# Patient Record
Sex: MALE | Race: WHITE | Employment: UNEMPLOYED | URBAN - METROPOLITAN AREA
[De-identification: names, ages, dates, MRNs, and addresses within clinical notes are randomized per-mention and may not be internally consistent; named-entity substitution may affect disease eponyms.]

---

## 2018-09-05 ENCOUNTER — APPOINTMENT (OUTPATIENT)
Dept: CT IMAGING | Age: 28
End: 2018-09-05
Payer: COMMERCIAL

## 2018-09-05 ENCOUNTER — HOSPITAL ENCOUNTER (EMERGENCY)
Age: 28
Discharge: HOME | End: 2018-09-05
Attending: EMERGENCY MEDICINE
Payer: COMMERCIAL

## 2018-09-05 VITALS
HEIGHT: 66 IN | WEIGHT: 130 LBS | OXYGEN SATURATION: 95 % | RESPIRATION RATE: 16 BRPM | BODY MASS INDEX: 20.89 KG/M2 | TEMPERATURE: 97.8 F | DIASTOLIC BLOOD PRESSURE: 70 MMHG | HEART RATE: 102 BPM | SYSTOLIC BLOOD PRESSURE: 117 MMHG

## 2018-09-05 DIAGNOSIS — F10.10 ALCOHOL ABUSE: ICD-10-CM

## 2018-09-05 DIAGNOSIS — F10.920 ACUTE ALCOHOLIC INTOXICATION WITHOUT COMPLICATION (HCC): ICD-10-CM

## 2018-09-05 DIAGNOSIS — S09.90XA CLOSED HEAD INJURY, INITIAL ENCOUNTER: ICD-10-CM
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
## 2018-09-05 PROCEDURE — 72125 CT NECK SPINE W/O DYE: CPT

## 2018-09-05 PROCEDURE — 99284 EMERGENCY DEPT VISIT MOD MDM: CPT

## 2018-09-05 PROCEDURE — 70450 CT HEAD/BRAIN W/O DYE: CPT

## 2018-09-05 ASSESSMENT — ENCOUNTER SYMPTOMS
SORE THROAT: 0
DIARRHEA: 0
RHINORRHEA: 0
SHORTNESS OF BREATH: 0
VOMITING: 0
ABDOMINAL PAIN: 0
COUGH: 0
NAUSEA: 0
BLOOD IN STOOL: 0
BACK PAIN: 0
CONSTIPATION: 0

## 2018-09-05 NOTE — ED NOTES
Bed: 10  Expected date:   Expected time:   Means of arrival:   Comments:  Thelma Vázquez [facility]  09/05/18 1027 [facility]

## 2018-09-07 ENCOUNTER — HOSPITAL ENCOUNTER (INPATIENT)
Age: 28
LOS: 6 days | Discharge: HOME | DRG: 753 | End: 2018-09-13
Attending: PSYCHIATRY & NEUROLOGY | Admitting: PSYCHIATRY & NEUROLOGY
Payer: COMMERCIAL

## 2018-09-07 PROBLEM — F32.2 MAJOR DEPRESSIVE DISORDER, SEVERE (HCC): Status: ACTIVE | Noted: 2018-09-07

## 2018-09-07 PROCEDURE — 6370000000 HC RX 637 (ALT 250 FOR IP): Performed by: NURSE PRACTITIONER

## 2018-09-07 PROCEDURE — 1240000000 HC EMOTIONAL WELLNESS R&B

## 2018-09-07 RX ORDER — OLANZAPINE 10 MG/1
10 TABLET ORAL
Status: COMPLETED | OUTPATIENT
Start: 2018-09-07 | End: 2018-09-07

## 2018-09-07 RX ORDER — BENZTROPINE MESYLATE 1 MG/ML
2 INJECTION INTRAMUSCULAR; INTRAVENOUS 2 TIMES DAILY PRN
Status: DISCONTINUED | OUTPATIENT
Start: 2018-09-07 | End: 2018-09-13 | Stop reason: HOSPADM

## 2018-09-07 RX ORDER — HALOPERIDOL 5 MG/ML
10 INJECTION INTRAMUSCULAR EVERY 6 HOURS PRN
Status: DISCONTINUED | OUTPATIENT
Start: 2018-09-07 | End: 2018-09-10 | Stop reason: SDUPTHER

## 2018-09-07 RX ORDER — NICOTINE 21 MG/24HR
1 PATCH, TRANSDERMAL 24 HOURS TRANSDERMAL DAILY
Status: DISCONTINUED | OUTPATIENT
Start: 2018-09-07 | End: 2018-09-13 | Stop reason: HOSPADM

## 2018-09-07 RX ORDER — CHLORDIAZEPOXIDE HYDROCHLORIDE 5 MG/1
5 CAPSULE, GELATIN COATED ORAL EVERY 6 HOURS PRN
Status: DISCONTINUED | OUTPATIENT
Start: 2018-09-07 | End: 2018-09-13 | Stop reason: HOSPADM

## 2018-09-07 RX ORDER — TRAZODONE HYDROCHLORIDE 50 MG/1
50 TABLET ORAL NIGHTLY PRN
Status: DISCONTINUED | OUTPATIENT
Start: 2018-09-07 | End: 2018-09-12

## 2018-09-07 RX ORDER — ACETAMINOPHEN 325 MG/1
650 TABLET ORAL EVERY 4 HOURS PRN
Status: DISCONTINUED | OUTPATIENT
Start: 2018-09-07 | End: 2018-09-13 | Stop reason: HOSPADM

## 2018-09-07 RX ORDER — MAGNESIUM HYDROXIDE/ALUMINUM HYDROXICE/SIMETHICONE 120; 1200; 1200 MG/30ML; MG/30ML; MG/30ML
30 SUSPENSION ORAL PRN
Status: DISCONTINUED | OUTPATIENT
Start: 2018-09-07 | End: 2018-09-13 | Stop reason: HOSPADM

## 2018-09-07 RX ORDER — SODIUM CHLORIDE 0.9 % (FLUSH) 0.9 %
10 SYRINGE (ML) INJECTION PRN
Status: DISCONTINUED | OUTPATIENT
Start: 2018-09-07 | End: 2018-09-08 | Stop reason: SDUPTHER

## 2018-09-07 RX ORDER — ONDANSETRON 2 MG/ML
4 INJECTION INTRAMUSCULAR; INTRAVENOUS EVERY 6 HOURS PRN
Status: DISCONTINUED | OUTPATIENT
Start: 2018-09-07 | End: 2018-09-13 | Stop reason: HOSPADM

## 2018-09-07 RX ORDER — HYDROXYZINE PAMOATE 50 MG/1
50 CAPSULE ORAL EVERY 6 HOURS PRN
Status: DISCONTINUED | OUTPATIENT
Start: 2018-09-07 | End: 2018-09-12

## 2018-09-07 RX ORDER — SODIUM CHLORIDE 0.9 % (FLUSH) 0.9 %
10 SYRINGE (ML) INJECTION EVERY 12 HOURS SCHEDULED
Status: DISCONTINUED | OUTPATIENT
Start: 2018-09-07 | End: 2018-09-08 | Stop reason: SDUPTHER

## 2018-09-07 RX ORDER — GABAPENTIN 100 MG/1
100 CAPSULE ORAL 2 TIMES DAILY
Status: DISCONTINUED | OUTPATIENT
Start: 2018-09-07 | End: 2018-09-08

## 2018-09-07 RX ADMIN — HYDROXYZINE PAMOATE 50 MG: 50 CAPSULE ORAL at 17:44

## 2018-09-07 RX ADMIN — OLANZAPINE 10 MG: 10 TABLET, FILM COATED ORAL at 20:16

## 2018-09-07 RX ADMIN — ACETAMINOPHEN 650 MG: 325 TABLET, FILM COATED ORAL at 17:45

## 2018-09-07 RX ADMIN — CHLORDIAZEPOXIDE HYDROCHLORIDE 5 MG: 5 CAPSULE ORAL at 20:16

## 2018-09-07 RX ADMIN — GABAPENTIN 100 MG: 100 CAPSULE ORAL at 20:16

## 2018-09-07 ASSESSMENT — SLEEP AND FATIGUE QUESTIONNAIRES
DIFFICULTY FALLING ASLEEP: YES
AVERAGE NUMBER OF SLEEP HOURS: 2
DIFFICULTY STAYING ASLEEP: YES
DO YOU USE A SLEEP AID: YES
DO YOU HAVE DIFFICULTY SLEEPING: YES
DIFFICULTY ARISING: NO
SLEEP PATTERN: DIFFICULTY FALLING ASLEEP;DISTURBED/INTERRUPTED SLEEP;INSOMNIA;NIGHTMARES/TERRORS
RESTFUL SLEEP: NO

## 2018-09-07 ASSESSMENT — PAIN DESCRIPTION - ONSET
ONSET: ON-GOING
ONSET: OTHER (COMMENT)

## 2018-09-07 ASSESSMENT — LIFESTYLE VARIABLES: HISTORY_ALCOHOL_USE: YES

## 2018-09-07 ASSESSMENT — PAIN SCALES - GENERAL
PAINLEVEL_OUTOF10: 10
PAINLEVEL_OUTOF10: 0
PAINLEVEL_OUTOF10: 6

## 2018-09-07 ASSESSMENT — PAIN DESCRIPTION - DESCRIPTORS: DESCRIPTORS: ACHING;CRAMPING;JABBING

## 2018-09-07 ASSESSMENT — PAIN DESCRIPTION - FREQUENCY
FREQUENCY: INTERMITTENT
FREQUENCY: CONTINUOUS

## 2018-09-07 ASSESSMENT — PAIN DESCRIPTION - PAIN TYPE
TYPE: ACUTE PAIN
TYPE: ACUTE PAIN

## 2018-09-07 ASSESSMENT — PAIN DESCRIPTION - LOCATION
LOCATION: BACK;ARM
LOCATION: BACK

## 2018-09-07 ASSESSMENT — PAIN DESCRIPTION - PROGRESSION
CLINICAL_PROGRESSION: GRADUALLY WORSENING
CLINICAL_PROGRESSION: NOT CHANGED

## 2018-09-07 ASSESSMENT — PAIN DESCRIPTION - ORIENTATION: ORIENTATION: LOWER;MID;UPPER

## 2018-09-07 ASSESSMENT — PATIENT HEALTH QUESTIONNAIRE - PHQ9: SUM OF ALL RESPONSES TO PHQ QUESTIONS 1-9: 22

## 2018-09-08 PROBLEM — F31.4 SEVERE DEPRESSED BIPOLAR I DISORDER WITHOUT PSYCHOTIC FEATURES (HCC): Status: ACTIVE | Noted: 2018-09-08

## 2018-09-08 LAB
CHOLESTEROL, TOTAL: 189 MG/DL (ref 0–199)
CREAT SERPL-MCNC: 0.8 MG/DL (ref 0.7–1.2)
GFR AFRICAN AMERICAN: >60
GFR NON-AFRICAN AMERICAN: >60 ML/MIN/1.73
HBA1C MFR BLD: 5.1 % (ref 4–5.6)
HDLC SERPL-MCNC: 48 MG/DL
LDL CHOLESTEROL CALCULATED: 106 MG/DL (ref 0–99)
TRIGL SERPL-MCNC: 176 MG/DL (ref 0–149)
VLDLC SERPL CALC-MCNC: 35 MG/DL

## 2018-09-08 PROCEDURE — 36415 COLL VENOUS BLD VENIPUNCTURE: CPT

## 2018-09-08 PROCEDURE — 83036 HEMOGLOBIN GLYCOSYLATED A1C: CPT

## 2018-09-08 PROCEDURE — 6370000000 HC RX 637 (ALT 250 FOR IP): Performed by: PSYCHIATRY & NEUROLOGY

## 2018-09-08 PROCEDURE — 99222 1ST HOSP IP/OBS MODERATE 55: CPT | Performed by: PSYCHIATRY & NEUROLOGY

## 2018-09-08 PROCEDURE — 1240000000 HC EMOTIONAL WELLNESS R&B

## 2018-09-08 PROCEDURE — 6370000000 HC RX 637 (ALT 250 FOR IP): Performed by: NURSE PRACTITIONER

## 2018-09-08 PROCEDURE — 82565 ASSAY OF CREATININE: CPT

## 2018-09-08 PROCEDURE — 80061 LIPID PANEL: CPT

## 2018-09-08 RX ORDER — BENZTROPINE MESYLATE 1 MG/ML
2 INJECTION INTRAMUSCULAR; INTRAVENOUS 2 TIMES DAILY PRN
Status: DISCONTINUED | OUTPATIENT
Start: 2018-09-08 | End: 2018-09-13 | Stop reason: HOSPADM

## 2018-09-08 RX ORDER — ACETAMINOPHEN 325 MG/1
650 TABLET ORAL EVERY 4 HOURS PRN
Status: DISCONTINUED | OUTPATIENT
Start: 2018-09-08 | End: 2018-09-13 | Stop reason: HOSPADM

## 2018-09-08 RX ORDER — OLANZAPINE 10 MG/1
10 TABLET ORAL
Status: ACTIVE | OUTPATIENT
Start: 2018-09-08 | End: 2018-09-08

## 2018-09-08 RX ORDER — DIVALPROEX SODIUM 500 MG/1
500 TABLET, EXTENDED RELEASE ORAL EVERY EVENING
Status: DISCONTINUED | OUTPATIENT
Start: 2018-09-08 | End: 2018-09-10

## 2018-09-08 RX ORDER — TRAZODONE HYDROCHLORIDE 50 MG/1
50 TABLET ORAL NIGHTLY PRN
Status: DISCONTINUED | OUTPATIENT
Start: 2018-09-08 | End: 2018-09-08

## 2018-09-08 RX ORDER — HYDROXYZINE PAMOATE 50 MG/1
50 CAPSULE ORAL EVERY 6 HOURS PRN
Status: DISCONTINUED | OUTPATIENT
Start: 2018-09-08 | End: 2018-09-08

## 2018-09-08 RX ORDER — SODIUM CHLORIDE 0.9 % (FLUSH) 0.9 %
10 SYRINGE (ML) INJECTION PRN
Status: DISCONTINUED | OUTPATIENT
Start: 2018-09-08 | End: 2018-09-13 | Stop reason: HOSPADM

## 2018-09-08 RX ORDER — HALOPERIDOL 5 MG/ML
10 INJECTION INTRAMUSCULAR EVERY 6 HOURS PRN
Status: DISCONTINUED | OUTPATIENT
Start: 2018-09-08 | End: 2018-09-13 | Stop reason: HOSPADM

## 2018-09-08 RX ORDER — SODIUM CHLORIDE 0.9 % (FLUSH) 0.9 %
10 SYRINGE (ML) INJECTION EVERY 12 HOURS SCHEDULED
Status: DISCONTINUED | OUTPATIENT
Start: 2018-09-08 | End: 2018-09-08

## 2018-09-08 RX ORDER — PALIPERIDONE 3 MG/1
3 TABLET, EXTENDED RELEASE ORAL DAILY
Status: DISCONTINUED | OUTPATIENT
Start: 2018-09-08 | End: 2018-09-10

## 2018-09-08 RX ORDER — MAGNESIUM HYDROXIDE/ALUMINUM HYDROXICE/SIMETHICONE 120; 1200; 1200 MG/30ML; MG/30ML; MG/30ML
30 SUSPENSION ORAL PRN
Status: DISCONTINUED | OUTPATIENT
Start: 2018-09-08 | End: 2018-09-13 | Stop reason: HOSPADM

## 2018-09-08 RX ADMIN — PALIPERIDONE 3 MG: 3 TABLET, EXTENDED RELEASE ORAL at 11:49

## 2018-09-08 RX ADMIN — CHLORDIAZEPOXIDE HYDROCHLORIDE 5 MG: 5 CAPSULE ORAL at 15:06

## 2018-09-08 RX ADMIN — HYDROXYZINE PAMOATE 50 MG: 50 CAPSULE ORAL at 13:22

## 2018-09-08 RX ADMIN — DIVALPROEX SODIUM 500 MG: 500 TABLET, EXTENDED RELEASE ORAL at 17:22

## 2018-09-08 RX ADMIN — HYDROXYZINE PAMOATE 50 MG: 50 CAPSULE ORAL at 21:12

## 2018-09-08 RX ADMIN — CHLORDIAZEPOXIDE HYDROCHLORIDE 5 MG: 5 CAPSULE ORAL at 21:12

## 2018-09-08 RX ADMIN — GABAPENTIN 100 MG: 100 CAPSULE ORAL at 08:38

## 2018-09-08 RX ADMIN — CHLORDIAZEPOXIDE HYDROCHLORIDE 5 MG: 5 CAPSULE ORAL at 08:42

## 2018-09-08 NOTE — PROGRESS NOTES
greg 7 pt. Irritable because he did not get what he wanted for lunch dietary to send new lunch for pt.

## 2018-09-08 NOTE — PROGRESS NOTES
`[facility]  Admission Note     Admission Type:   Admission Type: Involuntary    Reason for admission:  Reason for Admission: \"I'M SCARED AND SAID SOME SUICIDAL THINGS, MY MOM SAID GO JUMP IN A RIVER AND SHE CALLED THE \"    PATIENT STRENGTHS:  Strengths: No significant Physical Illness    Patient Strengths and Limitations:  Limitations: Tendency to isolate self, Inappropriate/potentially harmful leisure interests, Hopeless about future    Addictive Behavior:   Addictive Behavior  In the past 3 months, have you felt or has someone told you that you have a problem with:  : None  Do you have a history of Chemical Use?: No  Do you have a history of Alcohol Use?: Yes  Do you have a history of Street Drug Abuse?: Yes  Histroy of Prescripton Drug Abuse?: No    Medical Problems:   Past Medical History:   Diagnosis Date    Anxiety     Arm pain     Convulsions (HCC)     Depression     Flashing lights     Head injury     Headache     Leg pain     Numbness and tingling     arms and hands    PTSD (post-traumatic stress disorder)     Seizures (HCC)        Status EXAM:  Status and Exam  Normal: No  Facial Expression: Avoids Gaze, Exaggerated, Sad, Worried  Affect: Unstable  Level of Consciousness: Confused  Mood:Normal: No  Mood: Depressed, Anxious, Ashamed/humiliated, Despair, Sad, Helpless  Motor Activity:Normal: No  Motor Activity: Agitated, Tremors  Interview Behavior: Evasive, Impulsive  Preception: Russell to Person, Russell to Place, Russell to Situation  Attention:Normal: No  Attention: Distractible, Unable to Concentrate  Thought Processes: Tangential, Flt.of Ideas, Loose Assoc. Thought Content:Normal: No  Thought Content: Preoccupations  Hallucinations: Visual (Comment), Auditory (Comment), Tactile (Comment)  Delusions: Yes  Delusions:  Other(See Comment) (PARANOID)  Memory:Normal: No  Memory: Confabulation, Poor Recent, Poor Remote  Insight and Judgment: No  Insight and Judgment: Poor

## 2018-09-08 NOTE — H&P
PSYCHIATRIC EVALUATION  (HISTORY & PHYSICAL)     CHIEF COMPLAINT:   [x] Mood Problems [] Anxiety Problems [x] Psychosis                    [x] Suicidal/Homicidal   [] Aggression  [] Other    HISTORY OF PRESENT ILLNESS: Rusty Christianson  is a 24 y.o. male who has a previous psychiatric history of depression, alcoholism medication noncompliance. Pt  presents for admission with depression, sucidal ideation, and alcohol withdrawal. He stopped psych meds 4 months ago. Symptoms onset was a 4 days ago and worsened when he cried over his girlfriend, his mother told him to kill himself and he said \"I'm going to kill myself\" and his mother called police. Police pink slipped him. This presentation associates with depression, anxiety and worsened by medication noncompliance, GF suicide attempt and relapse on ETOH.      Precipitating Factors:     [x] Family Stress   [] Recent loss/grief Stress   [] Health Stress   [x] Relationship Stress    [] Legal Stress   [] Environmental Stress    [] Occupational Stress   [] Financial Stress   [] Substance Abuse [x]   Other: ETOH abuse     PAST PSYCHIATRIC HISTORY:   History of psychiatric Hospitalization:    [] Denies    [x] yes  [] Days ago     []  Weeks Ago    [] Months ago  [] Years ago              [] [facility]  [] [facility]  [x] Other: Leelee Finley [address]          [x] Once  [] More than once    Outpatient treatment:  [] [facility]  [x] [facility]  [] [facility]              [] [facility]  [] [facility]      [] [address] [place] [address] [] [facility]      [] [facility] [] [facility]  [] [facility] [] [facility]             [x] currently  [] in the past  [] Non-Compliant    [] Denies    Previous suicide attempt: [x]Denies            [] yes  [] OD  [] Cutting  [] Hanging  [] Gun  [] Other    Previous psych medications:  [x] Was prescribed depakote, effexor, abilify, gabapentin, prozac               [] Currently Taking       [] Never taken medications      PAST MEDICAL HISTORY:       Diagnosis Date    Anxiety     Arm pain     Convulsions (HCC)     Depression     Flashing lights     Head injury     Headache     Leg pain     Numbness and tingling     arms and hands    PTSD (post-traumatic stress disorder)     Seizures (HCC)        FAMILY PSYCHIATRIC HISTORY:  Family History   Problem Relation Age of Onset    Mental Illness Mother     Substance Abuse Mother     Cancer Father     Substance Abuse Father     Mental Illness Sister     Substance Abuse Sister     Mental Illness Brother     Substance Abuse Maternal Aunt     Substance Abuse Maternal Uncle     Substance Abuse Paternal Aunt     Substance Abuse Paternal Uncle            [] Denies       [x] Endorses               [] Father                [] Depression  [] Anxiety  [] Bipolar  [] Psychosis  []  Other                  [] Mother               [] Depression  [] Anxiety  [] Bipolar  [] Psychosis  []  Other                  [x] Sibling               [] Depression  [] Anxiety  [x] Bipolar, sister, mother [] Psychosis  [x]  Other: Autism, OCD  [x] alcoholic dependence             [] Grandparent               [] Depression  [] Anxiety  [] Bipolar  [] Psychosis  []  Other       SOCIAL HISTORY:     1. Living Situation:[x] Private Residence, lives with mother [] Homeless [] [address]             [] Assisted Living [] [address]  [] Shelter [] Other   2. Employment:  [] Unemployed  [x] Employed  [] Disabled  [] Retired   1. Legal History: [x] No Arrest [] Arrest  [] Theft  []  Assault  [] Substances   4. History of Trama/ Abuse: [x] Denies  [] Emotional [] Physical [] Sexual   5. Spirituality: [] Spiritual [x] Not Spiritual   6. Substance Abuse: [] Denies  [x] Drug of choice - ETOH      [] Amphetamines [x] Marijuana [] Cocaine      [] Opioids  [x] Alcohol  [] Benzodiazepines     For further SH review SW note. Risk Assessment:  1.  Risk Factors:   [x] Depression  [x] Anxiety  [] Psychosis   [x] Suicidal/Homicidal Thoughts [] Suicide Attempt [x] Substance Abuse 2. Protective Factors: [] Controlled Environment         [] Supportive Family []         [] Christian Support     3. Level of Risk: [] Mild [] Moderate [x] Severe      Strengths & Weaknesses:    1. Strengths: [] Ability to communicate feelings     [x] Independent ADL's     [] Supportive Family    [x] Current Health Status     2.  Weaknesses: [x] Emotional          [x] Motivational     MEDICATIONS: Current Facility-Administered Medications: acetaminophen (TYLENOL) tablet 650 mg, 650 mg, Oral, Q4H PRN  OLANZapine (ZYPREXA) tablet 10 mg, 10 mg, Oral, Once PRN  haloperidol lactate (HALDOL) injection 10 mg, 10 mg, Intramuscular, Q6H PRN  benztropine mesylate (COGENTIN) injection 2 mg, 2 mg, Intramuscular, BID PRN  aluminum & magnesium hydroxide-simethicone (MAALOX) 200-200-20 MG/5ML suspension 30 mL, 30 mL, Oral, PRN  paliperidone (INVEGA) extended release tablet 3 mg, 3 mg, Oral, Daily  divalproex (DEPAKOTE ER) extended release tablet 500 mg, 500 mg, Oral, QPM  sodium chloride flush 0.9 % injection 10 mL, 10 mL, Intravenous, 2 times per day  sodium chloride flush 0.9 % injection 10 mL, 10 mL, Intravenous, PRN  enoxaparin (LOVENOX) injection 40 mg, 40 mg, Subcutaneous, Daily  acetaminophen (TYLENOL) tablet 650 mg, 650 mg, Oral, Q4H PRN  hydrOXYzine (VISTARIL) capsule 50 mg, 50 mg, Oral, Q6H PRN  haloperidol lactate (HALDOL) injection 10 mg, 10 mg, Intramuscular, Q6H PRN  traZODone (DESYREL) tablet 50 mg, 50 mg, Oral, Nightly PRN  benztropine mesylate (COGENTIN) injection 2 mg, 2 mg, Intramuscular, BID PRN  magnesium hydroxide (MILK OF MAGNESIA) 400 MG/5ML suspension 30 mL, 30 mL, Oral, Daily PRN  aluminum & magnesium hydroxide-simethicone (MAALOX) 200-200-20 MG/5ML suspension 30 mL, 30 mL, Oral, PRN  nicotine (NICODERM CQ) 21 MG/24HR 1 patch, 1 patch, Transdermal, Daily  sodium chloride flush 0.9 % injection 10 mL, 10 mL, Intravenous, 2 times per day  sodium chloride flush 0.9 % injection 10 mL, 10 mL, [x] Limited    Judgement:  [] Intact  [] Fair  [x] Limited        ASSESSMENT  Patient Active Problem List   Diagnosis    [facility] withdrawal syndrome [facility] complication [facility]    Alcoholism ([facility]    Acute alcoholic intoxication ([facility]    Severe single current episode of major depressive disorder, without psychotic features ([facility]    Marijuana smoker    Major depressive disorder, severe ([facility]    Severe depressed bipolar I disorder without psychotic features ([facility]     Recommendations and plan of treatment:  1- admit to inpatient unit  2- Unit milleiu   3- Medication Management I discussed risk benefits and side effects of medications. Patient is aware and willing to comply with treatment. 4- Group therapy and one on one. 5- Routine precautions    Start depakote, paliperidone, nicoderm. CDP 5 mg q6h prn    CIWA protocol    Signed:  Denny Davenport  9/8/2018  2:22 PM     I saw and examined the patient and I agree with the above documentation.

## 2018-09-08 NOTE — PROGRESS NOTES
Group Therapy Note    Date: 9/8/2018  Start Time:  11:00  End Time:  11:35  Number of Participants: 14    Type of Group: Psychoeducation    Wellness Binder Information  Module Name:  Mindful Response to Thoughts  Session Number:  APPLE    Patient's Goal:  Patient will identify ways to utilize the APPLE acronym in recovery. Notes:  Patient was interactive during group sharing ways to utilize the APPLE acronym in recovery. Patient gave support and feedback to others. Status After Intervention:  Improved    Participation Level:  Active Listener and Interactive    Participation Quality: Appropriate, Attentive, Sharing and Supportive      Speech:  normal      Thought Process/Content: Logical      Affective Functioning: Congruent      Mood: depressed      Level of consciousness:  Alert, Oriented x4 and Attentive      Response to Learning: Able to verbalize current knowledge/experience, Able to verbalize/acknowledge new learning, Able to retain information, Capable of insight, Able to change behavior and Progressing to goal      Endings: None Reported    Modes of Intervention: Education, Support, Socialization, Exploration, Clarifying, Problem-solving and Activity      Discipline Responsible: Psychoeducational Specialist      Signature:  Melissa Snell

## 2018-09-08 NOTE — PROGRESS NOTES
Group Therapy Note    Date: 9/8/2018  Start Time: 10:00 am  End Time:  10:45 am  Number of Participants: 15     Type of Group: Recovery     Wellness Binder Information  Module Name:  francoise  Session Number:  na     Patient's Goal:  To practice mindfulness techniques    Notes:  Pt was open to group topic and engaged in discussion. Status After Intervention:  Improved    Participation Level:  Active Listener and Interactive    Participation Quality: Appropriate, Attentive and Sharing      Speech:  normal      Thought Process/Content: Logical  Linear      Affective Functioning: Congruent      Mood: euthymic      Level of consciousness:  Alert, Oriented x4 and Attentive      Response to Learning: Able to verbalize current knowledge/experience      Endings: None Reported    Modes of Intervention: Education, Support, Socialization, Exploration, Clarifying, Problem-solving and Activity      Discipline Responsible: /Counselor      Signature:  Lennox Hinojosa MSW, LSJEANCARLOS

## 2018-09-09 PROCEDURE — 99231 SBSQ HOSP IP/OBS SF/LOW 25: CPT | Performed by: PSYCHIATRY & NEUROLOGY

## 2018-09-09 PROCEDURE — 6370000000 HC RX 637 (ALT 250 FOR IP): Performed by: NURSE PRACTITIONER

## 2018-09-09 PROCEDURE — 1240000000 HC EMOTIONAL WELLNESS R&B

## 2018-09-09 PROCEDURE — 6370000000 HC RX 637 (ALT 250 FOR IP): Performed by: PSYCHIATRY & NEUROLOGY

## 2018-09-09 RX ADMIN — HYDROXYZINE PAMOATE 50 MG: 50 CAPSULE ORAL at 13:17

## 2018-09-09 RX ADMIN — CHLORDIAZEPOXIDE HYDROCHLORIDE 5 MG: 5 CAPSULE ORAL at 20:47

## 2018-09-09 RX ADMIN — CHLORDIAZEPOXIDE HYDROCHLORIDE 5 MG: 5 CAPSULE ORAL at 09:56

## 2018-09-09 RX ADMIN — PALIPERIDONE 3 MG: 3 TABLET, EXTENDED RELEASE ORAL at 09:12

## 2018-09-09 RX ADMIN — HYDROXYZINE PAMOATE 50 MG: 50 CAPSULE ORAL at 20:47

## 2018-09-09 RX ADMIN — DIVALPROEX SODIUM 500 MG: 500 TABLET, EXTENDED RELEASE ORAL at 17:47

## 2018-09-09 NOTE — PROGRESS NOTES
DATE OF SERVICE:     9/9/2018    Eusebio Lock seen today for the purpose of continuation of care. Nursing, social work reports, laboratory studies and vital signs are reviewed. Patient chief complaint today is:             [x] Depression      [] Anxiety        [] Psychosis         [] Suicidal/Homicidal                         [] Delusions           [] Aggression          Subjective: Today patient states that he's feeling pretty good. States that his depression and anxiety is moderate and sporadic. Denies SI, HI, and AVH. Sleep:  [] Good [x] Fair  [] Poor  Appetite:  [] Good [x] Fair  [] Poor    Depression:  [] Mild [x] Moderate [] Severe                [] Constant [x] Sporadic     Anxiety: [] Mild [] Moderate [] Severe    [] Constant [] Sporadic     Delusions: [] Mild [] Moderate [] Severe     [] Constant [] Sporadic     [] Paranoid [] Somatic [] Grandiose     Hallucinations: [] Mild [] Moderate [] Severe     [] Constant [] Sporadic    [] Auditory  [] Visual [] Tactile       Suicidal: [] Constant [] Sporadic  Homicidal: [] Constant [] Sporadic    Unscheduled Medications     [] Patient Receiving Emergency Medications \" Chemical Restraint\"   [x] Requesting PRN medications for anxiety    Medical Review of Systems:     All other than marked systmes have been reviewed and are all negative.     Constitutional Symptoms: []  fever []  Chills  Skin Symptoms: [] rash []  Pruritus   Eye Symptoms: [] Vision unchanged []  recent vision problems[] blurred vision   Respiratory Symptoms:[] shortness of breath [] cough  Cardiovascular Symptoms:  [] chest pain   [] palpitations   Gastrointestinal Symptoms: []  abdominal pain []  nausea []  vomiting []  diarrhea  Genitourinary Symptoms: []  dysuria  []  hematuria   Musculoskeletal Symptoms: []  back pain []  muscle pain []  joint pain  Neurologic Symptoms: []  headache []  dizziness  Hematolymphoid Symptoms: [] Adenopathy [] Bruises   [] Schimosis       Psychiatric Review of systems  Delusions:  [] Denies [] Endorses   Withdrawals:  [] Denies [] Endorses    Hallucinations: [] Denies [] Endorses    Extra Pyramidal Symptoms: [] Denies [] Endorses      /63   Pulse 50   Temp 98 °F (36.7 °C) (Oral)   Resp 16   Ht 5' 6\" (1.676 m)   Wt 144 lb 2 oz (65.4 kg)   SpO2 98%   BMI 23.26 kg/m²     Mental Status Examination:    Cognition:      [x] Alert  [x] Awake  [x] Oriented  [x] Person  [x] Place [x] Time      [] drowsy  [] tired  [] lethargic  [] distractable  [] Other    Attention/Concentration:   [x] Attentive  [] Distracted        Memory Recent and Remote: [] Intact   [] Impaired [] Partially Impaired     Language: [] Able to recognize and name objects          [] Unable to recognize and name Objects    Fund of Knowledge:  [] Poor [x]  Fair  [] Good    Speech: [] Normal  [x] Soft  [] Slow  [] Fast [] Pressured            [] Loud [] Dysarthria  [] Incoherent    Appearance: [] Well Groomed  [] Casual Dressed  [] Unkept  [x] Disheveled          [x] Normal weight[] Thin  [] Overweight  [] Obese           Attitude: [] Positive  [] Hostile  [] Demanding  [x] Guarded  [] Defensive         [x] Cooperative  []  Uncooperative      Behavior:  [x] Normal Gait  [] Walks with Assistance  [] Kayleigh Chair     [] Walks with Tiara   [] In Hospital Bed  [] Sitting in Chair    Muscle-Skeletal:  [x] Normal Muscle Tone [] Muscle Atrophy       [] Abnormal Muscle Movement     Eye Contact:  [] Good eye contact  [x] Intermittent Eye Contact  [] Poor Eye Contact        [] Excessive Eye Contact   [] Intrusive    Mood: [x] Depressed  [] Anxious  [] Irritated  [] Euthymic   [] Angry [] Restless                    [] Apathetic    Affect:  [x] Congruent  [] Incongruent  [] Labile  [x] Constricted  [x] Flat  [] Bizarre                     [] Heightened  [] Exaggerated      Thought Process and Association:  [x] Logical [] Illogical       [] Linear and Goal Directed  [] Tangential  [x] Circumstantial Thought Content:  [x] Denies [] Endorses [] Suicidal [] Homicidal  [] Delusional      [] Paranoid  [] Somatic  [] Grandiose    Perception: [x]  None  [] Auditory   [] Visual  [] tactile   [] olfactory  [] Illusions         Insight: [] Intact  [] Fair  [x] Limited    Judgement:  [] Intact  [] Fair  [x] Limited      Assessment/Plan:        Patient Active Problem List   Diagnosis Code    Alcohol withdrawal syndrome without complication ([facility] B59.407    Alcoholism ([facility] F10.20    Acute alcoholic intoxication ([facility] F10.929    Severe single current episode of major depressive disorder, without psychotic features ([facility] F32.2    Marijuana smoker F12.90    Major depressive disorder, severe ([facility] F32.2    Severe depressed bipolar I disorder without psychotic features ([facility] F31.4         Plan:    []  Patient is refusing medications  [x] Improving as expected   [] Not improving as expected   [] Worsening    []  At Baseline     Continue current treatment  [facility]ing for bed at [facility].       Reason for more than one antipsychotic:  [x] N/A  [] 3 failed monotherapy(drugs tried):  [] Cross over to a new antipsychotic  [] Taper to monotherapy from polypharmacy  [] Augmentation of Clozapine therapy due to treatment resistance to single therapy      Signed:  Georgi Mcdonald  9/9/2018  4:35 PM

## 2018-09-09 NOTE — PROGRESS NOTES
Patient attended community meeting/morning stretch. Patient identified goal for the day as:  \"Be able to sleep tonight\"

## 2018-09-10 PROCEDURE — 99232 SBSQ HOSP IP/OBS MODERATE 35: CPT | Performed by: PSYCHIATRY & NEUROLOGY

## 2018-09-10 PROCEDURE — 6370000000 HC RX 637 (ALT 250 FOR IP): Performed by: NURSE PRACTITIONER

## 2018-09-10 PROCEDURE — 1240000000 HC EMOTIONAL WELLNESS R&B

## 2018-09-10 PROCEDURE — 6360000002 HC RX W HCPCS: Performed by: NURSE PRACTITIONER

## 2018-09-10 PROCEDURE — 6370000000 HC RX 637 (ALT 250 FOR IP): Performed by: PSYCHIATRY & NEUROLOGY

## 2018-09-10 RX ORDER — DIVALPROEX SODIUM 500 MG/1
1000 TABLET, EXTENDED RELEASE ORAL EVERY EVENING
Status: DISCONTINUED | OUTPATIENT
Start: 2018-09-10 | End: 2018-09-12

## 2018-09-10 RX ORDER — PALIPERIDONE 6 MG/1
6 TABLET, EXTENDED RELEASE ORAL DAILY
Status: DISCONTINUED | OUTPATIENT
Start: 2018-09-11 | End: 2018-09-13 | Stop reason: HOSPADM

## 2018-09-10 RX ADMIN — CHLORDIAZEPOXIDE HYDROCHLORIDE 5 MG: 5 CAPSULE ORAL at 09:26

## 2018-09-10 RX ADMIN — CHLORDIAZEPOXIDE HYDROCHLORIDE 5 MG: 5 CAPSULE ORAL at 20:55

## 2018-09-10 RX ADMIN — HYDROXYZINE PAMOATE 50 MG: 50 CAPSULE ORAL at 09:26

## 2018-09-10 RX ADMIN — BENZTROPINE MESYLATE 2 MG: 1 INJECTION INTRAMUSCULAR; INTRAVENOUS at 12:34

## 2018-09-10 RX ADMIN — HALOPERIDOL LACTATE 10 MG: 5 INJECTION, SOLUTION INTRAMUSCULAR at 12:33

## 2018-09-10 RX ADMIN — PALIPERIDONE 3 MG: 3 TABLET, EXTENDED RELEASE ORAL at 08:43

## 2018-09-10 RX ADMIN — DIVALPROEX SODIUM 1000 MG: 500 TABLET, EXTENDED RELEASE ORAL at 17:06

## 2018-09-10 ASSESSMENT — PAIN SCALES - GENERAL: PAINLEVEL_OUTOF10: 0

## 2018-09-10 NOTE — PROGRESS NOTES
Directed  [] Tangential  [x] Circumstantial     Thought Content:  [] Denies [] Endorses [] Suicidal [] Homicidal  [x] Delusional      [x] Paranoid  [] Somatic  [] Grandiose    Perception: [x]  None  [] Auditory   [] Visual  [] tactile   [] olfactory  [] Illusions         Insight: [] Intact  [] Fair  [x] Limited    Judgement:  [] Intact  [] Fair  [x] Limited      Assessment/Plan:        Patient Active Problem List   Diagnosis Code    [facility] E82.351    Alcoholism ([facility] F10.20    Acute alcoholic intoxication ([facility] F10.929    Severe single current episode of major depressive disorder, without psychotic features ([facility] F32.2    Marijuana smoker F12.90    Major depressive disorder, severe ([facility] F32.2    Severe depressed bipolar I disorder without psychotic features ([facility] F31.4         Plan:    []  Patient is refusing medications  [] Improving as expected   [x] Not improving as expected Will increase Depakote to 1,000 mg daily and increase Invega to 6 mg daily. Will petition court for [facility]. Spoke with patient's mother who states that the patient is manipulative and drinks alcohol frequently. She also states patient does not make it to his appointments or routinely take his medications.     [] Worsening    []  At Baseline       Reason for more than one antipsychotic:  [x] N/A  [] 3 failed monotherapy(drugs tried):  [] Cross over to a new antipsychotic  [] Taper to monotherapy from polypharmacy  [] Augmentation of Clozapine therapy due to treatment resistance to single therapy      Signed:  Prabhu Carlos  9/10/2018  2:54 PM    I saw and examined the patient and I agree with the above documentation.

## 2018-09-11 PROCEDURE — 6370000000 HC RX 637 (ALT 250 FOR IP): Performed by: PSYCHIATRY & NEUROLOGY

## 2018-09-11 PROCEDURE — 6370000000 HC RX 637 (ALT 250 FOR IP): Performed by: NURSE PRACTITIONER

## 2018-09-11 PROCEDURE — 1240000000 HC EMOTIONAL WELLNESS R&B

## 2018-09-11 PROCEDURE — 99231 SBSQ HOSP IP/OBS SF/LOW 25: CPT | Performed by: PSYCHIATRY & NEUROLOGY

## 2018-09-11 RX ADMIN — DIVALPROEX SODIUM 1000 MG: 500 TABLET, EXTENDED RELEASE ORAL at 17:11

## 2018-09-11 RX ADMIN — HYDROXYZINE PAMOATE 50 MG: 50 CAPSULE ORAL at 09:48

## 2018-09-11 RX ADMIN — CHLORDIAZEPOXIDE HYDROCHLORIDE 5 MG: 5 CAPSULE ORAL at 09:48

## 2018-09-11 RX ADMIN — PALIPERIDONE 6 MG: 6 TABLET, EXTENDED RELEASE ORAL at 08:36

## 2018-09-11 ASSESSMENT — PAIN SCALES - GENERAL: PAINLEVEL_OUTOF10: 0

## 2018-09-11 NOTE — CARE COORDINATION
Pt met in treatment team meeting regarding case, pt had various questions regarding hearing. Pt affect flat and depressed. SW will await court hearing and will await hearing regarding discharge plan (possible [place] prior to discharge home).

## 2018-09-11 NOTE — PROGRESS NOTES
Date: 9/11/2018  Start Time: 100  End Time:  200  Number of Participants: 8     Type of Group: Recovery     Wellness Binder Information  Module Name:  na  Session Number:  na     Patient's Goal: Patient Safety Plan     Notes:  Pt was engaged in group session and was able to identify warning signs, coping strategies, supports and reasons to stay mentally healthy. Coping mechanisms were discussed. Status After Intervention:  Improved     Participation Level:  Active Listener and Interactive     Participation Quality: Appropriate, Attentive, Sharing and Supportive     Speech:  normal     Thought Process/Content: Logical  Linear     Affective Functioning: Congruent     Mood: euthymic     Level of consciousness:  Alert, Oriented x4 and Attentive     Response to Learning: Able to verbalize current knowledge/experience     Endings: None Reported     Modes of Intervention: Education, Support, Socialization, Exploration, Clarifying, Problem-solving and Activity     Discipline Responsible: /Counselor

## 2018-09-11 NOTE — PROGRESS NOTES
Group Therapy Note    Date: 9/11/2018  Start Time: 10:00 am  End Time:  11:00 am  Number of Participants: 10    Type of Group: Psychoeducation    Wellness Binder Information  Module Name:  Coping and Affirmations  Session Number:  N/A    Patient's Goal:  Patient will recognize impact stress on health, and identify one affirmation helpful to add to recovery plan. Notes:  Positively participated in discussion. Able to share stressor and useful affirmation with peers. Status After Intervention:  Improved    Participation Level:  Active Listener and Interactive    Participation Quality: Appropriate and Attentive      Speech:  normal      Thought Process/Content: Logical      Affective Functioning: Congruent      Mood: depressed      Level of consciousness:  Alert      Response to Learning: Progressing to goal      Endings: None Reported    Modes of Intervention: Education, Support, Socialization and Exploration      Discipline Responsible: Psychoeducational Specialist      Signature:  Mandy Da Silva, [address]

## 2018-09-11 NOTE — PROGRESS NOTES
In bed after lunch, resting. Pt. is quiet with low profile. Did attend treatment team. Has been in control  and has been medication complaint, but does not attend most groups. Pt. is concerned about court placement and is requesting transfer to [facility]. Pt. denies suicidal ideation, homicidal ideation, and hallucinations. Pt. Has no insight in regards to need to be in hospital, and stated belief that being in  This hospital is \"detrimental' to this pt. Rene Jordan

## 2018-09-11 NOTE — PROGRESS NOTES
DATE OF SERVICE:     9/11/2018    Ronn Howell seen today for the purpose of continuation of care. Nursing, social work reports, laboratory studies and vital signs are reviewed. Patient chief complaint today is:             [x] Depression      [x] Anxiety        [] Psychosis         [] Suicidal/Homicidal                         [x] Delusions           [] Aggression          Subjective: Today patient states that \"I want transferred to [facility]. I don't want to be here anymore and I just feel anxious. \"  Patient denies SI, HI, or AVH. Sleep:  [] Good [x] Fair  [] Poor  Appetite:  [] Good [x] Fair  [] Poor    Depression:  [] Mild [] Moderate [x] Severe                [x] Constant [] Sporadic     Anxiety: [] Mild [] Moderate [x] Severe    [x] Constant [] Sporadic     Delusions: [] Mild [] Moderate [x] Severe     [x] Constant [] Sporadic     [x] Paranoid [] Somatic [] Grandiose     Hallucinations: [] Mild [] Moderate [] Severe     [] Constant [] Sporadic    [] Auditory  [] Visual [] Tactile       Suicidal: [] Constant [] Sporadic  Homicidal: [] Constant [] Sporadic    Unscheduled Medications     [] Patient Receiving Emergency Medications \" Chemical Restraint\"   [] Requesting PRN medications for anxiety    Medical Review of Systems:     All other than marked systmes have been reviewed and are all negative.     Constitutional Symptoms: []  fever []  Chills  Skin Symptoms: [] rash []  Pruritus   Eye Symptoms: [] Vision unchanged []  recent vision problems[] blurred vision   Respiratory Symptoms:[] shortness of breath [] cough  Cardiovascular Symptoms:  [] chest pain   [] palpitations   Gastrointestinal Symptoms: []  abdominal pain []  nausea []  vomiting []  diarrhea  Genitourinary Symptoms: []  dysuria  []  hematuria   Musculoskeletal Symptoms: []  back pain []  muscle pain []  joint pain  Neurologic Symptoms: []  headache []  dizziness  Hematolymphoid Symptoms: [] Adenopathy [] Bruises   [] Schimosis       Psychiatric Review of systems  Delusions:  [] Denies [] Endorses   Withdrawals:  [] Denies [] Endorses    Hallucinations: [] Denies [] Endorses    Extra Pyramidal Symptoms: [] Denies [] Endorses      BP 96/65   Pulse 85   Temp 97.9 °F (36.6 °C) (Oral)   Resp 16   Ht 5' 6\" (1.676 m)   Wt 144 lb 2 oz (65.4 kg)   SpO2 96%   BMI 23.26 kg/m²     Mental Status Examination:    Cognition:      [x] Alert  [x] Awake  [x] Oriented  [x] Person  [x] Place [x] Time      [] drowsy  [] tired  [] lethargic  [] distractable  [] Other     Attention/Concentration:   [x] Attentive  [] Distracted        Memory Recent and Remote: [x] Intact   [] Impaired [] Partially Impaired     Language: [] Able to recognize and name objects          [] Unable to recognize and name Objects    Fund of Knowledge:  [] Poor []  Fair  [] Good    Speech: [] Normal  [x] Soft  [] Slow  [] Fast [] Pressured            [] Loud [] Dysarthria  [] Incoherent    Appearance: [] Well Groomed  [x] Casual Dressed  [] Unkept  [] Disheveled          [] Normal weight[] Thin  [] Overweight  [] Obese           Attitude: [] Positive  [] Hostile  [] Demanding  [] Guarded  [] Defensive         [x] Cooperative  []  Uncooperative      Behavior:  [x] Normal Gait  [] Walks with Assistance  [] Kayleigh Chair    [] Walks with Tono Erwin  [] In Hospital Bed  [] Sitting in Chair    Muscle-Skeletal:  [x] Normal Muscle Tone [] Muscle Atrophy       [] Abnormal Muscle Movement     Eye Contact:  [] Good eye contact  [x] Intermittent Eye Contact  [] Poor Eye Contact     Mood: [x] Depressed  [x] Anxious  [x] Irritated  [] Euthymic   [] Angry [] Restless    Affect:  [x] Congruent  [] Incongruent  [] Labile  [] Constricted  [] Flat  [] Bizarre     Thought Process and Association:  [] Logical [] Illogical       [] Linear and Goal Directed  [x] Tangential  [x] Circumstantial     Thought Content:  [] Denies [] Endorses [] Suicidal [] Homicidal  [x] Delusional      [x] Paranoid  [] Somatic  [] Grandiose    Perception: [x]  None  [] Auditory   [] Visual  [] tactile   [] olfactory  [] Illusions         Insight: [] Intact  [] Fair  [x] Limited    Judgement:  [] Intact  [] Fair  [x] Limited      Assessment/Plan:        Patient Active Problem List   Diagnosis Code    Alcohol withdrawal syndrome without complication ([facility] A12.100    Alcoholism ([facility] F10.20    Acute alcoholic intoxication ([facility] F10.929    Severe single current episode of major depressive disorder, without psychotic features ([facility] F32.2    Marijuana smoker F12.90    Major depressive disorder, severe ([facility] F32.2    Severe depressed bipolar I disorder without psychotic features ([facility] F31.4         Plan:    []  Patient is refusing medications  [] Improving as expected   [x] Not improving as expected-Awaiting probate date for [facility]   [] Worsening    []  At Baseline       Reason for more than one antipsychotic:  [x] N/A  [] 3 failed monotherapy(drugs tried):  [] Cross over to a new antipsychotic  [] Taper to monotherapy from polypharmacy  [] Augmentation of Clozapine therapy due to treatment resistance to single therapy      Signed:  Damion Jeffery  9/11/2018  2:21 PM    I saw and examined the patient and I agree with the above documentation.

## 2018-09-12 LAB — VALPROIC ACID LEVEL: 83 MCG/ML (ref 50–100)

## 2018-09-12 PROCEDURE — 80164 ASSAY DIPROPYLACETIC ACD TOT: CPT

## 2018-09-12 PROCEDURE — 1240000000 HC EMOTIONAL WELLNESS R&B

## 2018-09-12 PROCEDURE — 6370000000 HC RX 637 (ALT 250 FOR IP): Performed by: PSYCHIATRY & NEUROLOGY

## 2018-09-12 PROCEDURE — 6370000000 HC RX 637 (ALT 250 FOR IP): Performed by: NURSE PRACTITIONER

## 2018-09-12 PROCEDURE — 99231 SBSQ HOSP IP/OBS SF/LOW 25: CPT | Performed by: PSYCHIATRY & NEUROLOGY

## 2018-09-12 PROCEDURE — 36415 COLL VENOUS BLD VENIPUNCTURE: CPT

## 2018-09-12 RX ORDER — DIVALPROEX SODIUM 500 MG/1
1000 TABLET, EXTENDED RELEASE ORAL EVERY MORNING
Status: DISCONTINUED | OUTPATIENT
Start: 2018-09-13 | End: 2018-09-13 | Stop reason: HOSPADM

## 2018-09-12 RX ORDER — DIPHENHYDRAMINE HCL 25 MG
50 TABLET ORAL NIGHTLY PRN
Status: DISCONTINUED | OUTPATIENT
Start: 2018-09-12 | End: 2018-09-13 | Stop reason: HOSPADM

## 2018-09-12 RX ADMIN — DIPHENHYDRAMINE HCL 50 MG: 25 TABLET ORAL at 20:42

## 2018-09-12 RX ADMIN — ACETAMINOPHEN 650 MG: 325 TABLET, FILM COATED ORAL at 08:25

## 2018-09-12 RX ADMIN — HYDROXYZINE PAMOATE 50 MG: 50 CAPSULE ORAL at 15:51

## 2018-09-12 RX ADMIN — PALIPERIDONE 6 MG: 6 TABLET, EXTENDED RELEASE ORAL at 08:24

## 2018-09-12 RX ADMIN — HYDROXYZINE PAMOATE 50 MG: 50 CAPSULE ORAL at 08:24

## 2018-09-12 RX ADMIN — CHLORDIAZEPOXIDE HYDROCHLORIDE 5 MG: 5 CAPSULE ORAL at 13:50

## 2018-09-12 ASSESSMENT — PAIN SCALES - GENERAL
PAINLEVEL_OUTOF10: 5
PAINLEVEL_OUTOF10: 0
PAINLEVEL_OUTOF10: 3

## 2018-09-12 NOTE — PROGRESS NOTES
DATE OF SERVICE:     9/12/2018    Milton Donovan seen today for the purpose of continuation of care. Nursing, social work reports, laboratory studies and vital signs are reviewed. Patient chief complaint today is:             [x] Depression      [x] Anxiety        [] Psychosis         [] Suicidal/Homicidal                         [] Delusions           [] Aggression          Subjective: Today patient had questions about court for Friday. Patient is calmer today, states the medications are effective. Denies SI, HI, or AVH, remains paranoid, but is improving. Sleep:  [] Good [x] Fair  [] Poor  Appetite:  [] Good [x] Fair  [] Poor    Depression:  [] Mild [] Moderate [x] Severe                [x] Constant [] Sporadic     Anxiety: [] Mild [] Moderate [x] Severe    [x] Constant [] Sporadic     Delusions: [] Mild [] Moderate [x] Severe     [] Constant [x] Sporadic     [x] Paranoid [] Somatic [] Grandiose     Hallucinations: [] Mild [] Moderate [] Severe     [] Constant [] Sporadic    [] Auditory  [] Visual [] Tactile       Suicidal: [] Constant [] Sporadic  Homicidal: [] Constant [] Sporadic    Unscheduled Medications     [] Patient Receiving Emergency Medications \" Chemical Restraint\"   [] Requesting PRN medications for anxiety    Medical Review of Systems:     All other than marked systmes have been reviewed and are all negative.     Constitutional Symptoms: []  fever []  Chills  Skin Symptoms: [] rash []  Pruritus   Eye Symptoms: [] Vision unchanged []  recent vision problems[] blurred vision   Respiratory Symptoms:[] shortness of breath [] cough  Cardiovascular Symptoms:  [] chest pain   [] palpitations   Gastrointestinal Symptoms: []  abdominal pain []  nausea []  vomiting []  diarrhea  Genitourinary Symptoms: []  dysuria  []  hematuria   Musculoskeletal Symptoms: []  back pain []  muscle pain []  joint pain  Neurologic Symptoms: []  headache []  dizziness  Hematolymphoid Symptoms: [] Adenopathy [] Bruises   [] Schimosis       Psychiatric Review of systems  Delusions:  [] Denies [] Endorses   Withdrawals:  [] Denies [] Endorses    Hallucinations: [] Denies [] Endorses    Extra Pyramidal Symptoms: [] Denies [] Endorses      BP 96/65   Pulse 85   Temp 97.9 °F (36.6 °C) (Oral)   Resp 16   Ht 5' 6\" (1.676 m)   Wt 144 lb 2 oz (65.4 kg)   SpO2 96%   BMI 23.26 kg/m²     Mental Status Examination:    Cognition:      [x] Alert  [x] Awake  [x] Oriented  [x] Person  [x] Place [x] Time      [] drowsy  [] tired  [] lethargic  [] distractable  [] Other     Attention/Concentration:   [x] Attentive  [] Distracted        Memory Recent and Remote: [x] Intact   [] Impaired [] Partially Impaired     Language: [] Able to recognize and name objects          [] Unable to recognize and name Objects    Fund of Knowledge:  [] Poor []  Fair  [] Good    Speech: [x] Normal  [] Soft  [] Slow  [] Fast [] Pressured            [] Loud [] Dysarthria  [] Incoherent    Appearance: [] Well Groomed  [x] Casual Dressed  [] Unkept  [] Disheveled          [] Normal weight[] Thin  [] Overweight  [] Obese           Attitude: [] Positive  [] Hostile  [] Demanding  [] Guarded  [] Defensive         [x] Cooperative  []  Uncooperative      Behavior:  [x] Normal Gait  [] Walks with Assistance  [] Kayleigh Chair    [] Walks with [place] Suzy  [] In Hospital Bed  [] Sitting in Chair    Muscle-Skeletal:  [x] Normal Muscle Tone [] Muscle Atrophy       [] Abnormal Muscle Movement     Eye Contact:  [x] Good eye contact  [] Intermittent Eye Contact  [] Poor Eye Contact     Mood: [x] Depressed  [x] Anxious  [] Irritated  [] Euthymic   [] Angry [] Restless    Affect:  [x] Congruent  [] Incongruent  [] Labile  [] Constricted  [] Flat  [] Bizarre     Thought Process and Association:  [] Logical [] Illogical       [] Linear and Goal Directed  [] Tangential  [x] Circumstantial     Thought Content:  [] Denies [] Endorses [] Suicidal [] Homicidal  [x] Delusional      [x]

## 2018-09-13 VITALS
HEART RATE: 88 BPM | BODY MASS INDEX: 23.16 KG/M2 | WEIGHT: 144.13 LBS | SYSTOLIC BLOOD PRESSURE: 116 MMHG | TEMPERATURE: 98.1 F | DIASTOLIC BLOOD PRESSURE: 78 MMHG | HEIGHT: 66 IN | OXYGEN SATURATION: 96 % | RESPIRATION RATE: 18 BRPM

## 2018-09-13 PROCEDURE — 99238 HOSP IP/OBS DSCHRG MGMT 30/<: CPT | Performed by: PSYCHIATRY & NEUROLOGY

## 2018-09-13 PROCEDURE — 6370000000 HC RX 637 (ALT 250 FOR IP): Performed by: NURSE PRACTITIONER

## 2018-09-13 PROCEDURE — 6370000000 HC RX 637 (ALT 250 FOR IP): Performed by: PSYCHIATRY & NEUROLOGY

## 2018-09-13 RX ORDER — NICOTINE 21 MG/24HR
1 PATCH, TRANSDERMAL 24 HOURS TRANSDERMAL DAILY
Qty: 30 PATCH | Refills: 0 | Status: SHIPPED | OUTPATIENT
Start: 2018-09-14 | End: 2021-02-02

## 2018-09-13 RX ORDER — PALIPERIDONE 6 MG/1
6 TABLET, EXTENDED RELEASE ORAL DAILY
Qty: 5 TABLET | Refills: 0 | Status: SHIPPED | OUTPATIENT
Start: 2018-09-14 | End: 2018-12-07 | Stop reason: ALTCHOICE

## 2018-09-13 RX ORDER — DIVALPROEX SODIUM 500 MG/1
1000 TABLET, EXTENDED RELEASE ORAL EVERY MORNING
Qty: 60 TABLET | Refills: 0 | Status: SHIPPED | OUTPATIENT
Start: 2018-09-14 | End: 2018-12-07 | Stop reason: ALTCHOICE

## 2018-09-13 RX ADMIN — PALIPERIDONE 6 MG: 6 TABLET, EXTENDED RELEASE ORAL at 08:51

## 2018-09-13 RX ADMIN — DIVALPROEX SODIUM 1000 MG: 500 TABLET, EXTENDED RELEASE ORAL at 08:51

## 2018-09-13 RX ADMIN — CHLORDIAZEPOXIDE HYDROCHLORIDE 5 MG: 5 CAPSULE ORAL at 08:51

## 2018-09-13 ASSESSMENT — PAIN SCALES - GENERAL
PAINLEVEL_OUTOF10: 0
PAINLEVEL_OUTOF10: 0

## 2018-09-13 NOTE — PROGRESS NOTES
Group Therapy Note    Date: 9/13/2018  Start Time: 11:15  End Time:  12:00  Number of Participants: 10    Type of Group: Psychotherapy    Wellness Binder Information  Module Name:  N/A  Session Number:  N/A    Patient's Goal:  To improve communication skills within interpersonal relationships    Notes:  Patient was an active participant in group therapy focusing on improving relationships within interpersonal relationships. Pt shared personal issues and provided supportive feedback to group members    Status After Intervention:  Improved    Participation Level:  Active Listener and Interactive    Participation Quality: Appropriate, Attentive, Sharing and Supportive      Speech:  normal      Thought Process/Content: Logical      Affective Functioning: Congruent      Mood: depressed      Level of consciousness:  Alert, Oriented x4 and Attentive      Response to Learning: Able to verbalize current knowledge/experience, Able to verbalize/acknowledge new learning and Able to retain information      Endings: None Reported    Modes of Intervention: Education, Support and Socialization      Discipline Responsible: /Counselor      Signature:  JOSIE Minor

## 2018-09-13 NOTE — DISCHARGE SUMMARY
Physician Discharge Summary      Physical Examination   VS/Measurements:     /78   Pulse 88   Temp 98.1 °F (36.7 °C) (Oral)   Resp 18   Ht 5' 6\" (1.676 m)   Wt 144 lb 2 oz (65.4 kg)   SpO2 96%   BMI 23.26 kg/m²         Discharge Medications:              [place], [address] [facility] Medication Instructions IOI:[id number]    Printed on:09/13/18 1595   Medication Information                      divalproex (DEPAKOTE ER) 500 MG extended release tablet  Take 2 tablets by mouth every morning             nicotine (NICODERM CQ) 21 MG/24HR  Place 1 patch onto the skin daily             paliperidone (INVEGA) 6 MG extended release tablet  Take 1 tablet by mouth daily for 5 days                     Psychiatric: Mental Status Examination:    Cognition:      [x] Alert  [x] Awake  [x] Oriented  [x] Person  [x] Place [x] Time    [] drowsy  [] tired  [] lethargic  [] distractable       Attention/Concentration:   [x] Attentive  [] Distracted        Memory Recent and Remote: [x] Intact   [] Impaired [] Partially Impaired     Language: [] Able to recognize and name objects          [] Unable to recognize and name Objects    Fund of Knowledge:  [] Poor []  Fair  [] Good    Speech: [x] Normal  [] Soft  [] Slow  [] Fast [] Pressured            [] Loud [] Dysarthria  [] Incoherent       Appearance: [] Well Groomed  [x] Casual Dressed  [] Unkept  [] Disheveled          [] Normal weight[] Thin  [] Overweight  [] Obese           Attitude: [] Positive  [] Hostile  [] Demanding  [] Guarded  [] Defensive         [x] Cooperative  []  Uncooperative      Behavior:  [x] Normal Gait  [] Walks with Assistance  [] Kayleigh Chair    [] Walks with Luís Garcianer  [] In Hospital Bed  [] Sitting in Chair    Muscle-Skeletal:  [x] Normal Muscle Tone [] Muscle Atrophy       [] Abnormal Muscle Movement     Eye Contact:  [x] Good eye contact  [] Intermittent Eye Contact  [] Poor Eye Contact     Mood: [] Depressed  [] Anxious  [] Irritated  [x] Euthymic   []

## 2018-09-13 NOTE — PROGRESS NOTES
Group Therapy Note    Date: 9/13/2018  Start Time: 1:00  End Time:  1:50  Number of Participants: 12    Type of Group: Psychoeducation    Wellness Binder Information  Module Name: N/A  Session Number: N/A    Patient's Goal: To acquire new relaxation skills to help alleviate anxiety    Notes: Pt was an active participant in yoga class focusing on providing group members with new relaxations strategies and skills. Pt offered a supportive presence to group members during the yoga session. Status After Intervention:  Improved    Participation Level:  Active Listener    Participation Quality: Appropriate and Attentive      Speech:  normal      Thought Process/Content: Logical      Affective Functioning: Congruent      Mood: depressed      Level of consciousness:  Alert, Oriented x4 and Attentive      Response to Learning: Progressing to goal      Endings: None Reported    Modes of Intervention: Education, Support and Socialization      Discipline Responsible: /Counselor      Signature:  JOSIE Ludwig

## 2018-09-13 NOTE — PLAN OF CARE
Problem: Altered Mood, Manic Behavior:  Goal: Ability to interact with others will improve  Ability to interact with others will improve   Outcome: Ongoing  OUT ON THE UNIT, SOCIAL AND INTERACTING WITH [employer]. CONTINUES WITH  RAMBLING CONVERSATION AND CRITICIZING THE TV, LAUGHING FREQUENTLY AT TIMES INTRUSIVE. MOOD AT THIS TIME IS BRIGHT AND CHEERFUL. HAS NOT DEMONSTRATED SIGNS OR SYMPTOMS OF WITHDRAWAL.  CIWA=0
Problem: Altered Mood, Manic Behavior:  Goal: Able to sleep  Able to sleep   Outcome: Met This Shift  Pt resting in bed apparently asleep with easy even respirations at HS q 15 min electronic rounding. RN hourly rounds provided to pt this shift.
Problem: Altered Mood, Manic Behavior:  Goal: Able to verbalize decrease in frequency and intensity of racing thoughts  Able to verbalize decrease in frequency and intensity of racing thoughts   Outcome: Met This Shift  THOUGHTS MORE ORGANIZED, LESS PREOCCUPATION WITH PROBATE AND REPEATED QUESTIONS REGARDING SAME. Goal: Ability to interact with others will improve  Ability to interact with others will improve   Outcome: Met This Shift  OBSERVED TO BE SOCIAL IN [facility].
Problem: Altered Mood, Manic Behavior:  Goal: Able to verbalize decrease in frequency and intensity of racing thoughts  Able to verbalize decrease in frequency and intensity of racing thoughts   Outcome: Ongoing  Patient reports thoughts are no longer racing, however, patient presents with slowed thinking and impaired judgement. Patient spends much time in his room and displays some confusion. Patient denies SI, HI, and AVH. Patient has no insight into condition and believes he is admitted because he yelled at his mom. Patient eating and sleeping well. Goal: Ability to disclose and discuss suicidal ideas will improve  Ability to disclose and discuss suicidal ideas will improve   Outcome: Ongoing  Patient denies SI. No evidence of self harm.
Problem: Mood - Altered:  Goal: Absence of abusive behavior  Absence of abusive behavior   Outcome: Ongoing  Pt resting in bed apparently asleep with easy even respirations at HS q 15 min electronic rounding. RN hourly rounds provided to pt this shift.
Problem: Mood - Altered:  Goal: Mood stable  Mood stable   Outcome: Ongoing  Patient is pleasant and cooperative. Patient reports still some depression and anxiety but states that he is getting better with it. Patient is mainly anxious over the hearing on Friday. Patient is more organized with his thoughts and contributes his thoughts of suicide, prior to admission, from two recent breakups which were involved in drugs. Patient reports he is not Suicidal, not homicidal, and denies AVH. Patient more social and less isolative. Will monitor closely. No irritability, no loud outburst. No adverse activity witnessed    Problem: Sleep Pattern Disturbance:  Goal: Appears well-rested  Appears well-rested   Outcome: Ongoing  Patient reports some difficulty falling asleep, but reports that he is getting enough sleep through the night.
Problem: Mood - Altered:  Goal: Mood stable  Mood stable   Outcome: Ongoing  Pt has been up and out on the unit. Pleasant and cooperative with assessment. He started his librium today. States that he gets anxious at times but denies any S/I, A/V hallucinations or homicidal thoughts. He has been incontrol. Said that he didn't sleep very well last night, but hours total were at 6. Support offered. I will continue to monitor behavior and provide a safe and secure environment.
Problem: Sleep Pattern Disturbance:  Goal: Appears well-rested  Appears well-rested   Outcome: Met This Shift  SLEEPING QUIETLY AT SHIFT CHANGE.
UPDATE: probate application to [facility], supportive care, discharge planning and follow up with [facility] in [place], assess outpatient support and need for AOD referral    GOALS UPDATE:   Time frame for Short-Term Goals: 1 week       Beatrice Myrick RN

## 2018-09-13 NOTE — PROGRESS NOTES
Group Therapy Note    Date: 9/13/2018  Start Time: 1100  End Time:  2593  Number of Participants:8  Type of Group: Psychoeducation    Wellness Binder Information  Module Name:  Dimensions of wellness   Session Number: na    Patient's Goal:  Patient will be able to id dimensions of wellness and what he/she can improve on and what he/she is doing well. Notes:  Engaged in group able to share, pleasant and actively listening in group. Status After Intervention:  Improved    Participation Level:  Active Listener and Interactive    Participation Quality: Appropriate, Attentive, Sharing and Supportive      Speech:  normal      Thought Process/Content: Logical      Affective Functioning: Congruent      Mood: euthymic      Level of consciousness:  Alert, Oriented x4 and Attentive      Response to Learning: Able to verbalize/acknowledge new learning, Able to retain information and Progressing to goal      Endings: None Reported    Modes of Intervention: Education, Support, Socialization, Exploration and Problem-solving      Discipline Responsible: Psychoeducational Specialist      Signature:  Niels Avila

## 2018-09-13 NOTE — PROGRESS NOTES
Patient attended community meeting/morning stretch.  Patient identified goal for the day as: \"Release stress in positive way\"

## 2018-12-07 ENCOUNTER — HOSPITAL ENCOUNTER (INPATIENT)
Age: 28
LOS: 4 days | Discharge: HOME | DRG: 282 | End: 2018-12-11
Attending: EMERGENCY MEDICINE | Admitting: INTERNAL MEDICINE
Payer: COMMERCIAL

## 2018-12-07 ENCOUNTER — APPOINTMENT (OUTPATIENT)
Dept: CT IMAGING | Age: 28
DRG: 282 | End: 2018-12-07
Payer: COMMERCIAL

## 2018-12-07 DIAGNOSIS — K85.20 ALCOHOL-INDUCED ACUTE PANCREATITIS, UNSPECIFIED COMPLICATION STATUS: Primary | ICD-10-CM

## 2018-12-07 DIAGNOSIS — F41.1 GAD (GENERALIZED ANXIETY DISORDER): ICD-10-CM

## 2018-12-07 DIAGNOSIS — F10.20 ALCOHOLISM (HCC): ICD-10-CM

## 2018-12-07 LAB
ACETAMINOPHEN LEVEL: 5.9 MCG/ML (ref 10–30)
ALBUMIN SERPL-MCNC: 5.1 G/DL (ref 3.5–5.2)
ALP BLD-CCNC: 97 U/L (ref 40–129)
ALT SERPL-CCNC: 93 U/L (ref 0–40)
AMPHETAMINE SCREEN, URINE: NOT DETECTED
ANION GAP SERPL CALCULATED.3IONS-SCNC: 18 MMOL/L (ref 7–16)
AST SERPL-CCNC: 107 U/L (ref 0–39)
BACTERIA: ABNORMAL /HPF
BARBITURATE SCREEN URINE: NOT DETECTED
BASOPHILS ABSOLUTE: 0.04 E9/L (ref 0–0.2)
BASOPHILS RELATIVE PERCENT: 0.2 % (ref 0–2)
BENZODIAZEPINE SCREEN, URINE: NOT DETECTED
BILIRUB SERPL-MCNC: 1.9 MG/DL (ref 0–1.2)
BILIRUBIN URINE: NEGATIVE
BLOOD, URINE: NEGATIVE
BUN BLDV-MCNC: 11 MG/DL (ref 6–20)
CALCIUM SERPL-MCNC: 9.8 MG/DL (ref 8.6–10.2)
CANNABINOID SCREEN URINE: NOT DETECTED
CHLORIDE BLD-SCNC: 86 MMOL/L (ref 98–107)
CLARITY: CLEAR
CO2: 27 MMOL/L (ref 22–29)
COCAINE METABOLITE SCREEN URINE: NOT DETECTED
COLOR: YELLOW
CREAT SERPL-MCNC: 0.8 MG/DL (ref 0.7–1.2)
EOSINOPHILS ABSOLUTE: 0 E9/L (ref 0.05–0.5)
EOSINOPHILS RELATIVE PERCENT: 0 % (ref 0–6)
ETHANOL: <10 MG/DL (ref 0–0.08)
GFR AFRICAN AMERICAN: >60
GFR NON-AFRICAN AMERICAN: >60 ML/MIN/1.73
GLUCOSE BLD-MCNC: 148 MG/DL (ref 74–99)
GLUCOSE URINE: NEGATIVE MG/DL
HCT VFR BLD CALC: 47.6 % (ref 37–54)
HEMOGLOBIN: 17.1 G/DL (ref 12.5–16.5)
IMMATURE GRANULOCYTES #: 0.07 E9/L
IMMATURE GRANULOCYTES %: 0.4 % (ref 0–5)
KETONES, URINE: 40 MG/DL
LACTIC ACID: 1.9 MMOL/L (ref 0.5–2.2)
LEUKOCYTE ESTERASE, URINE: NEGATIVE
LIPASE: 1237 U/L (ref 13–60)
LYMPHOCYTES ABSOLUTE: 0.85 E9/L (ref 1.5–4)
LYMPHOCYTES RELATIVE PERCENT: 5.1 % (ref 20–42)
MCH RBC QN AUTO: 32.8 PG (ref 26–35)
MCHC RBC AUTO-ENTMCNC: 35.9 % (ref 32–34.5)
MCV RBC AUTO: 91.4 FL (ref 80–99.9)
METHADONE SCREEN, URINE: NOT DETECTED
MONOCYTES ABSOLUTE: 1.17 E9/L (ref 0.1–0.95)
MONOCYTES RELATIVE PERCENT: 7 % (ref 2–12)
NEUTROPHILS ABSOLUTE: 14.68 E9/L (ref 1.8–7.3)
NEUTROPHILS RELATIVE PERCENT: 87.3 % (ref 43–80)
NITRITE, URINE: NEGATIVE
OPIATE SCREEN URINE: NOT DETECTED
PDW BLD-RTO: 11.8 FL (ref 11.5–15)
PH UA: 6 (ref 5–9)
PHENCYCLIDINE SCREEN URINE: NOT DETECTED
PLATELET # BLD: 200 E9/L (ref 130–450)
PMV BLD AUTO: 12.5 FL (ref 7–12)
POTASSIUM SERPL-SCNC: 4 MMOL/L (ref 3.5–5)
PROPOXYPHENE SCREEN: NOT DETECTED
PROTEIN UA: 30 MG/DL
RBC # BLD: 5.21 E12/L (ref 3.8–5.8)
RBC UA: ABNORMAL /HPF (ref 0–2)
SALICYLATE, SERUM: <0.3 MG/DL (ref 0–30)
SODIUM BLD-SCNC: 131 MMOL/L (ref 132–146)
SPECIFIC GRAVITY UA: 1.02 (ref 1–1.03)
TOTAL PROTEIN: 8.1 G/DL (ref 6.4–8.3)
TRICYCLIC ANTIDEPRESSANTS SCREEN SERUM: NEGATIVE NG/ML
UROBILINOGEN, URINE: 0.2 E.U./DL
WBC # BLD: 16.8 E9/L (ref 4.5–11.5)
WBC UA: ABNORMAL /HPF (ref 0–5)

## 2018-12-07 PROCEDURE — 96376 TX/PRO/DX INJ SAME DRUG ADON: CPT

## 2018-12-07 PROCEDURE — 2580000003 HC RX 258: Performed by: INTERNAL MEDICINE

## 2018-12-07 PROCEDURE — 83690 ASSAY OF LIPASE: CPT

## 2018-12-07 PROCEDURE — 94761 N-INVAS EAR/PLS OXIMETRY MLT: CPT

## 2018-12-07 PROCEDURE — 80307 DRUG TEST PRSMV CHEM ANLYZR: CPT

## 2018-12-07 PROCEDURE — G0480 DRUG TEST DEF 1-7 CLASSES: HCPCS

## 2018-12-07 PROCEDURE — 6370000000 HC RX 637 (ALT 250 FOR IP): Performed by: STUDENT IN AN ORGANIZED HEALTH CARE EDUCATION/TRAINING PROGRAM

## 2018-12-07 PROCEDURE — 6360000002 HC RX W HCPCS: Performed by: INTERNAL MEDICINE

## 2018-12-07 PROCEDURE — 85025 COMPLETE CBC W/AUTO DIFF WBC: CPT

## 2018-12-07 PROCEDURE — 2060000000 HC ICU INTERMEDIATE R&B

## 2018-12-07 PROCEDURE — 36415 COLL VENOUS BLD VENIPUNCTURE: CPT

## 2018-12-07 PROCEDURE — 83605 ASSAY OF LACTIC ACID: CPT

## 2018-12-07 PROCEDURE — 84145 PROCALCITONIN (PCT): CPT

## 2018-12-07 PROCEDURE — 99284 EMERGENCY DEPT VISIT MOD MDM: CPT

## 2018-12-07 PROCEDURE — 81001 URINALYSIS AUTO W/SCOPE: CPT

## 2018-12-07 PROCEDURE — 80053 COMPREHEN METABOLIC PANEL: CPT

## 2018-12-07 PROCEDURE — 96374 THER/PROPH/DIAG INJ IV PUSH: CPT

## 2018-12-07 PROCEDURE — 74177 CT ABD & PELVIS W/CONTRAST: CPT

## 2018-12-07 PROCEDURE — 6360000002 HC RX W HCPCS: Performed by: STUDENT IN AN ORGANIZED HEALTH CARE EDUCATION/TRAINING PROGRAM

## 2018-12-07 PROCEDURE — 2580000003 HC RX 258: Performed by: STUDENT IN AN ORGANIZED HEALTH CARE EDUCATION/TRAINING PROGRAM

## 2018-12-07 PROCEDURE — 6360000004 HC RX CONTRAST MEDICATION: Performed by: RADIOLOGY

## 2018-12-07 RX ORDER — FENTANYL CITRATE 50 UG/ML
25 INJECTION, SOLUTION INTRAMUSCULAR; INTRAVENOUS ONCE
Status: COMPLETED | OUTPATIENT
Start: 2018-12-07 | End: 2018-12-07

## 2018-12-07 RX ORDER — SODIUM CHLORIDE, SODIUM LACTATE, POTASSIUM CHLORIDE, CALCIUM CHLORIDE 600; 310; 30; 20 MG/100ML; MG/100ML; MG/100ML; MG/100ML
INJECTION, SOLUTION INTRAVENOUS CONTINUOUS
Status: DISCONTINUED | OUTPATIENT
Start: 2018-12-07 | End: 2018-12-10

## 2018-12-07 RX ORDER — FENTANYL CITRATE 50 UG/ML
50 INJECTION, SOLUTION INTRAMUSCULAR; INTRAVENOUS
Status: DISCONTINUED | OUTPATIENT
Start: 2018-12-07 | End: 2018-12-09

## 2018-12-07 RX ORDER — ALBUTEROL SULFATE 90 UG/1
2 AEROSOL, METERED RESPIRATORY (INHALATION) EVERY 6 HOURS PRN
Status: DISCONTINUED | OUTPATIENT
Start: 2018-12-07 | End: 2018-12-11 | Stop reason: HOSPADM

## 2018-12-07 RX ORDER — THIAMINE HYDROCHLORIDE 100 MG/ML
100 INJECTION, SOLUTION INTRAMUSCULAR; INTRAVENOUS DAILY
Status: DISCONTINUED | OUTPATIENT
Start: 2018-12-07 | End: 2018-12-07 | Stop reason: DRUGHIGH

## 2018-12-07 RX ORDER — ALBUTEROL SULFATE 90 UG/1
2 AEROSOL, METERED RESPIRATORY (INHALATION) EVERY 6 HOURS PRN
COMMUNITY
End: 2021-03-25

## 2018-12-07 RX ORDER — CHLORDIAZEPOXIDE HYDROCHLORIDE 5 MG/1
5 CAPSULE, GELATIN COATED ORAL EVERY 6 HOURS PRN
Status: DISCONTINUED | OUTPATIENT
Start: 2018-12-07 | End: 2018-12-11 | Stop reason: HOSPADM

## 2018-12-07 RX ORDER — GABAPENTIN 400 MG/1
400 CAPSULE ORAL 4 TIMES DAILY
COMMUNITY
End: 2020-06-09 | Stop reason: HOSPADM

## 2018-12-07 RX ORDER — 0.9 % SODIUM CHLORIDE 0.9 %
1000 INTRAVENOUS SOLUTION INTRAVENOUS ONCE
Status: COMPLETED | OUTPATIENT
Start: 2018-12-07 | End: 2018-12-07

## 2018-12-07 RX ORDER — SODIUM CHLORIDE 0.9 % (FLUSH) 0.9 %
10 SYRINGE (ML) INJECTION EVERY 12 HOURS SCHEDULED
Status: DISCONTINUED | OUTPATIENT
Start: 2018-12-07 | End: 2018-12-11 | Stop reason: HOSPADM

## 2018-12-07 RX ORDER — SODIUM CHLORIDE 0.9 % (FLUSH) 0.9 %
10 SYRINGE (ML) INJECTION PRN
Status: DISCONTINUED | OUTPATIENT
Start: 2018-12-07 | End: 2018-12-11 | Stop reason: HOSPADM

## 2018-12-07 RX ORDER — ONDANSETRON 2 MG/ML
4 INJECTION INTRAMUSCULAR; INTRAVENOUS EVERY 6 HOURS PRN
Status: DISCONTINUED | OUTPATIENT
Start: 2018-12-07 | End: 2018-12-11 | Stop reason: HOSPADM

## 2018-12-07 RX ORDER — GABAPENTIN 300 MG/1
300 CAPSULE ORAL 4 TIMES DAILY
COMMUNITY
End: 2018-12-07 | Stop reason: ALTCHOICE

## 2018-12-07 RX ORDER — ACETAMINOPHEN 325 MG/1
650 TABLET ORAL EVERY 4 HOURS PRN
Status: DISCONTINUED | OUTPATIENT
Start: 2018-12-07 | End: 2018-12-11 | Stop reason: HOSPADM

## 2018-12-07 RX ORDER — GABAPENTIN 400 MG/1
400 CAPSULE ORAL 4 TIMES DAILY
Status: DISCONTINUED | OUTPATIENT
Start: 2018-12-07 | End: 2018-12-11 | Stop reason: HOSPADM

## 2018-12-07 RX ORDER — FENTANYL CITRATE 50 UG/ML
50 INJECTION, SOLUTION INTRAMUSCULAR; INTRAVENOUS ONCE
Status: COMPLETED | OUTPATIENT
Start: 2018-12-07 | End: 2018-12-07

## 2018-12-07 RX ORDER — NICOTINE 21 MG/24HR
1 PATCH, TRANSDERMAL 24 HOURS TRANSDERMAL DAILY
Status: DISCONTINUED | OUTPATIENT
Start: 2018-12-07 | End: 2018-12-11 | Stop reason: HOSPADM

## 2018-12-07 RX ORDER — CHLORDIAZEPOXIDE HYDROCHLORIDE 5 MG/1
5 CAPSULE, GELATIN COATED ORAL ONCE
Status: COMPLETED | OUTPATIENT
Start: 2018-12-07 | End: 2018-12-07

## 2018-12-07 RX ADMIN — FENTANYL CITRATE 25 MCG: 50 INJECTION, SOLUTION INTRAMUSCULAR; INTRAVENOUS at 12:21

## 2018-12-07 RX ADMIN — SODIUM CHLORIDE 1000 ML: 9 INJECTION, SOLUTION INTRAVENOUS at 11:27

## 2018-12-07 RX ADMIN — IOHEXOL 50 ML: 240 INJECTION, SOLUTION INTRATHECAL; INTRAVASCULAR; INTRAVENOUS; ORAL at 20:16

## 2018-12-07 RX ADMIN — FENTANYL CITRATE 50 MCG: 50 INJECTION INTRAMUSCULAR; INTRAVENOUS at 21:37

## 2018-12-07 RX ADMIN — FENTANYL CITRATE 50 MCG: 50 INJECTION INTRAMUSCULAR; INTRAVENOUS at 17:20

## 2018-12-07 RX ADMIN — FENTANYL CITRATE 50 MCG: 50 INJECTION INTRAMUSCULAR; INTRAVENOUS at 19:23

## 2018-12-07 RX ADMIN — SODIUM CHLORIDE, POTASSIUM CHLORIDE, SODIUM LACTATE AND CALCIUM CHLORIDE: 600; 310; 30; 20 INJECTION, SOLUTION INTRAVENOUS at 17:22

## 2018-12-07 RX ADMIN — IOPAMIDOL 80 ML: 755 INJECTION, SOLUTION INTRAVENOUS at 20:16

## 2018-12-07 RX ADMIN — CHLORDIAZEPOXIDE HYDROCHLORIDE 5 MG: 5 CAPSULE ORAL at 11:27

## 2018-12-07 RX ADMIN — FENTANYL CITRATE 50 MCG: 50 INJECTION, SOLUTION INTRAMUSCULAR; INTRAVENOUS at 11:27

## 2018-12-07 RX ADMIN — THIAMINE HYDROCHLORIDE 100 MG: 100 INJECTION, SOLUTION INTRAMUSCULAR; INTRAVENOUS at 20:59

## 2018-12-07 RX ADMIN — FENTANYL CITRATE 25 MCG: 50 INJECTION, SOLUTION INTRAMUSCULAR; INTRAVENOUS at 13:27

## 2018-12-07 RX ADMIN — FENTANYL CITRATE 25 MCG: 50 INJECTION, SOLUTION INTRAMUSCULAR; INTRAVENOUS at 15:15

## 2018-12-07 ASSESSMENT — ENCOUNTER SYMPTOMS
SORE THROAT: 0
EYE PAIN: 0
COUGH: 0
NAUSEA: 1
SINUS PRESSURE: 0
VOMITING: 1
WHEEZING: 0
ABDOMINAL PAIN: 1
BACK PAIN: 0
EYE REDNESS: 0
SHORTNESS OF BREATH: 0
EYE DISCHARGE: 0
DIARRHEA: 0

## 2018-12-07 ASSESSMENT — PAIN DESCRIPTION - ORIENTATION
ORIENTATION: MID;UPPER
ORIENTATION: UPPER
ORIENTATION: UPPER;MID
ORIENTATION: MID;UPPER

## 2018-12-07 ASSESSMENT — PAIN DESCRIPTION - PAIN TYPE
TYPE: ACUTE PAIN

## 2018-12-07 ASSESSMENT — PAIN DESCRIPTION - FREQUENCY
FREQUENCY: CONTINUOUS

## 2018-12-07 ASSESSMENT — PAIN SCALES - GENERAL
PAINLEVEL_OUTOF10: 7
PAINLEVEL_OUTOF10: 10
PAINLEVEL_OUTOF10: 10
PAINLEVEL_OUTOF10: 8
PAINLEVEL_OUTOF10: 5
PAINLEVEL_OUTOF10: 10
PAINLEVEL_OUTOF10: 6
PAINLEVEL_OUTOF10: 10
PAINLEVEL_OUTOF10: 4
PAINLEVEL_OUTOF10: 9

## 2018-12-07 ASSESSMENT — PAIN DESCRIPTION - DESCRIPTORS
DESCRIPTORS: ACHING;CONSTANT;DISCOMFORT
DESCRIPTORS: CRAMPING;DISCOMFORT

## 2018-12-07 ASSESSMENT — PAIN DESCRIPTION - LOCATION
LOCATION: ABDOMEN

## 2018-12-07 ASSESSMENT — PAIN DESCRIPTION - PROGRESSION
CLINICAL_PROGRESSION: NOT CHANGED
CLINICAL_PROGRESSION: GRADUALLY IMPROVING
CLINICAL_PROGRESSION: GRADUALLY IMPROVING

## 2018-12-07 ASSESSMENT — PAIN DESCRIPTION - ONSET
ONSET: ON-GOING

## 2018-12-07 NOTE — ED PROVIDER NOTES
The patient is a 51-year-old male presents emergency department evaluated for epigastric abdominal pain. The patient states that the pain has been ongoing for the past several days. He states that for the duration of last week he consumed large amounts of alcohol. He notes that he drank an entire bottle of grain alcohol. States that since then he has had progressively worsening abdominal pain. He states that he has had several, intermittent cans of beer since then in hopes to alleviate the pain. He also admits to episodes of a shaky sensation. He has had vomiting which has been nonbloody and without coffee grounds. He has not taken any medications to control pain at home. He denies any diarrhea or constipation. There is been no trauma or injury to the abdomen. Denies any hematuria or dysuria. There is no chest pain or shortness of breath. He denies any fevers or chills. The history is provided by the patient. Review of Systems   Constitutional: Positive for activity change and appetite change. Negative for chills and fever. HENT: Negative for ear pain, sinus pressure and sore throat. Eyes: Negative for pain, discharge and redness. Respiratory: Negative for cough, shortness of breath and wheezing. Cardiovascular: Negative for chest pain. Gastrointestinal: Positive for abdominal pain, nausea and vomiting. Negative for diarrhea. Genitourinary: Negative for dysuria and frequency. Musculoskeletal: Negative for arthralgias and back pain. Skin: Negative for rash and wound. Neurological: Positive for tremors. Negative for weakness and headaches. Hematological: Negative for adenopathy. All other systems reviewed and are negative. Physical Exam   Constitutional: He is oriented to person, place, and time. Very uncomfortable. Alert and oriented ×3. No acute distress. Crying. Cardiovascular: Normal rate, regular rhythm and normal heart sounds. No murmur heard.   Pulmonary/Chest: 2.2 mmol/L   Lipase   Result Value Ref Range    Lipase 1,237 (H) 13 - 60 U/L   Urinalysis with Microscopic   Result Value Ref Range    Color, UA Yellow Straw/Yellow    Clarity, UA Clear Clear    Glucose, Ur Negative Negative mg/dL    Bilirubin Urine Negative Negative    Ketones, Urine 40 (A) Negative mg/dL    Specific Gravity, UA 1.020 1.005 - 1.030    Blood, Urine Negative Negative    pH, UA 6.0 5.0 - 9.0    Protein, UA 30 (A) Negative mg/dL    Urobilinogen, Urine 0.2 <2.0 E.U./dL    Nitrite, Urine Negative Negative    Leukocyte Esterase, Urine Negative Negative    WBC, UA 1-3 0 - 5 /HPF    RBC, UA NONE 0 - 2 /HPF    Bacteria, UA NONE /HPF   Serum Drug Screen   Result Value Ref Range    Ethanol Lvl <10 mg/dL    Acetaminophen Level 5.9 (L) 10.0 - 41.7 mcg/mL    Salicylate, Serum <4.1 0.0 - 30.0 mg/dL       Radiology  No orders to display       ------------------------- NURSING NOTES AND VITALS REVIEWED ---------------------------  Date / Time Roomed:  12/7/2018 10:40 AM  ED Bed Assignment:  14/14    The nursing notes within the ED encounter and vital signs as below have been reviewed. Patient Vitals for the past 24 hrs:   BP Temp Temp src Pulse Resp SpO2 Height Weight   12/07/18 1328 (!) 130/97 - - 64 14 100 % - -   12/07/18 1034 136/84 98.2 °F (36.8 °C) Oral 78 14 98 % 5' 6\" (1.676 m) 157 lb 8 oz (71.4 kg)       Oxygen Saturation Interpretation: Normal      ------------------------------------------ PROGRESS NOTES ------------------------------------------  Re-evaluation(s):    3:35 PM patient reevaluated. He remains in pain. He will be remedicated. He is agreeable to admission at this time. Otherwise, no changes to physical examination. I have spoken with the patient and discussed todays results, in addition to providing specific details for the plan of care and counseling regarding the diagnosis and prognosis.   Their questions are answered at this time and they are agreeable with the

## 2018-12-07 NOTE — H&P
[id number] [facility], [facility]–1983  Internal Medicine  -Resident History & Physical Note-     Marcia Emily Treadwell / Ja Nico 1990 / MRN [id number] / 14/14 / [place] 12/7/2018    PCP:  Sara Corley MD  Admitting Physician:  No admitting provider for patient encounter. Consultants:  None at this time   Chief Complaint   Patient presents with    Abdominal Pain     upper abd pain for past 5 days vomiting and diarrhea states started after drinking 6 days ago had not drank for 2 months then drank 5 days ago states I THINK i may be detoxing from alcohol      History of Present Illness  Martínez Roy is a 24 y.o. male with a past medical history pertinent for anxiety, depression, PTSD, seizures, and ETOH abuse who presents to [facility] ER complaining of epigastric pain. Patient states that he has had this pain for the last 6 day and it has gotten progressively worse. He states he has been drinking approximately a 750ml bottle of vodka daily for the last week, however stopped 2 days ago. He has been unable to eat or drink for the last couple days with several episodes of emesis. He reports that he believes he is starting to go through withdrawal and is concerned with his hx of seizures. Librium works well to control his withdrawal symptoms. Patient does states that he has been more depressed and \"disappointed with life\" recently, but he denies any suicidal ideations. Histories / Home Medications / Allergies  Past Medical History:   Diagnosis Date    Anxiety     Arm pain     Convulsions ([address]     Depression     Flashing lights     Head injury     Headache     Leg pain     Numbness and tingling     arms and hands    PTSD (post-traumatic stress disorder)     Seizures ([address]      History reviewed. No pertinent surgical history.   Family History   Problem Relation Age of Onset    Mental Illness Mother     Substance Abuse Mother     Cancer Father     Substance Abuse Father     Mental Illness Sister    [facility]

## 2018-12-08 LAB
ALBUMIN SERPL-MCNC: 3.6 G/DL (ref 3.5–5.2)
ALP BLD-CCNC: 69 U/L (ref 40–129)
ALT SERPL-CCNC: 49 U/L (ref 0–40)
ANION GAP SERPL CALCULATED.3IONS-SCNC: 10 MMOL/L (ref 7–16)
AST SERPL-CCNC: 44 U/L (ref 0–39)
BILIRUB SERPL-MCNC: 1.2 MG/DL (ref 0–1.2)
BUN BLDV-MCNC: 6 MG/DL (ref 6–20)
CALCIUM SERPL-MCNC: 8.4 MG/DL (ref 8.6–10.2)
CHLORIDE BLD-SCNC: 98 MMOL/L (ref 98–107)
CHOLESTEROL, TOTAL: 79 MG/DL (ref 0–199)
CO2: 28 MMOL/L (ref 22–29)
CREAT SERPL-MCNC: 0.7 MG/DL (ref 0.7–1.2)
GFR AFRICAN AMERICAN: >60
GFR NON-AFRICAN AMERICAN: >60 ML/MIN/1.73
GLUCOSE BLD-MCNC: 97 MG/DL (ref 74–99)
HCT VFR BLD CALC: 37.9 % (ref 37–54)
HDLC SERPL-MCNC: 35 MG/DL
HEMOGLOBIN: 13.2 G/DL (ref 12.5–16.5)
LDL CHOLESTEROL CALCULATED: 20 MG/DL (ref 0–99)
LIPASE: 755 U/L (ref 13–60)
MAGNESIUM: 2.1 MG/DL (ref 1.6–2.6)
MCH RBC QN AUTO: 32.4 PG (ref 26–35)
MCHC RBC AUTO-ENTMCNC: 34.8 % (ref 32–34.5)
MCV RBC AUTO: 93.1 FL (ref 80–99.9)
PDW BLD-RTO: 12.2 FL (ref 11.5–15)
PHOSPHORUS: 2.8 MG/DL (ref 2.5–4.5)
PLATELET # BLD: 153 E9/L (ref 130–450)
PMV BLD AUTO: 12.9 FL (ref 7–12)
POTASSIUM SERPL-SCNC: 3.7 MMOL/L (ref 3.5–5)
PROCALCITONIN: 0.04 NG/ML (ref 0–0.08)
RBC # BLD: 4.07 E12/L (ref 3.8–5.8)
SODIUM BLD-SCNC: 136 MMOL/L (ref 132–146)
TOTAL PROTEIN: 6 G/DL (ref 6.4–8.3)
TRIGL SERPL-MCNC: 122 MG/DL (ref 0–149)
VLDLC SERPL CALC-MCNC: 24 MG/DL
WBC # BLD: 13.4 E9/L (ref 4.5–11.5)

## 2018-12-08 PROCEDURE — 6360000002 HC RX W HCPCS: Performed by: INTERNAL MEDICINE

## 2018-12-08 PROCEDURE — 2060000000 HC ICU INTERMEDIATE R&B

## 2018-12-08 PROCEDURE — 84100 ASSAY OF PHOSPHORUS: CPT

## 2018-12-08 PROCEDURE — 6370000000 HC RX 637 (ALT 250 FOR IP): Performed by: STUDENT IN AN ORGANIZED HEALTH CARE EDUCATION/TRAINING PROGRAM

## 2018-12-08 PROCEDURE — 36415 COLL VENOUS BLD VENIPUNCTURE: CPT

## 2018-12-08 PROCEDURE — 99222 1ST HOSP IP/OBS MODERATE 55: CPT | Performed by: PSYCHIATRY & NEUROLOGY

## 2018-12-08 PROCEDURE — 2580000003 HC RX 258: Performed by: INTERNAL MEDICINE

## 2018-12-08 PROCEDURE — 83690 ASSAY OF LIPASE: CPT

## 2018-12-08 PROCEDURE — 80053 COMPREHEN METABOLIC PANEL: CPT

## 2018-12-08 PROCEDURE — 83735 ASSAY OF MAGNESIUM: CPT

## 2018-12-08 PROCEDURE — 6370000000 HC RX 637 (ALT 250 FOR IP): Performed by: PSYCHIATRY & NEUROLOGY

## 2018-12-08 PROCEDURE — 80061 LIPID PANEL: CPT

## 2018-12-08 PROCEDURE — 6370000000 HC RX 637 (ALT 250 FOR IP): Performed by: INTERNAL MEDICINE

## 2018-12-08 PROCEDURE — 85027 COMPLETE CBC AUTOMATED: CPT

## 2018-12-08 RX ORDER — CLONAZEPAM 1 MG/1
1 TABLET ORAL 2 TIMES DAILY
Status: DISCONTINUED | OUTPATIENT
Start: 2018-12-08 | End: 2018-12-11 | Stop reason: HOSPADM

## 2018-12-08 RX ORDER — CLONAZEPAM 1 MG/1
1 TABLET ORAL 2 TIMES DAILY
Qty: 28 TABLET | Refills: 0 | Status: SHIPPED | OUTPATIENT
Start: 2018-12-08 | End: 2020-06-09 | Stop reason: HOSPADM

## 2018-12-08 RX ADMIN — CLONAZEPAM 1 MG: 1 TABLET ORAL at 21:28

## 2018-12-08 RX ADMIN — FENTANYL CITRATE 50 MCG: 50 INJECTION INTRAMUSCULAR; INTRAVENOUS at 19:28

## 2018-12-08 RX ADMIN — GABAPENTIN 400 MG: 400 CAPSULE ORAL at 21:28

## 2018-12-08 RX ADMIN — FENTANYL CITRATE 50 MCG: 50 INJECTION INTRAMUSCULAR; INTRAVENOUS at 15:16

## 2018-12-08 RX ADMIN — Medication 10 ML: at 21:29

## 2018-12-08 RX ADMIN — FENTANYL CITRATE 50 MCG: 50 INJECTION INTRAMUSCULAR; INTRAVENOUS at 02:01

## 2018-12-08 RX ADMIN — FENTANYL CITRATE 50 MCG: 50 INJECTION INTRAMUSCULAR; INTRAVENOUS at 00:02

## 2018-12-08 RX ADMIN — FENTANYL CITRATE 50 MCG: 50 INJECTION INTRAMUSCULAR; INTRAVENOUS at 12:27

## 2018-12-08 RX ADMIN — GABAPENTIN 400 MG: 400 CAPSULE ORAL at 16:38

## 2018-12-08 RX ADMIN — SODIUM CHLORIDE, POTASSIUM CHLORIDE, SODIUM LACTATE AND CALCIUM CHLORIDE: 600; 310; 30; 20 INJECTION, SOLUTION INTRAVENOUS at 02:00

## 2018-12-08 RX ADMIN — FENTANYL CITRATE 50 MCG: 50 INJECTION INTRAMUSCULAR; INTRAVENOUS at 06:43

## 2018-12-08 RX ADMIN — FENTANYL CITRATE 50 MCG: 50 INJECTION INTRAMUSCULAR; INTRAVENOUS at 04:07

## 2018-12-08 RX ADMIN — FENTANYL CITRATE 50 MCG: 50 INJECTION INTRAMUSCULAR; INTRAVENOUS at 09:18

## 2018-12-08 RX ADMIN — SODIUM CHLORIDE, POTASSIUM CHLORIDE, SODIUM LACTATE AND CALCIUM CHLORIDE: 600; 310; 30; 20 INJECTION, SOLUTION INTRAVENOUS at 09:17

## 2018-12-08 RX ADMIN — SERTRALINE 50 MG: 50 TABLET, FILM COATED ORAL at 15:12

## 2018-12-08 RX ADMIN — SODIUM CHLORIDE, POTASSIUM CHLORIDE, SODIUM LACTATE AND CALCIUM CHLORIDE: 600; 310; 30; 20 INJECTION, SOLUTION INTRAVENOUS at 17:24

## 2018-12-08 RX ADMIN — THIAMINE HYDROCHLORIDE 100 MG: 100 INJECTION, SOLUTION INTRAMUSCULAR; INTRAVENOUS at 18:39

## 2018-12-08 RX ADMIN — ENOXAPARIN SODIUM 40 MG: 40 INJECTION SUBCUTANEOUS at 09:18

## 2018-12-08 ASSESSMENT — PAIN DESCRIPTION - FREQUENCY
FREQUENCY: CONTINUOUS

## 2018-12-08 ASSESSMENT — PAIN SCALES - GENERAL
PAINLEVEL_OUTOF10: 4
PAINLEVEL_OUTOF10: 7
PAINLEVEL_OUTOF10: 4
PAINLEVEL_OUTOF10: 8
PAINLEVEL_OUTOF10: 7
PAINLEVEL_OUTOF10: 7
PAINLEVEL_OUTOF10: 2
PAINLEVEL_OUTOF10: 7
PAINLEVEL_OUTOF10: 0
PAINLEVEL_OUTOF10: 7
PAINLEVEL_OUTOF10: 3
PAINLEVEL_OUTOF10: 0
PAINLEVEL_OUTOF10: 4

## 2018-12-08 ASSESSMENT — PAIN DESCRIPTION - PROGRESSION
CLINICAL_PROGRESSION: NOT CHANGED
CLINICAL_PROGRESSION: GRADUALLY IMPROVING

## 2018-12-08 ASSESSMENT — PAIN DESCRIPTION - DESCRIPTORS
DESCRIPTORS: ACHING
DESCRIPTORS: ACHING;DISCOMFORT
DESCRIPTORS: ACHING
DESCRIPTORS: ACHING;DISCOMFORT
DESCRIPTORS: ACHING
DESCRIPTORS: ACHING;CONSTANT;DISCOMFORT
DESCRIPTORS: ACHING;DISCOMFORT;CONSTANT
DESCRIPTORS: ACHING;DISCOMFORT
DESCRIPTORS: ACHING
DESCRIPTORS: ACHING

## 2018-12-08 ASSESSMENT — PAIN DESCRIPTION - PAIN TYPE
TYPE: ACUTE PAIN

## 2018-12-08 ASSESSMENT — PAIN DESCRIPTION - ORIENTATION
ORIENTATION: MID;UPPER
ORIENTATION: MID;UPPER;LOWER
ORIENTATION: MID;UPPER;LOWER
ORIENTATION: MID
ORIENTATION: MID
ORIENTATION: MID;UPPER
ORIENTATION: MID

## 2018-12-08 ASSESSMENT — PAIN DESCRIPTION - LOCATION
LOCATION: ABDOMEN;BACK
LOCATION: ABDOMEN
LOCATION: ABDOMEN;BACK
LOCATION: ABDOMEN

## 2018-12-08 ASSESSMENT — PAIN DESCRIPTION - ONSET
ONSET: ON-GOING
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
## 2018-12-09 LAB
ALBUMIN SERPL-MCNC: 3.3 G/DL (ref 3.5–5.2)
ALP BLD-CCNC: 66 U/L (ref 40–129)
ALT SERPL-CCNC: 31 U/L (ref 0–40)
AMYLASE: 101 U/L (ref 20–100)
ANION GAP SERPL CALCULATED.3IONS-SCNC: 11 MMOL/L (ref 7–16)
AST SERPL-CCNC: 27 U/L (ref 0–39)
BILIRUB SERPL-MCNC: 0.8 MG/DL (ref 0–1.2)
BUN BLDV-MCNC: 6 MG/DL (ref 6–20)
CALCIUM SERPL-MCNC: 8.4 MG/DL (ref 8.6–10.2)
CHLORIDE BLD-SCNC: 100 MMOL/L (ref 98–107)
CO2: 24 MMOL/L (ref 22–29)
CREAT SERPL-MCNC: 0.6 MG/DL (ref 0.7–1.2)
GFR AFRICAN AMERICAN: >60
GFR NON-AFRICAN AMERICAN: >60 ML/MIN/1.73
GLUCOSE BLD-MCNC: 82 MG/DL (ref 74–99)
HCT VFR BLD CALC: 35.1 % (ref 37–54)
HEMOGLOBIN: 12 G/DL (ref 12.5–16.5)
LIPASE: 469 U/L (ref 13–60)
MCH RBC QN AUTO: 32.5 PG (ref 26–35)
MCHC RBC AUTO-ENTMCNC: 34.2 % (ref 32–34.5)
MCV RBC AUTO: 95.1 FL (ref 80–99.9)
PDW BLD-RTO: 12.1 FL (ref 11.5–15)
PLATELET # BLD: 138 E9/L (ref 130–450)
PMV BLD AUTO: 13.4 FL (ref 7–12)
POTASSIUM SERPL-SCNC: 3.5 MMOL/L (ref 3.5–5)
RBC # BLD: 3.69 E12/L (ref 3.8–5.8)
SODIUM BLD-SCNC: 135 MMOL/L (ref 132–146)
TOTAL PROTEIN: 6 G/DL (ref 6.4–8.3)
WBC # BLD: 14 E9/L (ref 4.5–11.5)

## 2018-12-09 PROCEDURE — 83690 ASSAY OF LIPASE: CPT

## 2018-12-09 PROCEDURE — 6360000002 HC RX W HCPCS: Performed by: INTERNAL MEDICINE

## 2018-12-09 PROCEDURE — 82150 ASSAY OF AMYLASE: CPT

## 2018-12-09 PROCEDURE — 6370000000 HC RX 637 (ALT 250 FOR IP): Performed by: INTERNAL MEDICINE

## 2018-12-09 PROCEDURE — 2060000000 HC ICU INTERMEDIATE R&B

## 2018-12-09 PROCEDURE — 80053 COMPREHEN METABOLIC PANEL: CPT

## 2018-12-09 PROCEDURE — 2580000003 HC RX 258: Performed by: INTERNAL MEDICINE

## 2018-12-09 PROCEDURE — 85027 COMPLETE CBC AUTOMATED: CPT

## 2018-12-09 PROCEDURE — 36415 COLL VENOUS BLD VENIPUNCTURE: CPT

## 2018-12-09 PROCEDURE — 6370000000 HC RX 637 (ALT 250 FOR IP): Performed by: PSYCHIATRY & NEUROLOGY

## 2018-12-09 RX ORDER — FENTANYL CITRATE 50 UG/ML
25 INJECTION, SOLUTION INTRAMUSCULAR; INTRAVENOUS
Status: DISCONTINUED | OUTPATIENT
Start: 2018-12-09 | End: 2018-12-10

## 2018-12-09 RX ADMIN — GABAPENTIN 400 MG: 400 CAPSULE ORAL at 20:09

## 2018-12-09 RX ADMIN — FENTANYL CITRATE 50 MCG: 50 INJECTION INTRAMUSCULAR; INTRAVENOUS at 06:24

## 2018-12-09 RX ADMIN — Medication 10 ML: at 20:09

## 2018-12-09 RX ADMIN — SODIUM CHLORIDE, POTASSIUM CHLORIDE, SODIUM LACTATE AND CALCIUM CHLORIDE: 600; 310; 30; 20 INJECTION, SOLUTION INTRAVENOUS at 12:58

## 2018-12-09 RX ADMIN — GABAPENTIN 400 MG: 400 CAPSULE ORAL at 17:36

## 2018-12-09 RX ADMIN — FENTANYL CITRATE 50 MCG: 50 INJECTION INTRAMUSCULAR; INTRAVENOUS at 02:30

## 2018-12-09 RX ADMIN — ACETAMINOPHEN 650 MG: 325 TABLET, FILM COATED ORAL at 17:42

## 2018-12-09 RX ADMIN — SERTRALINE 50 MG: 50 TABLET, FILM COATED ORAL at 09:35

## 2018-12-09 RX ADMIN — GABAPENTIN 400 MG: 400 CAPSULE ORAL at 13:48

## 2018-12-09 RX ADMIN — CLONAZEPAM 1 MG: 1 TABLET ORAL at 20:09

## 2018-12-09 RX ADMIN — FENTANYL CITRATE 50 MCG: 50 INJECTION INTRAMUSCULAR; INTRAVENOUS at 12:56

## 2018-12-09 RX ADMIN — GABAPENTIN 400 MG: 400 CAPSULE ORAL at 09:35

## 2018-12-09 RX ADMIN — FENTANYL CITRATE 25 MCG: 50 INJECTION, SOLUTION INTRAMUSCULAR; INTRAVENOUS at 20:09

## 2018-12-09 RX ADMIN — THIAMINE HYDROCHLORIDE 100 MG: 100 INJECTION, SOLUTION INTRAMUSCULAR; INTRAVENOUS at 17:36

## 2018-12-09 RX ADMIN — CLONAZEPAM 1 MG: 1 TABLET ORAL at 09:36

## 2018-12-09 RX ADMIN — ENOXAPARIN SODIUM 40 MG: 40 INJECTION SUBCUTANEOUS at 09:36

## 2018-12-09 ASSESSMENT — PAIN DESCRIPTION - ORIENTATION
ORIENTATION: MID

## 2018-12-09 ASSESSMENT — PAIN DESCRIPTION - PROGRESSION: CLINICAL_PROGRESSION: GRADUALLY IMPROVING

## 2018-12-09 ASSESSMENT — PAIN DESCRIPTION - PAIN TYPE
TYPE: ACUTE PAIN

## 2018-12-09 ASSESSMENT — PAIN DESCRIPTION - DESCRIPTORS
DESCRIPTORS: ACHING;DISCOMFORT
DESCRIPTORS: ACHING;DISCOMFORT
DESCRIPTORS: ACHING;CRAMPING;DISCOMFORT

## 2018-12-09 ASSESSMENT — PAIN DESCRIPTION - ONSET: ONSET: ON-GOING

## 2018-12-09 ASSESSMENT — PAIN SCALES - GENERAL
PAINLEVEL_OUTOF10: 1
PAINLEVEL_OUTOF10: 4
PAINLEVEL_OUTOF10: 7
PAINLEVEL_OUTOF10: 0
PAINLEVEL_OUTOF10: 3
PAINLEVEL_OUTOF10: 4
PAINLEVEL_OUTOF10: 7

## 2018-12-09 ASSESSMENT — PAIN DESCRIPTION - FREQUENCY
FREQUENCY: CONTINUOUS

## 2018-12-09 ASSESSMENT — PAIN DESCRIPTION - LOCATION
LOCATION: ABDOMEN;BACK
LOCATION: ABDOMEN
LOCATION: ABDOMEN

## 2018-12-10 LAB
ALBUMIN SERPL-MCNC: 3.7 G/DL (ref 3.5–5.2)
ALP BLD-CCNC: 68 U/L (ref 40–129)
ALT SERPL-CCNC: 29 U/L (ref 0–40)
ANION GAP SERPL CALCULATED.3IONS-SCNC: 11 MMOL/L (ref 7–16)
AST SERPL-CCNC: 25 U/L (ref 0–39)
BILIRUB SERPL-MCNC: 0.6 MG/DL (ref 0–1.2)
BUN BLDV-MCNC: 4 MG/DL (ref 6–20)
CALCIUM SERPL-MCNC: 8.7 MG/DL (ref 8.6–10.2)
CHLORIDE BLD-SCNC: 106 MMOL/L (ref 98–107)
CO2: 25 MMOL/L (ref 22–29)
CREAT SERPL-MCNC: 0.6 MG/DL (ref 0.7–1.2)
GFR AFRICAN AMERICAN: >60
GFR NON-AFRICAN AMERICAN: >60 ML/MIN/1.73
GLUCOSE BLD-MCNC: 103 MG/DL (ref 74–99)
HCT VFR BLD CALC: 35.2 % (ref 37–54)
HEMOGLOBIN: 12.4 G/DL (ref 12.5–16.5)
LIPASE: 719 U/L (ref 13–60)
MCH RBC QN AUTO: 33.2 PG (ref 26–35)
MCHC RBC AUTO-ENTMCNC: 35.2 % (ref 32–34.5)
MCV RBC AUTO: 94.4 FL (ref 80–99.9)
PDW BLD-RTO: 12.5 FL (ref 11.5–15)
PLATELET # BLD: 153 E9/L (ref 130–450)
PMV BLD AUTO: 12.6 FL (ref 7–12)
POTASSIUM SERPL-SCNC: 3.6 MMOL/L (ref 3.5–5)
RBC # BLD: 3.73 E12/L (ref 3.8–5.8)
SODIUM BLD-SCNC: 142 MMOL/L (ref 132–146)
TOTAL PROTEIN: 6.5 G/DL (ref 6.4–8.3)
WBC # BLD: 10.5 E9/L (ref 4.5–11.5)

## 2018-12-10 PROCEDURE — 1200000000 HC SEMI PRIVATE

## 2018-12-10 PROCEDURE — 6370000000 HC RX 637 (ALT 250 FOR IP): Performed by: PSYCHIATRY & NEUROLOGY

## 2018-12-10 PROCEDURE — 6360000002 HC RX W HCPCS: Performed by: INTERNAL MEDICINE

## 2018-12-10 PROCEDURE — 83690 ASSAY OF LIPASE: CPT

## 2018-12-10 PROCEDURE — 2580000003 HC RX 258: Performed by: INTERNAL MEDICINE

## 2018-12-10 PROCEDURE — 6370000000 HC RX 637 (ALT 250 FOR IP): Performed by: INTERNAL MEDICINE

## 2018-12-10 PROCEDURE — 85027 COMPLETE CBC AUTOMATED: CPT

## 2018-12-10 PROCEDURE — 80053 COMPREHEN METABOLIC PANEL: CPT

## 2018-12-10 PROCEDURE — 36415 COLL VENOUS BLD VENIPUNCTURE: CPT

## 2018-12-10 PROCEDURE — 6370000000 HC RX 637 (ALT 250 FOR IP): Performed by: STUDENT IN AN ORGANIZED HEALTH CARE EDUCATION/TRAINING PROGRAM

## 2018-12-10 RX ORDER — FENTANYL CITRATE 50 UG/ML
25 INJECTION, SOLUTION INTRAMUSCULAR; INTRAVENOUS EVERY 4 HOURS PRN
Status: DISCONTINUED | OUTPATIENT
Start: 2018-12-10 | End: 2018-12-10

## 2018-12-10 RX ORDER — SODIUM CHLORIDE, SODIUM LACTATE, POTASSIUM CHLORIDE, CALCIUM CHLORIDE 600; 310; 30; 20 MG/100ML; MG/100ML; MG/100ML; MG/100ML
INJECTION, SOLUTION INTRAVENOUS CONTINUOUS
Status: DISCONTINUED | OUTPATIENT
Start: 2018-12-10 | End: 2018-12-11

## 2018-12-10 RX ORDER — FOLIC ACID 1 MG/1
1 TABLET ORAL DAILY
Qty: 30 TABLET | Refills: 2 | Status: SHIPPED | OUTPATIENT
Start: 2018-12-11 | End: 2020-08-22 | Stop reason: ALTCHOICE

## 2018-12-10 RX ORDER — THIAMINE MONONITRATE (VIT B1) 100 MG
100 TABLET ORAL DAILY
Status: DISCONTINUED | OUTPATIENT
Start: 2018-12-10 | End: 2018-12-11 | Stop reason: HOSPADM

## 2018-12-10 RX ORDER — DIPHENHYDRAMINE HCL 25 MG
25 TABLET ORAL EVERY 6 HOURS PRN
Status: DISCONTINUED | OUTPATIENT
Start: 2018-12-10 | End: 2018-12-11 | Stop reason: HOSPADM

## 2018-12-10 RX ORDER — FENTANYL CITRATE 50 UG/ML
25 INJECTION, SOLUTION INTRAMUSCULAR; INTRAVENOUS EVERY 6 HOURS PRN
Status: DISCONTINUED | OUTPATIENT
Start: 2018-12-10 | End: 2018-12-11 | Stop reason: HOSPADM

## 2018-12-10 RX ORDER — FOLIC ACID 1 MG/1
1 TABLET ORAL DAILY
Status: DISCONTINUED | OUTPATIENT
Start: 2018-12-10 | End: 2018-12-11 | Stop reason: HOSPADM

## 2018-12-10 RX ADMIN — CHLORDIAZEPOXIDE HYDROCHLORIDE 5 MG: 5 CAPSULE ORAL at 16:26

## 2018-12-10 RX ADMIN — SODIUM CHLORIDE, POTASSIUM CHLORIDE, SODIUM LACTATE AND CALCIUM CHLORIDE: 600; 310; 30; 20 INJECTION, SOLUTION INTRAVENOUS at 23:35

## 2018-12-10 RX ADMIN — GABAPENTIN 400 MG: 400 CAPSULE ORAL at 16:26

## 2018-12-10 RX ADMIN — CLONAZEPAM 1 MG: 1 TABLET ORAL at 08:30

## 2018-12-10 RX ADMIN — GABAPENTIN 400 MG: 400 CAPSULE ORAL at 08:30

## 2018-12-10 RX ADMIN — Medication 10 ML: at 08:32

## 2018-12-10 RX ADMIN — CLONAZEPAM 1 MG: 1 TABLET ORAL at 20:21

## 2018-12-10 RX ADMIN — SODIUM CHLORIDE, POTASSIUM CHLORIDE, SODIUM LACTATE AND CALCIUM CHLORIDE: 600; 310; 30; 20 INJECTION, SOLUTION INTRAVENOUS at 14:01

## 2018-12-10 RX ADMIN — DIPHENHYDRAMINE HCL 25 MG: 25 TABLET ORAL at 19:47

## 2018-12-10 RX ADMIN — FOLIC ACID 1 MG: 1 TABLET ORAL at 14:09

## 2018-12-10 RX ADMIN — FENTANYL CITRATE 25 MCG: 50 INJECTION, SOLUTION INTRAMUSCULAR; INTRAVENOUS at 08:33

## 2018-12-10 RX ADMIN — FENTANYL CITRATE 25 MCG: 50 INJECTION, SOLUTION INTRAMUSCULAR; INTRAVENOUS at 12:48

## 2018-12-10 RX ADMIN — GABAPENTIN 400 MG: 400 CAPSULE ORAL at 20:21

## 2018-12-10 RX ADMIN — SERTRALINE 50 MG: 50 TABLET, FILM COATED ORAL at 08:30

## 2018-12-10 RX ADMIN — FENTANYL CITRATE 25 MCG: 50 INJECTION, SOLUTION INTRAMUSCULAR; INTRAVENOUS at 18:48

## 2018-12-10 RX ADMIN — SODIUM CHLORIDE, POTASSIUM CHLORIDE, SODIUM LACTATE AND CALCIUM CHLORIDE: 600; 310; 30; 20 INJECTION, SOLUTION INTRAVENOUS at 06:22

## 2018-12-10 RX ADMIN — ENOXAPARIN SODIUM 40 MG: 40 INJECTION SUBCUTANEOUS at 08:28

## 2018-12-10 RX ADMIN — FENTANYL CITRATE 25 MCG: 50 INJECTION, SOLUTION INTRAMUSCULAR; INTRAVENOUS at 04:44

## 2018-12-10 RX ADMIN — Medication 100 MG: at 14:09

## 2018-12-10 RX ADMIN — GABAPENTIN 400 MG: 400 CAPSULE ORAL at 12:43

## 2018-12-10 ASSESSMENT — PAIN DESCRIPTION - DESCRIPTORS
DESCRIPTORS: ACHING;CONSTANT;DISCOMFORT
DESCRIPTORS: ACHING;DISCOMFORT;SORE
DESCRIPTORS: ACHING;CONSTANT;DISCOMFORT

## 2018-12-10 ASSESSMENT — PAIN DESCRIPTION - PAIN TYPE
TYPE: ACUTE PAIN

## 2018-12-10 ASSESSMENT — PAIN SCALES - GENERAL
PAINLEVEL_OUTOF10: 7
PAINLEVEL_OUTOF10: 1
PAINLEVEL_OUTOF10: 7
PAINLEVEL_OUTOF10: 5
PAINLEVEL_OUTOF10: 4
PAINLEVEL_OUTOF10: 7
PAINLEVEL_OUTOF10: 5
PAINLEVEL_OUTOF10: 1
PAINLEVEL_OUTOF10: 4

## 2018-12-10 ASSESSMENT — PAIN DESCRIPTION - PROGRESSION
CLINICAL_PROGRESSION: NOT CHANGED
CLINICAL_PROGRESSION: GRADUALLY WORSENING
CLINICAL_PROGRESSION: NOT CHANGED

## 2018-12-10 ASSESSMENT — PAIN DESCRIPTION - FREQUENCY
FREQUENCY: CONTINUOUS

## 2018-12-10 ASSESSMENT — PAIN DESCRIPTION - ORIENTATION
ORIENTATION: MID
ORIENTATION: MID;LOWER
ORIENTATION: MID

## 2018-12-10 ASSESSMENT — PAIN DESCRIPTION - LOCATION
LOCATION: ABDOMEN;BACK
LOCATION: BACK
LOCATION: ABDOMEN;BACK

## 2018-12-10 ASSESSMENT — PAIN DESCRIPTION - ONSET
ONSET: ON-GOING

## 2018-12-10 NOTE — PLAN OF CARE
Problem: Pain:  Goal: Pain level will decrease  Pain level will decrease   Outcome: Ongoing    Goal: Control of acute pain  Control of acute pain   Outcome: Ongoing    Goal: Control of chronic pain  Control of chronic pain   Outcome: Ongoing      Problem: Anxiety/Stress:  Goal: Level of anxiety will decrease  Level of anxiety will decrease   Outcome: Ongoing      Problem: Nutrition Deficit:  Goal: Ability to achieve adequate nutritional intake will improve  Ability to achieve adequate nutritional intake will improve   Outcome: Ongoing

## 2018-12-10 NOTE — DISCHARGE SUMMARY
stranding, compatible with acute pancreatitis. Fluid extends into the right paracolic gutter. There is no loculated collection. There is no CT evidence of necrosis. The superior mesenteric artery and vein demonstrate normal enhancement. The spleen is unremarkable. The distal esophagus, stomach, duodenum are normal in appearance. The small bowel loops are normal in caliber. The appendix is identified and is unremarkable. The colon is normal in caliber and grossly unremarkable. Bilateral adrenal glands are unremarkable. Bilateral kidneys are normal in morphology and demonstrate symmetric enhancement. There is no hydronephrosis or hydroureter bilaterally. The urinary bladder is mildly distended and grossly unremarkable. The prostate seminal vesicles are normal in appearance. The abdominal aorta is normal in caliber and enhancement. There is no abdominal, retroperitoneal, or pelvic lymphadenopathy. There is no suspicious lytic or blastic osseous lesion. Slightly heterogeneous pancreatic head and uncinate process with adjacent fat stranding, compatible with acute pancreatitis. No loculated collection or CT evidence of necrosis. Disposition  The patient's condition is good. At this time the patient is without objective evidence of an acute process requiring continuing hospitalization or inpatient management. They are stable for discharge with outpatient follow-up. I have spoken with the patient and discussed the results of the current hospitalization, in addition to providing specific details for the plan of care and counseling regarding the diagnosis and prognosis. The plan has been discussed in detail and they are aware of the specific conditions for emergent return, as well as the importance of follow-up.   Their questions are answered at this time and they are agreeable with the plan for discharge to home    Discharge Medications     Medication List      START taking these medications    clonazePAM 1 MG

## 2018-12-10 NOTE — PLAN OF CARE
Problem: Pain:  Goal: Pain level will decrease  Pain level will decrease   Outcome: Ongoing    Goal: Control of acute pain  Control of acute pain   Outcome: Ongoing    Goal: Control of chronic pain  Control of chronic pain   Outcome: Ongoing      Problem: Falls - Risk of:  Goal: Will remain free from falls  Will remain free from falls   Outcome: Ongoing    Goal: Absence of physical injury  Absence of physical injury   Outcome: Ongoing      Problem: Discharge Planning:  Goal: Participates in care planning  Participates in care planning   Outcome: Ongoing    Goal: Discharged to appropriate level of care  Discharged to appropriate level of care   Outcome: Ongoing      Problem: Anxiety/Stress:  Goal: Level of anxiety will decrease  Level of anxiety will decrease   Outcome: Ongoing      Problem:  Bowel Function - Altered:  Goal: Bowel elimination is within specified parameters  Bowel elimination is within specified parameters   Outcome: Ongoing      Problem: Fluid Volume - Imbalance:  Goal: Absence of imbalanced fluid volume signs and symptoms  Absence of imbalanced fluid volume signs and symptoms   Outcome: Ongoing      Problem: Nutrition Deficit:  Goal: Ability to achieve adequate nutritional intake will improve  Ability to achieve adequate nutritional intake will improve   Outcome: Ongoing

## 2018-12-11 VITALS
SYSTOLIC BLOOD PRESSURE: 122 MMHG | RESPIRATION RATE: 20 BRPM | TEMPERATURE: 97.9 F | BODY MASS INDEX: 25.07 KG/M2 | OXYGEN SATURATION: 98 % | WEIGHT: 156 LBS | HEIGHT: 66 IN | DIASTOLIC BLOOD PRESSURE: 78 MMHG | HEART RATE: 88 BPM

## 2018-12-11 LAB
ALBUMIN SERPL-MCNC: 3.4 G/DL (ref 3.5–5.2)
ALP BLD-CCNC: 62 U/L (ref 40–129)
ALT SERPL-CCNC: 31 U/L (ref 0–40)
ANION GAP SERPL CALCULATED.3IONS-SCNC: 11 MMOL/L (ref 7–16)
AST SERPL-CCNC: 31 U/L (ref 0–39)
BILIRUB SERPL-MCNC: 0.5 MG/DL (ref 0–1.2)
BUN BLDV-MCNC: 4 MG/DL (ref 6–20)
CALCIUM SERPL-MCNC: 8.6 MG/DL (ref 8.6–10.2)
CHLORIDE BLD-SCNC: 103 MMOL/L (ref 98–107)
CO2: 26 MMOL/L (ref 22–29)
CREAT SERPL-MCNC: 0.6 MG/DL (ref 0.7–1.2)
GFR AFRICAN AMERICAN: >60
GFR NON-AFRICAN AMERICAN: >60 ML/MIN/1.73
GLUCOSE BLD-MCNC: 142 MG/DL (ref 74–99)
HCT VFR BLD CALC: 34.6 % (ref 37–54)
HEMOGLOBIN: 11.8 G/DL (ref 12.5–16.5)
MCH RBC QN AUTO: 32.6 PG (ref 26–35)
MCHC RBC AUTO-ENTMCNC: 34.1 % (ref 32–34.5)
MCV RBC AUTO: 95.6 FL (ref 80–99.9)
PDW BLD-RTO: 12.5 FL (ref 11.5–15)
PLATELET # BLD: 181 E9/L (ref 130–450)
PMV BLD AUTO: 12.9 FL (ref 7–12)
POTASSIUM SERPL-SCNC: 3.2 MMOL/L (ref 3.5–5)
RBC # BLD: 3.62 E12/L (ref 3.8–5.8)
SODIUM BLD-SCNC: 140 MMOL/L (ref 132–146)
TOTAL PROTEIN: 6.2 G/DL (ref 6.4–8.3)
WBC # BLD: 8.6 E9/L (ref 4.5–11.5)

## 2018-12-11 PROCEDURE — 6370000000 HC RX 637 (ALT 250 FOR IP): Performed by: STUDENT IN AN ORGANIZED HEALTH CARE EDUCATION/TRAINING PROGRAM

## 2018-12-11 PROCEDURE — 85027 COMPLETE CBC AUTOMATED: CPT

## 2018-12-11 PROCEDURE — 36415 COLL VENOUS BLD VENIPUNCTURE: CPT

## 2018-12-11 PROCEDURE — 6360000002 HC RX W HCPCS: Performed by: INTERNAL MEDICINE

## 2018-12-11 PROCEDURE — 80053 COMPREHEN METABOLIC PANEL: CPT

## 2018-12-11 PROCEDURE — 6370000000 HC RX 637 (ALT 250 FOR IP): Performed by: INTERNAL MEDICINE

## 2018-12-11 PROCEDURE — 6370000000 HC RX 637 (ALT 250 FOR IP): Performed by: PSYCHIATRY & NEUROLOGY

## 2018-12-11 RX ORDER — POTASSIUM CHLORIDE 20 MEQ/1
40 TABLET, EXTENDED RELEASE ORAL ONCE
Status: COMPLETED | OUTPATIENT
Start: 2018-12-11 | End: 2018-12-11

## 2018-12-11 RX ADMIN — POTASSIUM CHLORIDE 40 MEQ: 20 TABLET, EXTENDED RELEASE ORAL at 10:42

## 2018-12-11 RX ADMIN — FENTANYL CITRATE 25 MCG: 50 INJECTION, SOLUTION INTRAMUSCULAR; INTRAVENOUS at 08:33

## 2018-12-11 RX ADMIN — CHLORDIAZEPOXIDE HYDROCHLORIDE 5 MG: 5 CAPSULE ORAL at 06:13

## 2018-12-11 RX ADMIN — FENTANYL CITRATE 25 MCG: 50 INJECTION, SOLUTION INTRAMUSCULAR; INTRAVENOUS at 02:09

## 2018-12-11 RX ADMIN — SERTRALINE 50 MG: 50 TABLET, FILM COATED ORAL at 08:38

## 2018-12-11 RX ADMIN — GABAPENTIN 400 MG: 400 CAPSULE ORAL at 08:40

## 2018-12-11 RX ADMIN — CLONAZEPAM 1 MG: 1 TABLET ORAL at 08:37

## 2018-12-11 RX ADMIN — FOLIC ACID 1 MG: 1 TABLET ORAL at 08:37

## 2018-12-11 RX ADMIN — ENOXAPARIN SODIUM 40 MG: 40 INJECTION SUBCUTANEOUS at 08:39

## 2018-12-11 RX ADMIN — Medication 100 MG: at 09:16

## 2018-12-11 ASSESSMENT — PAIN DESCRIPTION - FREQUENCY: FREQUENCY: CONTINUOUS

## 2018-12-11 ASSESSMENT — PAIN DESCRIPTION - PAIN TYPE: TYPE: ACUTE PAIN

## 2018-12-11 ASSESSMENT — PAIN DESCRIPTION - DESCRIPTORS: DESCRIPTORS: ACHING;DISCOMFORT;DULL

## 2018-12-11 ASSESSMENT — PAIN SCALES - GENERAL
PAINLEVEL_OUTOF10: 5
PAINLEVEL_OUTOF10: 7

## 2018-12-11 ASSESSMENT — PAIN DESCRIPTION - ORIENTATION: ORIENTATION: RIGHT;LEFT;LOWER

## 2018-12-11 ASSESSMENT — PAIN DESCRIPTION - LOCATION: LOCATION: BACK

## 2018-12-11 ASSESSMENT — PAIN DESCRIPTION - ONSET: ONSET: ON-GOING

## 2018-12-11 ASSESSMENT — PAIN DESCRIPTION - PROGRESSION: CLINICAL_PROGRESSION: GRADUALLY WORSENING

## 2018-12-11 NOTE — PROGRESS NOTES
Sent message to Dr. Pavithra Vanegas regarding Ct result and diet order.  Awaiting call back
Subjective:  Tere Saucedo was seen and examined at bedside today. The patient's questions were answered and tests were reviewed. There were no new problems reported overnight. Patient feels better with less abd pain   Appetite is coming back    A complete review of systems and social history was completed on admission and remains unchanged unless otherwise noted    Scheduled Meds:   nicotine  1 patch Transdermal Daily    sodium chloride flush  10 mL Intravenous 2 times per day    gabapentin  400 mg Oral 4x Daily    nicotine  1 patch Transdermal Daily    enoxaparin  40 mg Subcutaneous Daily    thiamine (VITAMIN B1) IVPB  100 mg Intravenous Q24H     Continuous Infusions:   lactated ringers 150 mL/hr at 12/08/18 0917     PRN Meds:sodium chloride flush, acetaminophen, albuterol sulfate HFA, fentanNYL, chlordiazePOXIDE, ondansetron    Objective:  /88   Pulse 68   Temp 98.9 °F (37.2 °C) (Oral)   Resp 15   Ht 5' 6\" (1.676 m)   Wt 156 lb (70.8 kg)   SpO2 98%   BMI 25.18 kg/m²   In: 2002.5 [I.V.:2002.5]  Out: 875    In: 2002.5   Out: 875 [Urine:875]     AAO x 3, currently in NAD  RRR, pos S1, S2  CTA bilaterally, no wheeze, rales or rhonchi  bowel sounds present, tender in epigastrium  No clubbing, cyanosis, or edema  No neuro changes   No obvious rashes or lesions. Recent Labs      12/07/18   1132  12/08/18   0447   WBC  16.8*  13.4*   HGB  17.1*  13.2   PLT  200  153     Recent Labs      12/07/18   1132  12/08/18   0447   NA  131*  136   K  4.0  3.7   CL  86*  98   CO2  27  28   BUN  11  6   CREATININE  0.8  0.7   GLUCOSE  148*  97     Recent Labs      12/07/18   1132  12/08/18   0447   BILITOT  1.9*  1.2   ALKPHOS  97  69   AST  107*  44*   ALT  93*  49*     No results for input(s): INR in the last 72 hours. Invalid input(s): PT  No results for input(s): CKTOTAL, CKMB, CKMBINDEX, TROPONINI in the last 72 hours.       Ct Abdomen Pelvis W Iv Contrast Additional Contrast? Radiologist
[facility] Quality Flow/Interdisciplinary Rounds Progress Note        Quality Flow Rounds held on December 11, 2018    Disciplines Attending:  Bedside Nurse, ,  and Nursing Unit 200 Adrianna Messina was admitted on 12/7/2018 10:40 AM    Anticipated Discharge Date:  Expected Discharge Date: 12/10/18    Disposition:    Maximus Score:  Maximus Scale Score: 23    Readmission Risk              Risk of Unplanned Readmission:        16           Discussed patient goal for the day, patient clinical progression, and barriers to discharge.   The following Goal(s) of the Day/Commitment(s) have been identified:  Prompt for possible discharge home today      Pablo Doll  December 11, 2018
Slightly heterogeneous pancreatic head and uncinate process with adjacent fat stranding, compatible with acute pancreatitis. No loculated collection or CT evidence of necrosis. Assessment:  Brooks Harman is a 24y.o. year old male who presented on 12/7/2018 and is being treated for:  Active Problems:    Alcohol-induced acute pancreatitis  Resolved Problems:    * No resolved hospital problems. *    Plan  · Cut down on ivf/pain meds and increase diet  · Otherwise continue current therapy. · Please see orders for further management and care.     Louie Madera MD  6:28 PM  12/9/2018

## 2019-01-25 ENCOUNTER — HOSPITAL ENCOUNTER (EMERGENCY)
Age: 29
Discharge: HOME | End: 2019-01-25
Attending: EMERGENCY MEDICINE
Payer: COMMERCIAL

## 2019-01-25 VITALS
WEIGHT: 165.31 LBS | BODY MASS INDEX: 26.68 KG/M2 | RESPIRATION RATE: 16 BRPM | SYSTOLIC BLOOD PRESSURE: 123 MMHG | TEMPERATURE: 97.7 F | HEART RATE: 91 BPM | OXYGEN SATURATION: 99 % | DIASTOLIC BLOOD PRESSURE: 86 MMHG

## 2019-01-25 DIAGNOSIS — F10.920 ACUTE ALCOHOLIC INTOXICATION WITHOUT COMPLICATION (HCC): Primary | ICD-10-CM

## 2019-01-25 LAB
AMPHETAMINE SCREEN, URINE: NOT DETECTED
ANION GAP SERPL CALCULATED.3IONS-SCNC: 16 MMOL/L (ref 7–16)
BARBITURATE SCREEN URINE: NOT DETECTED
BASOPHILS ABSOLUTE: 0.09 E9/L (ref 0–0.2)
BASOPHILS RELATIVE PERCENT: 1 % (ref 0–2)
BENZODIAZEPINE SCREEN, URINE: NOT DETECTED
BUN BLDV-MCNC: 2 MG/DL (ref 6–20)
CALCIUM SERPL-MCNC: 9.2 MG/DL (ref 8.6–10.2)
CANNABINOID SCREEN URINE: NOT DETECTED
CHLORIDE BLD-SCNC: 99 MMOL/L (ref 98–107)
CO2: 23 MMOL/L (ref 22–29)
COCAINE METABOLITE SCREEN URINE: NOT DETECTED
CREAT SERPL-MCNC: 0.8 MG/DL (ref 0.7–1.2)
EKG ATRIAL RATE: 76 BPM
EKG P AXIS: 67 DEGREES
EKG P-R INTERVAL: 144 MS
EKG Q-T INTERVAL: 398 MS
EKG QRS DURATION: 98 MS
EKG QTC CALCULATION (BAZETT): 447 MS
EKG R AXIS: 29 DEGREES
EKG T AXIS: 41 DEGREES
EKG VENTRICULAR RATE: 76 BPM
EOSINOPHILS ABSOLUTE: 0.19 E9/L (ref 0.05–0.5)
EOSINOPHILS RELATIVE PERCENT: 2 % (ref 0–6)
GFR AFRICAN AMERICAN: >60
GFR NON-AFRICAN AMERICAN: >60 ML/MIN/1.73
GLUCOSE BLD-MCNC: 91 MG/DL (ref 74–99)
HCT VFR BLD CALC: 46 % (ref 37–54)
HEMOGLOBIN: 16.2 G/DL (ref 12.5–16.5)
IMMATURE GRANULOCYTES #: 0.04 E9/L
IMMATURE GRANULOCYTES %: 0.4 % (ref 0–5)
LYMPHOCYTES ABSOLUTE: 4.03 E9/L (ref 1.5–4)
LYMPHOCYTES RELATIVE PERCENT: 43.5 % (ref 20–42)
MCH RBC QN AUTO: 32.4 PG (ref 26–35)
MCHC RBC AUTO-ENTMCNC: 35.2 % (ref 32–34.5)
MCV RBC AUTO: 92 FL (ref 80–99.9)
METHADONE SCREEN, URINE: NOT DETECTED
MONOCYTES ABSOLUTE: 0.81 E9/L (ref 0.1–0.95)
MONOCYTES RELATIVE PERCENT: 8.7 % (ref 2–12)
NEUTROPHILS ABSOLUTE: 4.11 E9/L (ref 1.8–7.3)
NEUTROPHILS RELATIVE PERCENT: 44.4 % (ref 43–80)
OPIATE SCREEN URINE: NOT DETECTED
PDW BLD-RTO: 12.3 FL (ref 11.5–15)
PHENCYCLIDINE SCREEN URINE: NOT DETECTED
PLATELET # BLD: 299 E9/L (ref 130–450)
PMV BLD AUTO: 11.9 FL (ref 7–12)
POTASSIUM REFLEX MAGNESIUM: 3.7 MMOL/L (ref 3.5–5)
PROPOXYPHENE SCREEN: NOT DETECTED
RBC # BLD: 5 E12/L (ref 3.8–5.8)
SODIUM BLD-SCNC: 138 MMOL/L (ref 132–146)
WBC # BLD: 9.3 E9/L (ref 4.5–11.5)

## 2019-01-25 PROCEDURE — 80048 BASIC METABOLIC PNL TOTAL CA: CPT

## 2019-01-25 PROCEDURE — G0480 DRUG TEST DEF 1-7 CLASSES: HCPCS

## 2019-01-25 PROCEDURE — 36415 COLL VENOUS BLD VENIPUNCTURE: CPT

## 2019-01-25 PROCEDURE — 80307 DRUG TEST PRSMV CHEM ANLYZR: CPT

## 2019-01-25 PROCEDURE — 85025 COMPLETE CBC W/AUTO DIFF WBC: CPT

## 2019-01-25 PROCEDURE — 93005 ELECTROCARDIOGRAM TRACING: CPT

## 2019-01-25 PROCEDURE — 99284 EMERGENCY DEPT VISIT MOD MDM: CPT

## 2019-01-25 RX ORDER — NICOTINE 21 MG/24HR
1 PATCH, TRANSDERMAL 24 HOURS TRANSDERMAL ONCE
Status: DISCONTINUED | OUTPATIENT
Start: 2019-01-25 | End: 2019-01-26 | Stop reason: HOSPADM

## 2019-01-25 ASSESSMENT — ENCOUNTER SYMPTOMS
NAUSEA: 0
VOMITING: 0
BACK PAIN: 0
SHORTNESS OF BREATH: 0
ABDOMINAL PAIN: 0
CHEST TIGHTNESS: 0
DIARRHEA: 0
SORE THROAT: 0

## 2019-01-26 LAB
ACETAMINOPHEN LEVEL: <5 MCG/ML (ref 10–30)
ETHANOL: 305 MG/DL (ref 0–0.08)
SALICYLATE, SERUM: <0.3 MG/DL (ref 0–30)
TRICYCLIC ANTIDEPRESSANTS SCREEN SERUM: NEGATIVE NG/ML

## 2019-01-31 ENCOUNTER — HOSPITAL ENCOUNTER (EMERGENCY)
Age: 29
Discharge: HOME | End: 2019-02-01
Attending: EMERGENCY MEDICINE
Payer: COMMERCIAL

## 2019-01-31 ENCOUNTER — APPOINTMENT (OUTPATIENT)
Dept: CT IMAGING | Age: 29
End: 2019-01-31
Payer: COMMERCIAL

## 2019-01-31 DIAGNOSIS — F10.920 ACUTE ALCOHOLIC INTOXICATION WITHOUT COMPLICATION (HCC): Primary | ICD-10-CM

## 2019-01-31 DIAGNOSIS — F32.A DEPRESSION, UNSPECIFIED DEPRESSION TYPE: ICD-10-CM

## 2019-01-31 LAB
ALBUMIN SERPL-MCNC: 4.7 G/DL (ref 3.5–5.2)
ALP BLD-CCNC: 90 U/L (ref 40–129)
ALT SERPL-CCNC: 16 U/L (ref 0–40)
AMPHETAMINE SCREEN, URINE: NOT DETECTED
ANION GAP SERPL CALCULATED.3IONS-SCNC: 23 MMOL/L (ref 7–16)
AST SERPL-CCNC: 27 U/L (ref 0–39)
BARBITURATE SCREEN URINE: NOT DETECTED
BASOPHILS ABSOLUTE: 0.1 E9/L (ref 0–0.2)
BASOPHILS RELATIVE PERCENT: 0.6 % (ref 0–2)
BENZODIAZEPINE SCREEN, URINE: NOT DETECTED
BILIRUB SERPL-MCNC: 0.5 MG/DL (ref 0–1.2)
BILIRUBIN URINE: NEGATIVE
BLOOD, URINE: NEGATIVE
BUN BLDV-MCNC: 3 MG/DL (ref 6–20)
CALCIUM SERPL-MCNC: 8.9 MG/DL (ref 8.6–10.2)
CANNABINOID SCREEN URINE: NOT DETECTED
CHLORIDE BLD-SCNC: 98 MMOL/L (ref 98–107)
CLARITY: CLEAR
CO2: 19 MMOL/L (ref 22–29)
COCAINE METABOLITE SCREEN URINE: NOT DETECTED
COLOR: YELLOW
CREAT SERPL-MCNC: 0.9 MG/DL (ref 0.7–1.2)
EOSINOPHILS ABSOLUTE: 0.09 E9/L (ref 0.05–0.5)
EOSINOPHILS RELATIVE PERCENT: 0.5 % (ref 0–6)
GFR AFRICAN AMERICAN: >60
GFR NON-AFRICAN AMERICAN: >60 ML/MIN/1.73
GLUCOSE BLD-MCNC: 98 MG/DL (ref 74–99)
GLUCOSE URINE: NEGATIVE MG/DL
HCT VFR BLD CALC: 44.9 % (ref 37–54)
HEMOGLOBIN: 16.1 G/DL (ref 12.5–16.5)
IMMATURE GRANULOCYTES #: 0.06 E9/L
IMMATURE GRANULOCYTES %: 0.3 % (ref 0–5)
KETONES, URINE: NEGATIVE MG/DL
LEUKOCYTE ESTERASE, URINE: NEGATIVE
LIPASE: 70 U/L (ref 13–60)
LYMPHOCYTES ABSOLUTE: 2.68 E9/L (ref 1.5–4)
LYMPHOCYTES RELATIVE PERCENT: 15.4 % (ref 20–42)
MCH RBC QN AUTO: 32.7 PG (ref 26–35)
MCHC RBC AUTO-ENTMCNC: 35.9 % (ref 32–34.5)
MCV RBC AUTO: 91.3 FL (ref 80–99.9)
METHADONE SCREEN, URINE: NOT DETECTED
MONOCYTES ABSOLUTE: 1.05 E9/L (ref 0.1–0.95)
MONOCYTES RELATIVE PERCENT: 6.1 % (ref 2–12)
NEUTROPHILS ABSOLUTE: 13.37 E9/L (ref 1.8–7.3)
NEUTROPHILS RELATIVE PERCENT: 77.1 % (ref 43–80)
NITRITE, URINE: NEGATIVE
OPIATE SCREEN URINE: NOT DETECTED
PDW BLD-RTO: 12.1 FL (ref 11.5–15)
PH UA: 5.5 (ref 5–9)
PHENCYCLIDINE SCREEN URINE: NOT DETECTED
PLATELET # BLD: 256 E9/L (ref 130–450)
PMV BLD AUTO: 12.6 FL (ref 7–12)
POTASSIUM SERPL-SCNC: 3.7 MMOL/L (ref 3.5–5)
PROPOXYPHENE SCREEN: NOT DETECTED
PROTEIN UA: NEGATIVE MG/DL
RBC # BLD: 4.92 E12/L (ref 3.8–5.8)
SODIUM BLD-SCNC: 140 MMOL/L (ref 132–146)
SPECIFIC GRAVITY UA: <=1.005 (ref 1–1.03)
TOTAL PROTEIN: 7.7 G/DL (ref 6.4–8.3)
TSH SERPL DL<=0.05 MIU/L-ACNC: 1.79 UIU/ML (ref 0.27–4.2)
UROBILINOGEN, URINE: 0.2 E.U./DL
WBC # BLD: 17.4 E9/L (ref 4.5–11.5)

## 2019-01-31 PROCEDURE — 36415 COLL VENOUS BLD VENIPUNCTURE: CPT

## 2019-01-31 PROCEDURE — 84443 ASSAY THYROID STIM HORMONE: CPT

## 2019-01-31 PROCEDURE — 83690 ASSAY OF LIPASE: CPT

## 2019-01-31 PROCEDURE — 85025 COMPLETE CBC W/AUTO DIFF WBC: CPT

## 2019-01-31 PROCEDURE — G0480 DRUG TEST DEF 1-7 CLASSES: HCPCS

## 2019-01-31 PROCEDURE — 84436 ASSAY OF TOTAL THYROXINE: CPT

## 2019-01-31 PROCEDURE — 80307 DRUG TEST PRSMV CHEM ANLYZR: CPT

## 2019-01-31 PROCEDURE — 70450 CT HEAD/BRAIN W/O DYE: CPT

## 2019-01-31 PROCEDURE — 72125 CT NECK SPINE W/O DYE: CPT

## 2019-01-31 PROCEDURE — 80053 COMPREHEN METABOLIC PANEL: CPT

## 2019-01-31 PROCEDURE — 81003 URINALYSIS AUTO W/O SCOPE: CPT

## 2019-01-31 PROCEDURE — 99285 EMERGENCY DEPT VISIT HI MDM: CPT

## 2019-01-31 ASSESSMENT — PAIN DESCRIPTION - FREQUENCY: FREQUENCY: CONTINUOUS

## 2019-01-31 ASSESSMENT — ENCOUNTER SYMPTOMS
ABDOMINAL PAIN: 0
EYE PAIN: 0
EYE REDNESS: 0
BACK PAIN: 0
COUGH: 0
VOMITING: 0
EYE DISCHARGE: 0
WHEEZING: 0
NAUSEA: 0
SORE THROAT: 0
SINUS PRESSURE: 0
SHORTNESS OF BREATH: 0
DIARRHEA: 0

## 2019-01-31 ASSESSMENT — PAIN DESCRIPTION - LOCATION: LOCATION: HEAD

## 2019-01-31 ASSESSMENT — PAIN SCALES - GENERAL: PAINLEVEL_OUTOF10: 3

## 2019-01-31 ASSESSMENT — PAIN DESCRIPTION - DESCRIPTORS: DESCRIPTORS: ACHING

## 2019-01-31 ASSESSMENT — PAIN DESCRIPTION - PAIN TYPE: TYPE: ACUTE PAIN

## 2019-02-01 VITALS
HEIGHT: 66 IN | DIASTOLIC BLOOD PRESSURE: 68 MMHG | RESPIRATION RATE: 20 BRPM | BODY MASS INDEX: 26.52 KG/M2 | WEIGHT: 165 LBS | TEMPERATURE: 98.6 F | HEART RATE: 67 BPM | SYSTOLIC BLOOD PRESSURE: 110 MMHG | OXYGEN SATURATION: 100 %

## 2019-02-01 LAB
ACETAMINOPHEN LEVEL: <5 MCG/ML (ref 10–30)
ETHANOL: 138 MG/DL (ref 0–0.08)
ETHANOL: 284 MG/DL (ref 0–0.08)
ETHANOL: 98 MG/DL (ref 0–0.08)
SALICYLATE, SERUM: <0.3 MG/DL (ref 0–30)
T4 TOTAL: 5.9 MCG/DL (ref 4.5–11.7)
TRICYCLIC ANTIDEPRESSANTS SCREEN SERUM: NEGATIVE NG/ML

## 2019-02-01 PROCEDURE — G0480 DRUG TEST DEF 1-7 CLASSES: HCPCS

## 2019-02-01 PROCEDURE — 36415 COLL VENOUS BLD VENIPUNCTURE: CPT

## 2019-02-13 ENCOUNTER — HOSPITAL ENCOUNTER (EMERGENCY)
Age: 29
Discharge: HOME | End: 2019-02-13
Attending: EMERGENCY MEDICINE
Payer: COMMERCIAL

## 2019-02-13 VITALS
DIASTOLIC BLOOD PRESSURE: 92 MMHG | OXYGEN SATURATION: 100 % | BODY MASS INDEX: 26.52 KG/M2 | HEIGHT: 66 IN | TEMPERATURE: 98.5 F | WEIGHT: 165 LBS | RESPIRATION RATE: 20 BRPM | HEART RATE: 68 BPM | SYSTOLIC BLOOD PRESSURE: 144 MMHG

## 2019-02-13 DIAGNOSIS — N48.89 PENILE PAIN: Primary | ICD-10-CM

## 2019-02-13 LAB
BACTERIA: NORMAL /HPF
BILIRUBIN URINE: NEGATIVE
BLOOD, URINE: NEGATIVE
CLARITY: CLEAR
COLOR: YELLOW
GLUCOSE URINE: NEGATIVE MG/DL
KETONES, URINE: NEGATIVE MG/DL
LEUKOCYTE ESTERASE, URINE: ABNORMAL
NITRITE, URINE: NEGATIVE
PH UA: 6 (ref 5–9)
PROTEIN UA: NEGATIVE MG/DL
RBC UA: NORMAL /HPF (ref 0–2)
SPECIFIC GRAVITY UA: <=1.005 (ref 1–1.03)
UROBILINOGEN, URINE: 0.2 E.U./DL
WBC UA: NORMAL /HPF (ref 0–5)

## 2019-02-13 PROCEDURE — 81001 URINALYSIS AUTO W/SCOPE: CPT

## 2019-02-13 PROCEDURE — 99283 EMERGENCY DEPT VISIT LOW MDM: CPT

## 2019-02-13 RX ORDER — NAPROXEN 500 MG/1
500 TABLET ORAL 2 TIMES DAILY
Qty: 30 TABLET | Refills: 0 | Status: SHIPPED | OUTPATIENT
Start: 2019-02-13 | End: 2020-12-27

## 2019-02-13 RX ORDER — DOXYCYCLINE HYCLATE 100 MG
100 TABLET ORAL 2 TIMES DAILY
Qty: 14 TABLET | Refills: 0 | Status: SHIPPED | OUTPATIENT
Start: 2019-02-13 | End: 2019-02-20

## 2019-02-13 ASSESSMENT — ENCOUNTER SYMPTOMS
NAUSEA: 0
SHORTNESS OF BREATH: 0
ABDOMINAL PAIN: 0
PHOTOPHOBIA: 0
COUGH: 0
CHEST TIGHTNESS: 0
VOMITING: 0

## 2019-02-13 ASSESSMENT — PAIN DESCRIPTION - PAIN TYPE: TYPE: ACUTE PAIN

## 2019-02-13 ASSESSMENT — PAIN DESCRIPTION - LOCATION: LOCATION: GROIN

## 2019-02-13 ASSESSMENT — PAIN DESCRIPTION - ORIENTATION: ORIENTATION: MID

## 2019-02-13 ASSESSMENT — PAIN DESCRIPTION - DESCRIPTORS: DESCRIPTORS: SHARP;SHOOTING;DULL

## 2019-02-13 ASSESSMENT — PAIN SCALES - GENERAL: PAINLEVEL_OUTOF10: 7

## 2020-05-12 ENCOUNTER — HOSPITAL ENCOUNTER (EMERGENCY)
Age: 30
Discharge: HOME | End: 2020-05-12
Attending: EMERGENCY MEDICINE
Payer: COMMERCIAL

## 2020-05-12 ENCOUNTER — APPOINTMENT (OUTPATIENT)
Dept: CT IMAGING | Age: 30
End: 2020-05-12
Payer: COMMERCIAL

## 2020-05-12 VITALS
SYSTOLIC BLOOD PRESSURE: 118 MMHG | OXYGEN SATURATION: 97 % | DIASTOLIC BLOOD PRESSURE: 78 MMHG | WEIGHT: 165 LBS | HEART RATE: 87 BPM | RESPIRATION RATE: 18 BRPM | BODY MASS INDEX: 26.63 KG/M2 | TEMPERATURE: 98.3 F

## 2020-05-12 LAB
ACETAMINOPHEN LEVEL: <5 MCG/ML (ref 10–30)
ALBUMIN SERPL-MCNC: 4.9 G/DL (ref 3.5–5.2)
ALP BLD-CCNC: 120 U/L (ref 40–129)
ALT SERPL-CCNC: 89 U/L (ref 0–40)
AMPHETAMINE SCREEN, URINE: NOT DETECTED
ANION GAP SERPL CALCULATED.3IONS-SCNC: 34 MMOL/L (ref 7–16)
AST SERPL-CCNC: 134 U/L (ref 0–39)
BARBITURATE SCREEN URINE: NOT DETECTED
BASOPHILS ABSOLUTE: 0.05 E9/L (ref 0–0.2)
BASOPHILS RELATIVE PERCENT: 0.6 % (ref 0–2)
BENZODIAZEPINE SCREEN, URINE: NOT DETECTED
BILIRUB SERPL-MCNC: 0.9 MG/DL (ref 0–1.2)
BUN BLDV-MCNC: 6 MG/DL (ref 6–20)
CALCIUM SERPL-MCNC: 9.5 MG/DL (ref 8.6–10.2)
CANNABINOID SCREEN URINE: POSITIVE
CHLORIDE BLD-SCNC: 93 MMOL/L (ref 98–107)
CO2: 12 MMOL/L (ref 22–29)
COCAINE METABOLITE SCREEN URINE: NOT DETECTED
CREAT SERPL-MCNC: 1 MG/DL (ref 0.7–1.2)
EOSINOPHILS ABSOLUTE: 0.21 E9/L (ref 0.05–0.5)
EOSINOPHILS RELATIVE PERCENT: 2.4 % (ref 0–6)
ETHANOL: <10 MG/DL (ref 0–0.08)
FENTANYL SCREEN, URINE: NOT DETECTED
GFR AFRICAN AMERICAN: >60
GFR NON-AFRICAN AMERICAN: >60 ML/MIN/1.73
GLUCOSE BLD-MCNC: 153 MG/DL (ref 74–99)
HCT VFR BLD CALC: 48.4 % (ref 37–54)
HEMOGLOBIN: 16.7 G/DL (ref 12.5–16.5)
IMMATURE GRANULOCYTES #: 0.07 E9/L
IMMATURE GRANULOCYTES %: 0.8 % (ref 0–5)
LACTIC ACID, SEPSIS: 1.4 MMOL/L (ref 0.5–1.9)
LACTIC ACID: 14.8 MMOL/L (ref 0.5–2.2)
LYMPHOCYTES ABSOLUTE: 1.28 E9/L (ref 1.5–4)
LYMPHOCYTES RELATIVE PERCENT: 14.7 % (ref 20–42)
Lab: ABNORMAL
MCH RBC QN AUTO: 33.1 PG (ref 26–35)
MCHC RBC AUTO-ENTMCNC: 34.5 % (ref 32–34.5)
MCV RBC AUTO: 96 FL (ref 80–99.9)
METHADONE SCREEN, URINE: NOT DETECTED
MONOCYTES ABSOLUTE: 0.9 E9/L (ref 0.1–0.95)
MONOCYTES RELATIVE PERCENT: 10.4 % (ref 2–12)
NEUTROPHILS ABSOLUTE: 6.17 E9/L (ref 1.8–7.3)
NEUTROPHILS RELATIVE PERCENT: 71.1 % (ref 43–80)
OPIATE SCREEN URINE: NOT DETECTED
OXYCODONE URINE: NOT DETECTED
PDW BLD-RTO: 13.4 FL (ref 11.5–15)
PHENCYCLIDINE SCREEN URINE: NOT DETECTED
PLATELET # BLD: 124 E9/L (ref 130–450)
PMV BLD AUTO: 11.5 FL (ref 7–12)
POTASSIUM REFLEX MAGNESIUM: 4 MMOL/L (ref 3.5–5)
RBC # BLD: 5.04 E12/L (ref 3.8–5.8)
SALICYLATE, SERUM: <0.3 MG/DL (ref 0–30)
SODIUM BLD-SCNC: 139 MMOL/L (ref 132–146)
TOTAL PROTEIN: 7.9 G/DL (ref 6.4–8.3)
TRICYCLIC ANTIDEPRESSANTS SCREEN SERUM: NEGATIVE NG/ML
WBC # BLD: 8.7 E9/L (ref 4.5–11.5)

## 2020-05-12 PROCEDURE — 83605 ASSAY OF LACTIC ACID: CPT

## 2020-05-12 PROCEDURE — 85025 COMPLETE CBC W/AUTO DIFF WBC: CPT

## 2020-05-12 PROCEDURE — 2580000003 HC RX 258: Performed by: EMERGENCY MEDICINE

## 2020-05-12 PROCEDURE — G0480 DRUG TEST DEF 1-7 CLASSES: HCPCS

## 2020-05-12 PROCEDURE — 99285 EMERGENCY DEPT VISIT HI MDM: CPT

## 2020-05-12 PROCEDURE — 96374 THER/PROPH/DIAG INJ IV PUSH: CPT

## 2020-05-12 PROCEDURE — 6360000002 HC RX W HCPCS: Performed by: STUDENT IN AN ORGANIZED HEALTH CARE EDUCATION/TRAINING PROGRAM

## 2020-05-12 PROCEDURE — 80307 DRUG TEST PRSMV CHEM ANLYZR: CPT

## 2020-05-12 PROCEDURE — 70450 CT HEAD/BRAIN W/O DYE: CPT

## 2020-05-12 PROCEDURE — 6370000000 HC RX 637 (ALT 250 FOR IP): Performed by: STUDENT IN AN ORGANIZED HEALTH CARE EDUCATION/TRAINING PROGRAM

## 2020-05-12 PROCEDURE — 93005 ELECTROCARDIOGRAM TRACING: CPT | Performed by: STUDENT IN AN ORGANIZED HEALTH CARE EDUCATION/TRAINING PROGRAM

## 2020-05-12 PROCEDURE — 94760 N-INVAS EAR/PLS OXIMETRY 1: CPT

## 2020-05-12 PROCEDURE — 2580000003 HC RX 258: Performed by: STUDENT IN AN ORGANIZED HEALTH CARE EDUCATION/TRAINING PROGRAM

## 2020-05-12 PROCEDURE — 80053 COMPREHEN METABOLIC PANEL: CPT

## 2020-05-12 PROCEDURE — 72125 CT NECK SPINE W/O DYE: CPT

## 2020-05-12 RX ORDER — FOLIC ACID 1 MG/1
1 TABLET ORAL DAILY
Status: DISCONTINUED | OUTPATIENT
Start: 2020-05-12 | End: 2020-05-12 | Stop reason: HOSPADM

## 2020-05-12 RX ORDER — CHLORDIAZEPOXIDE HYDROCHLORIDE 25 MG/1
50 CAPSULE, GELATIN COATED ORAL ONCE
Status: COMPLETED | OUTPATIENT
Start: 2020-05-12 | End: 2020-05-12

## 2020-05-12 RX ORDER — THIAMINE HYDROCHLORIDE 100 MG/ML
100 INJECTION, SOLUTION INTRAMUSCULAR; INTRAVENOUS DAILY
Status: DISCONTINUED | OUTPATIENT
Start: 2020-05-12 | End: 2020-05-12 | Stop reason: HOSPADM

## 2020-05-12 RX ORDER — DIAZEPAM 5 MG/ML
5 INJECTION, SOLUTION INTRAMUSCULAR; INTRAVENOUS ONCE
Status: DISCONTINUED | OUTPATIENT
Start: 2020-05-12 | End: 2020-05-12

## 2020-05-12 RX ORDER — ONDANSETRON 2 MG/ML
4 INJECTION INTRAMUSCULAR; INTRAVENOUS ONCE
Status: COMPLETED | OUTPATIENT
Start: 2020-05-12 | End: 2020-05-12

## 2020-05-12 RX ORDER — 0.9 % SODIUM CHLORIDE 0.9 %
1000 INTRAVENOUS SOLUTION INTRAVENOUS ONCE
Status: COMPLETED | OUTPATIENT
Start: 2020-05-12 | End: 2020-05-12

## 2020-05-12 RX ADMIN — CHLORDIAZEPOXIDE HYDROCHLORIDE 50 MG: 25 CAPSULE ORAL at 16:18

## 2020-05-12 RX ADMIN — SODIUM CHLORIDE 1000 ML: 9 INJECTION, SOLUTION INTRAVENOUS at 16:18

## 2020-05-12 RX ADMIN — ONDANSETRON 4 MG: 2 INJECTION INTRAMUSCULAR; INTRAVENOUS at 16:18

## 2020-05-12 RX ADMIN — SODIUM CHLORIDE 1000 ML: 9 INJECTION, SOLUTION INTRAVENOUS at 17:38

## 2020-05-12 RX ADMIN — FOLIC ACID 1 MG: 1 TABLET ORAL at 16:18

## 2020-05-12 RX ADMIN — THIAMINE HYDROCHLORIDE 100 MG: 100 INJECTION, SOLUTION INTRAMUSCULAR; INTRAVENOUS at 16:18

## 2020-05-12 ASSESSMENT — ENCOUNTER SYMPTOMS
WHEEZING: 0
PHOTOPHOBIA: 0
SINUS PRESSURE: 0
NAUSEA: 1
VOICE CHANGE: 0
SHORTNESS OF BREATH: 0
EYE ITCHING: 0
BACK PAIN: 0
EYE PAIN: 0
ABDOMINAL PAIN: 0
VOMITING: 1
EYE DISCHARGE: 0
ABDOMINAL DISTENTION: 0
CHOKING: 0
EYE REDNESS: 0
CHEST TIGHTNESS: 0
SORE THROAT: 0
APNEA: 0
COUGH: 0
DIARRHEA: 0
TROUBLE SWALLOWING: 0
COLOR CHANGE: 0

## 2020-05-12 NOTE — ED PROVIDER NOTES
the patient's consent of all the results of the evaluation. She is agreeable with plan. States that she will follow-up with her primary care physician as soon as they are able. She was also given option that they could go to a rehab facility. She also is adamant about not going to rehab facility and would rather take care of him at home. [JV]      ED Course User Index  [JV] Suellen Lanes, MD        ED Course as of May 12 1926   Tue May 12, 2020   1614 EKG read by me. Heart rate 92. Normal sinus rhythm. Normal axis deviation. No ventricular hypertrophy. No flattened T waves. No U waves. No STEMI. [JV]   2012 Patient sitting in bed comfortably. In no acute distress. He has a small tremors of his right hand. [JV]   2786 Patient was reevaluated in the room. He is in no acute distress. He is lying back comfortably. He does not have any tremors on his right hand. [JV]   1815 Patient was brought on subject about going to a rehab facility. States that he would rather go home instead. [JV]   1830 For 2 L of fluid patient has a normal heart rate. And normotensive. Patient has been afebrile throughout. Pulse: 96 [JV]   1831 No trauma   CT Head WO Contrast [JV]   1833 Repeat lactic is 1.4. Lactic Acid, Sepsis: 1.4 [JV]   1844 Sitting in bed comfortably. He has no signs of alcohol withdrawal.  He has a slight tremor on the left hand. States that he feels much better and he would rather go home than go to a rehab facility. [JV]   1923 Spoke with patient's mother at length over the phone at bedside. She was informed with the patient's consent of all the results of the evaluation. She is agreeable with plan. States that she will follow-up with her primary care physician as soon as they are able. She was also given option that they could go to a rehab facility. She also is adamant about not going to rehab facility and would rather take care of him at home.     [JV]      ED Course % 0.6 0.0 - 2.0 %    Neutrophils Absolute 6.17 1.80 - 7.30 E9/L    Immature Granulocytes # 0.07 E9/L    Lymphocytes Absolute 1.28 (L) 1.50 - 4.00 E9/L    Monocytes Absolute 0.90 0.10 - 0.95 E9/L    Eosinophils Absolute 0.21 0.05 - 0.50 E9/L    Basophils Absolute 0.05 0.00 - 0.20 E9/L   Comprehensive Metabolic Panel w/ Reflex to MG   Result Value Ref Range    Sodium 139 132 - 146 mmol/L    Potassium reflex Magnesium 4.0 3.5 - 5.0 mmol/L    Chloride 93 (L) 98 - 107 mmol/L    CO2 12 (L) 22 - 29 mmol/L    Anion Gap 34 (H) 7 - 16 mmol/L    Glucose 153 (H) 74 - 99 mg/dL    BUN 6 6 - 20 mg/dL    CREATININE 1.0 0.7 - 1.2 mg/dL    GFR Non-African American >60 >=60 mL/min/1.73    GFR African American >60     Calcium 9.5 8.6 - 10.2 mg/dL    Total Protein 7.9 6.4 - 8.3 g/dL    Alb 4.9 3.5 - 5.2 g/dL    Total Bilirubin 0.9 0.0 - 1.2 mg/dL    Alkaline Phosphatase 120 40 - 129 U/L    ALT 89 (H) 0 - 40 U/L     (H) 0 - 39 U/L   Urine Drug Screen   Result Value Ref Range    Amphetamine Screen, Urine NOT DETECTED Negative <1000 ng/mL    Barbiturate Screen, Ur NOT DETECTED Negative < 200 ng/mL    Benzodiazepine Screen, Urine NOT DETECTED Negative < 200 ng/mL    Cannabinoid Scrn, Ur POSITIVE (A) Negative < 50ng/mL    Cocaine Metabolite Screen, Urine NOT DETECTED Negative < 300 ng/mL    Opiate Scrn, Ur NOT DETECTED Negative < 300ng/mL    PCP Screen, Urine NOT DETECTED Negative < 25 ng/mL    Methadone Screen, Urine NOT DETECTED Negative <300 ng/mL    Oxycodone Urine NOT DETECTED Negative <100 ng/mL    FENTANYL SCREEN, URINE NOT DETECTED Negative <1 ng/mL    Drug Screen Comment: see below    Serum Drug Screen   Result Value Ref Range    Ethanol Lvl <10 mg/dL    Acetaminophen Level <5.0 (L) 10.0 - 55.8 mcg/mL    Salicylate, Serum <0.6 0.0 - 30.0 mg/dL   Lactate, Sepsis   Result Value Ref Range    Lactic Acid, Sepsis 1.4 0.5 - 1.9 mmol/L   EKG 12 Lead   Result Value Ref Range    Ventricular Rate 120 BPM    Atrial Rate 120 BPM    P-R

## 2020-05-12 NOTE — ED NOTES
Bed: 02  Expected date:   Expected time:   Means of arrival:   Comments:  [address] [facility], [address] [place] [address]  05/12/20 3716

## 2020-05-13 LAB
EKG ATRIAL RATE: 120 BPM
EKG P AXIS: 67 DEGREES
EKG P-R INTERVAL: 120 MS
EKG Q-T INTERVAL: 320 MS
EKG QRS DURATION: 88 MS
EKG QTC CALCULATION (BAZETT): 452 MS
EKG R AXIS: 52 DEGREES
EKG T AXIS: 52 DEGREES
EKG VENTRICULAR RATE: 120 BPM

## 2020-05-13 PROCEDURE — 93010 ELECTROCARDIOGRAM REPORT: CPT | Performed by: INTERNAL MEDICINE

## 2020-05-31 ENCOUNTER — HOSPITAL ENCOUNTER (EMERGENCY)
Age: 30
Discharge: HOME | End: 2020-05-31
Attending: EMERGENCY MEDICINE
Payer: COMMERCIAL

## 2020-05-31 VITALS
BODY MASS INDEX: 24.43 KG/M2 | WEIGHT: 152 LBS | SYSTOLIC BLOOD PRESSURE: 121 MMHG | RESPIRATION RATE: 16 BRPM | TEMPERATURE: 98.8 F | HEART RATE: 74 BPM | HEIGHT: 66 IN | DIASTOLIC BLOOD PRESSURE: 94 MMHG | OXYGEN SATURATION: 96 %

## 2020-05-31 LAB
ACETAMINOPHEN LEVEL: <5 MCG/ML (ref 10–30)
ALBUMIN SERPL-MCNC: 5.3 G/DL (ref 3.5–5.2)
ALP BLD-CCNC: 113 U/L (ref 40–129)
ALT SERPL-CCNC: 47 U/L (ref 0–40)
AMPHETAMINE SCREEN, URINE: NOT DETECTED
ANION GAP SERPL CALCULATED.3IONS-SCNC: 20 MMOL/L (ref 7–16)
AST SERPL-CCNC: 59 U/L (ref 0–39)
BARBITURATE SCREEN URINE: NOT DETECTED
BASOPHILS ABSOLUTE: 0.06 E9/L (ref 0–0.2)
BASOPHILS RELATIVE PERCENT: 0.8 % (ref 0–2)
BENZODIAZEPINE SCREEN, URINE: NOT DETECTED
BILIRUB SERPL-MCNC: 0.5 MG/DL (ref 0–1.2)
BUN BLDV-MCNC: 3 MG/DL (ref 6–20)
CALCIUM SERPL-MCNC: 10.1 MG/DL (ref 8.6–10.2)
CANNABINOID SCREEN URINE: POSITIVE
CHLORIDE BLD-SCNC: 95 MMOL/L (ref 98–107)
CO2: 24 MMOL/L (ref 22–29)
COCAINE METABOLITE SCREEN URINE: NOT DETECTED
CREAT SERPL-MCNC: 0.8 MG/DL (ref 0.7–1.2)
EOSINOPHILS ABSOLUTE: 0.2 E9/L (ref 0.05–0.5)
EOSINOPHILS RELATIVE PERCENT: 2.6 % (ref 0–6)
ETHANOL: 138 MG/DL (ref 0–0.08)
FENTANYL SCREEN, URINE: NOT DETECTED
GFR AFRICAN AMERICAN: >60
GFR NON-AFRICAN AMERICAN: >60 ML/MIN/1.73
GLUCOSE BLD-MCNC: 96 MG/DL (ref 74–99)
HCT VFR BLD CALC: 51.4 % (ref 37–54)
HEMOGLOBIN: 17.5 G/DL (ref 12.5–16.5)
IMMATURE GRANULOCYTES #: 0.02 E9/L
IMMATURE GRANULOCYTES %: 0.3 % (ref 0–5)
LACTIC ACID: 3 MMOL/L (ref 0.5–2.2)
LACTIC ACID: 3.5 MMOL/L (ref 0.5–2.2)
LIPASE: 25 U/L (ref 13–60)
LYMPHOCYTES ABSOLUTE: 1.32 E9/L (ref 1.5–4)
LYMPHOCYTES RELATIVE PERCENT: 17.3 % (ref 20–42)
Lab: ABNORMAL
MCH RBC QN AUTO: 32.9 PG (ref 26–35)
MCHC RBC AUTO-ENTMCNC: 34 % (ref 32–34.5)
MCV RBC AUTO: 96.6 FL (ref 80–99.9)
METHADONE SCREEN, URINE: NOT DETECTED
MONOCYTES ABSOLUTE: 0.63 E9/L (ref 0.1–0.95)
MONOCYTES RELATIVE PERCENT: 8.3 % (ref 2–12)
NEUTROPHILS ABSOLUTE: 5.4 E9/L (ref 1.8–7.3)
NEUTROPHILS RELATIVE PERCENT: 70.7 % (ref 43–80)
OPIATE SCREEN URINE: NOT DETECTED
OXYCODONE URINE: NOT DETECTED
PDW BLD-RTO: 14 FL (ref 11.5–15)
PHENCYCLIDINE SCREEN URINE: NOT DETECTED
PLATELET # BLD: 385 E9/L (ref 130–450)
PMV BLD AUTO: 11.6 FL (ref 7–12)
POTASSIUM SERPL-SCNC: 4.7 MMOL/L (ref 3.5–5)
RBC # BLD: 5.32 E12/L (ref 3.8–5.8)
SALICYLATE, SERUM: <0.3 MG/DL (ref 0–30)
SODIUM BLD-SCNC: 139 MMOL/L (ref 132–146)
TOTAL PROTEIN: 8.9 G/DL (ref 6.4–8.3)
TRICYCLIC ANTIDEPRESSANTS SCREEN SERUM: NEGATIVE NG/ML
WBC # BLD: 7.6 E9/L (ref 4.5–11.5)

## 2020-05-31 PROCEDURE — 6360000002 HC RX W HCPCS: Performed by: STUDENT IN AN ORGANIZED HEALTH CARE EDUCATION/TRAINING PROGRAM

## 2020-05-31 PROCEDURE — 85025 COMPLETE CBC W/AUTO DIFF WBC: CPT

## 2020-05-31 PROCEDURE — 99284 EMERGENCY DEPT VISIT MOD MDM: CPT

## 2020-05-31 PROCEDURE — 6370000000 HC RX 637 (ALT 250 FOR IP): Performed by: STUDENT IN AN ORGANIZED HEALTH CARE EDUCATION/TRAINING PROGRAM

## 2020-05-31 PROCEDURE — G0480 DRUG TEST DEF 1-7 CLASSES: HCPCS

## 2020-05-31 PROCEDURE — 2580000003 HC RX 258: Performed by: STUDENT IN AN ORGANIZED HEALTH CARE EDUCATION/TRAINING PROGRAM

## 2020-05-31 PROCEDURE — 83690 ASSAY OF LIPASE: CPT

## 2020-05-31 PROCEDURE — 80307 DRUG TEST PRSMV CHEM ANLYZR: CPT

## 2020-05-31 PROCEDURE — 83605 ASSAY OF LACTIC ACID: CPT

## 2020-05-31 PROCEDURE — 36415 COLL VENOUS BLD VENIPUNCTURE: CPT

## 2020-05-31 PROCEDURE — 80053 COMPREHEN METABOLIC PANEL: CPT

## 2020-05-31 RX ORDER — FOLIC ACID 1 MG/1
1 TABLET ORAL DAILY
Status: DISCONTINUED | OUTPATIENT
Start: 2020-05-31 | End: 2020-05-31 | Stop reason: HOSPADM

## 2020-05-31 RX ORDER — FOLIC ACID 1 MG/1
TABLET ORAL
Status: DISCONTINUED
Start: 2020-05-31 | End: 2020-05-31 | Stop reason: HOSPADM

## 2020-05-31 RX ORDER — THIAMINE HYDROCHLORIDE 100 MG/ML
100 INJECTION, SOLUTION INTRAMUSCULAR; INTRAVENOUS DAILY
Status: DISCONTINUED | OUTPATIENT
Start: 2020-05-31 | End: 2020-05-31 | Stop reason: HOSPADM

## 2020-05-31 RX ORDER — CHLORDIAZEPOXIDE HYDROCHLORIDE 25 MG/1
50 CAPSULE, GELATIN COATED ORAL 4 TIMES DAILY PRN
Qty: 16 CAPSULE | Refills: 0 | Status: SHIPPED | OUTPATIENT
Start: 2020-05-31 | End: 2020-06-03

## 2020-05-31 RX ORDER — 0.9 % SODIUM CHLORIDE 0.9 %
1000 INTRAVENOUS SOLUTION INTRAVENOUS ONCE
Status: COMPLETED | OUTPATIENT
Start: 2020-05-31 | End: 2020-05-31

## 2020-05-31 RX ORDER — CHLORDIAZEPOXIDE HYDROCHLORIDE 25 MG/1
25 CAPSULE, GELATIN COATED ORAL ONCE
Status: COMPLETED | OUTPATIENT
Start: 2020-05-31 | End: 2020-05-31

## 2020-05-31 RX ORDER — THIAMINE HYDROCHLORIDE 100 MG/ML
INJECTION, SOLUTION INTRAMUSCULAR; INTRAVENOUS
Status: DISCONTINUED
Start: 2020-05-31 | End: 2020-05-31 | Stop reason: HOSPADM

## 2020-05-31 RX ORDER — ONDANSETRON 2 MG/ML
4 INJECTION INTRAMUSCULAR; INTRAVENOUS ONCE
Status: COMPLETED | OUTPATIENT
Start: 2020-05-31 | End: 2020-05-31

## 2020-05-31 RX ORDER — SODIUM CHLORIDE 0.9 % (FLUSH) 0.9 %
SYRINGE (ML) INJECTION
Status: DISCONTINUED
Start: 2020-05-31 | End: 2020-05-31 | Stop reason: HOSPADM

## 2020-05-31 RX ORDER — CHLORDIAZEPOXIDE HYDROCHLORIDE 25 MG/1
100 CAPSULE, GELATIN COATED ORAL ONCE
Status: COMPLETED | OUTPATIENT
Start: 2020-05-31 | End: 2020-05-31

## 2020-05-31 RX ADMIN — CHLORDIAZEPOXIDE HYDROCHLORIDE 25 MG: 25 CAPSULE ORAL at 14:34

## 2020-05-31 RX ADMIN — FOLIC ACID 1 MG: 1 TABLET ORAL at 10:37

## 2020-05-31 RX ADMIN — SODIUM CHLORIDE 1000 ML: 9 INJECTION, SOLUTION INTRAVENOUS at 08:59

## 2020-05-31 RX ADMIN — CHLORDIAZEPOXIDE HYDROCHLORIDE 100 MG: 25 CAPSULE ORAL at 08:47

## 2020-05-31 RX ADMIN — SODIUM CHLORIDE 1000 ML: 9 INJECTION, SOLUTION INTRAVENOUS at 10:31

## 2020-05-31 RX ADMIN — THIAMINE HYDROCHLORIDE 100 MG: 100 INJECTION, SOLUTION INTRAMUSCULAR; INTRAVENOUS at 10:38

## 2020-05-31 RX ADMIN — CHLORDIAZEPOXIDE HYDROCHLORIDE 25 MG: 25 CAPSULE ORAL at 12:36

## 2020-05-31 RX ADMIN — ONDANSETRON 4 MG: 2 INJECTION INTRAMUSCULAR; INTRAVENOUS at 09:00

## 2020-05-31 ASSESSMENT — ENCOUNTER SYMPTOMS
VOMITING: 1
CHEST TIGHTNESS: 0
SORE THROAT: 0
NAUSEA: 1
COUGH: 0
ABDOMINAL PAIN: 0
RHINORRHEA: 0
PHOTOPHOBIA: 0
EYE REDNESS: 0
SHORTNESS OF BREATH: 0

## 2020-05-31 NOTE — ED NOTES
[facility] would not except the pt. Pt was made aware.  Pt will be D/c from this ER and will go by cayetano to [facility] at [address] [place] [address], RN  05/31/20 1677

## 2020-05-31 NOTE — ED NOTES
Pt waiting for [facility] transfer pt to [facility] [address] if they except him there.       Clinton Wallace, ELFEGO  05/31/20 Sallie Kaur RN  05/31/20 1140

## 2020-05-31 NOTE — ED PROVIDER NOTES
Patient is a 28-year-old male that presents to the emergency department for evaluation of alcohol intoxication. Patient states that he has a history of alcohol abuse for the last 11 years. That he has been through rehab on multiple occasions however several weeks ago started drinking again. He has been able to wean himself down to 3.4 ounce beers a day. Last drink was 12 hours ago. He states he feels as though he is killing himself by continuing to drink and wanted to come in for evaluation and to check into rehab. Patient does admit to generalized tremor and fatigue. Nothing seems to make it better or worse. It has been constant and moderate in severity over the last several hours. No medications were taken prior to arrival.  He denies suicidal or homicidal ideation. States he did talk with Justo Hernandez prior to coming in and was told to come here for medical clearance prior to admission. The history is provided by the patient. Drug / Alcohol Assessment   This is a chronic problem. The current episode started more than 2 days ago. The problem occurs constantly. The problem has not changed since onset. Pertinent negatives include no chest pain, no abdominal pain, no headaches and no shortness of breath. The symptoms are aggravated by drinking. Nothing relieves the symptoms. He has tried nothing for the symptoms. Review of Systems   Constitutional: Positive for chills and fatigue. Negative for diaphoresis and fever. HENT: Negative for congestion, rhinorrhea and sore throat. Eyes: Negative for photophobia, redness and visual disturbance. Respiratory: Negative for cough, chest tightness and shortness of breath. Cardiovascular: Negative for chest pain. Gastrointestinal: Positive for nausea and vomiting. Negative for abdominal pain. Genitourinary: Negative for decreased urine volume, flank pain, hematuria and urgency.    Musculoskeletal: Negative for myalgias, neck pain and neck stiffness. Skin: Negative for pallor and rash. Neurological: Negative for dizziness, weakness, light-headedness and headaches. Physical Exam  Vitals signs and nursing note reviewed. Constitutional:       General: He is not in acute distress. Appearance: Normal appearance. He is well-developed. He is not ill-appearing. HENT:      Head: Normocephalic and atraumatic. Mouth/Throat:      Mouth: Mucous membranes are moist.   Eyes:      Extraocular Movements: Extraocular movements intact. Pupils: Pupils are equal, round, and reactive to light. Neck:      Musculoskeletal: Normal range of motion and neck supple. Cardiovascular:      Rate and Rhythm: Normal rate and regular rhythm. Pulses: Normal pulses. Heart sounds: Normal heart sounds. No murmur. Pulmonary:      Effort: Pulmonary effort is normal. No respiratory distress. Breath sounds: Normal breath sounds. No wheezing or rales. Abdominal:      General: Bowel sounds are normal.      Palpations: Abdomen is soft. Tenderness: There is no abdominal tenderness. There is no guarding or rebound. Skin:     General: Skin is warm and dry. Neurological:      Mental Status: He is alert and oriented to person, place, and time. Comments: Very generalized tremor. Psychiatric:         Attention and Perception: He does not perceive auditory or visual hallucinations. Mood and Affect: Mood is depressed. Speech: Speech normal.         Behavior: Behavior normal. Behavior is cooperative. Thought Content: Thought content normal. Thought content is not paranoid or delusional. Thought content does not include homicidal or suicidal ideation. Thought content does not include homicidal or suicidal plan.           Procedures     MDM  Number of Diagnoses or Management Options  Alcohol abuse:   Alcohol withdrawal syndrome without complication [facility]):   Diagnosis management comments: Patient is a 61-year-old male that presents to the emergency department for evaluation of alcohol intoxication, withdrawal.  Patient resting comfortably and in no apparent distress on exam.  He denies suicidal or homicidal ideation and is of sound mind at this time. Blood work was remarkable for mildly elevated lactate of 3.5. He was given 2 L of normal saline with improvement of lactate. Patient was given 100 mg of Librium on initial presentation he did repeat dosing of 25 mg prior to discharge. Patient medically cleared at this time and will be transported to [facility] for continued alcohol detox. ED Course as of May 31 1114   Sun May 31, 2020   0154   ATTENDING PROVIDER ATTESTATION:     I have personally performed and/or participated in the history, exam, medical decision making, and procedures and agree with all pertinent clinical information unless otherwise noted. I have also reviewed and agree with the past medical, family and social history unless otherwise noted. I have discussed this patient in detail with the resident and provided the instruction and education regarding the evidence-based evaluation and treatment of [unfilled]  History: patient presents with compliant of alcohol withdrawal.  He reached out to [facility] for alcohol rehab and they advised him to be medically cleared. His last drink was 12 hours ago. He was drinking a case of beer a day. He's had vomiting and not been able to eat for the last 5 days. He denies abdominal pain or bloating. He feels like he is withdrawing and had a grand mal seizure a week ago. My findings: Tacos Camargo is a 34 y.o. male whom is in mild distress. Physical exam reveals very anxious. PERRL, EOM, heart rate is elevated but not not irregular. Lungs CTA. Abdomen is soft and nontender. No focal neurologic deficits. My plan: Symptomatic and supportive care. Will evaluate and treat.     Electronically signed by Radha Nicole DO Alma Rosa on 5/31/20 at 8:32 AM EDT          [JS]      ED Course User Index  [JS] [address] DO Jia       --------------------------------------------- PAST HISTORY ---------------------------------------------  Past Medical History:  has a past medical history of Anxiety, Arm pain, Convulsions ([facility] [address] Depression, Flashing lights, Head injury, Headache, Leg pain, Numbness and tingling, PTSD (post-traumatic stress disorder), and Seizures ([facility] [address] Past Surgical History:  has no past surgical history on file. Social History:  reports that he has been smoking. He has a 18.00 pack-year smoking history. He has never used smokeless tobacco. He reports current drug use. Frequency: 1.00 time per week. Drug: Marijuana. He reports that he does not drink alcohol. Family History: family history includes Cancer in his father; Mental Illness in his brother, mother, and sister; Substance Abuse in his father, maternal aunt, maternal uncle, mother, paternal aunt, paternal uncle, and sister. The patients home medications have been reviewed. Allergies: Hydrocodone; Ativan [lorazepam];  Invega sustenna [paliperidone palmitate er]; Pcn [penicillins]; and Fluticasone    -------------------------------------------------- RESULTS -------------------------------------------------  Labs:  Results for orders placed or performed during the hospital encounter of 05/31/20   Comprehensive Metabolic Panel   Result Value Ref Range    Sodium 139 132 - 146 mmol/L    Potassium 4.7 3.5 - 5.0 mmol/L    Chloride 95 (L) 98 - 107 mmol/L    CO2 24 22 - 29 mmol/L    Anion Gap 20 (H) 7 - 16 mmol/L    Glucose 96 74 - 99 mg/dL    BUN 3 (L) 6 - 20 mg/dL    CREATININE 0.8 0.7 - 1.2 mg/dL    GFR Non-African American >60 >=60 mL/min/1.73    GFR African American >60     Calcium 10.1 8.6 - 10.2 mg/dL    Total Protein 8.9 (H) 6.4 - 8.3 g/dL    Alb 5.3 (H) 3.5 - 5.2 g/dL    Total Bilirubin 0.5 0.0 - 1.2 mg/dL    Alkaline Phosphatase 113 40 - 129 U/L    ALT 47 (H) 0 - 40 U/L    AST 59 (H) 0 - 39 U/L   CBC Auto Differential   Result Value Ref Range    WBC 7.6 4.5 - 11.5 E9/L    RBC 5.32 3.80 - 5.80 E12/L    Hemoglobin 17.5 (H) 12.5 - 16.5 g/dL    Hematocrit 51.4 37.0 - 54.0 %    MCV 96.6 80.0 - 99.9 fL    MCH 32.9 26.0 - 35.0 pg    MCHC 34.0 32.0 - 34.5 %    RDW 14.0 11.5 - 15.0 fL    Platelets 956 112 - 523 E9/L    MPV 11.6 7.0 - 12.0 fL    Neutrophils % 70.7 43.0 - 80.0 %    Immature Granulocytes % 0.3 0.0 - 5.0 %    Lymphocytes % 17.3 (L) 20.0 - 42.0 %    Monocytes % 8.3 2.0 - 12.0 %    Eosinophils % 2.6 0.0 - 6.0 %    Basophils % 0.8 0.0 - 2.0 %    Neutrophils Absolute 5.40 1.80 - 7.30 E9/L    Immature Granulocytes # 0.02 E9/L    Lymphocytes Absolute 1.32 (L) 1.50 - 4.00 E9/L    Monocytes Absolute 0.63 0.10 - 0.95 E9/L    Eosinophils Absolute 0.20 0.05 - 0.50 E9/L    Basophils Absolute 0.06 0.00 - 0.20 E9/L   Lipase   Result Value Ref Range    Lipase 25 13 - 60 U/L   Serum Drug Screen   Result Value Ref Range    Ethanol Lvl 138 mg/dL    Acetaminophen Level <5.0 (L) 10.0 - 42.9 mcg/mL    Salicylate, Serum <6.5 0.0 - 30.0 mg/dL   Urine Drug Screen   Result Value Ref Range    Amphetamine Screen, Urine NOT DETECTED Negative <1000 ng/mL    Barbiturate Screen, Ur NOT DETECTED Negative < 200 ng/mL    Benzodiazepine Screen, Urine NOT DETECTED Negative < 200 ng/mL    Cannabinoid Scrn, Ur POSITIVE (A) Negative < 50ng/mL    Cocaine Metabolite Screen, Urine NOT DETECTED Negative < 300 ng/mL    Opiate Scrn, Ur NOT DETECTED Negative < 300ng/mL    PCP Screen, Urine NOT DETECTED Negative < 25 ng/mL    Methadone Screen, Urine NOT DETECTED Negative <300 ng/mL    Oxycodone Urine NOT DETECTED Negative <100 ng/mL    FENTANYL SCREEN, URINE NOT DETECTED Negative <1 ng/mL    Drug Screen Comment: see below    Lactic Acid, Plasma   Result Value Ref Range    Lactic Acid 3.5 (H) 0.5 - 2.2 mmol/L   Lactic Acid, Plasma   Result Value Ref Range    Lactic Acid 3.0 (H) 0.5 - 2.2 mmol/L       Radiology:  No orders to display       ------------------------- NURSING NOTES AND VITALS REVIEWED ---------------------------  Date / Time Roomed:  5/31/2020  8:09 AM  ED Bed Assignment:  06/06    The nursing notes within the ED encounter and vital signs as below have been reviewed. /88   Pulse 86   Temp 99.1 °F (37.3 °C)   Resp 16   Ht 5' 6\" (1.676 m)   Wt 152 lb (68.9 kg)   SpO2 97%   BMI 24.53 kg/m²   Oxygen Saturation Interpretation: Normal      ------------------------------------------ PROGRESS NOTES ------------------------------------------  11:13 AM EDT  I have spoken with the patient and discussed todays results, in addition to providing specific details for the plan of care and counseling regarding the diagnosis and prognosis. Their questions are answered at this time and they are agreeable with the plan. I discussed at length with them reasons for immediate return here for re evaluation. They will followup with their primary care physician by calling their office tomorrow. --------------------------------- ADDITIONAL PROVIDER NOTES ---------------------------------  At this time the patient is without objective evidence of an acute process requiring hospitalization or inpatient management. They have remained hemodynamically stable throughout their entire ED visit and are stable for discharge with outpatient follow-up. The plan has been discussed in detail and they are aware of the specific conditions for emergent return, as well as the importance of follow-up. New Prescriptions    No medications on file       Diagnosis:  1. Alcohol withdrawal syndrome without complication ([facility] [address]    2. Alcohol abuse        Disposition:  Patient's disposition: Discharge to Rehab  Patient's condition is stable.        Teagan Mcginnis., DO  Resident  05/31/20 1216      Addendum:  [facility] was unwilling to accept the patient due to his history of severe withdrawal symptoms as well as his reported not severe enough symptoms at this time to be admitted. We were able to provide patient with information for [facility] which he was able to call and was accepted for rehab. Patient [facility]. Discharged in stable condition.      Gely Uribe, DO  Resident  05/31/20 [id number]

## 2020-05-31 NOTE — ED NOTES
Pts vs done. Pt voided 550 cc clear yellow urine. Spec sent to lab. Pt medicated as per orders. Pt watching TV.       Bernice Galo RN  05/31/20 [id number]

## 2020-05-31 NOTE — ED NOTES
Pt alert and cooperative. Wants to quit drinking. Last drink was 12 hours ago. Drinks a case a beer a day and 6 shots of liquor. Pt denies wanting to harm himself or anyone else. Been drinking since he was 14years old. Pt feels anxious and having n/v this am. Having some hallucinations. Was seen here last week for a seizure and states he doesn't want that to happen again. Waiting for Drs orders.       Festus Lesch, RN  05/31/20 [id number]

## 2020-06-08 ENCOUNTER — HOSPITAL ENCOUNTER (OUTPATIENT)
Dept: PSYCHIATRY | Age: 30
Setting detail: THERAPIES SERIES
Discharge: HOME | End: 2020-06-08
Payer: COMMERCIAL

## 2020-06-08 PROCEDURE — H0001 ALCOHOL AND/OR DRUG ASSESS: HCPCS | Performed by: COUNSELOR

## 2020-06-08 ASSESSMENT — PATIENT HEALTH QUESTIONNAIRE - PHQ9: SUM OF ALL RESPONSES TO PHQ QUESTIONS 1-9: 25

## 2020-06-08 ASSESSMENT — LIFESTYLE VARIABLES: HISTORY_ALCOHOL_USE: YES

## 2020-06-08 NOTE — PLAN OF CARE
[facility] [place]- Level of Care Placement      [x]Admissions  []Continued Stay []Discharge/Transfer / Complication in [facility] AND [facility] FKAJ:2/7/5472    Onesimo Heimlich      Level of Care Level 1      Outpatient Services Level 2.1   Intensive Outpatient Services(IOP) Level 2.5   Partial Hospitalization Services Level 3.1 CLINICALLY Managed Low-Intensity Residential Services Level 3.3  CLINICALLY Managed Population- Specific High- Intensity Residential Services Level 3.5  CLINICALLY Managed High Intensive Residential Services Level 3.7  MEDICALLY Monitored Intensive Inpatient Services Level 4  MEDICALLY Managed Intensive Inpatient Services   Dimension 1  Acute Intoxication and/or Withdrawal Potential [] Not experiencing significant withdrawal    [] Minimal  risk of severe  withdrawal [x] Minimal  risk of severe  withdrawal    [] Manageable  at Level 2-WM [] Moderate  risk of severe withdrawal    [] Manageable at Level 2-WM [] No withdrawal risk or minimal or stable withdrawal     [] Concurrently receiving Level -WM or Level 2-WM services [] Minimal risk of severe withdrawal    [] If withdrawal is present, manageable at Level 3. 2-WM  [] Minimal risk of severe withdrawal    [] If withdrawal is present manageable at Level 3. 2-WM [] High risk of withdrawal, but manageable at Level  3.7-WM and does not require  full resources of a licensed hospital [] At high risk of withdrawal and requires Level 4-WM and full resources of licensed hospital    COMMENTS:           Dimension 2   Biomedical Conditions and Complications  (BMC/C) [] None or very stable    [] Receiving concurrent medical monitoring  [] None or not a distraction from treatment    [x] Problems are manageable at Level 2.1 [] None or not sufficient to distract from treatment    [] Problems are manageable at  Level 2.5 [] None or stable    [] Receiving concurrent medical monitoring  [] None or stable    [] Receiving concurrent medical [place] Date:6/8/2020    Mark Davila      Level of Care Level 1  Outpatient   Services Level 2.1  Intensive  Outpatient  Services Level 2.5  Partial Hospitalization Services Level 3.1  CLINICALLY Managed Low-intensity Residential Services Level 3.3  CLINICALLY Managed Population- Specific High- Intensity Residential Services Level 3.5  CLINICALLY Managed High-Intensive Residential Services Level 3.7  MEDICALLY Monitored Intensive Inpatient Services Level 4  MEDICALLY Managed Intensive Inpatient Services   Dimension 4  Readiness  To  Change [] Ready for recovery but needs motivating and monitoring strategies to strengthen readiness    []Needs ongoing monitoring and disease management    [] High severity in this dimension but not in other dimensions. Needs Level 1 motivational enhancement strategies   [x] Has variable engagement in treatment, ambivalence, or lack of awareness of substance use or mental health problems and requires structured program several times/wk. to promote progress through stages of change [] Has poor engagement in treatment, significant ambivalence, or lack of awareness of substance use or mental health problems, requires near daily structured program or intensive engagement to promote progress through stages of change [] Open to recovery, but needs structured environment to maintain therapeutic gains [] Has little awareness & needs interventions at Level 3.3 to engage & stay in treatment.     [] If there is high severity in this dimension, but not in any other dimension, motivational enhancement strategies should be provided in Level 1 [] Has marked difficulty with, or opposition to treatment with dangerous consequences    [] If there is high severity in this dimension, but not in any other dimension, motivational enhancement strategies should be provided in Level 1 [] Low interest in treatment and impulse control is poor despite negative consequences;  needs motivating strategies only safely to cope [] Recovery environment is not supportive  but with structure and support, the client can cope [] Recovery environment is not supportive, but with structure and support and relief from the home environment, client can cope [] Environment    is dangerous, but recovery is achievable if Level 3.1 24-hour structure is available  [] Environment is dangerous and  client needs 24-hour structure to learn to cope [] Environment is dangerous, and the client lacks skills to cope outside of a  highly structured 24-hour setting   [] Environment is dangerous, and the client lacks skills to cope outside of a  highly structured 24-hour setting [] Problems in this dimension do not qualify the client for Level 4 services    [] If the client's only severity is in Dimension 4,5, and/or 6 without high severity in Dimensions 1,2, and/or 3, then client is not qualified for Level 4   COMMENTS:               Staff: Electronically signed by Maximus Rocha [facility] on 6/8/2020 at 2:50 PM

## 2020-06-09 ENCOUNTER — HOSPITAL ENCOUNTER (OUTPATIENT)
Dept: PSYCHIATRY | Age: 30
Setting detail: THERAPIES SERIES
Discharge: HOME | End: 2020-06-09
Payer: COMMERCIAL

## 2020-06-09 PROCEDURE — 99214 OFFICE O/P EST MOD 30 MIN: CPT | Performed by: PSYCHIATRY & NEUROLOGY

## 2020-06-09 RX ORDER — GABAPENTIN 300 MG/1
300 CAPSULE ORAL 3 TIMES DAILY
Qty: 90 CAPSULE | Refills: 0 | Status: SHIPPED | OUTPATIENT
Start: 2020-06-09 | End: 2020-07-02 | Stop reason: SDUPTHER

## 2020-06-09 RX ORDER — QUETIAPINE FUMARATE 100 MG/1
100 TABLET, FILM COATED ORAL NIGHTLY
Qty: 30 TABLET | Refills: 0 | Status: SHIPPED | OUTPATIENT
Start: 2020-06-09 | End: 2020-08-22 | Stop reason: ALTCHOICE
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
## 2020-06-11 ENCOUNTER — HOSPITAL ENCOUNTER (OUTPATIENT)
Dept: PSYCHIATRY | Age: 30
Setting detail: THERAPIES SERIES
Discharge: HOME | End: 2020-06-11
Payer: COMMERCIAL

## 2020-06-11 PROCEDURE — 90832 PSYTX W PT 30 MINUTES: CPT | Performed by: COUNSELOR

## 2020-06-16 ENCOUNTER — HOSPITAL ENCOUNTER (OUTPATIENT)
Dept: PSYCHIATRY | Age: 30
Setting detail: THERAPIES SERIES
Discharge: HOME | End: 2020-06-16
Payer: COMMERCIAL

## 2020-06-16 PROCEDURE — 99213 OFFICE O/P EST LOW 20 MIN: CPT | Performed by: PSYCHIATRY & NEUROLOGY

## 2020-06-16 NOTE — BH NOTE
PSYCHIATRY ATTENDING NOTE    CC: \"I'm doing alright I guess\"    S: Patient being seen at [facility] in follow-up for alcohol use disorder and anxiety. Neurontin and Serouqel were started last appointment. Spoke with patient on the telephone due to coronavirus precautions which he consented to. Patient's location [facility] [place] Patient reports some positive developments in his life such as a new potential relationship and job opportunity at a [employer] but he is feeling stressed about everything. Discussed with Narciso Singletary that these are healthy changes even if they may feel uncomfortable at first; patient insightful about how he became comfortable just living at his brother's, drinking and not being productive. Also discussed need to experience emotions without alcohol buffering them which he agrees. Reviewed the medications - patient feels the gabapentin helping with anxiety and hence has been able to refrain from alcohol. He says the Seroquel gave him restless legs so we discussed using a fourth gabapentin at bedtime if needed for sleep. No issues otherwise. Patient reports session with Mac Adkins went very well. MSE:   Pleasant, cooperative, forthcoming. Mood calmer. Speech clear. Thought process organized without loosening of associations. Content future-oriented. No suicidal or homicidal ideations. No paranoia, delusions or hallucinations. Orientation, concentration, recent and remote memory are grossly intact. Fund of knowledge fair. Language use fair. Insight and judgment fair. MEDICATIONS:  Neurontin 300 mg tid to qid (cont)    ASSESSMENT:    Alcohol Use Disorder                                      MDD recurrent moderate                                      MEET                                      PTSD    PLAN: Continue [facility]. Support and reassurance provided. Speak with patient next week.     Total time spent 12 min                          Electronically signed by Stephan Quispe MD on 6/16/2020 at 12:39 PM

## 2020-06-17 ENCOUNTER — HOSPITAL ENCOUNTER (OUTPATIENT)
Dept: PSYCHIATRY | Age: 30
Setting detail: THERAPIES SERIES
Discharge: HOME | End: 2020-06-17
Payer: COMMERCIAL

## 2020-06-17 PROCEDURE — 90832 PSYTX W PT 30 MINUTES: CPT | Performed by: COUNSELOR

## 2020-06-17 NOTE — VIRTUAL HEALTH
Consults  Patient Location: at his home    Provider Location ([facility]/State): [address] [facility] [address] [facility]    Individual Therapy note    Date: 06/17/2020  Start time: 11:00 AM  End time: 11:32 AM    Patient's goal: \"Get this all under control. \"    Notes: Pt reports that he is doing \"alright\" at check-in. Pt states that while he is feeling less depressed and better overall, he has been recognizing everyday stressors more and has been stressing out. Pt states he is about to start a new job next week or the following week and is worried about that working out. He expressed concerns about his treatment and balancing that and a new girlfriend. Pt processed this with LPC. On a scale of 1-10 (10 being the worst) the pt rates his depression a 5, his anxiety a 7, and his urges to use/drink, a 1. When asked about his anxiety he stated he is still feeling rather anxious but these mentioned stressors have contributed. Pt was able to identify some effective coping strategies such as talking with friends or his sponsor and computer work or francy. LPC encouraged pt to continue these activities and to look into some self-care practices. Pt challenged to come up with some self-care that he can implement for next session. Pt denies SI/HI and can contract for safety. Status After Intervention:  Improved    Participation Level: Active Listener and Interactive    Participation Quality: Appropriate, Attentive and Sharing      Speech:  normal      Thought Process/Content: Logical      Affective Functioning: Congruent      Mood: euthymic      Level of consciousness:  Alert, Oriented x4 and Attentive      Response to Learning: Able to verbalize current knowledge/experience and Capable of insight      Endings: None Reported    Discipline Responsible: /Counselor          This virtual visit was conducted via interactive/real-time audio/video.

## 2020-06-25 ENCOUNTER — CLINICAL DOCUMENTATION (OUTPATIENT)
Dept: PSYCHIATRY | Age: 30
End: 2020-06-25

## 2020-06-26 ENCOUNTER — HOSPITAL ENCOUNTER (OUTPATIENT)
Dept: PSYCHIATRY | Age: 30
Setting detail: THERAPIES SERIES
Discharge: HOME | End: 2020-06-26
Payer: COMMERCIAL

## 2020-06-26 PROCEDURE — 99212 OFFICE O/P EST SF 10 MIN: CPT | Performed by: PSYCHIATRY & NEUROLOGY

## 2020-07-02 ENCOUNTER — HOSPITAL ENCOUNTER (OUTPATIENT)
Dept: PSYCHIATRY | Age: 30
Setting detail: THERAPIES SERIES
Discharge: HOME | End: 2020-07-02
Payer: COMMERCIAL

## 2020-07-02 PROCEDURE — 99212 OFFICE O/P EST SF 10 MIN: CPT | Performed by: PSYCHIATRY & NEUROLOGY

## 2020-07-02 RX ORDER — GABAPENTIN 300 MG/1
300 CAPSULE ORAL 4 TIMES DAILY
Qty: 120 CAPSULE | Refills: 0 | Status: SHIPPED | OUTPATIENT
Start: 2020-07-02 | End: 2020-12-27 | Stop reason: ALTCHOICE

## 2020-07-02 NOTE — BH NOTE
PSYCHIATRY ATTENDING NOTE    CC: \"Rough couple days. \"    S: Patient being seen at [facility] in follow-up for alcohol use disorder and anxiety. Spoke with patient on the telephone today due to coronavirus precautions which he consented to. Patient's location was home. Provider's location was [address] [facility]. Patient reports he has been a \"mess\" since his new relationship of less than a week abruptly ended. This is the second relationship he has had within the last month. Discussed with patient he needs to work in therapy on exploring why he jumps into these situations which he agrees. Despite everything he denies a relapse with alcohol and continues to take his medication as prescribed. He reports he is not going to start the work through the [employer] because he \"had to deal with the relationship\". Discussed patient focusing on his own issues for now and establishing financial independence. MSE:   Pleasant, cooperative, forthcoming. Mood euthymic. Speech clear. Thought process organized without loosening of associations. Content future-oriented. No suicidal or homicidal ideations. No paranoia, delusions or hallucinations. Orientation, concentration, recent and remote memory are grossly intact. Fund of knowledge fair. Language use fair. Insight and judgment fair. MEDICATIONS:  Neurontin 300 mg qid (cont)    ASSESSMENT:    Alcohol Use Disorder                                      MDD recurrent moderate                                      MEET                                      PTSD    PLAN: Continue New Start and current medication. Support and reassurance provided. Speak with patient in two weeks.  Discuss with treatment team.     Total time spent 8 min                          Electronically signed by Giselle Palafox MD on 7/2/2020 at 1:16 PM

## 2020-07-09 ENCOUNTER — CLINICAL DOCUMENTATION (OUTPATIENT)
Dept: PSYCHIATRY | Age: 30
End: 2020-07-09

## 2020-07-09 NOTE — DISCHARGE SUMMARY
[address]      Client Name: Raymundo Zuniga  Date of Admission: 06/09/2020    Discharge Date: 07/09/2020      Diagnosis: F10.20, F33.1   Authorized Person's  Name: Leana Turner           License: MS Michelle LPC           Date: 7/9/2020      Degree of Severity- ADMISSION  Degree of Severity- DISCHARGE    Acute Intoxication withdrawal moderate Acute Intoxication withdrawal high   Biomedical Conditions/  Complications low Biomedical Conditions/  Complications low   Emotional Behavioral/ Cognitive Conditions moderate Emotional Behavioral/ Cognitive Conditions high   Treatment Acceptance Resistance moderate Treatment Acceptance Resistance low   Relapse Potential moderate Relapse Potential high   Recovery Environment moderate Recovery Environment moderate       Comments on Dimensional Criteria: Pt experienced a major relapse during treatment. Pt had about one week of non-participation and relapsed during that time. Pt's risk factors increased substantially, ultimately resulting in pt's need for a higher level of care. Level of Care and Service Provided during Course of Treatment: 2.1 Intensive outpatient therapy    Client's Response to Treatment: Initially, pt was resistant to counseling and verbalized only wanting medicaiton management. After the pt relapsed, he self-disclosed that he knew he needed inpatient rehab.     Recommendations and/or Referral for Additional AOD Addiction Treatment or other services: Complete inpatient rehab and follow-up with dual-diagnosis IOP program.     Electronically signed by Leana Turner LPC on 7/9/2020 at 11:40 AM

## 2020-08-02 ENCOUNTER — HOSPITAL ENCOUNTER (EMERGENCY)
Age: 30
Discharge: HOME | End: 2020-08-02
Attending: EMERGENCY MEDICINE
Payer: COMMERCIAL

## 2020-08-02 VITALS
SYSTOLIC BLOOD PRESSURE: 114 MMHG | WEIGHT: 160 LBS | BODY MASS INDEX: 25.71 KG/M2 | OXYGEN SATURATION: 95 % | RESPIRATION RATE: 18 BRPM | DIASTOLIC BLOOD PRESSURE: 83 MMHG | HEART RATE: 79 BPM | HEIGHT: 66 IN | TEMPERATURE: 98.8 F

## 2020-08-02 LAB
ACETAMINOPHEN LEVEL: <5 MCG/ML (ref 10–30)
ALBUMIN SERPL-MCNC: 5.3 G/DL (ref 3.5–5.2)
ALP BLD-CCNC: 96 U/L (ref 40–129)
ALT SERPL-CCNC: 48 U/L (ref 0–40)
AMPHETAMINE SCREEN, URINE: NOT DETECTED
ANION GAP SERPL CALCULATED.3IONS-SCNC: 23 MMOL/L (ref 7–16)
AST SERPL-CCNC: 60 U/L (ref 0–39)
BARBITURATE SCREEN URINE: POSITIVE
BASOPHILS ABSOLUTE: 0.06 E9/L (ref 0–0.2)
BASOPHILS RELATIVE PERCENT: 0.5 % (ref 0–2)
BENZODIAZEPINE SCREEN, URINE: NOT DETECTED
BILIRUB SERPL-MCNC: 0.8 MG/DL (ref 0–1.2)
BUN BLDV-MCNC: 4 MG/DL (ref 6–20)
CALCIUM SERPL-MCNC: 9.9 MG/DL (ref 8.6–10.2)
CANNABINOID SCREEN URINE: NOT DETECTED
CHLORIDE BLD-SCNC: 93 MMOL/L (ref 98–107)
CO2: 21 MMOL/L (ref 22–29)
COCAINE METABOLITE SCREEN URINE: NOT DETECTED
CREAT SERPL-MCNC: 0.7 MG/DL (ref 0.7–1.2)
EOSINOPHILS ABSOLUTE: 0.23 E9/L (ref 0.05–0.5)
EOSINOPHILS RELATIVE PERCENT: 2.1 % (ref 0–6)
ETHANOL: 106 MG/DL (ref 0–0.08)
FENTANYL SCREEN, URINE: NOT DETECTED
GFR AFRICAN AMERICAN: >60
GFR NON-AFRICAN AMERICAN: >60 ML/MIN/1.73
GLUCOSE BLD-MCNC: 71 MG/DL (ref 74–99)
HCT VFR BLD CALC: 48.2 % (ref 37–54)
HEMOGLOBIN: 16.9 G/DL (ref 12.5–16.5)
IMMATURE GRANULOCYTES #: 0.03 E9/L
IMMATURE GRANULOCYTES %: 0.3 % (ref 0–5)
LACTIC ACID: 3.5 MMOL/L (ref 0.5–2.2)
LIPASE: 36 U/L (ref 13–60)
LYMPHOCYTES ABSOLUTE: 2.66 E9/L (ref 1.5–4)
LYMPHOCYTES RELATIVE PERCENT: 24.1 % (ref 20–42)
Lab: ABNORMAL
MCH RBC QN AUTO: 33.3 PG (ref 26–35)
MCHC RBC AUTO-ENTMCNC: 35.1 % (ref 32–34.5)
MCV RBC AUTO: 94.9 FL (ref 80–99.9)
METHADONE SCREEN, URINE: NOT DETECTED
MONOCYTES ABSOLUTE: 0.74 E9/L (ref 0.1–0.95)
MONOCYTES RELATIVE PERCENT: 6.7 % (ref 2–12)
NEUTROPHILS ABSOLUTE: 7.34 E9/L (ref 1.8–7.3)
NEUTROPHILS RELATIVE PERCENT: 66.3 % (ref 43–80)
OPIATE SCREEN URINE: NOT DETECTED
OXYCODONE URINE: NOT DETECTED
PDW BLD-RTO: 12.5 FL (ref 11.5–15)
PHENCYCLIDINE SCREEN URINE: NOT DETECTED
PLATELET # BLD: 261 E9/L (ref 130–450)
PMV BLD AUTO: 11.9 FL (ref 7–12)
POTASSIUM REFLEX MAGNESIUM: 4.3 MMOL/L (ref 3.5–5)
RBC # BLD: 5.08 E12/L (ref 3.8–5.8)
SALICYLATE, SERUM: <0.3 MG/DL (ref 0–30)
SARS-COV-2, NAAT: NOT DETECTED
SODIUM BLD-SCNC: 137 MMOL/L (ref 132–146)
TOTAL PROTEIN: 8.5 G/DL (ref 6.4–8.3)
TRICYCLIC ANTIDEPRESSANTS SCREEN SERUM: NEGATIVE NG/ML
WBC # BLD: 11.1 E9/L (ref 4.5–11.5)

## 2020-08-02 PROCEDURE — 6370000000 HC RX 637 (ALT 250 FOR IP): Performed by: EMERGENCY MEDICINE

## 2020-08-02 PROCEDURE — 2580000003 HC RX 258: Performed by: STUDENT IN AN ORGANIZED HEALTH CARE EDUCATION/TRAINING PROGRAM

## 2020-08-02 PROCEDURE — 80307 DRUG TEST PRSMV CHEM ANLYZR: CPT

## 2020-08-02 PROCEDURE — G0480 DRUG TEST DEF 1-7 CLASSES: HCPCS

## 2020-08-02 PROCEDURE — 6370000000 HC RX 637 (ALT 250 FOR IP): Performed by: STUDENT IN AN ORGANIZED HEALTH CARE EDUCATION/TRAINING PROGRAM

## 2020-08-02 PROCEDURE — 80053 COMPREHEN METABOLIC PANEL: CPT

## 2020-08-02 PROCEDURE — U0002 COVID-19 LAB TEST NON-CDC: HCPCS

## 2020-08-02 PROCEDURE — 83690 ASSAY OF LIPASE: CPT

## 2020-08-02 PROCEDURE — 99284 EMERGENCY DEPT VISIT MOD MDM: CPT

## 2020-08-02 PROCEDURE — 85025 COMPLETE CBC W/AUTO DIFF WBC: CPT

## 2020-08-02 PROCEDURE — 83605 ASSAY OF LACTIC ACID: CPT

## 2020-08-02 RX ORDER — 0.9 % SODIUM CHLORIDE 0.9 %
1000 INTRAVENOUS SOLUTION INTRAVENOUS ONCE
Status: COMPLETED | OUTPATIENT
Start: 2020-08-02 | End: 2020-08-02

## 2020-08-02 RX ORDER — CHLORDIAZEPOXIDE HYDROCHLORIDE 25 MG/1
50 CAPSULE, GELATIN COATED ORAL ONCE
Status: COMPLETED | OUTPATIENT
Start: 2020-08-02 | End: 2020-08-02

## 2020-08-02 RX ORDER — SODIUM CHLORIDE 0.9 % (FLUSH) 0.9 %
10 SYRINGE (ML) INJECTION EVERY 12 HOURS SCHEDULED
Status: DISCONTINUED | OUTPATIENT
Start: 2020-08-02 | End: 2020-08-02 | Stop reason: HOSPADM

## 2020-08-02 RX ORDER — CHLORDIAZEPOXIDE HYDROCHLORIDE 25 MG/1
25 CAPSULE, GELATIN COATED ORAL ONCE
Status: COMPLETED | OUTPATIENT
Start: 2020-08-02 | End: 2020-08-02

## 2020-08-02 RX ORDER — DIAZEPAM 5 MG/1
2.5 TABLET ORAL ONCE
Status: COMPLETED | OUTPATIENT
Start: 2020-08-02 | End: 2020-08-02

## 2020-08-02 RX ORDER — SODIUM CHLORIDE 0.9 % (FLUSH) 0.9 %
10 SYRINGE (ML) INJECTION PRN
Status: DISCONTINUED | OUTPATIENT
Start: 2020-08-02 | End: 2020-08-02 | Stop reason: HOSPADM

## 2020-08-02 RX ORDER — THIAMINE MONONITRATE (VIT B1) 100 MG
100 TABLET ORAL DAILY
Status: DISCONTINUED | OUTPATIENT
Start: 2020-08-02 | End: 2020-08-02 | Stop reason: HOSPADM

## 2020-08-02 RX ADMIN — SODIUM CHLORIDE 1000 ML: 9 INJECTION, SOLUTION INTRAVENOUS at 08:10

## 2020-08-02 RX ADMIN — DIAZEPAM 2.5 MG: 5 TABLET ORAL at 13:43

## 2020-08-02 RX ADMIN — CHLORDIAZEPOXIDE HYDROCHLORIDE 50 MG: 25 CAPSULE ORAL at 08:16

## 2020-08-02 RX ADMIN — CHLORDIAZEPOXIDE HYDROCHLORIDE 25 MG: 25 CAPSULE ORAL at 11:54

## 2020-08-02 RX ADMIN — Medication 100 MG: at 08:17

## 2020-08-02 ASSESSMENT — ENCOUNTER SYMPTOMS
ABDOMINAL PAIN: 0
VOMITING: 0
BACK PAIN: 0
CONSTIPATION: 0
BLOOD IN STOOL: 0
SORE THROAT: 0
COUGH: 0
SHORTNESS OF BREATH: 0
RHINORRHEA: 0
NAUSEA: 0
DIARRHEA: 0

## 2020-08-02 NOTE — ED NOTES
Pt states he feels terrible and is shaking and sweaty . CIWA test completed and dr notified.   Orders obtained     Cristiano Hart RN  08/02/20 [id number]

## 2020-08-02 NOTE — ED PROVIDER NOTES
Normal appearance. He is well-developed. He is not ill-appearing. HENT:      Head: Normocephalic and atraumatic. Right Ear: External ear normal.      Left Ear: External ear normal.   Eyes:      General:         Right eye: No discharge. Left eye: No discharge. Extraocular Movements: Extraocular movements intact. Conjunctiva/sclera: Conjunctivae normal.   Neck:      Musculoskeletal: Normal range of motion and neck supple. Cardiovascular:      Rate and Rhythm: Normal rate and regular rhythm. Heart sounds: Normal heart sounds. No murmur. Pulmonary:      Effort: Pulmonary effort is normal. No respiratory distress. Breath sounds: Normal breath sounds. No stridor. No wheezing. Abdominal:      General: There is no distension. Palpations: Abdomen is soft. There is no mass. Tenderness: There is no abdominal tenderness. Musculoskeletal: Normal range of motion. General: No swelling or tenderness. Skin:     General: Skin is warm and dry. Coloration: Skin is not jaundiced or pale. Findings: No bruising. Neurological:      General: No focal deficit present. Mental Status: He is alert and oriented to person, place, and time. Cranial Nerves: No cranial nerve deficit. Coordination: Coordination normal.          Procedures     [facility]     ED Course as of Aug 02 0750   Doe Brown Aug 02, 2020   9819 Patient states that he has problems with ativan when he takes it he gets hallucinations and seizures. But notes that he takes valium, librium, and phenobarb without issue. [BB]      ED Course User Index  [BB] Marjan Dee DO        Corrine Yamel presents to the ED for evaluation of alcohol withdrawal. The patient has been in withdrawal multiple times over the past 10 years causing him to have seizures which is his current concern. . Workup in the ED revealed patient that is a little anxious on exam. Labs demonstrating a little bit of an electrolyte MPV 11.9 7.0 - 12.0 fL    Neutrophils % 66.3 43.0 - 80.0 %    Immature Granulocytes % 0.3 0.0 - 5.0 %    Lymphocytes % 24.1 20.0 - 42.0 %    Monocytes % 6.7 2.0 - 12.0 %    Eosinophils % 2.1 0.0 - 6.0 %    Basophils % 0.5 0.0 - 2.0 %    Neutrophils Absolute 7.34 (H) 1.80 - 7.30 E9/L    Immature Granulocytes # 0.03 E9/L    Lymphocytes Absolute 2.66 1.50 - 4.00 E9/L    Monocytes Absolute 0.74 0.10 - 0.95 E9/L    Eosinophils Absolute 0.23 0.05 - 0.50 E9/L    Basophils Absolute 0.06 0.00 - 0.20 E9/L   Comprehensive Metabolic Panel w/ Reflex to MG   Result Value Ref Range    Sodium 137 132 - 146 mmol/L    Potassium reflex Magnesium 4.3 3.5 - 5.0 mmol/L    Chloride 93 (L) 98 - 107 mmol/L    CO2 21 (L) 22 - 29 mmol/L    Anion Gap 23 (H) 7 - 16 mmol/L    Glucose 71 (L) 74 - 99 mg/dL    BUN 4 (L) 6 - 20 mg/dL    CREATININE 0.7 0.7 - 1.2 mg/dL    GFR Non-African American >60 >=60 mL/min/1.73    GFR African American >60     Calcium 9.9 8.6 - 10.2 mg/dL    Total Protein 8.5 (H) 6.4 - 8.3 g/dL    Alb 5.3 (H) 3.5 - 5.2 g/dL    Total Bilirubin 0.8 0.0 - 1.2 mg/dL    Alkaline Phosphatase 96 40 - 129 U/L    ALT 48 (H) 0 - 40 U/L    AST 60 (H) 0 - 39 U/L   Lipase   Result Value Ref Range    Lipase 36 13 - 60 U/L   Lactic Acid, Plasma   Result Value Ref Range    Lactic Acid 3.5 (H) 0.5 - 2.2 mmol/L   COVID-19   Result Value Ref Range    SARS-CoV-2, NAAT Not Detected Not Detected   SERUM DRUG SCREEN   Result Value Ref Range    Ethanol Lvl 106 mg/dL    Acetaminophen Level <5.0 (L) 10.0 - 78.6 mcg/mL    Salicylate, Serum <8.6 0.0 - 30.0 mg/dL    TCA Scrn NEGATIVE Cutoff:300 ng/mL   URINE DRUG SCREEN   Result Value Ref Range    Amphetamine Screen, Urine NOT DETECTED Negative <1000 ng/mL    Barbiturate Screen, Ur POSITIVE (A) Negative < 200 ng/mL    Benzodiazepine Screen, Urine NOT DETECTED Negative < 200 ng/mL    Cannabinoid Scrn, Ur NOT DETECTED Negative < 50ng/mL    Cocaine Metabolite Screen, Urine NOT DETECTED Negative < 300 ng/mL syndrome with complication ([facility] [address]        Disposition  Patient's disposition: Discharge to detox    Patient's condition is stable.          Ki Regalado DO  Resident  08/03/20 [id number]

## 2020-08-02 NOTE — CARE COORDINATION
CM note: call placed to [facility], no on call person scheduled on Sundays, left a VM with CMs contact information in an attempt to get patient placed in to alcohol rehab. Placed a call with the admission director at [facility], left a VM for possible placement, await a return call.

## 2020-08-22 ENCOUNTER — HOSPITAL ENCOUNTER (EMERGENCY)
Age: 30
Discharge: HOME | End: 2020-08-22
Attending: EMERGENCY MEDICINE
Payer: COMMERCIAL

## 2020-08-22 VITALS
OXYGEN SATURATION: 97 % | DIASTOLIC BLOOD PRESSURE: 95 MMHG | RESPIRATION RATE: 18 BRPM | HEART RATE: 89 BPM | SYSTOLIC BLOOD PRESSURE: 137 MMHG | TEMPERATURE: 98.8 F

## 2020-08-22 LAB
ALBUMIN SERPL-MCNC: 5.2 G/DL (ref 3.5–5.2)
ALP BLD-CCNC: 97 U/L (ref 40–129)
ALT SERPL-CCNC: 33 U/L (ref 0–40)
AMPHETAMINE SCREEN, URINE: NOT DETECTED
ANION GAP SERPL CALCULATED.3IONS-SCNC: 19 MMOL/L (ref 7–16)
AST SERPL-CCNC: 42 U/L (ref 0–39)
BARBITURATE SCREEN URINE: POSITIVE
BASOPHILS ABSOLUTE: 0.06 E9/L (ref 0–0.2)
BASOPHILS RELATIVE PERCENT: 0.8 % (ref 0–2)
BENZODIAZEPINE SCREEN, URINE: NOT DETECTED
BILIRUB SERPL-MCNC: 0.3 MG/DL (ref 0–1.2)
BILIRUBIN URINE: NEGATIVE
BLOOD, URINE: NEGATIVE
BUN BLDV-MCNC: 2 MG/DL (ref 6–20)
CALCIUM SERPL-MCNC: 9.2 MG/DL (ref 8.6–10.2)
CANNABINOID SCREEN URINE: NOT DETECTED
CHLORIDE BLD-SCNC: 94 MMOL/L (ref 98–107)
CLARITY: CLEAR
CO2: 24 MMOL/L (ref 22–29)
COCAINE METABOLITE SCREEN URINE: NOT DETECTED
COLOR: YELLOW
CREAT SERPL-MCNC: 0.7 MG/DL (ref 0.7–1.2)
EOSINOPHILS ABSOLUTE: 0.21 E9/L (ref 0.05–0.5)
EOSINOPHILS RELATIVE PERCENT: 2.8 % (ref 0–6)
ETHANOL: 341 MG/DL (ref 0–0.08)
FENTANYL SCREEN, URINE: NOT DETECTED
GFR AFRICAN AMERICAN: >60
GFR NON-AFRICAN AMERICAN: >60 ML/MIN/1.73
GLUCOSE BLD-MCNC: 143 MG/DL (ref 74–99)
GLUCOSE URINE: NEGATIVE MG/DL
HCT VFR BLD CALC: 49.5 % (ref 37–54)
HEMOGLOBIN: 17.5 G/DL (ref 12.5–16.5)
IMMATURE GRANULOCYTES #: 0.01 E9/L
IMMATURE GRANULOCYTES %: 0.1 % (ref 0–5)
INR BLD: 1
KETONES, URINE: NEGATIVE MG/DL
LEUKOCYTE ESTERASE, URINE: NEGATIVE
LIPASE: 39 U/L (ref 13–60)
LYMPHOCYTES ABSOLUTE: 2.01 E9/L (ref 1.5–4)
LYMPHOCYTES RELATIVE PERCENT: 26.8 % (ref 20–42)
Lab: ABNORMAL
MCH RBC QN AUTO: 33.3 PG (ref 26–35)
MCHC RBC AUTO-ENTMCNC: 35.4 % (ref 32–34.5)
MCV RBC AUTO: 94.1 FL (ref 80–99.9)
METHADONE SCREEN, URINE: NOT DETECTED
MONOCYTES ABSOLUTE: 0.46 E9/L (ref 0.1–0.95)
MONOCYTES RELATIVE PERCENT: 6.1 % (ref 2–12)
NEUTROPHILS ABSOLUTE: 4.76 E9/L (ref 1.8–7.3)
NEUTROPHILS RELATIVE PERCENT: 63.4 % (ref 43–80)
NITRITE, URINE: NEGATIVE
OPIATE SCREEN URINE: NOT DETECTED
OXYCODONE URINE: NOT DETECTED
PDW BLD-RTO: 12.1 FL (ref 11.5–15)
PH UA: 6.5 (ref 5–9)
PHENCYCLIDINE SCREEN URINE: NOT DETECTED
PLATELET # BLD: 286 E9/L (ref 130–450)
PMV BLD AUTO: 11.3 FL (ref 7–12)
POTASSIUM REFLEX MAGNESIUM: 4.1 MMOL/L (ref 3.5–5)
PROTEIN UA: NEGATIVE MG/DL
PROTHROMBIN TIME: 11.4 SEC (ref 9.3–12.4)
RBC # BLD: 5.26 E12/L (ref 3.8–5.8)
SARS-COV-2, NAAT: NOT DETECTED
SODIUM BLD-SCNC: 137 MMOL/L (ref 132–146)
SPECIFIC GRAVITY UA: <=1.005 (ref 1–1.03)
TOTAL PROTEIN: 8.5 G/DL (ref 6.4–8.3)
UROBILINOGEN, URINE: 0.2 E.U./DL
WBC # BLD: 7.5 E9/L (ref 4.5–11.5)

## 2020-08-22 PROCEDURE — U0002 COVID-19 LAB TEST NON-CDC: HCPCS

## 2020-08-22 PROCEDURE — 80053 COMPREHEN METABOLIC PANEL: CPT

## 2020-08-22 PROCEDURE — 85025 COMPLETE CBC W/AUTO DIFF WBC: CPT

## 2020-08-22 PROCEDURE — 85610 PROTHROMBIN TIME: CPT

## 2020-08-22 PROCEDURE — G0480 DRUG TEST DEF 1-7 CLASSES: HCPCS

## 2020-08-22 PROCEDURE — 80307 DRUG TEST PRSMV CHEM ANLYZR: CPT

## 2020-08-22 PROCEDURE — 99284 EMERGENCY DEPT VISIT MOD MDM: CPT

## 2020-08-22 PROCEDURE — 6370000000 HC RX 637 (ALT 250 FOR IP): Performed by: EMERGENCY MEDICINE

## 2020-08-22 PROCEDURE — 83690 ASSAY OF LIPASE: CPT

## 2020-08-22 PROCEDURE — 81003 URINALYSIS AUTO W/O SCOPE: CPT

## 2020-08-22 RX ORDER — THIAMINE MONONITRATE (VIT B1) 100 MG
100 TABLET ORAL ONCE
Status: COMPLETED | OUTPATIENT
Start: 2020-08-22 | End: 2020-08-22

## 2020-08-22 RX ORDER — DIAZEPAM 5 MG/1
5 TABLET ORAL ONCE
Status: COMPLETED | OUTPATIENT
Start: 2020-08-22 | End: 2020-08-22

## 2020-08-22 RX ORDER — FOLIC ACID 1 MG/1
1 TABLET ORAL DAILY
Status: DISCONTINUED | OUTPATIENT
Start: 2020-08-22 | End: 2020-08-22 | Stop reason: HOSPADM

## 2020-08-22 RX ADMIN — Medication 100 MG: at 12:01

## 2020-08-22 RX ADMIN — DIAZEPAM 5 MG: 5 TABLET ORAL at 14:20

## 2020-08-22 RX ADMIN — FOLIC ACID 1 MG: 1 TABLET ORAL at 12:01

## 2020-08-22 NOTE — ED NOTES
SPOKE  WITH FRANK  @ [facility] WE WILL CALL BACK WHEN PT. IS MEDICALLY CLEARED          Yolanda Bonilla  08/22/20 [id number]

## 2020-08-22 NOTE — ED NOTES
Spoke to Neftaly,  on call-she advised she was calling Karla Owens from [facility] to address pt situation-Pt remains aslepp with easy respirations.      Ciaran Arteaga RN  08/22/20 9400

## 2020-08-22 NOTE — CARE COORDINATION
SS Note:  COVID test pending: SS Consult received for alcohol rehab placement, pt presented to  ED c/o of alcohol abuse, met with pt supportively, pt admits to drinking 1 beer prior to coming to the ED but admits to recent loss of his sister and to [place] drinking\" up to 24 beers per day, pt currently pleasant and is asking for help and wanting Inpt alcohol rehab placement, referral made to [facility] who request sw call [facility] (# [phone]), sw spoke with [facility] admissions liaison who spoke with pt for a phone interview and have accepted pt for detox/rehab admission today, request pt arrive by 5:00pm, no answer at [address] a Ride for transport, state only scheduling M-F, [employer] scheduled for 4:00pm , taxi voucher provided to ED staff. Electronically signed by PAT Snowden on 8/22/2020 at 2:47 PM

## 2020-08-22 NOTE — ED NOTES
Iv established and labs drawn/sent, pt aware of need for urine sample but unable to void at this time, urinal provided, bed low/locked with side rails up and call light within reach, seizure pads in place, pt tearful, pt on phone calling [facility] and Laura Pearl RN  08/22/20 [id number]

## 2020-08-22 NOTE — ED PROVIDER NOTES
History of Present Illness     Patient Identification  Eloina Casas is a 34 y.o. male. Patient information was obtained from patient. History/Exam limitations: none. Patient presented voluntarily to the Emergency Department by ambulance where the patient received see Ambulance Run Sheet prior to arrival.    Chief Complaint   Alcohol Problem (pt self reported alcoholic, states for last 10 days hes been drinking 24 beers daily had 1 beer today and he's worried he might have withdrawal seizures-denies suicidal or homicidal ideation)      Patient presents for psychiatric evaluation and requires medical clearance exam. Patient is brought by EMS. He is not placed on [address] [place] [address] Street and is not requiring physical restraint. Patient requires psychiatric evaluation with concern for substance abuse. He has no associated symptoms. Patient was not referred by a therapist or psychiatrist. Patient has a history of PTSD and alcohol dependence, for which he has been hospitalized several times. The last physical exam was within the past year. Patient complains of depression, anxiety and stress. Onset of symptoms was gradual 1 month ago. Symptoms are of mild severity and are gradually worsening. Patient states symptoms have been exacerbated by relationship problem with family and friends. Symptoms are associated with no coherent plan to harm self and drug or alcohol intoxication. History obtained from: patient. Care prior to arrival consisted of nothing, with no relief.      Past Medical History:   Diagnosis Date    Anxiety     Arm pain     Convulsions ([address]     Depression     Flashing lights     Head injury     Headache     Leg pain     Numbness and tingling     arms and hands    PTSD (post-traumatic stress disorder)     Seizures ([address]      Family History   Problem Relation Age of Onset    Mental Illness Mother     Substance Abuse Mother     Cancer Father     Substance Abuse Father     Mental Comment: occasional liquor    Drug use: Yes     Frequency: 1.0 times per week     Types: Marijuana     Comment: ONCE A MONTH    Sexual activity: Not Currently     Partners: Female   Lifestyle    Physical activity     Days per week: Not on file     Minutes per session: Not on file    Stress: Not on file   Relationships    Social connections     Talks on phone: Not on file     Gets together: Not on file     Attends Voodoo service: Not on file     Active member of club or organization: Not on file     Attends meetings of clubs or organizations: Not on file     Relationship status: Not on file    Intimate partner violence     Fear of current or ex partner: Not on file     Emotionally abused: Not on file     Physically abused: Not on file     Forced sexual activity: Not on file   Other Topics Concern    Not on file   Social History Narrative    Not on file     Review of Systems  Pertinent items are noted in HPI. Physical Exam     BP (!) 134/96   Pulse 109   Temp 98.8 °F (37.1 °C) (Oral)   Resp 16   SpO2 93%   BP (!) 134/96   Pulse 109   Temp 98.8 °F (37.1 °C) (Oral)   Resp 16   SpO2 93%   General appearance: alert, appears stated age and cooperative  Head: Normocephalic, without obvious abnormality, atraumatic  Eyes: conjunctivae/corneas clear. PERRL, EOM's intact. Fundi benign. Ears: normal TM's and external ear canals both ears  Nose: Nares normal. Septum midline. Mucosa normal. No drainage or sinus tenderness. Throat: lips, mucosa, and tongue normal; teeth and gums normal  Back: symmetric, no curvature. ROM normal. No CVA tenderness.   Lungs: clear to auscultation bilaterally  Chest wall: no tenderness  Heart: regular rate and rhythm, S1, S2 normal, no murmur, click, rub or gallop  Abdomen: soft, non-tender; bowel sounds normal; no masses,  no organomegaly  Extremities: extremities normal, atraumatic, no cyanosis or edema  Pulses: 2+ and symmetric  Skin: Skin color, texture, turgor normal. No rashes or lesions  Lymph nodes: Cervical, supraclavicular, and axillary nodes normal.  Neurologic: Grossly normal    ED Course          --------------------------------------------- PAST HISTORY ---------------------------------------------  Past Medical History:  has a past medical history of Anxiety, Arm pain, Convulsions ([facility] [address] Depression, Flashing lights, Head injury, Headache, Leg pain, Numbness and tingling, PTSD (post-traumatic stress disorder), and Seizures ([facility] [address] Past Surgical History:  has no past surgical history on file. Social History:  reports that he has been smoking. He has a 18.00 pack-year smoking history. He has never used smokeless tobacco. He reports current alcohol use of about 20.0 standard drinks of alcohol per week. He reports current drug use. Frequency: 1.00 time per week. Drug: Marijuana. Family History: family history includes Cancer in his father; Mental Illness in his brother, mother, and sister; Substance Abuse in his father, maternal aunt, maternal uncle, mother, paternal aunt, paternal uncle, and sister. The patients home medications have been reviewed. Allergies: Hydrocodone; Ativan [lorazepam];  Invega sustenna [paliperidone palmitate er]; Pcn [penicillins]; and Fluticasone    -------------------------------------------------- RESULTS -------------------------------------------------  Labs:  Results for orders placed or performed during the hospital encounter of 08/22/20   CBC Auto Differential   Result Value Ref Range    WBC 7.5 4.5 - 11.5 E9/L    RBC 5.26 3.80 - 5.80 E12/L    Hemoglobin 17.5 (H) 12.5 - 16.5 g/dL    Hematocrit 49.5 37.0 - 54.0 %    MCV 94.1 80.0 - 99.9 fL    MCH 33.3 26.0 - 35.0 pg    MCHC 35.4 (H) 32.0 - 34.5 %    RDW 12.1 11.5 - 15.0 fL    Platelets 103 106 - 170 E9/L    MPV 11.3 7.0 - 12.0 fL    Neutrophils % 63.4 43.0 - 80.0 %    Immature Granulocytes % 0.1 0.0 - 5.0 %    Lymphocytes % 26.8 20.0 - 42.0 %    Monocytes % 6.1 2.0 - 12.0 % Eosinophils % 2.8 0.0 - 6.0 %    Basophils % 0.8 0.0 - 2.0 %    Neutrophils Absolute 4.76 1.80 - 7.30 E9/L    Immature Granulocytes # 0.01 E9/L    Lymphocytes Absolute 2.01 1.50 - 4.00 E9/L    Monocytes Absolute 0.46 0.10 - 0.95 E9/L    Eosinophils Absolute 0.21 0.05 - 0.50 E9/L    Basophils Absolute 0.06 0.00 - 0.20 E9/L   Protime-INR   Result Value Ref Range    Protime 11.4 9.3 - 12.4 sec    INR 1.0        Radiology:  No orders to display       ------------------------- NURSING NOTES AND VITALS REVIEWED ---------------------------  Date / Time Roomed:  8/22/2020 11:22 AM  ED Bed Assignment:  09/09    The nursing notes within the ED encounter and vital signs as below have been reviewed. BP (!) 134/96   Pulse 109   Temp 98.8 °F (37.1 °C) (Oral)   Resp 16   SpO2 93%   Oxygen Saturation Interpretation: Normal      ------------------------------------------ PROGRESS NOTES ------------------------------------------  I have spoken with the patient and discussed todays results, in addition to providing specific details for the plan of care and counseling regarding the diagnosis and prognosis. Their questions are answered at this time and they are agreeable with the plan. I discussed at length with them reasons for immediate return here for re evaluation. They will followup with primary care by calling their office tomorrow. --------------------------------- ADDITIONAL PROVIDER NOTES ---------------------------------  At this time the patient is without objective evidence of an acute process requiring hospitalization or inpatient management. They have remained hemodynamically stable throughout their entire ED visit and are stable for discharge with outpatient follow-up. The plan has been discussed in detail and they are aware of the specific conditions for emergent return, as well as the importance of follow-up. Discharge Medication List as of 8/22/2020  3:33 PM          Diagnosis:  1.  Alcohol abuse Disposition:  Patient's disposition: Discharge to rehab bed  Patient's condition is stable.          Kacy Romero MD  08/22/20 [id number]

## 2020-08-22 NOTE — ED NOTES
Bed: 09  Expected date:   Expected time:   Means of arrival:   Comments:     Caitlyn Watters RN  08/22/20 1126

## 2020-08-22 NOTE — ED NOTES
Patient placed in taxi to go to outpatient alcohol treatment center in [facility].       Vania Locke RN  08/22/20 1340

## 2020-12-27 ENCOUNTER — HOSPITAL ENCOUNTER (OUTPATIENT)
Age: 30
Setting detail: OBSERVATION
Discharge: LEFT BEFORE DISCHARGE | End: 2020-12-28
Attending: EMERGENCY MEDICINE | Admitting: INTERNAL MEDICINE
Payer: COMMERCIAL

## 2020-12-27 ENCOUNTER — APPOINTMENT (OUTPATIENT)
Dept: ULTRASOUND IMAGING | Age: 30
End: 2020-12-27
Payer: COMMERCIAL

## 2020-12-27 PROBLEM — F10.939 ALCOHOL WITHDRAWAL (HCC): Status: ACTIVE | Noted: 2020-12-27

## 2020-12-27 PROBLEM — F10.139 ALCOHOL ABUSE WITH WITHDRAWAL (HCC): Status: ACTIVE | Noted: 2020-12-27

## 2020-12-27 LAB
ALBUMIN SERPL-MCNC: 4.7 G/DL (ref 3.5–5.2)
ALP BLD-CCNC: 107 U/L (ref 40–129)
ALT SERPL-CCNC: 35 U/L (ref 0–40)
AMPHETAMINE SCREEN, URINE: NOT DETECTED
ANION GAP SERPL CALCULATED.3IONS-SCNC: 19 MMOL/L (ref 7–16)
AST SERPL-CCNC: 64 U/L (ref 0–39)
BARBITURATE SCREEN URINE: NOT DETECTED
BASOPHILS ABSOLUTE: 0 E9/L (ref 0–0.2)
BASOPHILS RELATIVE PERCENT: 0.3 % (ref 0–2)
BENZODIAZEPINE SCREEN, URINE: NOT DETECTED
BILIRUB SERPL-MCNC: 0.7 MG/DL (ref 0–1.2)
BUN BLDV-MCNC: 6 MG/DL (ref 6–20)
CALCIUM SERPL-MCNC: 9.2 MG/DL (ref 8.6–10.2)
CANNABINOID SCREEN URINE: NOT DETECTED
CHLORIDE BLD-SCNC: 88 MMOL/L (ref 98–107)
CO2: 27 MMOL/L (ref 22–29)
COCAINE METABOLITE SCREEN URINE: NOT DETECTED
CREAT SERPL-MCNC: 0.7 MG/DL (ref 0.7–1.2)
EKG ATRIAL RATE: 97 BPM
EKG P AXIS: 42 DEGREES
EKG P-R INTERVAL: 130 MS
EKG Q-T INTERVAL: 356 MS
EKG QRS DURATION: 86 MS
EKG QTC CALCULATION (BAZETT): 452 MS
EKG R AXIS: 31 DEGREES
EKG T AXIS: 51 DEGREES
EKG VENTRICULAR RATE: 97 BPM
EOSINOPHILS ABSOLUTE: 0 E9/L (ref 0.05–0.5)
EOSINOPHILS RELATIVE PERCENT: 1.4 % (ref 0–6)
FENTANYL SCREEN, URINE: NOT DETECTED
GFR AFRICAN AMERICAN: >60
GFR NON-AFRICAN AMERICAN: >60 ML/MIN/1.73
GLUCOSE BLD-MCNC: 125 MG/DL (ref 74–99)
HCT VFR BLD CALC: 48.1 % (ref 37–54)
HEMOGLOBIN: 17.3 G/DL (ref 12.5–16.5)
LACTIC ACID: 3.8 MMOL/L (ref 0.5–2.2)
LACTIC ACID: 4 MMOL/L (ref 0.5–2.2)
LACTIC ACID: 5.4 MMOL/L (ref 0.5–2.2)
LIPASE: 145 U/L (ref 13–60)
LYMPHOCYTES ABSOLUTE: 0.42 E9/L (ref 1.5–4)
LYMPHOCYTES RELATIVE PERCENT: 2.6 % (ref 20–42)
Lab: NORMAL
MCH RBC QN AUTO: 32.5 PG (ref 26–35)
MCHC RBC AUTO-ENTMCNC: 36 % (ref 32–34.5)
MCV RBC AUTO: 90.4 FL (ref 80–99.9)
METHADONE SCREEN, URINE: NOT DETECTED
MONOCYTES ABSOLUTE: 0.7 E9/L (ref 0.1–0.95)
MONOCYTES RELATIVE PERCENT: 5.2 % (ref 2–12)
NEUTROPHILS ABSOLUTE: 12.88 E9/L (ref 1.8–7.3)
NEUTROPHILS RELATIVE PERCENT: 92.2 % (ref 43–80)
OPIATE SCREEN URINE: NOT DETECTED
OXYCODONE URINE: NOT DETECTED
PDW BLD-RTO: 11.8 FL (ref 11.5–15)
PHENCYCLIDINE SCREEN URINE: NOT DETECTED
PLATELET # BLD: 240 E9/L (ref 130–450)
PMV BLD AUTO: 12 FL (ref 7–12)
POIKILOCYTES: ABNORMAL
POTASSIUM REFLEX MAGNESIUM: 3.9 MMOL/L (ref 3.5–5)
RBC # BLD: 5.32 E12/L (ref 3.8–5.8)
SODIUM BLD-SCNC: 134 MMOL/L (ref 132–146)
TARGET CELLS: ABNORMAL
TOTAL PROTEIN: 7.9 G/DL (ref 6.4–8.3)
WBC # BLD: 14 E9/L (ref 4.5–11.5)

## 2020-12-27 PROCEDURE — 2580000003 HC RX 258: Performed by: EMERGENCY MEDICINE

## 2020-12-27 PROCEDURE — 83690 ASSAY OF LIPASE: CPT

## 2020-12-27 PROCEDURE — 93005 ELECTROCARDIOGRAM TRACING: CPT | Performed by: EMERGENCY MEDICINE

## 2020-12-27 PROCEDURE — 76705 ECHO EXAM OF ABDOMEN: CPT

## 2020-12-27 PROCEDURE — 6360000002 HC RX W HCPCS: Performed by: EMERGENCY MEDICINE

## 2020-12-27 PROCEDURE — 96375 TX/PRO/DX INJ NEW DRUG ADDON: CPT

## 2020-12-27 PROCEDURE — 96361 HYDRATE IV INFUSION ADD-ON: CPT

## 2020-12-27 PROCEDURE — 85025 COMPLETE CBC W/AUTO DIFF WBC: CPT

## 2020-12-27 PROCEDURE — 80307 DRUG TEST PRSMV CHEM ANLYZR: CPT

## 2020-12-27 PROCEDURE — G0378 HOSPITAL OBSERVATION PER HR: HCPCS

## 2020-12-27 PROCEDURE — 80053 COMPREHEN METABOLIC PANEL: CPT

## 2020-12-27 PROCEDURE — 6370000000 HC RX 637 (ALT 250 FOR IP): Performed by: INTERNAL MEDICINE

## 2020-12-27 PROCEDURE — 96374 THER/PROPH/DIAG INJ IV PUSH: CPT

## 2020-12-27 PROCEDURE — 99285 EMERGENCY DEPT VISIT HI MDM: CPT

## 2020-12-27 PROCEDURE — 6370000000 HC RX 637 (ALT 250 FOR IP): Performed by: EMERGENCY MEDICINE

## 2020-12-27 PROCEDURE — G0480 DRUG TEST DEF 1-7 CLASSES: HCPCS

## 2020-12-27 PROCEDURE — 83605 ASSAY OF LACTIC ACID: CPT

## 2020-12-27 PROCEDURE — 96372 THER/PROPH/DIAG INJ SC/IM: CPT

## 2020-12-27 RX ORDER — PROMETHAZINE HYDROCHLORIDE 25 MG/ML
25 INJECTION, SOLUTION INTRAMUSCULAR; INTRAVENOUS ONCE
Status: COMPLETED | OUTPATIENT
Start: 2020-12-27 | End: 2020-12-27

## 2020-12-27 RX ORDER — 0.9 % SODIUM CHLORIDE 0.9 %
1000 INTRAVENOUS SOLUTION INTRAVENOUS ONCE
Status: COMPLETED | OUTPATIENT
Start: 2020-12-27 | End: 2020-12-27

## 2020-12-27 RX ORDER — ONDANSETRON 2 MG/ML
4 INJECTION INTRAMUSCULAR; INTRAVENOUS ONCE
Status: COMPLETED | OUTPATIENT
Start: 2020-12-27 | End: 2020-12-27

## 2020-12-27 RX ORDER — KETOROLAC TROMETHAMINE 30 MG/ML
15 INJECTION, SOLUTION INTRAMUSCULAR; INTRAVENOUS ONCE
Status: COMPLETED | OUTPATIENT
Start: 2020-12-28 | End: 2020-12-28

## 2020-12-27 RX ORDER — NICOTINE 21 MG/24HR
1 PATCH, TRANSDERMAL 24 HOURS TRANSDERMAL DAILY
Status: DISCONTINUED | OUTPATIENT
Start: 2020-12-27 | End: 2020-12-28 | Stop reason: HOSPADM

## 2020-12-27 RX ORDER — CHLORDIAZEPOXIDE HYDROCHLORIDE 5 MG/1
5 CAPSULE, GELATIN COATED ORAL EVERY 4 HOURS PRN
Status: DISCONTINUED | OUTPATIENT
Start: 2020-12-27 | End: 2020-12-27

## 2020-12-27 RX ORDER — PROMETHAZINE HYDROCHLORIDE 25 MG/ML
25 INJECTION, SOLUTION INTRAMUSCULAR; INTRAVENOUS ONCE
Status: DISCONTINUED | OUTPATIENT
Start: 2020-12-27 | End: 2020-12-28 | Stop reason: HOSPADM

## 2020-12-27 RX ORDER — SODIUM CHLORIDE 9 MG/ML
INJECTION, SOLUTION INTRAVENOUS CONTINUOUS
Status: DISCONTINUED | OUTPATIENT
Start: 2020-12-28 | End: 2020-12-28

## 2020-12-27 RX ORDER — DIAZEPAM 5 MG/ML
5 INJECTION, SOLUTION INTRAMUSCULAR; INTRAVENOUS ONCE
Status: COMPLETED | OUTPATIENT
Start: 2020-12-27 | End: 2020-12-27

## 2020-12-27 RX ORDER — CHLORDIAZEPOXIDE HYDROCHLORIDE 25 MG/1
50 CAPSULE, GELATIN COATED ORAL ONCE
Status: COMPLETED | OUTPATIENT
Start: 2020-12-27 | End: 2020-12-27

## 2020-12-27 RX ADMIN — SODIUM CHLORIDE 1000 ML: 9 INJECTION, SOLUTION INTRAVENOUS at 14:52

## 2020-12-27 RX ADMIN — SODIUM CHLORIDE 1000 ML: 9 INJECTION, SOLUTION INTRAVENOUS at 18:51

## 2020-12-27 RX ADMIN — PROMETHAZINE HYDROCHLORIDE 25 MG: 25 INJECTION INTRAMUSCULAR; INTRAVENOUS at 16:23

## 2020-12-27 RX ADMIN — CHLORDIAZEPOXIDE HYDROCHLORIDE 50 MG: 25 CAPSULE ORAL at 14:52

## 2020-12-27 RX ADMIN — DIAZEPAM 5 MG: 10 INJECTION, SOLUTION INTRAMUSCULAR; INTRAVENOUS at 22:23

## 2020-12-27 RX ADMIN — CHLORDIAZEPOXIDE HYDROCHLORIDE 5 MG: 5 CAPSULE ORAL at 22:22

## 2020-12-27 RX ADMIN — ONDANSETRON HYDROCHLORIDE 4 MG: 2 SOLUTION INTRAMUSCULAR; INTRAVENOUS at 14:52

## 2020-12-27 RX ADMIN — CHLORDIAZEPOXIDE HYDROCHLORIDE 50 MG: 25 CAPSULE ORAL at 18:52

## 2020-12-27 RX ADMIN — ONDANSETRON 4 MG: 2 INJECTION INTRAMUSCULAR; INTRAVENOUS at 21:15

## 2020-12-27 RX ADMIN — SODIUM CHLORIDE 1000 ML: 9 INJECTION, SOLUTION INTRAVENOUS at 16:18

## 2020-12-27 ASSESSMENT — ENCOUNTER SYMPTOMS
BACK PAIN: 0
COUGH: 0
ABDOMINAL PAIN: 0
NAUSEA: 1
SHORTNESS OF BREATH: 0
DIARRHEA: 0
VOMITING: 1
COLOR CHANGE: 0
RHINORRHEA: 0
PHOTOPHOBIA: 0

## 2020-12-27 ASSESSMENT — PAIN DESCRIPTION - DESCRIPTORS: DESCRIPTORS: SHARP

## 2020-12-27 ASSESSMENT — PAIN DESCRIPTION - PAIN TYPE: TYPE: ACUTE PAIN

## 2020-12-27 ASSESSMENT — PAIN SCALES - GENERAL: PAINLEVEL_OUTOF10: 6

## 2020-12-27 ASSESSMENT — PAIN DESCRIPTION - ORIENTATION: ORIENTATION: UPPER;MID

## 2020-12-27 ASSESSMENT — PAIN DESCRIPTION - FREQUENCY: FREQUENCY: CONTINUOUS

## 2020-12-27 ASSESSMENT — PAIN DESCRIPTION - LOCATION: LOCATION: ABDOMEN

## 2020-12-27 NOTE — ED PROVIDER NOTES
Patient presents to the ED for evaluation. Patient states that over the past 3 days he has been trying to detox himself from alcohol. He admits that he is an alcoholic. States that yesterday he only had 1 bottle of vodka and today had only a shot of vodka. He states he normally can drink a case of 12 ounce beers a day but has decided to cut back again. He states that he lives in a family of alcoholics. He states that he had a small seizure this morning. He has had prior seizures before. States that related to alcohol withdrawal.  He was concerned that he may go into more severe withdrawal and therefore presented to the ED for evaluation. Has not noted anything to make his symptoms worse. States normally drinking alcohol make his symptoms better but he did not want to do this because he wants to detox himself. No associated chest pain or shortness of breath. No fever or chills. Denies any confusion. Had associated nausea and vomiting. Had 5 episodes of emesis earlier today without blood present. No coffee-ground emesis. Review of Systems   Constitutional: Negative for chills, diaphoresis, fatigue and fever. HENT: Negative for congestion and rhinorrhea. Eyes: Negative for photophobia and visual disturbance. Respiratory: Negative for cough and shortness of breath. Cardiovascular: Positive for palpitations. Negative for chest pain. Gastrointestinal: Positive for nausea and vomiting. Negative for abdominal pain and diarrhea. Genitourinary: Negative for decreased urine volume and difficulty urinating. Musculoskeletal: Negative for back pain, gait problem and neck pain. Skin: Negative for color change and pallor. Neurological: Positive for dizziness and seizures. Negative for light-headedness and headaches. Hematological: Does not bruise/bleed easily. Psychiatric/Behavioral: Negative for agitation, confusion, hallucinations, self-injury and suicidal ideas.  The patient is the patient. [MS]   [id number] Patient resting in bed. States he feels much better after the Phenergan. States that he would have no problem going home and doing his rehab at home. He states that if he had a prescription of Librium he would feel comfortable doing that and contacting [facility]. We will continue to assess for a while. [MS]   1846 Patient is requiring more Librium as she is starting to get shaky and not feeling well. He would prefer to stay as an observation and be seen by social work tomorrow to be set up to go to [facility] or [facility]. [MS]   2024 Patient reports that he had another episode of emesis. Will order additional Phenergan. [MS]      ED Course User Index  [MS] Ciaran Slade, DO       --------------------------------------------- PAST HISTORY ---------------------------------------------  Past Medical History:  has a past medical history of Anxiety, Arm pain, Convulsions ([facility] [address] Depression, Flashing lights, Head injury, Headache, Leg pain, Numbness and tingling, PTSD (post-traumatic stress disorder), and Seizures ([facility] [address] Past Surgical History:  has no past surgical history on file. Social History:  reports that he has been smoking. He has a 18.00 pack-year smoking history. He has never used smokeless tobacco. He reports current alcohol use of about 20.0 standard drinks of alcohol per week. He reports current drug use. Frequency: 1.00 time per week. Drug: Marijuana. Family History: family history includes Cancer in his father; Mental Illness in his brother, mother, and sister; Substance Abuse in his father, maternal aunt, maternal uncle, mother, paternal aunt, paternal uncle, and sister. The patients home medications have been reviewed.     Allergies: Hydrocodone, Ativan [lorazepam], Invega sustenna [paliperidone palmitate er], Pcn [penicillins], and Fluticasone    -------------------------------------------------- RESULTS -------------------------------------------------    LABS:  Results for orders placed or performed during the hospital encounter of 12/27/20   Lactic Acid, Plasma   Result Value Ref Range    Lactic Acid 5.4 (HH) 0.5 - 2.2 mmol/L   Serum Drug Screen   Result Value Ref Range    Ethanol Lvl 229 mg/dL    Acetaminophen Level <5.0 (L) 10.0 - 64.5 mcg/mL    Salicylate, Serum <3.0 0.0 - 30.0 mg/dL   Urine Drug Screen   Result Value Ref Range    Amphetamine Screen, Urine NOT DETECTED Negative <1000 ng/mL    Barbiturate Screen, Ur NOT DETECTED Negative < 200 ng/mL    Benzodiazepine Screen, Urine NOT DETECTED Negative < 200 ng/mL    Cannabinoid Scrn, Ur NOT DETECTED Negative < 50ng/mL    Cocaine Metabolite Screen, Urine NOT DETECTED Negative < 300 ng/mL    Opiate Scrn, Ur NOT DETECTED Negative < 300ng/mL    PCP Screen, Urine NOT DETECTED Negative < 25 ng/mL    Methadone Screen, Urine NOT DETECTED Negative <300 ng/mL    Oxycodone Urine NOT DETECTED Negative <100 ng/mL    FENTANYL SCREEN, URINE NOT DETECTED Negative <1 ng/mL    Drug Screen Comment: see below    CBC Auto Differential   Result Value Ref Range    WBC 14.0 (H) 4.5 - 11.5 E9/L    RBC 5.32 3.80 - 5.80 E12/L    Hemoglobin 17.3 (H) 12.5 - 16.5 g/dL    Hematocrit 48.1 37.0 - 54.0 %    MCV 90.4 80.0 - 99.9 fL    MCH 32.5 26.0 - 35.0 pg    MCHC 36.0 (H) 32.0 - 34.5 %    RDW 11.8 11.5 - 15.0 fL    Platelets 848 771 - 805 E9/L    MPV 12.0 7.0 - 12.0 fL    Neutrophils % 92.2 (H) 43.0 - 80.0 %    Lymphocytes % 2.6 (L) 20.0 - 42.0 %    Monocytes % 5.2 2.0 - 12.0 %    Eosinophils % 1.4 0.0 - 6.0 %    Basophils % 0.3 0.0 - 2.0 %    Neutrophils Absolute 12.88 (H) 1.80 - 7.30 E9/L    Lymphocytes Absolute 0.42 (L) 1.50 - 4.00 E9/L    Monocytes Absolute 0.70 0.10 - 0.95 E9/L    Eosinophils Absolute 0.00 (L) 0.05 - 0.50 E9/L    Basophils Absolute 0.00 0.00 - 0.20 E9/L    Poikilocytes 1+     Target Cells 1+    Comprehensive Metabolic Panel w/ Reflex to MG   Result Value Ref Range Sodium 134 132 - 146 mmol/L    Potassium reflex Magnesium 3.9 3.5 - 5.0 mmol/L    Chloride 88 (L) 98 - 107 mmol/L    CO2 27 22 - 29 mmol/L    Anion Gap 19 (H) 7 - 16 mmol/L    Glucose 125 (H) 74 - 99 mg/dL    BUN 6 6 - 20 mg/dL    CREATININE 0.7 0.7 - 1.2 mg/dL    GFR Non-African American >60 >=60 mL/min/1.73    GFR African American >60     Calcium 9.2 8.6 - 10.2 mg/dL    Total Protein 7.9 6.4 - 8.3 g/dL    Alb 4.7 3.5 - 5.2 g/dL    Total Bilirubin 0.7 0.0 - 1.2 mg/dL    Alkaline Phosphatase 107 40 - 129 U/L    ALT 35 0 - 40 U/L    AST 64 (H) 0 - 39 U/L   Lipase   Result Value Ref Range    Lipase 145 (H) 13 - 60 U/L   Lactic Acid, Plasma   Result Value Ref Range    Lactic Acid 4.0 (H) 0.5 - 2.2 mmol/L   Lactic Acid, Plasma   Result Value Ref Range    Lactic Acid 3.8 (H) 0.5 - 2.2 mmol/L   EKG 12 Lead   Result Value Ref Range    Ventricular Rate 97 BPM    Atrial Rate 97 BPM    P-R Interval 130 ms    QRS Duration 86 ms    Q-T Interval 356 ms    QTc Calculation (Bazett) 452 ms    P Axis 42 degrees    R Axis 31 degrees    T Axis 51 degrees       RADIOLOGY:  No orders to display       EKG: This EKG is signed and interpreted by me. Rate: 97  Rhythm: Sinus  Interpretation: Normal sinus rhythm. No ectopy. Normal axis. No ST segment or T wave abnormalities. ------------------------- NURSING NOTES AND VITALS REVIEWED ---------------------------  Date / Time Roomed:  12/27/2020  1:58 PM  ED Bed Assignment:  16/16    The nursing notes within the ED encounter and vital signs as below have been reviewed.      Patient Vitals for the past 24 hrs:   BP Temp Temp src Pulse Resp SpO2 Height Weight   12/27/20 1913 (!) 130/91 98.7 °F (37.1 °C) -- 71 24 96 % -- --   12/27/20 1831 (!) 151/90 98.7 °F (37.1 °C) -- 82 19 97 % -- --   12/27/20 1642 (!) 143/93 98.7 °F (37.1 °C) -- 77 18 98 % -- --   12/27/20 1405 -- -- -- -- -- -- 5' 6\" (1.676 m) 143 lb 8 oz (65.1 kg)   12/27/20 1400 (!) 149/124 98.7 °F (37.1 °C) Oral 113 18 95 % -- --       Oxygen Saturation Interpretation: Normal    ------------------------------------------ PROGRESS NOTES ------------------------------------------  Re-evaluation(s):  Please see ED course above. Counseling:  I have spoken with the patient and discussed todays results, in addition to providing specific details for the plan of care and counseling regarding the diagnosis and prognosis. Their questions are answered at this time and they are agreeable with the plan of admission.    --------------------------------- ADDITIONAL PROVIDER NOTES ---------------------------------  Consultations:  Time: 2019. Spoke with Dr. Josh Joseph. Discussed case. He will admit the patient. This patient's ED course included: a personal history and physicial examination, re-evaluation prior to disposition, multiple bedside re-evaluations, IV medications, cardiac monitoring, continuous pulse oximetry and complex medical decision making and emergency management    This patient has remained hemodynamically stable during their ED course. Diagnosis:  1. Alcohol withdrawal syndrome without complication ([facility] [address]    2. Alcohol withdrawal seizure, uncomplicated ([facility] [address]    3. Alcohol abuse        Disposition:  Patient's disposition: Admit to telemetry  Patient's condition is fair.          Ciaran Slade DO  12/27/20 2028

## 2020-12-27 NOTE — Clinical Note
Patient Class: Observation [104]   REQUIRED: Diagnosis: Alcohol abuse with withdrawal [5395628]   Estimated Length of Stay: Estimated stay of less than 2 midnights   Admitting Provider: Tera Heck [[id number]]   Telemetry Bed Required?: Yes

## 2020-12-28 ENCOUNTER — APPOINTMENT (OUTPATIENT)
Dept: CT IMAGING | Age: 30
End: 2020-12-28
Payer: COMMERCIAL

## 2020-12-28 ENCOUNTER — APPOINTMENT (OUTPATIENT)
Dept: GENERAL RADIOLOGY | Age: 30
End: 2020-12-28
Payer: COMMERCIAL

## 2020-12-28 VITALS
HEIGHT: 66 IN | RESPIRATION RATE: 22 BRPM | OXYGEN SATURATION: 96 % | SYSTOLIC BLOOD PRESSURE: 145 MMHG | DIASTOLIC BLOOD PRESSURE: 125 MMHG | WEIGHT: 143.5 LBS | HEART RATE: 100 BPM | TEMPERATURE: 99.4 F | BODY MASS INDEX: 23.06 KG/M2

## 2020-12-28 PROCEDURE — 74177 CT ABD & PELVIS W/CONTRAST: CPT

## 2020-12-28 PROCEDURE — G0378 HOSPITAL OBSERVATION PER HR: HCPCS

## 2020-12-28 PROCEDURE — 2580000003 HC RX 258: Performed by: INTERNAL MEDICINE

## 2020-12-28 PROCEDURE — 2500000003 HC RX 250 WO HCPCS: Performed by: INTERNAL MEDICINE

## 2020-12-28 PROCEDURE — 6360000004 HC RX CONTRAST MEDICATION: Performed by: RADIOLOGY

## 2020-12-28 PROCEDURE — 96365 THER/PROPH/DIAG IV INF INIT: CPT

## 2020-12-28 PROCEDURE — 96376 TX/PRO/DX INJ SAME DRUG ADON: CPT

## 2020-12-28 PROCEDURE — 96375 TX/PRO/DX INJ NEW DRUG ADDON: CPT

## 2020-12-28 PROCEDURE — 71045 X-RAY EXAM CHEST 1 VIEW: CPT

## 2020-12-28 PROCEDURE — 6370000000 HC RX 637 (ALT 250 FOR IP): Performed by: INTERNAL MEDICINE

## 2020-12-28 PROCEDURE — 6360000002 HC RX W HCPCS: Performed by: INTERNAL MEDICINE

## 2020-12-28 RX ORDER — KETOROLAC TROMETHAMINE 30 MG/ML
15 INJECTION, SOLUTION INTRAMUSCULAR; INTRAVENOUS EVERY 6 HOURS PRN
Status: DISCONTINUED | OUTPATIENT
Start: 2020-12-28 | End: 2020-12-28 | Stop reason: HOSPADM

## 2020-12-28 RX ORDER — PROMETHAZINE HYDROCHLORIDE 25 MG/1
12.5 TABLET ORAL EVERY 6 HOURS PRN
Status: DISCONTINUED | OUTPATIENT
Start: 2020-12-28 | End: 2020-12-28 | Stop reason: HOSPADM

## 2020-12-28 RX ORDER — GABAPENTIN 300 MG/1
300 CAPSULE ORAL 3 TIMES DAILY
Status: DISCONTINUED | OUTPATIENT
Start: 2020-12-28 | End: 2020-12-28

## 2020-12-28 RX ORDER — ONDANSETRON 2 MG/ML
4 INJECTION INTRAMUSCULAR; INTRAVENOUS EVERY 6 HOURS PRN
Status: DISCONTINUED | OUTPATIENT
Start: 2020-12-28 | End: 2020-12-28 | Stop reason: HOSPADM

## 2020-12-28 RX ORDER — SODIUM CHLORIDE 0.9 % (FLUSH) 0.9 %
10 SYRINGE (ML) INJECTION EVERY 12 HOURS SCHEDULED
Status: DISCONTINUED | OUTPATIENT
Start: 2020-12-28 | End: 2020-12-28 | Stop reason: HOSPADM

## 2020-12-28 RX ORDER — SODIUM CHLORIDE 0.9 % (FLUSH) 0.9 %
10 SYRINGE (ML) INJECTION PRN
Status: DISCONTINUED | OUTPATIENT
Start: 2020-12-28 | End: 2020-12-28 | Stop reason: SDUPTHER

## 2020-12-28 RX ORDER — SODIUM CHLORIDE 0.9 % (FLUSH) 0.9 %
10 SYRINGE (ML) INJECTION EVERY 12 HOURS SCHEDULED
Status: DISCONTINUED | OUTPATIENT
Start: 2020-12-28 | End: 2020-12-28 | Stop reason: SDUPTHER

## 2020-12-28 RX ORDER — SODIUM CHLORIDE 9 MG/ML
INJECTION, SOLUTION INTRAVENOUS CONTINUOUS
Status: DISCONTINUED | OUTPATIENT
Start: 2020-12-28 | End: 2020-12-28 | Stop reason: HOSPADM

## 2020-12-28 RX ORDER — ACETAMINOPHEN 650 MG/1
650 SUPPOSITORY RECTAL EVERY 6 HOURS PRN
Status: DISCONTINUED | OUTPATIENT
Start: 2020-12-28 | End: 2020-12-28 | Stop reason: HOSPADM

## 2020-12-28 RX ORDER — DIAZEPAM 5 MG/ML
5 INJECTION, SOLUTION INTRAMUSCULAR; INTRAVENOUS EVERY 6 HOURS PRN
Status: DISCONTINUED | OUTPATIENT
Start: 2020-12-28 | End: 2020-12-28 | Stop reason: HOSPADM

## 2020-12-28 RX ORDER — ACETAMINOPHEN 325 MG/1
650 TABLET ORAL EVERY 6 HOURS PRN
Status: DISCONTINUED | OUTPATIENT
Start: 2020-12-28 | End: 2020-12-28 | Stop reason: HOSPADM

## 2020-12-28 RX ORDER — SODIUM CHLORIDE 0.9 % (FLUSH) 0.9 %
10 SYRINGE (ML) INJECTION PRN
Status: DISCONTINUED | OUTPATIENT
Start: 2020-12-28 | End: 2020-12-28 | Stop reason: HOSPADM

## 2020-12-28 RX ORDER — GABAPENTIN 300 MG/1
300 CAPSULE ORAL 2 TIMES DAILY
Status: DISCONTINUED | OUTPATIENT
Start: 2020-12-28 | End: 2020-12-28 | Stop reason: HOSPADM

## 2020-12-28 RX ADMIN — SODIUM CHLORIDE 75 ML: 9 INJECTION, SOLUTION INTRAVENOUS at 00:30

## 2020-12-28 RX ADMIN — ACETAMINOPHEN 650 MG: 325 TABLET, FILM COATED ORAL at 00:49

## 2020-12-28 RX ADMIN — THIAMINE HYDROCHLORIDE 100 MG: 100 INJECTION, SOLUTION INTRAMUSCULAR; INTRAVENOUS at 01:13

## 2020-12-28 RX ADMIN — DIAZEPAM 5 MG: 10 INJECTION, SOLUTION INTRAMUSCULAR; INTRAVENOUS at 05:35

## 2020-12-28 RX ADMIN — FOLIC ACID 1 MG: 5 INJECTION, SOLUTION INTRAMUSCULAR; INTRAVENOUS; SUBCUTANEOUS at 01:50

## 2020-12-28 RX ADMIN — KETOROLAC TROMETHAMINE 15 MG: 30 INJECTION, SOLUTION INTRAMUSCULAR at 00:13

## 2020-12-28 RX ADMIN — IOPAMIDOL 80 ML: 755 INJECTION, SOLUTION INTRAVENOUS at 06:30

## 2020-12-28 ASSESSMENT — PAIN SCALES - GENERAL
PAINLEVEL_OUTOF10: 6
PAINLEVEL_OUTOF10: 6

## 2020-12-28 NOTE — ED NOTES
Patient sitting on the edge of the bed. Stating that he is hallucinating and sweating and no one will believe him. Wanting to speak with his doctor because he needs his valium. Patient also states that he needs to use the restroom at this time. Patient walked to restroom with this nurse at his side.        Mary Ann Douglas RN  12/28/20 5332

## 2020-12-28 NOTE — ED NOTES
Patient requesting to leave AMA. Dr. Jackie Gay being notified by perfect serve at this time.      Zaheer Richard RN  12/28/20 6814

## 2020-12-28 NOTE — H&P
Department of Internal Medicine  History and Physical    PCP: Dr. Kim Chilel  Admitting Physician: Dr. Rere Loja  Consultants:       CHIEF COMPLAINT: Seizures, nausea emesis, abdominal pain, alcohol withdrawal.    HISTORY OF PRESENT ILLNESS:    Patient is 72-year-old male who presented to the ED due to nausea emesis along with abdominal pain. Patient is alcoholic. He recently has been trying to quit over the last few days. During that time he developed nausea and emesis along with abdominal pain. He also had seizure activity last night that was witnessed. As such he presented to the ED for further evaluation and management. PAST MEDICAL Hx:  Past Medical History:   Diagnosis Date    Anxiety     Arm pain     Convulsions ([address]     Depression     Flashing lights     Head injury     Headache     Leg pain     Numbness and tingling     arms and hands    PTSD (post-traumatic stress disorder)     Seizures (HCC)        PAST SURGICAL Hx:   History reviewed. No pertinent surgical history. FAMILY Hx:  Family History   Problem Relation Age of Onset    Mental Illness Mother     Substance Abuse Mother     Cancer Father     Substance Abuse Father     Mental Illness Sister     Substance Abuse Sister     Mental Illness Brother     Substance Abuse Maternal Aunt     Substance Abuse Maternal Uncle     Substance Abuse Paternal Aunt     Substance Abuse Paternal Uncle        HOME MEDICATIONS:  Prior to Admission medications    Medication Sig Start Date End Date Taking?  Authorizing Provider   albuterol sulfate  (90 Base) MCG/ACT inhaler Inhale 2 puffs into the lungs every 6 hours as needed for Wheezing    Historical Provider, MD   nicotine (Satya Craft) 21 MG/24HR Place 1 patch onto the skin daily 9/14/18   Chadwick Cagle, APRN - CNP       ALLERGIES:  Hydrocodone, Ativan [lorazepam], Invega sustenna [paliperidone palmitate er], Pcn [penicillins], and Fluticasone    SOCIAL Hx:  Social History     Socioeconomic History  Marital status: Single     Spouse name: Not on file    Number of children: 0    Years of education: 5    Highest education level: Not on file   Occupational History    Occupation: [employer]     Employer: SELF     Comment: [employer]   Social Needs    Financial resource strain: Not on file    Food insecurity     Worry: Not on file     Inability: Not on file    Transportation needs     Medical: Not on file     Non-medical: Not on file   Tobacco Use    Smoking status: Heavy Tobacco Smoker     Packs/day: 2.00     Years: 9.00     Pack years: 18.00    Smokeless tobacco: Never Used   Substance and Sexual Activity    Alcohol use: Yes     Alcohol/week: 20.0 standard drinks     Types: 20 Cans of beer per week     Comment: occasional liquor    Drug use: Yes     Frequency: 1.0 times per week     Types: Marijuana     Comment: ONCE A MONTH    Sexual activity: Not Currently     Partners: Female   Lifestyle    Physical activity     Days per week: Not on file     Minutes per session: Not on file    Stress: Not on file   Relationships    Social connections     Talks on phone: Not on file     Gets together: Not on file     Attends Anabaptist service: Not on file     Active member of club or organization: Not on file     Attends meetings of clubs or organizations: Not on file     Relationship status: Not on file    Intimate partner violence     Fear of current or ex partner: Not on file     Emotionally abused: Not on file     Physically abused: Not on file     Forced sexual activity: Not on file   Other Topics Concern    Not on file   Social History Narrative    Not on file       ROS: Positive in bold  General:   Denies chills, fatigue, fever, malaise, night sweats or weight loss    Psychological:   Denies anxiety, disorientation or hallucinations    ENT:    Denies epistaxis, headaches, vertigo or visual changes    Cardiovascular:   Denies any chest pain, irregular heartbeats, or palpitations.  No paroxysmal nocturnal dyspnea. Respiratory:   Denies shortness of breath, coughing, sputum production, hemoptysis, or wheezing. No orthopnea. Gastrointestinal:   Denies nausea, vomiting, diarrhea, or constipation. Denies any abdominal pain. Denies change in bowel habits or stools. Genito-Urinary:    Denies any urgency, frequency, hematuria. Voiding without difficulty. Musculoskeletal:   Denies joint pain, joint stiffness, joint swelling or muscle pain    Neurology:    Denies any headache or focal neurological deficits. No weakness or paresthesia. Derm:    Denies any rashes, ulcers, or excoriations. Denies bruising. Extremities:   Denies any lower extremity swelling or edema. PHYSICAL EXAM:  VITALS:  Vitals:    12/27/20 2050   BP: (!) 142/86   Pulse: 84   Resp: 18   Temp: 98.7 °F (37.1 °C)   SpO2: 95%         CONSTITUTIONAL:    Awake, alert, cooperative, no apparent distress, and appears stated age    EYES:    PERRL, EOMI, sclera clear, conjunctiva normal    ENT:    Normocephalic, atraumatic, sinuses nontender on palpation. External ears without lesions. Oral pharynx with moist mucus membranes. Tonsils without erythema or exudates. NECK:    Supple, symmetrical, trachea midline, no adenopathy, thyroid symmetric, not enlarged and no tenderness, skin normal, no bruits, no JVD    HEMATOLOGIC/LYMPHATICS:    No cervical lymphadenopathy and no supraclavicular lymphadenopathy    LUNGS:    Symmetric.  No increased work of breathing, good air exchange, clear to auscultation bilaterally, no wheezes, rhonchi, or rales,     CARDIOVASCULAR:    Normal apical impulse, regular rate and rhythm, normal S1 and S2, no S3 or S4, and no murmur noted    ABDOMEN:    No scars, normal bowel sounds, soft, non-distended, non-tender, no masses palpated, no hepatosplenomegaly, no rebound or guarding elicited on palpation, patient does have right upper quadrant abdominal pain    MUSCULOSKELETAL:    There is no redness, warmth, or swelling of the joints. Full range of motion noted. Motor strength is 5 out of 5 all extremities bilaterally. Tone is normal.    NEUROLOGIC:    Awake, alert, oriented to name, place and time. Cranial nerves II-XII are grossly intact. Motor is 5 out of 5 bilaterally. SKIN:    No bruising or bleeding. No redness, warmth, or swelling    EXTREMITIES:    Peripheral pulses present. No edema, cyanosis, or swelling. OSTEOPATHIC:    Examined in seated and supine positions. Normal thoracic kyphosis and lumbar lordosis. No acute somatic dysfunction. LINES/CATHETERS     LABORATORY DATA:  CBC with Differential:    Lab Results   Component Value Date    WBC 14.0 12/27/2020    RBC 5.32 12/27/2020    HGB 17.3 12/27/2020    HCT 48.1 12/27/2020     12/27/2020    MCV 90.4 12/27/2020    MCH 32.5 12/27/2020    MCHC 36.0 12/27/2020    RDW 11.8 12/27/2020    LYMPHOPCT 2.6 12/27/2020    MONOPCT 5.2 12/27/2020    BASOPCT 0.3 12/27/2020    MONOSABS 0.70 12/27/2020    LYMPHSABS 0.42 12/27/2020    EOSABS 0.00 12/27/2020    BASOSABS 0.00 12/27/2020     CMP:    Lab Results   Component Value Date     12/27/2020    K 3.9 12/27/2020    CL 88 12/27/2020    CO2 27 12/27/2020    BUN 6 12/27/2020    CREATININE 0.7 12/27/2020    GFRAA >60 12/27/2020    LABGLOM >60 12/27/2020    GLUCOSE 125 12/27/2020    PROT 7.9 12/27/2020    LABALBU 4.7 12/27/2020    CALCIUM 9.2 12/27/2020    BILITOT 0.7 12/27/2020    ALKPHOS 107 12/27/2020    AST 64 12/27/2020    ALT 35 12/27/2020       ASSESSMENT/PLAN:  1. Alcohol withdrawal with associated seizures  1. Continue Valium 5 mg IV every 6 hours for withdrawal. Placed on gabapentin 300 mg twice daily. Seizure precautions. Social work consulted for possible rehab placement. Although patient is against rehab at this time. 2. Possible acute pancreatitis  1. Obtain CT abdomen and pelvis with IV contrast. Placed on IV fluids. 3. Lactic acidosis  4.  History of seizures secondary to alcohol withdrawal  5. Depression  6. Anxiety  7. PTSD  8. Current tobacco abuse  1. Placed on nicotine patch.   9. Cannabis use        Rosy Greene D.O.  9:11 PM  12/27/2020    Electronically signed by Rosy Greene DO on 12/27/20 at 9:11 PM EST

## 2020-12-31 LAB
ACETAMINOPHEN LEVEL: <5 MCG/ML (ref 10–30)
ETHANOL: 229 MG/DL (ref 0–0.08)
SALICYLATE, SERUM: <0.3 MG/DL (ref 0–30)
TRICYCLIC ANTIDEPRESSANTS SCREEN SERUM: NEGATIVE NG/ML

## 2021-02-01 ENCOUNTER — HOSPITAL ENCOUNTER (EMERGENCY)
Age: 31
Discharge: HOME | End: 2021-02-01
Payer: COMMERCIAL

## 2021-02-01 ENCOUNTER — HOSPITAL ENCOUNTER (INPATIENT)
Age: 31
LOS: 2 days | Discharge: LEFT BEFORE DISCHARGE | DRG: 770 | End: 2021-02-03
Attending: EMERGENCY MEDICINE | Admitting: FAMILY MEDICINE
Payer: COMMERCIAL

## 2021-02-01 DIAGNOSIS — F10.930 ALCOHOL WITHDRAWAL SYNDROME WITHOUT COMPLICATION (HCC): Primary | ICD-10-CM

## 2021-02-01 LAB
ACETAMINOPHEN LEVEL: <5 MCG/ML (ref 10–30)
ALBUMIN SERPL-MCNC: 4.8 G/DL (ref 3.5–5.2)
ALP BLD-CCNC: 101 U/L (ref 40–129)
ALT SERPL-CCNC: 37 U/L (ref 0–40)
AMPHETAMINE SCREEN, URINE: NOT DETECTED
ANION GAP SERPL CALCULATED.3IONS-SCNC: 21 MMOL/L (ref 7–16)
AST SERPL-CCNC: 62 U/L (ref 0–39)
BARBITURATE SCREEN URINE: POSITIVE
BASOPHILS ABSOLUTE: 0.07 E9/L (ref 0–0.2)
BASOPHILS RELATIVE PERCENT: 1.2 % (ref 0–2)
BENZODIAZEPINE SCREEN, URINE: NOT DETECTED
BILIRUB SERPL-MCNC: 0.5 MG/DL (ref 0–1.2)
BUN BLDV-MCNC: 4 MG/DL (ref 6–20)
CALCIUM SERPL-MCNC: 9.2 MG/DL (ref 8.6–10.2)
CANNABINOID SCREEN URINE: POSITIVE
CHLORIDE BLD-SCNC: 96 MMOL/L (ref 98–107)
CO2: 19 MMOL/L (ref 22–29)
COCAINE METABOLITE SCREEN URINE: NOT DETECTED
CREAT SERPL-MCNC: 0.7 MG/DL (ref 0.7–1.2)
EOSINOPHILS ABSOLUTE: 0.13 E9/L (ref 0.05–0.5)
EOSINOPHILS RELATIVE PERCENT: 2.2 % (ref 0–6)
ETHANOL: 281 MG/DL (ref 0–0.08)
FENTANYL SCREEN, URINE: NOT DETECTED
GFR AFRICAN AMERICAN: >60
GFR NON-AFRICAN AMERICAN: >60 ML/MIN/1.73
GLUCOSE BLD-MCNC: 156 MG/DL (ref 74–99)
HCT VFR BLD CALC: 49.6 % (ref 37–54)
HEMOGLOBIN: 16.9 G/DL (ref 12.5–16.5)
IMMATURE GRANULOCYTES #: 0.01 E9/L
IMMATURE GRANULOCYTES %: 0.2 % (ref 0–5)
LYMPHOCYTES ABSOLUTE: 1.02 E9/L (ref 1.5–4)
LYMPHOCYTES RELATIVE PERCENT: 17.6 % (ref 20–42)
Lab: ABNORMAL
MAGNESIUM: 2.5 MG/DL (ref 1.6–2.6)
MCH RBC QN AUTO: 32.3 PG (ref 26–35)
MCHC RBC AUTO-ENTMCNC: 34.1 % (ref 32–34.5)
MCV RBC AUTO: 94.8 FL (ref 80–99.9)
METHADONE SCREEN, URINE: NOT DETECTED
MONOCYTES ABSOLUTE: 0.49 E9/L (ref 0.1–0.95)
MONOCYTES RELATIVE PERCENT: 8.4 % (ref 2–12)
NEUTROPHILS ABSOLUTE: 4.08 E9/L (ref 1.8–7.3)
NEUTROPHILS RELATIVE PERCENT: 70.4 % (ref 43–80)
OPIATE SCREEN URINE: NOT DETECTED
OXYCODONE URINE: NOT DETECTED
PDW BLD-RTO: 13.2 FL (ref 11.5–15)
PHENCYCLIDINE SCREEN URINE: NOT DETECTED
PLATELET # BLD: 323 E9/L (ref 130–450)
PMV BLD AUTO: 10.3 FL (ref 7–12)
POTASSIUM SERPL-SCNC: 4.3 MMOL/L (ref 3.5–5)
RBC # BLD: 5.23 E12/L (ref 3.8–5.8)
SALICYLATE, SERUM: <0.3 MG/DL (ref 0–30)
SODIUM BLD-SCNC: 136 MMOL/L (ref 132–146)
TOTAL PROTEIN: 8.1 G/DL (ref 6.4–8.3)
TRICYCLIC ANTIDEPRESSANTS SCREEN SERUM: NEGATIVE NG/ML
TROPONIN: <0.01 NG/ML (ref 0–0.03)
WBC # BLD: 5.8 E9/L (ref 4.5–11.5)

## 2021-02-01 PROCEDURE — 84484 ASSAY OF TROPONIN QUANT: CPT

## 2021-02-01 PROCEDURE — 83735 ASSAY OF MAGNESIUM: CPT

## 2021-02-01 PROCEDURE — 6370000000 HC RX 637 (ALT 250 FOR IP): Performed by: STUDENT IN AN ORGANIZED HEALTH CARE EDUCATION/TRAINING PROGRAM

## 2021-02-01 PROCEDURE — 80053 COMPREHEN METABOLIC PANEL: CPT

## 2021-02-01 PROCEDURE — 99283 EMERGENCY DEPT VISIT LOW MDM: CPT

## 2021-02-01 PROCEDURE — 6360000002 HC RX W HCPCS: Performed by: STUDENT IN AN ORGANIZED HEALTH CARE EDUCATION/TRAINING PROGRAM

## 2021-02-01 PROCEDURE — 2060000000 HC ICU INTERMEDIATE R&B

## 2021-02-01 PROCEDURE — 80307 DRUG TEST PRSMV CHEM ANLYZR: CPT

## 2021-02-01 PROCEDURE — 80143 DRUG ASSAY ACETAMINOPHEN: CPT

## 2021-02-01 PROCEDURE — 2580000003 HC RX 258: Performed by: STUDENT IN AN ORGANIZED HEALTH CARE EDUCATION/TRAINING PROGRAM

## 2021-02-01 PROCEDURE — 93005 ELECTROCARDIOGRAM TRACING: CPT | Performed by: NURSE PRACTITIONER

## 2021-02-01 PROCEDURE — 36415 COLL VENOUS BLD VENIPUNCTURE: CPT

## 2021-02-01 PROCEDURE — 85025 COMPLETE CBC W/AUTO DIFF WBC: CPT

## 2021-02-01 PROCEDURE — 80179 DRUG ASSAY SALICYLATE: CPT

## 2021-02-01 PROCEDURE — 82077 ASSAY SPEC XCP UR&BREATH IA: CPT

## 2021-02-01 RX ORDER — ONDANSETRON 2 MG/ML
4 INJECTION INTRAMUSCULAR; INTRAVENOUS ONCE
Status: COMPLETED | OUTPATIENT
Start: 2021-02-01 | End: 2021-02-01

## 2021-02-01 RX ORDER — GAUZE BANDAGE 2" X 2"
100 BANDAGE TOPICAL DAILY
Status: DISCONTINUED | OUTPATIENT
Start: 2021-02-02 | End: 2021-02-03 | Stop reason: HOSPADM

## 2021-02-01 RX ORDER — PHENOBARBITAL SODIUM 65 MG/ML
260 INJECTION INTRAMUSCULAR ONCE
Status: DISCONTINUED | OUTPATIENT
Start: 2021-02-01 | End: 2021-02-01 | Stop reason: SDUPTHER

## 2021-02-01 RX ORDER — DIAZEPAM 5 MG/1
5 TABLET ORAL ONCE
Status: COMPLETED | OUTPATIENT
Start: 2021-02-01 | End: 2021-02-01

## 2021-02-01 RX ORDER — SODIUM CHLORIDE 0.9 % (FLUSH) 0.9 %
10 SYRINGE (ML) INJECTION PRN
Status: DISCONTINUED | OUTPATIENT
Start: 2021-02-01 | End: 2021-02-02 | Stop reason: SDUPTHER

## 2021-02-01 RX ORDER — PHENOBARBITAL SODIUM 65 MG/ML
130 INJECTION INTRAMUSCULAR
Status: COMPLETED | OUTPATIENT
Start: 2021-02-02 | End: 2021-02-02

## 2021-02-01 RX ORDER — 0.9 % SODIUM CHLORIDE 0.9 %
1000 INTRAVENOUS SOLUTION INTRAVENOUS ONCE
Status: COMPLETED | OUTPATIENT
Start: 2021-02-01 | End: 2021-02-02

## 2021-02-01 RX ORDER — SODIUM CHLORIDE 0.9 % (FLUSH) 0.9 %
10 SYRINGE (ML) INJECTION EVERY 12 HOURS SCHEDULED
Status: DISCONTINUED | OUTPATIENT
Start: 2021-02-01 | End: 2021-02-02 | Stop reason: SDUPTHER

## 2021-02-01 RX ORDER — PHENOBARBITAL SODIUM 65 MG/ML
260 INJECTION INTRAMUSCULAR ONCE
Status: COMPLETED | OUTPATIENT
Start: 2021-02-01 | End: 2021-02-02

## 2021-02-01 RX ORDER — FOLIC ACID 5 MG/ML
1 INJECTION, SOLUTION INTRAMUSCULAR; INTRAVENOUS; SUBCUTANEOUS DAILY
Status: DISCONTINUED | OUTPATIENT
Start: 2021-02-02 | End: 2021-02-03

## 2021-02-01 RX ADMIN — SODIUM CHLORIDE, PRESERVATIVE FREE 10 ML: 5 INJECTION INTRAVENOUS at 23:01

## 2021-02-01 RX ADMIN — ONDANSETRON 4 MG: 2 INJECTION INTRAMUSCULAR; INTRAVENOUS at 23:01

## 2021-02-01 RX ADMIN — SODIUM CHLORIDE 1000 ML: 9 INJECTION, SOLUTION INTRAVENOUS at 23:08

## 2021-02-01 RX ADMIN — DIAZEPAM 5 MG: 5 TABLET ORAL at 23:07

## 2021-02-02 LAB
EKG ATRIAL RATE: 83 BPM
EKG P AXIS: 23 DEGREES
EKG P-R INTERVAL: 116 MS
EKG Q-T INTERVAL: 356 MS
EKG QRS DURATION: 96 MS
EKG QTC CALCULATION (BAZETT): 418 MS
EKG R AXIS: 56 DEGREES
EKG T AXIS: 66 DEGREES
EKG VENTRICULAR RATE: 83 BPM

## 2021-02-02 PROCEDURE — 6360000002 HC RX W HCPCS: Performed by: STUDENT IN AN ORGANIZED HEALTH CARE EDUCATION/TRAINING PROGRAM

## 2021-02-02 PROCEDURE — 2580000003 HC RX 258: Performed by: FAMILY MEDICINE

## 2021-02-02 PROCEDURE — 2060000000 HC ICU INTERMEDIATE R&B

## 2021-02-02 PROCEDURE — 6370000000 HC RX 637 (ALT 250 FOR IP): Performed by: FAMILY MEDICINE

## 2021-02-02 PROCEDURE — 6360000002 HC RX W HCPCS

## 2021-02-02 PROCEDURE — 6360000002 HC RX W HCPCS: Performed by: FAMILY MEDICINE

## 2021-02-02 PROCEDURE — 93010 ELECTROCARDIOGRAM REPORT: CPT | Performed by: INTERNAL MEDICINE

## 2021-02-02 PROCEDURE — 2500000003 HC RX 250 WO HCPCS: Performed by: FAMILY MEDICINE

## 2021-02-02 PROCEDURE — 6360000002 HC RX W HCPCS: Performed by: EMERGENCY MEDICINE

## 2021-02-02 RX ORDER — PROMETHAZINE HYDROCHLORIDE 25 MG/1
12.5 TABLET ORAL EVERY 6 HOURS PRN
Status: DISCONTINUED | OUTPATIENT
Start: 2021-02-02 | End: 2021-02-03 | Stop reason: HOSPADM

## 2021-02-02 RX ORDER — ONDANSETRON 2 MG/ML
4 INJECTION INTRAMUSCULAR; INTRAVENOUS EVERY 6 HOURS PRN
Status: DISCONTINUED | OUTPATIENT
Start: 2021-02-02 | End: 2021-02-03 | Stop reason: HOSPADM

## 2021-02-02 RX ORDER — ALPRAZOLAM 0.25 MG/1
0.5 TABLET ORAL ONCE
Status: COMPLETED | OUTPATIENT
Start: 2021-02-02 | End: 2021-02-02

## 2021-02-02 RX ORDER — SENNA PLUS 8.6 MG/1
1 TABLET ORAL DAILY PRN
Status: DISCONTINUED | OUTPATIENT
Start: 2021-02-02 | End: 2021-02-03 | Stop reason: HOSPADM

## 2021-02-02 RX ORDER — PROMETHAZINE HYDROCHLORIDE 25 MG/ML
INJECTION, SOLUTION INTRAMUSCULAR; INTRAVENOUS
Status: COMPLETED
Start: 2021-02-02 | End: 2021-02-02

## 2021-02-02 RX ORDER — ACETAMINOPHEN 650 MG/1
650 SUPPOSITORY RECTAL EVERY 6 HOURS PRN
Status: DISCONTINUED | OUTPATIENT
Start: 2021-02-02 | End: 2021-02-03 | Stop reason: HOSPADM

## 2021-02-02 RX ORDER — M-VIT,TX,IRON,MINS/CALC/FOLIC 27MG-0.4MG
1 TABLET ORAL DAILY
COMMUNITY
End: 2021-03-25

## 2021-02-02 RX ORDER — ACETAMINOPHEN 325 MG/1
650 TABLET ORAL EVERY 6 HOURS PRN
Status: DISCONTINUED | OUTPATIENT
Start: 2021-02-02 | End: 2021-02-03 | Stop reason: HOSPADM

## 2021-02-02 RX ORDER — NICOTINE 21 MG/24HR
1 PATCH, TRANSDERMAL 24 HOURS TRANSDERMAL DAILY
Status: DISCONTINUED | OUTPATIENT
Start: 2021-02-02 | End: 2021-02-03 | Stop reason: HOSPADM

## 2021-02-02 RX ORDER — SODIUM CHLORIDE 0.9 % (FLUSH) 0.9 %
10 SYRINGE (ML) INJECTION PRN
Status: DISCONTINUED | OUTPATIENT
Start: 2021-02-02 | End: 2021-02-03 | Stop reason: HOSPADM

## 2021-02-02 RX ORDER — CHLORDIAZEPOXIDE HYDROCHLORIDE 5 MG/1
5 CAPSULE, GELATIN COATED ORAL 3 TIMES DAILY
Status: DISCONTINUED | OUTPATIENT
Start: 2021-02-02 | End: 2021-02-03 | Stop reason: HOSPADM

## 2021-02-02 RX ORDER — PROMETHAZINE HYDROCHLORIDE 25 MG/ML
6.25 INJECTION, SOLUTION INTRAMUSCULAR; INTRAVENOUS ONCE
Status: COMPLETED | OUTPATIENT
Start: 2021-02-02 | End: 2021-02-02

## 2021-02-02 RX ORDER — SODIUM CHLORIDE 0.9 % (FLUSH) 0.9 %
10 SYRINGE (ML) INJECTION EVERY 12 HOURS SCHEDULED
Status: DISCONTINUED | OUTPATIENT
Start: 2021-02-02 | End: 2021-02-03 | Stop reason: HOSPADM

## 2021-02-02 RX ORDER — ACETAMINOPHEN 325 MG/1
650 TABLET ORAL EVERY 6 HOURS PRN
COMMUNITY
End: 2021-03-25

## 2021-02-02 RX ORDER — PHENOBARBITAL SODIUM 65 MG/ML
260 INJECTION INTRAMUSCULAR ONCE
Status: COMPLETED | OUTPATIENT
Start: 2021-02-02 | End: 2021-02-02

## 2021-02-02 RX ORDER — PHENOBARBITAL SODIUM 65 MG/ML
130 INJECTION INTRAMUSCULAR
Status: DISCONTINUED | OUTPATIENT
Start: 2021-02-03 | End: 2021-02-02

## 2021-02-02 RX ADMIN — PHENOBARBITAL SODIUM 260 MG: 65 INJECTION INTRAMUSCULAR at 00:31

## 2021-02-02 RX ADMIN — PHENOBARBITAL SODIUM 130 MG: 65 INJECTION INTRAMUSCULAR at 06:56

## 2021-02-02 RX ADMIN — CHLORDIAZEPOXIDE HYDROCHLORIDE 5 MG: 5 CAPSULE ORAL at 20:03

## 2021-02-02 RX ADMIN — SODIUM CHLORIDE, PRESERVATIVE FREE 10 ML: 5 INJECTION INTRAVENOUS at 20:04

## 2021-02-02 RX ADMIN — SODIUM CHLORIDE, PRESERVATIVE FREE 10 ML: 5 INJECTION INTRAVENOUS at 08:35

## 2021-02-02 RX ADMIN — PHENOBARBITAL SODIUM 130 MG: 65 INJECTION INTRAMUSCULAR at 03:04

## 2021-02-02 RX ADMIN — ONDANSETRON 4 MG: 2 INJECTION INTRAMUSCULAR; INTRAVENOUS at 17:24

## 2021-02-02 RX ADMIN — PHENOBARBITAL SODIUM 260 MG: 65 INJECTION INTRAMUSCULAR at 23:20

## 2021-02-02 RX ADMIN — ACETAMINOPHEN 650 MG: 325 TABLET ORAL at 10:44

## 2021-02-02 RX ADMIN — CHLORDIAZEPOXIDE HYDROCHLORIDE 5 MG: 5 CAPSULE ORAL at 13:24

## 2021-02-02 RX ADMIN — FOLIC ACID 1 MG: 5 INJECTION, SOLUTION INTRAMUSCULAR; INTRAVENOUS; SUBCUTANEOUS at 08:35

## 2021-02-02 RX ADMIN — ONDANSETRON 4 MG: 2 INJECTION INTRAMUSCULAR; INTRAVENOUS at 08:33

## 2021-02-02 RX ADMIN — SODIUM CHLORIDE, PRESERVATIVE FREE 10 ML: 5 INJECTION INTRAVENOUS at 17:25

## 2021-02-02 RX ADMIN — PROMETHAZINE HYDROCHLORIDE 6.25 MG: 25 INJECTION INTRAMUSCULAR; INTRAVENOUS at 05:37

## 2021-02-02 RX ADMIN — ALPRAZOLAM 0.5 MG: 0.25 TABLET ORAL at 13:46

## 2021-02-02 RX ADMIN — Medication 100 MG: at 09:05

## 2021-02-02 RX ADMIN — PROMETHAZINE HYDROCHLORIDE 6.25 MG: 25 INJECTION, SOLUTION INTRAMUSCULAR; INTRAVENOUS at 05:37

## 2021-02-02 RX ADMIN — CHLORDIAZEPOXIDE HYDROCHLORIDE 5 MG: 5 CAPSULE ORAL at 08:31

## 2021-02-02 NOTE — PROGRESS NOTES
Hospitalist Progress Note      SYNOPSIS: Patient admitted on 2021   Came to the ER looking for alcohol detox      SUBJECTIVE:    Patient seen and examined  Records reviewed. Temp (24hrs), Av.9 °F (36.6 °C), Min:97.3 °F (36.3 °C), Max:98.4 °F (36.9 °C)    DIET: No diet orders on file  CODE: Prior  No intake or output data in the 24 hours ending 21 0046    OBJECTIVE:    /86   Pulse 83   Temp 98.4 °F (36.9 °C) (Oral)   Resp 16   Ht 5' 6\" (1.676 m)   Wt 150 lb (68 kg)   SpO2 95%   BMI 24.21 kg/m²     General appearance: No apparent distress, appears stated age and cooperative. HEENT:  Conjunctivae/corneas clear. Neck: Supple. No jugular venous distention. Respiratory: Clear to auscultation bilaterally, normal respiratory effort  Cardiovascular: Regular rate rhythm, normal S1-S2  Abdomen: Soft, nontender, nondistended  Musculoskeletal: No clubbing, cyanosis, no bilateral lower extremity edema. Brisk capillary refill. Skin:  No rashes  on visible skin  Neurologic: awake, alert and following commands     ASSESSMENT:  Alcohol withdrawal  Alcohol dependence       PLAN:  [facility] protocol  Phenobarbital 130 mg IV every 3 hours  Folic acid and thiamine  Seizure precautions        Medications:  REVIEWED DAILY    Infusion Medications   Scheduled Medications    sodium chloride flush  10 mL Intravenous 2 times per day    thiamine mononitrate  100 mg Oral Daily    folic acid  1 mg Intravenous Daily    PHENobarbital  130 mg Intravenous Q3H (SCHEDULED)     PRN Meds: sodium chloride flush    Labs:     Recent Labs     21   WBC 5.8   HGB 16.9*   HCT 49.6          Recent Labs     21      K 4.3   CL 96*   CO2 19*   BUN 4*   CREATININE 0.7   CALCIUM 9.2       Recent Labs     21   PROT 8.1   ALKPHOS 101   ALT 37   AST 62*   BILITOT 0.5       No results for input(s): INR in the last 72 hours.     Recent Labs     21   TROPONINI <0.01 Chronic labs:    Lab Results   Component Value Date    CHOL 79 12/08/2018    TRIG 122 12/08/2018    HDL 35 12/08/2018    LDLCALC 20 12/08/2018    TSH 1.790 01/31/2019    INR 1.0 08/22/2020    LABA1C 5.1 09/08/2018       Radiology: REVIEWED DAILY    +++++++++++++++++++++++++++++++++++++++++++++++++  [place] , [place]  +++++++++++++++++++++++++++++++++++++++++++++++++  NOTE: This report was transcribed using voice recognition software. Every effort was made to ensure accuracy; however, inadvertent computerized transcription errors may be present.

## 2021-02-02 NOTE — ED NOTES
PAtient with mom present, Decided to leave without being seen. Walked out.       Nunu Santos RN  02/01/21 6717

## 2021-02-02 NOTE — ED PROVIDER NOTES
HPI:     Tank Lion is a 31 y.o. male presenting to the ED for alcohol withdrawal, beginning a few hours ago. The complaint has been persistent, moderate in severity, and worsened by nothing. Patient states that he is an alcoholic and has many drinks a day. States that he has hit rock bottom and he needs help for detox. States that his last drink was at 6 PM and that he feels like he started to go into withdrawal.  States that he feels very anxious and tremulous. Denies any other symptoms including headache, dizziness, fever, chest pain, cough, nausea, vomiting, abdominal pain, diarrhea, constipation, urinary symptoms. Review of Systems:   Please see HPI above. All bolded are positive. All un-bolded are negative.     Constitutional Symptoms: fever, chills, fatigue, generalized weakness, diaphoresis, increase in thirst, loss of appetite  Eyes: vision change   Ears, Nose, Mouth, Throat: hearing loss, nasal congestion, sores in the mouth  Cardiovascular: chest pain, chest heaviness, palpitations  Respiratory: shortness of breath, wheezing, coughing  Gastrointestinal: abdominal pain, nausea, vomiting, diarrhea, constipation, melena, hematochezia, hematemesis  Genitourinary: dysuria, hematuria, increased frequency  Musculoskeletal: lower extremity edema, myalgias, arthralgias, back pain  Integumentary: rashes, itching   Neurological: headache, lightheadedness, dizziness, confusion, syncope, numbness, tingling, focal weakness, tremor  Psychiatric: depression, suicidal ideation, anxiety  Endocrine: unintentional weight change  Hematologic/Lymphatic: lymphadenopathy, easy bruising, easy bleeding   Allergic/Immunologic: recurrent infections            --------------------------------------------- PAST HISTORY ---------------------------------------------  Past Medical History:  has a past medical history of Anxiety, Arm pain, Concussion with loss of consciousness, Convulsions ([address] Depression, Flashing lights, Head injury, Headache, Leg pain, Numbness and tingling, PTSD (post-traumatic stress disorder), and Seizures ([facility] Past Surgical History:  has no past surgical history on file. Social History:  reports that he has been smoking. He has a 18.00 pack-year smoking history. He has never used smokeless tobacco. He reports current alcohol use of about 24.0 standard drinks of alcohol per week. He reports current drug use. Frequency: 1.00 time per week. Drug: Marijuana. Family History: family history includes Cancer in his father; Cirrhosis in his father; Mental Illness in his brother, mother, and sister; Substance Abuse in his father, maternal aunt, maternal uncle, mother, paternal aunt, paternal uncle, and sister. The patients home medications have been reviewed.     Allergies: Hydrocodone, Ativan [lorazepam], Invega sustenna [paliperidone palmitate er], Pcn [penicillins], and Fluticasone    -------------------------------------------------- RESULTS -------------------------------------------------  All laboratory and radiology results have been personally reviewed by myself   LABS:  Results for orders placed or performed during the hospital encounter of 02/01/21   Urine Drug Screen   Result Value Ref Range    Amphetamine Screen, Urine NOT DETECTED Negative <1000 ng/mL    Barbiturate Screen, Ur POSITIVE (A) Negative < 200 ng/mL    Benzodiazepine Screen, Urine NOT DETECTED Negative < 200 ng/mL    Cannabinoid Scrn, Ur POSITIVE (A) Negative < 50ng/mL    Cocaine Metabolite Screen, Urine NOT DETECTED Negative < 300 ng/mL    Opiate Scrn, Ur NOT DETECTED Negative < 300ng/mL    PCP Screen, Urine NOT DETECTED Negative < 25 ng/mL    Methadone Screen, Urine NOT DETECTED Negative <300 ng/mL    Oxycodone Urine NOT DETECTED Negative <100 ng/mL    FENTANYL SCREEN, URINE NOT DETECTED Negative <1 ng/mL    Drug Screen Comment: see below    Serum Drug Screen   Result Value Ref Range    Ethanol Lvl 281 mg/dL Acetaminophen Level <5.0 (L) 10.0 - 55.1 mcg/mL    Salicylate, Serum <9.9 0.0 - 30.0 mg/dL    TCA Scrn NEGATIVE Cutoff:300 ng/mL   CBC Auto Differential   Result Value Ref Range    WBC 5.8 4.5 - 11.5 E9/L    RBC 5.23 3.80 - 5.80 E12/L    Hemoglobin 16.9 (H) 12.5 - 16.5 g/dL    Hematocrit 49.6 37.0 - 54.0 %    MCV 94.8 80.0 - 99.9 fL    MCH 32.3 26.0 - 35.0 pg    MCHC 34.1 32.0 - 34.5 %    RDW 13.2 11.5 - 15.0 fL    Platelets 204 765 - 181 E9/L    MPV 10.3 7.0 - 12.0 fL    Neutrophils % 70.4 43.0 - 80.0 %    Immature Granulocytes % 0.2 0.0 - 5.0 %    Lymphocytes % 17.6 (L) 20.0 - 42.0 %    Monocytes % 8.4 2.0 - 12.0 %    Eosinophils % 2.2 0.0 - 6.0 %    Basophils % 1.2 0.0 - 2.0 %    Neutrophils Absolute 4.08 1.80 - 7.30 E9/L    Immature Granulocytes # 0.01 E9/L    Lymphocytes Absolute 1.02 (L) 1.50 - 4.00 E9/L    Monocytes Absolute 0.49 0.10 - 0.95 E9/L    Eosinophils Absolute 0.13 0.05 - 0.50 E9/L    Basophils Absolute 0.07 0.00 - 0.20 E9/L   Troponin   Result Value Ref Range    Troponin <0.01 0.00 - 0.03 ng/mL   Comprehensive Metabolic Panel   Result Value Ref Range    Sodium 136 132 - 146 mmol/L    Potassium 4.3 3.5 - 5.0 mmol/L    Chloride 96 (L) 98 - 107 mmol/L    CO2 19 (L) 22 - 29 mmol/L    Anion Gap 21 (H) 7 - 16 mmol/L    Glucose 156 (H) 74 - 99 mg/dL    BUN 4 (L) 6 - 20 mg/dL    CREATININE 0.7 0.7 - 1.2 mg/dL    GFR Non-African American >60 >=60 mL/min/1.73    GFR African American >60     Calcium 9.2 8.6 - 10.2 mg/dL    Total Protein 8.1 6.4 - 8.3 g/dL    Albumin 4.8 3.5 - 5.2 g/dL    Total Bilirubin 0.5 0.0 - 1.2 mg/dL    Alkaline Phosphatase 101 40 - 129 U/L    ALT 37 0 - 40 U/L    AST 62 (H) 0 - 39 U/L   Magnesium   Result Value Ref Range    Magnesium 2.5 1.6 - 2.6 mg/dL   EKG 12 Lead   Result Value Ref Range    Ventricular Rate 83 BPM    Atrial Rate 83 BPM    P-R Interval 116 ms    QRS Duration 96 ms    Q-T Interval 356 ms    QTc Calculation (Bazett) 418 ms    P Axis 23 degrees    R Axis 56 degrees acetaminophen (TYLENOL) suppository 650 mg ( Rectal See Alternative 2/2/21 1044)   senna (SENOKOT) tablet 8.6 mg (has no administration in time range)   chlordiazePOXIDE (LIBRIUM) capsule 5 mg (5 mg Oral Given 2/2/21 1324)   nicotine (NICODERM CQ) 14 MG/24HR 1 patch (has no administration in time range)   ondansetron (ZOFRAN) injection 4 mg (4 mg Intravenous Given 2/1/21 2301)   diazePAM (VALIUM) tablet 5 mg (5 mg Oral Given 2/1/21 2307)   0.9 % sodium chloride bolus (0 mLs Intravenous Stopped 2/2/21 0030)   PHENobarbital (LUMINAL) injection 260 mg (260 mg Intravenous Given 2/2/21 0031)     Followed by   PHENobarbital (LUMINAL) injection 130 mg (130 mg Intravenous Given 2/2/21 0656)   promethazine (PHENERGAN) injection 6.25 mg (6.25 mg Intravenous Given 2/2/21 0537)   ALPRAZolam (XANAX) tablet 0.5 mg (0.5 mg Oral Given 2/2/21 1346)         ED COURSE:       Medical Decision Making:    Patient presented to the ER today with chief complaint of alcohol withdrawal and desire for detox. States that he is ready to quit drinking. Patient was placed on CIWA protocol. He is allergic to Ativan, so he was given Valium and loaded with phenobarbital.  Did speak to Dr. Elliot Nevarez who is agreeable to admission. Patient is agreeable as well. All questions have been answered    Counseling: The emergency provider has spoken with the patient and discussed todays results, in addition to providing specific details for the plan of care and counseling regarding the diagnosis and prognosis. Questions are answered at this time and they are agreeable with the plan.      --------------------------------- IMPRESSION AND DISPOSITION ---------------------------------    IMPRESSION  1.  Alcohol withdrawal syndrome without complication ([facility] [address] [facility]        New Prescriptions    No medications on file       DISPOSITION  Disposition: Admit to med/surg floor  Patient condition is fair      NOTE: This report was transcribed using voice recognition software.  Every effort was made to ensure accuracy; however, inadvertent computerized transcription errors may be present       Katherine Trinidad DO  Resident  02/02/21 4461

## 2021-02-02 NOTE — ED NOTES
Pt reporting some relief from nausea. Advised pt to not have PO intake at this time.   Ice chips provided for c/o mouth dryness     Justice Bennett RN  02/02/21 1710

## 2021-02-02 NOTE — H&P
Hospitalist History & Physical      PCP: Kae León MD    Date of Admission: 2/1/2021    Date of Service: Pt seen/examined on 2/1/2021    Chief Complaint:  had concerns including Alcohol Problem (reports alcohol withdrawl, last drink 1800 tonight; c/o dizziness, hallucinations, hot flashes ). History Of Present Illness:    Mr. Lashawn Jaen, a 31y.o. year old male  who  has a past medical history of Anxiety, Arm pain, Concussion with loss of consciousness, Convulsions ([facility] [address] Depression, Flashing lights, Head injury, Headache, Leg pain, Numbness and tingling, PTSD (post-traumatic stress disorder), and Seizures ([facility] [address] Patient presented to the emergency department seeking detox from alcohol. Last drink was 6:00 this evening. Laboratory studies unremarkable. Past Medical History:        Diagnosis Date    Anxiety     Arm pain     Concussion with loss of consciousness     Convulsions (HCC)     Depression     Flashing lights     Head injury     Headache     Leg pain     Numbness and tingling     arms and hands    PTSD (post-traumatic stress disorder)     Seizures (HCC)        Past Surgical History:    History reviewed. No pertinent surgical history. Medications Prior to Admission:      Prior to Admission medications    Medication Sig Start Date End Date Taking? Authorizing Provider   albuterol sulfate  (90 Base) MCG/ACT inhaler Inhale 2 puffs into the lungs every 6 hours as needed for Wheezing    Historical Provider, MD   nicotine (NICODERM CQ) 21 MG/24HR Place 1 patch onto the skin daily 9/14/18   Rhonda Lilly, LI - CNP       Allergies:  Hydrocodone, Ativan [lorazepam], Invega sustenna [paliperidone palmitate er], Pcn [penicillins], and Fluticasone    Social History:    TOBACCO:   reports that he has been smoking. He has a 18.00 pack-year smoking history.  He has never used smokeless tobacco.  ETOH:   reports current alcohol use of about 24.0 standard drinks of alcohol per week. Family History:    Reviewed in detail and negative for DM, CAD, Cancer, CVA. Positive as follows\"      Problem Relation Age of Onset    Mental Illness Mother     Substance Abuse Mother     Cancer Father         lung     Substance Abuse Father     Cirrhosis Father     Mental Illness Sister     Substance Abuse Sister     Mental Illness Brother     Substance Abuse Maternal Aunt     Substance Abuse Maternal Uncle     Substance Abuse Paternal Aunt     Substance Abuse Paternal Uncle        REVIEW OF SYSTEMS:   Pertinent positives as noted in the HPI. All other systems reviewed and negative. PHYSICAL EXAM:  BP (!) 131/90   Pulse 80   Temp 98.4 °F (36.9 °C) (Oral)   Resp 17   Ht 5' 6\" (1.676 m)   Wt 150 lb (68 kg)   SpO2 95%   BMI 24.21 kg/m²   General appearance: No apparent distress, appears stated age and cooperative. HEENT: Normal cephalic, atraumatic without obvious deformity. Pupils equal, round, and reactive to light. Extra ocular muscles intact. Conjunctivae/corneas clear. Neck: Supple, with full range of motion. No jugular venous distention. Trachea midline. Respiratory: Clear to auscultation  Cardiovascular: Regular rate and rhythm  Abdomen: Soft, nontender, nondistended  Musculoskeletal: No clubbing, cyanosis, edema of bilateral lower extremities. Brisk capillary refill. Skin: Normal skin color. No rashes or lesions. Neurologic:  Neurovascularly intact without any focal sensory/motor deficits.  Cranial nerves: II-XII intact, grossly non-focal.      CBC:   Recent Labs     02/01/21 2043   WBC 5.8   RBC 5.23   HGB 16.9*   HCT 49.6   MCV 94.8   RDW 13.2        BMP:   Recent Labs     02/01/21 2043      K 4.3   CL 96*   CO2 19*   BUN 4*   CREATININE 0.7     LFT:  Recent Labs     02/01/21 2043   PROT 8.1   ALKPHOS 101   ALT 37   AST 62*   BILITOT 0.5     CE:  Recent Labs     02/01/21 2043   TROPONINI <0.01     PT/INR: No results for input(s): INR, APTT in the last 72 hours. BNP: No results for input(s): BNP in the last 72 hours. ESR: No results found for: SEDRATE  CRP: No results found for: CRP  D Dimer: No results found for: DDIMER   Folate and B12: No results found for: [id number], No results found for: FOLATE  Lactic Acid:   Lab Results   Component Value Date    LACTA 3.8 (H) 12/27/2020     Thyroid Studies:   Lab Results   Component Value Date    TSH 1.790 01/31/2019    H6EPZEK 5.9 01/31/2019       Oupatient labs:  Lab Results   Component Value Date    CHOL 79 12/08/2018    TRIG 122 12/08/2018    HDL 35 12/08/2018    LDLCALC 20 12/08/2018    TSH 1.790 01/31/2019    INR 1.0 08/22/2020    LABA1C 5.1 09/08/2018       Urinalysis:    Lab Results   Component Value Date    NITRU Negative 08/22/2020    WBCUA 1-3 02/13/2019    BACTERIA NONE 02/13/2019    RBCUA 0-1 02/13/2019    BLOODU Negative 08/22/2020    SPECGRAV <=1.005 08/22/2020    GLUCOSEU Negative 08/22/2020       Imaging:  No results found. ASSESSMENT:  Alcohol dependence/abuse  Alcohol withdrawal      PLAN:  Admit to medicine  [facility] protocol  Phenobarbital 130 mg IV every 3 hours  Folic acid and thiamine  Seizure precaution        Diet: No diet orders on file  Code Status: Prior  Surrogate decision maker confirmed with patient:   Extended Emergency Contact Information  Primary Emergency Contact: Lorena Davila  Address: [address] [place] [address] [address] Phone: [phone]  Mobile Phone: [phone]  Relation: Parent   needed? No      DVT Prophylaxis: []Lovenox []Heparin []PCD [] [address] []Encouraged ambulation  Disposition: []Med/Surg [] Intermediate [] ICU/CCU  Admit status: [] Observation [] Inpatient       NOTE: This report was transcribed using voice recognition software. Every effort was made to ensure accuracy; however, inadvertent computerized transcription errors may be present.

## 2021-02-02 NOTE — ED NOTES
Lab notified of add on magnesium, Midge states they are working on them now. Will monitor.       Sami Hill RN  02/01/21 [id number]

## 2021-02-02 NOTE — CARE COORDINATION
Social Work/Discharge Planning:    Pt presents to the ED secondary to alcohol withdrawal.  Pt is from home. SW met with pt who was alert and oriented x3, lying in bed, asking for his nurse because he was \"sweating and feels like im on fire\". Pt declined referral to detox because he reports he needs to get back home to take care of his 30yr old autistic brother. Pt reports his mother is also hospitalized and in a coma due to alcohol withdrawal at a hospital in [address] [place] [address] Street did not want to answer any further questions and again asked for his nurse. SW notified RN. Antnoio Ellis, from [facility], also spoke with pt earlier and pt declined referral to detox.

## 2021-02-02 NOTE — CARE COORDINATION
This note will not be viewable in [email or web address] for the following reason(s). Suspected substance abuse disorder. Peer Recovery Support Note    Name: Grady Lux  Date: 2/2/2021    Chief Complaint   Patient presents with    Alcohol Problem     reports alcohol withdrawl, last drink 1800 tonight; c/o dizziness, hallucinations, hot flashes        Peer Support met with patient.   [x] Support and education provided  [x] Resources provided   [] Treatment referral:   [] Other:   [] Patient declined peer recovery services     Referred By:     Notes:     Signed: Jennifer Bustillo, 2/2/2021

## 2021-02-03 VITALS
OXYGEN SATURATION: 95 % | DIASTOLIC BLOOD PRESSURE: 87 MMHG | HEIGHT: 66 IN | RESPIRATION RATE: 18 BRPM | HEART RATE: 84 BPM | SYSTOLIC BLOOD PRESSURE: 125 MMHG | TEMPERATURE: 98.6 F | WEIGHT: 150 LBS | BODY MASS INDEX: 24.11 KG/M2

## 2021-02-03 PROCEDURE — 2580000003 HC RX 258: Performed by: FAMILY MEDICINE

## 2021-02-03 PROCEDURE — 6370000000 HC RX 637 (ALT 250 FOR IP): Performed by: FAMILY MEDICINE

## 2021-02-03 PROCEDURE — 6360000002 HC RX W HCPCS: Performed by: FAMILY MEDICINE

## 2021-02-03 PROCEDURE — 2500000003 HC RX 250 WO HCPCS: Performed by: FAMILY MEDICINE

## 2021-02-03 PROCEDURE — 6370000000 HC RX 637 (ALT 250 FOR IP): Performed by: STUDENT IN AN ORGANIZED HEALTH CARE EDUCATION/TRAINING PROGRAM

## 2021-02-03 RX ORDER — PHENOBARBITAL SODIUM 65 MG/ML
130 INJECTION INTRAMUSCULAR EVERY 8 HOURS SCHEDULED
Status: DISCONTINUED | OUTPATIENT
Start: 2021-02-03 | End: 2021-02-03 | Stop reason: HOSPADM

## 2021-02-03 RX ORDER — ALPRAZOLAM 1 MG/1
1 TABLET ORAL ONCE
Status: COMPLETED | OUTPATIENT
Start: 2021-02-03 | End: 2021-02-03

## 2021-02-03 RX ORDER — PHENOBARBITAL 20 MG/5ML
30 ELIXIR ORAL 2 TIMES DAILY
Status: DISCONTINUED | OUTPATIENT
Start: 2021-02-03 | End: 2021-02-03

## 2021-02-03 RX ORDER — FOLIC ACID 1 MG/1
1 TABLET ORAL DAILY
Status: DISCONTINUED | OUTPATIENT
Start: 2021-02-03 | End: 2021-02-03 | Stop reason: HOSPADM

## 2021-02-03 RX ORDER — M-VIT,TX,IRON,MINS/CALC/FOLIC 27MG-0.4MG
1 TABLET ORAL DAILY
Status: DISCONTINUED | OUTPATIENT
Start: 2021-02-03 | End: 2021-02-03 | Stop reason: HOSPADM

## 2021-02-03 RX ADMIN — ONDANSETRON 4 MG: 2 INJECTION INTRAMUSCULAR; INTRAVENOUS at 19:35

## 2021-02-03 RX ADMIN — Medication 1 TABLET: at 12:30

## 2021-02-03 RX ADMIN — SODIUM CHLORIDE, PRESERVATIVE FREE 10 ML: 5 INJECTION INTRAVENOUS at 09:56

## 2021-02-03 RX ADMIN — PHENOBARBITAL SODIUM 130 MG: 65 INJECTION INTRAMUSCULAR at 15:10

## 2021-02-03 RX ADMIN — SODIUM CHLORIDE, PRESERVATIVE FREE 10 ML: 5 INJECTION INTRAVENOUS at 19:35

## 2021-02-03 RX ADMIN — ENOXAPARIN SODIUM 40 MG: 40 INJECTION SUBCUTANEOUS at 09:56

## 2021-02-03 RX ADMIN — PHENOBARBITAL 30 MG: 20 ELIXIR ORAL at 12:30

## 2021-02-03 RX ADMIN — CHLORDIAZEPOXIDE HYDROCHLORIDE 5 MG: 5 CAPSULE ORAL at 09:56

## 2021-02-03 RX ADMIN — ALPRAZOLAM 1 MG: 1 TABLET ORAL at 05:51

## 2021-02-03 RX ADMIN — Medication 100 MG: at 09:56

## 2021-02-03 RX ADMIN — CHLORDIAZEPOXIDE HYDROCHLORIDE 5 MG: 5 CAPSULE ORAL at 14:19

## 2021-02-03 RX ADMIN — FOLIC ACID 1 MG: 5 INJECTION, SOLUTION INTRAMUSCULAR; INTRAVENOUS; SUBCUTANEOUS at 09:56

## 2021-02-03 ASSESSMENT — PAIN SCALES - GENERAL: PAINLEVEL_OUTOF10: 0

## 2021-02-03 NOTE — CARE COORDINATION
Met with patient in room. He states he lives home with his mother, who is currently hospitalized in [address] [place] [address] and a brother, who has autism. He states he did have a sister, but she passed away. He reports being independent at home and uses no DME. He is not currently interested in inpatient alcohol rehab, spoke with Peer recovery who states Antonio Ellis is following. Plan home at discharge. Will continue to follow.

## 2021-02-03 NOTE — PLAN OF CARE
Problem: Discharge Planning:  Goal: Discharged to appropriate level of care  Description: Discharged to appropriate level of care  Outcome: Not Met This Shift     Problem: Fluid Volume - Deficit:  Goal: Absence of fluid volume deficit signs and symptoms  Description: Absence of fluid volume deficit signs and symptoms  Outcome: Met This Shift     Problem: Nutrition Deficit:  Goal: Ability to achieve adequate nutritional intake will improve  Description: Ability to achieve adequate nutritional intake will improve  Outcome: Met This Shift     Problem: Sleep Pattern Disturbance:  Goal: Appears well-rested  Description: Appears well-rested  Outcome: Met This Shift     Problem: Violence - Risk of, Self/Other-Directed:  Goal: Knowledge of developmental care interventions  Description: Absence of violence  Outcome: Met This Shift

## 2021-02-03 NOTE — PROGRESS NOTES
Hospitalist Progress Note      SYNOPSIS: Patient admitted on 2021   Came to the ER looking for alcohol detox      SUBJECTIVE:    Patient seen and examined  Records reviewed. Temp (24hrs), Av °F (36.7 °C), Min:97.6 °F (36.4 °C), Max:98.5 °F (36.9 °C)    DIET: DIET GENERAL;  CODE: Full Code    Intake/Output Summary (Last 24 hours) at 2/3/2021 1001  Last data filed at 2/3/2021 0900  Gross per 24 hour   Intake 480 ml   Output --   Net 480 ml       OBJECTIVE:    /85   Pulse 64   Temp 97.8 °F (36.6 °C) (Temporal)   Resp 16   Ht 5' 6\" (1.676 m)   Wt 150 lb (68 kg)   SpO2 96%   BMI 24.21 kg/m²     General appearance: No apparent distress, appears stated age and cooperative. HEENT:  Conjunctivae/corneas clear. Neck: Supple. No jugular venous distention. Respiratory: Clear to auscultation bilaterally, normal respiratory effort  Cardiovascular: Regular rate rhythm, normal S1-S2  Abdomen: Soft, nontender, nondistended  Musculoskeletal: No clubbing, cyanosis, no bilateral lower extremity edema. Brisk capillary refill.    Skin:  No rashes  on visible skin  Neurologic: awake, alert and following commands     ASSESSMENT:  Alcohol withdrawal  Alcohol dependence       PLAN:  [facility] protocol  Phenobarbital 30 mg p.o. twice daily  Librium 5 mg 3 times daily  Folic acid and thiamine  Seizure precautions        Medications:  REVIEWED DAILY    Infusion Medications   Scheduled Medications    sodium chloride flush  10 mL Intravenous 2 times per day    enoxaparin  40 mg Subcutaneous Daily    chlordiazePOXIDE  5 mg Oral TID    nicotine  1 patch Transdermal Daily    thiamine mononitrate  100 mg Oral Daily    folic acid  1 mg Intravenous Daily     PRN Meds: sodium chloride flush, promethazine **OR** ondansetron, acetaminophen **OR** acetaminophen, senna    Labs:     Recent Labs     21   WBC 5.8   HGB 16.9*   HCT 49.6          Recent Labs     21      K 4.3   CL 96*   CO2 19*   BUN 4*   CREATININE 0.7   CALCIUM 9.2       Recent Labs     02/01/21 2043   PROT 8.1   ALKPHOS 101   ALT 37   AST 62*   BILITOT 0.5       No results for input(s): INR in the last 72 hours. Recent Labs     02/01/21 2043   TROPONINI <0.01       Chronic labs:    Lab Results   Component Value Date    CHOL 79 12/08/2018    TRIG 122 12/08/2018    HDL 35 12/08/2018    LDLCALC 20 12/08/2018    TSH 1.790 01/31/2019    INR 1.0 08/22/2020    LABA1C 5.1 09/08/2018       Radiology: REVIEWED DAILY    +++++++++++++++++++++++++++++++++++++++++++++++++  [address], [place]  +++++++++++++++++++++++++++++++++++++++++++++++++  NOTE: This report was transcribed using voice recognition software. Every effort was made to ensure accuracy; however, inadvertent computerized transcription errors may be present.

## 2021-02-03 NOTE — PLAN OF CARE
Problem: Sleep Pattern Disturbance:  Goal: Appears well-rested  Description: Appears well-rested  2/3/2021 0523 by Mahi Flor RN  Outcome: Met This Shift

## 2021-02-03 NOTE — SIGNIFICANT EVENT
Pt noted by RN to be more anxious with increasing tremors. CIWA of 19. Pt seen and examined at bedside, noted to be anxious, tremulous and midly diaphoretic. Pt follows all commands and respnds appropriately to questions. He denies any visual, auditory or tactile hallucinations. Pt noted to have allergy to Ativan, currently on librium. Will dose with Phenobarb 260 IV x 1 dose followed. Cont to monitor and maintain on CIWA.     Shirley Mccabe MD  Dept of Internal Medicine  [facility]

## 2021-02-04 NOTE — DISCHARGE SUMMARY
Hospitalist Discharge Summary    Patient ID: Jaimie Kendrick   Patient : 1990  Patient's PCP: Brice Sandoval MD    Admit Date: 2021   Admitting Physician: Tess Green DO    Discharge Date:  2021   Discharge Physician: Tess Green DO   Discharge Condition: Stable  Discharge Disposition: [facility] course in brief:  (Please refer to daily progress notes for a comprehensive review of the hospitalization by requesting medical records)  Mr. Jaimie Kendrick, a 31y.o. year old male  who  has a past medical history of Anxiety, Arm pain, Concussion with loss of consciousness, Convulsions ([address] Depression, Flashing lights, Head injury, Headache, Leg pain, Numbness and tingling, PTSD (post-traumatic stress disorder), and Seizures ([address]    Patient presented to the emergency department seeking detox from alcohol. Last drink was 6:00 this evening. Laboratory studies unremarkable.     Patient was admitted. Started on CIWA protocol. Librium. Phenobarbital initially 130 mg twice daily. This was later increased to every 8 hours. Clinically patient improved over the next 24 hours. But still with complaints of hot flashes. Offered inpatient alcohol rehab but declined. Continued to be anxious and frustrated eventually left AMA at around 8 PM 2/3/2021        Consults:   IP CONSULT TO SOCIAL WORK  IP CONSULT TO SOCIAL WORK  IP CONSULT TO INTERNAL MEDICINE  IP CONSULT TO INTERNAL MEDICINE  IP CONSULT TO SOCIAL WORK    Discharge Diagnoses:  Alcohol withdrawal  Alcohol dependence      Discharge Instructions / Follow up:    No future appointments.     Continued appropriate risk factor modification of blood pressure, diabetes and serum lipids will remain essential to reducing risk of future atherosclerotic development    Activity: activity as tolerated    Significant labs:  CBC:   Recent Labs     21   WBC 5.8   RBC 5.23   HGB 16.9*   HCT 49.6   MCV 94.8   RDW 13.2    BMP:   Recent Labs     02/01/21 2043      K 4.3   CL 96*   CO2 19*   BUN 4*   CREATININE 0.7   MG 2.5     LFT:  Recent Labs     02/01/21 2043   PROT 8.1   ALKPHOS 101   ALT 37   AST 62*   BILITOT 0.5     PT/INR: No results for input(s): INR, APTT in the last 72 hours. BNP: No results for input(s): BNP in the last 72 hours. Hgb A1C:   Lab Results   Component Value Date    LABA1C 5.1 09/08/2018     Folate and B12: No results found for: Shayla Ashe, No results found for: FOLATE  Thyroid Studies:   Lab Results   Component Value Date    TSH 1.790 01/31/2019    L3IOMGI 5.9 01/31/2019       Urinalysis:    Lab Results   Component Value Date    NITRU Negative 08/22/2020    WBCUA 1-3 02/13/2019    BACTERIA NONE 02/13/2019    RBCUA 0-1 02/13/2019    BLOODU Negative 08/22/2020    SPECGRAV <=1.005 08/22/2020    GLUCOSEU Negative 08/22/2020       Imaging:  No results found. Discharge Medications:      Medication List      ASK your doctor about these medications    acetaminophen 325 MG tablet  Commonly known as: TYLENOL     albuterol sulfate  (90 Base) MCG/ACT inhaler     therapeutic multivitamin-minerals tablet            Time Spent on discharge is more than 45 minutes in the examination, evaluation, counseling and review of medications and discharge plan.    +++++++++++++++++++++++++++++++++++++++++++++++++  Monica Crandall, [address], [place]  +++++++++++++++++++++++++++++++++++++++++++++++++  NOTE: This report was transcribed using voice recognition software. Every effort was made to ensure accuracy; however, inadvertent computerized transcription errors may be present.

## 2021-02-27 ENCOUNTER — HOSPITAL ENCOUNTER (INPATIENT)
Age: 31
LOS: 1 days | Discharge: LEFT BEFORE DISCHARGE | DRG: 770 | End: 2021-02-28
Attending: EMERGENCY MEDICINE | Admitting: INTERNAL MEDICINE
Payer: COMMERCIAL

## 2021-02-27 ENCOUNTER — APPOINTMENT (OUTPATIENT)
Dept: CT IMAGING | Age: 31
DRG: 770 | End: 2021-02-27
Payer: COMMERCIAL

## 2021-02-27 DIAGNOSIS — F10.930 ALCOHOL WITHDRAWAL SYNDROME WITHOUT COMPLICATION (HCC): Primary | ICD-10-CM

## 2021-02-27 DIAGNOSIS — R11.2 NON-INTRACTABLE VOMITING WITH NAUSEA, UNSPECIFIED VOMITING TYPE: ICD-10-CM

## 2021-02-27 LAB
ALBUMIN SERPL-MCNC: 4.6 G/DL (ref 3.5–5.2)
ALP BLD-CCNC: 109 U/L (ref 40–129)
ALT SERPL-CCNC: 49 U/L (ref 0–40)
AMPHETAMINE SCREEN, URINE: NOT DETECTED
ANION GAP SERPL CALCULATED.3IONS-SCNC: 18 MMOL/L (ref 7–16)
AST SERPL-CCNC: 62 U/L (ref 0–39)
BARBITURATE SCREEN URINE: NOT DETECTED
BASOPHILS ABSOLUTE: 0.04 E9/L (ref 0–0.2)
BASOPHILS RELATIVE PERCENT: 0.4 % (ref 0–2)
BENZODIAZEPINE SCREEN, URINE: NOT DETECTED
BILIRUB SERPL-MCNC: 0.4 MG/DL (ref 0–1.2)
BUN BLDV-MCNC: <2 MG/DL (ref 6–20)
CALCIUM SERPL-MCNC: 8.7 MG/DL (ref 8.6–10.2)
CANNABINOID SCREEN URINE: POSITIVE
CHLORIDE BLD-SCNC: 90 MMOL/L (ref 98–107)
CHP ED QC CHECK: YES
CO2: 25 MMOL/L (ref 22–29)
COCAINE METABOLITE SCREEN URINE: NOT DETECTED
CREAT SERPL-MCNC: 0.7 MG/DL (ref 0.7–1.2)
EOSINOPHILS ABSOLUTE: 0.09 E9/L (ref 0.05–0.5)
EOSINOPHILS RELATIVE PERCENT: 0.9 % (ref 0–6)
FENTANYL SCREEN, URINE: NOT DETECTED
GFR AFRICAN AMERICAN: >60
GFR NON-AFRICAN AMERICAN: >60 ML/MIN/1.73
GLUCOSE BLD-MCNC: 101 MG/DL (ref 74–99)
GLUCOSE BLD-MCNC: 109 MG/DL
HCT VFR BLD CALC: 46.3 % (ref 37–54)
HEMOGLOBIN: 17 G/DL (ref 12.5–16.5)
IMMATURE GRANULOCYTES #: 0.03 E9/L
IMMATURE GRANULOCYTES %: 0.3 % (ref 0–5)
INR BLD: 1.1
LACTIC ACID, SEPSIS: 2.6 MMOL/L (ref 0.5–1.9)
LACTIC ACID, SEPSIS: 4.2 MMOL/L (ref 0.5–1.9)
LIPASE: 38 U/L (ref 13–60)
LYMPHOCYTES ABSOLUTE: 1.68 E9/L (ref 1.5–4)
LYMPHOCYTES RELATIVE PERCENT: 16.8 % (ref 20–42)
Lab: ABNORMAL
MCH RBC QN AUTO: 33.9 PG (ref 26–35)
MCHC RBC AUTO-ENTMCNC: 36.7 % (ref 32–34.5)
MCV RBC AUTO: 92.2 FL (ref 80–99.9)
METER GLUCOSE: 109 MG/DL (ref 74–99)
METHADONE SCREEN, URINE: NOT DETECTED
MONOCYTES ABSOLUTE: 0.67 E9/L (ref 0.1–0.95)
MONOCYTES RELATIVE PERCENT: 6.7 % (ref 2–12)
NEUTROPHILS ABSOLUTE: 7.48 E9/L (ref 1.8–7.3)
NEUTROPHILS RELATIVE PERCENT: 74.9 % (ref 43–80)
OPIATE SCREEN URINE: NOT DETECTED
OXYCODONE URINE: NOT DETECTED
PDW BLD-RTO: 12.6 FL (ref 11.5–15)
PHENCYCLIDINE SCREEN URINE: NOT DETECTED
PLATELET # BLD: 330 E9/L (ref 130–450)
PMV BLD AUTO: 11.7 FL (ref 7–12)
POTASSIUM REFLEX MAGNESIUM: 3.9 MMOL/L (ref 3.5–5)
PROTHROMBIN TIME: 12.5 SEC (ref 9.3–12.4)
RBC # BLD: 5.02 E12/L (ref 3.8–5.8)
SODIUM BLD-SCNC: 133 MMOL/L (ref 132–146)
TOTAL PROTEIN: 7 G/DL (ref 6.4–8.3)
TROPONIN: <0.01 NG/ML (ref 0–0.03)
WBC # BLD: 10 E9/L (ref 4.5–11.5)

## 2021-02-27 PROCEDURE — 80307 DRUG TEST PRSMV CHEM ANLYZR: CPT

## 2021-02-27 PROCEDURE — 6360000004 HC RX CONTRAST MEDICATION: Performed by: RADIOLOGY

## 2021-02-27 PROCEDURE — 6360000002 HC RX W HCPCS: Performed by: EMERGENCY MEDICINE

## 2021-02-27 PROCEDURE — 84484 ASSAY OF TROPONIN QUANT: CPT

## 2021-02-27 PROCEDURE — 2580000003 HC RX 258: Performed by: EMERGENCY MEDICINE

## 2021-02-27 PROCEDURE — 1200000000 HC SEMI PRIVATE

## 2021-02-27 PROCEDURE — 74177 CT ABD & PELVIS W/CONTRAST: CPT

## 2021-02-27 PROCEDURE — 83690 ASSAY OF LIPASE: CPT

## 2021-02-27 PROCEDURE — 96372 THER/PROPH/DIAG INJ SC/IM: CPT

## 2021-02-27 PROCEDURE — 83605 ASSAY OF LACTIC ACID: CPT

## 2021-02-27 PROCEDURE — 99285 EMERGENCY DEPT VISIT HI MDM: CPT

## 2021-02-27 PROCEDURE — 6370000000 HC RX 637 (ALT 250 FOR IP): Performed by: EMERGENCY MEDICINE

## 2021-02-27 PROCEDURE — 80053 COMPREHEN METABOLIC PANEL: CPT

## 2021-02-27 PROCEDURE — 80143 DRUG ASSAY ACETAMINOPHEN: CPT

## 2021-02-27 PROCEDURE — 82077 ASSAY SPEC XCP UR&BREATH IA: CPT

## 2021-02-27 PROCEDURE — 80179 DRUG ASSAY SALICYLATE: CPT

## 2021-02-27 PROCEDURE — 82962 GLUCOSE BLOOD TEST: CPT

## 2021-02-27 PROCEDURE — 96374 THER/PROPH/DIAG INJ IV PUSH: CPT

## 2021-02-27 PROCEDURE — 85610 PROTHROMBIN TIME: CPT

## 2021-02-27 PROCEDURE — 85025 COMPLETE CBC W/AUTO DIFF WBC: CPT

## 2021-02-27 RX ORDER — DIAZEPAM 5 MG/1
10 TABLET ORAL ONCE
Status: COMPLETED | OUTPATIENT
Start: 2021-02-27 | End: 2021-02-27

## 2021-02-27 RX ORDER — CHLORDIAZEPOXIDE HYDROCHLORIDE 5 MG/1
5 CAPSULE, GELATIN COATED ORAL EVERY 6 HOURS PRN
Status: DISCONTINUED | OUTPATIENT
Start: 2021-02-27 | End: 2021-02-28

## 2021-02-27 RX ORDER — SODIUM CHLORIDE 0.9 % (FLUSH) 0.9 %
10 SYRINGE (ML) INJECTION PRN
Status: DISCONTINUED | OUTPATIENT
Start: 2021-02-27 | End: 2021-02-28 | Stop reason: HOSPADM

## 2021-02-27 RX ORDER — ALBUTEROL SULFATE 2.5 MG/3ML
2.5 SOLUTION RESPIRATORY (INHALATION) EVERY 6 HOURS PRN
Status: DISCONTINUED | OUTPATIENT
Start: 2021-02-27 | End: 2021-02-28 | Stop reason: HOSPADM

## 2021-02-27 RX ORDER — ONDANSETRON 2 MG/ML
4 INJECTION INTRAMUSCULAR; INTRAVENOUS ONCE
Status: COMPLETED | OUTPATIENT
Start: 2021-02-27 | End: 2021-02-27

## 2021-02-27 RX ORDER — FOLIC ACID 1 MG/1
1 TABLET ORAL DAILY
Status: DISCONTINUED | OUTPATIENT
Start: 2021-02-28 | End: 2021-02-28 | Stop reason: HOSPADM

## 2021-02-27 RX ORDER — SENNOSIDES 8.8 MG/5ML
5 LIQUID ORAL 2 TIMES DAILY PRN
Status: DISCONTINUED | OUTPATIENT
Start: 2021-02-27 | End: 2021-02-28 | Stop reason: HOSPADM

## 2021-02-27 RX ORDER — SODIUM CHLORIDE 0.9 % (FLUSH) 0.9 %
10 SYRINGE (ML) INJECTION EVERY 12 HOURS SCHEDULED
Status: DISCONTINUED | OUTPATIENT
Start: 2021-02-27 | End: 2021-02-28 | Stop reason: HOSPADM

## 2021-02-27 RX ORDER — GAUZE BANDAGE 2" X 2"
100 BANDAGE TOPICAL DAILY
Status: DISCONTINUED | OUTPATIENT
Start: 2021-02-28 | End: 2021-02-28 | Stop reason: HOSPADM

## 2021-02-27 RX ORDER — CHLORDIAZEPOXIDE HYDROCHLORIDE 25 MG/1
50 CAPSULE, GELATIN COATED ORAL ONCE
Status: COMPLETED | OUTPATIENT
Start: 2021-02-27 | End: 2021-02-27

## 2021-02-27 RX ORDER — PROMETHAZINE HYDROCHLORIDE 25 MG/ML
25 INJECTION, SOLUTION INTRAMUSCULAR; INTRAVENOUS ONCE
Status: COMPLETED | OUTPATIENT
Start: 2021-02-27 | End: 2021-02-27

## 2021-02-27 RX ORDER — ACETAMINOPHEN 325 MG/1
650 TABLET ORAL EVERY 6 HOURS PRN
Status: DISCONTINUED | OUTPATIENT
Start: 2021-02-27 | End: 2021-02-28 | Stop reason: HOSPADM

## 2021-02-27 RX ORDER — ACETAMINOPHEN 650 MG/1
650 SUPPOSITORY RECTAL EVERY 6 HOURS PRN
Status: DISCONTINUED | OUTPATIENT
Start: 2021-02-27 | End: 2021-02-28 | Stop reason: HOSPADM

## 2021-02-27 RX ORDER — PROMETHAZINE HYDROCHLORIDE 25 MG/1
12.5 TABLET ORAL EVERY 6 HOURS PRN
Status: DISCONTINUED | OUTPATIENT
Start: 2021-02-27 | End: 2021-02-28

## 2021-02-27 RX ORDER — 0.9 % SODIUM CHLORIDE 0.9 %
1000 INTRAVENOUS SOLUTION INTRAVENOUS ONCE
Status: COMPLETED | OUTPATIENT
Start: 2021-02-27 | End: 2021-02-27

## 2021-02-27 RX ORDER — ONDANSETRON 2 MG/ML
4 INJECTION INTRAMUSCULAR; INTRAVENOUS EVERY 6 HOURS PRN
Status: DISCONTINUED | OUTPATIENT
Start: 2021-02-27 | End: 2021-02-28

## 2021-02-27 RX ADMIN — CHLORDIAZEPOXIDE HYDROCHLORIDE 50 MG: 25 CAPSULE ORAL at 19:54

## 2021-02-27 RX ADMIN — ONDANSETRON 4 MG: 2 INJECTION INTRAMUSCULAR; INTRAVENOUS at 19:54

## 2021-02-27 RX ADMIN — PROMETHAZINE HYDROCHLORIDE 25 MG: 25 INJECTION INTRAMUSCULAR; INTRAVENOUS at 22:02

## 2021-02-27 RX ADMIN — IOPAMIDOL 75 ML: 755 INJECTION, SOLUTION INTRAVENOUS at 20:46

## 2021-02-27 RX ADMIN — DIAZEPAM 10 MG: 5 TABLET ORAL at 22:02

## 2021-02-27 RX ADMIN — SODIUM CHLORIDE 1000 ML: 9 INJECTION, SOLUTION INTRAVENOUS at 19:53

## 2021-02-27 ASSESSMENT — ENCOUNTER SYMPTOMS
RHINORRHEA: 0
BLOOD IN STOOL: 0
PHOTOPHOBIA: 0
NAUSEA: 1
SHORTNESS OF BREATH: 0
COUGH: 0
ABDOMINAL PAIN: 1
VOMITING: 1
ABDOMINAL DISTENTION: 0
DIARRHEA: 0
COLOR CHANGE: 0

## 2021-02-27 ASSESSMENT — PAIN SCALES - GENERAL
PAINLEVEL_OUTOF10: 3
PAINLEVEL_OUTOF10: 0

## 2021-02-27 ASSESSMENT — PAIN DESCRIPTION - LOCATION: LOCATION: ABDOMEN

## 2021-02-28 VITALS
TEMPERATURE: 98 F | RESPIRATION RATE: 21 BRPM | BODY MASS INDEX: 23.87 KG/M2 | SYSTOLIC BLOOD PRESSURE: 94 MMHG | HEIGHT: 66 IN | DIASTOLIC BLOOD PRESSURE: 61 MMHG | WEIGHT: 148.5 LBS | OXYGEN SATURATION: 96 % | HEART RATE: 70 BPM

## 2021-02-28 LAB
ACETAMINOPHEN LEVEL: <5 MCG/ML (ref 10–30)
ALBUMIN SERPL-MCNC: 3.9 G/DL (ref 3.5–5.2)
ALP BLD-CCNC: 90 U/L (ref 40–129)
ALT SERPL-CCNC: 45 U/L (ref 0–40)
ANION GAP SERPL CALCULATED.3IONS-SCNC: 11 MMOL/L (ref 7–16)
AST SERPL-CCNC: 66 U/L (ref 0–39)
BILIRUB SERPL-MCNC: 0.7 MG/DL (ref 0–1.2)
BUN BLDV-MCNC: <2 MG/DL (ref 6–20)
CALCIUM SERPL-MCNC: 8.2 MG/DL (ref 8.6–10.2)
CHLORIDE BLD-SCNC: 99 MMOL/L (ref 98–107)
CO2: 27 MMOL/L (ref 22–29)
CREAT SERPL-MCNC: 0.7 MG/DL (ref 0.7–1.2)
ETHANOL: 221 MG/DL (ref 0–0.08)
GFR AFRICAN AMERICAN: >60
GFR NON-AFRICAN AMERICAN: >60 ML/MIN/1.73
GLUCOSE BLD-MCNC: 96 MG/DL (ref 74–99)
HCT VFR BLD CALC: 42.6 % (ref 37–54)
HEMOGLOBIN: 14.8 G/DL (ref 12.5–16.5)
MAGNESIUM: 2 MG/DL (ref 1.6–2.6)
MCH RBC QN AUTO: 33.1 PG (ref 26–35)
MCHC RBC AUTO-ENTMCNC: 34.7 % (ref 32–34.5)
MCV RBC AUTO: 95.3 FL (ref 80–99.9)
PDW BLD-RTO: 12.9 FL (ref 11.5–15)
PLATELET # BLD: 273 E9/L (ref 130–450)
PMV BLD AUTO: 12.3 FL (ref 7–12)
POTASSIUM REFLEX MAGNESIUM: 3.3 MMOL/L (ref 3.5–5)
RBC # BLD: 4.47 E12/L (ref 3.8–5.8)
SALICYLATE, SERUM: 2.9 MG/DL (ref 0–30)
SODIUM BLD-SCNC: 137 MMOL/L (ref 132–146)
TOTAL PROTEIN: 6.2 G/DL (ref 6.4–8.3)
TRICYCLIC ANTIDEPRESSANTS SCREEN SERUM: NEGATIVE NG/ML
WBC # BLD: 9 E9/L (ref 4.5–11.5)

## 2021-02-28 PROCEDURE — 83735 ASSAY OF MAGNESIUM: CPT

## 2021-02-28 PROCEDURE — 36415 COLL VENOUS BLD VENIPUNCTURE: CPT

## 2021-02-28 PROCEDURE — 2500000003 HC RX 250 WO HCPCS: Performed by: INTERNAL MEDICINE

## 2021-02-28 PROCEDURE — 85027 COMPLETE CBC AUTOMATED: CPT

## 2021-02-28 PROCEDURE — 80053 COMPREHEN METABOLIC PANEL: CPT

## 2021-02-28 PROCEDURE — 6360000002 HC RX W HCPCS: Performed by: INTERNAL MEDICINE

## 2021-02-28 PROCEDURE — 2580000003 HC RX 258: Performed by: INTERNAL MEDICINE

## 2021-02-28 PROCEDURE — 6370000000 HC RX 637 (ALT 250 FOR IP): Performed by: INTERNAL MEDICINE

## 2021-02-28 RX ORDER — PROMETHAZINE HYDROCHLORIDE 25 MG/1
12.5 TABLET ORAL EVERY 6 HOURS PRN
Status: DISCONTINUED | OUTPATIENT
Start: 2021-02-28 | End: 2021-02-28 | Stop reason: HOSPADM

## 2021-02-28 RX ORDER — CHLORDIAZEPOXIDE HYDROCHLORIDE 5 MG/1
10 CAPSULE, GELATIN COATED ORAL EVERY 6 HOURS PRN
Status: DISCONTINUED | OUTPATIENT
Start: 2021-02-28 | End: 2021-02-28 | Stop reason: HOSPADM

## 2021-02-28 RX ORDER — NICOTINE 21 MG/24HR
1 PATCH, TRANSDERMAL 24 HOURS TRANSDERMAL DAILY
Status: DISCONTINUED | OUTPATIENT
Start: 2021-02-28 | End: 2021-02-28 | Stop reason: HOSPADM

## 2021-02-28 RX ORDER — ONDANSETRON 2 MG/ML
4 INJECTION INTRAMUSCULAR; INTRAVENOUS EVERY 6 HOURS PRN
Status: DISCONTINUED | OUTPATIENT
Start: 2021-02-28 | End: 2021-02-28 | Stop reason: HOSPADM

## 2021-02-28 RX ADMIN — PROMETHAZINE HYDROCHLORIDE 12.5 MG: 25 TABLET ORAL at 09:32

## 2021-02-28 RX ADMIN — CHLORDIAZEPOXIDE HYDROCHLORIDE 5 MG: 5 CAPSULE ORAL at 00:16

## 2021-02-28 RX ADMIN — CHLORDIAZEPOXIDE HYDROCHLORIDE 5 MG: 5 CAPSULE ORAL at 06:15

## 2021-02-28 RX ADMIN — SODIUM CHLORIDE, PRESERVATIVE FREE 10 ML: 5 INJECTION INTRAVENOUS at 00:16

## 2021-02-28 RX ADMIN — ONDANSETRON 4 MG: 2 INJECTION INTRAMUSCULAR; INTRAVENOUS at 01:23

## 2021-02-28 RX ADMIN — FOLIC ACID: 5 INJECTION, SOLUTION INTRAMUSCULAR; INTRAVENOUS; SUBCUTANEOUS at 00:15

## 2021-02-28 RX ADMIN — ONDANSETRON 4 MG: 2 INJECTION INTRAMUSCULAR; INTRAVENOUS at 06:15

## 2021-02-28 RX ADMIN — FOLIC ACID 1 MG: 1 TABLET ORAL at 09:32

## 2021-02-28 RX ADMIN — THIAMINE HCL TAB 100 MG 100 MG: 100 TAB at 09:32

## 2021-02-28 RX ADMIN — CHLORDIAZEPOXIDE HYDROCHLORIDE 10 MG: 5 CAPSULE ORAL at 11:48

## 2021-02-28 ASSESSMENT — PAIN DESCRIPTION - PROGRESSION: CLINICAL_PROGRESSION: NOT CHANGED

## 2021-02-28 ASSESSMENT — PAIN DESCRIPTION - LOCATION: LOCATION: GENERALIZED

## 2021-02-28 ASSESSMENT — PAIN DESCRIPTION - ONSET: ONSET: ON-GOING

## 2021-02-28 NOTE — PROGRESS NOTES
Patient becoming increasingly anxious. Stated wanted to leave and had a ride coming already. Attempted to talk to patient and get him to stay.  Patient declined

## 2021-02-28 NOTE — ED NOTES
Critical lab of 4.20 lactic acid. Dr Blayne Carrizales notified.      Adin Hudson, RN  02/27/21 [id number]

## 2021-02-28 NOTE — PROGRESS NOTES
Patient signing out AMA and has already called for a ride home. Patient encouraged to stay without success. Dr Colletta Peals notified that patient refusing to stay. IV removed and paperwork given.

## 2021-02-28 NOTE — ED PROVIDER NOTES
Patient presents to the ED for evaluation. He fears that he is going to go into withdrawals. Patient states his last drink was approximately 8 hours ago. He admits to having a bottle of wine this afternoon. He is a daily drinker. Patient states that his symptoms currently consist of nausea, vomiting, and anxiety. He is also had shaking. He mentioned to the triage nurse that he was depressed. This is because his mother is currently in a coma at [facility]. He denies any suicidal thoughts. No homicidal ideations. Denies fevers or chills. Has mild upper associated abdominal pain. Denies dizziness or lightheadedness. Review of Systems   Constitutional: Positive for diaphoresis. Negative for chills, fatigue and fever. HENT: Negative for congestion and rhinorrhea. Eyes: Negative for photophobia and visual disturbance. Respiratory: Negative for cough and shortness of breath. Cardiovascular: Positive for palpitations. Negative for chest pain and leg swelling. Gastrointestinal: Positive for abdominal pain, nausea and vomiting. Negative for abdominal distention, blood in stool and diarrhea. Genitourinary: Negative for decreased urine volume and difficulty urinating. Musculoskeletal: Negative for arthralgias and myalgias. Skin: Negative for color change and pallor. Neurological: Negative for dizziness, syncope, light-headedness and headaches. Hematological: Does not bruise/bleed easily. Psychiatric/Behavioral: Negative for confusion. All other systems reviewed and are negative. Physical Exam  Vitals signs and nursing note reviewed. Constitutional:       General: He is not in acute distress. Appearance: He is well-developed and normal weight. He is not diaphoretic. HENT:      Head: Normocephalic and atraumatic. Mouth/Throat:      Mouth: Mucous membranes are moist.   Eyes:      General: No scleral icterus. Pupils: Pupils are equal, round, and reactive to light. Neck:      Musculoskeletal: Normal range of motion and neck supple. Cardiovascular:      Rate and Rhythm: Regular rhythm. Tachycardia present. Heart sounds: Normal heart sounds. No murmur. Pulmonary:      Effort: Pulmonary effort is normal. No respiratory distress. Breath sounds: Normal breath sounds. No wheezing or rales. Abdominal:      General: Bowel sounds are normal.      Palpations: Abdomen is soft. Tenderness: There is abdominal tenderness ( Mild upper abdominal discomfort). There is no guarding or rebound. Skin:     General: Skin is warm and dry. Coloration: Skin is not jaundiced or pale. Neurological:      Mental Status: He is alert and oriented to person, place, and time. Comments: Sensation grossly intact. No focal neurological deficits. No ataxia with finger-to-nose. Procedures     MDM   Patient presented to the ED with complaint of nausea, vomiting, withdrawal symptoms. Patient is a long-term alcoholic. He was initially treated here with Librium and Zofran which did not provide any relief. Afterwards I did give him Phenergan and Valium which did provide additional relief but he was still not feeling well. Patient [facility] knows that [facility] would not [facility] until Monday. Does not feel that he will be able to treat himself at home. He wants to detox. Today's labs showed CBC with no evidence of leukocytosis or anemia. CMP showed no electrolyte abnormalities or evidence of renal insufficiency. Liver function studies are grossly unremarkable. His alcohol level was 221. Was also experiencing upper abdominal pain. CT was obtained which showed no evidence of acute intra-abdominal or pelvic pathology. ED Course as of Feb 27 2227   Sat Feb 27, 2021 2154 Patient reports that he is still feeling anxious and nauseated. Will order additional meds and reassess. [MS]   2217 The nausea is better with the Phenergan but still feels sick. He is afraid that he is going to withdraw. Will call his PCP to see if he can be admitted for further treatment evaluation. [MS]      ED Course User Index  [MS] Júnior Graft, DO       --------------------------------------------- PAST HISTORY ---------------------------------------------  Past Medical History:  has a past medical history of Anxiety, Arm pain, Concussion with loss of consciousness, Convulsions ([facility] Depression, Flashing lights, Head injury, Headache, Leg pain, Numbness and tingling, PTSD (post-traumatic stress disorder), and Seizures ([facility] Past Surgical History:  has no past surgical history on file. Social History:  reports that he has been smoking. He has a 18.00 pack-year smoking history. He has never used smokeless tobacco. He reports current alcohol use of about 24.0 standard drinks of alcohol per week. He reports current drug use. Frequency: 1.00 time per week. Drug: Marijuana. Family History: family history includes Cancer in his father; Cirrhosis in his father; Mental Illness in his brother, mother, and sister; Substance Abuse in his father, maternal aunt, maternal uncle, mother, paternal aunt, paternal uncle, and sister. The patients home medications have been reviewed.     Allergies: Hydrocodone, Ativan [lorazepam], Invega sustenna [paliperidone palmitate er], Pcn [penicillins], and Fluticasone    -------------------------------------------------- RESULTS -------------------------------------------------    LABS:  Results for orders placed or performed during the hospital encounter of 02/27/21   CBC Auto Differential   Result Value Ref Range    WBC 10.0 4.5 - 11.5 E9/L    RBC 5.02 3.80 - 5.80 E12/L    Hemoglobin 17.0 (H) 12.5 - 16.5 g/dL    Hematocrit 46.3 37.0 - 54.0 %    MCV 92.2 80.0 - 99.9 fL    MCH 33.9 26.0 - 35.0 pg    MCHC 36.7 (H) 32.0 - 34.5 %    RDW 12.6 11.5 - 15.0 fL Platelets 263 895 - 860 E9/L    MPV 11.7 7.0 - 12.0 fL    Neutrophils % 74.9 43.0 - 80.0 %    Immature Granulocytes % 0.3 0.0 - 5.0 %    Lymphocytes % 16.8 (L) 20.0 - 42.0 %    Monocytes % 6.7 2.0 - 12.0 %    Eosinophils % 0.9 0.0 - 6.0 %    Basophils % 0.4 0.0 - 2.0 %    Neutrophils Absolute 7.48 (H) 1.80 - 7.30 E9/L    Immature Granulocytes # 0.03 E9/L    Lymphocytes Absolute 1.68 1.50 - 4.00 E9/L    Monocytes Absolute 0.67 0.10 - 0.95 E9/L    Eosinophils Absolute 0.09 0.05 - 0.50 E9/L    Basophils Absolute 0.04 0.00 - 0.20 E9/L   Comprehensive Metabolic Panel w/ Reflex to MG   Result Value Ref Range    Sodium 133 132 - 146 mmol/L    Potassium reflex Magnesium 3.9 3.5 - 5.0 mmol/L    Chloride 90 (L) 98 - 107 mmol/L    CO2 25 22 - 29 mmol/L    Anion Gap 18 (H) 7 - 16 mmol/L    Glucose 101 (H) 74 - 99 mg/dL    BUN <2 (L) 6 - 20 mg/dL    CREATININE 0.7 0.7 - 1.2 mg/dL    GFR Non-African American >60 >=60 mL/min/1.73    GFR African American >60     Calcium 8.7 8.6 - 10.2 mg/dL    Total Protein 7.0 6.4 - 8.3 g/dL    Albumin 4.6 3.5 - 5.2 g/dL    Total Bilirubin 0.4 0.0 - 1.2 mg/dL    Alkaline Phosphatase 109 40 - 129 U/L    ALT 49 (H) 0 - 40 U/L    AST 62 (H) 0 - 39 U/L   Serum Drug Screen   Result Value Ref Range    Ethanol Lvl 221 mg/dL    Acetaminophen Level <5.0 (L) 10.0 - 31.7 mcg/mL    Salicylate, Serum 2.9 0.0 - 30.0 mg/dL   Urine Drug Screen   Result Value Ref Range    Drug Screen Comment: see below    Lipase   Result Value Ref Range    Lipase 38 13 - 60 U/L   Troponin   Result Value Ref Range    Troponin <0.01 0.00 - 0.03 ng/mL   Protime-INR   Result Value Ref Range    Protime 12.5 (H) 9.3 - 12.4 sec    INR 1.1    Lactate, Sepsis   Result Value Ref Range    Lactic Acid, Sepsis 4.2 (HH) 0.5 - 1.9 mmol/L   POCT Glucose   Result Value Ref Range    Glucose 109 mg/dL    QC OK?  yes    POCT Glucose   Result Value Ref Range    Meter Glucose 109 (H) 74 - 99 mg/dL       RADIOLOGY:  CT ABDOMEN PELVIS W IV CONTRAST Final Result   No evidence of acute intra-abdominal or pelvic inflammatory process. No evidence of obstructive uropathy or acute appendicitis. Fatty liver. Mild thickening of the bladder likely from incomplete distension, and less   likely from cystitis.          ------------------------- NURSING NOTES AND VITALS REVIEWED ---------------------------  Date / Time Roomed:  2/27/2021  7:28 PM  ED Bed Assignment:  07/07    The nursing notes within the ED encounter and vital signs as below have been reviewed. Patient Vitals for the past 24 hrs:   BP Temp Temp src Pulse Resp SpO2 Weight   02/27/21 2111 128/88   79 24 98 %    02/27/21 1933 (!) 130/90 98.4 °F (36.9 °C) Oral 103 16 96 % 160 lb (72.6 kg)       Oxygen Saturation Interpretation: Normal    ------------------------------------------ PROGRESS NOTES ------------------------------------------  Re-evaluation(s):  Please see ED course. Counseling:  I have spoken with the patient and discussed todays results, in addition to providing specific details for the plan of care and counseling regarding the diagnosis and prognosis. Their questions are answered at this time and they are agreeable with the plan of admission.    --------------------------------- ADDITIONAL PROVIDER NOTES ---------------------------------  Consultations:  Time: 2224. Spoke with Dr. Marimar Delatorre. Discussed case. She will admit the patient. This patient's ED course included: a personal history and physicial examination, re-evaluation prior to disposition, multiple bedside re-evaluations, IV medications, cardiac monitoring, continuous pulse oximetry and complex medical decision making and emergency management    This patient has remained hemodynamically stable during their ED course. Diagnosis:  1. Alcohol withdrawal syndrome without complication ([address]    2.  Non-intractable vomiting with nausea, unspecified vomiting type        Disposition: Patient's disposition: Admit to med/surg floor  Patient's condition is fair.          Kelly Francisco DO  02/27/21 2229

## 2021-02-28 NOTE — H&P
History and Physical      CHIEF COMPLAINT:  Depression and Alcohol Problem    History Obtained From:  electronic medical record    HISTORY OF PRESENT ILLNESS:    The patient is a 31 y.o. male with a long hx of polysubstance abuse including daily etoh use who presented to [facility] he felt he was having withdrawal symptoms. He complained of n/v and anxiety/shakes with no improvement in symptomatic relief in the ED. Also his lactic acid level was increased with a normal ct abd/pelvis so it was decided to admit the pt to the hospital for further treatment. Of note pt was just admitted to [facility] for the same issue of seeking detox about a month ago and eventually left AMA. Past Medical History:    Past Medical History:   Diagnosis Date    Anxiety     Arm pain     Concussion with loss of consciousness     Convulsions (HCC)     Depression     Flashing lights     Head injury     Headache     Leg pain     Numbness and tingling     arms and hands    PTSD (post-traumatic stress disorder)     Seizures ([facility])      Past Surgical History:    History reviewed. No pertinent surgical history. Medications Prior to Admission:    No medications prior to admission. Allergies:    Hydrocodone, Ativan [lorazepam], Invega sustenna [paliperidone palmitate er], Pcn [penicillins], and Fluticasone    Social History:    reports that he has been smoking. He has a 18.00 pack-year smoking history. He has never used smokeless tobacco. He reports current alcohol use of about 24.0 standard drinks of alcohol per week. He reports current drug use. Frequency: 1.00 time per week. Drug: Marijuana. Single    Family History:   family history includes Cancer in his father; Cirrhosis in his father; Mental Illness in his brother, mother, and sister; Substance Abuse in his father, maternal aunt, maternal uncle, mother, paternal aunt, paternal uncle, and sister.     Review of Systems  Unable to obtain    PHYSICAL EXAM:  Vitals:  BP 94/61 Pulse 70   Temp 98 °F (36.7 °C) (Oral)   Resp 21   Ht 5' 6\" (1.676 m)   Wt 148 lb 8 oz (67.4 kg)   SpO2 96%   BMI 23.97 kg/m²     Unable to obtain    LABS:  Lab Results   Component Value Date    WBC 9.0 02/28/2021    RBC 4.47 02/28/2021    HGB 14.8 02/28/2021    HCT 42.6 02/28/2021    MCV 95.3 02/28/2021    MCH 33.1 02/28/2021    MCHC 34.7 02/28/2021    RDW 12.9 02/28/2021     02/28/2021    MPV 12.3 02/28/2021     Lab Results   Component Value Date     02/28/2021    K 3.3 02/28/2021    CL 99 02/28/2021    CO2 27 02/28/2021    BUN <2 02/28/2021    CREATININE 0.7 02/28/2021    GFRAA >60 02/28/2021    LABGLOM >60 02/28/2021    GLUCOSE 96 02/28/2021    PROT 6.2 02/28/2021    LABALBU 3.9 02/28/2021    CALCIUM 8.2 02/28/2021    BILITOT 0.7 02/28/2021    ALKPHOS 90 02/28/2021    AST 66 02/28/2021    ALT 45 02/28/2021     Lab Results   Component Value Date    PROTIME 12.5 02/27/2021    INR 1.1 02/27/2021     Recent Labs     02/27/21  1947   TROPONINI <0.01     Lab Results   Component Value Date    NITRU Negative 08/22/2020    COLORU Yellow 08/22/2020    PHUR 6.5 08/22/2020    WBCUA 1-3 02/13/2019    RBCUA 0-1 02/13/2019    BACTERIA NONE 02/13/2019    CLARITYU Clear 08/22/2020    SPECGRAV <=1.005 08/22/2020    LEUKOCYTESUR Negative 08/22/2020    UROBILINOGEN 0.2 08/22/2020    BILIRUBINUR Negative 08/22/2020    BLOODU Negative 08/22/2020    GLUCOSEU Negative 08/22/2020    KETUA Negative 08/22/2020     Lab Results   Component Value Date    MG 2.0 02/28/2021    PHOS 2.8 12/08/2018     Lab Results   Component Value Date    LABA1C 5.1 09/08/2018     Lab Results   Component Value Date    TSH 1.790 01/31/2019     Lab Results   Component Value Date    TRIG 122 12/08/2018    HDL 35 12/08/2018    LDLCALC 20 12/08/2018    LABVLDL 24 12/08/2018     Lab Results   Component Value Date    AMYLASE 101 12/09/2018    LIPASE 38 02/27/2021     No results found for: BNP  Lab Results   Component Value Date    LACTA 3.8 12/27/2020     Lab Results   Component Value Date    VALPROATE 83 09/12/2018     No results found for: PHART, PH, [id number], PCO2, PO2ART, PO2, SYO7TGR, HCO3, BEART, BE, THGBART, THB, AAW4CUP, L8RJPRJA, O2SAT  Lab Results   Component Value Date    LABAMPH NOT DETECTED 02/27/2021    BARBSCNU NOT DETECTED 02/27/2021    LABBENZ NOT DETECTED 02/27/2021    LABMETH NOT DETECTED 02/27/2021    OPIATESCREENURINE NOT DETECTED 02/27/2021    PHENCYCLIDINESCREENURINE NOT DETECTED 02/27/2021    PPXUR NA 06/29/2018    ETOH 221 02/27/2021     No results found for: DDIMER  No results found for: VITD25    Radiology  Ct Abdomen Pelvis W Iv Contrast    Result Date: 2/27/2021  EXAMINATION: CT OF THE ABDOMEN AND PELVIS WITH CONTRAST 2/27/2021 8:42 pm TECHNIQUE: CT of the abdomen and pelvis was performed with the administration of intravenous contrast. Multiplanar reformatted images are provided for review. Dose modulation, iterative reconstruction, and/or weight based adjustment of the mA/kV was utilized to reduce the radiation dose to as low as reasonably achievable. COMPARISON: December 28, 2020 HISTORY: ORDERING SYSTEM PROVIDED HISTORY: upper abd pain TECHNOLOGIST PROVIDED HISTORY: Reason for exam:->upper abd pain Decision Support Exception->Emergency Medical Condition (MA) FINDINGS: Lower Chest: No infiltrates or pleural effusion. Organs: Fatty liver with no focal lesions or biliary ductal dilatation. Gallbladder is present with no calcified stones. Spleen is not enlarged. Pancreas shows normal contour. The adrenal glands appear unremarkable. Kidneys show symmetric enhancement with no hydronephrosis or perinephric infiltration. GI/Bowel: No obstructing or constricting lesions. The appendix is normal. Pelvis: Bladder is suboptimally distended with mild thickening of the wall likely from incomplete distention. There is no significant free fluid in the pelvis. Peritoneum/Retroperitoneum: No free air or significant adenopathy. Bones/Soft Tissues: Unremarkable. No evidence of acute intra-abdominal or pelvic inflammatory process. No evidence of obstructive uropathy or acute appendicitis. Fatty liver. Mild thickening of the bladder likely from incomplete distension, and less likely from cystitis. ASSESSMENT:    Principal Problem:    Alcohol abuse with withdrawal  Active Problems:    Alcoholism ([address]    Marijuana smoker    Major depressive disorder, severe (HCC)  Resolved Problems:    * No resolved hospital problems. *    PLAN:  Admit pt  IVF  Librium/Ciwa protocol  Folic acid/thiamine  SS and psych consults  Pt left AMA again though    Time spent face to face with patient along with family counseling and discussing care exceeded 50% of the time of the visit. Additional time spent reviewing images and labs, discussing case with nursing, support staff and other physicians; as well as coordinating care. Lisbeth Snellen, MD  3:12 PM  2/28/2021    NOTE:  This report was transcribed using voice recognition software. Every effort was made to ensure accuracy; however, inadvertent computerized transcription errors may be present.

## 2021-02-28 NOTE — CARE COORDINATION
2-28- note: ( no covid testing ) SS consult noted: I met with pt this am, he is currently c/o \"chills/and hot flashes\", states he's not getting enough Librium in his system, \" I feel like i'm gonna die\" , per Karen Contreras has addressed these issues with the dr. Marion Guallpa states he doesn't want to talk about any needs at this time, he did say he has been to [facility] in the past for ETOH withdrawal. CM/SS will follow when pt ready to discuss plans for dc.  Electronically signed by Pauline Phillips RN on 2/28/2021 at 10:08 AM

## 2021-03-25 ENCOUNTER — HOSPITAL ENCOUNTER (EMERGENCY)
Age: 31
Discharge: HOME | End: 2021-03-25
Attending: EMERGENCY MEDICINE
Payer: COMMERCIAL

## 2021-03-25 VITALS
SYSTOLIC BLOOD PRESSURE: 113 MMHG | TEMPERATURE: 98.3 F | HEART RATE: 103 BPM | OXYGEN SATURATION: 95 % | RESPIRATION RATE: 16 BRPM | DIASTOLIC BLOOD PRESSURE: 72 MMHG

## 2021-03-25 DIAGNOSIS — F10.930 ALCOHOL WITHDRAWAL SYNDROME WITHOUT COMPLICATION (HCC): Primary | ICD-10-CM

## 2021-03-25 PROCEDURE — 6370000000 HC RX 637 (ALT 250 FOR IP): Performed by: EMERGENCY MEDICINE

## 2021-03-25 PROCEDURE — 99285 EMERGENCY DEPT VISIT HI MDM: CPT

## 2021-03-25 RX ORDER — ONDANSETRON 4 MG/1
4 TABLET, ORALLY DISINTEGRATING ORAL ONCE
Status: COMPLETED | OUTPATIENT
Start: 2021-03-25 | End: 2021-03-25

## 2021-03-25 RX ORDER — CHLORDIAZEPOXIDE HYDROCHLORIDE 25 MG/1
50 CAPSULE, GELATIN COATED ORAL ONCE
Status: COMPLETED | OUTPATIENT
Start: 2021-03-25 | End: 2021-03-25

## 2021-03-25 RX ADMIN — CHLORDIAZEPOXIDE HYDROCHLORIDE 50 MG: 25 CAPSULE ORAL at 10:02

## 2021-03-25 RX ADMIN — ONDANSETRON 4 MG: 4 TABLET, ORALLY DISINTEGRATING ORAL at 10:02

## 2021-03-25 ASSESSMENT — ENCOUNTER SYMPTOMS
BACK PAIN: 0
SINUS PRESSURE: 0
SORE THROAT: 0
EYE DISCHARGE: 0
VOMITING: 0
ABDOMINAL PAIN: 0
WHEEZING: 0
EYE REDNESS: 0
COUGH: 0
SHORTNESS OF BREATH: 0
DIARRHEA: 0
NAUSEA: 1
EYE PAIN: 0

## 2021-03-25 NOTE — ED NOTES
PT walked out with Lynn Patel from [facility] to his ride     [facility]  03/25/21 0338

## 2021-03-25 NOTE — ED PROVIDER NOTES
This patient with history of alcoholism. He has tried rehab before. He is here today requesting additional evaluation for possible rehab placement. He denies any homicidal or suicidal ideation. He admits to anxiety. The history is provided by the patient. Drug / Alcohol Assessment  This is a recurrent problem. The current episode started 3 to 5 hours ago. The problem occurs constantly. The problem has not changed since onset. Pertinent negatives include no chest pain, no abdominal pain, no headaches and no shortness of breath. Nothing aggravates the symptoms. He has tried nothing for the symptoms. Review of Systems   Constitutional: Negative for chills and fever. HENT: Negative for ear pain, sinus pressure and sore throat. Eyes: Negative for pain, discharge and redness. Respiratory: Negative for cough, shortness of breath and wheezing. Cardiovascular: Negative for chest pain. Gastrointestinal: Positive for nausea. Negative for abdominal pain, diarrhea and vomiting. Genitourinary: Negative for dysuria and frequency. Musculoskeletal: Negative for arthralgias and back pain. Skin: Negative for rash and wound. Neurological: Negative for weakness and headaches. Hematological: Negative for adenopathy. Psychiatric/Behavioral: The patient is nervous/anxious. All other systems reviewed and are negative. Physical Exam  Vitals signs and nursing note reviewed. Constitutional:       Appearance: He is well-developed. HENT:      Head: Normocephalic and atraumatic. Eyes:      Pupils: Pupils are equal, round, and reactive to light. Neck:      Musculoskeletal: Normal range of motion and neck supple. Cardiovascular:      Rate and Rhythm: Regular rhythm. Tachycardia present. Heart sounds: Normal heart sounds. No murmur. Pulmonary:      Effort: Pulmonary effort is normal. No respiratory distress. Breath sounds: Normal breath sounds. No wheezing or rales. Abdominal:      General: Bowel sounds are normal.      Palpations: Abdomen is soft. Tenderness: There is no abdominal tenderness. There is no guarding or rebound. Skin:     General: Skin is warm and dry. Neurological:      Mental Status: He is alert and oriented to person, place, and time. Cranial Nerves: No cranial nerve deficit. Coordination: Coordination normal.   Psychiatric:         Mood and Affect: Mood is anxious. Thought Content: Thought content is not paranoid or delusional. Thought content does not include homicidal or suicidal ideation. Thought content does not include homicidal or suicidal plan. Procedures     MDM     9:25 AM EDT  Patient being seen by peer recovery. 11:41 AM EDT  Patient appears much calmer following treatment. Peer recovery is arrange for him to be admitted at [facility] for detox services. He will be transported directly from here to there.           --------------------------------------------- PAST HISTORY ---------------------------------------------  Past Medical History:  has a past medical history of Anxiety, Arm pain, Concussion with loss of consciousness, Convulsions ([facility] [address] Depression, Flashing lights, Head injury, Headache, Leg pain, Numbness and tingling, PTSD (post-traumatic stress disorder), and Seizures ([facility] [address] Past Surgical History:  has no past surgical history on file. Social History:  reports that he has been smoking. He has a 18.00 pack-year smoking history. He has never used smokeless tobacco. He reports current alcohol use of about 24.0 standard drinks of alcohol per week. He reports current drug use. Frequency: 1.00 time per week. Drug: Marijuana. Family History: family history includes Cancer in his father; Cirrhosis in his father; Mental Illness in his brother, mother, and sister; Substance Abuse in his father, maternal aunt, maternal uncle, mother, paternal aunt, paternal uncle, and sister.      The patients home medications have been reviewed. Allergies: Hydrocodone, Ativan [lorazepam], Diphenhydramine, Invega sustenna [paliperidone palmitate er], Paliperidone, Pcn [penicillins], and Fluticasone    -------------------------------------------------- RESULTS -------------------------------------------------  Labs:  No results found for this visit on 03/25/21. Radiology:  No orders to display       ------------------------- NURSING NOTES AND VITALS REVIEWED ---------------------------  Date / Time Roomed:  3/25/2021  8:52 AM  ED Bed Assignment:  16/16    The nursing notes within the ED encounter and vital signs as below have been reviewed. /72   Pulse 103   Temp 98.3 °F (36.8 °C) (Oral)   Resp 16   SpO2 95%   Oxygen Saturation Interpretation: Normal      ------------------------------------------ PROGRESS NOTES ------------------------------------------  11:41 AM EDT  I have spoken with the patient and discussed todays results, in addition to providing specific details for the plan of care and counseling regarding the diagnosis and prognosis. Their questions are answered at this time and they are agreeable with the plan. I discussed at length with them reasons for immediate return here for re evaluation. They will followup with their primary care physician by calling their office Upon discharge from [facility] center. Any [place] --------------------------------- ADDITIONAL PROVIDER NOTES ---------------------------------  At this time the patient is without objective evidence of an acute process requiring hospitalization or inpatient management. They have remained hemodynamically stable throughout their entire ED visit and are stable for discharge with outpatient follow-up. The plan has been discussed in detail and they are aware of the specific conditions for emergent return, as well as the importance of follow-up. New Prescriptions    No medications on file       Diagnosis:  1.  Alcohol withdrawal syndrome without complication ([facility]        Disposition:  Patient's disposition: Discharge to detox  Patient's condition is stable.        Oralia Mixon,   03/25/21 1141

## 2021-03-25 NOTE — ED NOTES
ETA for [facility] to take pt received.  They will be here alfred Welch RN  03/25/21 6138

## 2021-03-25 NOTE — ED NOTES
PT reports his mother recently  and he and his brother are just trying to make it day by day.   PT states he called [facility] but he has given them issues in the past      [facility]  21 [phone]

## 2021-03-25 NOTE — ED NOTES
PT has bed at [facility].  Maeve Davidson is setting up transportation and will be back with NESTOR Cunha RN  03/25/21 6074

## 2021-03-25 NOTE — CARE COORDINATION
This note will not be viewable in [employer] for the following reason(s). Suspected substance abuse disorder. Peer Recovery Support Note    Name: Sophie Noland  Date: 3/25/2021    Chief Complaint   Patient presents with    Alcohol Problem     PT states he is withdrawing from ETOH. PT reports he drinks bottles of 8% wine 5 day. last drink within last 2 hours           Peer Support met with patient. [] Support and education provided  [] Resources provided   [x] Treatment referral: [facility]  [] Other:   [] Patient declined peer recovery services     Referred By: Dr. Jorge Covington    Notes: Bed available for patient at [address] [place] [address] at 1pm or earlier. PRS will continue to follow.      Signed: Walter Lopez, 3/25/2021

## 2021-03-25 NOTE — ED NOTES
Bed: 16  Expected date:   Expected time:   Means of arrival:   Comments:  wtp     Kameron Lyman RN  03/25/21 7484

## 2021-03-25 NOTE — CARE COORDINATION
This note will not be viewable in [employer] for the following reason(s). Suspected substance abuse disorder. Peer Recovery Support Note    Name: Zahira Gotti  Date: 3/25/2021    Chief Complaint   Patient presents with    Alcohol Problem     PT states he is withdrawing from ETOH. PT reports he drinks bottles of 8% wine 5 day. last drink within last 2 hours           Peer Support met with patient. [x] Support and education provided  [x] Resources provided   [] Treatment referral:   [] Other:   [] Patient declined peer recovery services     Referred By: Dr. Aggarwal Son    Notes:  Patient agreeable to detox at this time. PRS will follow.      Signed: Crystal Goncalves, 3/25/2021

## 2021-04-15 ENCOUNTER — HOSPITAL ENCOUNTER (EMERGENCY)
Age: 31
Discharge: HOME | End: 2021-04-15
Attending: EMERGENCY MEDICINE
Payer: COMMERCIAL

## 2021-04-15 ENCOUNTER — APPOINTMENT (OUTPATIENT)
Dept: CT IMAGING | Age: 31
End: 2021-04-15
Payer: COMMERCIAL

## 2021-04-15 VITALS
HEART RATE: 87 BPM | RESPIRATION RATE: 22 BRPM | SYSTOLIC BLOOD PRESSURE: 122 MMHG | WEIGHT: 150 LBS | TEMPERATURE: 97.1 F | DIASTOLIC BLOOD PRESSURE: 86 MMHG | BODY MASS INDEX: 24.11 KG/M2 | HEIGHT: 66 IN | OXYGEN SATURATION: 96 %

## 2021-04-15 DIAGNOSIS — S13.9XXA NECK SPRAIN, INITIAL ENCOUNTER: ICD-10-CM

## 2021-04-15 DIAGNOSIS — E87.6 HYPOKALEMIA: ICD-10-CM

## 2021-04-15 DIAGNOSIS — S09.90XA INJURY OF HEAD, INITIAL ENCOUNTER: ICD-10-CM

## 2021-04-15 DIAGNOSIS — F10.930 ALCOHOL WITHDRAWAL SYNDROME WITHOUT COMPLICATION (HCC): Primary | ICD-10-CM

## 2021-04-15 LAB
ALBUMIN SERPL-MCNC: 4 G/DL (ref 3.5–5.2)
ALP BLD-CCNC: 97 U/L (ref 40–129)
ALT SERPL-CCNC: 48 U/L (ref 0–40)
ANION GAP SERPL CALCULATED.3IONS-SCNC: 14 MMOL/L (ref 7–16)
AST SERPL-CCNC: 145 U/L (ref 0–39)
BASOPHILS ABSOLUTE: 0.05 E9/L (ref 0–0.2)
BASOPHILS RELATIVE PERCENT: 0.6 % (ref 0–2)
BILIRUB SERPL-MCNC: 1.4 MG/DL (ref 0–1.2)
BUN BLDV-MCNC: 3 MG/DL (ref 6–20)
CALCIUM SERPL-MCNC: 8.2 MG/DL (ref 8.6–10.2)
CHLORIDE BLD-SCNC: 94 MMOL/L (ref 98–107)
CO2: 28 MMOL/L (ref 22–29)
CREAT SERPL-MCNC: 0.8 MG/DL (ref 0.7–1.2)
EKG ATRIAL RATE: 84 BPM
EKG P AXIS: 57 DEGREES
EKG P-R INTERVAL: 124 MS
EKG Q-T INTERVAL: 404 MS
EKG QRS DURATION: 88 MS
EKG QTC CALCULATION (BAZETT): 477 MS
EKG R AXIS: 47 DEGREES
EKG T AXIS: 69 DEGREES
EKG VENTRICULAR RATE: 84 BPM
EOSINOPHILS ABSOLUTE: 0.09 E9/L (ref 0.05–0.5)
EOSINOPHILS RELATIVE PERCENT: 1.1 % (ref 0–6)
GFR AFRICAN AMERICAN: >60
GFR NON-AFRICAN AMERICAN: >60 ML/MIN/1.73
GLUCOSE BLD-MCNC: 97 MG/DL (ref 74–99)
HCT VFR BLD CALC: 47.8 % (ref 37–54)
HEMOGLOBIN: 16.5 G/DL (ref 12.5–16.5)
IMMATURE GRANULOCYTES #: 0.02 E9/L
IMMATURE GRANULOCYTES %: 0.3 % (ref 0–5)
LYMPHOCYTES ABSOLUTE: 1.68 E9/L (ref 1.5–4)
LYMPHOCYTES RELATIVE PERCENT: 21 % (ref 20–42)
MCH RBC QN AUTO: 33.3 PG (ref 26–35)
MCHC RBC AUTO-ENTMCNC: 34.5 % (ref 32–34.5)
MCV RBC AUTO: 96.6 FL (ref 80–99.9)
MONOCYTES ABSOLUTE: 0.51 E9/L (ref 0.1–0.95)
MONOCYTES RELATIVE PERCENT: 6.4 % (ref 2–12)
NEUTROPHILS ABSOLUTE: 5.64 E9/L (ref 1.8–7.3)
NEUTROPHILS RELATIVE PERCENT: 70.6 % (ref 43–80)
PDW BLD-RTO: 12.8 FL (ref 11.5–15)
PLATELET # BLD: 108 E9/L (ref 130–450)
PMV BLD AUTO: 11 FL (ref 7–12)
POTASSIUM SERPL-SCNC: 3 MMOL/L (ref 3.5–5)
RBC # BLD: 4.95 E12/L (ref 3.8–5.8)
SODIUM BLD-SCNC: 136 MMOL/L (ref 132–146)
TOTAL PROTEIN: 6.2 G/DL (ref 6.4–8.3)
TROPONIN: <0.01 NG/ML (ref 0–0.03)
WBC # BLD: 8 E9/L (ref 4.5–11.5)

## 2021-04-15 PROCEDURE — 72125 CT NECK SPINE W/O DYE: CPT

## 2021-04-15 PROCEDURE — 85025 COMPLETE CBC W/AUTO DIFF WBC: CPT

## 2021-04-15 PROCEDURE — 93010 ELECTROCARDIOGRAM REPORT: CPT | Performed by: INTERNAL MEDICINE

## 2021-04-15 PROCEDURE — 70450 CT HEAD/BRAIN W/O DYE: CPT

## 2021-04-15 PROCEDURE — 6370000000 HC RX 637 (ALT 250 FOR IP): Performed by: EMERGENCY MEDICINE

## 2021-04-15 PROCEDURE — 80053 COMPREHEN METABOLIC PANEL: CPT

## 2021-04-15 PROCEDURE — 96372 THER/PROPH/DIAG INJ SC/IM: CPT

## 2021-04-15 PROCEDURE — 84484 ASSAY OF TROPONIN QUANT: CPT

## 2021-04-15 PROCEDURE — 2580000003 HC RX 258: Performed by: EMERGENCY MEDICINE

## 2021-04-15 PROCEDURE — 6360000002 HC RX W HCPCS: Performed by: EMERGENCY MEDICINE

## 2021-04-15 PROCEDURE — 99284 EMERGENCY DEPT VISIT MOD MDM: CPT

## 2021-04-15 PROCEDURE — 93005 ELECTROCARDIOGRAM TRACING: CPT | Performed by: EMERGENCY MEDICINE

## 2021-04-15 RX ORDER — 0.9 % SODIUM CHLORIDE 0.9 %
1000 INTRAVENOUS SOLUTION INTRAVENOUS ONCE
Status: COMPLETED | OUTPATIENT
Start: 2021-04-15 | End: 2021-04-15

## 2021-04-15 RX ORDER — DIAZEPAM 5 MG/1
5 TABLET ORAL ONCE
Status: COMPLETED | OUTPATIENT
Start: 2021-04-15 | End: 2021-04-15

## 2021-04-15 RX ORDER — POTASSIUM CHLORIDE 20 MEQ/1
40 TABLET, EXTENDED RELEASE ORAL ONCE
Status: COMPLETED | OUTPATIENT
Start: 2021-04-15 | End: 2021-04-15

## 2021-04-15 RX ORDER — PROMETHAZINE HYDROCHLORIDE 25 MG/ML
12.5 INJECTION, SOLUTION INTRAMUSCULAR; INTRAVENOUS ONCE
Status: COMPLETED | OUTPATIENT
Start: 2021-04-15 | End: 2021-04-15

## 2021-04-15 RX ADMIN — DIAZEPAM 5 MG: 5 TABLET ORAL at 06:10

## 2021-04-15 RX ADMIN — PROMETHAZINE HYDROCHLORIDE 12.5 MG: 25 INJECTION INTRAMUSCULAR; INTRAVENOUS at 05:25

## 2021-04-15 RX ADMIN — POTASSIUM CHLORIDE 40 MEQ: 1500 TABLET, EXTENDED RELEASE ORAL at 06:10

## 2021-04-15 RX ADMIN — SODIUM CHLORIDE 1000 ML: 9 INJECTION, SOLUTION INTRAVENOUS at 05:30

## 2021-04-15 ASSESSMENT — PAIN DESCRIPTION - DESCRIPTORS: DESCRIPTORS: ACHING

## 2021-04-15 ASSESSMENT — PAIN DESCRIPTION - ONSET: ONSET: ON-GOING

## 2021-04-15 ASSESSMENT — PAIN DESCRIPTION - FREQUENCY: FREQUENCY: CONTINUOUS

## 2021-04-15 ASSESSMENT — PAIN DESCRIPTION - PROGRESSION: CLINICAL_PROGRESSION: NOT CHANGED

## 2021-04-15 ASSESSMENT — PAIN SCALES - GENERAL: PAINLEVEL_OUTOF10: 10

## 2021-04-15 NOTE — ED NOTES
Received call from Saray Bowie in [address]. Pt having emesis.   Dr Konstantin Valdez notified and new order obtained     Lima Pfeiffer RN  04/15/21 6783
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
## 2021-04-15 NOTE — ED NOTES
[facility] ETA is [id number]. They will called with updates throughout.       Tamanna Stanley RN  04/15/21 8483

## 2021-04-15 NOTE — ED NOTES
Nurse to nurse report given to RN at [address] [facility], RN  04/15/21 9304

## 2021-05-04 ENCOUNTER — APPOINTMENT (OUTPATIENT)
Dept: CT IMAGING | Age: 31
End: 2021-05-04
Payer: COMMERCIAL

## 2021-05-04 ENCOUNTER — HOSPITAL ENCOUNTER (EMERGENCY)
Age: 31
Discharge: HOME | End: 2021-05-04
Attending: EMERGENCY MEDICINE
Payer: COMMERCIAL

## 2021-05-04 VITALS
TEMPERATURE: 98.4 F | RESPIRATION RATE: 19 BRPM | OXYGEN SATURATION: 98 % | SYSTOLIC BLOOD PRESSURE: 117 MMHG | DIASTOLIC BLOOD PRESSURE: 70 MMHG | HEART RATE: 107 BPM

## 2021-05-04 DIAGNOSIS — S01.511A LIP LACERATION, INITIAL ENCOUNTER: ICD-10-CM
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
## 2021-05-04 PROCEDURE — 72125 CT NECK SPINE W/O DYE: CPT

## 2021-05-04 PROCEDURE — 70450 CT HEAD/BRAIN W/O DYE: CPT

## 2021-05-04 PROCEDURE — 6360000002 HC RX W HCPCS: Performed by: EMERGENCY MEDICINE

## 2021-05-04 PROCEDURE — 12011 RPR F/E/E/N/L/M 2.5 CM/<: CPT

## 2021-05-04 PROCEDURE — 90471 IMMUNIZATION ADMIN: CPT | Performed by: EMERGENCY MEDICINE

## 2021-05-04 PROCEDURE — 70486 CT MAXILLOFACIAL W/O DYE: CPT

## 2021-05-04 PROCEDURE — 99284 EMERGENCY DEPT VISIT MOD MDM: CPT

## 2021-05-04 PROCEDURE — 90715 TDAP VACCINE 7 YRS/> IM: CPT | Performed by: EMERGENCY MEDICINE

## 2021-05-04 RX ADMIN — TETANUS TOXOID, REDUCED DIPHTHERIA TOXOID AND ACELLULAR PERTUSSIS VACCINE, ADSORBED 0.5 ML: 5; 2.5; 8; 8; 2.5 SUSPENSION INTRAMUSCULAR at 16:40

## 2021-05-04 ASSESSMENT — ENCOUNTER SYMPTOMS
SHORTNESS OF BREATH: 0
ABDOMINAL PAIN: 0
BACK PAIN: 0
COUGH: 0
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
## 2021-05-04 NOTE — ED NOTES
Pt A&Ox4. No neuro deficits noted. GCS 15. Pt to be D/C'd home with sober ride.       Sailaja Hutchins RN  05/04/21 6815
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
## 2021-05-16 ENCOUNTER — APPOINTMENT (OUTPATIENT)
Dept: CT IMAGING | Age: 31
DRG: 282 | End: 2021-05-16
Payer: COMMERCIAL

## 2021-05-16 ENCOUNTER — HOSPITAL ENCOUNTER (INPATIENT)
Age: 31
LOS: 2 days | Discharge: LEFT BEFORE DISCHARGE | DRG: 282 | End: 2021-05-19
Attending: EMERGENCY MEDICINE | Admitting: INTERNAL MEDICINE
Payer: COMMERCIAL

## 2021-05-16 DIAGNOSIS — K85.20 ALCOHOL-INDUCED ACUTE PANCREATITIS WITHOUT INFECTION OR NECROSIS: Primary | ICD-10-CM

## 2021-05-16 DIAGNOSIS — R11.2 NON-INTRACTABLE VOMITING WITH NAUSEA, UNSPECIFIED VOMITING TYPE: ICD-10-CM

## 2021-05-16 DIAGNOSIS — E87.6 HYPOKALEMIA: ICD-10-CM

## 2021-05-16 DIAGNOSIS — F10.930 ALCOHOL WITHDRAWAL SYNDROME WITHOUT COMPLICATION (HCC): ICD-10-CM

## 2021-05-16 DIAGNOSIS — E87.29 ALCOHOLIC KETOACIDOSIS: ICD-10-CM

## 2021-05-16 DIAGNOSIS — E87.20 LACTIC ACIDOSIS: ICD-10-CM

## 2021-05-16 DIAGNOSIS — E83.42 HYPOMAGNESEMIA: ICD-10-CM

## 2021-05-16 LAB
ALBUMIN SERPL-MCNC: 4.2 G/DL (ref 3.5–5.2)
ALP BLD-CCNC: 103 U/L (ref 40–129)
ALT SERPL-CCNC: 16 U/L (ref 0–40)
ANION GAP SERPL CALCULATED.3IONS-SCNC: 23 MMOL/L (ref 7–16)
AST SERPL-CCNC: 35 U/L (ref 0–39)
BASOPHILS ABSOLUTE: 0.1 E9/L (ref 0–0.2)
BASOPHILS RELATIVE PERCENT: 0.6 % (ref 0–2)
BILIRUB SERPL-MCNC: 0.5 MG/DL (ref 0–1.2)
BILIRUBIN DIRECT: <0.2 MG/DL (ref 0–0.3)
BILIRUBIN, INDIRECT: NORMAL MG/DL (ref 0–1)
BUN BLDV-MCNC: 9 MG/DL (ref 6–20)
CALCIUM SERPL-MCNC: 7.9 MG/DL (ref 8.6–10.2)
CHLORIDE BLD-SCNC: 100 MMOL/L (ref 98–107)
CO2: 18 MMOL/L (ref 22–29)
CREAT SERPL-MCNC: 0.6 MG/DL (ref 0.7–1.2)
EOSINOPHILS ABSOLUTE: 0.02 E9/L (ref 0.05–0.5)
EOSINOPHILS RELATIVE PERCENT: 0.1 % (ref 0–6)
GFR AFRICAN AMERICAN: >60
GFR NON-AFRICAN AMERICAN: >60 ML/MIN/1.73
GLUCOSE BLD-MCNC: 121 MG/DL (ref 74–99)
HCT VFR BLD CALC: 48.5 % (ref 37–54)
HEMOGLOBIN: 17.8 G/DL (ref 12.5–16.5)
IMMATURE GRANULOCYTES #: 0.07 E9/L
IMMATURE GRANULOCYTES %: 0.4 % (ref 0–5)
LACTIC ACID: 4.5 MMOL/L (ref 0.5–2.2)
LYMPHOCYTES ABSOLUTE: 0.87 E9/L (ref 1.5–4)
LYMPHOCYTES RELATIVE PERCENT: 5.4 % (ref 20–42)
MCH RBC QN AUTO: 35.5 PG (ref 26–35)
MCHC RBC AUTO-ENTMCNC: 36.7 % (ref 32–34.5)
MCV RBC AUTO: 96.8 FL (ref 80–99.9)
MONOCYTES ABSOLUTE: 0.75 E9/L (ref 0.1–0.95)
MONOCYTES RELATIVE PERCENT: 4.7 % (ref 2–12)
NEUTROPHILS ABSOLUTE: 14.22 E9/L (ref 1.8–7.3)
NEUTROPHILS RELATIVE PERCENT: 88.8 % (ref 43–80)
PDW BLD-RTO: 14.2 FL (ref 11.5–15)
PLATELET # BLD: 197 E9/L (ref 130–450)
PMV BLD AUTO: 11.9 FL (ref 7–12)
POTASSIUM SERPL-SCNC: 3.3 MMOL/L (ref 3.5–5)
RBC # BLD: 5.01 E12/L (ref 3.8–5.8)
REASON FOR REJECTION: NORMAL
REASON FOR REJECTION: NORMAL
REJECTED TEST: NORMAL
REJECTED TEST: NORMAL
SODIUM BLD-SCNC: 141 MMOL/L (ref 132–146)
TOTAL PROTEIN: 6.4 G/DL (ref 6.4–8.3)
WBC # BLD: 16 E9/L (ref 4.5–11.5)

## 2021-05-16 PROCEDURE — 83735 ASSAY OF MAGNESIUM: CPT

## 2021-05-16 PROCEDURE — 74177 CT ABD & PELVIS W/CONTRAST: CPT

## 2021-05-16 PROCEDURE — 80048 BASIC METABOLIC PNL TOTAL CA: CPT

## 2021-05-16 PROCEDURE — 99284 EMERGENCY DEPT VISIT MOD MDM: CPT

## 2021-05-16 PROCEDURE — 2580000003 HC RX 258: Performed by: EMERGENCY MEDICINE

## 2021-05-16 PROCEDURE — 99223 1ST HOSP IP/OBS HIGH 75: CPT | Performed by: INTERNAL MEDICINE

## 2021-05-16 PROCEDURE — 80076 HEPATIC FUNCTION PANEL: CPT

## 2021-05-16 PROCEDURE — 96374 THER/PROPH/DIAG INJ IV PUSH: CPT

## 2021-05-16 PROCEDURE — 6360000002 HC RX W HCPCS: Performed by: EMERGENCY MEDICINE

## 2021-05-16 PROCEDURE — 93005 ELECTROCARDIOGRAM TRACING: CPT | Performed by: EMERGENCY MEDICINE

## 2021-05-16 PROCEDURE — 96375 TX/PRO/DX INJ NEW DRUG ADDON: CPT

## 2021-05-16 PROCEDURE — 83605 ASSAY OF LACTIC ACID: CPT

## 2021-05-16 PROCEDURE — 84484 ASSAY OF TROPONIN QUANT: CPT

## 2021-05-16 PROCEDURE — 36415 COLL VENOUS BLD VENIPUNCTURE: CPT

## 2021-05-16 PROCEDURE — 6360000004 HC RX CONTRAST MEDICATION: Performed by: RADIOLOGY

## 2021-05-16 PROCEDURE — 85025 COMPLETE CBC W/AUTO DIFF WBC: CPT

## 2021-05-16 PROCEDURE — 96372 THER/PROPH/DIAG INJ SC/IM: CPT

## 2021-05-16 PROCEDURE — 83690 ASSAY OF LIPASE: CPT

## 2021-05-16 RX ORDER — ONDANSETRON 2 MG/ML
4 INJECTION INTRAMUSCULAR; INTRAVENOUS ONCE
Status: COMPLETED | OUTPATIENT
Start: 2021-05-16 | End: 2021-05-16

## 2021-05-16 RX ORDER — THIAMINE HYDROCHLORIDE 100 MG/ML
100 INJECTION, SOLUTION INTRAMUSCULAR; INTRAVENOUS ONCE
Status: COMPLETED | OUTPATIENT
Start: 2021-05-16 | End: 2021-05-16

## 2021-05-16 RX ORDER — SODIUM CHLORIDE, SODIUM LACTATE, POTASSIUM CHLORIDE, CALCIUM CHLORIDE 600; 310; 30; 20 MG/100ML; MG/100ML; MG/100ML; MG/100ML
INJECTION, SOLUTION INTRAVENOUS ONCE
Status: COMPLETED | OUTPATIENT
Start: 2021-05-16 | End: 2021-05-17

## 2021-05-16 RX ORDER — POTASSIUM CHLORIDE 7.45 MG/ML
10 INJECTION INTRAVENOUS
Status: COMPLETED | OUTPATIENT
Start: 2021-05-17 | End: 2021-05-17

## 2021-05-16 RX ORDER — PHENOBARBITAL SODIUM 65 MG/ML
130 INJECTION INTRAMUSCULAR ONCE
Status: COMPLETED | OUTPATIENT
Start: 2021-05-16 | End: 2021-05-16

## 2021-05-16 RX ORDER — 0.9 % SODIUM CHLORIDE 0.9 %
1000 INTRAVENOUS SOLUTION INTRAVENOUS ONCE
Status: COMPLETED | OUTPATIENT
Start: 2021-05-16 | End: 2021-05-16

## 2021-05-16 RX ORDER — SODIUM CHLORIDE, SODIUM LACTATE, POTASSIUM CHLORIDE, CALCIUM CHLORIDE 600; 310; 30; 20 MG/100ML; MG/100ML; MG/100ML; MG/100ML
1000 INJECTION, SOLUTION INTRAVENOUS ONCE
Status: COMPLETED | OUTPATIENT
Start: 2021-05-16 | End: 2021-05-17

## 2021-05-16 RX ORDER — MORPHINE SULFATE 4 MG/ML
4 INJECTION, SOLUTION INTRAMUSCULAR; INTRAVENOUS ONCE
Status: COMPLETED | OUTPATIENT
Start: 2021-05-16 | End: 2021-05-16

## 2021-05-16 RX ADMIN — MORPHINE SULFATE 4 MG: 4 INJECTION, SOLUTION INTRAMUSCULAR; INTRAVENOUS at 21:23

## 2021-05-16 RX ADMIN — PHENOBARBITAL SODIUM 130 MG: 65 INJECTION INTRAMUSCULAR; INTRAVENOUS at 21:18

## 2021-05-16 RX ADMIN — THIAMINE HYDROCHLORIDE 100 MG: 100 INJECTION, SOLUTION INTRAMUSCULAR; INTRAVENOUS at 21:24

## 2021-05-16 RX ADMIN — ONDANSETRON 4 MG: 2 INJECTION INTRAMUSCULAR; INTRAVENOUS at 21:26

## 2021-05-16 RX ADMIN — IOPAMIDOL 80 ML: 755 INJECTION, SOLUTION INTRAVENOUS at 23:47

## 2021-05-16 RX ADMIN — SODIUM CHLORIDE 1000 ML: 9 INJECTION, SOLUTION INTRAVENOUS at 21:28

## 2021-05-16 ASSESSMENT — ENCOUNTER SYMPTOMS
ABDOMINAL PAIN: 1
BLOOD IN STOOL: 0
DIARRHEA: 1
SHORTNESS OF BREATH: 1
COUGH: 0
COLOR CHANGE: 0
VOMITING: 1
TROUBLE SWALLOWING: 0
RHINORRHEA: 0
NAUSEA: 1

## 2021-05-16 ASSESSMENT — PAIN SCALES - GENERAL: PAINLEVEL_OUTOF10: 10

## 2021-05-17 ENCOUNTER — APPOINTMENT (OUTPATIENT)
Dept: GENERAL RADIOLOGY | Age: 31
DRG: 282 | End: 2021-05-17
Payer: COMMERCIAL

## 2021-05-17 PROBLEM — E87.20 LACTIC ACIDOSIS: Status: ACTIVE | Noted: 2021-05-17

## 2021-05-17 PROBLEM — K85.20 ALCOHOL INDUCED ACUTE PANCREATITIS WITHOUT NECROSIS OR INFECTION: Status: ACTIVE | Noted: 2021-05-17

## 2021-05-17 LAB
ACETAMINOPHEN LEVEL: <5 MCG/ML (ref 10–30)
ALBUMIN SERPL-MCNC: 3.9 G/DL (ref 3.5–5.2)
ALP BLD-CCNC: 97 U/L (ref 40–129)
ALT SERPL-CCNC: 13 U/L (ref 0–40)
ANION GAP SERPL CALCULATED.3IONS-SCNC: 13 MMOL/L (ref 7–16)
AST SERPL-CCNC: 29 U/L (ref 0–39)
BASOPHILS ABSOLUTE: 0 E9/L (ref 0–0.2)
BASOPHILS RELATIVE PERCENT: 0.2 % (ref 0–2)
BETA-HYDROXYBUTYRATE: 0.87 MMOL/L (ref 0.02–0.27)
BILIRUB SERPL-MCNC: 1.3 MG/DL (ref 0–1.2)
BUN BLDV-MCNC: 9 MG/DL (ref 6–20)
CALCIUM IONIZED: 1.21 MMOL/L (ref 1.15–1.33)
CALCIUM SERPL-MCNC: 8.4 MG/DL (ref 8.6–10.2)
CHLORIDE BLD-SCNC: 99 MMOL/L (ref 98–107)
CHOLESTEROL, TOTAL: 166 MG/DL (ref 0–199)
CO2: 25 MMOL/L (ref 22–29)
CREAT SERPL-MCNC: 0.5 MG/DL (ref 0.7–1.2)
EKG ATRIAL RATE: 78 BPM
EKG P AXIS: 14 DEGREES
EKG P-R INTERVAL: 112 MS
EKG Q-T INTERVAL: 412 MS
EKG QRS DURATION: 96 MS
EKG QTC CALCULATION (BAZETT): 469 MS
EKG R AXIS: 41 DEGREES
EKG T AXIS: 67 DEGREES
EKG VENTRICULAR RATE: 78 BPM
EOSINOPHILS ABSOLUTE: 0 E9/L (ref 0.05–0.5)
EOSINOPHILS RELATIVE PERCENT: 0 % (ref 0–6)
ETHANOL: 13 MG/DL (ref 0–0.08)
GFR AFRICAN AMERICAN: >60
GFR NON-AFRICAN AMERICAN: >60 ML/MIN/1.73
GLUCOSE BLD-MCNC: 144 MG/DL (ref 74–99)
HCT VFR BLD CALC: 42.5 % (ref 37–54)
HDLC SERPL-MCNC: 78 MG/DL
HEMOGLOBIN: 14.8 G/DL (ref 12.5–16.5)
LACTIC ACID: 1.1 MMOL/L (ref 0.5–2.2)
LACTIC ACID: 2.9 MMOL/L (ref 0.5–2.2)
LDL CHOLESTEROL CALCULATED: 66 MG/DL (ref 0–99)
LIPASE: 1327 U/L (ref 13–60)
LYMPHOCYTES ABSOLUTE: 0.16 E9/L (ref 1.5–4)
LYMPHOCYTES RELATIVE PERCENT: 0.9 % (ref 20–42)
MAGNESIUM: 1.5 MG/DL (ref 1.6–2.6)
MAGNESIUM: 1.8 MG/DL (ref 1.6–2.6)
MCH RBC QN AUTO: 34.6 PG (ref 26–35)
MCHC RBC AUTO-ENTMCNC: 34.8 % (ref 32–34.5)
MCV RBC AUTO: 99.3 FL (ref 80–99.9)
MONOCYTES ABSOLUTE: 0.16 E9/L (ref 0.1–0.95)
MONOCYTES RELATIVE PERCENT: 0.9 % (ref 2–12)
NEUTROPHILS ABSOLUTE: 15.58 E9/L (ref 1.8–7.3)
NEUTROPHILS RELATIVE PERCENT: 98.3 % (ref 43–80)
PDW BLD-RTO: 14.4 FL (ref 11.5–15)
PHOSPHORUS: 3.3 MG/DL (ref 2.5–4.5)
PLATELET # BLD: 104 E9/L (ref 130–450)
PMV BLD AUTO: 11.3 FL (ref 7–12)
POTASSIUM SERPL-SCNC: 4 MMOL/L (ref 3.5–5)
PROCALCITONIN: 0.06 NG/ML (ref 0–0.08)
RBC # BLD: 4.28 E12/L (ref 3.8–5.8)
SALICYLATE, SERUM: 0.7 MG/DL (ref 0–30)
SODIUM BLD-SCNC: 137 MMOL/L (ref 132–146)
TOTAL PROTEIN: 6.1 G/DL (ref 6.4–8.3)
TRICYCLIC ANTIDEPRESSANTS SCREEN SERUM: NEGATIVE NG/ML
TRIGL SERPL-MCNC: 108 MG/DL (ref 0–149)
TROPONIN: <0.01 NG/ML (ref 0–0.03)
VITAMIN D 25-HYDROXY: 10 NG/ML (ref 30–100)
VLDLC SERPL CALC-MCNC: 22 MG/DL
WBC # BLD: 15.9 E9/L (ref 4.5–11.5)

## 2021-05-17 PROCEDURE — 2580000003 HC RX 258: Performed by: INTERNAL MEDICINE

## 2021-05-17 PROCEDURE — 80307 DRUG TEST PRSMV CHEM ANLYZR: CPT

## 2021-05-17 PROCEDURE — 99233 SBSQ HOSP IP/OBS HIGH 50: CPT | Performed by: INTERNAL MEDICINE

## 2021-05-17 PROCEDURE — C9113 INJ PANTOPRAZOLE SODIUM, VIA: HCPCS | Performed by: INTERNAL MEDICINE

## 2021-05-17 PROCEDURE — 83605 ASSAY OF LACTIC ACID: CPT

## 2021-05-17 PROCEDURE — 71045 X-RAY EXAM CHEST 1 VIEW: CPT

## 2021-05-17 PROCEDURE — 36415 COLL VENOUS BLD VENIPUNCTURE: CPT

## 2021-05-17 PROCEDURE — 83735 ASSAY OF MAGNESIUM: CPT

## 2021-05-17 PROCEDURE — 80061 LIPID PANEL: CPT

## 2021-05-17 PROCEDURE — 80179 DRUG ASSAY SALICYLATE: CPT

## 2021-05-17 PROCEDURE — 82330 ASSAY OF CALCIUM: CPT

## 2021-05-17 PROCEDURE — 6370000000 HC RX 637 (ALT 250 FOR IP): Performed by: INTERNAL MEDICINE

## 2021-05-17 PROCEDURE — 6360000002 HC RX W HCPCS: Performed by: INTERNAL MEDICINE

## 2021-05-17 PROCEDURE — 6360000002 HC RX W HCPCS: Performed by: HOSPITALIST

## 2021-05-17 PROCEDURE — 84145 PROCALCITONIN (PCT): CPT

## 2021-05-17 PROCEDURE — 80143 DRUG ASSAY ACETAMINOPHEN: CPT

## 2021-05-17 PROCEDURE — 2580000003 HC RX 258: Performed by: EMERGENCY MEDICINE

## 2021-05-17 PROCEDURE — 84100 ASSAY OF PHOSPHORUS: CPT

## 2021-05-17 PROCEDURE — 82306 VITAMIN D 25 HYDROXY: CPT

## 2021-05-17 PROCEDURE — 87040 BLOOD CULTURE FOR BACTERIA: CPT

## 2021-05-17 PROCEDURE — 1200000000 HC SEMI PRIVATE

## 2021-05-17 PROCEDURE — 85025 COMPLETE CBC W/AUTO DIFF WBC: CPT

## 2021-05-17 PROCEDURE — 82077 ASSAY SPEC XCP UR&BREATH IA: CPT

## 2021-05-17 PROCEDURE — 80053 COMPREHEN METABOLIC PANEL: CPT

## 2021-05-17 PROCEDURE — 6360000002 HC RX W HCPCS: Performed by: EMERGENCY MEDICINE

## 2021-05-17 PROCEDURE — 82010 KETONE BODYS QUAN: CPT

## 2021-05-17 RX ORDER — CHLORDIAZEPOXIDE HYDROCHLORIDE 25 MG/1
50 CAPSULE, GELATIN COATED ORAL EVERY 6 HOURS
Status: DISCONTINUED | OUTPATIENT
Start: 2021-05-17 | End: 2021-05-17

## 2021-05-17 RX ORDER — ACETAMINOPHEN 650 MG/1
650 SUPPOSITORY RECTAL EVERY 6 HOURS PRN
Status: DISCONTINUED | OUTPATIENT
Start: 2021-05-17 | End: 2021-05-19 | Stop reason: HOSPADM

## 2021-05-17 RX ORDER — SODIUM CHLORIDE 0.9 % (FLUSH) 0.9 %
5-40 SYRINGE (ML) INJECTION EVERY 12 HOURS SCHEDULED
Status: DISCONTINUED | OUTPATIENT
Start: 2021-05-17 | End: 2021-05-17 | Stop reason: SDUPTHER

## 2021-05-17 RX ORDER — HYDROMORPHONE HYDROCHLORIDE 1 MG/ML
1 INJECTION, SOLUTION INTRAMUSCULAR; INTRAVENOUS; SUBCUTANEOUS ONCE
Status: COMPLETED | OUTPATIENT
Start: 2021-05-17 | End: 2021-05-17

## 2021-05-17 RX ORDER — MAGNESIUM SULFATE IN WATER 40 MG/ML
2000 INJECTION, SOLUTION INTRAVENOUS ONCE
Status: COMPLETED | OUTPATIENT
Start: 2021-05-17 | End: 2021-05-17

## 2021-05-17 RX ORDER — LORAZEPAM 2 MG/ML
0.5 INJECTION INTRAMUSCULAR
Status: DISCONTINUED | OUTPATIENT
Start: 2021-05-17 | End: 2021-05-17

## 2021-05-17 RX ORDER — NICOTINE 21 MG/24HR
1 PATCH, TRANSDERMAL 24 HOURS TRANSDERMAL DAILY
Status: DISCONTINUED | OUTPATIENT
Start: 2021-05-17 | End: 2021-05-19 | Stop reason: HOSPADM

## 2021-05-17 RX ORDER — METOCLOPRAMIDE HYDROCHLORIDE 5 MG/ML
10 INJECTION INTRAMUSCULAR; INTRAVENOUS ONCE
Status: COMPLETED | OUTPATIENT
Start: 2021-05-17 | End: 2021-05-17

## 2021-05-17 RX ORDER — SODIUM CHLORIDE 0.9 % (FLUSH) 0.9 %
5-40 SYRINGE (ML) INJECTION PRN
Status: DISCONTINUED | OUTPATIENT
Start: 2021-05-17 | End: 2021-05-17 | Stop reason: SDUPTHER

## 2021-05-17 RX ORDER — ERGOCALCIFEROL 1.25 MG/1
50000 CAPSULE ORAL WEEKLY
Status: DISCONTINUED | OUTPATIENT
Start: 2021-05-17 | End: 2021-05-19 | Stop reason: HOSPADM

## 2021-05-17 RX ORDER — SODIUM CHLORIDE, SODIUM LACTATE, POTASSIUM CHLORIDE, CALCIUM CHLORIDE 600; 310; 30; 20 MG/100ML; MG/100ML; MG/100ML; MG/100ML
INJECTION, SOLUTION INTRAVENOUS CONTINUOUS
Status: DISCONTINUED | OUTPATIENT
Start: 2021-05-17 | End: 2021-05-19 | Stop reason: HOSPADM

## 2021-05-17 RX ORDER — SODIUM CHLORIDE 0.9 % (FLUSH) 0.9 %
5-40 SYRINGE (ML) INJECTION EVERY 12 HOURS SCHEDULED
Status: DISCONTINUED | OUTPATIENT
Start: 2021-05-17 | End: 2021-05-19 | Stop reason: HOSPADM

## 2021-05-17 RX ORDER — HYDROMORPHONE HYDROCHLORIDE 1 MG/ML
1 INJECTION, SOLUTION INTRAMUSCULAR; INTRAVENOUS; SUBCUTANEOUS
Status: DISCONTINUED | OUTPATIENT
Start: 2021-05-17 | End: 2021-05-17

## 2021-05-17 RX ORDER — FOLIC ACID 1 MG/1
1 TABLET ORAL DAILY
Status: DISCONTINUED | OUTPATIENT
Start: 2021-05-17 | End: 2021-05-19 | Stop reason: HOSPADM

## 2021-05-17 RX ORDER — THIAMINE HYDROCHLORIDE 100 MG/ML
100 INJECTION, SOLUTION INTRAMUSCULAR; INTRAVENOUS DAILY
Status: DISCONTINUED | OUTPATIENT
Start: 2021-05-17 | End: 2021-05-17 | Stop reason: CLARIF

## 2021-05-17 RX ORDER — CHLORDIAZEPOXIDE HYDROCHLORIDE 25 MG/1
25 CAPSULE, GELATIN COATED ORAL EVERY 6 HOURS
Status: DISCONTINUED | OUTPATIENT
Start: 2021-05-18 | End: 2021-05-17

## 2021-05-17 RX ORDER — SODIUM CHLORIDE 9 MG/ML
10 INJECTION INTRAVENOUS DAILY
Status: DISCONTINUED | OUTPATIENT
Start: 2021-05-17 | End: 2021-05-19 | Stop reason: HOSPADM

## 2021-05-17 RX ORDER — SENNA PLUS 8.6 MG/1
1 TABLET ORAL DAILY PRN
Status: DISCONTINUED | OUTPATIENT
Start: 2021-05-17 | End: 2021-05-19 | Stop reason: HOSPADM

## 2021-05-17 RX ORDER — MAGNESIUM SULFATE IN WATER 40 MG/ML
2000 INJECTION, SOLUTION INTRAVENOUS PRN
Status: DISCONTINUED | OUTPATIENT
Start: 2021-05-17 | End: 2021-05-19 | Stop reason: HOSPADM

## 2021-05-17 RX ORDER — SODIUM CHLORIDE 9 MG/ML
25 INJECTION, SOLUTION INTRAVENOUS PRN
Status: DISCONTINUED | OUTPATIENT
Start: 2021-05-17 | End: 2021-05-19 | Stop reason: HOSPADM

## 2021-05-17 RX ORDER — PHENOBARBITAL 32.4 MG/1
16.2 TABLET ORAL 2 TIMES DAILY
Status: DISCONTINUED | OUTPATIENT
Start: 2021-05-19 | End: 2021-05-19 | Stop reason: HOSPADM

## 2021-05-17 RX ORDER — ACETAMINOPHEN 325 MG/1
650 TABLET ORAL EVERY 6 HOURS PRN
Status: DISCONTINUED | OUTPATIENT
Start: 2021-05-17 | End: 2021-05-19 | Stop reason: HOSPADM

## 2021-05-17 RX ORDER — KETOROLAC TROMETHAMINE 30 MG/ML
15 INJECTION, SOLUTION INTRAMUSCULAR; INTRAVENOUS ONCE
Status: COMPLETED | OUTPATIENT
Start: 2021-05-17 | End: 2021-05-17

## 2021-05-17 RX ORDER — GAUZE BANDAGE 2" X 2"
100 BANDAGE TOPICAL DAILY
Status: DISCONTINUED | OUTPATIENT
Start: 2021-05-17 | End: 2021-05-17

## 2021-05-17 RX ORDER — PHENOBARBITAL 32.4 MG/1
64.8 TABLET ORAL 2 TIMES DAILY
Status: COMPLETED | OUTPATIENT
Start: 2021-05-17 | End: 2021-05-17

## 2021-05-17 RX ORDER — PANTOPRAZOLE SODIUM 40 MG/10ML
40 INJECTION, POWDER, LYOPHILIZED, FOR SOLUTION INTRAVENOUS DAILY
Status: DISCONTINUED | OUTPATIENT
Start: 2021-05-17 | End: 2021-05-19 | Stop reason: HOSPADM

## 2021-05-17 RX ORDER — DIAZEPAM 5 MG/ML
5 INJECTION, SOLUTION INTRAMUSCULAR; INTRAVENOUS ONCE
Status: DISCONTINUED | OUTPATIENT
Start: 2021-05-17 | End: 2021-05-17

## 2021-05-17 RX ORDER — PHENOBARBITAL 32.4 MG/1
32.4 TABLET ORAL 2 TIMES DAILY
Status: COMPLETED | OUTPATIENT
Start: 2021-05-18 | End: 2021-05-18

## 2021-05-17 RX ORDER — POTASSIUM CHLORIDE 7.45 MG/ML
10 INJECTION INTRAVENOUS PRN
Status: DISCONTINUED | OUTPATIENT
Start: 2021-05-17 | End: 2021-05-19 | Stop reason: HOSPADM

## 2021-05-17 RX ORDER — SODIUM CHLORIDE 9 MG/ML
25 INJECTION, SOLUTION INTRAVENOUS PRN
Status: DISCONTINUED | OUTPATIENT
Start: 2021-05-17 | End: 2021-05-17 | Stop reason: SDUPTHER

## 2021-05-17 RX ORDER — SODIUM CHLORIDE, SODIUM LACTATE, POTASSIUM CHLORIDE, CALCIUM CHLORIDE 600; 310; 30; 20 MG/100ML; MG/100ML; MG/100ML; MG/100ML
INJECTION, SOLUTION INTRAVENOUS CONTINUOUS
Status: ACTIVE | OUTPATIENT
Start: 2021-05-17 | End: 2021-05-17

## 2021-05-17 RX ORDER — HYDROMORPHONE HYDROCHLORIDE 1 MG/ML
0.5 INJECTION, SOLUTION INTRAMUSCULAR; INTRAVENOUS; SUBCUTANEOUS
Status: DISCONTINUED | OUTPATIENT
Start: 2021-05-17 | End: 2021-05-17

## 2021-05-17 RX ORDER — CHLORDIAZEPOXIDE HYDROCHLORIDE 25 MG/1
25 CAPSULE, GELATIN COATED ORAL EVERY 6 HOURS PRN
Status: DISCONTINUED | OUTPATIENT
Start: 2021-05-17 | End: 2021-05-19 | Stop reason: HOSPADM

## 2021-05-17 RX ORDER — SODIUM CHLORIDE 0.9 % (FLUSH) 0.9 %
5-40 SYRINGE (ML) INJECTION PRN
Status: DISCONTINUED | OUTPATIENT
Start: 2021-05-17 | End: 2021-05-19 | Stop reason: HOSPADM

## 2021-05-17 RX ORDER — HYDROMORPHONE HYDROCHLORIDE 1 MG/ML
0.5 INJECTION, SOLUTION INTRAMUSCULAR; INTRAVENOUS; SUBCUTANEOUS
Status: DISCONTINUED | OUTPATIENT
Start: 2021-05-17 | End: 2021-05-19 | Stop reason: HOSPADM

## 2021-05-17 RX ORDER — HYDROMORPHONE HCL 110MG/55ML
1.2 PATIENT CONTROLLED ANALGESIA SYRINGE INTRAVENOUS
Status: DISCONTINUED | OUTPATIENT
Start: 2021-05-17 | End: 2021-05-19 | Stop reason: HOSPADM

## 2021-05-17 RX ADMIN — ENOXAPARIN SODIUM 40 MG: 40 INJECTION SUBCUTANEOUS at 09:18

## 2021-05-17 RX ADMIN — CHLORDIAZEPOXIDE HYDROCHLORIDE 25 MG: 25 CAPSULE ORAL at 15:43

## 2021-05-17 RX ADMIN — SODIUM CHLORIDE, POTASSIUM CHLORIDE, SODIUM LACTATE AND CALCIUM CHLORIDE 1000 ML: 600; 310; 30; 20 INJECTION, SOLUTION INTRAVENOUS at 01:04

## 2021-05-17 RX ADMIN — TRIMETHOBENZAMIDE HYDROCHLORIDE 200 MG: 100 INJECTION INTRAMUSCULAR at 12:41

## 2021-05-17 RX ADMIN — CHLORDIAZEPOXIDE HYDROCHLORIDE 50 MG: 25 CAPSULE ORAL at 03:02

## 2021-05-17 RX ADMIN — HYDROMORPHONE HYDROCHLORIDE 1.2 MG: 2 INJECTION, SOLUTION INTRAMUSCULAR; INTRAVENOUS; SUBCUTANEOUS at 15:43

## 2021-05-17 RX ADMIN — HYDROMORPHONE HYDROCHLORIDE 1 MG: 1 INJECTION, SOLUTION INTRAMUSCULAR; INTRAVENOUS; SUBCUTANEOUS at 09:25

## 2021-05-17 RX ADMIN — PHENOBARBITAL 64.8 MG: 32.4 TABLET ORAL at 21:59

## 2021-05-17 RX ADMIN — ERGOCALCIFEROL 50000 UNITS: 1.25 CAPSULE ORAL at 13:18

## 2021-05-17 RX ADMIN — PHENOBARBITAL 64.8 MG: 32.4 TABLET ORAL at 09:18

## 2021-05-17 RX ADMIN — SODIUM CHLORIDE, POTASSIUM CHLORIDE, SODIUM LACTATE AND CALCIUM CHLORIDE: 600; 310; 30; 20 INJECTION, SOLUTION INTRAVENOUS at 03:29

## 2021-05-17 RX ADMIN — HYDROMORPHONE HYDROCHLORIDE 1 MG: 1 INJECTION, SOLUTION INTRAMUSCULAR; INTRAVENOUS; SUBCUTANEOUS at 06:33

## 2021-05-17 RX ADMIN — MAGNESIUM SULFATE HEPTAHYDRATE 2000 MG: 40 INJECTION, SOLUTION INTRAVENOUS at 00:47

## 2021-05-17 RX ADMIN — CHLORDIAZEPOXIDE HYDROCHLORIDE 25 MG: 25 CAPSULE ORAL at 09:18

## 2021-05-17 RX ADMIN — SODIUM CHLORIDE, PRESERVATIVE FREE 10 ML: 5 INJECTION INTRAVENOUS at 09:24

## 2021-05-17 RX ADMIN — MAGNESIUM SULFATE HEPTAHYDRATE 2000 MG: 40 INJECTION, SOLUTION INTRAVENOUS at 10:20

## 2021-05-17 RX ADMIN — METOCLOPRAMIDE HYDROCHLORIDE 10 MG: 5 INJECTION INTRAMUSCULAR; INTRAVENOUS at 00:41

## 2021-05-17 RX ADMIN — POTASSIUM CHLORIDE 10 MEQ: 10 INJECTION, SOLUTION INTRAVENOUS at 06:38

## 2021-05-17 RX ADMIN — HYDROMORPHONE HYDROCHLORIDE 1.2 MG: 2 INJECTION, SOLUTION INTRAMUSCULAR; INTRAVENOUS; SUBCUTANEOUS at 12:40

## 2021-05-17 RX ADMIN — POTASSIUM CHLORIDE 10 MEQ: 10 INJECTION, SOLUTION INTRAVENOUS at 03:02

## 2021-05-17 RX ADMIN — THIAMINE HYDROCHLORIDE 100 MG: 100 INJECTION, SOLUTION INTRAMUSCULAR; INTRAVENOUS at 09:38

## 2021-05-17 RX ADMIN — HYDROMORPHONE HYDROCHLORIDE 1.2 MG: 2 INJECTION, SOLUTION INTRAMUSCULAR; INTRAVENOUS; SUBCUTANEOUS at 19:36

## 2021-05-17 RX ADMIN — SODIUM CHLORIDE, PRESERVATIVE FREE 10 ML: 5 INJECTION INTRAVENOUS at 09:18

## 2021-05-17 RX ADMIN — PANTOPRAZOLE SODIUM 40 MG: 40 INJECTION, POWDER, FOR SOLUTION INTRAVENOUS at 09:17

## 2021-05-17 RX ADMIN — CHLORDIAZEPOXIDE HYDROCHLORIDE 25 MG: 25 CAPSULE ORAL at 21:59

## 2021-05-17 RX ADMIN — SODIUM CHLORIDE, POTASSIUM CHLORIDE, SODIUM LACTATE AND CALCIUM CHLORIDE: 600; 310; 30; 20 INJECTION, SOLUTION INTRAVENOUS at 15:07

## 2021-05-17 RX ADMIN — SODIUM CHLORIDE, POTASSIUM CHLORIDE, SODIUM LACTATE AND CALCIUM CHLORIDE: 600; 310; 30; 20 INJECTION, SOLUTION INTRAVENOUS at 09:40

## 2021-05-17 RX ADMIN — POTASSIUM CHLORIDE 10 MEQ: 10 INJECTION, SOLUTION INTRAVENOUS at 00:51

## 2021-05-17 RX ADMIN — SODIUM CHLORIDE, POTASSIUM CHLORIDE, SODIUM LACTATE AND CALCIUM CHLORIDE: 600; 310; 30; 20 INJECTION, SOLUTION INTRAVENOUS at 21:59

## 2021-05-17 RX ADMIN — HYDROMORPHONE HYDROCHLORIDE 1 MG: 1 INJECTION, SOLUTION INTRAMUSCULAR; INTRAVENOUS; SUBCUTANEOUS at 00:41

## 2021-05-17 RX ADMIN — HYDROMORPHONE HYDROCHLORIDE 1.2 MG: 2 INJECTION, SOLUTION INTRAMUSCULAR; INTRAVENOUS; SUBCUTANEOUS at 22:39

## 2021-05-17 RX ADMIN — ACETAMINOPHEN 650 MG: 325 TABLET ORAL at 05:37

## 2021-05-17 RX ADMIN — HYDROMORPHONE HYDROCHLORIDE 1 MG: 1 INJECTION, SOLUTION INTRAMUSCULAR; INTRAVENOUS; SUBCUTANEOUS at 03:28

## 2021-05-17 RX ADMIN — KETOROLAC TROMETHAMINE 15 MG: 30 INJECTION, SOLUTION INTRAMUSCULAR; INTRAVENOUS at 00:41

## 2021-05-17 RX ADMIN — POTASSIUM CHLORIDE 10 MEQ: 10 INJECTION, SOLUTION INTRAVENOUS at 04:36

## 2021-05-17 RX ADMIN — FOLIC ACID 1 MG: 1 TABLET ORAL at 09:18

## 2021-05-17 RX ADMIN — SODIUM CHLORIDE, POTASSIUM CHLORIDE, SODIUM LACTATE AND CALCIUM CHLORIDE: 600; 310; 30; 20 INJECTION, SOLUTION INTRAVENOUS at 03:03

## 2021-05-17 ASSESSMENT — PAIN DESCRIPTION - PAIN TYPE
TYPE: ACUTE PAIN
TYPE: ACUTE PAIN

## 2021-05-17 ASSESSMENT — PAIN SCALES - GENERAL
PAINLEVEL_OUTOF10: 4
PAINLEVEL_OUTOF10: 9
PAINLEVEL_OUTOF10: 8
PAINLEVEL_OUTOF10: 10
PAINLEVEL_OUTOF10: 10
PAINLEVEL_OUTOF10: 7
PAINLEVEL_OUTOF10: 10

## 2021-05-17 ASSESSMENT — PAIN DESCRIPTION - ORIENTATION
ORIENTATION: OTHER (COMMENT)
ORIENTATION: RIGHT;LEFT;UPPER
ORIENTATION: MID;UPPER;RIGHT;LEFT

## 2021-05-17 ASSESSMENT — PAIN DESCRIPTION - FREQUENCY
FREQUENCY: CONTINUOUS

## 2021-05-17 ASSESSMENT — PAIN - FUNCTIONAL ASSESSMENT: PAIN_FUNCTIONAL_ASSESSMENT: PREVENTS OR INTERFERES SOME ACTIVE ACTIVITIES AND ADLS

## 2021-05-17 ASSESSMENT — PAIN DESCRIPTION - PROGRESSION
CLINICAL_PROGRESSION: GRADUALLY IMPROVING
CLINICAL_PROGRESSION: GRADUALLY IMPROVING

## 2021-05-17 ASSESSMENT — PAIN DESCRIPTION - DESCRIPTORS
DESCRIPTORS: STABBING
DESCRIPTORS: SHARP;STABBING

## 2021-05-17 ASSESSMENT — PAIN DESCRIPTION - ONSET: ONSET: ON-GOING

## 2021-05-17 ASSESSMENT — PAIN DESCRIPTION - LOCATION
LOCATION: ABDOMEN

## 2021-05-17 NOTE — PROGRESS NOTES
[address] [place], [place] [address]ist   Progress Note    Admitting Date and Time: 5/16/2021  8:37 PM  Admit Dx: Alcohol induced acute pancreatitis without necrosis or infection [K85.20]    Subjective:     Pt feels pain is not adequately controlled. Healing is really for about 2 hours and never completely goes away. He is asking pain medication can begin sooner than every 3 hours. Patient states he is done with drinking. I asked him to quantify what he actually he was drinking at home and its more than was listed in the admitting H&P. He actually drinks two 1L bottles of whiskey. He admits to 2 prior episodes of pancreatitis. The first 1 when he was a 25years old and the second 1 was just this past December when he got hospitalized. I do not see evidence of that in our system. He has had multiple ER visits over the last several months for alcohol withdrawal.  Two hospitalizations in February for alkalization and both times he left AGAINST MEDICAL ADVICE. Per RN: patient will not take Ativan as he as states it makes him hallucinate.      enoxaparin  40 mg Subcutaneous Daily    folic acid  1 mg Oral Daily    pantoprazole  40 mg Intravenous Daily    And    sodium chloride (PF)  10 mL Intravenous Daily    sodium chloride flush  5-40 mL Intravenous 2 times per day    thiamine (VITAMIN B1) IVPB  100 mg Intravenous Daily    nicotine  1 patch Transdermal Daily    PHENobarbital  64.8 mg Oral BID    Followed by   Ina Morin ON 5/18/2021] PHENobarbital  32.4 mg Oral BID    Followed by   Ina Morin ON 5/19/2021] PHENobarbital  16.2 mg Oral BID     acetaminophen, 650 mg, Q6H PRN   Or  acetaminophen, 650 mg, Q6H PRN  senna, 1 tablet, Daily PRN  trimethobenzamide, 200 mg, Q6H PRN  HYDROmorphone, 0.5 mg, Q3H PRN   Or  HYDROmorphone, 1 mg, Q3H PRN  sodium chloride flush, 5-40 mL, PRN  sodium chloride, 25 mL, PRN  magnesium sulfate, 2,000 mg, PRN  potassium chloride, 10 mEq, PRN  chlordiazePOXIDE, 25 mg, Q6H PRN Objective:    BP (!) 160/91   Pulse 65   Temp 97.6 °F (36.4 °C) (Oral)   Resp 16   Ht 5' 7\" (1.702 m)   Wt 150 lb (68 kg)   SpO2 98%   BMI 23.49 kg/m²   General Appearance: Patient with eyes shut. They do appear swollen. He is tearful. He is wearing headphones. Skin: warm and dry  Head: normocephalic and atraumatic  Eyes: pupils equal, round, and reactive to light, extraocular eye movements intact, conjunctivae normal  Neck: neck supple and non tender without mass   Pulmonary/Chest: clear to auscultation bilaterally- no wheezes, rales or rhonchi, normal air movement, no respiratory distress  Cardiovascular: normal rate, normal S1 and S2 and no carotid bruits  Abdomen: Soft, he is holding ice pack on his epigastric area. He is tender to palpation. Extremities: no cyanosis, no clubbing and no edema  Neurologic: no cranial nerve deficit and speech normal      Recent Labs     05/16/21 2215 05/17/21  0713    137   K 3.3* 4.0    99   CO2 18* 25   BUN 9 9   CREATININE 0.6* 0.5*   GLUCOSE 121* 144*   CALCIUM 7.9* 8.4*       Recent Labs     05/16/21 2215 05/17/21  0713   ALKPHOS 103 97   PROT 6.4 6.1*   LABALBU 4.2 3.9   BILITOT 0.5 1.3*   AST 35 29   ALT 16 13       Recent Labs     05/16/21  2110 05/17/21  0713   WBC 16.0* 15.9*   RBC 5.01 4.28   HGB 17.8* 14.8   HCT 48.5 42.5   MCV 96.8 99.3   MCH 35.5* 34.6   MCHC 36.7* 34.8*   RDW 14.2 14.4    104*   MPV 11.9 11.3       Lipid panel [[id number]] Collected: 05/17/21 0713     Specimen: Blood Updated: 05/17/21 0840      Cholesterol, Total 166 mg/dL       Triglycerides 108 mg/dL       HDL 78 mg/dL       LDL Calculated 66 mg/dL       VLDL Cholesterol Calculated 22 mg/dL        Lipase [[id number]] (Abnormal) Collected: 05/16/21 2329     Specimen: Blood Updated: 05/17/21 0030      Lipase 1,327 U/L         Ref.  Range 5/16/2021 21:10 5/16/2021 23:29 5/17/2021 07:13   Lactic Acid Latest Ref Range: 0.5 - 2.2 mmol/L 4.5 (HH) 2.9 (H) 1.1 Radiology:   XR CHEST PORTABLE   Final Result      No acute intrathoracic pathology. CT ABDOMEN PELVIS W IV CONTRAST Additional Contrast? None   Final Result   Peripancreatic inflammatory changes consistent with acute pancreatitis. Inflammatory changes extend to the left anterior pararenal space. No   organized circumscribed peripancreatic fluid collections. Assessment:  Active Problems:    Alcohol induced acute pancreatitis without necrosis or infection  Resolved Problems:    * No resolved hospital problems. *      Plan:    1. Alcohol induced acute pancreatitis--IV fluid hydration with lactated Ringer's. Continue Dilaudid pain panel every 3 hours. .  Triglycerides were very within normal limits. Will ask GI to evaluate given patient having recurrent pancreatitis. Ice chips for now but may be able to start clear diet. 2. Alcohol withdrawal syndrome--prior history of DTs. Discussed with the Pharm. D. and we decided to put him on a phenobarb taper and use Librium as needed. Seizure precautions. .  3. Alcohol abuse disorder--continue folate and thiamine daily. Patient was resistant to inpatient rehab but states that he has autistic brother who he needs to attend to. His mother recently  and so there is no one to look after him. He may be open to an IOP. Last peer recovery to see him tomorrow when he is more comfortable. 4. Lactic acidosis--resolved with IV fluid hydration. Lactate trend 4.5-->2.9-- >8.6.  5. Neutrophilic leukocytosis--likely reactive but will continue to monitor. 6. Hypokalemia/hypomagnesemia--replete electrolytes as needed. Mg 1.6 and given 2g IV over 2 hours and up to 1.8. Will give additional 2 Mg today as patient's with alcoholism waste Mg. K level up to 4.0 today,. NOTE: This report was transcribed using voice recognition software. Every effort was made to ensure accuracy; however, inadvertent computerized transcription errors may be present.

## 2021-05-17 NOTE — PLAN OF CARE
achieve and maintain a regular respiratory rate will improve  Outcome: Met This Shift     Problem: Skin Integrity:  Goal: Skin integrity will be maintained  Description: Skin integrity will be maintained  Outcome: Met This Shift     Problem: Nausea/Vomiting:  Goal: Ability to achieve adequate nutritional intake will improve  Description: Ability to achieve adequate nutritional intake will improve  Outcome: Met This Shift     Problem: Nausea/Vomiting:  Goal: Absence of nausea/vomiting  Description: Absence of nausea/vomiting  Outcome: Met This Shift

## 2021-05-17 NOTE — H&P
[address] [place], [place] [address]ist Group   History and Physical      CHIEF COMPLAINT:  N/V/abdominal pain     History of Present Illness:  31 y.o. male with a history of alcohol use disorder, MDD, anxiety, and PTSD presents with nausea, vomiting, and abdominal pain. Patient states that has started last night, he started to experience sharp epigastric pain with radiation to the back. Patient also endorsed nausea, vomiting, as well as diarrhea. Patient states that bowel movement has been loose and noted to be dark (patient states that was dark after he started taking Pepto-Bismol). Patient denies having any mucus or blood in the stool. Patient denies having any fever or chills. Patient denies any recent travels or sick contacts. Patient states that he is a daily drinker he drinks about 250 mL of Kajal Jay in a day. Patient states that he has gone through withdrawal and DTs in the past.  In the ED, patient was noted to be in mild distress. Patient remained in sinus tachycardia. Labs were significant for K3.3, CO2 18, anion gap 23, lactic acid 4.5, calcium 7.9, lipase 1327, and WBC 16.  CT of abdomen and pelvis showed peripancreatic inflammatory changes consistent with acute pancreatitis. Inflammatory changes extend to the left anterior pararenal space.  There was no organized circumscribed peripancreatic fluid collections. Patient was given 1 L lactated ringer, Zofran, Reglan, Toradol, morphine and Dilaudid. For alcohol withdrawal, patient was given phenobarb as patient stated Ativan causes him to have hallucinations. Informant(s) for H&P: Patient    REVIEW OF SYSTEMS:  no fevers, chills, cp, sob, ha, vision/hearing changes, wt changes, hot/cold flashes, other open skin lesions, constipation, dysuria/hematuria unless noted in HPI. Complete ROS performed with the patient and is otherwise negative.       PMH:  Past Medical History:   Diagnosis Date    Anxiety     Arm pain     Concussion with HISTORY: epigastric/RUQ ttp, nausea, vomiting, hx of pancreatitis TECHNOLOGIST PROVIDED HISTORY: Reason for exam:->epigastric/RUQ ttp, nausea, vomiting, hx of pancreatitis Additional Contrast?->None Decision Support Exception - unselect if not a suspected or confirmed emergency medical condition->Emergency Medical Condition (MA) FINDINGS: Lower Chest: The lung bases are clear. There is diffuse fatty infiltration of the liver. Organs: Spleen and adrenals within normal limits. Prominent peripancreatic inflammatory changes consistent with acute pancreatitis. Inflammatory changes extend to the left anterior pararenal space. There are no organized circumscribed peripancreatic fluid collections. Mildly distended gallbladder. No evidence of obstructive uropathy. GI/Bowel: No bowel obstruction or free intraperitoneal air. No colitis or diverticulitis. Normal appendix. Pelvis: Urinary bladder within normal limits. Peritoneum/Retroperitoneum: No retroperitoneal adenopathy. Bones/Soft Tissues: No acute bony pathology. Peripancreatic inflammatory changes consistent with acute pancreatitis. Inflammatory changes extend to the left anterior pararenal space. No organized circumscribed peripancreatic fluid collections. XR CHEST PORTABLE    Result Date: 5/17/2021  EXAMINATION: ONE XRAY VIEW OF THE CHEST 5/17/2021 12:11 am COMPARISON: 12/28/2020 HISTORY: ORDERING SYSTEM PROVIDED HISTORY: short of breath TECHNOLOGIST PROVIDED HISTORY: Reason for exam:->short of breath FINDINGS: Chest radiograph: The cardiomediastinal silhouette and hilar contours are normal. The lungs are clear with no focal consolidation, pleural effusion or pneumothorax. The overlying soft tissue and osseous structures do not demonstrate acute abnormality. No acute intrathoracic pathology.        EKG: Normal sinus rhythm  T wave abnormality, consider anterior ischemia  Prolonged QT    ASSESSMENT AND PLAN:      Active Problems:    Alcohol induced acute pancreatitis without necrosis or infection  Resolved Problems:    * No resolved hospital problems. *    1. Alcohol induced acute pancreatitis   - keep NPO until N/V resolves   -  cc/hr for 12 hours then 150 cc/hr   - dilaudid q 3hours PRN for pain   - tigan q 6 hours PRN for N/V   - obtain TG level     2. Alcohol withdrawal syndrome   - patient has history of withdrawal and DTs in the past   - librium 50 mg q 6 hours for 1 day then 25 mg q 6hours for 2 days   - trial of ativan 0.5 mg as needed for acute withdrawal symptoms. Consider switching to CIWA protocol if patient tolerates ativan    - seizure precaution     3. Alcohol use disorder    - folate and thiamine daily     4. HAGMA 2/2 lactic acidosis and alcohol ketoacidosis    5. Leukocytosis likely reactive    - obtain procalcitonin   - obtain blood cultures   - watch off abx for now    6. Hypokalemia and hypomagnesemia   - likely due to vomiting and poor oral intake   - replace as needed   - watch out for refeeding    7. Hypocalcemia   - obtain vitamin D2 level         Code Status: FULL  DVT prophylaxis: Lovenox    NOTE: This report was transcribed using voice recognition software.  Every effort was made to ensure accuracy; however, inadvertent computerized transcription errors may be present.     Electronically signed by Brennan Workman MD on 5/17/2021 at 1:40 AM

## 2021-05-17 NOTE — ED PROVIDER NOTES
ED PROVIDER NOTE    Chief Complaint   Patient presents with    Abdominal Pain     Pt states he's having a pancreatitis flare-up. HPI:  5/16/21,   Time: 9:10 PM EDT       Khushboo Tobias is a 31 y.o. male presenting to the ED for abdominal pain. Gradual onset last night, progressively worsening, sharp epigastric and RUQ pain radiating to back. Worse w/ movement. Associated nausea, vomiting, diarrhea, shortness of breath, lightheadedness. No black/bloody emesis, stools were black after trying pepto bismol. No fever, chills, chest pain. No drug use. Chart review: hx of alcohol abuse and withdrawal, seizures, pancreatitis    Review of Systems:     Review of Systems   Constitutional: Positive for appetite change. Negative for chills and fever. HENT: Negative for congestion, rhinorrhea and trouble swallowing. Eyes: Negative for visual disturbance. Respiratory: Positive for shortness of breath. Negative for cough. Cardiovascular: Negative for chest pain and leg swelling. Gastrointestinal: Positive for abdominal pain, diarrhea, nausea and vomiting. Negative for blood in stool. Genitourinary: Negative for decreased urine volume, difficulty urinating, dysuria, frequency, hematuria and urgency. Musculoskeletal: Negative for myalgias, neck pain and neck stiffness. Skin: Negative for color change. Neurological: Positive for tremors and light-headedness.  Negative for dizziness, syncope, weakness, numbness and headaches.         --------------------------------------------- PAST HISTORY ---------------------------------------------  Past Medical History:   Past Medical History:   Diagnosis Date    Anxiety     Arm pain     Concussion with loss of consciousness     Convulsions (HCC)     Depression     Flashing lights     Head injury     Headache     Leg pain     Numbness and tingling     arms and hands    PTSD (post-traumatic stress disorder)     Seizures (HCC)        Past Surgical History:   History reviewed. No pertinent surgical history. Social History:   Social History     Socioeconomic History    Marital status: Single     Spouse name: None    Number of children: 0    Years of education: 9    Highest education level: None   Occupational History    Occupation: [employer]     Employer: SELF     Comment: [employer]   Tobacco Use    Smoking status: Heavy Tobacco Smoker     Packs/day: 2.00     Years: 9.00     Pack years: 18.00    Smokeless tobacco: Never Used   Vaping Use    Vaping Use: Former    Substances: Always   Substance and Sexual Activity    Alcohol use: Yes     Alcohol/week: 24.0 standard drinks     Types: 20 Cans of beer, 4 Glasses of wine per week     Comment: heavy daily use     Drug use: Yes     Frequency: 1.0 times per week     Types: Marijuana    Sexual activity: Not Currently     Partners: Female   Other Topics Concern    None   Social History Narrative    None     Social Determinants of Health     Financial Resource Strain:     Difficulty of Paying Living Expenses:    Food Insecurity:     Worried About Running Out of Food in the Last Year:     Ran Out of Food in the Last Year:    Transportation Needs:     Lack of Transportation (Medical):      Lack of Transportation (Non-Medical):    Physical Activity:     Days of Exercise per Week:     Minutes of Exercise per Session:    Stress:     Feeling of Stress :    Social Connections:     Frequency of Communication with Friends and Family:     Frequency of Social Gatherings with Friends and Family:     Attends Jew Services:     Active Member of Clubs or Organizations:     Attends Club or Organization Meetings:     Marital Status:    Intimate Partner Violence:     Fear of Current or Ex-Partner:     Emotionally Abused:     Physically Abused:     Sexually Abused:        Family History:   Family History   Problem Relation Age of Onset    Mental Illness Mother     Substance Abuse Mother range of motion. Cervical back: Normal range of motion and neck supple. No rigidity. No muscular tenderness. Comments: Radial, DP, and PT pulses 2+ bilaterally. Skin:     General: Skin is warm. Neurological:      Mental Status: He is alert and oriented to person, place, and time. Comments: Strength 5/5 and sensation grossly intact to light touch and equal bilaterally throughout all extremities            -------------------------------------------------- RESULTS -------------------------------------------------  I have personally reviewed all laboratory and imaging results for this patient. Results are listed below.      LABS:  Labs Reviewed   CBC WITH AUTO DIFFERENTIAL - Abnormal; Notable for the following components:       Result Value    WBC 16.0 (*)     Hemoglobin 17.8 (*)     MCH 35.5 (*)     MCHC 36.7 (*)     Neutrophils % 88.8 (*)     Lymphocytes % 5.4 (*)     Neutrophils Absolute 14.22 (*)     Lymphocytes Absolute 0.87 (*)     Eosinophils Absolute 0.02 (*)     All other components within normal limits   LACTIC ACID, PLASMA - Abnormal; Notable for the following components:    Lactic Acid 4.5 (*)     All other components within normal limits    Narrative:     Roshan Siu tel. [phone],  Chemistry results called to and read back by Karlos Mcnamara, 05/16/2021  21:55, by Greer Ward - Abnormal; Notable for the following components:    Potassium 3.3 (*)     CO2 18 (*)     Anion Gap 23 (*)     Glucose 121 (*)     CREATININE 0.6 (*)     Calcium 7.9 (*)     All other components within normal limits   SERUM DRUG SCREEN - Abnormal; Notable for the following components:    Acetaminophen Level <5.0 (*)     All other components within normal limits   LIPASE - Abnormal; Notable for the following components:    Lipase 1,327 (*)     All other components within normal limits   MAGNESIUM - Abnormal; Notable for the following components:    Magnesium 1.5 (*)     All other components within normal limits   LACTIC ACID, PLASMA - Abnormal; Notable for the following components:    Lactic Acid 2.9 (*)     All other components within normal limits   CULTURE, BLOOD 1   CULTURE, BLOOD 2   SPECIMEN REJECTION   HEPATIC FUNCTION PANEL   SPECIMEN REJECTION   TROPONIN   SERUM DRUG SCREEN   BETA-HYDROXYBUTYRATE       RADIOLOGY:  Interpreted personally and by Radiologist.  XR CHEST PORTABLE   Final Result      No acute intrathoracic pathology. CT ABDOMEN PELVIS W IV CONTRAST Additional Contrast? None   Final Result   Peripancreatic inflammatory changes consistent with acute pancreatitis. Inflammatory changes extend to the left anterior pararenal space. No   organized circumscribed peripancreatic fluid collections. EKG:  This EKG is signed and interpreted by the EP. Normal sinus rhythm, vent rate 78bpm, normal axis and intervals, no acute injury pattern, nonspecific ST-T abnormality in anterior leads, no clinically significant change compared w/ prior EKG      ------------------------- NURSING NOTES AND VITALS REVIEWED ---------------------------   The nursing notes within the ED encounter and vital signs as below have been reviewed by myself. BP (!) 135/110   Pulse 130   Temp 98.5 °F (36.9 °C) (Oral)   Resp 16   Ht 5' 7\" (1.702 m)   Wt 150 lb (68 kg)   SpO2 95%   BMI 23.49 kg/m²   Oxygen Saturation Interpretation: Normal    The patients available past medical records and past encounters were reviewed.         ------------------------------ ED COURSE/MEDICAL DECISION MAKING----------------------  Medications   lactated ringers infusion 1,000 mL (1,000 mLs Intravenous New Bag 5/17/21 0104)   lactated ringers infusion (has no administration in time range)   potassium chloride 10 mEq/100 mL IVPB (Peripheral Line) (10 mEq Intravenous New Bag 5/17/21 0051)   sodium chloride flush 0.9 % injection 5-40 mL (has no administration in time range)   sodium chloride flush 0.9 % injection 5-40 mL (has no administration in time range)   0.9 % sodium chloride infusion (has no administration in time range)   magnesium sulfate 2000 mg in 50 mL IVPB premix (2,000 mg Intravenous New Bag 21)   diazePAM (VALIUM) injection 5 mg (has no administration in time range)   0.9 % sodium chloride bolus (0 mLs Intravenous Stopped 21)   thiamine (B-1) injection 100 mg (100 mg Intravenous Given 21)   ondansetron (ZOFRAN) injection 4 mg (4 mg Intravenous Given 21)   morphine injection 4 mg (4 mg Intravenous Given 21)   PHENobarbital (LUMINAL) injection 130 mg (130 mg Intramuscular Given 21)   iopamidol (ISOVUE-370) 76 % injection 80 mL (80 mLs Intravenous Given 21)   ketorolac (TORADOL) injection 15 mg (15 mg Intravenous Given 21)   HYDROmorphone HCl PF (DILAUDID) injection 1 mg (1 mg Intravenous Given 21)   metoclopramide (REGLAN) injection 10 mg (10 mg Intravenous Given 21)     Critical Care: Please note that the withdrawal or failure to initiate urgent interventions for this patient would likely result in a life threatening deterioration or permanent disability. Accordingly this patient received 30 minutes of critical care time, excluding separately billable procedures. Counseling: The emergency provider has spoken with the patient and discussed todays results, in addition to providing specific details for the plan of care and counseling regarding the diagnosis and prognosis. Questions are answered at this time and they are agreeable with the plan. ED Course/Medical Decision Makin y.o. male here with acute pancreatitis and alcohol withdrawal. Ill appearing, afebrile, hemodynamically stable. Tachycardic and vomiting. Workup notable for pancreatitis, elevated anion gap acidosis likely 2/2 lactic acidosis and possible alcohol ketoacidosis. CT negative for fluid collection.  Allergy to ativan, treated

## 2021-05-17 NOTE — CARE COORDINATION
SS Note: SW Consult received for \"For consideration of rehab\". Physician stated pt in much pain today to meet with [facility] today and recommended meeting for tomorrow.  SAM spoke with Viktoria Bonds, [facility], and he will visit pt tomorrow and has met with pt several times in past.  Electronically signed by PAT Snow on 5/17/2021 at 4:55 PM

## 2021-05-17 NOTE — PLAN OF CARE
experience of comfort will improve  Description: General experience of comfort will improve  Outcome: Ongoing     Problem: Coping:  Goal: Ability to verbalize feelings will improve  Description: Ability to verbalize feelings will improve  5/17/2021 1406 by Clare Lindquist RN  Outcome: Met This Shift     Problem: Coping:  Goal: Level of anxiety will decrease  Description: Level of anxiety will decrease  5/17/2021 1406 by Clare Lindquist RN  Outcome: Met This Shift     Problem: Health Behavior:  Goal: Ability to identify changes in lifestyle to reduce recurrence of condition will improve  Description: Ability to identify changes in lifestyle to reduce recurrence of condition will improve  Outcome: Ongoing     Problem: Physical Regulation:  Goal: Complications related to the disease process, condition or treatment will be avoided or minimized  Description: Complications related to the disease process, condition or treatment will be avoided or minimized  Outcome: Met This Shift     Problem: Physical Regulation:  Goal: Hemodynamic stability will improve  Description: Hemodynamic stability will improve  Outcome: Met This Shift     Problem: Respiratory:  Goal: Ability to achieve and maintain a regular respiratory rate will improve  Description: Ability to achieve and maintain a regular respiratory rate will improve  5/17/2021 1406 by Clare Lindquist RN  Outcome: Met This Shift     Problem: Skin Integrity:  Goal: Skin integrity will be maintained  Description: Skin integrity will be maintained  5/17/2021 1406 by Clare Lindquist RN  Outcome: Met This Shift     Problem: Nausea/Vomiting:  Goal: Absence of nausea/vomiting  Description: Absence of nausea/vomiting  5/17/2021 1406 by Clare Lindquist RN  Outcome: Not Met This Shift     Problem: Nausea/Vomiting:  Goal: Able to drink  Description: Able to drink  Outcome: Not Met This Shift     Problem: Nausea/Vomiting:  Goal: Able to eat  Description: Able to eat  Outcome: Not Met This Shift Problem: Sleep Pattern Disturbance:  Goal: Appears well-rested  Description: Appears well-rested  Outcome: Not Met This Shift

## 2021-05-17 NOTE — CONSULTS
[facility]  Dr. Verito Bernardo M.D., Dr. Abigail Diggs M.D., Dr. Elham Presley D.O., Dr. Bartolome Donald M.D., Dr. Shauna Goldberg D.O. Patient Name: Khushboo Tobias  MRN: [id number]  : 1990 (31 y.o. male)  Allergies: is allergic to hydrocodone, ativan [lorazepam], diphenhydramine, invega sustenna [paliperidone palmitate er], paliperidone, pcn [penicillins], and fluticasone. Date of Service: 2021       Reason for Consultation:      HISTORY OF PRESENT ILLNESS:    Patient is a 82-year-old  male with a past history significant for significant ETOH abuse, pancreatitis, major depressive disorder anxiety and PTSD. Patient represented to centers emergency room overnight with main complaint of epigastric pain nausea vomiting. In the ER patient routine lab work. Patient underwent CT the abdomen showed peripancreatic inflammatory change consistent with acute pancreatitis no signs of necrosis present. Lipase was 1227 CBC was obtained patient found to have a WBC count of 16,000 hemoglobin 17.8 platelet count of 785. Patient started IV fluid hydration IV pain control admitted to the sixth floor. Seen evaluated the sixth floor this AM.  Patient she is in a moderate amount of pain. Patient dates that pain medication has been wearing off before 3 hours. Patient admits to drinking 2 bottles of [email or web address] whiskey daily. Patient has been drinking heavily since he was 14years old. Patient with family history of alcohol abuse and cirrhosis in his father. Patient denies any melena medic easier hematemesis. Patient denies any previous EGD or colonoscopy. Patient does admit to previous episodes of pancreatitis in the past.            REVIEW OF SYSTEMS:   Constitutional: Denies fever, chills, or unintentional weight loss. HEENT: Denies double or blurry vision, headaches, ear pain or ringing in the ears. No drainage from the ears, nose or throat.   Cardiovascular: Denies any chest pain, irregular heartbeats, or palpitations. Respiratory: Denies shortness of breath, coughing, sputum production, hemoptysis, or wheezing. Gastrointestinal: Denies diarrhea, or constipation. Denies any  black or bloody stools. Genitourinary: Denies any urinary urgency, frequency, hematuria. Voiding without difficulty. Endocrine: Denies sensitivity to heat or cold. Denies changes in hair, skin or nails. Denies excessive thirst, hunger or going to the bathroom more frequently than usual.  Extremities: Denies swelling or calf pain. Musculoskeletal: Denies muscle or joint pain, stiffness, arthritis, gout, or instability. Dermatology / Skin: Denies any rashes, ulcers, or excoriations. Denies bruising. Neurology: Denies any bowel or bladder incontinence, headache or focal neurological deficits. No weakness or paresthesia. Denies numbness or tingling in the hands or feet. Psychiatric: Denies nervousness/anxiety, depression or memory loss.       Past Medical History:  Past Medical History:   Diagnosis Date    Anxiety     Arm pain     Concussion with loss of consciousness     Convulsions (HCC)     Depression     Flashing lights     Head injury     Headache     Leg pain     Numbness and tingling     arms and hands    PTSD (post-traumatic stress disorder)     Seizures (HCC)        Past Surgical History:  Past Surgical History:   Procedure Laterality Date    COLONOSCOPY      ENDOSCOPY, COLON, DIAGNOSTIC         Home Medications:  Prior to Admission medications    Not on File       Allergies: Hydrocodone, Ativan [lorazepam], Diphenhydramine, Invega sustenna [paliperidone palmitate er], Paliperidone, Pcn [penicillins], and Fluticasone    Social History:  Social History     Socioeconomic History    Marital status: Single     Spouse name: Not on file    Number of children: 0    Years of education: 9    Highest education level: Not on file   Occupational History    Occupation: [employer]     Employer: SELF     Comment: [employer]   Tobacco Use    Smoking status: Heavy Tobacco Smoker     Packs/day: 2.00     Years: 9.00     Pack years: 18.00    Smokeless tobacco: Never Used   Vaping Use    Vaping Use: Former    Substances: Always   Substance and Sexual Activity    Alcohol use: Yes     Alcohol/week: 24.0 standard drinks     Types: 4 Glasses of wine, 20 Cans of beer per week     Comment: heavy daily use ( 2 bottles of los daily)    Drug use: Yes     Frequency: 1.0 times per week     Types: Marijuana    Sexual activity: Not Currently     Partners: Female   Other Topics Concern    Not on file   Social History Narrative    Not on file     Social Determinants of Health     Financial Resource Strain:     Difficulty of Paying Living Expenses:    Food Insecurity:     Worried About Running Out of Food in the Last Year:     Ran Out of Food in the Last Year:    Transportation Needs:     Lack of Transportation (Medical):      Lack of Transportation (Non-Medical):    Physical Activity:     Days of Exercise per Week:     Minutes of Exercise per Session:    Stress:     Feeling of Stress :    Social Connections:     Frequency of Communication with Friends and Family:     Frequency of Social Gatherings with Friends and Family:     Attends Buddhism Services:     Active Member of Clubs or Organizations:     Attends Club or Organization Meetings:     Marital Status:    Intimate Partner Violence:     Fear of Current or Ex-Partner:     Emotionally Abused:     Physically Abused:     Sexually Abused:        Family History:  Family History   Problem Relation Age of Onset    Mental Illness Mother     Substance Abuse Mother     Alcohol Abuse Mother     Cancer Father         lung     Substance Abuse Father     Cirrhosis Father     Alcohol Abuse Father     Mental Illness Sister     Substance Abuse Sister     Mental Illness Brother     Substance Abuse Maternal Aunt     - 15.0 fL    Platelets 063 103 - 109 E9/L    MPV 11.9 7.0 - 12.0 fL    Neutrophils % 88.8 (H) 43.0 - 80.0 %    Immature Granulocytes % 0.4 0.0 - 5.0 %    Lymphocytes % 5.4 (L) 20.0 - 42.0 %    Monocytes % 4.7 2.0 - 12.0 %    Eosinophils % 0.1 0.0 - 6.0 %    Basophils % 0.6 0.0 - 2.0 %    Neutrophils Absolute 14.22 (H) 1.80 - 7.30 E9/L    Immature Granulocytes # 0.07 E9/L    Lymphocytes Absolute 0.87 (L) 1.50 - 4.00 E9/L    Monocytes Absolute 0.75 0.10 - 0.95 E9/L    Eosinophils Absolute 0.02 (L) 0.05 - 0.50 E9/L    Basophils Absolute 0.10 0.00 - 0.20 E9/L   Lactic Acid, Plasma   Result Value Ref Range    Lactic Acid 4.5 (HH) 0.5 - 2.2 mmol/L   SPECIMEN REJECTION   Result Value Ref Range    Rejected Test BMP, Liver     Reason for Rejection see below    Basic metabolic panel   Result Value Ref Range    Sodium 141 132 - 146 mmol/L    Potassium 3.3 (L) 3.5 - 5.0 mmol/L    Chloride 100 98 - 107 mmol/L    CO2 18 (L) 22 - 29 mmol/L    Anion Gap 23 (H) 7 - 16 mmol/L    Glucose 121 (H) 74 - 99 mg/dL    BUN 9 6 - 20 mg/dL    CREATININE 0.6 (L) 0.7 - 1.2 mg/dL    GFR Non-African American >60 >=60 mL/min/1.73    GFR African American >60     Calcium 7.9 (L) 8.6 - 10.2 mg/dL   Hepatic function panel   Result Value Ref Range    Total Protein 6.4 6.4 - 8.3 g/dL    Albumin 4.2 3.5 - 5.2 g/dL    Alkaline Phosphatase 103 40 - 129 U/L    ALT 16 0 - 40 U/L    AST 35 0 - 39 U/L    Total Bilirubin 0.5 0.0 - 1.2 mg/dL    Bilirubin, Direct <0.2 0.0 - 0.3 mg/dL    Bilirubin, Indirect see below 0.0 - 1.0 mg/dL   SPECIMEN REJECTION   Result Value Ref Range    Rejected Test SDS2     Reason for Rejection see below    Serum Drug Screen   Result Value Ref Range    Ethanol Lvl 97 mg/dL    Acetaminophen Level <5.0 (L) 10.0 - 33.5 mcg/mL    Salicylate, Serum 1.9 0.0 - 30.0 mg/dL   Lipase   Result Value Ref Range    Lipase 1,327 (H) 13 - 60 U/L   Magnesium   Result Value Ref Range    Magnesium 1.5 (L) 1.6 - 2.6 mg/dL   Troponin   Result Value Ref Range    Troponin <0.01 0.00 - 0.03 ng/mL   Lactic Acid, Plasma   Result Value Ref Range    Lactic Acid 2.9 (H) 0.5 - 2.2 mmol/L   Beta-Hydroxybutyrate   Result Value Ref Range    Beta-Hydroxybutyrate 0.87 (H) 0.02 - 0.27 mmol/L   Serum Drug Screen   Result Value Ref Range    Ethanol Lvl 13 mg/dL    Acetaminophen Level <5.0 (L) 10.0 - 63.8 mcg/mL    Salicylate, Serum 0.7 0.0 - 30.0 mg/dL   Lactic acid, plasma   Result Value Ref Range    Lactic Acid 1.1 0.5 - 2.2 mmol/L   Comprehensive metabolic panel   Result Value Ref Range    Sodium 137 132 - 146 mmol/L    Potassium 4.0 3.5 - 5.0 mmol/L    Chloride 99 98 - 107 mmol/L    CO2 25 22 - 29 mmol/L    Anion Gap 13 7 - 16 mmol/L    Glucose 144 (H) 74 - 99 mg/dL    BUN 9 6 - 20 mg/dL    CREATININE 0.5 (L) 0.7 - 1.2 mg/dL    GFR Non-African American >60 >=60 mL/min/1.73    GFR African American >60     Calcium 8.4 (L) 8.6 - 10.2 mg/dL    Total Protein 6.1 (L) 6.4 - 8.3 g/dL    Albumin 3.9 3.5 - 5.2 g/dL    Total Bilirubin 1.3 (H) 0.0 - 1.2 mg/dL    Alkaline Phosphatase 97 40 - 129 U/L    ALT 13 0 - 40 U/L    AST 29 0 - 39 U/L   CBC Auto Differential   Result Value Ref Range    WBC 15.9 (H) 4.5 - 11.5 E9/L    RBC 4.28 3.80 - 5.80 E12/L    Hemoglobin 14.8 12.5 - 16.5 g/dL    Hematocrit 42.5 37.0 - 54.0 %    MCV 99.3 80.0 - 99.9 fL    MCH 34.6 26.0 - 35.0 pg    MCHC 34.8 (H) 32.0 - 34.5 %    RDW 14.4 11.5 - 15.0 fL    Platelets 598 (L) 247 - 450 E9/L    MPV 11.3 7.0 - 12.0 fL    Neutrophils % 98.3 (H) 43.0 - 80.0 %    Lymphocytes % 0.9 (L) 20.0 - 42.0 %    Monocytes % 0.9 (L) 2.0 - 12.0 %    Eosinophils % 0.0 0.0 - 6.0 %    Basophils % 0.2 0.0 - 2.0 %    Neutrophils Absolute 15.58 (H) 1.80 - 7.30 E9/L    Lymphocytes Absolute 0.16 (L) 1.50 - 4.00 E9/L    Monocytes Absolute 0.16 0.10 - 0.95 E9/L    Eosinophils Absolute 0.00 (L) 0.05 - 0.50 E9/L    Basophils Absolute 0.00 0.00 - 0.20 E9/L   Magnesium   Result Value Ref Range    Magnesium 1.8 1.6 - 2.6 mg/dL   Phosphorus   Result Value Ref Range    Phosphorus 3.3 2.5 - 4.5 mg/dL   Lipid panel   Result Value Ref Range    Cholesterol, Total 166 0 - 199 mg/dL    Triglycerides 108 0 - 149 mg/dL    HDL 78 >40 mg/dL    LDL Calculated 66 0 - 99 mg/dL    VLDL Cholesterol Calculated 22 mg/dL   EKG 12 Lead   Result Value Ref Range    Ventricular Rate 78 BPM    Atrial Rate 78 BPM    P-R Interval 112 ms    QRS Duration 96 ms    Q-T Interval 412 ms    QTc Calculation (Bazett) 469 ms    P Axis 14 degrees    R Axis 41 degrees    T Axis 67 degrees         IMAGING:  CT HEAD WO CONTRAST    Result Date: 5/4/2021  EXAMINATION: CT OF THE HEAD WITHOUT CONTRAST  5/4/2021 4:50 pm TECHNIQUE: CT of the head was performed without the administration of intravenous contrast. Dose modulation, iterative reconstruction, and/or weight based adjustment of the mA/kV was utilized to reduce the radiation dose to as low as reasonably achievable. COMPARISON: April 15, 2021 HISTORY: ORDERING SYSTEM PROVIDED HISTORY: Fall TECHNOLOGIST PROVIDED HISTORY: Has a \"code stroke\" or \"stroke alert\" been called? ->No Reason for exam:->Fall Decision Support Exception - unselect if not a suspected or confirmed emergency medical condition->Emergency Medical Condition (MA) FINDINGS: BRAIN/VENTRICLES: There is no acute intracranial hemorrhage, mass effect or midline shift. No abnormal extra-axial fluid collection. The gray-white differentiation is maintained without evidence of an acute infarct. There is no evidence of hydrocephalus. ORBITS: The visualized portion of the orbits demonstrate no acute abnormality. SINUSES: The visualized paranasal sinuses and mastoid air cells demonstrate no acute abnormality. SOFT TISSUES/SKULL:  No acute abnormality of the visualized skull or soft tissues. No acute intracranial abnormality.      CT FACIAL BONES WO CONTRAST    Result Date: 5/4/2021  EXAMINATION: CT OF THE FACE WITHOUT CONTRAST  5/4/2021 4:50 pm TECHNIQUE: CT of the face was performed without the administration of intravenous contrast. Multiplanar reformatted images are provided for review. Dose modulation, iterative reconstruction, and/or weight based adjustment of the mA/kV was utilized to reduce the radiation dose to as low as reasonably achievable. COMPARISON: None HISTORY: ORDERING SYSTEM PROVIDED HISTORY: Left sided facial swelling TECHNOLOGIST PROVIDED HISTORY: Reason for exam:->Left sided facial swelling Decision Support Exception - unselect if not a suspected or confirmed emergency medical condition->Emergency Medical Condition (MA) FINDINGS: FACIAL BONES:  The maxilla, pterygoid plates and zygomatic arches are intact. The mandible is intact. The mandibular condyles are normally situated. The nasal bones and maxillary nasal processes are intact. ORBITS:  The globes appear intact. The extraocular muscles, optic nerve sheath complexes and lacrimal glands appear unremarkable. No retrobulbar hematoma or mass is seen. The orbital walls and rims are intact. SINUSES/MASTOIDS:  The paranasal sinuses and mastoid air cells are well aerated. No acute fracture is seen. There is mucosal thickening in the maxillary sinuses bilaterally. SOFT TISSUES:  No appreciable facial soft tissue swelling is seen. No acute traumatic injury of the facial bones. Chronic bilateral maxillary sinusitis. CT CERVICAL SPINE WO CONTRAST    Result Date: 5/4/2021  EXAMINATION: CT OF THE CERVICAL SPINE WITHOUT CONTRAST 5/4/2021 4:50 pm TECHNIQUE: CT of the cervical spine was performed without the administration of intravenous contrast. Multiplanar reformatted images are provided for review. Dose modulation, iterative reconstruction, and/or weight based adjustment of the mA/kV was utilized to reduce the radiation dose to as low as reasonably achievable.  COMPARISON: April 15, 2021 HISTORY: ORDERING SYSTEM PROVIDED HISTORY: Fall TECHNOLOGIST PROVIDED HISTORY: Reason for exam:->Fall Decision Support Exception - unselect if not a suspected or confirmed emergency medical condition->Emergency Medical Condition (MA) FINDINGS: BONES/ALIGNMENT: There is no acute fracture or traumatic malalignment. DEGENERATIVE CHANGES: No significant degenerative changes. SOFT TISSUES: There is no prevertebral soft tissue swelling. No acute abnormality of the cervical spine. CT ABDOMEN PELVIS W IV CONTRAST Additional Contrast? None    Result Date: 5/17/2021  EXAMINATION: CT OF THE ABDOMEN AND PELVIS WITH CONTRAST 5/16/2021 10:41 pm TECHNIQUE: CT of the abdomen and pelvis was performed with the administration of intravenous contrast. Multiplanar reformatted images are provided for review. Dose modulation, iterative reconstruction, and/or weight based adjustment of the mA/kV was utilized to reduce the radiation dose to as low as reasonably achievable. COMPARISON: February 27 HISTORY: ORDERING SYSTEM PROVIDED HISTORY: epigastric/RUQ ttp, nausea, vomiting, hx of pancreatitis TECHNOLOGIST PROVIDED HISTORY: Reason for exam:->epigastric/RUQ ttp, nausea, vomiting, hx of pancreatitis Additional Contrast?->None Decision Support Exception - unselect if not a suspected or confirmed emergency medical condition->Emergency Medical Condition (MA) FINDINGS: Lower Chest: The lung bases are clear. There is diffuse fatty infiltration of the liver. Organs: Spleen and adrenals within normal limits. Prominent peripancreatic inflammatory changes consistent with acute pancreatitis. Inflammatory changes extend to the left anterior pararenal space. There are no organized circumscribed peripancreatic fluid collections. Mildly distended gallbladder. No evidence of obstructive uropathy. GI/Bowel: No bowel obstruction or free intraperitoneal air. No colitis or diverticulitis. Normal appendix. Pelvis: Urinary bladder within normal limits. Peritoneum/Retroperitoneum: No retroperitoneal adenopathy. Bones/Soft Tissues: No acute bony pathology.      Peripancreatic inflammatory changes consistent with acute pancreatitis. Inflammatory changes extend to the left anterior pararenal space. No organized circumscribed peripancreatic fluid collections. XR CHEST PORTABLE    Result Date: 5/17/2021  EXAMINATION: ONE XRAY VIEW OF THE CHEST 5/17/2021 12:11 am COMPARISON: 12/28/2020 HISTORY: ORDERING SYSTEM PROVIDED HISTORY: short of breath TECHNOLOGIST PROVIDED HISTORY: Reason for exam:->short of breath FINDINGS: Chest radiograph: The cardiomediastinal silhouette and hilar contours are normal. The lungs are clear with no focal consolidation, pleural effusion or pneumothorax. The overlying soft tissue and osseous structures do not demonstrate acute abnormality. No acute intrathoracic pathology. ASSESSMENT/PLAN:      1. Mild to moderate acute pancreatitis  -CT abdomen with no signs of necrosis present, consistent with acute pancreatitis, lipase 3x ULN and typical epigastric pain  -Etiology alcohol abuse  -Continue with lactated Ringer's at 250 for additional 24 hours  -Pain control per admitting  -Okay for clear liquid diet attempt from GI point of view today  -Continue with supportive care    2. ETOH abuse  -Withdrawal management per admitting  -Patient counseled the need to refrain from alcohol abuse and risk of continued up to the pancreatitis along with increased risk of cirrhosis and death with continued abuse      3. Thrombocytopenia  -Count has trended down to 104 this a.m.  -No signs of cirrhosis on exam or imaging      4. Elevated bilirubin  -We will check hepatic function panel in the a.m.  -Obtain upper quadrant ultrasound for evaluation      Please see orders for further plan of care. Reviewed above with Dr. Mari Garibay DO, D.O.   GI Fellow  5/17/2021 at 10:06 AM    Pt seen and independently examined. Pertinent notes and lab work reviewed. D/w Dr. Cris Maloney with physical exam and A&P.   Discussed with patient - all questions answered - agreeable with the plan as delineated. Thank you for the opportunity to see this patient in consultation.     Nancy Da Silva MD  5/17/2021  12:18 PM

## 2021-05-18 LAB
ALBUMIN SERPL-MCNC: 3.1 G/DL (ref 3.5–5.2)
ALP BLD-CCNC: 74 U/L (ref 40–129)
ALT SERPL-CCNC: 11 U/L (ref 0–40)
ANION GAP SERPL CALCULATED.3IONS-SCNC: 10 MMOL/L (ref 7–16)
AST SERPL-CCNC: 51 U/L (ref 0–39)
BASOPHILS ABSOLUTE: 0.04 E9/L (ref 0–0.2)
BASOPHILS RELATIVE PERCENT: 0.2 % (ref 0–2)
BILIRUB SERPL-MCNC: 0.9 MG/DL (ref 0–1.2)
BILIRUBIN DIRECT: 0.3 MG/DL (ref 0–0.3)
BILIRUBIN, INDIRECT: 0.6 MG/DL (ref 0–1)
BUN BLDV-MCNC: 6 MG/DL (ref 6–20)
CALCIUM SERPL-MCNC: 8.3 MG/DL (ref 8.6–10.2)
CHLORIDE BLD-SCNC: 96 MMOL/L (ref 98–107)
CO2: 27 MMOL/L (ref 22–29)
CREAT SERPL-MCNC: 0.5 MG/DL (ref 0.7–1.2)
EKG ATRIAL RATE: 137 BPM
EKG P AXIS: 49 DEGREES
EKG P-R INTERVAL: 130 MS
EKG Q-T INTERVAL: 272 MS
EKG QRS DURATION: 82 MS
EKG QTC CALCULATION (BAZETT): 410 MS
EKG R AXIS: 61 DEGREES
EKG T AXIS: 50 DEGREES
EKG VENTRICULAR RATE: 137 BPM
EOSINOPHILS ABSOLUTE: 0.04 E9/L (ref 0.05–0.5)
EOSINOPHILS RELATIVE PERCENT: 0.2 % (ref 0–6)
GFR AFRICAN AMERICAN: >60
GFR NON-AFRICAN AMERICAN: >60 ML/MIN/1.73
GLUCOSE BLD-MCNC: 83 MG/DL (ref 74–99)
HCT VFR BLD CALC: 43.9 % (ref 37–54)
HEMOGLOBIN: 15.1 G/DL (ref 12.5–16.5)
IMMATURE GRANULOCYTES #: 0.16 E9/L
IMMATURE GRANULOCYTES %: 0.8 % (ref 0–5)
INR BLD: 1.3
LIPASE: 624 U/L (ref 13–60)
LYMPHOCYTES ABSOLUTE: 1.12 E9/L (ref 1.5–4)
LYMPHOCYTES RELATIVE PERCENT: 5.7 % (ref 20–42)
MAGNESIUM: 1.5 MG/DL (ref 1.6–2.6)
MCH RBC QN AUTO: 35.2 PG (ref 26–35)
MCHC RBC AUTO-ENTMCNC: 34.4 % (ref 32–34.5)
MCV RBC AUTO: 102.3 FL (ref 80–99.9)
MONOCYTES ABSOLUTE: 0.51 E9/L (ref 0.1–0.95)
MONOCYTES RELATIVE PERCENT: 2.6 % (ref 2–12)
NEUTROPHILS ABSOLUTE: 17.8 E9/L (ref 1.8–7.3)
NEUTROPHILS RELATIVE PERCENT: 90.5 % (ref 43–80)
PDW BLD-RTO: 14.5 FL (ref 11.5–15)
PHOSPHORUS: 1.8 MG/DL (ref 2.5–4.5)
PLATELET # BLD: 77 E9/L (ref 130–450)
PLATELET CONFIRMATION: NORMAL
PMV BLD AUTO: 12.5 FL (ref 7–12)
POTASSIUM SERPL-SCNC: 3.8 MMOL/L (ref 3.5–5)
PROTHROMBIN TIME: 15.2 SEC (ref 9.3–12.4)
RBC # BLD: 4.29 E12/L (ref 3.8–5.8)
SODIUM BLD-SCNC: 133 MMOL/L (ref 132–146)
TOTAL PROTEIN: 5.1 G/DL (ref 6.4–8.3)
WBC # BLD: 19.7 E9/L (ref 4.5–11.5)

## 2021-05-18 PROCEDURE — C9113 INJ PANTOPRAZOLE SODIUM, VIA: HCPCS | Performed by: INTERNAL MEDICINE

## 2021-05-18 PROCEDURE — 2500000003 HC RX 250 WO HCPCS: Performed by: INTERNAL MEDICINE

## 2021-05-18 PROCEDURE — 83735 ASSAY OF MAGNESIUM: CPT

## 2021-05-18 PROCEDURE — 6360000002 HC RX W HCPCS: Performed by: INTERNAL MEDICINE

## 2021-05-18 PROCEDURE — 6360000002 HC RX W HCPCS: Performed by: HOSPITALIST

## 2021-05-18 PROCEDURE — 99232 SBSQ HOSP IP/OBS MODERATE 35: CPT | Performed by: INTERNAL MEDICINE

## 2021-05-18 PROCEDURE — 36415 COLL VENOUS BLD VENIPUNCTURE: CPT

## 2021-05-18 PROCEDURE — 6370000000 HC RX 637 (ALT 250 FOR IP): Performed by: INTERNAL MEDICINE

## 2021-05-18 PROCEDURE — 6370000000 HC RX 637 (ALT 250 FOR IP): Performed by: HOSPITALIST

## 2021-05-18 PROCEDURE — 80076 HEPATIC FUNCTION PANEL: CPT

## 2021-05-18 PROCEDURE — 2580000003 HC RX 258: Performed by: INTERNAL MEDICINE

## 2021-05-18 PROCEDURE — 80048 BASIC METABOLIC PNL TOTAL CA: CPT

## 2021-05-18 PROCEDURE — 85610 PROTHROMBIN TIME: CPT

## 2021-05-18 PROCEDURE — 83690 ASSAY OF LIPASE: CPT

## 2021-05-18 PROCEDURE — 84100 ASSAY OF PHOSPHORUS: CPT

## 2021-05-18 PROCEDURE — 85025 COMPLETE CBC W/AUTO DIFF WBC: CPT

## 2021-05-18 PROCEDURE — 1200000000 HC SEMI PRIVATE

## 2021-05-18 PROCEDURE — 93005 ELECTROCARDIOGRAM TRACING: CPT | Performed by: NURSE PRACTITIONER

## 2021-05-18 RX ORDER — MAGNESIUM SULFATE IN WATER 40 MG/ML
2000 INJECTION, SOLUTION INTRAVENOUS ONCE
Status: COMPLETED | OUTPATIENT
Start: 2021-05-18 | End: 2021-05-18

## 2021-05-18 RX ORDER — CHOLESTYRAMINE 4 G/9G
1 POWDER, FOR SUSPENSION ORAL 2 TIMES DAILY
Status: DISCONTINUED | OUTPATIENT
Start: 2021-05-18 | End: 2021-05-19 | Stop reason: HOSPADM

## 2021-05-18 RX ADMIN — THIAMINE HYDROCHLORIDE 100 MG: 100 INJECTION, SOLUTION INTRAMUSCULAR; INTRAVENOUS at 12:41

## 2021-05-18 RX ADMIN — MAGNESIUM SULFATE HEPTAHYDRATE 2000 MG: 40 INJECTION, SOLUTION INTRAVENOUS at 15:43

## 2021-05-18 RX ADMIN — CHLORDIAZEPOXIDE HYDROCHLORIDE 25 MG: 25 CAPSULE ORAL at 18:45

## 2021-05-18 RX ADMIN — ACETAMINOPHEN 650 MG: 325 TABLET ORAL at 04:12

## 2021-05-18 RX ADMIN — HYDROMORPHONE HYDROCHLORIDE 1.2 MG: 2 INJECTION, SOLUTION INTRAMUSCULAR; INTRAVENOUS; SUBCUTANEOUS at 02:42

## 2021-05-18 RX ADMIN — CHLORDIAZEPOXIDE HYDROCHLORIDE 25 MG: 25 CAPSULE ORAL at 12:39

## 2021-05-18 RX ADMIN — CHOLESTYRAMINE 4 G: 4 POWDER, FOR SUSPENSION ORAL at 20:57

## 2021-05-18 RX ADMIN — SODIUM PHOSPHATE, MONOBASIC, MONOHYDRATE AND SODIUM PHOSPHATE, DIBASIC, ANHYDROUS 30 MMOL: 276; 142 INJECTION, SOLUTION INTRAVENOUS at 20:56

## 2021-05-18 RX ADMIN — PHENOBARBITAL 32.4 MG: 32.4 TABLET ORAL at 20:56

## 2021-05-18 RX ADMIN — HYDROMORPHONE HYDROCHLORIDE 1.2 MG: 2 INJECTION, SOLUTION INTRAMUSCULAR; INTRAVENOUS; SUBCUTANEOUS at 09:07

## 2021-05-18 RX ADMIN — SODIUM CHLORIDE, POTASSIUM CHLORIDE, SODIUM LACTATE AND CALCIUM CHLORIDE: 600; 310; 30; 20 INJECTION, SOLUTION INTRAVENOUS at 12:41

## 2021-05-18 RX ADMIN — HYDROMORPHONE HYDROCHLORIDE 1.2 MG: 2 INJECTION, SOLUTION INTRAMUSCULAR; INTRAVENOUS; SUBCUTANEOUS at 17:15

## 2021-05-18 RX ADMIN — ENOXAPARIN SODIUM 40 MG: 40 INJECTION SUBCUTANEOUS at 08:22

## 2021-05-18 RX ADMIN — FOLIC ACID 1 MG: 1 TABLET ORAL at 08:22

## 2021-05-18 RX ADMIN — PANTOPRAZOLE SODIUM 40 MG: 40 INJECTION, POWDER, FOR SOLUTION INTRAVENOUS at 08:22

## 2021-05-18 RX ADMIN — SODIUM CHLORIDE, PRESERVATIVE FREE 10 ML: 5 INJECTION INTRAVENOUS at 08:23

## 2021-05-18 RX ADMIN — PHENOBARBITAL 32.4 MG: 32.4 TABLET ORAL at 08:27

## 2021-05-18 RX ADMIN — TRIMETHOBENZAMIDE HYDROCHLORIDE 200 MG: 100 INJECTION INTRAMUSCULAR at 02:48

## 2021-05-18 RX ADMIN — MAGNESIUM SULFATE HEPTAHYDRATE 2000 MG: 40 INJECTION, SOLUTION INTRAVENOUS at 17:15

## 2021-05-18 RX ADMIN — HYDROMORPHONE HYDROCHLORIDE 1.2 MG: 2 INJECTION, SOLUTION INTRAMUSCULAR; INTRAVENOUS; SUBCUTANEOUS at 06:07

## 2021-05-18 RX ADMIN — SODIUM CHLORIDE, POTASSIUM CHLORIDE, SODIUM LACTATE AND CALCIUM CHLORIDE: 600; 310; 30; 20 INJECTION, SOLUTION INTRAVENOUS at 04:23

## 2021-05-18 RX ADMIN — SODIUM CHLORIDE, PRESERVATIVE FREE 10 ML: 5 INJECTION INTRAVENOUS at 20:57

## 2021-05-18 RX ADMIN — HYDROMORPHONE HYDROCHLORIDE 0.5 MG: 1 INJECTION, SOLUTION INTRAMUSCULAR; INTRAVENOUS; SUBCUTANEOUS at 22:55

## 2021-05-18 RX ADMIN — TRIMETHOBENZAMIDE HYDROCHLORIDE 200 MG: 100 INJECTION INTRAMUSCULAR at 09:54

## 2021-05-18 RX ADMIN — ACETAMINOPHEN 650 MG: 325 TABLET ORAL at 21:15

## 2021-05-18 RX ADMIN — HYDROMORPHONE HYDROCHLORIDE 1.2 MG: 2 INJECTION, SOLUTION INTRAMUSCULAR; INTRAVENOUS; SUBCUTANEOUS at 12:39

## 2021-05-18 RX ADMIN — CHLORDIAZEPOXIDE HYDROCHLORIDE 25 MG: 25 CAPSULE ORAL at 04:12

## 2021-05-18 ASSESSMENT — PAIN DESCRIPTION - FREQUENCY: FREQUENCY: CONTINUOUS

## 2021-05-18 ASSESSMENT — PAIN SCALES - GENERAL
PAINLEVEL_OUTOF10: 8
PAINLEVEL_OUTOF10: 7
PAINLEVEL_OUTOF10: 4
PAINLEVEL_OUTOF10: 8
PAINLEVEL_OUTOF10: 3
PAINLEVEL_OUTOF10: 0
PAINLEVEL_OUTOF10: 5
PAINLEVEL_OUTOF10: 6
PAINLEVEL_OUTOF10: 5

## 2021-05-18 ASSESSMENT — PAIN DESCRIPTION - PROGRESSION: CLINICAL_PROGRESSION: GRADUALLY IMPROVING

## 2021-05-18 ASSESSMENT — PAIN DESCRIPTION - DESCRIPTORS
DESCRIPTORS: STABBING
DESCRIPTORS: ACHING;NAGGING;SHARP

## 2021-05-18 ASSESSMENT — PAIN DESCRIPTION - ORIENTATION: ORIENTATION: UPPER;RIGHT;LEFT

## 2021-05-18 ASSESSMENT — PAIN DESCRIPTION - PAIN TYPE: TYPE: ACUTE PAIN

## 2021-05-18 ASSESSMENT — PAIN DESCRIPTION - ONSET
ONSET: ON-GOING
ONSET: ON-GOING

## 2021-05-18 ASSESSMENT — PAIN DESCRIPTION - LOCATION: LOCATION: ABDOMEN

## 2021-05-18 NOTE — PROGRESS NOTES
PROGRESS NOTE    By German Benito D.O GI Fellow    [facility]  Dr. Remy Cardozo MD, Dr. John Blue MD, Dr Joby Schaeffer, Dr. Padma Jarvis MD, Dr. Ciro Shone, DO Maribel Davila  31 y.o.  male    SUBJECTIVE:    Patient is abdominal pain this a.m. has improved. Patient denies any fevers or chills overnight. OBJECTIVE:    BP (!) 126/97   Pulse 118   Temp 98.8 °F (37.1 °C) (Oral)   Resp 17   Ht 5' 7\" (1.702 m)   Wt 150 lb (68 kg)   SpO2 94%   BMI 23.49 kg/m²     Gen: NAD, AAO x 3  HEENT:PEERL, no icterus  Heart: RRR, no M/R/G  Lungs: CTAB  Abd.: soft, mild tenderness to palpation epigastric region  Extr.: no C/C/E, no bruising      Stool (measured) : 0 mL  Lab Results   Component Value Date    WBC 19.7 05/18/2021    WBC 15.9 05/17/2021    WBC 16.0 05/16/2021    HGB 15.1 05/18/2021    HGB 14.8 05/17/2021    HGB 17.8 05/16/2021    HCT 43.9 05/18/2021    .3 05/18/2021    RDW 14.5 05/18/2021    PLT 77 05/18/2021     05/17/2021     05/16/2021     Lab Results   Component Value Date     05/17/2021    K 4.0 05/17/2021    K 3.3 02/28/2021    CL 99 05/17/2021    CO2 25 05/17/2021    BUN 9 05/17/2021    CREATININE 0.5 05/17/2021    CALCIUM 8.4 05/17/2021    PROT 6.1 05/17/2021    LABALBU 3.9 05/17/2021    BILITOT 1.3 05/17/2021    BILITOT 0.5 05/16/2021    BILITOT 1.4 04/15/2021    ALKPHOS 97 05/17/2021    ALKPHOS 103 05/16/2021    ALKPHOS 97 04/15/2021    AST 29 05/17/2021    AST 35 05/16/2021     04/15/2021    ALT 13 05/17/2021    ALT 16 05/16/2021    ALT 48 04/15/2021     Lab Results   Component Value Date    LIPASE 1,327 05/16/2021    LIPASE 38 02/27/2021    LIPASE 145 12/27/2020     Lab Results   Component Value Date    AMYLASE 101 12/09/2018         ASSESSMENT/PLAN:    1.   Mild to moderate acute pancreatitis  -CT abdomen with no signs of necrosis present, consistent with acute pancreatitis, lipase 3x ULN and typical epigastric pain -Etiology alcohol abuse  -Continue with lactated Ringer's at 150 for additional 24 hours  -BUN has came down significantly  -Pain control per admitting  -Trial of  clear liquid diet today  -Continue with supportive care     2. ETOH abuse  -Withdrawal management per admitting  -Patient counseled the need to refrain from alcohol abuse and risk of continued up to the pancreatitis along with increased risk of cirrhosis and death with continued abuse        3. Thrombocytopenia  -Count has trended down to 104 this a.m.  -Would recommend outpatient FibroSure versus liver quadrant ultrasound with elastography   -No signs of cirrhosis on exam or imaging        4. Elevated bilirubin  -We will check hepatic function panel in the a.m.  -Bilirubin has normalized         Pt was discussed with Dr. Nieves Kim DO  GI Fellow   5/18/2021  9:31 AM    Pt seen and independently examined. Pertinent notes and lab work reviewed. Monitor reviewed showing sinus tachycardia. D/w Dr. Aury Don with physical exam and A&P.     Franko Brown MD  5/18/2021  1:44 PM

## 2021-05-18 NOTE — PLAN OF CARE
Problem: Pain:  Goal: Pain level will decrease  Description: Pain level will decrease  5/18/2021 0034 by Troy Hinton RN  Outcome: Met This Shift     Problem: Pain:  Goal: Control of acute pain  Description: Control of acute pain  5/18/2021 0034 by Troy Hinton RN  Outcome: Met This Shift     Problem: Pain:  Goal: Control of chronic pain  Description: Control of chronic pain  5/18/2021 0034 by Troy Hinton RN  Outcome: Met This Shift     Problem: Falls - Risk of:  Goal: Will remain free from falls  Description: Will remain free from falls  5/18/2021 0034 by Troy Hinton RN  Outcome: Met This Shift     Problem: Falls - Risk of:  Goal: Absence of physical injury  Description: Absence of physical injury  5/18/2021 0034 by Troy Hinton RN  Outcome: Met This Shift     Problem: Bowel/Gastric:  Goal: Bowel function will improve  Description: Bowel function will improve  5/18/2021 0034 by Troy Hinton RN  Outcome: Met This Shift     Problem: Bowel/Gastric:  Goal: Occurrences of nausea will decrease  Description: Occurrences of nausea will decrease  5/18/2021 0034 by Troy Hinton RN  Outcome: Met This Shift     Problem:  Bowel/Gastric:  Goal: Occurrences of vomiting will decrease  Description: Occurrences of vomiting will decrease  5/18/2021 0034 by Troy Hinton RN  Outcome: Met This Shift     Problem: Fluid Volume:  Goal: Maintenance of adequate hydration will improve  Description: Maintenance of adequate hydration will improve  5/18/2021 0034 by Troy Hinton RN  Outcome: Met This Shift     Problem: Sensory:  Goal: General experience of comfort will improve  Description: General experience of comfort will improve  5/18/2021 0034 by Troy Hinton RN  Outcome: Met This Shift     Problem: Coping:  Goal: Ability to verbalize feelings will improve  Description: Ability to verbalize feelings will improve  5/18/2021 0034 by Troy Hinton RN  Outcome: Met This Shift     Problem: Coping: Goal: Level of anxiety will decrease  Description: Level of anxiety will decrease  5/18/2021 0034 by Leopold Crea, RN  Outcome: Met This Shift       Problem: Respiratory:  Goal: Ability to achieve and maintain a regular respiratory rate will improve  Description: Ability to achieve and maintain a regular respiratory rate will improve  5/18/2021 0034 by Leopold Crea, RN  Outcome: Met This Shift     Problem: Skin Integrity:  Goal: Skin integrity will be maintained  Description: Skin integrity will be maintained  5/18/2021 0034 by Leopold Crea, RN  Outcome: Met This Shift     Problem: Nausea/Vomiting:  Goal: Ability to achieve adequate nutritional intake will improve  Description: Ability to achieve adequate nutritional intake will improve  Outcome: Met This Shift     Problem: Nausea/Vomiting:  Goal: Absence of nausea/vomiting  Description: Absence of nausea/vomiting  5/18/2021 0034 by Leopold Crea, RN  Outcome: Met This Shift     Problem: Sleep Pattern Disturbance:  Goal: Appears well-rested  Description: Appears well-rested  5/18/2021 0034 by Leopold Crea, RN  Outcome: Met This Shift

## 2021-05-18 NOTE — PROGRESS NOTES
[address] [place] [address] Hospitalist   Progress Note    Admitting Date and Time: 5/16/2021  8:37 PM  Admit Dx: Alcohol induced acute pancreatitis without necrosis or infection [K85.20]    Subjective:    5/17: Pt feels pain is not adequately controlled. Healing is really for about 2 hours and never completely goes away. He is asking pain medication can begin sooner than every 3 hours. Patient states he is done with drinking. I asked him to quantify what he actually he was drinking at home and its more than was listed in the admitting H&P. He actually drinks two 1L bottles of whiskey. He admits to 2 prior episodes of pancreatitis. The first 1 when he was a 25years old and the second 1 was just this past December when he got hospitalized. I do not see evidence of that in our system. He has had multiple ER visits over the last several months for alcohol withdrawal.  Two hospitalizations in February for alkalization and both times he left AGAINST MEDICAL ADVICE. 5/18: Patient states pain still not optimally controlled only last gets about 2 hours of relief. He states his anxiety is better but not he feels more depressed about everything is going on in his life. He did meet with peer recovery but not interested in any rehab locally as he plans to fly out to [place] to be with his fiancée    Per RN: Patient getting 2 g of mag currently for low mag of 1.5.      cholestyramine  1 packet Oral BID    enoxaparin  40 mg Subcutaneous Daily    folic acid  1 mg Oral Daily    pantoprazole  40 mg Intravenous Daily    And    sodium chloride (PF)  10 mL Intravenous Daily    sodium chloride flush  5-40 mL Intravenous 2 times per day    thiamine (VITAMIN B1) IVPB  100 mg Intravenous Daily    nicotine  1 patch Transdermal Daily    PHENobarbital  32.4 mg Oral BID    Followed by   Sierra Venegas ON 5/19/2021] PHENobarbital  16.2 mg Oral BID    vitamin D  50,000 Units Oral Weekly     acetaminophen, 650 mg, Q6H PRN   Or acetaminophen, 650 mg, Q6H PRN  senna, 1 tablet, Daily PRN  trimethobenzamide, 200 mg, Q6H PRN  sodium chloride flush, 5-40 mL, PRN  sodium chloride, 25 mL, PRN  magnesium sulfate, 2,000 mg, PRN  potassium chloride, 10 mEq, PRN  chlordiazePOXIDE, 25 mg, Q6H PRN  HYDROmorphone, 1.2 mg, Q3H PRN   Or  HYDROmorphone, 0.5 mg, Q3H PRN         Objective:    BP (!) 126/97   Pulse (!) 265   Temp 98.8 °F (37.1 °C) (Oral)   Resp 17   Ht 5' 7\" (1.702 m)   Wt 150 lb (68 kg)   SpO2 94%   BMI 23.49 kg/m²   General Appearance: Patient with eyes shut. They do appear swollen. He is tearful. He is wearing headphones. Skin: warm and dry  Head: normocephalic and atraumatic  Eyes: pupils equal, round, and reactive to light, extraocular eye movements intact, conjunctivae normal  Neck: neck supple and non tender without mass   Pulmonary/Chest: clear to auscultation bilaterally- no wheezes, rales or rhonchi, normal air movement, no respiratory distress  Cardiovascular: normal rate, normal S1 and S2 and no carotid bruits  Abdomen: Soft, he is holding ice pack on his epigastric area. He is tender to palpation.   Extremities: no cyanosis, no clubbing and no edema  Neurologic: no cranial nerve deficit and speech normal      Recent Labs     05/16/21 2215 05/17/21  0713 05/18/21  0810    137 133   K 3.3* 4.0 3.8    99 96*   CO2 18* 25 27   BUN 9 9 6   CREATININE 0.6* 0.5* 0.5*   GLUCOSE 121* 144* 83   CALCIUM 7.9* 8.4* 8.3*       Recent Labs     05/16/21 2215 05/17/21  0713 05/18/21  0810   ALKPHOS 103 97 74   PROT 6.4 6.1* 5.1*   LABALBU 4.2 3.9 3.1*   BILITOT 0.5 1.3* 0.9   AST 35 29 51*   ALT 16 13 11       Recent Labs     05/16/21 2110 05/17/21  0713 05/18/21  0810   WBC 16.0* 15.9* 19.7*   RBC 5.01 4.28 4.29   HGB 17.8* 14.8 15.1   HCT 48.5 42.5 43.9   MCV 96.8 99.3 102.3*   MCH 35.5* 34.6 35.2*   MCHC 36.7* 34.8* 34.4   RDW 14.2 14.4 14.5    104* 77*   MPV 11.9 11.3 12.5*       Lipid panel [[id number]] His mother recently  and so there is no one to look after him. He may be open to an IOP. Seen by peer recovery today but patient declined any local referrals  4. Lactic acidosis(resolved)--resolved with IV fluid hydration. Lactate trend 4.5-->2.9-- >9.6.  5. Neutrophilic leukocytosis--likely reactive but will continue to monitor. WBC trending up to 16.0-->15.9->19.7. No obvious source of infection. 6. Hypokalemia/hypomagnesemia/hypophosphatemia--replete electrolytes as needed. Mg level 1.5. Per protocol, getting 2 g of mag will give additional 2 g for total 4 g for today. Phosphorus 1.8 with sodium 139 and therefor will give sodium Phos 30 mmol over 4 hours. Recheck electrolytes in the a.m. NOTE: This report was transcribed using voice recognition software. Every effort was made to ensure accuracy; however, inadvertent computerized transcription errors may be present.      Electronically signed by Anne Guerrero MD on 2021 at 4:21 PM

## 2021-05-19 VITALS
TEMPERATURE: 100.5 F | RESPIRATION RATE: 20 BRPM | DIASTOLIC BLOOD PRESSURE: 75 MMHG | OXYGEN SATURATION: 92 % | BODY MASS INDEX: 23.54 KG/M2 | HEIGHT: 67 IN | WEIGHT: 150 LBS | HEART RATE: 124 BPM | SYSTOLIC BLOOD PRESSURE: 119 MMHG

## 2021-05-19 PROCEDURE — 99239 HOSP IP/OBS DSCHRG MGMT >30: CPT | Performed by: INTERNAL MEDICINE

## 2021-05-19 PROCEDURE — 6370000000 HC RX 637 (ALT 250 FOR IP): Performed by: INTERNAL MEDICINE

## 2021-05-19 PROCEDURE — 6360000002 HC RX W HCPCS: Performed by: HOSPITALIST

## 2021-05-19 PROCEDURE — 2580000003 HC RX 258: Performed by: INTERNAL MEDICINE

## 2021-05-19 RX ORDER — PANTOPRAZOLE SODIUM 40 MG/1
40 TABLET, DELAYED RELEASE ORAL
Qty: 30 TABLET | Refills: 0 | Status: ON HOLD | OUTPATIENT
Start: 2021-05-19 | End: 2021-07-23 | Stop reason: SDUPTHER

## 2021-05-19 RX ORDER — ERGOCALCIFEROL 1.25 MG/1
50000 CAPSULE ORAL WEEKLY
Qty: 5 CAPSULE | Refills: 0 | Status: ON HOLD | OUTPATIENT
Start: 2021-05-24 | End: 2021-09-03

## 2021-05-19 RX ORDER — THIAMINE MONONITRATE (VIT B1) 100 MG
100 TABLET ORAL DAILY
Qty: 30 TABLET | Refills: 0 | Status: ON HOLD | OUTPATIENT
Start: 2021-05-19 | End: 2021-09-03

## 2021-05-19 RX ORDER — FOLIC ACID 1 MG/1
1 TABLET ORAL DAILY
Qty: 30 TABLET | Refills: 0 | Status: ON HOLD | OUTPATIENT
Start: 2021-05-19 | End: 2021-09-09

## 2021-05-19 RX ADMIN — SODIUM CHLORIDE, POTASSIUM CHLORIDE, SODIUM LACTATE AND CALCIUM CHLORIDE: 600; 310; 30; 20 INJECTION, SOLUTION INTRAVENOUS at 03:38

## 2021-05-19 RX ADMIN — CHLORDIAZEPOXIDE HYDROCHLORIDE 25 MG: 25 CAPSULE ORAL at 02:03

## 2021-05-19 RX ADMIN — HYDROMORPHONE HYDROCHLORIDE 1.2 MG: 2 INJECTION, SOLUTION INTRAMUSCULAR; INTRAVENOUS; SUBCUTANEOUS at 05:40

## 2021-05-19 RX ADMIN — ACETAMINOPHEN 650 MG: 325 TABLET ORAL at 05:40

## 2021-05-19 ASSESSMENT — PAIN DESCRIPTION - LOCATION: LOCATION: ABDOMEN

## 2021-05-19 ASSESSMENT — PAIN SCALES - GENERAL
PAINLEVEL_OUTOF10: 2
PAINLEVEL_OUTOF10: 4
PAINLEVEL_OUTOF10: 9

## 2021-05-19 ASSESSMENT — ENCOUNTER SYMPTOMS: TACHYPNEA: 1

## 2021-05-19 NOTE — PROGRESS NOTES
Pt very emotional, stated he wants to leave AMA however agreeable to waiting until he speaks with Hospitalist today about his case. Pt is tearful, said multiple times our \"medicine here cannot help me, I just need to go home to take my own\" when asked what medication he takes at home he said \"booze\". Claims he is not seeking further alcohol cessation assistance and is not open to speaking with social work on the topic. Pt refused AM blood draw ordered, also claims his pain is out of control despite previously denying pain. Pt states he denied earlier pain because our medicine doesn't work anyway so \"whats the difference\".  Pt agreeable to staying until seen by hospitalist today, waiting their arrival.

## 2021-05-19 NOTE — DISCHARGE SUMMARY
[facility] Physician Discharge Summary       [address]  Schedule an appointment as soon as possible for a visit in 1 week  Hospital follow up; on for no PCP    Vernon Mello MD  [address] [facility]  [phone]    Schedule an appointment as soon as possible for a visit in 1 week  Hospital follow up for acute pancreatitis      Activity level: As tolerated    Diet: DIET FULL LIQUID;    Labs:None    Condition at discharge: Stable     Dispo: Home-AMA    Patient ID:  Amparo Sherman  [id number]  31 y.o.  1990    Admit date: 5/16/2021    Discharge date and time:  5/19/2021  8:35 AM    Admission Diagnoses: Principal Problem:    Alcohol induced acute pancreatitis without necrosis or infection  Active Problems:    Major depressive disorder, severe ([address]    Alcohol abuse with withdrawal    Lactic acidosis  Resolved Problems:    * No resolved hospital problems. *      Discharge Diagnoses: Principal Problem:    Alcohol induced acute pancreatitis without necrosis or infection  Active Problems:    Major depressive disorder, severe (H[facility])    Alcohol abuse with withdrawal    Lactic acidosis  Resolved Problems:    * No resolved hospital problems. *      Consults:  IP CONSULT TO INTERNAL MEDICINE  IP CONSULT TO SOCIAL WORK  IP CONSULT TO SOCIAL WORK  IP CONSULT TO GI    Procedures: None    History of Present Illness:  31 y.o. male with a history of alcohol use disorder, MDD, anxiety, and PTSD presents with nausea, vomiting, and abdominal pain. Patient states that has started last night, he started to experience sharp epigastric pain with radiation to the back. Patient also endorsed nausea, vomiting, as well as diarrhea. Patient states that bowel movement has been loose and noted to be dark (patient states that was dark after he started taking Pepto-Bismol).  Patient denies having any mucus or blood in the stool. Patient denies having any fever or chills. Patient denies any recent travels or sick contacts. Patient states that he is a daily drinker he drinks about 250 mL of Catrina [place] in a day. Patient states that he has gone through withdrawal and DTs in the past.  In the ED, patient was noted to be in mild distress. Patient remained in sinus tachycardia. Labs were significant for K3.3, CO2 18, anion gap 23, lactic acid 4.5, calcium 7.9, lipase 1327, and WBC 16.  CT of abdomen and pelvis showed peripancreatic inflammatory changes consistent with acute pancreatitis. Inflammatory changes extend to the left anterior pararenal space.  There was no organized circumscribed peripancreatic fluid collections. Patient was given 1 L lactated ringer, Zofran, Reglan, Toradol, morphine and Dilaudid. For alcohol withdrawal, patient was given phenobarb as patient stated Ativan causes him to have hallucinations.       Hospital Course: 29-year-old male admitted as per the above details. Patient came in with alcoholic induced pancreatitis. He was treated with IV fluids, IV pain medications i.e. Dilaudid and placed on a clear diet. He was seen by GI per my request and they recommended conservative management. Patient felt he was not improving and that we were not doing much for him, refused this morning's labs and decided to leave [address] This is a pattern for the patient as he left last 2 hospitalizations the same way. One was with his original PCP back in late February and the other one was at [facility] in early February. I was unable to send patient home on any pain medications or Librium given ongoing alcohol abuse prior to admission. Patient complained that all she wants is Klonopin and no one wants to give it to him. States psychiatrist will not see him because he is red flag because of his alcohol abuse.   Nevertheless, patient was unwilling to stay for further treatment and wanted to sign himself out. Patient informed that leaving AMA now before treatment and evaluation is complete, there is risk of worsening condition, serious debility up to and including death. See outline below for additional details and issues addressed this admission:     1. Alcohol induced acute pancreatitis--IV fluid hydration with lactated Ringer's. Continue Dilaudid pain panel every 3 hours. .  Triglycerides were very within normal limits. GI consulted given recurrent pancreatitis history they placed him on IV fluids and clear liquid diet. Counseled him about alcohol cessation. Placed on clear diet yesterday patient has not taken in much by mouth. Lipase trending down 1327-->624-->refused labs. Home-going Rx for Protonix 40 mg daily. 2. Alcohol withdrawal syndrome--prior history of DTs. Discussed with the Pharm. SAMARA lee this week and we decided to put him on a phenobarb taper and use Librium as needed. Seizure precautions. 3. Alcohol abuse disorder--continue folate and thiamine daily. Patient was resistant to inpatient rehab but states that he has autistic brother who he needs to attend to. His mother recently  and so there is no one to look after him. He may be open to an IOP. Seen by peer recovery yesterday but patient declined any local referrals. Home-going Rx for thiamine and folic acid daily. 4. Lactic acidosis(resolved)--resolved with IV fluid hydration. Lactate trend 4.5-->2.9-- >1.8.  5. Neutrophilic leukocytosis--likely reactive but will continue to monitor. WBC trending up to 16.0-->15.9->19.7-->refused labs. No obvious source of infection. 6. Hypokalemia/hypomagnesemia/hypophosphatemia--replete electrolytes as needed. Mg level 1.5 yesteday. Per protocol, getting 2 g of mag will give additional 2 g for total 4 g for today. Phosphorus 1.8 yesteday with sodium 139 and therefor  given sodium Phos 30 mmol over 4 hours. Refused labs this morning.   7. Vitamin D deficiency--level came back at 10. He was started on ergocalciferol 50,000 IU weekly. Home-going Rx for that. Discharge Exam:  Vitals:    05/19/21 0451 05/19/21 0530 05/19/21 0628 05/19/21 0730   BP: 124/84 (!) 133/96 123/80 119/75   Pulse: 128 128 127 124   Resp: 20 22 20 20   Temp: 99.5 °F (37.5 °C) 102.3 °F (39.1 °C) 99.9 °F (37.7 °C) 100.5 °F (38.1 °C)   TempSrc: Oral Oral Oral Oral   SpO2:  93% 91% 92%   Weight:       Height:           General Appearance: Patient awake and alert but is tearful. Sitting up in bed with street clothes on. Skin: warm and dry  Head: normocephalic and atraumatic  Eyes: pupils equal, round, and reactive to light, extraocular eye movements intact, conjunctivae normal  Neck: neck supple and non tender without mass   Pulmonary/Chest: clear to auscultation bilaterally- no wheezes, rales or rhonchi, normal air movement, no respiratory distress  Cardiovascular: normal rate, normal S1 and S2 and no carotid bruits  Abdomen: Soft,epigastric tenderness. Extremities: no cyanosis, no clubbing and no edema  Neurologic: no cranial nerve deficit and speech normal  I/O last 3 completed shifts:  In: -   Out: 900 [Urine:900]  No intake/output data recorded. LABS:  Recent Labs     05/16/21 2215 05/17/21  0713 05/18/21  0810    137 133   K 3.3* 4.0 3.8    99 96*   CO2 18* 25 27   BUN 9 9 6   CREATININE 0.6* 0.5* 0.5*   GLUCOSE 121* 144* 83   CALCIUM 7.9* 8.4* 8.3*       Recent Labs     05/16/21  2110 05/17/21  0713 05/18/21  0810   WBC 16.0* 15.9* 19.7*   RBC 5.01 4.28 4.29   HGB 17.8* 14.8 15.1   HCT 48.5 42.5 43.9   MCV 96.8 99.3 102.3*   MCH 35.5* 34.6 35.2*   MCHC 36.7* 34.8* 34.4   RDW 14.2 14.4 14.5    104* 77*   MPV 11.9 11.3 12.5*        Ref.  Range 5/16/2021 23:29 5/18/2021 08:10   Lipase Latest Ref Range: 13 - 60 U/L 1,327 (H) 624 (H)       Vitamin D 25 Hydroxy [[id number]] (Abnormal) Collected: 05/17/21 0713     Specimen: Blood Updated: 05/17/21 1059      Vit D, 25-Hydroxy 10 ng/mL   Culture, Blood 2 [[id number]] Collected: 05/17/21 0052     Specimen: Blood Updated: 05/18/21 0835      Culture, Blood 2 24 Hours no growth     Narrative:       Source: BLOOD       Site: Antecubital-Lef               Culture, Blood 1 [[id number]] Collected: 05/17/21 0052     Specimen: Blood Updated: 05/18/21 0835      Blood Culture, Routine 24 Hours no growth     Narrative:       Source: BLOOD       Site: Antecubital-Rig             Imaging:      CT ABDOMEN PELVIS W IV CONTRAST Additional Contrast? None    Result Date: 5/17/2021  EXAMINATION: CT OF THE ABDOMEN AND PELVIS WITH CONTRAST 5/16/2021 10:41 pm TECHNIQUE: CT of the abdomen and pelvis was performed with the administration of intravenous contrast. Multiplanar reformatted images are provided for review. Dose modulation, iterative reconstruction, and/or weight based adjustment of the mA/kV was utilized to reduce the radiation dose to as low as reasonably achievable. COMPARISON: February 27 HISTORY: ORDERING SYSTEM PROVIDED HISTORY: epigastric/RUQ ttp, nausea, vomiting, hx of pancreatitis TECHNOLOGIST PROVIDED HISTORY: Reason for exam:->epigastric/RUQ ttp, nausea, vomiting, hx of pancreatitis Additional Contrast?->None Decision Support Exception - unselect if not a suspected or confirmed emergency medical condition->Emergency Medical Condition (MA) FINDINGS: Lower Chest: The lung bases are clear. There is diffuse fatty infiltration of the liver. Organs: Spleen and adrenals within normal limits. Prominent peripancreatic inflammatory changes consistent with acute pancreatitis. Inflammatory changes extend to the left anterior pararenal space. There are no organized circumscribed peripancreatic fluid collections. Mildly distended gallbladder. No evidence of obstructive uropathy. GI/Bowel: No bowel obstruction or free intraperitoneal air. No colitis or diverticulitis. Normal appendix. Pelvis: Urinary bladder within normal limits.  Peritoneum/Retroperitoneum: No retroperitoneal adenopathy. Bones/Soft Tissues: No acute bony pathology. Peripancreatic inflammatory changes consistent with acute pancreatitis. Inflammatory changes extend to the left anterior pararenal space. No organized circumscribed peripancreatic fluid collections. XR CHEST PORTABLE    Result Date: 5/17/2021  EXAMINATION: ONE XRAY VIEW OF THE CHEST 5/17/2021 12:11 am COMPARISON: 12/28/2020 HISTORY: ORDERING SYSTEM PROVIDED HISTORY: short of breath TECHNOLOGIST PROVIDED HISTORY: Reason for exam:->short of breath FINDINGS: Chest radiograph: The cardiomediastinal silhouette and hilar contours are normal. The lungs are clear with no focal consolidation, pleural effusion or pneumothorax. The overlying soft tissue and osseous structures do not demonstrate acute abnormality. No acute intrathoracic pathology. Patient Instructions:   Current Discharge Medication List      START taking these medications    Details   pantoprazole (PROTONIX) 40 MG tablet Take 1 tablet by mouth every morning (before breakfast)  Qty: 30 tablet, Refills: 0      folic acid (FOLVITE) 1 MG tablet Take 1 tablet by mouth daily  Qty: 30 tablet, Refills: 0      vitamin B-1 (THIAMINE) 100 MG tablet Take 1 tablet by mouth daily  Qty: 30 tablet, Refills: 0      vitamin D (ERGOCALCIFEROL) 1.25 MG (71759 UT) CAPS capsule Take 1 capsule by mouth once a week  Qty: 5 capsule, Refills: 0           Case discussed with patient's nurse at bedside. Note that more than 30 minutes was spent in preparing discharge papers, discussing discharge with patient, medication review, etc.    NOTE: This report was transcribed using voice recognition software. Every effort was made to ensure accuracy; however, inadvertent computerized transcription errors may be present.      Signed:  Electronically signed by Rachid Chand MD on 5/19/2021 at 8:35 AM

## 2021-05-22 LAB
BLOOD CULTURE, ROUTINE: NORMAL
CULTURE, BLOOD 2: NORMAL

## 2021-06-13 ENCOUNTER — APPOINTMENT (OUTPATIENT)
Dept: GENERAL RADIOLOGY | Age: 31
DRG: 282 | End: 2021-06-13
Payer: COMMERCIAL

## 2021-06-13 ENCOUNTER — APPOINTMENT (OUTPATIENT)
Dept: CT IMAGING | Age: 31
DRG: 282 | End: 2021-06-13
Payer: COMMERCIAL

## 2021-06-13 ENCOUNTER — HOSPITAL ENCOUNTER (INPATIENT)
Age: 31
LOS: 9 days | Discharge: LEFT BEFORE DISCHARGE | DRG: 282 | End: 2021-06-22
Attending: EMERGENCY MEDICINE | Admitting: INTERNAL MEDICINE
Payer: COMMERCIAL

## 2021-06-13 DIAGNOSIS — K85.90 ACUTE PANCREATITIS, UNSPECIFIED COMPLICATION STATUS, UNSPECIFIED PANCREATITIS TYPE: ICD-10-CM

## 2021-06-13 DIAGNOSIS — F10.920 ACUTE ALCOHOLIC INTOXICATION WITHOUT COMPLICATION (HCC): Primary | ICD-10-CM

## 2021-06-13 DIAGNOSIS — E87.6 HYPOKALEMIA: ICD-10-CM

## 2021-06-13 LAB
ACETAMINOPHEN LEVEL: <5 MCG/ML (ref 10–30)
ALBUMIN SERPL-MCNC: 3.7 G/DL (ref 3.5–5.2)
ALP BLD-CCNC: 210 U/L (ref 40–129)
ALT SERPL-CCNC: 46 U/L (ref 0–40)
AMPHETAMINE SCREEN, URINE: NOT DETECTED
ANION GAP SERPL CALCULATED.3IONS-SCNC: 19 MMOL/L (ref 7–16)
ANISOCYTOSIS: ABNORMAL
AST SERPL-CCNC: 149 U/L (ref 0–39)
BACTERIA: NORMAL /HPF
BARBITURATE SCREEN URINE: POSITIVE
BASOPHILS ABSOLUTE: 0 E9/L (ref 0–0.2)
BASOPHILS RELATIVE PERCENT: 0.6 % (ref 0–2)
BENZODIAZEPINE SCREEN, URINE: POSITIVE
BILIRUB SERPL-MCNC: 0.5 MG/DL (ref 0–1.2)
BILIRUBIN URINE: NEGATIVE
BLOOD, URINE: NEGATIVE
BUN BLDV-MCNC: 2 MG/DL (ref 6–20)
CALCIUM SERPL-MCNC: 8.2 MG/DL (ref 8.6–10.2)
CANNABINOID SCREEN URINE: POSITIVE
CHLORIDE BLD-SCNC: 99 MMOL/L (ref 98–107)
CHP ED QC CHECK: NORMAL
CLARITY: CLEAR
CO2: 26 MMOL/L (ref 22–29)
COCAINE METABOLITE SCREEN URINE: NOT DETECTED
COLOR: YELLOW
CREAT SERPL-MCNC: 0.6 MG/DL (ref 0.7–1.2)
EOSINOPHILS ABSOLUTE: 0 E9/L (ref 0.05–0.5)
EOSINOPHILS RELATIVE PERCENT: 0.3 % (ref 0–6)
ETHANOL: 457 MG/DL (ref 0–0.08)
FENTANYL SCREEN, URINE: NOT DETECTED
GFR AFRICAN AMERICAN: >60
GFR NON-AFRICAN AMERICAN: >60 ML/MIN/1.73
GLUCOSE BLD-MCNC: 139 MG/DL
GLUCOSE BLD-MCNC: 140 MG/DL (ref 74–99)
GLUCOSE URINE: NEGATIVE MG/DL
HCT VFR BLD CALC: 45.8 % (ref 37–54)
HEMOGLOBIN: 16.3 G/DL (ref 12.5–16.5)
INR BLD: 1
KETONES, URINE: NEGATIVE MG/DL
LACTIC ACID: 4.5 MMOL/L (ref 0.5–2.2)
LACTIC ACID: 4.6 MMOL/L (ref 0.5–2.2)
LEUKOCYTE ESTERASE, URINE: NEGATIVE
LIPASE: 455 U/L (ref 13–60)
LYMPHOCYTES ABSOLUTE: 0.28 E9/L (ref 1.5–4)
LYMPHOCYTES RELATIVE PERCENT: 4.4 % (ref 20–42)
Lab: ABNORMAL
MAGNESIUM: 1.8 MG/DL (ref 1.6–2.6)
MCH RBC QN AUTO: 35.4 PG (ref 26–35)
MCHC RBC AUTO-ENTMCNC: 35.6 % (ref 32–34.5)
MCV RBC AUTO: 99.3 FL (ref 80–99.9)
METER GLUCOSE: 139 MG/DL (ref 74–99)
METHADONE SCREEN, URINE: NOT DETECTED
MONOCYTES ABSOLUTE: 0.35 E9/L (ref 0.1–0.95)
MONOCYTES RELATIVE PERCENT: 5.3 % (ref 2–12)
NEUTROPHILS ABSOLUTE: 6.3 E9/L (ref 1.8–7.3)
NEUTROPHILS RELATIVE PERCENT: 90.4 % (ref 43–80)
NITRITE, URINE: NEGATIVE
OPIATE SCREEN URINE: NOT DETECTED
OXYCODONE URINE: NOT DETECTED
PDW BLD-RTO: 15.4 FL (ref 11.5–15)
PH UA: 7 (ref 5–9)
PHENCYCLIDINE SCREEN URINE: NOT DETECTED
PHOSPHORUS: 2 MG/DL (ref 2.5–4.5)
PLATELET # BLD: 83 E9/L (ref 130–450)
PLATELET CONFIRMATION: NORMAL
PMV BLD AUTO: 10.8 FL (ref 7–12)
POLYCHROMASIA: ABNORMAL
POTASSIUM SERPL-SCNC: 3.2 MMOL/L (ref 3.5–5)
PROTEIN UA: NORMAL MG/DL
PROTHROMBIN TIME: 11.8 SEC (ref 9.3–12.4)
RBC # BLD: 4.61 E12/L (ref 3.8–5.8)
RBC UA: NORMAL /HPF (ref 0–2)
SALICYLATE, SERUM: <0.3 MG/DL (ref 0–30)
SODIUM BLD-SCNC: 144 MMOL/L (ref 132–146)
SPECIFIC GRAVITY UA: <=1.005 (ref 1–1.03)
TOTAL CK: 159 U/L (ref 20–200)
TOTAL PROTEIN: 7.2 G/DL (ref 6.4–8.3)
TRICYCLIC ANTIDEPRESSANTS SCREEN SERUM: NEGATIVE NG/ML
UROBILINOGEN, URINE: 0.2 E.U./DL
WBC # BLD: 7 E9/L (ref 4.5–11.5)
WBC UA: NORMAL /HPF (ref 0–5)

## 2021-06-13 PROCEDURE — 80053 COMPREHEN METABOLIC PANEL: CPT

## 2021-06-13 PROCEDURE — 83735 ASSAY OF MAGNESIUM: CPT

## 2021-06-13 PROCEDURE — 81001 URINALYSIS AUTO W/SCOPE: CPT

## 2021-06-13 PROCEDURE — 80307 DRUG TEST PRSMV CHEM ANLYZR: CPT

## 2021-06-13 PROCEDURE — 96361 HYDRATE IV INFUSION ADD-ON: CPT

## 2021-06-13 PROCEDURE — 83690 ASSAY OF LIPASE: CPT

## 2021-06-13 PROCEDURE — 82962 GLUCOSE BLOOD TEST: CPT

## 2021-06-13 PROCEDURE — 96372 THER/PROPH/DIAG INJ SC/IM: CPT

## 2021-06-13 PROCEDURE — 6360000002 HC RX W HCPCS: Performed by: INTERNAL MEDICINE

## 2021-06-13 PROCEDURE — 6370000000 HC RX 637 (ALT 250 FOR IP): Performed by: EMERGENCY MEDICINE

## 2021-06-13 PROCEDURE — 96374 THER/PROPH/DIAG INJ IV PUSH: CPT

## 2021-06-13 PROCEDURE — 74177 CT ABD & PELVIS W/CONTRAST: CPT

## 2021-06-13 PROCEDURE — 84100 ASSAY OF PHOSPHORUS: CPT

## 2021-06-13 PROCEDURE — 93005 ELECTROCARDIOGRAM TRACING: CPT | Performed by: EMERGENCY MEDICINE

## 2021-06-13 PROCEDURE — 83605 ASSAY OF LACTIC ACID: CPT

## 2021-06-13 PROCEDURE — 96375 TX/PRO/DX INJ NEW DRUG ADDON: CPT

## 2021-06-13 PROCEDURE — 80179 DRUG ASSAY SALICYLATE: CPT

## 2021-06-13 PROCEDURE — 2580000003 HC RX 258: Performed by: INTERNAL MEDICINE

## 2021-06-13 PROCEDURE — 2500000003 HC RX 250 WO HCPCS: Performed by: INTERNAL MEDICINE

## 2021-06-13 PROCEDURE — 2060000000 HC ICU INTERMEDIATE R&B

## 2021-06-13 PROCEDURE — 85025 COMPLETE CBC W/AUTO DIFF WBC: CPT

## 2021-06-13 PROCEDURE — 6370000000 HC RX 637 (ALT 250 FOR IP): Performed by: SURGERY

## 2021-06-13 PROCEDURE — 80143 DRUG ASSAY ACETAMINOPHEN: CPT

## 2021-06-13 PROCEDURE — 82550 ASSAY OF CK (CPK): CPT

## 2021-06-13 PROCEDURE — 85610 PROTHROMBIN TIME: CPT

## 2021-06-13 PROCEDURE — 6360000002 HC RX W HCPCS: Performed by: EMERGENCY MEDICINE

## 2021-06-13 PROCEDURE — 71045 X-RAY EXAM CHEST 1 VIEW: CPT

## 2021-06-13 PROCEDURE — 96376 TX/PRO/DX INJ SAME DRUG ADON: CPT

## 2021-06-13 PROCEDURE — 99285 EMERGENCY DEPT VISIT HI MDM: CPT

## 2021-06-13 PROCEDURE — 6360000002 HC RX W HCPCS

## 2021-06-13 PROCEDURE — 82077 ASSAY SPEC XCP UR&BREATH IA: CPT

## 2021-06-13 PROCEDURE — 99223 1ST HOSP IP/OBS HIGH 75: CPT | Performed by: INTERNAL MEDICINE

## 2021-06-13 PROCEDURE — 6360000004 HC RX CONTRAST MEDICATION: Performed by: RADIOLOGY

## 2021-06-13 PROCEDURE — 2580000003 HC RX 258: Performed by: EMERGENCY MEDICINE

## 2021-06-13 PROCEDURE — 36415 COLL VENOUS BLD VENIPUNCTURE: CPT

## 2021-06-13 PROCEDURE — 99254 IP/OBS CNSLTJ NEW/EST MOD 60: CPT | Performed by: SURGERY

## 2021-06-13 RX ORDER — DIAZEPAM 5 MG/ML
20 INJECTION, SOLUTION INTRAMUSCULAR; INTRAVENOUS
Status: DISCONTINUED | OUTPATIENT
Start: 2021-06-13 | End: 2021-06-22 | Stop reason: HOSPADM

## 2021-06-13 RX ORDER — DIAZEPAM 5 MG/1
5 TABLET ORAL
Status: DISCONTINUED | OUTPATIENT
Start: 2021-06-13 | End: 2021-06-22 | Stop reason: HOSPADM

## 2021-06-13 RX ORDER — SODIUM CHLORIDE 0.9 % (FLUSH) 0.9 %
5-40 SYRINGE (ML) INJECTION EVERY 12 HOURS SCHEDULED
Status: DISCONTINUED | OUTPATIENT
Start: 2021-06-13 | End: 2021-06-22 | Stop reason: HOSPADM

## 2021-06-13 RX ORDER — LORAZEPAM 2 MG/ML
INJECTION INTRAMUSCULAR
Status: DISPENSED
Start: 2021-06-13 | End: 2021-06-13

## 2021-06-13 RX ORDER — DIAZEPAM 5 MG/ML
INJECTION, SOLUTION INTRAMUSCULAR; INTRAVENOUS
Status: COMPLETED
Start: 2021-06-13 | End: 2021-06-13

## 2021-06-13 RX ORDER — SODIUM CHLORIDE AND POTASSIUM CHLORIDE .9; .15 G/100ML; G/100ML
SOLUTION INTRAVENOUS CONTINUOUS
Status: DISCONTINUED | OUTPATIENT
Start: 2021-06-13 | End: 2021-06-15

## 2021-06-13 RX ORDER — LORAZEPAM 1 MG/1
4 TABLET ORAL
Status: DISCONTINUED | OUTPATIENT
Start: 2021-06-13 | End: 2021-06-13

## 2021-06-13 RX ORDER — ONDANSETRON 2 MG/ML
4 INJECTION INTRAMUSCULAR; INTRAVENOUS EVERY 6 HOURS PRN
Status: DISCONTINUED | OUTPATIENT
Start: 2021-06-13 | End: 2021-06-22 | Stop reason: HOSPADM

## 2021-06-13 RX ORDER — FENTANYL CITRATE 50 UG/ML
50 INJECTION, SOLUTION INTRAMUSCULAR; INTRAVENOUS ONCE
Status: COMPLETED | OUTPATIENT
Start: 2021-06-13 | End: 2021-06-13

## 2021-06-13 RX ORDER — DIAZEPAM 5 MG/ML
5 INJECTION, SOLUTION INTRAMUSCULAR; INTRAVENOUS
Status: DISCONTINUED | OUTPATIENT
Start: 2021-06-13 | End: 2021-06-22 | Stop reason: HOSPADM

## 2021-06-13 RX ORDER — POTASSIUM BICARBONATE 25 MEQ/1
50 TABLET, EFFERVESCENT ORAL ONCE
Status: DISCONTINUED | OUTPATIENT
Start: 2021-06-13 | End: 2021-06-13 | Stop reason: CLARIF

## 2021-06-13 RX ORDER — DIAZEPAM 5 MG/ML
5 INJECTION, SOLUTION INTRAMUSCULAR; INTRAVENOUS ONCE
Status: DISCONTINUED | OUTPATIENT
Start: 2021-06-13 | End: 2021-06-13 | Stop reason: ALTCHOICE

## 2021-06-13 RX ORDER — DIAZEPAM 5 MG/ML
10 INJECTION, SOLUTION INTRAMUSCULAR; INTRAVENOUS
Status: DISCONTINUED | OUTPATIENT
Start: 2021-06-13 | End: 2021-06-22 | Stop reason: HOSPADM

## 2021-06-13 RX ORDER — LORAZEPAM 1 MG/1
1 TABLET ORAL
Status: DISCONTINUED | OUTPATIENT
Start: 2021-06-13 | End: 2021-06-13

## 2021-06-13 RX ORDER — LORAZEPAM 1 MG/1
3 TABLET ORAL
Status: DISCONTINUED | OUTPATIENT
Start: 2021-06-13 | End: 2021-06-13

## 2021-06-13 RX ORDER — KETOROLAC TROMETHAMINE 15 MG/ML
15 INJECTION, SOLUTION INTRAMUSCULAR; INTRAVENOUS ONCE
Status: COMPLETED | OUTPATIENT
Start: 2021-06-13 | End: 2021-06-13

## 2021-06-13 RX ORDER — ACETAMINOPHEN 325 MG/1
650 TABLET ORAL EVERY 6 HOURS PRN
Status: DISCONTINUED | OUTPATIENT
Start: 2021-06-13 | End: 2021-06-22 | Stop reason: HOSPADM

## 2021-06-13 RX ORDER — ONDANSETRON 4 MG/1
4 TABLET, ORALLY DISINTEGRATING ORAL EVERY 8 HOURS PRN
Status: DISCONTINUED | OUTPATIENT
Start: 2021-06-13 | End: 2021-06-22 | Stop reason: HOSPADM

## 2021-06-13 RX ORDER — POLYETHYLENE GLYCOL 3350 17 G/17G
17 POWDER, FOR SOLUTION ORAL DAILY PRN
Status: DISCONTINUED | OUTPATIENT
Start: 2021-06-13 | End: 2021-06-22 | Stop reason: HOSPADM

## 2021-06-13 RX ORDER — PROMETHAZINE HYDROCHLORIDE 25 MG/ML
25 INJECTION, SOLUTION INTRAMUSCULAR; INTRAVENOUS ONCE
Status: COMPLETED | OUTPATIENT
Start: 2021-06-13 | End: 2021-06-13

## 2021-06-13 RX ORDER — ACETAMINOPHEN 650 MG/1
650 SUPPOSITORY RECTAL EVERY 6 HOURS PRN
Status: DISCONTINUED | OUTPATIENT
Start: 2021-06-13 | End: 2021-06-22 | Stop reason: HOSPADM

## 2021-06-13 RX ORDER — SODIUM CHLORIDE 0.9 % (FLUSH) 0.9 %
5-40 SYRINGE (ML) INJECTION EVERY 12 HOURS SCHEDULED
Status: DISCONTINUED | OUTPATIENT
Start: 2021-06-13 | End: 2021-06-13 | Stop reason: SDUPTHER

## 2021-06-13 RX ORDER — SODIUM CHLORIDE 0.9 % (FLUSH) 0.9 %
5-40 SYRINGE (ML) INJECTION PRN
Status: DISCONTINUED | OUTPATIENT
Start: 2021-06-13 | End: 2021-06-13 | Stop reason: SDUPTHER

## 2021-06-13 RX ORDER — SODIUM CHLORIDE 9 MG/ML
25 INJECTION, SOLUTION INTRAVENOUS PRN
Status: DISCONTINUED | OUTPATIENT
Start: 2021-06-13 | End: 2021-06-13 | Stop reason: SDUPTHER

## 2021-06-13 RX ORDER — DIAZEPAM 5 MG/1
15 TABLET ORAL
Status: DISCONTINUED | OUTPATIENT
Start: 2021-06-13 | End: 2021-06-22 | Stop reason: HOSPADM

## 2021-06-13 RX ORDER — SCOLOPAMINE TRANSDERMAL SYSTEM 1 MG/1
1 PATCH, EXTENDED RELEASE TRANSDERMAL
Status: DISCONTINUED | OUTPATIENT
Start: 2021-06-13 | End: 2021-06-22 | Stop reason: HOSPADM

## 2021-06-13 RX ORDER — DIAZEPAM 5 MG/ML
10 INJECTION, SOLUTION INTRAMUSCULAR; INTRAVENOUS ONCE
Status: COMPLETED | OUTPATIENT
Start: 2021-06-13 | End: 2021-06-13

## 2021-06-13 RX ORDER — SODIUM CHLORIDE 9 MG/ML
25 INJECTION, SOLUTION INTRAVENOUS PRN
Status: DISCONTINUED | OUTPATIENT
Start: 2021-06-13 | End: 2021-06-22 | Stop reason: HOSPADM

## 2021-06-13 RX ORDER — DIAZEPAM 5 MG/1
20 TABLET ORAL
Status: DISCONTINUED | OUTPATIENT
Start: 2021-06-13 | End: 2021-06-22 | Stop reason: HOSPADM

## 2021-06-13 RX ORDER — SODIUM CHLORIDE 0.9 % (FLUSH) 0.9 %
5-40 SYRINGE (ML) INJECTION PRN
Status: DISCONTINUED | OUTPATIENT
Start: 2021-06-13 | End: 2021-06-22 | Stop reason: HOSPADM

## 2021-06-13 RX ORDER — GAUZE BANDAGE 2" X 2"
100 BANDAGE TOPICAL ONCE
Status: COMPLETED | OUTPATIENT
Start: 2021-06-13 | End: 2021-06-13

## 2021-06-13 RX ORDER — DIAZEPAM 5 MG/ML
15 INJECTION, SOLUTION INTRAMUSCULAR; INTRAVENOUS
Status: DISCONTINUED | OUTPATIENT
Start: 2021-06-13 | End: 2021-06-22 | Stop reason: HOSPADM

## 2021-06-13 RX ORDER — LORAZEPAM 2 MG/ML
3 INJECTION INTRAMUSCULAR
Status: DISCONTINUED | OUTPATIENT
Start: 2021-06-13 | End: 2021-06-13

## 2021-06-13 RX ORDER — DIAZEPAM 5 MG/ML
5 INJECTION, SOLUTION INTRAMUSCULAR; INTRAVENOUS EVERY 4 HOURS PRN
Status: DISCONTINUED | OUTPATIENT
Start: 2021-06-13 | End: 2021-06-22 | Stop reason: HOSPADM

## 2021-06-13 RX ORDER — FOLIC ACID 1 MG/1
1 TABLET ORAL ONCE
Status: COMPLETED | OUTPATIENT
Start: 2021-06-13 | End: 2021-06-13

## 2021-06-13 RX ORDER — DIAZEPAM 5 MG/1
10 TABLET ORAL
Status: DISCONTINUED | OUTPATIENT
Start: 2021-06-13 | End: 2021-06-22 | Stop reason: HOSPADM

## 2021-06-13 RX ORDER — LORAZEPAM 2 MG/ML
1 INJECTION INTRAMUSCULAR
Status: DISCONTINUED | OUTPATIENT
Start: 2021-06-13 | End: 2021-06-13

## 2021-06-13 RX ORDER — MAGNESIUM SULFATE IN WATER 40 MG/ML
2000 INJECTION, SOLUTION INTRAVENOUS ONCE
Status: COMPLETED | OUTPATIENT
Start: 2021-06-13 | End: 2021-06-13

## 2021-06-13 RX ORDER — LORAZEPAM 2 MG/ML
4 INJECTION INTRAMUSCULAR
Status: DISCONTINUED | OUTPATIENT
Start: 2021-06-13 | End: 2021-06-13

## 2021-06-13 RX ORDER — 0.9 % SODIUM CHLORIDE 0.9 %
1000 INTRAVENOUS SOLUTION INTRAVENOUS ONCE
Status: COMPLETED | OUTPATIENT
Start: 2021-06-13 | End: 2021-06-13

## 2021-06-13 RX ORDER — SODIUM CHLORIDE 9 MG/ML
INJECTION, SOLUTION INTRAVENOUS CONTINUOUS
Status: DISCONTINUED | OUTPATIENT
Start: 2021-06-13 | End: 2021-06-13 | Stop reason: CLARIF

## 2021-06-13 RX ORDER — PHENOBARBITAL SODIUM 65 MG/ML
130 INJECTION INTRAMUSCULAR EVERY 12 HOURS
Status: DISCONTINUED | OUTPATIENT
Start: 2021-06-13 | End: 2021-06-16

## 2021-06-13 RX ORDER — LORAZEPAM 2 MG/ML
2 INJECTION INTRAMUSCULAR
Status: DISCONTINUED | OUTPATIENT
Start: 2021-06-13 | End: 2021-06-13

## 2021-06-13 RX ORDER — LORAZEPAM 1 MG/1
2 TABLET ORAL
Status: DISCONTINUED | OUTPATIENT
Start: 2021-06-13 | End: 2021-06-13

## 2021-06-13 RX ORDER — LORAZEPAM 2 MG/ML
1 INJECTION INTRAMUSCULAR ONCE
Status: DISCONTINUED | OUTPATIENT
Start: 2021-06-13 | End: 2021-06-13

## 2021-06-13 RX ADMIN — THIAMINE HCL TAB 100 MG 100 MG: 100 TAB at 05:25

## 2021-06-13 RX ADMIN — DIAZEPAM 20 MG: 5 INJECTION, SOLUTION INTRAMUSCULAR; INTRAVENOUS at 17:17

## 2021-06-13 RX ADMIN — ONDANSETRON 4 MG: 2 INJECTION INTRAMUSCULAR; INTRAVENOUS at 10:32

## 2021-06-13 RX ADMIN — POTASSIUM PHOSPHATE, MONOBASIC POTASSIUM PHOSPHATE, DIBASIC 30 MMOL: 224; 236 INJECTION, SOLUTION, CONCENTRATE INTRAVENOUS at 12:37

## 2021-06-13 RX ADMIN — DIAZEPAM 20 MG: 5 INJECTION, SOLUTION INTRAMUSCULAR; INTRAVENOUS at 15:52

## 2021-06-13 RX ADMIN — SODIUM CHLORIDE 1000 ML: 9 INJECTION, SOLUTION INTRAVENOUS at 04:26

## 2021-06-13 RX ADMIN — POTASSIUM CHLORIDE AND SODIUM CHLORIDE: 900; 150 INJECTION, SOLUTION INTRAVENOUS at 19:05

## 2021-06-13 RX ADMIN — FENTANYL CITRATE 50 MCG: 50 INJECTION, SOLUTION INTRAMUSCULAR; INTRAVENOUS at 07:20

## 2021-06-13 RX ADMIN — POTASSIUM CHLORIDE AND SODIUM CHLORIDE: 900; 150 INJECTION, SOLUTION INTRAVENOUS at 11:40

## 2021-06-13 RX ADMIN — DIAZEPAM 5 MG: 5 INJECTION, SOLUTION INTRAMUSCULAR; INTRAVENOUS at 12:02

## 2021-06-13 RX ADMIN — ONDANSETRON 4 MG: 2 INJECTION INTRAMUSCULAR; INTRAVENOUS at 22:21

## 2021-06-13 RX ADMIN — IOPAMIDOL 75 ML: 755 INJECTION, SOLUTION INTRAVENOUS at 06:04

## 2021-06-13 RX ADMIN — ENOXAPARIN SODIUM 40 MG: 40 INJECTION SUBCUTANEOUS at 10:32

## 2021-06-13 RX ADMIN — MAGNESIUM SULFATE HEPTAHYDRATE 2000 MG: 40 INJECTION, SOLUTION INTRAVENOUS at 05:25

## 2021-06-13 RX ADMIN — DIAZEPAM 20 MG: 10 INJECTION, SOLUTION INTRAMUSCULAR; INTRAVENOUS at 15:52

## 2021-06-13 RX ADMIN — ONDANSETRON 4 MG: 2 INJECTION INTRAMUSCULAR; INTRAVENOUS at 15:46

## 2021-06-13 RX ADMIN — DIAZEPAM 20 MG: 5 INJECTION, SOLUTION INTRAMUSCULAR; INTRAVENOUS at 14:20

## 2021-06-13 RX ADMIN — DIAZEPAM 20 MG: 5 INJECTION, SOLUTION INTRAMUSCULAR; INTRAVENOUS at 18:18

## 2021-06-13 RX ADMIN — DIAZEPAM 20 MG: 5 INJECTION, SOLUTION INTRAMUSCULAR; INTRAVENOUS at 20:42

## 2021-06-13 RX ADMIN — PHENOBARBITAL SODIUM 130 MG: 65 INJECTION INTRAMUSCULAR; INTRAVENOUS at 22:21

## 2021-06-13 RX ADMIN — DIAZEPAM 5 MG: 5 INJECTION, SOLUTION INTRAMUSCULAR; INTRAVENOUS at 09:07

## 2021-06-13 RX ADMIN — DIAZEPAM 20 MG: 5 INJECTION, SOLUTION INTRAMUSCULAR; INTRAVENOUS at 19:37

## 2021-06-13 RX ADMIN — POTASSIUM BICARBONATE 40 MEQ: 782 TABLET, EFFERVESCENT ORAL at 05:25

## 2021-06-13 RX ADMIN — FENTANYL CITRATE 50 MCG: 50 INJECTION, SOLUTION INTRAMUSCULAR; INTRAVENOUS at 05:54

## 2021-06-13 RX ADMIN — PROMETHAZINE HYDROCHLORIDE 25 MG: 25 INJECTION INTRAMUSCULAR; INTRAVENOUS at 04:55

## 2021-06-13 RX ADMIN — FOLIC ACID 1 MG: 1 TABLET ORAL at 05:25

## 2021-06-13 RX ADMIN — DIAZEPAM 10 MG: 5 INJECTION, SOLUTION INTRAMUSCULAR; INTRAVENOUS at 12:46

## 2021-06-13 RX ADMIN — KETOROLAC TROMETHAMINE 15 MG: 15 INJECTION, SOLUTION INTRAMUSCULAR; INTRAVENOUS at 23:27

## 2021-06-13 ASSESSMENT — PAIN DESCRIPTION - PAIN TYPE
TYPE: ACUTE PAIN

## 2021-06-13 ASSESSMENT — PAIN DESCRIPTION - DESCRIPTORS
DESCRIPTORS: DISCOMFORT
DESCRIPTORS: ACHING;DISCOMFORT
DESCRIPTORS: ACHING;CONSTANT

## 2021-06-13 ASSESSMENT — ENCOUNTER SYMPTOMS
COUGH: 0
SHORTNESS OF BREATH: 0
NAUSEA: 1
ABDOMINAL PAIN: 0

## 2021-06-13 ASSESSMENT — PAIN DESCRIPTION - ONSET
ONSET: ON-GOING

## 2021-06-13 ASSESSMENT — PAIN - FUNCTIONAL ASSESSMENT
PAIN_FUNCTIONAL_ASSESSMENT: INTOLERABLE, UNABLE TO DO ANY ACTIVE OR PASSIVE ACTIVITIES
PAIN_FUNCTIONAL_ASSESSMENT: PREVENTS OR INTERFERES WITH ALL ACTIVE AND SOME PASSIVE ACTIVITIES

## 2021-06-13 ASSESSMENT — PAIN SCALES - GENERAL
PAINLEVEL_OUTOF10: 9
PAINLEVEL_OUTOF10: 9
PAINLEVEL_OUTOF10: 10
PAINLEVEL_OUTOF10: 10
PAINLEVEL_OUTOF10: 8
PAINLEVEL_OUTOF10: 10

## 2021-06-13 ASSESSMENT — PAIN DESCRIPTION - FREQUENCY
FREQUENCY: CONTINUOUS

## 2021-06-13 ASSESSMENT — PAIN DESCRIPTION - PROGRESSION
CLINICAL_PROGRESSION: GRADUALLY WORSENING
CLINICAL_PROGRESSION: GRADUALLY IMPROVING

## 2021-06-13 ASSESSMENT — PAIN DESCRIPTION - LOCATION
LOCATION: GENERALIZED
LOCATION: ABDOMEN;GENERALIZED
LOCATION: GENERALIZED

## 2021-06-13 NOTE — PROGRESS NOTES
Dr Abeba Ford notified of patient c/o possible unwitnessed seizure. Patient a&o, able to follow commands.  Orders to give valium per MD

## 2021-06-13 NOTE — CONSULTS
infection    Lactic acidosis    Pancreatitis, recurrent       Allergies   Allergen Reactions    Hydrocodone Anaphylaxis    Ativan [Lorazepam] Other (See Comments)     \"Hallucinates\"     Dann Jordana Sustenna [Paliperidone Palmitate Er] Other (See Comments)     \"Unable to Urinate\"    Paliperidone Other (See Comments)    Pcn [Penicillins] Nausea Only    Fluticasone Rash       Past Medical History:   Diagnosis Date    Anxiety     Arm pain     Concussion with loss of consciousness     Convulsions (HCC)     Depression     Flashing lights     Head injury     Headache     Leg pain     Numbness and tingling     arms and hands    PTSD (post-traumatic stress disorder)     Seizures (HCC)        Past Surgical History:   Procedure Laterality Date    COLONOSCOPY      ENDOSCOPY, COLON, DIAGNOSTIC         Social History     Socioeconomic History    Marital status: Single     Spouse name: Not on file    Number of children: 0    Years of education: 9    Highest education level: Not on file   Occupational History    Occupation: [employer]     Employer: SELF     Comment: [employer]   Tobacco Use    Smoking status: Heavy Tobacco Smoker     Packs/day: 2.00     Years: 9.00     Pack years: 18.00    Smokeless tobacco: Never Used   Vaping Use    Vaping Use: Former    Substances: Always   Substance and Sexual Activity    Alcohol use:  Yes     Alcohol/week: 24.0 standard drinks     Types: 4 Glasses of wine, 20 Cans of beer per week     Comment: heavy daily use ( 2 bottles of los daily)    Drug use: Yes     Frequency: 1.0 times per week     Types: Marijuana    Sexual activity: Not Currently     Partners: Female   Other Topics Concern    Not on file   Social History Narrative    Not on file     Social Determinants of Health     Financial Resource Strain:     Difficulty of Paying Living Expenses:    Food Insecurity:     Worried About Running Out of Food in the Last Year:     [address] in the Last Year:    Transportation Needs:     Lack of Transportation (Medical):  Lack of Transportation (Non-Medical):    Physical Activity:     Days of Exercise per Week:     Minutes of Exercise per Session:    Stress:     Feeling of Stress :    Social Connections:     Frequency of Communication with Friends and Family:     Frequency of Social Gatherings with Friends and Family:     Attends Restoration[employer] Services:     Active Member of Clubs or Organizations:     Attends Club or Organization Meetings:     Marital Status:    Intimate Partner Violence:     Fear of Current or Ex-Partner:     Emotionally Abused:     Physically Abused:     Sexually Abused: The patient has a family history that is negative for severe cardiovascular or respiratory issues, negative for reaction to anesthesia. Recent Labs     06/13/21 0417   WBC 7.0   HGB 16.3   HCT 45.8   MCV 99.3   PLT 83*       Recent Labs     06/13/21 0417      K 3.2*   CO2 26   PHOS 2.0*   BUN 2*   CREATININE 0.6*       Recent Labs     06/13/21 0417   PROT 7.2   INR 1.0   LIPASE 455*         Intake/Output Summary (Last 24 hours) at 6/13/2021 1014  Last data filed at 6/13/2021 6106  Gross per 24 hour   Intake 50 ml   Output --   Net 50 ml       I have reviewed relevant labs from this admission and interpretation is included in my assessment and plan      Review of Systems  A complete 10 system review was performed and are otherwise negative unless mentioned in the above HPI. Specific negatives are listed below but may not include all those reviewed.     General ROS: negative obtundation, AMS  ENT ROS: negative rhinorrhea, epistaxis  Allergy and Immunology ROS: negative itchy/watery eyes or nasal congestion  Hematological and Lymphatic ROS: negative spontaneous bleeding or bruising  Endocrine ROS: negative  lethargy, mood swings, palpitations or polydipsia/polyuria  Respiratory ROS: negative sputum changes, stridor, tachypnea or wheezing  Cardiovascular ROS: negative for - loss of consciousness, murmur or orthopnea  Gastrointestinal ROS: negative for - hematochezia or hematemesis  Genito-Urinary ROS: negative for -  genital discharge or hematuria  Musculoskeletal ROS: negative for - focal weakness, gangrene  Psych/Neuro ROS: negative for - visual or auditory hallucinations, suicidal ideation      Physical exam:   BP (P) 123/75   Pulse (P) 112   Temp (P) 98.6 °F (37 °C) (Oral)   Resp (P) 20   Ht 5' 7\" (1.702 m)   Wt 150 lb (68 kg)   SpO2 95%   BMI 23.49 kg/m²   General appearance:  NAD, appears stated age  Head: NCAT, PERRLA, EOMI, red conjunctiva  Neck: supple, no masses, trachea midline  Lungs: Equal chest rise bilateral, no retractions, no wheezing  Heart: Reg rate  Abdomen: soft, bruising right side right flank, no epigastric tenderness  Skin; warm and dry, right flank ecchymosis  Gu: no cva tenderness  Extremities: atraumatic, no focal motor deficits, no open wounds  Psych: No tremor, visual hallucinations        Radiology: I reviewed relevant abdominal imaging from this admission and that available in the EMR including CT abd/pel from admission.  My assessment is pseudocyst        Assessment:  Khushboo Tobias is a 31 y.o. male with alcohol induced pancreatitis, pancreatic pseudocyst, right flank bruising, thrombocytopenia  Patient Active Problem List   Diagnosis    Alcohol withdrawal syndrome without complication ([address]    Alcoholism ([address]    Acute alcoholic intoxication ([address]    Severe single current episode of major depressive disorder, without psychotic features ([address]    Marijuana smoker    Major depressive disorder, severe ([address]    Severe depressed bipolar I disorder without psychotic features ([address]    Alcohol-induced acute pancreatitis    Alcohol withdrawal ([address]    Alcohol abuse with withdrawal ([address]    Alcohol withdrawal, uncomplicated ([address]    Alcohol induced acute pancreatitis without necrosis or infection  Lactic acidosis    Pancreatitis, recurrent       Plan:  Clear liquids with protein supplements for now, low-fat diet once pain improved  Recommend ICU transfer secondary to alcohol withdrawals  CIWA protocol  Discussed with hospitalist  Repeat CT in 6 weeks to evaluate for maturation of the cyst  No surgical intervention planned  IV fluid resuscitation pancreatitis protocol  Monitor his bruising and hemoglobin  On her urine output to guide resuscitation  We will consult surgical endoscopy for evaluation in 6 weeks for possible cyst gastrostomy  We will consult hepatobiliary to follow for pancreatic pseudocyst occurrence    Time spent reviewing past medical, surgical, social and family history, vitals, nursing assessment and images. Time spent face to face with patient and family counciling and discussing care exceeded 50% of the time of the consult. Additional time spent reviewing images and labs, discussing case with nursing, support staff and other physicians; as well as coordinating care.         Physician Signature: Electronically signed by Dr. Boone Head  [phone] (p)

## 2021-06-13 NOTE — ED NOTES
Per dr James Mandel, okay to give the ativan even though pt hallucinates, upon attempting to medicate pt with the ativan pt became more anxious adamantly refusing the ativan, crying, dr James Mandel updated and new order for valium obtained, pt up in room despite being told to stay in bed due to fall risk due to shaky gait     Lei Adams RN  06/13/21 2040

## 2021-06-13 NOTE — PLAN OF CARE
Problem: Falls - Risk of:  Goal: Will remain free from falls  Description: Will remain free from falls  6/13/2021 1841 by Taco Acuña RN  Outcome: Met This Shift     Problem: Falls - Risk of:  Goal: Absence of physical injury  Description: Absence of physical injury  6/13/2021 1841 by Taco Acuña RN  Outcome: Met This Shift

## 2021-06-13 NOTE — ED NOTES
Pt up drinking tap water despite being told not to, paging dr Jake Dinero, dr Adrian Bruno was notified     Juno Duncan RN  06/13/21 1196

## 2021-06-13 NOTE — ED NOTES
Vitals reassessed, medicated with valium, bed low/locked with side rails up and call light within reach, on cont pulse ox, pt instructed not to get out of bed due to unsteady gait, per dr Avalos Render okay for pt to be downgraded to f and charge advised     Apoorva Morin RN  06/13/21 0267

## 2021-06-13 NOTE — ED PROVIDER NOTES
This is a 82-year-old male with a past medical history of pancreatitis and alcohol abuse presents to the ED for evaluation of nausea. Patient states that he did end up going to rehab after leaving his facility about a month ago but states he did not succeed and went back to drinking. States that he drinks about 24 7 last having a drink about 10 hours ago. Patient states that he is not eating well or drinking plenty of fluids as of the last several weeks. He states he is having body aches diffusely his gums hurt and his legs hurt so he has not had any traumas or falls. Patient states he also has some diarrhea. States he last had a drink of alcohol about 10 hours ago. She has no SI or HI. Denies any mitigating or exacerbating factors. He has no black or bloody stools. The history is provided by the patient. No  was used. Review of Systems   Constitutional: Positive for appetite change. Negative for fever. HENT: Positive for dental problem. Eyes: Negative for visual disturbance. Respiratory: Negative for cough and shortness of breath. Cardiovascular: Negative for chest pain. Gastrointestinal: Positive for nausea. Negative for abdominal pain. Endocrine: Negative for polyuria. Genitourinary: Negative for dysuria. Musculoskeletal: Positive for myalgias. Skin: Negative for rash. Allergic/Immunologic: Negative for immunocompromised state. Neurological: Negative for headaches. Hematological: Does not bruise/bleed easily. Psychiatric/Behavioral: Negative for confusion. Physical Exam  Vitals and nursing note reviewed. Constitutional:       General: He is not in acute distress. Appearance: He is well-developed. HENT:      Head: Normocephalic and atraumatic. Mouth/Throat:      Mouth: Mucous membranes are dry. Eyes:      Extraocular Movements: Extraocular movements intact. Pupils: Pupils are equal, round, and reactive to light.    Neck: Vascular: No JVD. Cardiovascular:      Rate and Rhythm: Regular rhythm. Tachycardia present. Pulmonary:      Effort: Pulmonary effort is normal.   Abdominal:      General: There is no distension. Palpations: Abdomen is soft. Tenderness: There is no abdominal tenderness. There is no guarding or rebound. Hernia: No hernia is present. Musculoskeletal:      Cervical back: Normal range of motion and neck supple. Right lower leg: No edema. Left lower leg: No edema. Skin:     General: Skin is warm and dry. Capillary Refill: Capillary refill takes less than 2 seconds. Neurological:      General: No focal deficit present. Mental Status: He is alert and oriented to person, place, and time. Mental status is at baseline. Cranial Nerves: No cranial nerve deficit. Psychiatric:         Mood and Affect: Mood normal.         Behavior: Behavior normal.          Procedures     MDM  Number of Diagnoses or Management Options  Acute alcoholic intoxication without complication (HCC)  Acute pancreatitis, unspecified complication status, unspecified pancreatitis type  Hypokalemia  Diagnosis management comments: Patient has a history of alcoholism and presetned with a number of complaints including nausea, vomiting and abdominal pain. Patient was not going with withdrawal in the ED as he alcohol level was signficantly elevated. Patient was treated with IV fluids and analgesics. Patient had a signficantly elevated lipase and CT imaging showed signs of pseudocyst. Patient had his electrolytes repleted and medicine physician was in an RRT, Dr. Norah Camara was told of case and admitted patient to the medicine team as my shift was over.         Amount and/or Complexity of Data Reviewed  Clinical lab tests: reviewed  Tests in the radiology section of CPT®: reviewed  Tests in the medicine section of CPT®: reviewed                         --------------------------------------------- PAST HISTORY %    Neutrophils Absolute 6.30 1.80 - 7.30 E9/L    Lymphocytes Absolute 0.28 (L) 1.50 - 4.00 E9/L    Monocytes Absolute 0.35 0.10 - 0.95 E9/L    Eosinophils Absolute 0.00 (L) 0.05 - 0.50 E9/L    Basophils Absolute 0.00 0.00 - 0.20 E9/L    Anisocytosis 1+     Polychromasia 1+    Lipase   Result Value Ref Range    Lipase 455 (H) 13 - 60 U/L   Lactic Acid, Plasma   Result Value Ref Range    Lactic Acid 4.6 (HH) 0.5 - 2.2 mmol/L   Magnesium   Result Value Ref Range    Magnesium 1.8 1.6 - 2.6 mg/dL   Phosphorus   Result Value Ref Range    Phosphorus 2.0 (L) 2.5 - 4.5 mg/dL   CK   Result Value Ref Range    Total  20 - 200 U/L   Serum Drug Screen   Result Value Ref Range    Ethanol Lvl 457 (HH) mg/dL    Acetaminophen Level <5.0 (L) 10.0 - 86.4 mcg/mL    Salicylate, Serum <9.4 0.0 - 30.0 mg/dL    TCA Scrn NEGATIVE Cutoff:300 ng/mL   Urine Drug Screen   Result Value Ref Range    Amphetamine Screen, Urine NOT DETECTED Negative <1000 ng/mL    Barbiturate Screen, Ur POSITIVE (A) Negative < 200 ng/mL    Benzodiazepine Screen, Urine POSITIVE (A) Negative < 200 ng/mL    Cannabinoid Scrn, Ur POSITIVE (A) Negative < 50ng/mL    Cocaine Metabolite Screen, Urine NOT DETECTED Negative < 300 ng/mL    Opiate Scrn, Ur NOT DETECTED Negative < 300ng/mL    PCP Screen, Urine NOT DETECTED Negative < 25 ng/mL    Methadone Screen, Urine NOT DETECTED Negative <300 ng/mL    Oxycodone Urine NOT DETECTED Negative <100 ng/mL    FENTANYL SCREEN, URINE NOT DETECTED Negative <1 ng/mL    Drug Screen Comment: see below    Urinalysis with Microscopic   Result Value Ref Range    Color, UA Yellow Straw/Yellow    Clarity, UA Clear Clear    Glucose, Ur Negative Negative mg/dL    Bilirubin Urine Negative Negative    Ketones, Urine Negative Negative mg/dL    Specific Gravity, UA <=1.005 1.005 - 1.030    Blood, Urine Negative Negative    pH, UA 7.0 5.0 - 9.0    Protein, UA TRACE Negative mg/dL    Urobilinogen, Urine 0.2 <2.0 E.U./dL    Nitrite, Urine Result Value Ref Range    Magnesium 1.5 (L) 1.6 - 2.6 mg/dL   Platelet Confirmation   Result Value Ref Range    Platelet Confirmation CONFIRMED    Lactic acid, plasma   Result Value Ref Range    Lactic Acid 0.9 0.5 - 2.2 mmol/L   POCT glucose   Result Value Ref Range    Glucose 139 mg/dL    QC OK? OK    POCT Glucose   Result Value Ref Range    Meter Glucose 139 (H) 74 - 99 mg/dL   EKG 12 Lead   Result Value Ref Range    Ventricular Rate 96 BPM    Atrial Rate 96 BPM    P-R Interval 124 ms    QRS Duration 92 ms    Q-T Interval 368 ms    QTc Calculation (Bazett) 464 ms    P Axis 81 degrees    R Axis 53 degrees    T Axis 71 degrees       RADIOLOGY:  XR CHEST 1 VIEW   Final Result   No acute cardiopulmonary findings. CT ABDOMEN PELVIS W IV CONTRAST Additional Contrast? None   Final Result   Interval worsening of pancreatitis. Continued follow-up recommended. Probable pseudocysts near the body of the pancreas are new since previous. Mild thickening of the right colon may be due to underdistention. Mild   infectious or inflammatory colitis not excluded. Diffuse hepatic fatty infiltration.                 ------------------------- NURSING NOTES AND VITALS REVIEWED ---------------------------  Date / Time Roomed:  6/13/2021  3:54 AM  ED Bed Assignment:  [id number]    The nursing notes within the ED encounter and vital signs as below have been reviewed.      Patient Vitals for the past 24 hrs:   BP Temp Temp src Pulse Resp SpO2 Height Weight   06/14/21 1400 (!) 122/93 -- -- 73 30 98 % -- --   06/14/21 1300 118/86 -- -- 95 28 97 % -- --   06/14/21 1200 110/86 97.3 °F (36.3 °C) Infrared 81 26 97 % -- --   06/14/21 1100 (!) 127/93 -- -- 64 22 97 % -- --   06/14/21 1000 (!) 133/91 -- -- 91 23 99 % -- --   06/14/21 0900 127/76 -- -- 90 14 98 % -- --   06/14/21 0800 (!) 122/93 -- -- 64 22 98 % -- --   06/14/21 0700 127/81 -- -- 83 24 97 % -- --   06/14/21 0625 -- -- -- 75 -- -- -- --   06/14/21 0600 118/81 -- -- 80 24 98 % -- --   06/14/21 0523 -- -- -- 82 -- -- -- --   06/14/21 0500 (!) 134/95 -- -- 103 28 90 % -- --   06/14/21 0400 115/84 99.2 °F (37.3 °C) Oral 78 18 91 % -- --   06/14/21 0336 -- -- -- 80 -- -- -- --   06/14/21 0300 (!) 132/98 -- -- 92 25 97 % -- --   06/14/21 0232 -- -- -- 126 -- -- -- --   06/14/21 0200 126/83 -- -- 78 24 96 % -- --   06/14/21 0100 121/83 -- -- 79 26 97 % -- --   06/14/21 0000 116/86 99.4 °F (37.4 °C) Oral 87 27 96 % -- --   06/13/21 2300 (!) 122/91 -- -- 96 19 97 % -- --   06/13/21 2200 (!) 139/102 100 °F (37.8 °C) Oral 105 23 98 % -- --   06/13/21 2100 133/82 -- -- 102 27 96 % -- --   06/13/21 2000 (!) 137/92 99.6 °F (37.6 °C) Oral 119 22 97 % -- --   06/13/21 1900 (!) 132/90 -- -- 94 25 94 % -- --   06/13/21 1800 (!) 126/94 -- -- 110 22 96 % -- --   06/13/21 1700 (!) 121/99 -- -- 115 24 98 % -- --   06/13/21 1600 110/82 98.9 °F (37.2 °C) Oral 112 (!) 32 97 % -- --   06/13/21 1550 116/83 -- -- -- -- -- -- --   06/13/21 1454 116/83 98.9 °F (37.2 °C) -- 100 23 95 % 5' 7\" (1.702 m) 146 lb 2.6 oz (66.3 kg)       Oxygen Saturation Interpretation: Normal    ------------------------------------------ PROGRESS NOTES ------------------------------------------  Re-evaluation(s):  Time: 711  Patients symptoms show no change  Repeat physical examination is not changed    Counseling:  I have spoken with the patient and discussed todays results, in addition to providing specific details for the plan of care and counseling regarding the diagnosis and prognosis. Their questions are answered at this time and they are agreeable with the plan of admission.    --------------------------------- ADDITIONAL PROVIDER NOTES ---------------------------------  Consultations:  Dr. Magen Cary spoke with medicine team  Discussed case. They will admit the patient.   This patient's ED course included: a personal history and physicial examination, re-evaluation prior to disposition, multiple bedside re-evaluations, IV medications, cardiac monitoring, continuous pulse oximetry and complex medical decision making and emergency management    This patient has remained hemodynamically stable during their ED course. Diagnosis:  1. Acute alcoholic intoxication without complication ([address]    2. Acute pancreatitis, unspecified complication status, unspecified pancreatitis type    3. Hypokalemia        Disposition:  Patient's disposition: Admit to telemetry  Patient's condition is stable.        Rosario Schneider, DO  06/14/21 1423

## 2021-06-13 NOTE — ED NOTES
Pt provided urine sample and was incontinent stool, pt cleaned up and placed in hospital gown     Juno Duncan RN  06/13/21 6889

## 2021-06-13 NOTE — Clinical Note
Patient Class: Inpatient [101]   REQUIRED: Diagnosis: Alcohol induced acute pancreatitis without necrosis or infection [[id number]]   Estimated Length of Stay: Estimated stay of more than 2 midnights   Telemetry/Cardiac Monitoring Required?: Yes

## 2021-06-13 NOTE — ED NOTES
Oncoming rn, pt crying and stating he hurts all over, bed low/locked with side rails up and call light within reach     Valeri Parekh RN  06/13/21 7688

## 2021-06-13 NOTE — H&P
[facility] [place] [id number] [facility]   History and Physical      CHIEF COMPLAINT:  Ab pain    History of Present Illness:  31 y.o. male with a history of psychiatric disorders, ETOHism, who was here 5/16-5/19/21 for ETOH induced pancreatitis,  presents with similar symptoms. His ab pain began last night and has since gotten worse. It is described as sharp tender located in RUQ / flank with radiation or extension to his back. Nothing helps, but moving worsens it. This is in the context of falling down a flight of stairs 1 month ago since then he has had bruising adjacent and on the area that is tender. During exam he also has nausea and had bilious emesis    Informant(s) for H&P: Chart review and patient    REVIEW OF SYSTEMS:  no fevers, chills, cp, sob, n/v, ha, vision/hearing changes, wt changes, hot/cold flashes, other open skin lesions, diarrhea, constipation, dysuria/hematuria unless noted in HPI. Complete ROS performed with the patient and is otherwise negative. PMH:  Past Medical History:   Diagnosis Date    Anxiety     Arm pain     Concussion with loss of consciousness     Convulsions ([address]     Depression     Flashing lights     Head injury     Headache     Leg pain     Numbness and tingling     arms and hands    PTSD (post-traumatic stress disorder)     Seizures (HCC)        Surgical History:  Past Surgical History:   Procedure Laterality Date    COLONOSCOPY      ENDOSCOPY, COLON, DIAGNOSTIC         Medications Prior to Admission:    Prior to Admission medications    Medication Sig Start Date End Date Taking?  Authorizing Provider   pantoprazole (PROTONIX) 40 MG tablet Take 1 tablet by mouth every morning (before breakfast) 5/19/21  Yes Yoav Sanchez MD   folic acid (FOLVITE) 1 MG tablet Take 1 tablet by mouth daily 5/19/21  Yes Yoav Sanchez MD   vitamin B-1 (THIAMINE) 100 MG tablet Take 1 tablet by mouth daily 5/19/21  Yes Yoav Sanchez MD   vitamin D (ERGOCALCIFEROL) 1.25 MG (48637 UT) CAPS capsule Take 1 capsule by mouth once a week 5/24/21  Yes Maninder Harrison MD       Allergies:    Hydrocodone, Ativan [lorazepam], Invega sustenna [paliperidone palmitate er], Paliperidone, Pcn [penicillins], and Fluticasone    Social History:    reports that he has been smoking. He has a 18.00 pack-year smoking history. He has never used smokeless tobacco. He reports current alcohol use of about 24.0 standard drinks of alcohol per week. He reports current drug use. Frequency: 1.00 time per week. Drug: Marijuana. Family History:   family history includes Alcohol Abuse in his father and mother; Cancer in his father; Cirrhosis in his father; Mental Illness in his brother, mother, and sister; Substance Abuse in his father, maternal aunt, maternal uncle, mother, paternal aunt, paternal uncle, and sister.      PHYSICAL EXAM:  Vitals:  /75   Pulse 112   Temp 98.6 °F (37 °C) (Oral)   Resp 20   Ht 5' 7\" (1.702 m)   Wt 150 lb (68 kg)   SpO2 95%   BMI 23.49 kg/m²     General Appearance: alert and oriented to person, place and time and in no acute distress  Skin: warm and dry  Head: normocephalic and atraumatic  Eyes: pupils equal, round, and reactive to light, extraocular eye movements intact, conjunctivae normal  Neck: neck supple and non tender without mass   Pulmonary/Chest: clear to auscultation bilaterally- no wheezes, rales or rhonchi, normal air movement, no respiratory distress  Cardiovascular: normal rate, normal S1 and S2 and no carotid bruits  Abdomen: soft, non-tender, non-distended, normal bowel sounds, no masses or organomegaly  Extremities: no cyanosis, no clubbing and no edema  Neurologic: no cranial nerve deficit and speech normal.  Tremor  Affect anxious  LABS:  Recent Labs     06/13/21  0408 06/13/21  0417   NA  --  144   K  --  3.2*   CL  --  99   CO2  --  26   BUN  --  2*   CREATININE  --  0.6*   GLUCOSE 139 140*   CALCIUM  --  8.2*       Recent Labs     06/13/21  0417   WBC 7.0   RBC 4.61   HGB 16.3   HCT 45.8   MCV 99.3   MCH 35.4*   MCHC 35.6*   RDW 15.4*   PLT 83*   MPV 10.8       No results for input(s): POCGLU in the last 72 hours. Radiology: CT ABDOMEN PELVIS W IV CONTRAST Additional Contrast? None    Result Date: 6/13/2021  EXAMINATION: CT OF THE ABDOMEN AND PELVIS WITH CONTRAST 6/13/2021 6:04 am TECHNIQUE: CT of the abdomen and pelvis was performed with the administration of intravenous contrast. Multiplanar reformatted images are provided for review. Dose modulation, iterative reconstruction, and/or weight based adjustment of the mA/kV was utilized to reduce the radiation dose to as low as reasonably achievable. COMPARISON: 05/16/2021 HISTORY: ORDERING SYSTEM PROVIDED HISTORY: Abdominal pain, pancreatitis TECHNOLOGIST PROVIDED HISTORY: Reason for exam:->Abdominal pain, pancreatitis Additional Contrast?->None Decision Support Exception - unselect if not a suspected or confirmed emergency medical condition->Emergency Medical Condition (MA) FINDINGS: Extensive diffuse hepatic fatty infiltration. Gallbladder is unremarkable. Spleen is normal in size. Edematous appearance of the pancreatic body and tail with adjacent soft tissue thickening and fat stranding consistent with pancreatitis. Of note, the enhancement of the pancreatic tail is heterogeneous and somewhat diminished compared to previous. This is likely related to the edema and pancreatitis. Developing pancreatic necrosis thought less likely but not entirely excluded. There is no soft tissue gas. A 3.3 x 1.7 cm fluid collection anterior to the body of the pancreas may represent a developing pseudocyst.  A smaller focus just inferior to this measures 1.9 cm. No adrenal mass. No hydronephrosis, cyst or solid renal mass identified. Assessment of bowel is limited without oral contrast.  No bowel obstruction.  Appendix is normal.  Mild thickening of the ascending colon, hepatic flexure and proximal transverse colon. No free air. Urinary bladder is unremarkable. Prostate is normal in size. Calcifications in the pelvis are most consistent with phleboliths. Lung bases are clear. Interval worsening of pancreatitis. Continued follow-up recommended. Probable pseudocysts near the body of the pancreas are new since previous. Mild thickening of the right colon may be due to underdistention. Mild infectious or inflammatory colitis not excluded. Diffuse hepatic fatty infiltration. XR CHEST 1 VIEW    Result Date: 6/13/2021  EXAMINATION: ONE XRAY VIEW OF THE CHEST 6/13/2021 6:07 am COMPARISON: 05/17/2021 HISTORY: ORDERING SYSTEM PROVIDED HISTORY: Right sided pain TECHNOLOGIST PROVIDED HISTORY: Reason for exam: Right sided pain FINDINGS: Cardiac silhouette is near the upper limits of normal.  No pneumothorax. Faint opacities at the right base. These are thought to be artifactual as there was no opacification at the right base on CT of the abdomen performed today. No other consolidation or effusion. No acute cardiopulmonary findings. EKG: nsr  ASSESSMENT:      Active Problems:    Alcohol induced acute pancreatitis without necrosis or infection    Pancreatitis, recurrent  Resolved Problems:    * No resolved hospital problems. *      PLAN:    1 Acute recurrent alcoholic pancreatitis with development of pseudocyst n.p.o. consult surgery let them further manage diet IV fluids monitor electrolytes and replace prn (phos). analgesia will be difficult since he abuses multiple substances  2 Alcohol withdrawal CIWA protocol records show he has had Ativan before but he believes he is acutely allergic I discussed with pharmacist who agrees that we may try it anyway however the patient declined and therefore we switched to Valium both within the CIWA protocol as well as as needed anxiety.   Reevaluated him around 1 PM.  Nursing just given Valium she will give him 1 more dose then continue with the protocol and as needed's. He is already less symptomatic and I agree with her seems safe in his current setting. If he deteriorates or fails to significantly improve with the protocol and as needed's we could move him to 6 or the ICU. 1:52PM called. Patient not improved. Moving to 6 floor  3. Substance abuse with likely underlying mood disorder bipolar disorder benzos as mentioned above. This certainly is adding to the overall picture. He gives a nebulous history of how phenobarbital got into his system  4. Code Status: full  5. DVT prophylaxis: lovenox    NOTE: This report was transcribed using voice recognition software.  Every effort was made to ensure accuracy; however, inadvertent computerized transcription errors may be present.     Electronically signed by Pepe Aleman MD on 6/13/2021 at 11:05 AM

## 2021-06-13 NOTE — PROGRESS NOTES
Pharmacy Medication Consult      Date: 6/13/21  Physician: Dr Aneesh Quinonez  Medication: Valium IV/PO  Indication: Alcohol withdrawal      Per report, patient adamantly refusing ativan per CIWA protocol due to belief he has an allergy to the medication. Reports having seizures while taking in the past. Pharmacy consulted to dose valium in equivalent doses to ativan per CIWA protocol. Also currently ordered valium injections q4h prn for anxiety. Orders converted to valium. Called and discussed care with nurse. Informed her valium half-life can be variable and has risks for accumulation especially in hepatic dysfunction. Patient's hepatic panel looks acceptable for trialing therapy at this time but recommended nurse keep a very close eye on patient to ensure doses do not start to accumulate. PLEASE NOTE: If current orders of valium per CIWA protocol prove overly aggressive or insufficient please contact the pharmacist. Dosing strengths/intervals can be modified or patient can be transitioned to PRN phenobarb in these instances.       Thank you for this consult,    Christo Shen, PharmD 6/13/2021 12:27 PM   [phone]

## 2021-06-14 LAB
ANION GAP SERPL CALCULATED.3IONS-SCNC: 10 MMOL/L (ref 7–16)
BUN BLDV-MCNC: 6 MG/DL (ref 6–20)
CALCIUM SERPL-MCNC: 6.8 MG/DL (ref 8.6–10.2)
CHLORIDE BLD-SCNC: 105 MMOL/L (ref 98–107)
CO2: 26 MMOL/L (ref 22–29)
CREAT SERPL-MCNC: 0.5 MG/DL (ref 0.7–1.2)
EKG ATRIAL RATE: 96 BPM
EKG P AXIS: 81 DEGREES
EKG P-R INTERVAL: 124 MS
EKG Q-T INTERVAL: 368 MS
EKG QRS DURATION: 92 MS
EKG QTC CALCULATION (BAZETT): 464 MS
EKG R AXIS: 53 DEGREES
EKG T AXIS: 71 DEGREES
EKG VENTRICULAR RATE: 96 BPM
GFR AFRICAN AMERICAN: >60
GFR NON-AFRICAN AMERICAN: >60 ML/MIN/1.73
GLUCOSE BLD-MCNC: 103 MG/DL (ref 74–99)
HCT VFR BLD CALC: 33.3 % (ref 37–54)
HEMOGLOBIN: 11.7 G/DL (ref 12.5–16.5)
LACTIC ACID: 0.9 MMOL/L (ref 0.5–2.2)
LIPASE: 947 U/L (ref 13–60)
MAGNESIUM: 1.5 MG/DL (ref 1.6–2.6)
MCH RBC QN AUTO: 34.8 PG (ref 26–35)
MCHC RBC AUTO-ENTMCNC: 35.1 % (ref 32–34.5)
MCV RBC AUTO: 99.1 FL (ref 80–99.9)
PDW BLD-RTO: 15.7 FL (ref 11.5–15)
PHOSPHORUS: 1.7 MG/DL (ref 2.5–4.5)
PLATELET # BLD: 32 E9/L (ref 130–450)
PLATELET CONFIRMATION: NORMAL
PMV BLD AUTO: 11.7 FL (ref 7–12)
POTASSIUM SERPL-SCNC: 3.9 MMOL/L (ref 3.5–5)
RBC # BLD: 3.36 E12/L (ref 3.8–5.8)
SODIUM BLD-SCNC: 141 MMOL/L (ref 132–146)
WBC # BLD: 8.3 E9/L (ref 4.5–11.5)

## 2021-06-14 PROCEDURE — 99291 CRITICAL CARE FIRST HOUR: CPT | Performed by: INTERNAL MEDICINE

## 2021-06-14 PROCEDURE — 85027 COMPLETE CBC AUTOMATED: CPT

## 2021-06-14 PROCEDURE — 87324 CLOSTRIDIUM AG IA: CPT

## 2021-06-14 PROCEDURE — 83735 ASSAY OF MAGNESIUM: CPT

## 2021-06-14 PROCEDURE — 6370000000 HC RX 637 (ALT 250 FOR IP): Performed by: INTERNAL MEDICINE

## 2021-06-14 PROCEDURE — 6360000002 HC RX W HCPCS: Performed by: INTERNAL MEDICINE

## 2021-06-14 PROCEDURE — C9113 INJ PANTOPRAZOLE SODIUM, VIA: HCPCS | Performed by: TRANSPLANT SURGERY

## 2021-06-14 PROCEDURE — 99233 SBSQ HOSP IP/OBS HIGH 50: CPT | Performed by: TRANSPLANT SURGERY

## 2021-06-14 PROCEDURE — 87493 C DIFF AMPLIFIED PROBE: CPT

## 2021-06-14 PROCEDURE — 2060000000 HC ICU INTERMEDIATE R&B

## 2021-06-14 PROCEDURE — 36415 COLL VENOUS BLD VENIPUNCTURE: CPT

## 2021-06-14 PROCEDURE — 84100 ASSAY OF PHOSPHORUS: CPT

## 2021-06-14 PROCEDURE — 83605 ASSAY OF LACTIC ACID: CPT

## 2021-06-14 PROCEDURE — 80048 BASIC METABOLIC PNL TOTAL CA: CPT

## 2021-06-14 PROCEDURE — 6360000002 HC RX W HCPCS: Performed by: TRANSPLANT SURGERY

## 2021-06-14 PROCEDURE — 87449 NOS EACH ORGANISM AG IA: CPT

## 2021-06-14 PROCEDURE — 2580000003 HC RX 258: Performed by: INTERNAL MEDICINE

## 2021-06-14 PROCEDURE — 2500000003 HC RX 250 WO HCPCS: Performed by: INTERNAL MEDICINE

## 2021-06-14 PROCEDURE — 2580000003 HC RX 258: Performed by: TRANSPLANT SURGERY

## 2021-06-14 PROCEDURE — 83690 ASSAY OF LIPASE: CPT

## 2021-06-14 RX ORDER — PANTOPRAZOLE SODIUM 40 MG/10ML
40 INJECTION, POWDER, LYOPHILIZED, FOR SOLUTION INTRAVENOUS 2 TIMES DAILY
Status: DISCONTINUED | OUTPATIENT
Start: 2021-06-14 | End: 2021-06-17

## 2021-06-14 RX ORDER — MAGNESIUM SULFATE IN WATER 40 MG/ML
2000 INJECTION, SOLUTION INTRAVENOUS ONCE
Status: COMPLETED | OUTPATIENT
Start: 2021-06-14 | End: 2021-06-14

## 2021-06-14 RX ORDER — PROCHLORPERAZINE EDISYLATE 5 MG/ML
10 INJECTION INTRAMUSCULAR; INTRAVENOUS EVERY 6 HOURS PRN
Status: DISCONTINUED | OUTPATIENT
Start: 2021-06-14 | End: 2021-06-22 | Stop reason: HOSPADM

## 2021-06-14 RX ORDER — SODIUM CHLORIDE 9 MG/ML
10 INJECTION INTRAVENOUS 2 TIMES DAILY
Status: DISCONTINUED | OUTPATIENT
Start: 2021-06-14 | End: 2021-06-22 | Stop reason: HOSPADM

## 2021-06-14 RX ADMIN — Medication 10 ML: at 10:15

## 2021-06-14 RX ADMIN — PROCHLORPERAZINE EDISYLATE 10 MG: 5 INJECTION INTRAMUSCULAR; INTRAVENOUS at 05:23

## 2021-06-14 RX ADMIN — SODIUM CHLORIDE, PRESERVATIVE FREE 10 ML: 5 INJECTION INTRAVENOUS at 10:14

## 2021-06-14 RX ADMIN — POTASSIUM CHLORIDE AND SODIUM CHLORIDE: 900; 150 INJECTION, SOLUTION INTRAVENOUS at 03:03

## 2021-06-14 RX ADMIN — ONDANSETRON 4 MG: 2 INJECTION INTRAMUSCULAR; INTRAVENOUS at 05:23

## 2021-06-14 RX ADMIN — ONDANSETRON 4 MG: 2 INJECTION INTRAMUSCULAR; INTRAVENOUS at 12:23

## 2021-06-14 RX ADMIN — DIAZEPAM 10 MG: 5 INJECTION, SOLUTION INTRAMUSCULAR; INTRAVENOUS at 02:36

## 2021-06-14 RX ADMIN — Medication 10 ML: at 20:53

## 2021-06-14 RX ADMIN — MAGNESIUM SULFATE HEPTAHYDRATE 2000 MG: 40 INJECTION, SOLUTION INTRAVENOUS at 10:59

## 2021-06-14 RX ADMIN — PROCHLORPERAZINE EDISYLATE 10 MG: 5 INJECTION INTRAMUSCULAR; INTRAVENOUS at 22:20

## 2021-06-14 RX ADMIN — ACETAMINOPHEN 650 MG: 325 TABLET, FILM COATED ORAL at 20:54

## 2021-06-14 RX ADMIN — PANTOPRAZOLE SODIUM 40 MG: 40 INJECTION, POWDER, FOR SOLUTION INTRAVENOUS at 10:14

## 2021-06-14 RX ADMIN — POTASSIUM CHLORIDE AND SODIUM CHLORIDE: 900; 150 INJECTION, SOLUTION INTRAVENOUS at 17:56

## 2021-06-14 RX ADMIN — POTASSIUM PHOSPHATE, MONOBASIC POTASSIUM PHOSPHATE, DIBASIC 30 MMOL: 224; 236 INJECTION, SOLUTION, CONCENTRATE INTRAVENOUS at 13:36

## 2021-06-14 RX ADMIN — PHENOBARBITAL SODIUM 130 MG: 65 INJECTION INTRAMUSCULAR; INTRAVENOUS at 10:14

## 2021-06-14 RX ADMIN — SODIUM CHLORIDE, PRESERVATIVE FREE 10 ML: 5 INJECTION INTRAVENOUS at 20:49

## 2021-06-14 RX ADMIN — ONDANSETRON 4 MG: 2 INJECTION INTRAMUSCULAR; INTRAVENOUS at 19:08

## 2021-06-14 RX ADMIN — PHENOBARBITAL SODIUM 130 MG: 65 INJECTION INTRAMUSCULAR; INTRAVENOUS at 22:18

## 2021-06-14 RX ADMIN — PANTOPRAZOLE SODIUM 40 MG: 40 INJECTION, POWDER, FOR SOLUTION INTRAVENOUS at 20:50

## 2021-06-14 RX ADMIN — PROCHLORPERAZINE EDISYLATE 10 MG: 5 INJECTION INTRAMUSCULAR; INTRAVENOUS at 15:21

## 2021-06-14 ASSESSMENT — PAIN DESCRIPTION - PAIN TYPE
TYPE: ACUTE PAIN

## 2021-06-14 ASSESSMENT — PAIN SCALES - GENERAL
PAINLEVEL_OUTOF10: 7
PAINLEVEL_OUTOF10: 6
PAINLEVEL_OUTOF10: 0
PAINLEVEL_OUTOF10: 6
PAINLEVEL_OUTOF10: 0
PAINLEVEL_OUTOF10: 6

## 2021-06-14 ASSESSMENT — ENCOUNTER SYMPTOMS
SHORTNESS OF BREATH: 0
EYE DISCHARGE: 0
PHOTOPHOBIA: 0
CONSTIPATION: 0
DIARRHEA: 0
VOMITING: 0
ABDOMINAL PAIN: 1
NAUSEA: 0
EYE PAIN: 0
BLOOD IN STOOL: 0
BACK PAIN: 0

## 2021-06-14 ASSESSMENT — PAIN DESCRIPTION - ORIENTATION: ORIENTATION: RIGHT;LEFT;UPPER

## 2021-06-14 ASSESSMENT — PAIN DESCRIPTION - DESCRIPTORS
DESCRIPTORS: ACHING;CONSTANT;CRAMPING
DESCRIPTORS: ACHING;CONSTANT;DISCOMFORT
DESCRIPTORS: ACHING;CONSTANT;DISCOMFORT

## 2021-06-14 ASSESSMENT — PAIN DESCRIPTION - LOCATION
LOCATION: ABDOMEN

## 2021-06-14 ASSESSMENT — PAIN DESCRIPTION - FREQUENCY
FREQUENCY: CONTINUOUS

## 2021-06-14 ASSESSMENT — PAIN DESCRIPTION - ONSET
ONSET: ON-GOING

## 2021-06-14 ASSESSMENT — PAIN DESCRIPTION - PROGRESSION: CLINICAL_PROGRESSION: NOT CHANGED

## 2021-06-14 NOTE — CARE COORDINATION
SS Note: No Covid testing. Pt in ICU. SW Consult received for \"For consideration of rehab\". SW attempted meet with pt to discuss alcohol rehab, assessment, and readmission form however pt mostly kept his eyes closed, nodded head to a couple questions then responded \"I feel so sick\". Pt has met with [facility] during previous admissions including last month. Per review of May 2021 chart, pt was initially receptive to a rehab stay then reconsidered stating he planned \"to move to [place] to start over\". SW will attempt to meet with pt again tomorrow for above information and continue to follow for transition of care.   Electronically signed by PAT Triplett on 6/14/2021 at 1:40 PM

## 2021-06-14 NOTE — CONSULTS
Hepatobiliary and Pancreatic Surgery Attending History and Physical    Patient's Name/Date of Birth: Nely Pichardo /1990 (31 y.o.)    Date: June 14, 2418     CC: Alcoholic pancreatitis    HPI:  Patient is a very unfortunate 31year old male in the ICU secondary to alcoholic withdrawal and admission to for acute recurrent pancreatitis. He has had numerous admissions for this. He was drinking two bottles of Darío Finical daily. He currently is having copious amounts of diarrhea. He also is phenobarbital.  He states that his pain is there in his left upper quadrant. He had a CT scan on admission yesterday.     Past Medical History:   Diagnosis Date    Anxiety     Arm pain     Concussion with loss of consciousness     Convulsions (HCC)     Depression     Flashing lights     Head injury     Headache     Leg pain     Numbness and tingling     arms and hands    PTSD (post-traumatic stress disorder)     Seizures (HCC)        Past Surgical History:   Procedure Laterality Date    COLONOSCOPY      ENDOSCOPY, COLON, DIAGNOSTIC         Current Facility-Administered Medications   Medication Dose Route Frequency Provider Last Rate Last Admin    prochlorperazine (COMPAZINE) injection 10 mg  10 mg Intravenous Q6H PRN Makayla Morrissey MD   10 mg at 06/14/21 0523    pantoprazole (PROTONIX) injection 40 mg  40 mg Intravenous BID Agustín Calderon III, MD        And    sodium chloride (PF) 0.9 % injection 10 mL  10 mL Intravenous BID Agustín Calderon III, MD        enoxaparin (LOVENOX) injection 40 mg  40 mg Subcutaneous Daily Edvin Oden MD   40 mg at 06/13/21 1032    ondansetron (ZOFRAN-ODT) disintegrating tablet 4 mg  4 mg Oral Q8H PRN Edvin Oden MD        Or    ondansetron [facility]) injection 4 mg  4 mg Intravenous Q6H PRN Edvin Oden MD   4 mg at 06/14/21 0523    polyethylene glycol (GLYCOLAX) packet 17 g  17 g Oral Daily PRN Edvin Oden MD        acetaminophen (TYLENOL) tablet 650 mg  650 mg Oral Q6H PRN Padma Dunham MD        Or    acetaminophen (TYLENOL) suppository 650 mg  650 mg Rectal Q6H PRN Padma Dunham MD        diazePAM (VALIUM) injection 5 mg  5 mg Intravenous Q4H PRN Padma Dunham MD   5 mg at 06/13/21 1202    sodium chloride flush 0.9 % injection 5-40 mL  5-40 mL Intravenous 2 times per day Padma Dunham MD        sodium chloride flush 0.9 % injection 5-40 mL  5-40 mL Intravenous PRN Padma Dunham MD        0.9 % sodium chloride infusion  25 mL Intravenous PRN Padma Dunham MD        0.9% NaCl with KCl 20 mEq infusion   Intravenous Continuous Padma Dunham  mL/hr at 06/14/21 0303 New Bag at 06/14/21 0303    diazePAM (VALIUM) injection 20 mg  20 mg Intravenous Q1H PRN Padma Dunham MD   20 mg at 06/13/21 2042    Or    diazePAM (VALIUM) injection 15 mg  15 mg Intravenous Q1H PRN Padma Dunham MD        Or    diazePAM (VALIUM) injection 10 mg  10 mg Intravenous Q1H PRN Padma Dunham MD   10 mg at 06/14/21 0236    Or    diazePAM (VALIUM) injection 5 mg  5 mg Intravenous Q1H PRN Padma Dunham MD        Or    diazePAM (VALIUM) tablet 20 mg  20 mg Oral Q1H PRN Padma Dunham MD        Or    diazePAM (VALIUM) tablet 15 mg  15 mg Oral Q1H PRN Padma Dunham MD        Or    diazePAM (VALIUM) tablet 10 mg  10 mg Oral Q1H PRN Padma Dunham MD        Or    diazePAM (VALIUM) tablet 5 mg  5 mg Oral Q1H PRN Padma Dunham MD        scopolamine (TRANSDERM-SCOP) transdermal patch 1 patch  1 patch Transdermal Q72H Herminia Miles MD   1 patch at 06/13/21 1335    PHENobarbital (LUMINAL) injection 130 mg  130 mg Intravenous Q12H Makayla Morrissey MD   130 mg at 06/13/21 2221       Allergies   Allergen Reactions    Hydrocodone Anaphylaxis    Ativan [Lorazepam] Other (See Comments)     \"Hallucinates\"    [employer] [place] [employer] Sustenna [Paliperidone Palmitate Er] Other (See Comments)     \"Unable to Urinate\"    Paliperidone Other (See Comments)    Pcn [Penicillins] Nausea Only    Fluticasone Rash       Family History   Problem Relation Age of Onset   Phoebe Conine Mental Illness Mother     Substance Abuse Mother     Alcohol Abuse Mother     Cancer Father         lung     Substance Abuse Father     Cirrhosis Father     Alcohol Abuse Father     Mental Illness Sister     Substance Abuse Sister     Mental Illness Brother     Substance Abuse Maternal Aunt     Substance Abuse Maternal Uncle     Substance Abuse Paternal Aunt     Substance Abuse Paternal Uncle        Social History     Socioeconomic History    Marital status: Single     Spouse name: Not on file    Number of children: 0    Years of education: 5    Highest education level: Not on file   Occupational History    Occupation: [id number]     Employer: SELF     Comment: [employer]   Tobacco Use    Smoking status: Heavy Tobacco Smoker     Packs/day: 2.00     Years: 9.00     Pack years: 18.00    Smokeless tobacco: Never Used   Vaping Use    Vaping Use: Former    Substances: Always   Substance and Sexual Activity    Alcohol use: Yes     Alcohol/week: 24.0 standard drinks     Types: 4 Glasses of wine, 20 Cans of beer per week     Comment: heavy daily use ( 2 bottles of los daily)    Drug use: Yes     Frequency: 1.0 times per week     Types: Marijuana    Sexual activity: Not Currently     Partners: Female   Other Topics Concern    Not on file   Social History Narrative    Not on file     Social Determinants of Health     Financial Resource Strain:     Difficulty of Paying Living Expenses:    Food Insecurity:     Worried About Running Out of Food in the Last Year:     Ran Out of Food in the Last Year:    Transportation Needs:     Lack of Transportation (Medical):      Lack of Transportation (Non-Medical):    Physical Activity:     Days of Exercise per Week:     Minutes of Exercise per Session:    Stress:     Feeling of Stress :    Social Connections:     Frequency of Communication with Friends and Family:     Frequency of Social Gatherings with Friends and Family:     Attends Christianity Services:     Active Member of Clubs or Organizations:     Attends Club or Organization Meetings:     Marital Status:    Intimate Partner Violence:     Fear of Current or Ex-Partner:     Emotionally Abused:     Physically Abused:     Sexually Abused:        ROS:   Review of Systems   Constitutional: Negative for chills, diaphoresis and fever. HENT: Negative for congestion, ear discharge, ear pain, hearing loss, nosebleeds and tinnitus. Eyes: Negative for photophobia, pain and discharge. Respiratory: Negative for shortness of breath. Cardiovascular: Negative for palpitations and leg swelling. Gastrointestinal: Positive for abdominal pain. Negative for blood in stool, constipation, diarrhea, nausea and vomiting. Endocrine: Negative for polydipsia. Genitourinary: Negative for frequency, hematuria and urgency. Musculoskeletal: Negative for back pain and neck pain. Skin: Negative for rash. Allergic/Immunologic: Negative for environmental allergies. Neurological: Negative for tremors and seizures. Psychiatric/Behavioral: Positive for confusion and hallucinations. Negative for suicidal ideas. The patient is not nervous/anxious. Physical Exam:  Vitals:    06/14/21 0700   BP: 127/81   Pulse: 83   Resp: 24   Temp:    SpO2: 97%       PSYCH: mood and affect normal, alert and oriented x 3: No apparent distress, comfortable  EYES: Sclera white, pupils equal round and reactive to light  ENMT:  Hearing normal, trachea midline, ears externally intact  LYMPH: no obvious lympadenopathy in neck. RESP: Respiratory effort was normal with no retractions or use of accessory muscles.   CV:  No pedal edema  GI/ Abdomen: Soft, nondistended, nontender, no guarding, no peritoneal signs  MSK: no clubbing/ no cyanosis/ gaitnormal       Assessment/Plan:  Recurrent pancreatitis with acute necrotizing pancreatitis and developing acute necrotic collection  - start PPI IV bid - stomach is guilty by association  - CT scan shows pancreatic tail necrosis  - will need repeat CT triphasic scan in 48 hours to evaluate for worsening necrosis. - continue aggressive supportive care    Thank you for the consultation allowing me to take part in Mr. Ludin Barnett' care.      Margie Em M.D.  6/14/2021  7:32 AM

## 2021-06-14 NOTE — PROGRESS NOTES
(TRANSDERM-SCOP) transdermal patch 1 patch, 1 patch, Transdermal, Q72H  PHENobarbital (LUMINAL) injection 130 mg, 130 mg, Intravenous, Q12H  Physical    VITALS:  BP (!) 146/45   Pulse 75   Temp 97.4 °F (36.3 °C) (Infrared)   Resp 28   Ht 5' 7\" (1.702 m)   Wt 146 lb 2.6 oz (66.3 kg)   SpO2 99%   BMI 22.89 kg/m²   CONSTITUTIONAL:  awake, alert, cooperative, no apparent distress, and appears stated age  EYES:  Lids and lashes normal, pupils equal, round and reactive to light, extra ocular muscles intact, sclera clear, conjunctiva normal  ENT:  Normocephalic, without obvious abnormality, atraumatic, sinuses nontender on palpation, external ears without lesions, oral pharynx with moist mucus membranes, tonsils without erythema or exudates, gums normal and good dentition. NECK:  Supple, symmetrical, trachea midline, no adenopathy, thyroid symmetric, not enlarged and no tenderness, skin normal  HEMATOLOGIC/LYMPHATICS:  no cervical lymphadenopathy  LUNGS:  No increased work of breathing, good air exchange, clear to auscultation bilaterally, no crackles or wheezing  CARDIOVASCULAR:  Normal apical impulse, regular rate and rhythm, normal S1 and S2, no S3 or S4, and no murmur noted  ABDOMEN:  No scars, normal bowel sounds, soft, non-distended, non-tender, no masses palpated, no hepatosplenomegally  MUSCULOSKELETAL:  there is no redness, warmth, or swelling of the joints  NEUROLOGIC:  No focal neuro deficit.   SKIN:  no bruising or bleeding  Data    CBC:   Lab Results   Component Value Date    WBC 8.3 06/14/2021    RBC 3.36 06/14/2021    HGB 11.7 06/14/2021    HCT 33.3 06/14/2021    MCV 99.1 06/14/2021    MCH 34.8 06/14/2021    MCHC 35.1 06/14/2021    RDW 15.7 06/14/2021    PLT 32 06/14/2021    MPV 11.7 06/14/2021     CMP:    Lab Results   Component Value Date     06/14/2021    K 3.9 06/14/2021    K 3.3 02/28/2021     06/14/2021    CO2 26 06/14/2021    BUN 6 06/14/2021    CREATININE 0.5 06/14/2021    GFRAA >60 06/14/2021    LABGLOM >60 06/14/2021    GLUCOSE 103 06/14/2021    PROT 7.2 06/13/2021    LABALBU 3.7 06/13/2021    CALCIUM 6.8 06/14/2021    BILITOT 0.5 06/13/2021    ALKPHOS 210 06/13/2021     06/13/2021    ALT 46 06/13/2021       ASSESSMENT AND PLAN      Active Problems:  1. Alcohol induced acute pancreatitis without necrosis or infection  Gen surgery following  No surgical intervention for now  Continue to monitor    2. Pancreatitis, recurrent: pain control  Continue to monitor lipase and advance diet as tolerated    3. Alcohol abuse: on withdrawal protocol  Continue to monitor    4. Elevated Liver Enzymes: may be related to etoh abuse    5.  Thrombocytopenia: stop lovenox  Order SCD  Continue to monitor

## 2021-06-14 NOTE — PROGRESS NOTES
Pt. CIWA scores between 28-31, pt. Ordered Valium 20mg iv q1 hr. For withdrawal. Dr. Daxa Velazco aware and made notified of situation that pharmacy is running out of medication prior to me taking over at [address], I have been in contact multiple times w/ pharmacy that medication is almost gone and need a decision as what to do regarding withdrawal treatment of pt. Pharmacist says he has spoken to MD and is awaiting decision. I currently do not have the proper dose of medication to treat pt. Pharmacy and MD aware.

## 2021-06-15 LAB
ANION GAP SERPL CALCULATED.3IONS-SCNC: 9 MMOL/L (ref 7–16)
BUN BLDV-MCNC: 3 MG/DL (ref 6–20)
C DIFF TOXIN/ANTIGEN: NORMAL
C. DIFFICILE TOXIN MOLECULAR: ABNORMAL
CALCIUM SERPL-MCNC: 7 MG/DL (ref 8.6–10.2)
CHLORIDE BLD-SCNC: 105 MMOL/L (ref 98–107)
CO2: 22 MMOL/L (ref 22–29)
CREAT SERPL-MCNC: 0.4 MG/DL (ref 0.7–1.2)
GFR AFRICAN AMERICAN: >60
GFR NON-AFRICAN AMERICAN: >60 ML/MIN/1.73
GLUCOSE BLD-MCNC: 129 MG/DL (ref 74–99)
HCT VFR BLD CALC: 34.3 % (ref 37–54)
HEMOGLOBIN: 11.8 G/DL (ref 12.5–16.5)
LIPASE: 703 U/L (ref 13–60)
MAGNESIUM: 1.6 MG/DL (ref 1.6–2.6)
MCH RBC QN AUTO: 34.6 PG (ref 26–35)
MCHC RBC AUTO-ENTMCNC: 34.4 % (ref 32–34.5)
MCV RBC AUTO: 100.6 FL (ref 80–99.9)
ORGANISM: ABNORMAL
PDW BLD-RTO: 15.3 FL (ref 11.5–15)
PHOSPHORUS: 1.4 MG/DL (ref 2.5–4.5)
PLATELET # BLD: 24 E9/L (ref 130–450)
PLATELET CONFIRMATION: NORMAL
PMV BLD AUTO: 13.6 FL (ref 7–12)
POTASSIUM SERPL-SCNC: 4 MMOL/L (ref 3.5–5)
RBC # BLD: 3.41 E12/L (ref 3.8–5.8)
SODIUM BLD-SCNC: 136 MMOL/L (ref 132–146)
WBC # BLD: 6.2 E9/L (ref 4.5–11.5)

## 2021-06-15 PROCEDURE — 83690 ASSAY OF LIPASE: CPT

## 2021-06-15 PROCEDURE — 6360000002 HC RX W HCPCS: Performed by: INTERNAL MEDICINE

## 2021-06-15 PROCEDURE — 97161 PT EVAL LOW COMPLEX 20 MIN: CPT | Performed by: PHYSICAL THERAPIST

## 2021-06-15 PROCEDURE — C9113 INJ PANTOPRAZOLE SODIUM, VIA: HCPCS | Performed by: TRANSPLANT SURGERY

## 2021-06-15 PROCEDURE — 6360000002 HC RX W HCPCS: Performed by: TRANSPLANT SURGERY

## 2021-06-15 PROCEDURE — 2580000003 HC RX 258: Performed by: INTERNAL MEDICINE

## 2021-06-15 PROCEDURE — 2580000003 HC RX 258: Performed by: TRANSPLANT SURGERY

## 2021-06-15 PROCEDURE — 6370000000 HC RX 637 (ALT 250 FOR IP): Performed by: INTERNAL MEDICINE

## 2021-06-15 PROCEDURE — 85027 COMPLETE CBC AUTOMATED: CPT

## 2021-06-15 PROCEDURE — 2060000000 HC ICU INTERMEDIATE R&B

## 2021-06-15 PROCEDURE — 36415 COLL VENOUS BLD VENIPUNCTURE: CPT

## 2021-06-15 PROCEDURE — 80048 BASIC METABOLIC PNL TOTAL CA: CPT

## 2021-06-15 PROCEDURE — 83735 ASSAY OF MAGNESIUM: CPT

## 2021-06-15 PROCEDURE — 99291 CRITICAL CARE FIRST HOUR: CPT | Performed by: INTERNAL MEDICINE

## 2021-06-15 PROCEDURE — 97530 THERAPEUTIC ACTIVITIES: CPT | Performed by: PHYSICAL THERAPIST

## 2021-06-15 PROCEDURE — 84100 ASSAY OF PHOSPHORUS: CPT

## 2021-06-15 RX ORDER — POTASSIUM CHLORIDE AND SODIUM CHLORIDE 900; 300 MG/100ML; MG/100ML
INJECTION, SOLUTION INTRAVENOUS CONTINUOUS
Status: DISCONTINUED | OUTPATIENT
Start: 2021-06-15 | End: 2021-06-18

## 2021-06-15 RX ORDER — NICOTINE 21 MG/24HR
1 PATCH, TRANSDERMAL 24 HOURS TRANSDERMAL DAILY
Status: DISCONTINUED | OUTPATIENT
Start: 2021-06-15 | End: 2021-06-22 | Stop reason: HOSPADM

## 2021-06-15 RX ORDER — MAGNESIUM SULFATE IN WATER 40 MG/ML
2000 INJECTION, SOLUTION INTRAVENOUS ONCE
Status: COMPLETED | OUTPATIENT
Start: 2021-06-15 | End: 2021-06-15

## 2021-06-15 RX ADMIN — DIAZEPAM 20 MG: 5 INJECTION, SOLUTION INTRAMUSCULAR; INTRAVENOUS at 02:24

## 2021-06-15 RX ADMIN — Medication 10 ML: at 21:15

## 2021-06-15 RX ADMIN — Medication 125 MG: at 16:23

## 2021-06-15 RX ADMIN — DIAZEPAM 20 MG: 5 INJECTION, SOLUTION INTRAMUSCULAR; INTRAVENOUS at 10:17

## 2021-06-15 RX ADMIN — ACETAMINOPHEN 650 MG: 325 TABLET, FILM COATED ORAL at 04:26

## 2021-06-15 RX ADMIN — DIAZEPAM 20 MG: 5 INJECTION, SOLUTION INTRAMUSCULAR; INTRAVENOUS at 14:13

## 2021-06-15 RX ADMIN — DIAZEPAM 20 MG: 5 INJECTION, SOLUTION INTRAMUSCULAR; INTRAVENOUS at 20:42

## 2021-06-15 RX ADMIN — DIAZEPAM 20 MG: 5 INJECTION, SOLUTION INTRAMUSCULAR; INTRAVENOUS at 19:41

## 2021-06-15 RX ADMIN — DIAZEPAM 20 MG: 5 INJECTION, SOLUTION INTRAMUSCULAR; INTRAVENOUS at 22:36

## 2021-06-15 RX ADMIN — DIAZEPAM 20 MG: 5 INJECTION, SOLUTION INTRAMUSCULAR; INTRAVENOUS at 16:28

## 2021-06-15 RX ADMIN — SODIUM CHLORIDE, PRESERVATIVE FREE 10 ML: 5 INJECTION INTRAVENOUS at 21:13

## 2021-06-15 RX ADMIN — DIAZEPAM 20 MG: 5 TABLET ORAL at 08:03

## 2021-06-15 RX ADMIN — Medication 10 ML: at 08:07

## 2021-06-15 RX ADMIN — MAGNESIUM SULFATE HEPTAHYDRATE 2000 MG: 40 INJECTION, SOLUTION INTRAVENOUS at 08:39

## 2021-06-15 RX ADMIN — POTASSIUM CHLORIDE AND SODIUM CHLORIDE: 900; 300 INJECTION, SOLUTION INTRAVENOUS at 08:39

## 2021-06-15 RX ADMIN — SODIUM CHLORIDE, PRESERVATIVE FREE 10 ML: 5 INJECTION INTRAVENOUS at 08:07

## 2021-06-15 RX ADMIN — PHENOBARBITAL SODIUM 130 MG: 65 INJECTION INTRAMUSCULAR; INTRAVENOUS at 08:39

## 2021-06-15 RX ADMIN — POTASSIUM CHLORIDE AND SODIUM CHLORIDE: 900; 150 INJECTION, SOLUTION INTRAVENOUS at 01:36

## 2021-06-15 RX ADMIN — PANTOPRAZOLE SODIUM 40 MG: 40 INJECTION, POWDER, FOR SOLUTION INTRAVENOUS at 21:13

## 2021-06-15 RX ADMIN — PANTOPRAZOLE SODIUM 40 MG: 40 INJECTION, POWDER, FOR SOLUTION INTRAVENOUS at 08:07

## 2021-06-15 RX ADMIN — PROCHLORPERAZINE EDISYLATE 10 MG: 5 INJECTION INTRAMUSCULAR; INTRAVENOUS at 08:06

## 2021-06-15 RX ADMIN — ACETAMINOPHEN 650 MG: 325 TABLET, FILM COATED ORAL at 14:25

## 2021-06-15 RX ADMIN — POTASSIUM CHLORIDE AND SODIUM CHLORIDE: 900; 300 INJECTION, SOLUTION INTRAVENOUS at 19:38

## 2021-06-15 RX ADMIN — DIAZEPAM 15 MG: 5 TABLET ORAL at 06:09

## 2021-06-15 RX ADMIN — PHENOBARBITAL SODIUM 130 MG: 65 INJECTION INTRAMUSCULAR; INTRAVENOUS at 22:03

## 2021-06-15 RX ADMIN — ONDANSETRON 4 MG: 2 INJECTION INTRAMUSCULAR; INTRAVENOUS at 11:59

## 2021-06-15 RX ADMIN — Medication 125 MG: at 19:45

## 2021-06-15 ASSESSMENT — PAIN SCALES - GENERAL
PAINLEVEL_OUTOF10: 7
PAINLEVEL_OUTOF10: 4
PAINLEVEL_OUTOF10: 3
PAINLEVEL_OUTOF10: 4
PAINLEVEL_OUTOF10: 3
PAINLEVEL_OUTOF10: 4

## 2021-06-15 ASSESSMENT — PAIN DESCRIPTION - PROGRESSION
CLINICAL_PROGRESSION: NOT CHANGED

## 2021-06-15 ASSESSMENT — PAIN - FUNCTIONAL ASSESSMENT: PAIN_FUNCTIONAL_ASSESSMENT: PREVENTS OR INTERFERES SOME ACTIVE ACTIVITIES AND ADLS

## 2021-06-15 ASSESSMENT — PAIN DESCRIPTION - DESCRIPTORS
DESCRIPTORS: ACHING;DISCOMFORT
DESCRIPTORS: ACHING;CONSTANT;DISCOMFORT
DESCRIPTORS: ACHING;CONSTANT;DISCOMFORT
DESCRIPTORS: ACHING

## 2021-06-15 ASSESSMENT — PAIN DESCRIPTION - ONSET
ONSET: ON-GOING

## 2021-06-15 ASSESSMENT — PAIN DESCRIPTION - PAIN TYPE
TYPE: ACUTE PAIN
TYPE: ACUTE PAIN
TYPE: SURGICAL PAIN
TYPE: ACUTE PAIN

## 2021-06-15 ASSESSMENT — PAIN DESCRIPTION - FREQUENCY
FREQUENCY: CONTINUOUS

## 2021-06-15 ASSESSMENT — PAIN DESCRIPTION - LOCATION
LOCATION: BACK
LOCATION: ABDOMEN
LOCATION: ABDOMEN
LOCATION: BACK

## 2021-06-15 ASSESSMENT — PAIN DESCRIPTION - ORIENTATION
ORIENTATION: LOWER
ORIENTATION: LOWER

## 2021-06-15 NOTE — FLOWSHEET NOTE
Around 18:00 patient stated he was ready to go after his next dose of medication. I explained to the patient the his new room was not ready yet. The patient replied that he was ready to go home. I informed the patient the he was still sick and needed the medication we were giving him. The patient requested to sign out ama. Dr Natacha Crane notified of patients leaving ama request and that patient was hallucinating just 3 hours ago. Patient approched again to be told of the importance of staying with recent hallucinations. Dr Richie Polanco up to see patient and says the patient is incapable of making rational decisions at this time. Patient notified that he is unable to leave at this time and that he would be transferred to his new room shortly.

## 2021-06-15 NOTE — CARE COORDINATION
Peer Recovery Support Note    Name: Dimitri Reynolds  Date: 6/15/2021    Chief Complaint   Patient presents with    Eating Disorder     Pt states he hasn't been eating for the past 2 weeks because he hasn't had the urge to     Dental Pain     Pt has white and black spots on upper gums       Peer Support met with patient. [x] Support and education provided  [x] Resources provided   [] Treatment referral:   [] Other:   [] Patient declined peer recovery services     Referred By: SAM Anguiano    Notes: Patient agreeable to inpatient rehab at this time.  PRS will follow up with referral.     Signed: Stephen Fernandes, [id number] [address], 6/15/2021

## 2021-06-15 NOTE — PLAN OF CARE
Problem: Falls - Risk of:  Goal: Will remain free from falls  Description: Will remain free from falls  Outcome: Met This Shift  Goal: Absence of physical injury  Description: Absence of physical injury  Outcome: Met This Shift     Problem: Fluid Volume - Deficit:  Goal: Absence of fluid volume deficit signs and symptoms  Description: Absence of fluid volume deficit signs and symptoms  Outcome: Met This Shift     Problem: Discharge Planning:  Goal: Discharged to appropriate level of care  Description: Discharged to appropriate level of care  Outcome: Not Met This Shift     Problem: Nutrition Deficit:  Goal: Ability to achieve adequate nutritional intake will improve  Description: Ability to achieve adequate nutritional intake will improve  Outcome: Not Met This Shift     Problem: Sleep Pattern Disturbance:  Goal: Appears well-rested  Description: Appears well-rested  Outcome: Not Met This Shift     Problem: Violence - Risk of, Self/Other-Directed:  Goal: Knowledge of developmental care interventions  Description: Absence of violence  Outcome: Not Met This Shift

## 2021-06-15 NOTE — PROGRESS NOTES
Physical Therapy Initial Evaluation/Plan of Care    Room #:  IC07/IC07-01  Patient Name: Umberto Granda  YOB: 1990  MRN: [id number]    Date of Service: 6/15/2021      Tentative placement recommendation: Inpatient Rehab   Equipment recommendation: None at this time      Evaluating Physical Therapist: Davie Bianchi, PT #[id number]      Specific Provider Orders/Date/Referring Provider :  06/15/21 1000   PT eval and treat Start: 06/15/21 1000, End: 06/15/21 1000, ONE TIME, Standing Count: 1 Occurrences, Orion Sierra MD      Admitting Diagnosis:   Alcohol induced acute pancreatitis without necrosis or infection [K85.20]  Pancreatitis, recurrent [K85.90]  nausea      Visit Diagnoses       Codes    Acute pancreatitis, unspecified complication status, unspecified pancreatitis type     K85.90    Hypokalemia     E87.6        Surgery: -    Patient Active Problem List   Diagnosis    Alcohol withdrawal syndrome without complication ([address]    Alcoholism ([address]    Acute alcoholic intoxication ([address]    Severe single current episode of major depressive disorder, without psychotic features ([address]    Marijuana smoker    Major depressive disorder, severe ([address]    Severe depressed bipolar I disorder without psychotic features ([address]    Alcohol-induced acute pancreatitis    Alcohol withdrawal ([address]    Alcohol abuse with withdrawal ([address]    Alcohol withdrawal, uncomplicated ([address]    Alcohol induced acute pancreatitis without necrosis or infection    Lactic acidosis    Pancreatitis, recurrent        ASSESSMENT of Current Deficits Patient exhibits decreased strength, balance, endurance and pain right lateral trunk and lower extremity  impairing functional mobility, transfers, gait , gait distance and tolerance to activity patient labile, shaky with mobility. Patient requires continued skilled physical therapy to address concerns listed above for increased safety and function at discharge. PHYSICAL THERAPY  PLAN OF CARE       Physical therapy plan of care is established based on physician order,  patient diagnosis and clinical assessment    Current Treatment Recommendations:    -Standing Balance: Perform strengthening exercises in standing to promote motor control with or without upper extremity support   -Transfers: Cues for hand placement, technique and safety  -Gait: Gait training and Standing activities to improve: base of support, weight shift, weight bearing    -Endurance: Utilize Supervised activities to increase level of endurance to allow for safe functional mobility including transfers and gait     PT long term treatment goals are located in below grid    Patient and or family understand(s) diagnosis, prognosis, and plan of care. Frequency of treatments: Patient will be seen  daily.          Prior Level of Function: Patient ambulated independently    Rehab Potential: fair  + for baseline    Past medical history:   Past Medical History:   Diagnosis Date    Anxiety     Arm pain     Concussion with loss of consciousness     [facility]     Depression     Flashing lights     Head injury     Headache     Leg pain     Numbness and tingling     arms and hands    PTSD (post-traumatic stress disorder)     Seizures (HCC)      Past Surgical History:   Procedure Laterality Date    COLONOSCOPY      ENDOSCOPY, COLON, DIAGNOSTIC         SUBJECTIVE:    Precautions: Bedrest with bathroom Privileges , falls, alarm and contact isolation , nursing-Tyrell reports end of session tested negative for C-dif  Social history: Patient lives with brother high functioning autistic  in a two story home bedroom 2nd floor ~ 10 steps with rail  With 5- 6 steps  to enter bilateral Rail  Tub shower grab bars    Equipment owned: None,       AM-PAC Basic Mobility        AM-PAC Mobility Inpatient   How much difficulty turning over in bed?: None  How much difficulty sitting down on / standing up from a chair with arms?: A Little  How much difficulty moving from lying on back to sitting on side of bed?: A Little  How much help from another person moving to and from a bed to a chair?: A Little  How much help from another person needed to walk in hospital room?: A Lot  How much help from another person for climbing 3-5 steps with a railing?: A Lot  AM-PAC Inpatient Mobility Raw Score : 17  AM-PAC Inpatient T-Scale Score : 42.13  Mobility Inpatient CMS 0-100% Score: 50.57  Mobility Inpatient CMS G-Code Modifier : CK    Nursing cleared patient for PT evaluation. The admitting diagnosis and active problem list as listed above have been reviewed prior to the initiation of this evaluation. OBJECTIVE;   Initial Evaluation  Date: 6/15/2021 Treatment Date:     Short Term/ Long Term   Goals   Was pt agreeable to Eval/treatment? Yes    To be met in 3 days   Pain level   7/10  Right lateral trunk, Right lower extremity      Bed Mobility    Rolling: Independent    Supine to sit: Supervision     Sit to supine: Not assessed     Scooting: Supervision     Rolling: Independent    Supine to sit:  Independent    Sit to supine: Independent    Scooting: Independent     Transfers Sit to stand: Minimal assist of 1  Cues for hand placement and safety repeatedly for each transfer for safety   Sit to stand: Independent     Ambulation    1 x 5 feet, 1 x 3 forward/backward steps using  hand held assist with Moderate assist of 1   unsteady/shaky   50 feet using  no device with Supervision     Stair negotiation: ascended and descended   Not assessed            ROM Within functional limits       Strength BUE:  4/5  RLE:  4-/5  LLE:  4-/5   Increase strength in affected mm groups by 1/3 grade   Balance Sitting EOB:  fair +  Dynamic Standing:  fair - shaky  Sitting EOB:  good    Dynamic Standing: fair +     Patient is Alert & Oriented x person, place, time and situation and follows directions    Sensation:  Patient  reports numbness/tingling bilateral lower extremities and bilateral upper extremities  intermittently   Edema:  no    Endurance: fair       Vitals: room air    Blood Pressure at rest  98/75 Blood Pressure during session 121/95   Heart Rate at rest 97 Heart Rate during session 114   SPO2 at rest 97%  SPO2 during session 95%     Patient education  Patient educated on role of Physical Therapy, risks of immobility, safety and plan of care,  importance of mobility while in hospital  and ankle pumps, quad set and glut set for edema control, blood clot prevention      Patient response to education:   Pt verbalized understanding Pt demonstrated skill Pt requires further education in this area   Yes Partial Yes      Treatment:  Patient practiced and was instructed/facilitated in the following treatment: Patient assisted to edge of bed  Sat edge of bed 15 minutes with Minimal assist of 1 to increase dynamic sitting balance and activity tolerance. progressing to supervision. Assisted up to chair, stood x 3 for strengthening and cues for hand placement/safety. Performed exercises. Therapeutic Exercises:  ankle pumps, long arc quad and seated marching  x 10-15 reps. Discussed quad and glut set while in bed    At end of session, patient in chair with alarm call light and phone within reach,   all lines and tubes intact, nursing-hilda notified. Patient would benefit from continued skilled Physical Therapy to improve functional independence and quality of life. Patient's/ family goals   rehab        Time in  1127  Time out  1158    Total Treatment Time  11 minutes    Evaluation time includes thorough review of current medical information, gathering information on past medical history/social history and prior level of function, completion of standardized testing/informal observation of tasks, assessment of data, and development of Plan of care and goals.      CPT codes:  Low Complexity PT evaluation (26265)  Therapeutic activities (49425) 11 minutes  1 unit(s)    Darian Spence, PT

## 2021-06-15 NOTE — PROGRESS NOTES
(VALIUM) tablet 15 mg, 15 mg, Oral, Q1H PRN **OR** diazePAM (VALIUM) tablet 10 mg, 10 mg, Oral, Q1H PRN **OR** diazePAM (VALIUM) tablet 5 mg, 5 mg, Oral, Q1H PRN  scopolamine (TRANSDERM-SCOP) transdermal patch 1 patch, 1 patch, Transdermal, Q72H  PHENobarbital (LUMINAL) injection 130 mg, 130 mg, Intravenous, Q12H  Physical    VITALS:  /88   Pulse 80   Temp 97.3 °F (36.3 °C) (Infrared)   Resp 20   Ht 5' 7\" (1.702 m)   Wt 146 lb 2.6 oz (66.3 kg)   SpO2 98%   BMI 22.89 kg/m²   CONSTITUTIONAL:  awake, alert, cooperative, no apparent distress, and appears stated age  EYES:  Lids and lashes normal, pupils equal, round and reactive to light, extra ocular muscles intact, sclera clear, conjunctiva normal  ENT:  Normocephalic, without obvious abnormality, atraumatic, sinuses nontender on palpation, external ears without lesions, oral pharynx with moist mucus membranes, tonsils without erythema or exudates, gums normal and good dentition.   NECK:  Supple, symmetrical, trachea midline, no adenopathy, thyroid symmetric, not enlarged and no tenderness, skin normal  BACK:  Symmetric, no curvature, spinous processes are non-tender on palpation, paraspinous muscles are non-tender on palpation, no costal vertebral tenderness  LUNGS:  No increased work of breathing, good air exchange, clear to auscultation bilaterally, no crackles or wheezing  CARDIOVASCULAR:  Normal apical impulse, regular rate and rhythm, normal S1 and S2, no S3 or S4, and no murmur noted  ABDOMEN:  No scars, normal bowel sounds, soft, non-distended, non-tender, no masses palpated, no hepatosplenomegally  MUSCULOSKELETAL:  there is no redness, warmth, or swelling of the joints  NEUROLOGIC:  No focal neuro deficit  SKIN:  no bruising or bleeding  Data    CBC:   Lab Results   Component Value Date    WBC 6.2 06/15/2021    RBC 3.41 06/15/2021    HGB 11.8 06/15/2021    HCT 34.3 06/15/2021    .6 06/15/2021    MCH 34.6 06/15/2021    MCHC 34.4 06/15/2021

## 2021-06-15 NOTE — CARE COORDINATION
Readmission Assessment  Number of Days since last admission?: 8-30 days  Previous Disposition: Home with Family  Who is being Interviewed: Patient  What was the patient's/caregiver's perception as to why they think they needed to return back to the hospital?: Other (Comment) (Relapsed to alcohol abuse, felt ill, returned to hospital.)  Did you visit your Primary Care Physician after you left the hospital, before you returned this time?: No (Not sure if Dr. Yessica Youssef is still my doctor or not since I've come into the hosptial a few times about alcohol.)  Why weren't you able to visit your PCP?: Did not have an appointment  Did you see a specialist, such as Cardiac, Pulmonary, Orthopedic Physician, etc. after you left the hospital?: No  Who advised the patient to return to the hospital?: Self-referral  Does the patient report anything that got in the way of taking their medications?: No  In our efforts to provide the best possible care to you and others like you, can you think of anything that we could have done to help you after you left the hospital the first time, so that you might not have needed to return so soon?: Other (Comment) (No.)

## 2021-06-15 NOTE — CARE COORDINATION
SS Note: Janelle Vazquez, [facility], met with pt and stated pt in agreement with referral to [facility]; Janelle Vazquez calling initial referral and this SW faxed referral to fax [phone].   Electronically signed by PAT Sims on 6/15/2021 at 3:12 PM

## 2021-06-16 ENCOUNTER — APPOINTMENT (OUTPATIENT)
Dept: CT IMAGING | Age: 31
DRG: 282 | End: 2021-06-16
Payer: COMMERCIAL

## 2021-06-16 LAB
ALBUMIN SERPL-MCNC: 3.5 G/DL (ref 3.5–5.2)
ALP BLD-CCNC: 166 U/L (ref 40–129)
ALT SERPL-CCNC: 30 U/L (ref 0–40)
ANION GAP SERPL CALCULATED.3IONS-SCNC: 11 MMOL/L (ref 7–16)
AST SERPL-CCNC: 60 U/L (ref 0–39)
BILIRUB SERPL-MCNC: 0.9 MG/DL (ref 0–1.2)
BILIRUBIN DIRECT: 0.3 MG/DL (ref 0–0.3)
BILIRUBIN, INDIRECT: 0.6 MG/DL (ref 0–1)
BUN BLDV-MCNC: <2 MG/DL (ref 6–20)
CALCIUM SERPL-MCNC: 8.2 MG/DL (ref 8.6–10.2)
CHLORIDE BLD-SCNC: 104 MMOL/L (ref 98–107)
CO2: 22 MMOL/L (ref 22–29)
CREAT SERPL-MCNC: 0.5 MG/DL (ref 0.7–1.2)
GFR AFRICAN AMERICAN: >60
GFR NON-AFRICAN AMERICAN: >60 ML/MIN/1.73
GLUCOSE BLD-MCNC: 98 MG/DL (ref 74–99)
HCT VFR BLD CALC: 40.9 % (ref 37–54)
HEMOGLOBIN: 14 G/DL (ref 12.5–16.5)
LIPASE: 551 U/L (ref 13–60)
MAGNESIUM: 1.9 MG/DL (ref 1.6–2.6)
MCH RBC QN AUTO: 34.7 PG (ref 26–35)
MCHC RBC AUTO-ENTMCNC: 34.2 % (ref 32–34.5)
MCV RBC AUTO: 101.2 FL (ref 80–99.9)
PDW BLD-RTO: 15.4 FL (ref 11.5–15)
PHOSPHORUS: 1.8 MG/DL (ref 2.5–4.5)
PLATELET # BLD: 55 E9/L (ref 130–450)
PLATELET CONFIRMATION: NORMAL
PMV BLD AUTO: 13.7 FL (ref 7–12)
POTASSIUM SERPL-SCNC: 3.8 MMOL/L (ref 3.5–5)
RBC # BLD: 4.04 E12/L (ref 3.8–5.8)
SODIUM BLD-SCNC: 137 MMOL/L (ref 132–146)
TOTAL PROTEIN: 6.5 G/DL (ref 6.4–8.3)
WBC # BLD: 6.1 E9/L (ref 4.5–11.5)

## 2021-06-16 PROCEDURE — 6370000000 HC RX 637 (ALT 250 FOR IP): Performed by: INTERNAL MEDICINE

## 2021-06-16 PROCEDURE — 80076 HEPATIC FUNCTION PANEL: CPT

## 2021-06-16 PROCEDURE — 6370000000 HC RX 637 (ALT 250 FOR IP): Performed by: SURGERY

## 2021-06-16 PROCEDURE — 6360000002 HC RX W HCPCS: Performed by: INTERNAL MEDICINE

## 2021-06-16 PROCEDURE — 36415 COLL VENOUS BLD VENIPUNCTURE: CPT

## 2021-06-16 PROCEDURE — 83735 ASSAY OF MAGNESIUM: CPT

## 2021-06-16 PROCEDURE — 80048 BASIC METABOLIC PNL TOTAL CA: CPT

## 2021-06-16 PROCEDURE — 6360000004 HC RX CONTRAST MEDICATION: Performed by: RADIOLOGY

## 2021-06-16 PROCEDURE — 74175 CTA ABDOMEN W/CONTRAST: CPT

## 2021-06-16 PROCEDURE — 84100 ASSAY OF PHOSPHORUS: CPT

## 2021-06-16 PROCEDURE — 2580000003 HC RX 258: Performed by: INTERNAL MEDICINE

## 2021-06-16 PROCEDURE — C9113 INJ PANTOPRAZOLE SODIUM, VIA: HCPCS | Performed by: TRANSPLANT SURGERY

## 2021-06-16 PROCEDURE — 99232 SBSQ HOSP IP/OBS MODERATE 35: CPT | Performed by: INTERNAL MEDICINE

## 2021-06-16 PROCEDURE — 6360000002 HC RX W HCPCS: Performed by: TRANSPLANT SURGERY

## 2021-06-16 PROCEDURE — 2060000000 HC ICU INTERMEDIATE R&B

## 2021-06-16 PROCEDURE — 2580000003 HC RX 258: Performed by: TRANSPLANT SURGERY

## 2021-06-16 PROCEDURE — 6370000000 HC RX 637 (ALT 250 FOR IP): Performed by: TRANSPLANT SURGERY

## 2021-06-16 PROCEDURE — 85027 COMPLETE CBC AUTOMATED: CPT

## 2021-06-16 PROCEDURE — 83690 ASSAY OF LIPASE: CPT

## 2021-06-16 RX ORDER — DIAZEPAM 5 MG/ML
20 INJECTION, SOLUTION INTRAMUSCULAR; INTRAVENOUS EVERY 4 HOURS PRN
Status: DISCONTINUED | OUTPATIENT
Start: 2021-06-16 | End: 2021-06-16

## 2021-06-16 RX ORDER — DIAZEPAM 5 MG/ML
20 INJECTION, SOLUTION INTRAMUSCULAR; INTRAVENOUS EVERY 4 HOURS PRN
Status: ACTIVE | OUTPATIENT
Start: 2021-06-16 | End: 2021-06-18

## 2021-06-16 RX ORDER — DIAZEPAM 5 MG/ML
20 INJECTION, SOLUTION INTRAMUSCULAR; INTRAVENOUS ONCE
Status: COMPLETED | OUTPATIENT
Start: 2021-06-16 | End: 2021-06-16

## 2021-06-16 RX ORDER — PHENOBARBITAL SODIUM 65 MG/ML
130 INJECTION INTRAMUSCULAR 3 TIMES DAILY
Status: DISCONTINUED | OUTPATIENT
Start: 2021-06-16 | End: 2021-06-17

## 2021-06-16 RX ADMIN — PHENOBARBITAL SODIUM 130 MG: 65 INJECTION INTRAMUSCULAR; INTRAVENOUS at 09:57

## 2021-06-16 RX ADMIN — POTASSIUM CHLORIDE AND SODIUM CHLORIDE: 900; 300 INJECTION, SOLUTION INTRAVENOUS at 23:55

## 2021-06-16 RX ADMIN — DIAZEPAM 15 MG: 5 TABLET ORAL at 05:57

## 2021-06-16 RX ADMIN — ACETAMINOPHEN 650 MG: 325 TABLET, FILM COATED ORAL at 02:32

## 2021-06-16 RX ADMIN — DIAZEPAM 20 MG: 5 INJECTION, SOLUTION INTRAMUSCULAR; INTRAVENOUS at 20:20

## 2021-06-16 RX ADMIN — DIAZEPAM 20 MG: 5 INJECTION, SOLUTION INTRAMUSCULAR; INTRAVENOUS at 16:57

## 2021-06-16 RX ADMIN — PANCRELIPASE 72000 UNITS: 60000; 12000; 38000 CAPSULE, DELAYED RELEASE PELLETS ORAL at 12:25

## 2021-06-16 RX ADMIN — SODIUM CHLORIDE, PRESERVATIVE FREE 10 ML: 5 INJECTION INTRAVENOUS at 20:40

## 2021-06-16 RX ADMIN — Medication 10 ML: at 20:40

## 2021-06-16 RX ADMIN — DIAZEPAM 20 MG: 5 INJECTION, SOLUTION INTRAMUSCULAR; INTRAVENOUS at 17:59

## 2021-06-16 RX ADMIN — Medication 125 MG: at 06:10

## 2021-06-16 RX ADMIN — DIAZEPAM 20 MG: 5 INJECTION, SOLUTION INTRAMUSCULAR; INTRAVENOUS at 11:20

## 2021-06-16 RX ADMIN — DIAZEPAM 20 MG: 5 TABLET ORAL at 03:26

## 2021-06-16 RX ADMIN — PHENOBARBITAL SODIUM 130 MG: 65 INJECTION INTRAMUSCULAR; INTRAVENOUS at 20:39

## 2021-06-16 RX ADMIN — DIAZEPAM 20 MG: 5 INJECTION, SOLUTION INTRAMUSCULAR; INTRAVENOUS at 10:16

## 2021-06-16 RX ADMIN — Medication 125 MG: at 12:26

## 2021-06-16 RX ADMIN — PANTOPRAZOLE SODIUM 40 MG: 40 INJECTION, POWDER, FOR SOLUTION INTRAVENOUS at 20:39

## 2021-06-16 RX ADMIN — Medication 125 MG: at 16:59

## 2021-06-16 RX ADMIN — DIAZEPAM 20 MG: 5 INJECTION, SOLUTION INTRAMUSCULAR; INTRAVENOUS at 12:25

## 2021-06-16 RX ADMIN — DIAZEPAM 10 MG: 5 TABLET ORAL at 04:40

## 2021-06-16 RX ADMIN — DIAZEPAM 20 MG: 5 INJECTION, SOLUTION INTRAMUSCULAR; INTRAVENOUS at 13:37

## 2021-06-16 RX ADMIN — PANTOPRAZOLE SODIUM 40 MG: 40 INJECTION, POWDER, FOR SOLUTION INTRAVENOUS at 10:05

## 2021-06-16 RX ADMIN — SODIUM CHLORIDE, PRESERVATIVE FREE 10 ML: 5 INJECTION INTRAVENOUS at 20:39

## 2021-06-16 RX ADMIN — IOPAMIDOL 100 ML: 755 INJECTION, SOLUTION INTRAVENOUS at 08:50

## 2021-06-16 RX ADMIN — DIAZEPAM 20 MG: 5 INJECTION, SOLUTION INTRAMUSCULAR; INTRAVENOUS at 00:41

## 2021-06-16 RX ADMIN — Medication 125 MG: at 00:34

## 2021-06-16 RX ADMIN — DIAZEPAM 20 MG: 5 TABLET ORAL at 08:13

## 2021-06-16 RX ADMIN — DIAZEPAM 20 MG: 5 TABLET ORAL at 02:05

## 2021-06-16 RX ADMIN — PHENOBARBITAL SODIUM 130 MG: 65 INJECTION INTRAMUSCULAR; INTRAVENOUS at 12:51

## 2021-06-16 ASSESSMENT — PAIN SCALES - GENERAL
PAINLEVEL_OUTOF10: 2
PAINLEVEL_OUTOF10: 4
PAINLEVEL_OUTOF10: 8
PAINLEVEL_OUTOF10: 4
PAINLEVEL_OUTOF10: 4

## 2021-06-16 ASSESSMENT — PAIN DESCRIPTION - PAIN TYPE
TYPE: ACUTE PAIN

## 2021-06-16 ASSESSMENT — PAIN - FUNCTIONAL ASSESSMENT
PAIN_FUNCTIONAL_ASSESSMENT: ACTIVITIES ARE NOT PREVENTED

## 2021-06-16 ASSESSMENT — PAIN DESCRIPTION - DESCRIPTORS
DESCRIPTORS: ACHING;DISCOMFORT
DESCRIPTORS: ACHING;DISCOMFORT
DESCRIPTORS: ACHING
DESCRIPTORS: DISCOMFORT

## 2021-06-16 ASSESSMENT — PAIN DESCRIPTION - PROGRESSION
CLINICAL_PROGRESSION: NOT CHANGED
CLINICAL_PROGRESSION: NOT CHANGED
CLINICAL_PROGRESSION: GRADUALLY IMPROVING

## 2021-06-16 ASSESSMENT — PAIN DESCRIPTION - ONSET
ONSET: ON-GOING

## 2021-06-16 ASSESSMENT — PAIN DESCRIPTION - FREQUENCY
FREQUENCY: CONTINUOUS

## 2021-06-16 ASSESSMENT — PAIN DESCRIPTION - ORIENTATION
ORIENTATION: RIGHT;LEFT;LOWER
ORIENTATION: POSTERIOR
ORIENTATION: RIGHT;LEFT;LOWER
ORIENTATION: LOWER

## 2021-06-16 ASSESSMENT — PAIN DESCRIPTION - LOCATION
LOCATION: BACK;CHEST;SHOULDER
LOCATION: BACK
LOCATION: BACK

## 2021-06-16 NOTE — PROGRESS NOTES
Hepatobiliary and Pancreatic Surgery Progress Note    CC: acute pancreatitis    Subjective: Patient currently in CT scan. Is C Diff positive    OBJECTIVE      Physical    BP (!) 122/92   Pulse 82   Temp 98.9 °F (37.2 °C)   Resp 18   Ht 5' 7\" (1.702 m)   Wt 146 lb 2.6 oz (66.3 kg)   SpO2 100%   BMI 22.89 kg/m²       General appearance: appears in no acute distress  Lungs:respiratory effort normal without accessory numbers  Heart: no pedal edema  Abdomen: soft, nondistended, nontympanic, no guarding, no peritoneal signs, normoactive bowel sounds  Extremities: ROM normal    ASSESSMENT: Acute necrotizing pancreatitis secondary to alcohol, severe fatty infiltration of his liver    PLAN:    - repeat Triphasic CT scan to assess for worsening necrosis and acute necrotic collectins - organizing collections on CT  - clears liquid diet with supplements  - start creon 72k units with shakes    Thank you for the consultation and allowing me to take part in Mr. Flavio Lin' care.       Ophelia Hamilton MD 6/16/2021 8:39 AM

## 2021-06-16 NOTE — PROGRESS NOTES
Pt. pulled off his nicotine patch. States \" I smoke 3 packs a day. This is not doing anything. \" Patient then asked for nicotine gum or another patch.  This nurse informed patient that he will get a new one at [address] [place] [address].

## 2021-06-16 NOTE — PROGRESS NOTES
Pt attempting to leave the nursing floor stating he wanted to go to the [address] [facility] and buy something at [employer]. Patient ambulated near the elevators and was redirected back to his room. Once pt back in room hospital police came in to talk to him and inform him he was not able to leave. Pt remains confused to time place and purpose.

## 2021-06-16 NOTE — PLAN OF CARE
Problem: Falls - Risk of:  Goal: Will remain free from falls  Description: Will remain free from falls  Outcome: Met This Shift  Goal: Absence of physical injury  Description: Absence of physical injury  Outcome: Met This Shift     Problem: Discharge Planning:  Goal: Discharged to appropriate level of care  Description: Discharged to appropriate level of care  Outcome: Met This Shift     Problem: Fluid Volume - Deficit:  Goal: Absence of fluid volume deficit signs and symptoms  Description: Absence of fluid volume deficit signs and symptoms  Outcome: Met This Shift     Problem: Violence - Risk of, Self/Other-Directed:  Goal: Knowledge of developmental care interventions  Description: Absence of violence  Outcome: Met This Shift     Problem: Nutrition Deficit:  Goal: Ability to achieve adequate nutritional intake will improve  Description: Ability to achieve adequate nutritional intake will improve  Outcome: Not Met This Shift     Problem: Sleep Pattern Disturbance:  Goal: Appears well-rested  Description: Appears well-rested  Outcome: Not Met This Shift (0) independent

## 2021-06-16 NOTE — PROGRESS NOTES
Department of Internal Medicine  General Internal Medicine  Attending Progress Note  Chief Complaint   Patient presents with    Eating Disorder     Pt states he hasn't been eating for the past 2 weeks because he hasn't had the urge to     Dental Pain     Pt has white and black spots on upper gums     SUBJECTIVE:    Anxious. Misbehaving.   Code violet    OBJECTIVE      Medications    Current Facility-Administered Medications: lipase-protease-amylase (CREON) delayed release capsule 72,000 Units, 72,000 Units, Oral, TID WC  PHENobarbital (LUMINAL) injection 130 mg, 130 mg, Intravenous, TID  diazePAM (VALIUM) injection 20 mg, 20 mg, Intravenous, Q4H PRN  nicotine (NICODERM CQ) 21 MG/24HR 1 patch, 1 patch, Transdermal, Daily  0.9% NaCl with KCl 40 mEq infusion, , Intravenous, Continuous  vancomycin (VANCOCIN) oral solution 125 mg, 125 mg, Oral, 4 times per day  prochlorperazine (COMPAZINE) injection 10 mg, 10 mg, Intravenous, Q6H PRN  pantoprazole (PROTONIX) injection 40 mg, 40 mg, Intravenous, BID **AND** sodium chloride (PF) 0.9 % injection 10 mL, 10 mL, Intravenous, BID  ondansetron (ZOFRAN-ODT) disintegrating tablet 4 mg, 4 mg, Oral, Q8H PRN **OR** ondansetron (ZOFRAN) injection 4 mg, 4 mg, Intravenous, Q6H PRN  polyethylene glycol (GLYCOLAX) packet 17 g, 17 g, Oral, Daily PRN  acetaminophen (TYLENOL) tablet 650 mg, 650 mg, Oral, Q6H PRN **OR** acetaminophen (TYLENOL) suppository 650 mg, 650 mg, Rectal, Q6H PRN  diazePAM (VALIUM) injection 5 mg, 5 mg, Intravenous, Q4H PRN  sodium chloride flush 0.9 % injection 5-40 mL, 5-40 mL, Intravenous, 2 times per day  sodium chloride flush 0.9 % injection 5-40 mL, 5-40 mL, Intravenous, PRN  0.9 % sodium chloride infusion, 25 mL, Intravenous, PRN  diazePAM (VALIUM) injection 20 mg, 20 mg, Intravenous, Q1H PRN **OR** diazePAM (VALIUM) injection 15 mg, 15 mg, Intravenous, Q1H PRN **OR** diazePAM (VALIUM) injection 10 mg, 10 mg, Intravenous, Q1H PRN **OR** diazePAM (VALIUM) injection 5 mg, 5 mg, Intravenous, Q1H PRN **OR** diazePAM (VALIUM) tablet 20 mg, 20 mg, Oral, Q1H PRN **OR** diazePAM (VALIUM) tablet 15 mg, 15 mg, Oral, Q1H PRN **OR** diazePAM (VALIUM) tablet 10 mg, 10 mg, Oral, Q1H PRN **OR** diazePAM (VALIUM) tablet 5 mg, 5 mg, Oral, Q1H PRN  scopolamine (TRANSDERM-SCOP) transdermal patch 1 patch, 1 patch, Transdermal, Q72H  Physical    VITALS:  BP (!) 123/92   Pulse 98   Temp 98.9 °F (37.2 °C)   Resp 20   Ht 5' 7\" (1.702 m)   Wt 146 lb 2.6 oz (66.3 kg)   SpO2 99%   BMI 22.89 kg/m²   No acute distress moist membranes   Normocephalic, without obvious abnormality, atraumatic, external ears without lesions,   Neck supple no cervical lymphadenopathy  Heart has a regular rate and rhythm no murmur  Lungs are clear to auscultation bilaterally with equal movements. Abdomen is soft nontender nondistended no rebound or guarding. No significant peripheral edema good peripheral perfusion. No significant rashes or new skin lesions. No new focality on neuro exam which is overall grossly intact. confused  Affect and mood seem to be inappropriate, anxious, aggressive    Data    CBC:   Lab Results   Component Value Date    WBC 6.1 06/16/2021    RBC 4.04 06/16/2021    HGB 14.0 06/16/2021    HCT 40.9 06/16/2021    .2 06/16/2021    MCH 34.7 06/16/2021    MCHC 34.2 06/16/2021    RDW 15.4 06/16/2021    PLT 55 06/16/2021    MPV 13.7 06/16/2021     BMP:    Lab Results   Component Value Date     06/16/2021    K 3.8 06/16/2021    K 3.3 02/28/2021     06/16/2021    CO2 22 06/16/2021    BUN <2 06/16/2021    LABALBU 3.5 06/16/2021    CREATININE 0.5 06/16/2021    CALCIUM 8.2 06/16/2021    GFRAA >60 06/16/2021    LABGLOM >60 06/16/2021    GLUCOSE 98 06/16/2021       ASSESSMENT AND PLAN      Active Problems:  1. Alcohol induced acute pancreatitis without necrosis or infection: continue IV fluid and pain control  Continue to monitor lipase. This is trending down. creon    2.

## 2021-06-16 NOTE — PLAN OF CARE
Problem: Falls - Risk of:  Goal: Will remain free from falls  Description: Will remain free from falls  6/16/2021 1827 by Yoan Lechuga RN  Outcome: Met This Shift  6/16/2021 0623 by Henri Medeiros RN  Outcome: Met This Shift  Goal: Absence of physical injury  Description: Absence of physical injury  6/16/2021 1827 by Yoan Lechuga RN  Outcome: Met This Shift  6/16/2021 0623 by Henri Medeiros RN  Outcome: Met This Shift     Problem: Discharge Planning:  Goal: Discharged to appropriate level of care  Description: Discharged to appropriate level of care  Outcome: Met This Shift     Problem: Fluid Volume - Deficit:  Goal: Absence of fluid volume deficit signs and symptoms  Description: Absence of fluid volume deficit signs and symptoms  Outcome: Met This Shift     Problem: Nutrition Deficit:  Goal: Ability to achieve adequate nutritional intake will improve  Description: Ability to achieve adequate nutritional intake will improve  Outcome: Met This Shift     Problem: Sleep Pattern Disturbance:  Goal: Appears well-rested  Description: Appears well-rested  6/16/2021 1827 by Yoan Lechuga RN  Outcome: Met This Shift  6/16/2021 0623 by Henri Medeiros RN  Outcome: Not Met This Shift     Problem: Violence - Risk of, Self/Other-Directed:  Goal: Knowledge of developmental care interventions  Description: Absence of violence  Outcome: Met This Shift     Problem: Pain:  Goal: Pain level will decrease  Description: Pain level will decrease  Outcome: Met This Shift  Goal: Control of acute pain  Description: Control of acute pain  6/16/2021 1827 by Yoan Lechuga RN  Outcome: Met This Shift  6/16/2021 0623 by Henri Medeiros RN  Outcome: Ongoing  Goal: Control of chronic pain  Description: Control of chronic pain  Outcome: Met This Shift

## 2021-06-16 NOTE — PROGRESS NOTES
OT SESSION ATTEMPT     Date:2021  Patient Name: Hilda Davis  MRN: [id number]  : 1990  [facility]     Attempted OT session this date:    [] unavailable due to other medical staff currently with pt   [x] on hold per nursing staff   [] on hold per nursing staff secondary to lab / radiology results    [] pt declined due to ____. Benefits of participation in therapy reviewed with pt.    [] off unit   [] Other:     Pt violent yelling and behavioral. Code violet called. On hold for safety at this time. Will reattempt OT evaluation and/or treatment at a later time.     Rosi Garcia OTR/L; [id number]

## 2021-06-16 NOTE — CARE COORDINATION
SS Note: No Covid testing. Pt now on IMCU, code violet was called earlier today as pt confused, wanted to leave hospital. Pt met with Sp Ayala, [facility], yesterday and was in agreement alcohol rehab. Yesterday this SW faxed referral to [facility] however per Sp Ayala when he spoke to [facility] today they had not received. Per Winston's request SW faxed again to [facility] and to [facility] as another possible option for pt. SW/DEON will continue to follow for transition of care planning.   Electronically signed by PAT Monae on 6/16/2021 at 2:55 PM

## 2021-06-16 NOTE — FLOWSHEET NOTE
06/16/21 0242   Provider Notification   Reason for Communication Evaluate  (pt. states his chest hurts, feels SOB)   Provider Name Dr. French Pi   Provider Notification Physician   Method of Communication Call   Response No new orders   Notification Time [phone]

## 2021-06-17 LAB
ANION GAP SERPL CALCULATED.3IONS-SCNC: 8 MMOL/L (ref 7–16)
BUN BLDV-MCNC: 5 MG/DL (ref 6–20)
CALCIUM SERPL-MCNC: 8.1 MG/DL (ref 8.6–10.2)
CHLORIDE BLD-SCNC: 106 MMOL/L (ref 98–107)
CO2: 22 MMOL/L (ref 22–29)
CREAT SERPL-MCNC: 0.5 MG/DL (ref 0.7–1.2)
GFR AFRICAN AMERICAN: >60
GFR NON-AFRICAN AMERICAN: >60 ML/MIN/1.73
GLUCOSE BLD-MCNC: 79 MG/DL (ref 74–99)
HCT VFR BLD CALC: 35.9 % (ref 37–54)
HEMOGLOBIN: 11.9 G/DL (ref 12.5–16.5)
MAGNESIUM: 1.8 MG/DL (ref 1.6–2.6)
MCH RBC QN AUTO: 34.7 PG (ref 26–35)
MCHC RBC AUTO-ENTMCNC: 33.1 % (ref 32–34.5)
MCV RBC AUTO: 104.7 FL (ref 80–99.9)
PDW BLD-RTO: 15.8 FL (ref 11.5–15)
PHOSPHORUS: 2.8 MG/DL (ref 2.5–4.5)
PLATELET # BLD: 103 E9/L (ref 130–450)
PMV BLD AUTO: 12.6 FL (ref 7–12)
POTASSIUM SERPL-SCNC: 3.9 MMOL/L (ref 3.5–5)
RBC # BLD: 3.43 E12/L (ref 3.8–5.8)
SODIUM BLD-SCNC: 136 MMOL/L (ref 132–146)
WBC # BLD: 6.2 E9/L (ref 4.5–11.5)

## 2021-06-17 PROCEDURE — 99232 SBSQ HOSP IP/OBS MODERATE 35: CPT | Performed by: INTERNAL MEDICINE

## 2021-06-17 PROCEDURE — 6360000002 HC RX W HCPCS: Performed by: INTERNAL MEDICINE

## 2021-06-17 PROCEDURE — 83735 ASSAY OF MAGNESIUM: CPT

## 2021-06-17 PROCEDURE — 2580000003 HC RX 258: Performed by: INTERNAL MEDICINE

## 2021-06-17 PROCEDURE — 6370000000 HC RX 637 (ALT 250 FOR IP): Performed by: INTERNAL MEDICINE

## 2021-06-17 PROCEDURE — 36415 COLL VENOUS BLD VENIPUNCTURE: CPT

## 2021-06-17 PROCEDURE — 2580000003 HC RX 258: Performed by: TRANSPLANT SURGERY

## 2021-06-17 PROCEDURE — 84100 ASSAY OF PHOSPHORUS: CPT

## 2021-06-17 PROCEDURE — 99232 SBSQ HOSP IP/OBS MODERATE 35: CPT | Performed by: TRANSPLANT SURGERY

## 2021-06-17 PROCEDURE — 80048 BASIC METABOLIC PNL TOTAL CA: CPT

## 2021-06-17 PROCEDURE — 97530 THERAPEUTIC ACTIVITIES: CPT

## 2021-06-17 PROCEDURE — 97165 OT EVAL LOW COMPLEX 30 MIN: CPT | Performed by: OCCUPATIONAL THERAPIST

## 2021-06-17 PROCEDURE — 97530 THERAPEUTIC ACTIVITIES: CPT | Performed by: OCCUPATIONAL THERAPIST

## 2021-06-17 PROCEDURE — 6370000000 HC RX 637 (ALT 250 FOR IP): Performed by: TRANSPLANT SURGERY

## 2021-06-17 PROCEDURE — 76937 US GUIDE VASCULAR ACCESS: CPT

## 2021-06-17 PROCEDURE — 85027 COMPLETE CBC AUTOMATED: CPT

## 2021-06-17 PROCEDURE — C1751 CATH, INF, PER/CENT/MIDLINE: HCPCS

## 2021-06-17 PROCEDURE — 05HB33Z INSERTION OF INFUSION DEVICE INTO RIGHT BASILIC VEIN, PERCUTANEOUS APPROACH: ICD-10-PCS | Performed by: INTERNAL MEDICINE

## 2021-06-17 PROCEDURE — 2060000000 HC ICU INTERMEDIATE R&B

## 2021-06-17 PROCEDURE — 36410 VNPNXR 3YR/> PHY/QHP DX/THER: CPT

## 2021-06-17 RX ORDER — SODIUM CHLORIDE 9 MG/ML
25 INJECTION, SOLUTION INTRAVENOUS PRN
Status: DISCONTINUED | OUTPATIENT
Start: 2021-06-17 | End: 2021-06-22 | Stop reason: HOSPADM

## 2021-06-17 RX ORDER — SODIUM CHLORIDE 0.9 % (FLUSH) 0.9 %
5-40 SYRINGE (ML) INJECTION PRN
Status: DISCONTINUED | OUTPATIENT
Start: 2021-06-17 | End: 2021-06-22 | Stop reason: HOSPADM

## 2021-06-17 RX ORDER — LIDOCAINE HYDROCHLORIDE 10 MG/ML
5 INJECTION, SOLUTION EPIDURAL; INFILTRATION; INTRACAUDAL; PERINEURAL ONCE
Status: DISCONTINUED | OUTPATIENT
Start: 2021-06-17 | End: 2021-06-22 | Stop reason: HOSPADM

## 2021-06-17 RX ORDER — HEPARIN SODIUM (PORCINE) LOCK FLUSH IV SOLN 100 UNIT/ML 100 UNIT/ML
1 SOLUTION INTRAVENOUS EVERY 12 HOURS SCHEDULED
Status: DISCONTINUED | OUTPATIENT
Start: 2021-06-17 | End: 2021-06-22 | Stop reason: HOSPADM

## 2021-06-17 RX ORDER — HEPARIN SODIUM (PORCINE) LOCK FLUSH IV SOLN 100 UNIT/ML 100 UNIT/ML
1 SOLUTION INTRAVENOUS PRN
Status: DISCONTINUED | OUTPATIENT
Start: 2021-06-17 | End: 2021-06-22 | Stop reason: HOSPADM

## 2021-06-17 RX ORDER — PANTOPRAZOLE SODIUM 40 MG/1
40 TABLET, DELAYED RELEASE ORAL
Status: DISCONTINUED | OUTPATIENT
Start: 2021-06-18 | End: 2021-06-22 | Stop reason: HOSPADM

## 2021-06-17 RX ORDER — HEPARIN SODIUM (PORCINE) LOCK FLUSH IV SOLN 100 UNIT/ML 100 UNIT/ML
3 SOLUTION INTRAVENOUS PRN
Status: DISCONTINUED | OUTPATIENT
Start: 2021-06-17 | End: 2021-06-22 | Stop reason: HOSPADM

## 2021-06-17 RX ORDER — SODIUM CHLORIDE 0.9 % (FLUSH) 0.9 %
5-40 SYRINGE (ML) INJECTION EVERY 12 HOURS SCHEDULED
Status: DISCONTINUED | OUTPATIENT
Start: 2021-06-17 | End: 2021-06-22 | Stop reason: HOSPADM

## 2021-06-17 RX ORDER — HEPARIN SODIUM (PORCINE) LOCK FLUSH IV SOLN 100 UNIT/ML 100 UNIT/ML
3 SOLUTION INTRAVENOUS EVERY 12 HOURS SCHEDULED
Status: DISCONTINUED | OUTPATIENT
Start: 2021-06-17 | End: 2021-06-22 | Stop reason: HOSPADM

## 2021-06-17 RX ORDER — PHENOBARBITAL 32.4 MG/1
64.8 TABLET ORAL 4 TIMES DAILY
Status: DISCONTINUED | OUTPATIENT
Start: 2021-06-17 | End: 2021-06-20

## 2021-06-17 RX ADMIN — DIAZEPAM 15 MG: 5 TABLET ORAL at 19:53

## 2021-06-17 RX ADMIN — ONDANSETRON 4 MG: 4 TABLET, ORALLY DISINTEGRATING ORAL at 06:46

## 2021-06-17 RX ADMIN — PHENOBARBITAL 64.8 MG: 32.4 TABLET ORAL at 10:49

## 2021-06-17 RX ADMIN — SODIUM CHLORIDE, PRESERVATIVE FREE 100 UNITS: 5 INJECTION INTRAVENOUS at 19:53

## 2021-06-17 RX ADMIN — PHENOBARBITAL 64.8 MG: 32.4 TABLET ORAL at 17:18

## 2021-06-17 RX ADMIN — Medication 10 ML: at 20:48

## 2021-06-17 RX ADMIN — Medication 125 MG: at 00:14

## 2021-06-17 RX ADMIN — Medication 10 ML: at 19:49

## 2021-06-17 RX ADMIN — Medication 125 MG: at 06:46

## 2021-06-17 RX ADMIN — PANCRELIPASE 72000 UNITS: 60000; 12000; 38000 CAPSULE, DELAYED RELEASE PELLETS ORAL at 12:17

## 2021-06-17 RX ADMIN — DIAZEPAM 20 MG: 5 TABLET ORAL at 06:45

## 2021-06-17 RX ADMIN — DIAZEPAM 20 MG: 5 TABLET ORAL at 14:16

## 2021-06-17 RX ADMIN — DIAZEPAM 20 MG: 5 TABLET ORAL at 17:16

## 2021-06-17 RX ADMIN — PANCRELIPASE 72000 UNITS: 60000; 12000; 38000 CAPSULE, DELAYED RELEASE PELLETS ORAL at 09:37

## 2021-06-17 RX ADMIN — DIAZEPAM 20 MG: 5 TABLET ORAL at 10:04

## 2021-06-17 RX ADMIN — PHENOBARBITAL 64.8 MG: 32.4 TABLET ORAL at 19:49

## 2021-06-17 RX ADMIN — PANCRELIPASE 72000 UNITS: 60000; 12000; 38000 CAPSULE, DELAYED RELEASE PELLETS ORAL at 17:18

## 2021-06-17 RX ADMIN — SODIUM CHLORIDE, PRESERVATIVE FREE 10 ML: 5 INJECTION INTRAVENOUS at 19:50

## 2021-06-17 RX ADMIN — ONDANSETRON 4 MG: 2 INJECTION INTRAMUSCULAR; INTRAVENOUS at 17:19

## 2021-06-17 RX ADMIN — Medication 125 MG: at 12:15

## 2021-06-17 RX ADMIN — POTASSIUM CHLORIDE AND SODIUM CHLORIDE: 900; 300 INJECTION, SOLUTION INTRAVENOUS at 21:26

## 2021-06-17 RX ADMIN — Medication 10 ML: at 19:50

## 2021-06-17 RX ADMIN — Medication 125 MG: at 17:19

## 2021-06-17 RX ADMIN — DIAZEPAM 15 MG: 5 TABLET ORAL at 12:17

## 2021-06-17 ASSESSMENT — PAIN DESCRIPTION - FREQUENCY: FREQUENCY: CONTINUOUS

## 2021-06-17 ASSESSMENT — PAIN DESCRIPTION - PAIN TYPE: TYPE: ACUTE PAIN

## 2021-06-17 ASSESSMENT — PAIN DESCRIPTION - LOCATION: LOCATION: BACK

## 2021-06-17 ASSESSMENT — PAIN DESCRIPTION - PROGRESSION: CLINICAL_PROGRESSION: NOT CHANGED

## 2021-06-17 ASSESSMENT — PAIN SCALES - GENERAL
PAINLEVEL_OUTOF10: 2
PAINLEVEL_OUTOF10: 5

## 2021-06-17 ASSESSMENT — PAIN - FUNCTIONAL ASSESSMENT: PAIN_FUNCTIONAL_ASSESSMENT: ACTIVITIES ARE NOT PREVENTED

## 2021-06-17 ASSESSMENT — PAIN DESCRIPTION - DESCRIPTORS: DESCRIPTORS: DISCOMFORT

## 2021-06-17 ASSESSMENT — PAIN DESCRIPTION - ONSET: ONSET: ON-GOING

## 2021-06-17 ASSESSMENT — PAIN DESCRIPTION - ORIENTATION: ORIENTATION: POSTERIOR

## 2021-06-17 NOTE — PROGRESS NOTES
Department of Internal Medicine  General Internal Medicine  Attending Progress Note  Chief Complaint   Patient presents with    Eating Disorder     Pt states he hasn't been eating for the past 2 weeks because he hasn't had the urge to     Dental Pain     Pt has white and black spots on upper gums     SUBJECTIVE:    Anxious. Confused.  Poor memory of recent events    OBJECTIVE      Medications    Current Facility-Administered Medications: lidocaine PF 1 % injection 5 mL, 5 mL, Intradermal, Once  sodium chloride flush 0.9 % injection 5-40 mL, 5-40 mL, Intravenous, 2 times per day  sodium chloride flush 0.9 % injection 5-40 mL, 5-40 mL, Intravenous, PRN  0.9 % sodium chloride infusion, 25 mL, Intravenous, PRN  heparin flush 100 UNIT/ML injection 300 Units, 3 mL, Intravenous, 2 times per day  heparin flush 100 UNIT/ML injection 300 Units, 3 mL, Intracatheter, PRN  PHENobarbital (LUMINAL) tablet 64.8 mg, 64.8 mg, Oral, 4x Daily  [START ON 6/18/2021] pantoprazole (PROTONIX) tablet 40 mg, 40 mg, Oral, QAM AC  lipase-protease-amylase (CREON) delayed release capsule 72,000 Units, 72,000 Units, Oral, TID WC  diazePAM (VALIUM) injection 20 mg, 20 mg, Intravenous, Q4H PRN  nicotine (NICODERM CQ) 21 MG/24HR 1 patch, 1 patch, Transdermal, Daily  0.9% NaCl with KCl 40 mEq infusion, , Intravenous, Continuous  vancomycin (VANCOCIN) oral solution 125 mg, 125 mg, Oral, 4 times per day  prochlorperazine (COMPAZINE) injection 10 mg, 10 mg, Intravenous, Q6H PRN  [DISCONTINUED] pantoprazole (PROTONIX) injection 40 mg, 40 mg, Intravenous, BID **AND** sodium chloride (PF) 0.9 % injection 10 mL, 10 mL, Intravenous, BID  ondansetron (ZOFRAN-ODT) disintegrating tablet 4 mg, 4 mg, Oral, Q8H PRN **OR** ondansetron (ZOFRAN) injection 4 mg, 4 mg, Intravenous, Q6H PRN  polyethylene glycol (GLYCOLAX) packet 17 g, 17 g, Oral, Daily PRN  acetaminophen (TYLENOL) tablet 650 mg, 650 mg, Oral, Q6H PRN **OR** acetaminophen (TYLENOL) suppository 650 mg, 650 mg, Rectal, Q6H PRN  diazePAM (VALIUM) injection 5 mg, 5 mg, Intravenous, Q4H PRN  sodium chloride flush 0.9 % injection 5-40 mL, 5-40 mL, Intravenous, 2 times per day  sodium chloride flush 0.9 % injection 5-40 mL, 5-40 mL, Intravenous, PRN  0.9 % sodium chloride infusion, 25 mL, Intravenous, PRN  diazePAM (VALIUM) injection 20 mg, 20 mg, Intravenous, Q1H PRN **OR** diazePAM (VALIUM) injection 15 mg, 15 mg, Intravenous, Q1H PRN **OR** diazePAM (VALIUM) injection 10 mg, 10 mg, Intravenous, Q1H PRN **OR** diazePAM (VALIUM) injection 5 mg, 5 mg, Intravenous, Q1H PRN **OR** diazePAM (VALIUM) tablet 20 mg, 20 mg, Oral, Q1H PRN **OR** diazePAM (VALIUM) tablet 15 mg, 15 mg, Oral, Q1H PRN **OR** diazePAM (VALIUM) tablet 10 mg, 10 mg, Oral, Q1H PRN **OR** diazePAM (VALIUM) tablet 5 mg, 5 mg, Oral, Q1H PRN  scopolamine (TRANSDERM-SCOP) transdermal patch 1 patch, 1 patch, Transdermal, Q72H  Physical    VITALS:  /79   Pulse 90   Temp 98.3 °F (36.8 °C) (Oral)   Resp 18   Ht 5' 7\" (1.702 m)   Wt 146 lb 2.6 oz (66.3 kg)   SpO2 96%   BMI 22.89 kg/m²   No acute distress moist membranes   Normocephalic, without obvious abnormality, atraumatic, external ears without lesions,   Neck supple no cervical lymphadenopathy  Heart has a regular rate and rhythm no murmur  Lungs are clear to auscultation bilaterally with equal movements. Abdomen is soft nontender nondistended no rebound or guarding. No significant peripheral edema good peripheral perfusion. No significant rashes or new skin lesions. No new focality on neuro exam which is overall grossly intact. confused  Affect and mood seem to be inappropriate, anxious, aggressive    Data    CBC:   Lab Results   Component Value Date    WBC 6.2 06/17/2021    RBC 3.43 06/17/2021    HGB 11.9 06/17/2021    HCT 35.9 06/17/2021    .7 06/17/2021    MCH 34.7 06/17/2021    MCHC 33.1 06/17/2021    RDW 15.8 06/17/2021     06/17/2021    MPV 12.6 06/17/2021     BMP: Lab Results   Component Value Date     06/17/2021    K 3.9 06/17/2021    K 3.3 02/28/2021     06/17/2021    CO2 22 06/17/2021    BUN 5 06/17/2021    LABALBU 3.5 06/16/2021    CREATININE 0.5 06/17/2021    CALCIUM 8.1 06/17/2021    GFRAA >60 06/17/2021    LABGLOM >60 06/17/2021    GLUCOSE 79 06/17/2021       ASSESSMENT AND PLAN    1. Alcohol induced acute pancreatitis without necrosis or infection: continue IV fluid and pain control  Lipase trending down. Creon. Advance diet  Lost iv access. Will get line. Meanwhile change ppi and phenobarb to po. Given his seizure risk, it would be best to have iv access    2. Pancreatitis, recurrent: this is due to etoh abuse  Will continue to monitor    3.  C. Diff Colitis: Started on P.O Vanco    4. S/p Hypokalemia s/p Hypomagnesemia: this is related to diarrhea. Replaced  5. Nicotine abuse: patch ordered  6. Hypocalcemia: replace. 7. Alcohol Abuse: Continue withdrawal protocol. Continue to monitor. Add extra dose of standing phenobarb. Also add prn valium at rn descretation    8. Thrombocytopenia: This is etoh related. 9. dispo: detox when is discharged probably to [facility]  10 dvt prophy Scd, not pharmacologic due to #8  11.  Full code

## 2021-06-17 NOTE — PLAN OF CARE
Problem: Falls - Risk of:  Goal: Will remain free from falls  Description: Will remain free from falls  6/17/2021 1138 by Essence John RN  Outcome: Met This Shift  6/17/2021 0320 by Deedee Guillen RN  Outcome: Met This Shift  Goal: Absence of physical injury  Description: Absence of physical injury  6/17/2021 1138 by Essence John RN  Outcome: Met This Shift  6/17/2021 0320 by Deedee Guillen RN  Outcome: Met This Shift

## 2021-06-17 NOTE — CARE COORDINATION
SOCIAL WORK / DISCHARGE PLANNING:  COVID testing not done. Pt calmer today, verbalizing to nurse that he remains willing to go to ETOH treatment. Sho Reyes, [facility] continues to follow. Pt with C diff, he is to see if this will be a concern for acceptance to either facility, [facility]. If to dc on po vanco, will need script to check copay and if requires prior auth. Addendum: 215pm - Per Sho Reyes, Moises Sykes, [facility] did not receive full fax yesterday, request fax again. [address] Faxed demographic, H & P and MD progress notes.              Electronically signed by PAT Brewster on 6/17/2021 at 1:36 PM

## 2021-06-17 NOTE — PROGRESS NOTES
[address] [facility]  [facility], [place], operated by [facility]         Date:2021                                                   Patient Name: Tom Yates     MRN: [id number]     : 1990     Room: [facility]/0617-01       Evaluating OT: THUY Maradiaga/SONALI; [id number]       Referring Provider and Orders/Date:  OT eval and treat Start: 06/15/21 1000, End: 06/15/21 1000, ONE TIME, Standing Count: 1 Occurrences, R    Enrrique Urban MD       Diagnosis:   1. Acute alcoholic intoxication without complication ([facility] [address]    2. Acute pancreatitis, unspecified complication status, unspecified pancreatitis type    3.  Hypokalemia         Pertinent Medical History:        Past Medical History:   Diagnosis Date    Anxiety     Arm pain     Concussion with loss of consciousness     Convulsions (HCC)     Depression     Flashing lights     Head injury     Headache     Leg pain     Numbness and tingling     arms and hands    PTSD (post-traumatic stress disorder)     Seizures (HCC)           Past Surgical History:   Procedure Laterality Date    COLONOSCOPY      ENDOSCOPY, COLON, DIAGNOSTIC         Precautions:  Fall Risk, 1:1, cdiff isolation, seizures, agitation     Recommended placement: IRF for further therapy services    Assessment of current deficits     [x] Functional mobility  [x]ADLs  [x] Strength               [x]Cognition     [x] Functional transfers   [x] IADLs         [x] Safety Awareness   [x]Endurance     [] Fine Coordination              [x] Balance      [] Vision/perception   []Sensation      [x]Gross Motor Coordination  [] ROM  [] Delirium                   [] Motor Control     OT PLAN OF CARE   OT POC based on physician orders, patient diagnosis and results of clinical assessment    Frequency/Duration  1-3 days/wk for 2 weeks PRN   Specific OT Treatment Interventions to include:   * Instruction/training on adapted ADL techniques and AE recommendations to increase functional independence within precautions       * Training on energy conservation strategies, correct breathing pattern and techniques to improve independence/tolerance for self-care routine  * Functional transfer/mobility training/DME recommendations for increased independence, safety, and fall prevention  * Patient/Family education to increase follow through with safety techniques and functional independence  * Recommendation of environmental modifications for increased safety with functional transfers/mobility and ADLs  * Therapeutic exercise to improve motor endurance, ROM, and functional strength for ADLs/functional transfers  * Therapeutic activities to facilitate/challenge dynamic balance, stand tolerance for increased safety and independence with ADLs  * Therapeutic activities to facilitate gross/fine motor skills for increased independence with ADLs     Recommended Adaptive Equipment/DME: TBD      Home Living: Pt lives at home with brother in a 3 story home but doesn't go to [facility]. 6 steps to enter with sam rail. Bedroom and bathroom on the second floor. Bathroom setup: Tub shower without grab bars no DME and standard toilet. Takes bathes. DME owned: none     Prior Level of Function: indep with ADLs , indep with IADLs; ambulated indep without AD   Driving: no   Occupation: computers   Enjoys: francy    Pain Level: none-in stomach \"somewhat\"  Cognition: A&O: 3/4 with incorrect month reported; Follows 2-3 step directions   Memory:  Intact   Sequencing:  Delays with extended time to initiate    Problem solving:  Moderate limitations with prompting and executive functioning tasks     Judgement/safety:  Moderate limitation with high fall risk, irritability, anxiety and impulsivity     Functional Assessment:  AM-PAC Daily Activity Raw Score: 19/24   Initial Eval Status  Date: 6/17/21 Treatment Status  Date: STGs = LTGs  Time frame: 10-14 days   Feeding Independent with extended time  NA-PLOF   Grooming Stand by Assist standing at sink for oral care. Prompting required for sequencing of events and for encouragement. Independent    UB Dressing Minimal Assist with confusion with line management and freddie UE limitations. Extended time and encouragement. Independent    LB Dressing Minimal Assist with pants to pull up in standing. Balance assistance required and cues for safety. [place]/donning socks with extended time sitting EOB. Independent    Bathing Minimal Assist with impulsivity and decreased thoroughness. Prompting to complete from sitting vs standing due to balance loss. Supervision    Toileting Stand by Assist with impulsivity and pt pulling down pants prior to pivot to toilet. Fall risk and mod prompting for prevention. Supervision    Bed Mobility  Supine to sit: Independent   Sit to supine: Independent    6/17 NA-PLOF   Functional Transfers Stand by Assist without and AD per pt request. Decreased safety and balance noted. Completed from toilet, bed and arm chair with impulsivity. 6/17 Supervision    Functional Mobility Stand by Assist without and AD per pt request. Decreased safety and balance noted. High fall risk for short distance functional mobility in room and to bathroom. 6/17 Supervision    Balance Sitting:     Static: good    Dynamic:fair+  Standing: fair- 6/17 Sitting:     Dynamic:good  Standing: fair   Activity Tolerance Fair tolerance sitting EOB but fair- motivation to participate with therapy services due to pain and fatigue. Standing tolerance 3min generally. 6/17 5+min standing tolerance for carry over into self care, functional transfers and functional mobility witout AD. Visual/  Perceptual Glasses: no; WFL; Reports change in vision since admission: yes     NA   Freddie UE Strengthening  3-/5  4+/5MMT for carry over into self care, functional transfers and functional mobility with AD.       Hand Dominance right   AROM (PROM) Strength Additional Info:    RUE  WFL distal and limited to 100 degree shoulder flex 3-/5 Fair- and FMC/dexterity noted during ADL tasks       LUE WFL distal and limited to 100 degree shoulder flex 3-/5 Fair- and FMC/dexterity noted during ADL tasks     Hearing: [facility]  Sensation:  No c/o numbness or tingling  Tone: WFL  Edema: none    Comments: Upon arrival patient sitting EOB and finishing lunch. Pt required min A for most UB ADLs and min A LB ADLs tasks. Limited with SBA for standing during LB ADLs and functional transfers. The biggest barriers reflect that of functional transfers, functional mobility, UB/LB ADLs, cognition, activity tolerance, balance, pain, safety and strengthening. At end of session, patient sitting EOB with 1:1 and with call light and phone within reach, all lines and tubes intact. Overall patient demonstrated min decreased independence and safety during completion of ADL/functional transfer/mobility tasks. Nursing updated on pt position and status following OT eval. Pt would benefit from continued skilled OT to increase safety and independence with completion of ADL/IADL tasks for functional independence and quality of life. Treatment: OT treatment provided this date includes:    Instruction/training on safe functional mobility/transfer techniques and energy conservation/work simplification for completion of ADLs: Pt required prompting for follow through with all safety interventions for fall risk and indep. Limited with cognition and impulsivity. See above for details. Rehab Potential: Good  for established goals     Patient / Family Goal: Return home      Patient and/or family were instructed on functional diagnosis, prognosis/goals and OT plan of care. Demonstrated good understanding.      Eval Complexity: Low  · History: Brief review of medical records and additional review of physical, cognitive, or psychosocial history related to current functional performance  · Exam: 1-3 performance deficits  · Assistance/Modification: Min assistance or modifications required to perform tasks. May have comorbidities that affect occupational performance. Time In: 1315  Time Out: 1347  Total Treatment Time: 32    Min Units   OT Eval Low 97165  x  1   OT Eval Medium 04614      OT Eval High 40725      OT Re-Eval [id number]       Therapeutic Ex 55476       Therapeutic Activities 94400  12  1   ADL/Self Care 81965       Orthotic Management 54338       Manual 04271     Neuro Re-Ed 24820       Non-Billable Time          Evaluation Time additionally includes thorough review of current medical information, gathering information on past medical history/social history and prior level of function, interpretation of standardized testing/informal observation of tasks, assessment of data and development of plan of care and goals.             Brennan Santana OTR/L; [id number]

## 2021-06-17 NOTE — PROCEDURES
SL midline power Placement 6/17/2021    Product number: [id number]   Lot Number: [id number]   Consult: no access   Ultrasound: yes   R Basilic      Upper Arm Circumference: 26CM    Size: 4.5F/15CM    Exposed Length: 0   Internal Length: 15CM   Cut: 0   Vein Measurement: 0.50  Brisk blood return noted, site intact, pt jairo alvarado, RN aware  Catherine Martins RN  6/17/2021  1:24 PM

## 2021-06-17 NOTE — PROGRESS NOTES
Physical Therapy Treatment Note/Plan of Care    Room #:  1754/7524-44  Patient Name: Renald Closs  YOB: 1990  MRN: [id number]    Date of Service: 6/17/2021      Tentative placement recommendation: Inpatient Rehab   Equipment recommendation: None at this time      Evaluating Physical Therapist: Fernando Heath, PT #[id number]      Specific Provider Orders/Date/Referring Provider :  06/15/21 1000   PT eval and treat Start: 06/15/21 1000, End: 06/15/21 1000, ONE TIME, Standing Count: 1 Occurrences, Jake Terry MD      Admitting Diagnosis:   Alcohol induced acute pancreatitis without necrosis or infection [K85.20]  Pancreatitis, recurrent [K85.90]  nausea      Visit Diagnoses       Codes    Acute pancreatitis, unspecified complication status, unspecified pancreatitis type     K85.90    Hypokalemia     E87.6        Surgery: -    Patient Active Problem List   Diagnosis    Alcohol withdrawal syndrome without complication ([address]    Alcoholism ([address]    Acute alcoholic intoxication ([address]    Severe single current episode of major depressive disorder, without psychotic features ([address]    Marijuana smoker    Major depressive disorder, severe ([address]    Severe depressed bipolar I disorder without psychotic features ([address]    Alcohol-induced acute pancreatitis    Alcohol withdrawal ([address]    Alcohol abuse with withdrawal ([address]    Alcohol withdrawal, uncomplicated ([address]    Alcohol induced acute pancreatitis without necrosis or infection    Lactic acidosis    Pancreatitis, recurrent        ASSESSMENT of Current Deficits Patient exhibits decreased strength, balance, endurance and pain right lateral trunk and lower extremity  impairing functional mobility, transfers, gait , gait distance and tolerance to activity  Patient expresses fear for losing strength in legs and educated on the importance of physical therapy and doing exercises while in hospital to avoid muscle atrophy.  Patient needed max encouragement for participation. Performs seated exercises AROM. Patient requires continued skilled physical therapy to address concerns listed above for increased safety and function at discharge. PHYSICAL THERAPY  PLAN OF CARE       Physical therapy plan of care is established based on physician order,  patient diagnosis and clinical assessment    Current Treatment Recommendations:    -Standing Balance: Perform strengthening exercises in standing to promote motor control with or without upper extremity support   -Transfers: Cues for hand placement, technique and safety  -Gait: Gait training and Standing activities to improve: base of support, weight shift, weight bearing    -Endurance: Utilize Supervised activities to increase level of endurance to allow for safe functional mobility including transfers and gait     PT long term treatment goals are located in below grid    Patient and or family understand(s) diagnosis, prognosis, and plan of care. Frequency of treatments: Patient will be seen  daily.          Prior Level of Function: Patient ambulated independently    Rehab Potential: fair  + for baseline    Past medical history:   Past Medical History:   Diagnosis Date    Anxiety     Arm pain     Concussion with loss of consciousness     Convulsions ([facility] [address]     Depression     Flashing lights     Head injury     Headache     Leg pain     Numbness and tingling     arms and hands    PTSD (post-traumatic stress disorder)     Seizures (HCC)      Past Surgical History:   Procedure Laterality Date    COLONOSCOPY      ENDOSCOPY, COLON, DIAGNOSTIC         SUBJECTIVE:    Precautions: Bedrest with bathroom Privileges , falls, alarm and contact isolation , + C-dif, 1:1 sitter  Social history: Patient lives with brother high functioning autistic  in a two story home bedroom 2nd floor ~ 10 steps with rail  With 5- 6 steps  to enter bilateral Rail  Tub shower grab bars    Equipment owned: None,       AM-PAC Basic Mobility        -[facility] Mobility Inpatient   How much difficulty turning over in bed?: None  How much difficulty sitting down on / standing up from a chair with arms?: A Little  How much difficulty moving from lying on back to sitting on side of bed?: A Little  How much help from another person moving to and from a bed to a chair?: A Little  How much help from another person needed to walk in hospital room?: A Little  How much help from another person for climbing 3-5 steps with a railing?: A Lot  AM-PAC Inpatient Mobility Raw Score : 18  AM-PAC Inpatient T-Scale Score : 43.63  Mobility Inpatient CMS 0-100% Score: 46.58  Mobility Inpatient CMS G-Code Modifier : CK    Nursing cleared patient for PT treatment. Patient sitting edge of bed with 1:1 sitter  Patient states he isn't feeling well. OBJECTIVE;   Initial Evaluation  Date: 6/15/2021 Treatment Date:   6/17/2021    Short Term/ Long Term   Goals   Was pt agreeable to Eval/treatment? Yes   yes To be met in 3 days   Pain level   7/10  Right lateral trunk, Right lower extremity  No number given for pain    Bed Mobility    Rolling: Independent    Supine to sit: Supervision     Sit to supine: Not assessed     Scooting: Supervision    Rolling: Not assessed    Supine to sit: Not assessed    Sit to supine: Not assessed    Scooting: Not assessed     Rolling: Independent    Supine to sit:  Independent    Sit to supine: Independent    Scooting: Independent     Transfers Sit to stand: Minimal assist of 1  Cues for hand placement and safety repeatedly for each transfer for safety  Sit to stand: Not assessed       Sit to stand: Independent     Ambulation    1 x 5 feet, 1 x 3 forward/backward steps using  hand held assist with Moderate assist of 1   unsteady/shaky not assessed     50 feet using  no device with Supervision     Stair negotiation: ascended and descended   Not assessed            ROM Within functional limits       Strength BUE:  4/5  RLE:  4-/5  LLE:  4-/5 Increase strength in affected mm groups by 1/3 grade   Balance Sitting EOB:  fair +  Dynamic Standing:  fair - shaky Sitting EOB: fair + patient with flexed posture  Dynamic Standing: not assessed    Sitting EOB:  good    Dynamic Standing: fair +     Patient is Alert & Oriented x person, place, time and situation and follows directions    Sensation:  Patient  reports numbness/tingling bilateral lower extremities and bilateral upper extremities  intermittently   Edema:  no    Endurance: fair       Vitals: room air    Blood Pressure at rest   Blood Pressure during session    Heart Rate at rest  Heart Rate during session    SPO2 at rest %  SPO2 during session %     Patient education  Patient educated on role of Physical Therapy, risks of immobility, safety and plan of care,  importance of mobility while in hospital , ankle pumps, quad set and glut set for edema control, blood clot prevention and safety       Patient response to education:   Pt verbalized understanding Pt demonstrated skill Pt requires further education in this area   Yes Partial Yes      Treatment:  Patient practiced and was instructed/facilitated in the following treatment: Patient seated edge of bed. Max encouragement to perform seated exercises. Therapeutic Exercises:  long arc quad and seated marching  2x20  reps. At end of session, patient sitting edge of bed with 1:1 sitter call light and phone within reach,   all lines and tubes intact, nursing-hilda notified. Patient would benefit from continued skilled Physical Therapy to improve functional independence and quality of life.           Patient's/ family goals   rehab        Time in  0  Time out  240    Total Treatment Time  19 minutes    CPT codes:  Therapeutic activities (11073)   19 minutes  1 unit(s)    Susan Carlin, PTA #[id number]

## 2021-06-18 ENCOUNTER — APPOINTMENT (OUTPATIENT)
Dept: GENERAL RADIOLOGY | Age: 31
DRG: 282 | End: 2021-06-18
Payer: COMMERCIAL

## 2021-06-18 LAB
ANION GAP SERPL CALCULATED.3IONS-SCNC: 9 MMOL/L (ref 7–16)
BUN BLDV-MCNC: 9 MG/DL (ref 6–20)
CALCIUM SERPL-MCNC: 8.8 MG/DL (ref 8.6–10.2)
CHLORIDE BLD-SCNC: 103 MMOL/L (ref 98–107)
CO2: 23 MMOL/L (ref 22–29)
CREAT SERPL-MCNC: 0.6 MG/DL (ref 0.7–1.2)
GFR AFRICAN AMERICAN: >60
GFR NON-AFRICAN AMERICAN: >60 ML/MIN/1.73
GLUCOSE BLD-MCNC: 115 MG/DL (ref 74–99)
HCT VFR BLD CALC: 37.2 % (ref 37–54)
HEMOGLOBIN: 12.6 G/DL (ref 12.5–16.5)
MAGNESIUM: 1.8 MG/DL (ref 1.6–2.6)
MCH RBC QN AUTO: 35.5 PG (ref 26–35)
MCHC RBC AUTO-ENTMCNC: 33.9 % (ref 32–34.5)
MCV RBC AUTO: 104.8 FL (ref 80–99.9)
PDW BLD-RTO: 15.8 FL (ref 11.5–15)
PHOSPHORUS: 3.4 MG/DL (ref 2.5–4.5)
PLATELET # BLD: 147 E9/L (ref 130–450)
PMV BLD AUTO: 12.1 FL (ref 7–12)
POTASSIUM SERPL-SCNC: 4.3 MMOL/L (ref 3.5–5)
RBC # BLD: 3.55 E12/L (ref 3.8–5.8)
SODIUM BLD-SCNC: 135 MMOL/L (ref 132–146)
WBC # BLD: 10.4 E9/L (ref 4.5–11.5)

## 2021-06-18 PROCEDURE — 85027 COMPLETE CBC AUTOMATED: CPT

## 2021-06-18 PROCEDURE — 6360000002 HC RX W HCPCS: Performed by: INTERNAL MEDICINE

## 2021-06-18 PROCEDURE — 6370000000 HC RX 637 (ALT 250 FOR IP): Performed by: INTERNAL MEDICINE

## 2021-06-18 PROCEDURE — 2580000003 HC RX 258: Performed by: INTERNAL MEDICINE

## 2021-06-18 PROCEDURE — 6370000000 HC RX 637 (ALT 250 FOR IP): Performed by: TRANSPLANT SURGERY

## 2021-06-18 PROCEDURE — 83735 ASSAY OF MAGNESIUM: CPT

## 2021-06-18 PROCEDURE — 2060000000 HC ICU INTERMEDIATE R&B

## 2021-06-18 PROCEDURE — 80048 BASIC METABOLIC PNL TOTAL CA: CPT

## 2021-06-18 PROCEDURE — 36415 COLL VENOUS BLD VENIPUNCTURE: CPT

## 2021-06-18 PROCEDURE — 71045 X-RAY EXAM CHEST 1 VIEW: CPT

## 2021-06-18 PROCEDURE — 84100 ASSAY OF PHOSPHORUS: CPT

## 2021-06-18 PROCEDURE — 99232 SBSQ HOSP IP/OBS MODERATE 35: CPT | Performed by: INTERNAL MEDICINE

## 2021-06-18 RX ORDER — VANCOMYCIN HYDROCHLORIDE 125 MG/1
125 CAPSULE ORAL 4 TIMES DAILY
Qty: 28 CAPSULE | Refills: 0 | Status: SHIPPED | OUTPATIENT
Start: 2021-06-18 | End: 2021-06-25

## 2021-06-18 RX ORDER — NICOTINE 21 MG/24HR
1 PATCH, TRANSDERMAL 24 HOURS TRANSDERMAL DAILY
Qty: 30 PATCH | Refills: 3 | Status: ON HOLD | OUTPATIENT
Start: 2021-06-19 | End: 2021-09-09

## 2021-06-18 RX ADMIN — DIAZEPAM 20 MG: 5 INJECTION, SOLUTION INTRAMUSCULAR; INTRAVENOUS at 03:40

## 2021-06-18 RX ADMIN — Medication 10 ML: at 21:56

## 2021-06-18 RX ADMIN — PANCRELIPASE 72000 UNITS: 60000; 12000; 38000 CAPSULE, DELAYED RELEASE PELLETS ORAL at 09:51

## 2021-06-18 RX ADMIN — Medication 125 MG: at 17:35

## 2021-06-18 RX ADMIN — Medication 10 ML: at 21:55

## 2021-06-18 RX ADMIN — PHENOBARBITAL 64.8 MG: 32.4 TABLET ORAL at 17:34

## 2021-06-18 RX ADMIN — Medication 10 ML: at 21:57

## 2021-06-18 RX ADMIN — PANCRELIPASE 72000 UNITS: 60000; 12000; 38000 CAPSULE, DELAYED RELEASE PELLETS ORAL at 12:18

## 2021-06-18 RX ADMIN — PHENOBARBITAL 64.8 MG: 32.4 TABLET ORAL at 09:47

## 2021-06-18 RX ADMIN — SODIUM CHLORIDE, PRESERVATIVE FREE 100 UNITS: 5 INJECTION INTRAVENOUS at 20:08

## 2021-06-18 RX ADMIN — PHENOBARBITAL 64.8 MG: 32.4 TABLET ORAL at 20:07

## 2021-06-18 RX ADMIN — Medication 10 ML: at 09:56

## 2021-06-18 RX ADMIN — ACETAMINOPHEN 650 MG: 325 TABLET, FILM COATED ORAL at 20:07

## 2021-06-18 RX ADMIN — Medication 10 ML: at 09:46

## 2021-06-18 RX ADMIN — PANTOPRAZOLE SODIUM 40 MG: 40 TABLET, DELAYED RELEASE ORAL at 06:42

## 2021-06-18 RX ADMIN — Medication 125 MG: at 00:39

## 2021-06-18 RX ADMIN — Medication 125 MG: at 12:20

## 2021-06-18 RX ADMIN — DIAZEPAM 20 MG: 5 TABLET ORAL at 15:39

## 2021-06-18 RX ADMIN — PANCRELIPASE 72000 UNITS: 60000; 12000; 38000 CAPSULE, DELAYED RELEASE PELLETS ORAL at 17:34

## 2021-06-18 RX ADMIN — DIAZEPAM 20 MG: 5 TABLET ORAL at 12:19

## 2021-06-18 RX ADMIN — DIAZEPAM 10 MG: 5 TABLET ORAL at 17:34

## 2021-06-18 RX ADMIN — Medication 125 MG: at 06:50

## 2021-06-18 RX ADMIN — ONDANSETRON 4 MG: 4 TABLET, ORALLY DISINTEGRATING ORAL at 09:47

## 2021-06-18 RX ADMIN — DIAZEPAM 20 MG: 5 TABLET ORAL at 20:07

## 2021-06-18 RX ADMIN — DIAZEPAM 20 MG: 5 TABLET ORAL at 09:50

## 2021-06-18 RX ADMIN — PHENOBARBITAL 64.8 MG: 32.4 TABLET ORAL at 12:28

## 2021-06-18 ASSESSMENT — PAIN DESCRIPTION - FREQUENCY: FREQUENCY: INTERMITTENT

## 2021-06-18 ASSESSMENT — PAIN SCALES - GENERAL
PAINLEVEL_OUTOF10: 5
PAINLEVEL_OUTOF10: 8
PAINLEVEL_OUTOF10: 0
PAINLEVEL_OUTOF10: 0

## 2021-06-18 ASSESSMENT — PAIN DESCRIPTION - PROGRESSION: CLINICAL_PROGRESSION: NOT CHANGED

## 2021-06-18 ASSESSMENT — PAIN DESCRIPTION - ORIENTATION
ORIENTATION: POSTERIOR
ORIENTATION: POSTERIOR

## 2021-06-18 ASSESSMENT — PAIN DESCRIPTION - DESCRIPTORS: DESCRIPTORS: DISCOMFORT

## 2021-06-18 ASSESSMENT — PAIN DESCRIPTION - LOCATION
LOCATION: BACK
LOCATION: BACK

## 2021-06-18 ASSESSMENT — PAIN DESCRIPTION - ONSET: ONSET: ON-GOING

## 2021-06-18 ASSESSMENT — PAIN DESCRIPTION - PAIN TYPE
TYPE: CHRONIC PAIN
TYPE: ACUTE PAIN

## 2021-06-18 ASSESSMENT — PAIN - FUNCTIONAL ASSESSMENT: PAIN_FUNCTIONAL_ASSESSMENT: ACTIVITIES ARE NOT PREVENTED

## 2021-06-18 NOTE — PLAN OF CARE
Problem: Falls - Risk of:  Goal: Will remain free from falls  Description: Will remain free from falls  Outcome: Met This Shift  Goal: Absence of physical injury  Description: Absence of physical injury  Outcome: Met This Shift     Problem: Fluid Volume - Deficit:  Goal: Absence of fluid volume deficit signs and symptoms  Description: Absence of fluid volume deficit signs and symptoms  Outcome: Met This Shift     Problem: Skin Integrity:  Goal: Absence of new skin breakdown  Description: Absence of new skin breakdown  Outcome: Met This Shift

## 2021-06-18 NOTE — PROGRESS NOTES
Weight: No Adjustment   · BMI Categories: Normal Weight (BMI 18.5-24. 9)       Nutrition Diagnosis:   · Inadequate oral intake related to altered GI function (recurrent pancreatitis) as evidenced by poor intake prior to admission, intake 26-50%    Nutrition Interventions:   Food and/or Nutrient Delivery:  Continue Current Diet, Modify Oral Nutrition Supplement (Will discontinue Harshil as pt does not have noted wounds and start Ensure HP BID to supplement intake.)  Nutrition Education/Counseling:  Education not indicated   Coordination of Nutrition Care:  Continue to monitor while inpatient    Goals:  Pt is to consume >50% of most meals/ONS       Nutrition Monitoring and Evaluation:   Behavioral-Environmental Outcomes:  None Identified   Food/Nutrient Intake Outcomes:  Food and Nutrient Intake, Supplement Intake  Physical Signs/Symptoms Outcomes:  Biochemical Data, GI Status, Fluid Status or Edema, Nutrition Focused Physical Findings, Skin, Weight     Discharge Planning:    Continue current diet     Electronically signed by Adrian Kim RD, SHABNAM on 6/18/21 at 3:06 PM EDT    Contact: [phone]

## 2021-06-18 NOTE — PROGRESS NOTES
Department of Internal Medicine  General Internal Medicine  Attending Progress Note  Chief Complaint   Patient presents with    Eating Disorder     Pt states he hasn't been eating for the past 2 weeks because he hasn't had the urge to     Dental Pain     Pt has white and black spots on upper gums     SUBJECTIVE:    Reports that he is having lots of diarrhea. No fever or chills. Noted episodes of cough. Asked for imodium. I did explain to him that this is not necessary due to hx of C. Diff.      OBJECTIVE      Medications    Current Facility-Administered Medications: lidocaine PF 1 % injection 5 mL, 5 mL, Intradermal, Once  sodium chloride flush 0.9 % injection 5-40 mL, 5-40 mL, Intravenous, 2 times per day  sodium chloride flush 0.9 % injection 5-40 mL, 5-40 mL, Intravenous, PRN  0.9 % sodium chloride infusion, 25 mL, Intravenous, PRN  heparin flush 100 UNIT/ML injection 300 Units, 3 mL, Intravenous, 2 times per day  heparin flush 100 UNIT/ML injection 300 Units, 3 mL, Intracatheter, PRN  PHENobarbital (LUMINAL) tablet 64.8 mg, 64.8 mg, Oral, 4x Daily  pantoprazole (PROTONIX) tablet 40 mg, 40 mg, Oral, QAM AC  lidocaine 1 % injection 5 mL, 5 mL, Intradermal, Once  sodium chloride flush 0.9 % injection 5-40 mL, 5-40 mL, Intravenous, 2 times per day  sodium chloride flush 0.9 % injection 5-40 mL, 5-40 mL, Intravenous, PRN  0.9 % sodium chloride infusion, 25 mL, Intravenous, PRN  heparin flush 100 UNIT/ML injection 100 Units, 1 mL, Intravenous, 2 times per day  heparin flush 100 UNIT/ML injection 100 Units, 1 mL, Intracatheter, PRN  lipase-protease-amylase (CREON) delayed release capsule 72,000 Units, 72,000 Units, Oral, TID WC  nicotine (NICODERM CQ) 21 MG/24HR 1 patch, 1 patch, Transdermal, Daily  vancomycin (VANCOCIN) oral solution 125 mg, 125 mg, Oral, 4 times per day  prochlorperazine (COMPAZINE) injection 10 mg, 10 mg, Intravenous, Q6H PRN  [DISCONTINUED] pantoprazole (PROTONIX) injection 40 mg, 40 mg,

## 2021-06-18 NOTE — CARE COORDINATION
Peer Recovery Support Note    Name: Samantha Espinal  Date: 6/18/2021    Chief Complaint   Patient presents with    Eating Disorder     Pt states he hasn't been eating for the past 2 weeks because he hasn't had the urge to     Dental Pain     Pt has white and black spots on upper gums       Peer Support met with patient. [] Support and education provided  [] Resources provided   [] Treatment referral:   [] Other:   [] Patient declined peer recovery services         Notes: Faxed to [facility] for potential inpatient admission as early as 6/19/21. Will update.      Signed: Johnson Britt, [id number] [address], 6/18/2021

## 2021-06-18 NOTE — CARE COORDINATION
SOCIAL WORK / DISCHARGE PLANNING:  COVID testing not done. Sw discussed with Moises Majano this am. No bed at [facility] until possibly 7/1. [facility] and [facility] accepted, will have bed available Sat if medically stable. If pt to discharge on po vanco, will need script to check coverage and if requires prior auth. Addendum: 1138am Moises Bullard met with pt at bedside who is now telling him that he will not to to rehab at discharge at all. Bed cannot be held for pt who does not want to make choice for rehab.              Electronically signed by PAT Maciel on 6/18/2021 at 9:31 AM

## 2021-06-19 LAB
ANION GAP SERPL CALCULATED.3IONS-SCNC: 9 MMOL/L (ref 7–16)
BUN BLDV-MCNC: 7 MG/DL (ref 6–20)
CALCIUM SERPL-MCNC: 8.6 MG/DL (ref 8.6–10.2)
CHLORIDE BLD-SCNC: 102 MMOL/L (ref 98–107)
CO2: 25 MMOL/L (ref 22–29)
CREAT SERPL-MCNC: 0.5 MG/DL (ref 0.7–1.2)
GFR AFRICAN AMERICAN: >60
GFR NON-AFRICAN AMERICAN: >60 ML/MIN/1.73
GLUCOSE BLD-MCNC: 100 MG/DL (ref 74–99)
HCT VFR BLD CALC: 33.5 % (ref 37–54)
HEMOGLOBIN: 11.4 G/DL (ref 12.5–16.5)
LIPASE: 461 U/L (ref 13–60)
MAGNESIUM: 1.8 MG/DL (ref 1.6–2.6)
MCH RBC QN AUTO: 35.3 PG (ref 26–35)
MCHC RBC AUTO-ENTMCNC: 34 % (ref 32–34.5)
MCV RBC AUTO: 103.7 FL (ref 80–99.9)
PDW BLD-RTO: 15.5 FL (ref 11.5–15)
PHOSPHORUS: 4.2 MG/DL (ref 2.5–4.5)
PLATELET # BLD: 161 E9/L (ref 130–450)
PMV BLD AUTO: 12.4 FL (ref 7–12)
POTASSIUM SERPL-SCNC: 3.9 MMOL/L (ref 3.5–5)
RBC # BLD: 3.23 E12/L (ref 3.8–5.8)
SODIUM BLD-SCNC: 136 MMOL/L (ref 132–146)
WBC # BLD: 7.6 E9/L (ref 4.5–11.5)

## 2021-06-19 PROCEDURE — 6370000000 HC RX 637 (ALT 250 FOR IP): Performed by: SURGERY

## 2021-06-19 PROCEDURE — 2580000003 HC RX 258: Performed by: INTERNAL MEDICINE

## 2021-06-19 PROCEDURE — 6370000000 HC RX 637 (ALT 250 FOR IP): Performed by: INTERNAL MEDICINE

## 2021-06-19 PROCEDURE — 6370000000 HC RX 637 (ALT 250 FOR IP): Performed by: TRANSPLANT SURGERY

## 2021-06-19 PROCEDURE — 83735 ASSAY OF MAGNESIUM: CPT

## 2021-06-19 PROCEDURE — 6360000002 HC RX W HCPCS: Performed by: INTERNAL MEDICINE

## 2021-06-19 PROCEDURE — 85027 COMPLETE CBC AUTOMATED: CPT

## 2021-06-19 PROCEDURE — 80048 BASIC METABOLIC PNL TOTAL CA: CPT

## 2021-06-19 PROCEDURE — 99232 SBSQ HOSP IP/OBS MODERATE 35: CPT | Performed by: INTERNAL MEDICINE

## 2021-06-19 PROCEDURE — 2060000000 HC ICU INTERMEDIATE R&B

## 2021-06-19 PROCEDURE — 2580000003 HC RX 258: Performed by: TRANSPLANT SURGERY

## 2021-06-19 PROCEDURE — 84100 ASSAY OF PHOSPHORUS: CPT

## 2021-06-19 PROCEDURE — 36415 COLL VENOUS BLD VENIPUNCTURE: CPT

## 2021-06-19 PROCEDURE — 83690 ASSAY OF LIPASE: CPT

## 2021-06-19 RX ORDER — DIAZEPAM 5 MG/ML
5 INJECTION, SOLUTION INTRAMUSCULAR; INTRAVENOUS ONCE
Status: COMPLETED | OUTPATIENT
Start: 2021-06-19 | End: 2021-06-19

## 2021-06-19 RX ORDER — DIPHENHYDRAMINE HYDROCHLORIDE 50 MG/ML
50 INJECTION INTRAMUSCULAR; INTRAVENOUS ONCE
Status: COMPLETED | OUTPATIENT
Start: 2021-06-19 | End: 2021-06-19

## 2021-06-19 RX ADMIN — DIAZEPAM 10 MG: 5 TABLET ORAL at 09:52

## 2021-06-19 RX ADMIN — Medication 125 MG: at 12:35

## 2021-06-19 RX ADMIN — Medication 10 ML: at 09:51

## 2021-06-19 RX ADMIN — DIAZEPAM 10 MG: 5 TABLET ORAL at 12:31

## 2021-06-19 RX ADMIN — PANCRELIPASE 72000 UNITS: 60000; 12000; 38000 CAPSULE, DELAYED RELEASE PELLETS ORAL at 17:18

## 2021-06-19 RX ADMIN — PHENOBARBITAL 64.8 MG: 32.4 TABLET ORAL at 12:30

## 2021-06-19 RX ADMIN — DIAZEPAM 5 MG: 5 INJECTION, SOLUTION INTRAMUSCULAR; INTRAVENOUS at 22:34

## 2021-06-19 RX ADMIN — Medication 125 MG: at 00:20

## 2021-06-19 RX ADMIN — PHENOBARBITAL 64.8 MG: 32.4 TABLET ORAL at 09:48

## 2021-06-19 RX ADMIN — PHENOBARBITAL 64.8 MG: 32.4 TABLET ORAL at 21:14

## 2021-06-19 RX ADMIN — PANCRELIPASE 72000 UNITS: 60000; 12000; 38000 CAPSULE, DELAYED RELEASE PELLETS ORAL at 09:48

## 2021-06-19 RX ADMIN — Medication 125 MG: at 07:18

## 2021-06-19 RX ADMIN — SODIUM CHLORIDE, PRESERVATIVE FREE 100 UNITS: 5 INJECTION INTRAVENOUS at 22:56

## 2021-06-19 RX ADMIN — SODIUM CHLORIDE, PRESERVATIVE FREE 10 ML: 5 INJECTION INTRAVENOUS at 22:39

## 2021-06-19 RX ADMIN — DIAZEPAM 10 MG: 5 TABLET ORAL at 21:14

## 2021-06-19 RX ADMIN — DIAZEPAM 15 MG: 5 TABLET ORAL at 03:11

## 2021-06-19 RX ADMIN — PANTOPRAZOLE SODIUM 40 MG: 40 TABLET, DELAYED RELEASE ORAL at 07:17

## 2021-06-19 RX ADMIN — DIPHENHYDRAMINE HYDROCHLORIDE 50 MG: 50 INJECTION INTRAMUSCULAR; INTRAVENOUS at 22:34

## 2021-06-19 RX ADMIN — PANCRELIPASE 72000 UNITS: 60000; 12000; 38000 CAPSULE, DELAYED RELEASE PELLETS ORAL at 12:31

## 2021-06-19 RX ADMIN — Medication 10 ML: at 22:39

## 2021-06-19 RX ADMIN — PHENOBARBITAL 64.8 MG: 32.4 TABLET ORAL at 17:18

## 2021-06-19 RX ADMIN — Medication 125 MG: at 17:19

## 2021-06-19 ASSESSMENT — PAIN DESCRIPTION - LOCATION
LOCATION: HEAD
LOCATION: BACK

## 2021-06-19 ASSESSMENT — PAIN SCALES - GENERAL
PAINLEVEL_OUTOF10: 3
PAINLEVEL_OUTOF10: 4

## 2021-06-19 ASSESSMENT — PAIN DESCRIPTION - PAIN TYPE
TYPE: ACUTE PAIN
TYPE: CHRONIC PAIN

## 2021-06-19 ASSESSMENT — PAIN DESCRIPTION - PROGRESSION: CLINICAL_PROGRESSION: NOT CHANGED

## 2021-06-19 ASSESSMENT — PAIN DESCRIPTION - ORIENTATION: ORIENTATION: POSTERIOR

## 2021-06-19 NOTE — PLAN OF CARE
Problem: Falls - Risk of:  Goal: Will remain free from falls  Description: Will remain free from falls  6/19/2021 0021 by Alexey Victoria RN  Outcome: Met This Shift     Problem: Falls - Risk of:  Goal: Absence of physical injury  Description: Absence of physical injury  6/19/2021 0021 by Alexey Victoria RN  Outcome: Met This Shift     Problem: Discharge Planning:  Goal: Discharged to appropriate level of care  Description: Discharged to appropriate level of care  6/19/2021 0021 by Alexey Victoria RN  Outcome: Ongoing     Problem: Fluid Volume - Deficit:  Goal: Absence of fluid volume deficit signs and symptoms  Description: Absence of fluid volume deficit signs and symptoms  6/19/2021 0021 by Alexey Victoria RN  Outcome: Met This Shift     Problem: Nutrition Deficit:  Goal: Ability to achieve adequate nutritional intake will improve  Description: Ability to achieve adequate nutritional intake will improve  6/19/2021 0021 by Alexey Victoria RN  Outcome: Met This Shift     Problem: Sleep Pattern Disturbance:  Goal: Appears well-rested  Description: Appears well-rested  6/19/2021 0021 by Alexey Victoria RN  Outcome: Met This Shift     Problem: Violence - Risk of, Self/Other-Directed:  Goal: Knowledge of developmental care interventions  Description: Absence of violence  6/19/2021 0021 by Alexey Victoria RN  Outcome: Met This Shift

## 2021-06-19 NOTE — PROGRESS NOTES
Department of Internal Medicine  General Internal Medicine  Attending Progress Note  Chief Complaint   Patient presents with    Eating Disorder     Pt states he hasn't been eating for the past 2 weeks because he hasn't had the urge to     Dental Pain     Pt has white and black spots on upper gums     SUBJECTIVE:    Reports that his abdominal pain is better controlled. No fever or chills. No chest pain. Aware of plans to continue IV fluid and insulin.     OBJECTIVE      Medications    Current Facility-Administered Medications: lidocaine PF 1 % injection 5 mL, 5 mL, Intradermal, Once  sodium chloride flush 0.9 % injection 5-40 mL, 5-40 mL, Intravenous, 2 times per day  sodium chloride flush 0.9 % injection 5-40 mL, 5-40 mL, Intravenous, PRN  0.9 % sodium chloride infusion, 25 mL, Intravenous, PRN  heparin flush 100 UNIT/ML injection 300 Units, 3 mL, Intravenous, 2 times per day  heparin flush 100 UNIT/ML injection 300 Units, 3 mL, Intracatheter, PRN  PHENobarbital (LUMINAL) tablet 64.8 mg, 64.8 mg, Oral, 4x Daily  pantoprazole (PROTONIX) tablet 40 mg, 40 mg, Oral, QAM AC  lidocaine 1 % injection 5 mL, 5 mL, Intradermal, Once  sodium chloride flush 0.9 % injection 5-40 mL, 5-40 mL, Intravenous, 2 times per day  sodium chloride flush 0.9 % injection 5-40 mL, 5-40 mL, Intravenous, PRN  0.9 % sodium chloride infusion, 25 mL, Intravenous, PRN  heparin flush 100 UNIT/ML injection 100 Units, 1 mL, Intravenous, 2 times per day  heparin flush 100 UNIT/ML injection 100 Units, 1 mL, Intracatheter, PRN  lipase-protease-amylase (CREON) delayed release capsule 72,000 Units, 72,000 Units, Oral, TID WC  nicotine (NICODERM CQ) 21 MG/24HR 1 patch, 1 patch, Transdermal, Daily  vancomycin (VANCOCIN) oral solution 125 mg, 125 mg, Oral, 4 times per day  prochlorperazine (COMPAZINE) injection 10 mg, 10 mg, Intravenous, Q6H PRN  [DISCONTINUED] pantoprazole (PROTONIX) injection 40 mg, 40 mg, Intravenous, BID **AND** sodium chloride (PF) 0.9 % injection 10 mL, 10 mL, Intravenous, BID  ondansetron (ZOFRAN-ODT) disintegrating tablet 4 mg, 4 mg, Oral, Q8H PRN **OR** ondansetron (ZOFRAN) injection 4 mg, 4 mg, Intravenous, Q6H PRN  polyethylene glycol (GLYCOLAX) packet 17 g, 17 g, Oral, Daily PRN  acetaminophen (TYLENOL) tablet 650 mg, 650 mg, Oral, Q6H PRN **OR** acetaminophen (TYLENOL) suppository 650 mg, 650 mg, Rectal, Q6H PRN  diazePAM (VALIUM) injection 5 mg, 5 mg, Intravenous, Q4H PRN  sodium chloride flush 0.9 % injection 5-40 mL, 5-40 mL, Intravenous, 2 times per day  sodium chloride flush 0.9 % injection 5-40 mL, 5-40 mL, Intravenous, PRN  0.9 % sodium chloride infusion, 25 mL, Intravenous, PRN  diazePAM (VALIUM) injection 20 mg, 20 mg, Intravenous, Q1H PRN **OR** diazePAM (VALIUM) injection 15 mg, 15 mg, Intravenous, Q1H PRN **OR** diazePAM (VALIUM) injection 10 mg, 10 mg, Intravenous, Q1H PRN **OR** diazePAM (VALIUM) injection 5 mg, 5 mg, Intravenous, Q1H PRN **OR** diazePAM (VALIUM) tablet 20 mg, 20 mg, Oral, Q1H PRN **OR** diazePAM (VALIUM) tablet 15 mg, 15 mg, Oral, Q1H PRN **OR** diazePAM (VALIUM) tablet 10 mg, 10 mg, Oral, Q1H PRN **OR** diazePAM (VALIUM) tablet 5 mg, 5 mg, Oral, Q1H PRN  scopolamine (TRANSDERM-SCOP) transdermal patch 1 patch, 1 patch, Transdermal, Q72H  Physical    VITALS:  /78   Pulse 86   Temp 99.5 °F (37.5 °C) (Oral)   Resp 18   Ht 5' 7\" (1.702 m)   Wt 146 lb 2.6 oz (66.3 kg)   SpO2 98%   BMI 22.89 kg/m²   CONSTITUTIONAL:  awake, alert, cooperative, no apparent distress, and appears stated age  EYES:  Lids and lashes normal, pupils equal, round and reactive to light, extra ocular muscles intact, sclera clear, conjunctiva normal  ENT:  Normocephalic, without obvious abnormality, atraumatic, sinuses nontender on palpation, external ears without lesions, oral pharynx with moist mucus membranes, tonsils without erythema or exudates, gums normal and good dentition.   NECK:  Supple, symmetrical, trachea midline, no adenopathy, thyroid symmetric, not enlarged and no tenderness, skin normal  BACK:  Symmetric, no curvature, spinous processes are non-tender on palpation, paraspinous muscles are non-tender on palpation, no costal vertebral tenderness  LUNGS:  No increased work of breathing, good air exchange, clear to auscultation bilaterally, no crackles or wheezing  CARDIOVASCULAR:  Normal apical impulse, regular rate and rhythm, normal S1 and S2, no S3 or S4, and no murmur noted  ABDOMEN:  Mild generalized tenderness  MUSCULOSKELETAL:  there is no redness, warmth, or swelling of the joints  NEUROLOGIC:  No focal neuro deficit  SKIN:  no bruising or bleeding  Data    CBC:   Lab Results   Component Value Date    WBC 7.6 06/19/2021    RBC 3.23 06/19/2021    HGB 11.4 06/19/2021    HCT 33.5 06/19/2021    .7 06/19/2021    MCH 35.3 06/19/2021    MCHC 34.0 06/19/2021    RDW 15.5 06/19/2021     06/19/2021    MPV 12.4 06/19/2021     CMP:    Lab Results   Component Value Date     06/19/2021    K 3.9 06/19/2021    K 3.3 02/28/2021     06/19/2021    CO2 25 06/19/2021    BUN 7 06/19/2021    CREATININE 0.5 06/19/2021    GFRAA >60 06/19/2021    LABGLOM >60 06/19/2021    GLUCOSE 100 06/19/2021    PROT 6.5 06/16/2021    LABALBU 3.5 06/16/2021    CALCIUM 8.6 06/19/2021    BILITOT 0.9 06/16/2021    ALKPHOS 166 06/16/2021    AST 60 06/16/2021    ALT 30 06/16/2021       ASSESSMENT AND PLAN         1. Alcohol induced acute pancreatitis without necrosis or infection  Continue to monitor  Tolerates diet well    2. S. Diff Colitis: improved stool frequency:  Continue P. O Vanco  Monitor stool consistency, will not give any imodium    3. Alcohol Abuse: not in any withdrawal currently  Encourage oral intake  Continue to monitor    4. Suicidal Ideation: continue sitter and await psych recommendations.

## 2021-06-19 NOTE — PLAN OF CARE
Problem: Falls - Risk of:  Goal: Will remain free from falls  Description: Will remain free from falls  Outcome: Met This Shift  Goal: Absence of physical injury  Description: Absence of physical injury  Outcome: Met This Shift     Problem: Fluid Volume - Deficit:  Goal: Absence of fluid volume deficit signs and symptoms  Description: Absence of fluid volume deficit signs and symptoms  Outcome: Met This Shift     Problem: Nutrition Deficit:  Goal: Ability to achieve adequate nutritional intake will improve  Description: Ability to achieve adequate nutritional intake will improve  Outcome: Met This Shift     Problem: Sleep Pattern Disturbance:  Goal: Appears well-rested  Description: Appears well-rested  Outcome: Met This Shift     Problem: Skin Integrity:  Goal: Will show no infection signs and symptoms  Description: Will show no infection signs and symptoms  Outcome: Met This Shift  Goal: Absence of new skin breakdown  Description: Absence of new skin breakdown  Outcome: Met This Shift

## 2021-06-20 LAB
ANION GAP SERPL CALCULATED.3IONS-SCNC: 8 MMOL/L (ref 7–16)
BUN BLDV-MCNC: 4 MG/DL (ref 6–20)
CALCIUM SERPL-MCNC: 8.8 MG/DL (ref 8.6–10.2)
CHLORIDE BLD-SCNC: 103 MMOL/L (ref 98–107)
CO2: 27 MMOL/L (ref 22–29)
CREAT SERPL-MCNC: 0.5 MG/DL (ref 0.7–1.2)
GFR AFRICAN AMERICAN: >60
GFR NON-AFRICAN AMERICAN: >60 ML/MIN/1.73
GLUCOSE BLD-MCNC: 87 MG/DL (ref 74–99)
HCT VFR BLD CALC: 34.3 % (ref 37–54)
HEMOGLOBIN: 11.7 G/DL (ref 12.5–16.5)
MAGNESIUM: 2.1 MG/DL (ref 1.6–2.6)
MCH RBC QN AUTO: 35.8 PG (ref 26–35)
MCHC RBC AUTO-ENTMCNC: 34.1 % (ref 32–34.5)
MCV RBC AUTO: 104.9 FL (ref 80–99.9)
PDW BLD-RTO: 15.4 FL (ref 11.5–15)
PHOSPHORUS: 3.9 MG/DL (ref 2.5–4.5)
PLATELET # BLD: 228 E9/L (ref 130–450)
PMV BLD AUTO: 12.2 FL (ref 7–12)
POTASSIUM SERPL-SCNC: 4.2 MMOL/L (ref 3.5–5)
RBC # BLD: 3.27 E12/L (ref 3.8–5.8)
SODIUM BLD-SCNC: 138 MMOL/L (ref 132–146)
WBC # BLD: 9.9 E9/L (ref 4.5–11.5)

## 2021-06-20 PROCEDURE — 2060000000 HC ICU INTERMEDIATE R&B

## 2021-06-20 PROCEDURE — 6360000002 HC RX W HCPCS: Performed by: INTERNAL MEDICINE

## 2021-06-20 PROCEDURE — 6370000000 HC RX 637 (ALT 250 FOR IP): Performed by: INTERNAL MEDICINE

## 2021-06-20 PROCEDURE — 99232 SBSQ HOSP IP/OBS MODERATE 35: CPT | Performed by: INTERNAL MEDICINE

## 2021-06-20 PROCEDURE — 2580000003 HC RX 258: Performed by: INTERNAL MEDICINE

## 2021-06-20 PROCEDURE — 83735 ASSAY OF MAGNESIUM: CPT

## 2021-06-20 PROCEDURE — 6370000000 HC RX 637 (ALT 250 FOR IP): Performed by: TRANSPLANT SURGERY

## 2021-06-20 PROCEDURE — 36415 COLL VENOUS BLD VENIPUNCTURE: CPT

## 2021-06-20 PROCEDURE — 80048 BASIC METABOLIC PNL TOTAL CA: CPT

## 2021-06-20 PROCEDURE — 85027 COMPLETE CBC AUTOMATED: CPT

## 2021-06-20 PROCEDURE — 84100 ASSAY OF PHOSPHORUS: CPT

## 2021-06-20 RX ORDER — PHENOBARBITAL 32.4 MG/1
64.8 TABLET ORAL 3 TIMES DAILY
Status: DISCONTINUED | OUTPATIENT
Start: 2021-06-20 | End: 2021-06-21 | Stop reason: DRUGHIGH

## 2021-06-20 RX ADMIN — PANTOPRAZOLE SODIUM 40 MG: 40 TABLET, DELAYED RELEASE ORAL at 05:19

## 2021-06-20 RX ADMIN — Medication 125 MG: at 11:28

## 2021-06-20 RX ADMIN — PANCRELIPASE 72000 UNITS: 60000; 12000; 38000 CAPSULE, DELAYED RELEASE PELLETS ORAL at 17:30

## 2021-06-20 RX ADMIN — PANCRELIPASE 72000 UNITS: 60000; 12000; 38000 CAPSULE, DELAYED RELEASE PELLETS ORAL at 11:28

## 2021-06-20 RX ADMIN — PANCRELIPASE 72000 UNITS: 60000; 12000; 38000 CAPSULE, DELAYED RELEASE PELLETS ORAL at 09:32

## 2021-06-20 RX ADMIN — Medication 125 MG: at 17:30

## 2021-06-20 RX ADMIN — Medication 125 MG: at 05:21

## 2021-06-20 RX ADMIN — Medication 10 ML: at 09:32

## 2021-06-20 RX ADMIN — ACETAMINOPHEN 650 MG: 325 TABLET, FILM COATED ORAL at 11:28

## 2021-06-20 RX ADMIN — PHENOBARBITAL 64.8 MG: 32.4 TABLET ORAL at 12:24

## 2021-06-20 RX ADMIN — SODIUM CHLORIDE, PRESERVATIVE FREE 100 UNITS: 5 INJECTION INTRAVENOUS at 20:45

## 2021-06-20 RX ADMIN — DIAZEPAM 5 MG: 5 TABLET ORAL at 20:45

## 2021-06-20 RX ADMIN — DIAZEPAM 5 MG: 5 TABLET ORAL at 05:19

## 2021-06-20 RX ADMIN — Medication 10 ML: at 20:45

## 2021-06-20 RX ADMIN — DIAZEPAM 15 MG: 5 TABLET ORAL at 11:28

## 2021-06-20 RX ADMIN — PHENOBARBITAL 64.8 MG: 32.4 TABLET ORAL at 09:32

## 2021-06-20 RX ADMIN — ACETAMINOPHEN 650 MG: 325 TABLET, FILM COATED ORAL at 05:19

## 2021-06-20 RX ADMIN — DIAZEPAM 10 MG: 5 TABLET ORAL at 17:30

## 2021-06-20 RX ADMIN — PHENOBARBITAL 64.8 MG: 32.4 TABLET ORAL at 20:44

## 2021-06-20 ASSESSMENT — PAIN SCALES - GENERAL
PAINLEVEL_OUTOF10: 6
PAINLEVEL_OUTOF10: 10
PAINLEVEL_OUTOF10: 10
PAINLEVEL_OUTOF10: 5

## 2021-06-20 ASSESSMENT — PAIN DESCRIPTION - PAIN TYPE
TYPE: ACUTE PAIN
TYPE: ACUTE PAIN

## 2021-06-20 ASSESSMENT — PAIN DESCRIPTION - LOCATION
LOCATION: BACK;LEG
LOCATION: GENERALIZED

## 2021-06-20 ASSESSMENT — PAIN DESCRIPTION - ONSET: ONSET: ON-GOING

## 2021-06-20 ASSESSMENT — PAIN DESCRIPTION - PROGRESSION: CLINICAL_PROGRESSION: NOT CHANGED

## 2021-06-20 ASSESSMENT — PAIN DESCRIPTION - DESCRIPTORS: DESCRIPTORS: DISCOMFORT;ACHING

## 2021-06-20 ASSESSMENT — PAIN DESCRIPTION - FREQUENCY: FREQUENCY: CONTINUOUS

## 2021-06-20 ASSESSMENT — PAIN DESCRIPTION - ORIENTATION: ORIENTATION: MID;POSTERIOR

## 2021-06-20 NOTE — PROGRESS NOTES
Department of Internal Medicine  General Internal Medicine  Attending Progress Note  Chief Complaint   Patient presents with    Eating Disorder     Pt states he hasn't been eating for the past 2 weeks because he hasn't had the urge to     Dental Pain     Pt has white and black spots on upper gums     SUBJECTIVE:    Reports that he is doing well. Noted that he is able to ambulate. No fever or chills. No chest pain. Noted that diarrhea has improved. No BM yet today.      OBJECTIVE      Medications    Current Facility-Administered Medications: lidocaine PF 1 % injection 5 mL, 5 mL, Intradermal, Once  sodium chloride flush 0.9 % injection 5-40 mL, 5-40 mL, Intravenous, 2 times per day  sodium chloride flush 0.9 % injection 5-40 mL, 5-40 mL, Intravenous, PRN  0.9 % sodium chloride infusion, 25 mL, Intravenous, PRN  heparin flush 100 UNIT/ML injection 300 Units, 3 mL, Intravenous, 2 times per day  heparin flush 100 UNIT/ML injection 300 Units, 3 mL, Intracatheter, PRN  PHENobarbital (LUMINAL) tablet 64.8 mg, 64.8 mg, Oral, 4x Daily  pantoprazole (PROTONIX) tablet 40 mg, 40 mg, Oral, QAM AC  lidocaine 1 % injection 5 mL, 5 mL, Intradermal, Once  sodium chloride flush 0.9 % injection 5-40 mL, 5-40 mL, Intravenous, 2 times per day  sodium chloride flush 0.9 % injection 5-40 mL, 5-40 mL, Intravenous, PRN  0.9 % sodium chloride infusion, 25 mL, Intravenous, PRN  heparin flush 100 UNIT/ML injection 100 Units, 1 mL, Intravenous, 2 times per day  heparin flush 100 UNIT/ML injection 100 Units, 1 mL, Intracatheter, PRN  lipase-protease-amylase (CREON) delayed release capsule 72,000 Units, 72,000 Units, Oral, TID WC  nicotine (NICODERM CQ) 21 MG/24HR 1 patch, 1 patch, Transdermal, Daily  vancomycin (VANCOCIN) oral solution 125 mg, 125 mg, Oral, 4 times per day  prochlorperazine (COMPAZINE) injection 10 mg, 10 mg, Intravenous, Q6H PRN  [DISCONTINUED] pantoprazole (PROTONIX) injection 40 mg, 40 mg, Intravenous, BID **AND** sodium chloride (PF) 0.9 % injection 10 mL, 10 mL, Intravenous, BID  ondansetron (ZOFRAN-ODT) disintegrating tablet 4 mg, 4 mg, Oral, Q8H PRN **OR** ondansetron (ZOFRAN) injection 4 mg, 4 mg, Intravenous, Q6H PRN  polyethylene glycol (GLYCOLAX) packet 17 g, 17 g, Oral, Daily PRN  acetaminophen (TYLENOL) tablet 650 mg, 650 mg, Oral, Q6H PRN **OR** acetaminophen (TYLENOL) suppository 650 mg, 650 mg, Rectal, Q6H PRN  diazePAM (VALIUM) injection 5 mg, 5 mg, Intravenous, Q4H PRN  sodium chloride flush 0.9 % injection 5-40 mL, 5-40 mL, Intravenous, 2 times per day  sodium chloride flush 0.9 % injection 5-40 mL, 5-40 mL, Intravenous, PRN  0.9 % sodium chloride infusion, 25 mL, Intravenous, PRN  diazePAM (VALIUM) injection 20 mg, 20 mg, Intravenous, Q1H PRN **OR** diazePAM (VALIUM) injection 15 mg, 15 mg, Intravenous, Q1H PRN **OR** diazePAM (VALIUM) injection 10 mg, 10 mg, Intravenous, Q1H PRN **OR** diazePAM (VALIUM) injection 5 mg, 5 mg, Intravenous, Q1H PRN **OR** diazePAM (VALIUM) tablet 20 mg, 20 mg, Oral, Q1H PRN **OR** diazePAM (VALIUM) tablet 15 mg, 15 mg, Oral, Q1H PRN **OR** diazePAM (VALIUM) tablet 10 mg, 10 mg, Oral, Q1H PRN **OR** diazePAM (VALIUM) tablet 5 mg, 5 mg, Oral, Q1H PRN  scopolamine (TRANSDERM-SCOP) transdermal patch 1 patch, 1 patch, Transdermal, Q72H  Physical    VITALS:  /74   Pulse 104   Temp 97.7 °F (36.5 °C) (Oral)   Resp 16   Ht 5' 7\" (1.702 m)   Wt 146 lb 2.6 oz (66.3 kg)   SpO2 98%   BMI 22.89 kg/m²   CONSTITUTIONAL:  awake, alert, cooperative, no apparent distress, and appears stated age  EYES:  Lids and lashes normal, pupils equal, round and reactive to light, extra ocular muscles intact, sclera clear, conjunctiva normal  ENT:  normocepalic, without obvious abnormality  NECK:  Supple, symmetrical, trachea midline, no adenopathy, thyroid symmetric, not enlarged and no tenderness, skin normal  HEMATOLOGIC/LYMPHATICS:  no cervical lymphadenopathy  LUNGS:  No increased work of breathing, good air exchange, clear to auscultation bilaterally, no crackles or wheezing  CARDIOVASCULAR:  Normal apical impulse, regular rate and rhythm, normal S1 and S2, no S3 or S4, and no murmur noted  ABDOMEN:  No scars, normal bowel sounds, soft, non-distended, non-tender, no masses palpated, no hepatosplenomegally  MUSCULOSKELETAL:  There is no redness, warmth, or swelling of the joints. Full range of motion noted. Motor strength is 5 out of 5 all extremities bilaterally. Tone is normal.  NEUROLOGIC:  No focal neuro defiicit  SKIN:  no bruising or bleeding  Data    CBC:   Lab Results   Component Value Date    WBC 9.9 06/20/2021    RBC 3.27 06/20/2021    HGB 11.7 06/20/2021    HCT 34.3 06/20/2021    .9 06/20/2021    MCH 35.8 06/20/2021    MCHC 34.1 06/20/2021    RDW 15.4 06/20/2021     06/20/2021    MPV 12.2 06/20/2021     CMP:    Lab Results   Component Value Date     06/20/2021    K 4.2 06/20/2021    K 3.3 02/28/2021     06/20/2021    CO2 27 06/20/2021    BUN 4 06/20/2021    CREATININE 0.5 06/20/2021    GFRAA >60 06/20/2021    LABGLOM >60 06/20/2021    GLUCOSE 87 06/20/2021    PROT 6.5 06/16/2021    LABALBU 3.5 06/16/2021    CALCIUM 8.8 06/20/2021    BILITOT 0.9 06/16/2021    ALKPHOS 166 06/16/2021    AST 60 06/16/2021    ALT 30 06/16/2021       ASSESSMENT AND PLAN        1. Alcohol induced acute pancreatitis without necrosis or infection:  Not in active withdrawal  Continue to monitor  Will start to wean off phenobarb    2. Pancreatitis, recurrent    3. C. Diff colitis: improved diarrhea. Continue P. O Vanc    4. Suicidal Ideation: appreciate Psych input.

## 2021-06-20 NOTE — PROGRESS NOTES
Patient stated [facility] [address] [place] [address]. Phone number [phone].

## 2021-06-20 NOTE — CONSULTS
Consult received for SI. Chart reviewed and discussed with nursing. Per report patient showed a picture of his rifles at home to an aide two nights ago which is what prompted consult; however, there is nothing documented anywhere that patient was threatening harm to self or others. RN reports patient is too drowsy to be assessed today and he is still on scheduled phenobarbital and prn Valium for alcohol withdrawal. Psychiatric consultation is not appropriate at this time. Please re-consult if needed when patient is more medically stable.     Electronically signed by Adela Nicholas MD on 6/20/2021 at 10:20 AM

## 2021-06-20 NOTE — PLAN OF CARE
Problem: Falls - Risk of:  Goal: Will remain free from falls  Description: Will remain free from falls  6/20/2021 0023 by Anjelica Wang RN  Outcome: Met This Shift     Problem: Falls - Risk of:  Goal: Absence of physical injury  Description: Absence of physical injury  6/20/2021 0023 by Anjelica Wang RN  Outcome: Met This Shift     Problem: Discharge Planning:  Goal: Discharged to appropriate level of care  Description: Discharged to appropriate level of care  6/20/2021 0023 by Anjelica Wang RN  Outcome: Not Met This Shift     Problem: Fluid Volume - Deficit:  Goal: Absence of fluid volume deficit signs and symptoms  Description: Absence of fluid volume deficit signs and symptoms  6/20/2021 0023 by Anjelica Wang RN  Outcome: Ongoing     Problem: Nutrition Deficit:  Goal: Ability to achieve adequate nutritional intake will improve  Description: Ability to achieve adequate nutritional intake will improve  6/20/2021 0023 by Anjelica Wang RN  Outcome: Ongoing     Problem: Sleep Pattern Disturbance:  Goal: Appears well-rested  Description: Appears well-rested  6/20/2021 0023 by Anjelica Wang RN  Outcome: Ongoing     Problem: Violence - Risk of, Self/Other-Directed:  Goal: Knowledge of developmental care interventions  Description: Absence of violence  6/20/2021 0023 by Anjelica Wang RN  Outcome: Ongoing     Problem: Pain:  Goal: Pain level will decrease  Description: Pain level will decrease  6/20/2021 0023 by Anjelica Wang RN  Outcome: Ongoing     Problem: Pain:  Goal: Control of acute pain  Description: Control of acute pain  6/20/2021 0023 by Anjelica Wang RN  Outcome: Ongoing     Problem: Pain:  Goal: Control of chronic pain  Description: Control of chronic pain  6/20/2021 0023 by Anjelica Wang RN  Outcome: Ongoing     Problem: Skin Integrity:  Goal: Will show no infection signs and symptoms  Description: Will show no infection signs and symptoms  6/20/2021 0023 by Anjelica Wang RN  Outcome: Met This Shift Problem: Skin Integrity:  Goal: Absence of new skin breakdown  Description: Absence of new skin breakdown  6/20/2021 0023 by Selena Arvizu RN  Outcome: Met This Shift

## 2021-06-20 NOTE — PROGRESS NOTES
Patient stating he wants \"to leave right now, not stay another night. \" explained medical need to be monitored and notified Dr Garret Hearn. Notified supervisor and charge nurse.  See N.O. patient agreed to stay to speak to doctor in AM. Patient agreed to medication administration

## 2021-06-21 LAB
MAGNESIUM: 1.9 MG/DL (ref 1.6–2.6)
PHOSPHORUS: 4.3 MG/DL (ref 2.5–4.5)

## 2021-06-21 PROCEDURE — 2580000003 HC RX 258: Performed by: INTERNAL MEDICINE

## 2021-06-21 PROCEDURE — 99232 SBSQ HOSP IP/OBS MODERATE 35: CPT | Performed by: INTERNAL MEDICINE

## 2021-06-21 PROCEDURE — 6370000000 HC RX 637 (ALT 250 FOR IP): Performed by: TRANSPLANT SURGERY

## 2021-06-21 PROCEDURE — 36415 COLL VENOUS BLD VENIPUNCTURE: CPT

## 2021-06-21 PROCEDURE — 6370000000 HC RX 637 (ALT 250 FOR IP): Performed by: INTERNAL MEDICINE

## 2021-06-21 PROCEDURE — 6360000002 HC RX W HCPCS: Performed by: INTERNAL MEDICINE

## 2021-06-21 PROCEDURE — 83735 ASSAY OF MAGNESIUM: CPT

## 2021-06-21 PROCEDURE — 84100 ASSAY OF PHOSPHORUS: CPT

## 2021-06-21 PROCEDURE — 2060000000 HC ICU INTERMEDIATE R&B

## 2021-06-21 PROCEDURE — 2580000003 HC RX 258: Performed by: TRANSPLANT SURGERY

## 2021-06-21 RX ORDER — PHENOBARBITAL 32.4 MG/1
48.6 TABLET ORAL 3 TIMES DAILY
Status: DISCONTINUED | OUTPATIENT
Start: 2021-06-21 | End: 2021-06-22 | Stop reason: HOSPADM

## 2021-06-21 RX ADMIN — DIAZEPAM 20 MG: 5 INJECTION, SOLUTION INTRAMUSCULAR; INTRAVENOUS at 15:45

## 2021-06-21 RX ADMIN — PHENOBARBITAL 64.8 MG: 32.4 TABLET ORAL at 14:03

## 2021-06-21 RX ADMIN — Medication 125 MG: at 01:09

## 2021-06-21 RX ADMIN — PHENOBARBITAL 64.8 MG: 32.4 TABLET ORAL at 09:40

## 2021-06-21 RX ADMIN — Medication 125 MG: at 06:25

## 2021-06-21 RX ADMIN — Medication 125 MG: at 17:16

## 2021-06-21 RX ADMIN — DIAZEPAM 5 MG: 5 INJECTION, SOLUTION INTRAMUSCULAR; INTRAVENOUS at 17:44

## 2021-06-21 RX ADMIN — PHENOBARBITAL 48.6 MG: 32.4 TABLET ORAL at 20:33

## 2021-06-21 RX ADMIN — SODIUM CHLORIDE, PRESERVATIVE FREE 10 ML: 5 INJECTION INTRAVENOUS at 09:39

## 2021-06-21 RX ADMIN — PANCRELIPASE 72000 UNITS: 60000; 12000; 38000 CAPSULE, DELAYED RELEASE PELLETS ORAL at 12:27

## 2021-06-21 RX ADMIN — ONDANSETRON 4 MG: 4 TABLET, ORALLY DISINTEGRATING ORAL at 14:03

## 2021-06-21 RX ADMIN — ACETAMINOPHEN 650 MG: 325 TABLET, FILM COATED ORAL at 20:33

## 2021-06-21 RX ADMIN — PROCHLORPERAZINE EDISYLATE 10 MG: 5 INJECTION INTRAMUSCULAR; INTRAVENOUS at 09:55

## 2021-06-21 RX ADMIN — Medication 125 MG: at 12:28

## 2021-06-21 RX ADMIN — DIAZEPAM 5 MG: 5 INJECTION, SOLUTION INTRAMUSCULAR; INTRAVENOUS at 21:58

## 2021-06-21 RX ADMIN — SODIUM CHLORIDE, PRESERVATIVE FREE 100 UNITS: 5 INJECTION INTRAVENOUS at 20:32

## 2021-06-21 RX ADMIN — PANCRELIPASE 72000 UNITS: 60000; 12000; 38000 CAPSULE, DELAYED RELEASE PELLETS ORAL at 17:16

## 2021-06-21 RX ADMIN — Medication 10 ML: at 20:35

## 2021-06-21 RX ADMIN — HEPARIN 300 UNITS: 100 SYRINGE at 09:44

## 2021-06-21 RX ADMIN — DIAZEPAM 15 MG: 5 TABLET ORAL at 01:08

## 2021-06-21 RX ADMIN — PANTOPRAZOLE SODIUM 40 MG: 40 TABLET, DELAYED RELEASE ORAL at 06:25

## 2021-06-21 RX ADMIN — DIAZEPAM 20 MG: 5 INJECTION, SOLUTION INTRAMUSCULAR; INTRAVENOUS at 10:01

## 2021-06-21 ASSESSMENT — PAIN SCALES - GENERAL
PAINLEVEL_OUTOF10: 9
PAINLEVEL_OUTOF10: 0
PAINLEVEL_OUTOF10: 0

## 2021-06-21 NOTE — PLAN OF CARE
Problem: Falls - Risk of:  Goal: Will remain free from falls  Description: Will remain free from falls  6/21/2021 1058 by Jennifer Hurtado RN  Outcome: Ongoing  6/21/2021 0156 by Sandoval Donald RN  Outcome: Met This Shift  Goal: Absence of physical injury  Description: Absence of physical injury  6/21/2021 1058 by Jennifer Hurtado RN  Outcome: Ongoing  6/21/2021 0156 by Sandoval Donald RN  Outcome: Met This Shift     Problem: Discharge Planning:  Goal: Discharged to appropriate level of care  Description: Discharged to appropriate level of care  6/21/2021 1058 by Jennifer Hurtado RN  Outcome: Ongoing  6/21/2021 0156 by Sandoval Donald RN  Outcome: Ongoing     Problem: Fluid Volume - Deficit:  Goal: Absence of fluid volume deficit signs and symptoms  Description: Absence of fluid volume deficit signs and symptoms  6/21/2021 1058 by Jennifer Hurtado RN  Outcome: Ongoing  6/21/2021 0156 by Sandoval Donald RN  Outcome: Met This Shift     Problem: Nutrition Deficit:  Goal: Ability to achieve adequate nutritional intake will improve  Description: Ability to achieve adequate nutritional intake will improve  6/21/2021 1058 by Jennifer Hurtado RN  Outcome: Ongoing  6/21/2021 0156 by Sandoval Donald RN  Outcome: Met This Shift     Problem: Sleep Pattern Disturbance:  Goal: Appears well-rested  Description: Appears well-rested  6/21/2021 1058 by Jennifer Hurtado RN  Outcome: Ongoing  6/21/2021 0156 by Sandoval Donald RN  Outcome: Ongoing     Problem: Violence - Risk of, Self/Other-Directed:  Goal: Knowledge of developmental care interventions  Description: Absence of violence  6/21/2021 1058 by Jennifer Hurtado RN  Outcome: Ongoing  6/21/2021 0156 by Sandoval Donald RN  Outcome: Ongoing

## 2021-06-21 NOTE — PROGRESS NOTES
Department of Internal Medicine  General Internal Medicine  Attending Progress Note  Chief Complaint   Patient presents with    Eating Disorder     Pt states he hasn't been eating for the past 2 weeks because he hasn't had the urge to     Dental Pain     Pt has white and black spots on upper gums     SUBJECTIVE:    Patient is ambulating. Less diarrhea than a few days ago. Has a general sense of malaise and myalgias. Does not seem to be able to put his thoughts together for asking questions    OBJECTIVE      Medications    Current Facility-Administered Medications:  PHENobarbital (LUMINAL) tablet 64.8 mg, 64.8 mg, Oral, TID  lidocaine PF 1 % injection 5 mL, 5 mL, Intradermal, Once  sodium chloride flush 0.9 % injection 5-40 mL, 5-40 mL, Intravenous, 2 times per day  sodium chloride flush 0.9 % injection 5-40 mL, 5-40 mL, Intravenous, PRN  0.9 % sodium chloride infusion, 25 mL, Intravenous, PRN  heparin flush 100 UNIT/ML injection 300 Units, 3 mL, Intravenous, 2 times per day  heparin flush 100 UNIT/ML injection 300 Units, 3 mL, Intracatheter, PRN  pantoprazole (PROTONIX) tablet 40 mg, 40 mg, Oral, QAM AC  lidocaine 1 % injection 5 mL, 5 mL, Intradermal, Once  sodium chloride flush 0.9 % injection 5-40 mL, 5-40 mL, Intravenous, 2 times per day  sodium chloride flush 0.9 % injection 5-40 mL, 5-40 mL, Intravenous, PRN  0.9 % sodium chloride infusion, 25 mL, Intravenous, PRN  heparin flush 100 UNIT/ML injection 100 Units, 1 mL, Intravenous, 2 times per day  heparin flush 100 UNIT/ML injection 100 Units, 1 mL, Intracatheter, PRN  lipase-protease-amylase (CREON) delayed release capsule 72,000 Units, 72,000 Units, Oral, TID WC  nicotine (NICODERM CQ) 21 MG/24HR 1 patch, 1 patch, Transdermal, Daily  vancomycin (VANCOCIN) oral solution 125 mg, 125 mg, Oral, 4 times per day  prochlorperazine (COMPAZINE) injection 10 mg, 10 mg, Intravenous, Q6H PRN  [DISCONTINUED] pantoprazole (PROTONIX) injection 40 mg, 40 mg, Intravenous, neuro exam which is overall grossly intact. He has less of a tremor hard to detect  Affect and mood seem to be continued anxiety difficulty putting his thoughts together  Data    CBC:   Lab Results   Component Value Date    WBC 9.9 06/20/2021    RBC 3.27 06/20/2021    HGB 11.7 06/20/2021    HCT 34.3 06/20/2021    .9 06/20/2021    MCH 35.8 06/20/2021    MCHC 34.1 06/20/2021    RDW 15.4 06/20/2021     06/20/2021    MPV 12.2 06/20/2021     CMP:    Lab Results   Component Value Date     06/20/2021    K 4.2 06/20/2021    K 3.3 02/28/2021     06/20/2021    CO2 27 06/20/2021    BUN 4 06/20/2021    CREATININE 0.5 06/20/2021    GFRAA >60 06/20/2021    LABGLOM >60 06/20/2021    GLUCOSE 87 06/20/2021    PROT 6.5 06/16/2021    LABALBU 3.5 06/16/2021    CALCIUM 8.8 06/20/2021    BILITOT 0.9 06/16/2021    ALKPHOS 166 06/16/2021    AST 60 06/16/2021    ALT 30 06/16/2021       ASSESSMENT AND PLAN        1. Alcohol induced acute pancreatitis without necrosis or infection:  Phenobarb has been weaned on 6/20. He is in minimal if any withdrawal but also still affected by meds used to treat withdrawal.  So I spoke to the pharmacist and will attempt to wean further. 64.8 tid down to 48.6 tid    2. Pancreatitis, recurrent    3. C. Diff colitis: improved diarrhea. Continue P. O Vanc    4. Suicidal Ideation: appreciate Psych input.   He was so today sedated when he was evaluated by psych on 6/20 will reconsult for tomorrow

## 2021-06-21 NOTE — CARE COORDINATION
SOCIAL WORK / DISCHARGE PLANNING:  Pt not really able to participate in conversation, can't put thoughts together. Psych reconsulted. Pt had told Moises Champion last Friday he was not going to rehab. He will occasionally tell staff he wants to go to [facility] but it is reported he has been \"banned\" from there.                  Electronically signed by PAT Jernigan on 6/21/2021 at 5:28 PM

## 2021-06-21 NOTE — PROGRESS NOTES
Physical Therapy        0617/0617-01    Patient unavailable for physical therapy treatment due to patient unwilling to work with therapy despite the role of therapy and importance of movement being explained as well as max encouragement. Patient is more concerned with obtaining another doctor so he can receive better care. Patient in care of nursing when PTA left room.     Miladis Titus, SARINA  #[phone]
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
## 2021-06-21 NOTE — PLAN OF CARE
Problem: Falls - Risk of:  Goal: Will remain free from falls  Description: Will remain free from falls  6/21/2021 0156 by Rakesh Romo RN  Outcome: Met This Shift     Problem: Falls - Risk of:  Goal: Absence of physical injury  Description: Absence of physical injury  6/21/2021 0156 by Rakesh Romo RN  Outcome: Met This Shift     Problem: Discharge Planning:  Goal: Discharged to appropriate level of care  Description: Discharged to appropriate level of care  6/21/2021 0156 by Rakesh Romo RN  Outcome: Ongoing     Problem: Fluid Volume - Deficit:  Goal: Absence of fluid volume deficit signs and symptoms  Description: Absence of fluid volume deficit signs and symptoms  6/21/2021 0156 by Rakesh Romo RN  Outcome: Met This Shift     Problem: Nutrition Deficit:  Goal: Ability to achieve adequate nutritional intake will improve  Description: Ability to achieve adequate nutritional intake will improve  6/21/2021 0156 by Rakesh Romo RN  Outcome: Met This Shift     Problem: Sleep Pattern Disturbance:  Goal: Appears well-rested  Description: Appears well-rested  6/21/2021 0156 by Rakesh Romo RN  Outcome: Ongoing     Problem: Violence - Risk of, Self/Other-Directed:  Goal: Knowledge of developmental care interventions  Description: Absence of violence  6/21/2021 0156 by Rakesh Romo RN  Outcome: Ongoing     Problem: Pain:  Goal: Pain level will decrease  Description: Pain level will decrease  6/21/2021 0156 by Rakesh Romo RN  Outcome: Met This Shift     Problem: Pain:  Goal: Control of acute pain  Description: Control of acute pain  6/21/2021 0156 by Rakesh Romo RN  Outcome: Ongoing     Problem: Pain:  Goal: Control of chronic pain  Description: Control of chronic pain  6/21/2021 0156 by Rakesh Romo RN  Outcome: Ongoing     Problem: Skin Integrity:  Goal: Will show no infection signs and symptoms  Description: Will show no infection signs and symptoms  6/21/2021 0156 by Rakesh Romo RN  Outcome: Met This

## 2021-06-22 VITALS
HEIGHT: 67 IN | BODY MASS INDEX: 22.94 KG/M2 | SYSTOLIC BLOOD PRESSURE: 102 MMHG | WEIGHT: 146.16 LBS | OXYGEN SATURATION: 97 % | TEMPERATURE: 98.1 F | RESPIRATION RATE: 14 BRPM | DIASTOLIC BLOOD PRESSURE: 60 MMHG | HEART RATE: 88 BPM

## 2021-06-22 LAB
MAGNESIUM: 2 MG/DL (ref 1.6–2.6)
PHOSPHORUS: 3.7 MG/DL (ref 2.5–4.5)

## 2021-06-22 PROCEDURE — 6370000000 HC RX 637 (ALT 250 FOR IP): Performed by: INTERNAL MEDICINE

## 2021-06-22 PROCEDURE — 99239 HOSP IP/OBS DSCHRG MGMT >30: CPT | Performed by: INTERNAL MEDICINE

## 2021-06-22 PROCEDURE — 6370000000 HC RX 637 (ALT 250 FOR IP): Performed by: TRANSPLANT SURGERY

## 2021-06-22 PROCEDURE — 83735 ASSAY OF MAGNESIUM: CPT

## 2021-06-22 PROCEDURE — 84100 ASSAY OF PHOSPHORUS: CPT

## 2021-06-22 PROCEDURE — 99221 1ST HOSP IP/OBS SF/LOW 40: CPT | Performed by: PSYCHIATRY & NEUROLOGY

## 2021-06-22 PROCEDURE — 6360000002 HC RX W HCPCS: Performed by: INTERNAL MEDICINE

## 2021-06-22 PROCEDURE — 36415 COLL VENOUS BLD VENIPUNCTURE: CPT

## 2021-06-22 RX ADMIN — PHENOBARBITAL 48.6 MG: 32.4 TABLET ORAL at 09:23

## 2021-06-22 RX ADMIN — PANCRELIPASE 72000 UNITS: 60000; 12000; 38000 CAPSULE, DELAYED RELEASE PELLETS ORAL at 09:23

## 2021-06-22 RX ADMIN — Medication 125 MG: at 00:13

## 2021-06-22 RX ADMIN — Medication 125 MG: at 05:34

## 2021-06-22 RX ADMIN — SODIUM CHLORIDE, PRESERVATIVE FREE 100 UNITS: 5 INJECTION INTRAVENOUS at 09:24

## 2021-06-22 RX ADMIN — PANTOPRAZOLE SODIUM 40 MG: 40 TABLET, DELAYED RELEASE ORAL at 05:46

## 2021-06-22 ASSESSMENT — PAIN SCALES - GENERAL
PAINLEVEL_OUTOF10: 0
PAINLEVEL_OUTOF10: 0
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
## 2021-06-22 NOTE — PROGRESS NOTES
Pt educated about risks of leaving AMA by both the doctor & nurse. Pt still addiment on leaving. Midline & heart monitor removed. Pt signed AMA paper. Left with friend who came to pick him up.

## 2021-06-22 NOTE — PROGRESS NOTES
OT BEDSIDE TREATMENT NOTE      Date:2021  Patient Name: Aldo Baugh  MRN: [id number]  : 1990  Room: [address] [place] [address]-                           OCCUPATIONAL THERAPY TREATMENT NOTE    [address] [facility] [place] [address]        Evaluating OT: Isael Rojas, OTR/L; [id number]            Attempted occupational therapy this date, however pt unavailable due to pt leaving hospital AMA, per SW note pt counsled on importance of not leaving AMA. Continue POC when appropriate.           Clarisse Kong, [address] [place]/L [id number]

## 2021-06-22 NOTE — PLAN OF CARE
Problem: Falls - Risk of:  Goal: Will remain free from falls  Description: Will remain free from falls  Outcome: Met This Shift     Problem: Falls - Risk of:  Goal: Absence of physical injury  Description: Absence of physical injury  Outcome: Met This Shift     Problem: Discharge Planning:  Goal: Discharged to appropriate level of care  Description: Discharged to appropriate level of care  Outcome: Met This Shift     Problem: Fluid Volume - Deficit:  Goal: Absence of fluid volume deficit signs and symptoms  Description: Absence of fluid volume deficit signs and symptoms  Outcome: Met This Shift     Problem: Nutrition Deficit:  Goal: Ability to achieve adequate nutritional intake will improve  Description: Ability to achieve adequate nutritional intake will improve  Outcome: Met This Shift     Problem: Pain:  Goal: Pain level will decrease  Description: Pain level will decrease  Outcome: Met This Shift     Problem: Pain:  Goal: Control of acute pain  Description: Control of acute pain  Outcome: Met This Shift     Problem: Skin Integrity:  Goal: Will show no infection signs and symptoms  Description: Will show no infection signs and symptoms  Outcome: Met This Shift     Problem: Skin Integrity:  Goal: Absence of new skin breakdown  Description: Absence of new skin breakdown  Outcome: Met This Shift

## 2021-06-22 NOTE — DISCHARGE SUMMARY
Then he started threatening to leave [address] At my first available moment after and in between handling some other medical emergencies I came to the bedside with the nurse and explained to him how it would be worthwhile for him to stay a little bit longer until I can complete his paperwork including his prescription for vancomycin prior to his departure he seemed understand but then delayed my attending to getting the discharge done by asking repetitive questions. I tried to redirect him and advised him to focus his thoughts similar to my advice that I gave him yesterday. I advised him after focusing his thoughts and questions that he talk to the nurse and let her answer any questions that he may have. I then attempted to excuse myself to attend to emergent matters including his discharge. Before this could be completed he signed out [address]     After he left I have written this note and go to see what has been done with his medication list it seems like he might already have a prescription for vancomycin since epic has marked it as new at discharge. Discharge Exam:  Vitals:    06/21/21 1738 06/21/21 1945 06/22/21 0000 06/22/21 0809   BP: 102/63 94/62 (!) 90/59 102/60   Pulse: 92 86 61 88   Resp:  20 20 14   Temp:  99.1 °F (37.3 °C) 97.4 °F (36.3 °C) 98.1 °F (36.7 °C)   TempSrc:  Oral Axillary Oral   SpO2:  98% 99% 97%   Weight:       Height:           No acute distress moist membranes   Normocephalic, without obvious abnormality, atraumatic, external ears without lesions,   Neck supple no cervical lymphadenopathy  Heart has a regular rate and rhythm no murmur  Lungs are clear to auscultation bilaterally with equal movements. Abdomen is soft nontender nondistended no rebound or guarding. No  significant peripheral edema good peripheral perfusion. No significant rashes or new skin lesions. No new focality on neuro exam which is overall grossly intact.   Affect and mood are anxious    I/O last 3 completed shifts: In: 760 [P.O.:760]  Out: -   No intake/output data recorded. LABS:  Recent Labs     06/20/21  0755      K 4.2      CO2 27   BUN 4*   CREATININE 0.5*   GLUCOSE 87   CALCIUM 8.8       Recent Labs     06/20/21  0755   WBC 9.9   RBC 3.27*   HGB 11.7*   HCT 34.3*   .9*   MCH 35.8*   MCHC 34.1   RDW 15.4*      MPV 12.2*       No results for input(s): POCGLU in the last 72 hours. Imaging:  CT ABDOMEN PELVIS W IV CONTRAST Additional Contrast? None    Result Date: 6/13/2021  EXAMINATION: CT OF THE ABDOMEN AND PELVIS WITH CONTRAST 6/13/2021 6:04 am TECHNIQUE: CT of the abdomen and pelvis was performed with the administration of intravenous contrast. Multiplanar reformatted images are provided for review. Dose modulation, iterative reconstruction, and/or weight based adjustment of the mA/kV was utilized to reduce the radiation dose to as low as reasonably achievable. COMPARISON: 05/16/2021 HISTORY: ORDERING SYSTEM PROVIDED HISTORY: Abdominal pain, pancreatitis TECHNOLOGIST PROVIDED HISTORY: Reason for exam:->Abdominal pain, pancreatitis Additional Contrast?->None Decision Support Exception - unselect if not a suspected or confirmed emergency medical condition->Emergency Medical Condition (MA) FINDINGS: Extensive diffuse hepatic fatty infiltration. Gallbladder is unremarkable. Spleen is normal in size. Edematous appearance of the pancreatic body and tail with adjacent soft tissue thickening and fat stranding consistent with pancreatitis. Of note, the enhancement of the pancreatic tail is heterogeneous and somewhat diminished compared to previous. This is likely related to the edema and pancreatitis. Developing pancreatic necrosis thought less likely but not entirely excluded. There is no soft tissue gas.  A 3.3 x 1.7 cm fluid collection anterior to the body of the pancreas may represent a developing pseudocyst.  A smaller focus just inferior to this measures 1.9 cm. No adrenal mass. No hydronephrosis, cyst or solid renal mass identified. Assessment of bowel is limited without oral contrast.  No bowel obstruction. Appendix is normal.  Mild thickening of the ascending colon, hepatic flexure and proximal transverse colon. No free air. Urinary bladder is unremarkable. Prostate is normal in size. Calcifications in the pelvis are most consistent with phleboliths. Lung bases are clear. Interval worsening of pancreatitis. Continued follow-up recommended. Probable pseudocysts near the body of the pancreas are new since previous. Mild thickening of the right colon may be due to underdistention. Mild infectious or inflammatory colitis not excluded. Diffuse hepatic fatty infiltration. XR CHEST 1 VIEW    Result Date: 6/13/2021  EXAMINATION: ONE XRAY VIEW OF THE CHEST 6/13/2021 6:07 am COMPARISON: 05/17/2021 HISTORY: ORDERING SYSTEM PROVIDED HISTORY: Right sided pain TECHNOLOGIST PROVIDED HISTORY: Reason for exam: Right sided pain FINDINGS: Cardiac silhouette is near the upper limits of normal.  No pneumothorax. Faint opacities at the right base. These are thought to be artifactual as there was no opacification at the right base on CT of the abdomen performed today. No other consolidation or effusion. No acute cardiopulmonary findings.          Patient Instructions:   Discharge Medication List as of 6/22/2021 12:11 PM      START taking these medications    Details   vancomycin (VANCOCIN) 125 MG capsule Take 1 capsule by mouth 4 times daily for 7 days, Disp-28 capsule, R-0Normal      nicotine (NICODERM CQ) 21 MG/24HR Place 1 patch onto the skin daily, Disp-30 patch, R-3Normal         CONTINUE these medications which have NOT CHANGED    Details   pantoprazole (PROTONIX) 40 MG tablet Take 1 tablet by mouth every morning (before breakfast), Disp-30 tablet, [id number]      folic acid (FOLVITE) 1 MG tablet Take 1 tablet by mouth daily, Disp-30 tablet, R-0Normal      vitamin B-1 (THIAMINE) 100 MG tablet Take 1 tablet by mouth daily, Disp-30 tablet, R-0Normal      vitamin D (ERGOCALCIFEROL) 1.25 MG (38366 UT) CAPS capsule Take 1 capsule by mouth once a week, Disp-5 capsule, R-0Normal               Note that more than 30 minutes was spent in preparing discharge papers, discussing discharge with patient, medication review, etc.    NOTE: This report was transcribed using voice recognition software. Every effort was made to ensure accuracy; however, inadvertent computerized transcription errors may be present.      Signed:  Electronically signed by Fabian Velarde MD on 6/22/2021 at 5:13 PM

## 2021-07-19 ENCOUNTER — HOSPITAL ENCOUNTER (INPATIENT)
Age: 31
LOS: 3 days | Discharge: HOME | DRG: 282 | End: 2021-07-23
Attending: EMERGENCY MEDICINE | Admitting: INTERNAL MEDICINE
Payer: COMMERCIAL

## 2021-07-19 DIAGNOSIS — E87.6 HYPOKALEMIA: ICD-10-CM

## 2021-07-19 DIAGNOSIS — K86.0 ALCOHOL-INDUCED CHRONIC PANCREATITIS (HCC): ICD-10-CM

## 2021-07-19 DIAGNOSIS — R10.13 EPIGASTRIC PAIN: Primary | ICD-10-CM

## 2021-07-19 DIAGNOSIS — R11.2 INTRACTABLE NAUSEA AND VOMITING: ICD-10-CM

## 2021-07-19 DIAGNOSIS — R74.01 TRANSAMINITIS: ICD-10-CM

## 2021-07-19 LAB
ALBUMIN SERPL-MCNC: 3.2 G/DL (ref 3.5–5.2)
ALP BLD-CCNC: 159 U/L (ref 40–129)
ALT SERPL-CCNC: 72 U/L (ref 0–40)
ANION GAP SERPL CALCULATED.3IONS-SCNC: 18 MMOL/L (ref 7–16)
AST SERPL-CCNC: 123 U/L (ref 0–39)
BILIRUB SERPL-MCNC: 0.7 MG/DL (ref 0–1.2)
BUN BLDV-MCNC: 5 MG/DL (ref 6–20)
CALCIUM SERPL-MCNC: 9.1 MG/DL (ref 8.6–10.2)
CHLORIDE BLD-SCNC: 90 MMOL/L (ref 98–107)
CO2: 28 MMOL/L (ref 22–29)
CREAT SERPL-MCNC: 0.5 MG/DL (ref 0.7–1.2)
GFR AFRICAN AMERICAN: >60
GFR NON-AFRICAN AMERICAN: >60 ML/MIN/1.73
GLUCOSE BLD-MCNC: 109 MG/DL (ref 74–99)
LIPASE: 231 U/L (ref 13–60)
POTASSIUM SERPL-SCNC: 3.2 MMOL/L (ref 3.5–5)
SODIUM BLD-SCNC: 136 MMOL/L (ref 132–146)
TOTAL PROTEIN: 6.7 G/DL (ref 6.4–8.3)

## 2021-07-19 PROCEDURE — 80307 DRUG TEST PRSMV CHEM ANLYZR: CPT

## 2021-07-19 PROCEDURE — 83690 ASSAY OF LIPASE: CPT

## 2021-07-19 PROCEDURE — 80053 COMPREHEN METABOLIC PANEL: CPT

## 2021-07-19 PROCEDURE — 6360000002 HC RX W HCPCS: Performed by: EMERGENCY MEDICINE

## 2021-07-19 PROCEDURE — 96375 TX/PRO/DX INJ NEW DRUG ADDON: CPT

## 2021-07-19 PROCEDURE — 80143 DRUG ASSAY ACETAMINOPHEN: CPT

## 2021-07-19 PROCEDURE — 82077 ASSAY SPEC XCP UR&BREATH IA: CPT

## 2021-07-19 PROCEDURE — 99285 EMERGENCY DEPT VISIT HI MDM: CPT

## 2021-07-19 PROCEDURE — 96372 THER/PROPH/DIAG INJ SC/IM: CPT

## 2021-07-19 PROCEDURE — 85025 COMPLETE CBC W/AUTO DIFF WBC: CPT

## 2021-07-19 PROCEDURE — 80179 DRUG ASSAY SALICYLATE: CPT

## 2021-07-19 PROCEDURE — 36415 COLL VENOUS BLD VENIPUNCTURE: CPT

## 2021-07-19 PROCEDURE — 6370000000 HC RX 637 (ALT 250 FOR IP): Performed by: EMERGENCY MEDICINE

## 2021-07-19 PROCEDURE — 2580000003 HC RX 258: Performed by: EMERGENCY MEDICINE

## 2021-07-19 RX ORDER — 0.9 % SODIUM CHLORIDE 0.9 %
1000 INTRAVENOUS SOLUTION INTRAVENOUS ONCE
Status: COMPLETED | OUTPATIENT
Start: 2021-07-19 | End: 2021-07-19

## 2021-07-19 RX ORDER — ONDANSETRON 2 MG/ML
4 INJECTION INTRAMUSCULAR; INTRAVENOUS ONCE
Status: COMPLETED | OUTPATIENT
Start: 2021-07-19 | End: 2021-07-19

## 2021-07-19 RX ORDER — PROMETHAZINE HYDROCHLORIDE 25 MG/ML
25 INJECTION, SOLUTION INTRAMUSCULAR; INTRAVENOUS ONCE
Status: COMPLETED | OUTPATIENT
Start: 2021-07-19 | End: 2021-07-19

## 2021-07-19 RX ORDER — DIAZEPAM 5 MG/1
5 TABLET ORAL ONCE
Status: COMPLETED | OUTPATIENT
Start: 2021-07-19 | End: 2021-07-19

## 2021-07-19 RX ADMIN — SODIUM CHLORIDE 1000 ML: 9 INJECTION, SOLUTION INTRAVENOUS at 22:58

## 2021-07-19 RX ADMIN — ONDANSETRON 4 MG: 2 INJECTION INTRAMUSCULAR; INTRAVENOUS at 22:58

## 2021-07-19 RX ADMIN — DIAZEPAM 5 MG: 5 TABLET ORAL at 23:36

## 2021-07-19 RX ADMIN — PROMETHAZINE HYDROCHLORIDE 25 MG: 25 INJECTION INTRAMUSCULAR; INTRAVENOUS at 23:36

## 2021-07-19 ASSESSMENT — ENCOUNTER SYMPTOMS
BACK PAIN: 0
COUGH: 0
EYE PAIN: 0
ABDOMINAL PAIN: 1
EYE DISCHARGE: 0
EYE REDNESS: 0
SINUS PRESSURE: 0
NAUSEA: 1
SORE THROAT: 0
DIARRHEA: 0
SHORTNESS OF BREATH: 0
VOMITING: 1
WHEEZING: 0

## 2021-07-19 NOTE — Clinical Note
Patient Class: Inpatient [101]   REQUIRED: Diagnosis: Pancreatitis, recurrent [433542]   Estimated Length of Stay: Estimated stay of more than 2 midnights   Future Attending Provider: Harlen Goldberg [[id number]]

## 2021-07-20 ENCOUNTER — APPOINTMENT (OUTPATIENT)
Dept: ULTRASOUND IMAGING | Age: 31
DRG: 282 | End: 2021-07-20
Payer: COMMERCIAL

## 2021-07-20 ENCOUNTER — APPOINTMENT (OUTPATIENT)
Dept: CT IMAGING | Age: 31
DRG: 282 | End: 2021-07-20
Payer: COMMERCIAL

## 2021-07-20 PROBLEM — Z72.0 TOBACCO ABUSE: Status: ACTIVE | Noted: 2021-07-20

## 2021-07-20 PROBLEM — F10.239 IMPENDING DELIRIUM TREMENS (HCC): Status: ACTIVE | Noted: 2021-07-20

## 2021-07-20 PROBLEM — F10.939 ALCOHOL WITHDRAWAL SEIZURE (HCC): Status: ACTIVE | Noted: 2021-07-20

## 2021-07-20 PROBLEM — R56.9 ALCOHOL WITHDRAWAL SEIZURE (HCC): Status: ACTIVE | Noted: 2021-07-20

## 2021-07-20 PROBLEM — E87.6 HYPOKALEMIA: Status: ACTIVE | Noted: 2021-07-20

## 2021-07-20 PROBLEM — K70.0 ALCOHOLIC FATTY LIVER: Status: ACTIVE | Noted: 2021-07-20

## 2021-07-20 LAB
ACETAMINOPHEN LEVEL: <5 MCG/ML (ref 10–30)
ACETAMINOPHEN LEVEL: <5 MCG/ML (ref 10–30)
ALBUMIN SERPL-MCNC: 2.9 G/DL (ref 3.5–5.2)
ALP BLD-CCNC: 118 U/L (ref 40–129)
ALT SERPL-CCNC: 51 U/L (ref 0–40)
AMMONIA: 23 UMOL/L (ref 16–60)
ANION GAP SERPL CALCULATED.3IONS-SCNC: 10 MMOL/L (ref 7–16)
AST SERPL-CCNC: 94 U/L (ref 0–39)
BASOPHILIC STIPPLING: ABNORMAL
BASOPHILS ABSOLUTE: 0 E9/L (ref 0–0.2)
BASOPHILS ABSOLUTE: 0.02 E9/L (ref 0–0.2)
BASOPHILS RELATIVE PERCENT: 0.3 % (ref 0–2)
BASOPHILS RELATIVE PERCENT: 0.3 % (ref 0–2)
BILIRUB SERPL-MCNC: 1.2 MG/DL (ref 0–1.2)
BUN BLDV-MCNC: 9 MG/DL (ref 6–20)
CALCIUM SERPL-MCNC: 8 MG/DL (ref 8.6–10.2)
CHLORIDE BLD-SCNC: 100 MMOL/L (ref 98–107)
CO2: 31 MMOL/L (ref 22–29)
CREAT SERPL-MCNC: 0.6 MG/DL (ref 0.7–1.2)
EKG ATRIAL RATE: 74 BPM
EKG P AXIS: 58 DEGREES
EKG P-R INTERVAL: 106 MS
EKG Q-T INTERVAL: 442 MS
EKG QRS DURATION: 88 MS
EKG QTC CALCULATION (BAZETT): 490 MS
EKG R AXIS: 67 DEGREES
EKG T AXIS: 74 DEGREES
EKG VENTRICULAR RATE: 74 BPM
EOSINOPHILS ABSOLUTE: 0 E9/L (ref 0.05–0.5)
EOSINOPHILS ABSOLUTE: 0 E9/L (ref 0.05–0.5)
EOSINOPHILS RELATIVE PERCENT: 0 % (ref 0–6)
EOSINOPHILS RELATIVE PERCENT: 0.1 % (ref 0–6)
ETHANOL: 209 MG/DL (ref 0–0.08)
ETHANOL: <10 MG/DL (ref 0–0.08)
GFR AFRICAN AMERICAN: >60
GFR NON-AFRICAN AMERICAN: >60 ML/MIN/1.73
GLUCOSE BLD-MCNC: 102 MG/DL (ref 74–99)
HCT VFR BLD CALC: 31.4 % (ref 37–54)
HCT VFR BLD CALC: 38.9 % (ref 37–54)
HEMOGLOBIN: 11.1 G/DL (ref 12.5–16.5)
HEMOGLOBIN: 14.3 G/DL (ref 12.5–16.5)
IMMATURE GRANULOCYTES #: 0.02 E9/L
IMMATURE GRANULOCYTES %: 0.3 % (ref 0–5)
LYMPHOCYTES ABSOLUTE: 0.42 E9/L (ref 1.5–4)
LYMPHOCYTES ABSOLUTE: 0.62 E9/L (ref 1.5–4)
LYMPHOCYTES RELATIVE PERCENT: 3.5 % (ref 20–42)
LYMPHOCYTES RELATIVE PERCENT: 8.1 % (ref 20–42)
MAGNESIUM: 2.5 MG/DL (ref 1.6–2.6)
MCH RBC QN AUTO: 35.8 PG (ref 26–35)
MCH RBC QN AUTO: 36.1 PG (ref 26–35)
MCHC RBC AUTO-ENTMCNC: 35.4 % (ref 32–34.5)
MCHC RBC AUTO-ENTMCNC: 36.8 % (ref 32–34.5)
MCV RBC AUTO: 101.3 FL (ref 80–99.9)
MCV RBC AUTO: 98.2 FL (ref 80–99.9)
METER GLUCOSE: 131 MG/DL (ref 74–99)
MONOCYTES ABSOLUTE: 0.1 E9/L (ref 0.1–0.95)
MONOCYTES ABSOLUTE: 0.46 E9/L (ref 0.1–0.95)
MONOCYTES RELATIVE PERCENT: 0.9 % (ref 2–12)
MONOCYTES RELATIVE PERCENT: 6 % (ref 2–12)
NEUTROPHILS ABSOLUTE: 6.58 E9/L (ref 1.8–7.3)
NEUTROPHILS ABSOLUTE: 9.98 E9/L (ref 1.8–7.3)
NEUTROPHILS RELATIVE PERCENT: 85.3 % (ref 43–80)
NEUTROPHILS RELATIVE PERCENT: 95.6 % (ref 43–80)
NUCLEATED RED BLOOD CELLS: 0.9 /100 WBC
OVALOCYTES: ABNORMAL
PDW BLD-RTO: 13.5 FL (ref 11.5–15)
PDW BLD-RTO: 14 FL (ref 11.5–15)
PHOSPHORUS: 3 MG/DL (ref 2.5–4.5)
PLATELET # BLD: 46 E9/L (ref 130–450)
PLATELET # BLD: 79 E9/L (ref 130–450)
PLATELET CONFIRMATION: NORMAL
PLATELET CONFIRMATION: NORMAL
PMV BLD AUTO: 12.7 FL (ref 7–12)
PMV BLD AUTO: 12.8 FL (ref 7–12)
POLYCHROMASIA: ABNORMAL
POTASSIUM SERPL-SCNC: 2.9 MMOL/L (ref 3.5–5)
RBC # BLD: 3.1 E12/L (ref 3.8–5.8)
RBC # BLD: 3.96 E12/L (ref 3.8–5.8)
SALICYLATE, SERUM: 1 MG/DL (ref 0–30)
SALICYLATE, SERUM: <0.3 MG/DL (ref 0–30)
SODIUM BLD-SCNC: 141 MMOL/L (ref 132–146)
TARGET CELLS: ABNORMAL
TOTAL PROTEIN: 5.4 G/DL (ref 6.4–8.3)
TRICYCLIC ANTIDEPRESSANTS SCREEN SERUM: ABNORMAL NG/ML
TRICYCLIC ANTIDEPRESSANTS SCREEN SERUM: NEGATIVE NG/ML
WBC # BLD: 10.4 E9/L (ref 4.5–11.5)
WBC # BLD: 7.7 E9/L (ref 4.5–11.5)

## 2021-07-20 PROCEDURE — 6360000002 HC RX W HCPCS

## 2021-07-20 PROCEDURE — 80143 DRUG ASSAY ACETAMINOPHEN: CPT

## 2021-07-20 PROCEDURE — 2580000003 HC RX 258: Performed by: INTERNAL MEDICINE

## 2021-07-20 PROCEDURE — 93005 ELECTROCARDIOGRAM TRACING: CPT | Performed by: EMERGENCY MEDICINE

## 2021-07-20 PROCEDURE — 6370000000 HC RX 637 (ALT 250 FOR IP): Performed by: INTERNAL MEDICINE

## 2021-07-20 PROCEDURE — 80053 COMPREHEN METABOLIC PANEL: CPT

## 2021-07-20 PROCEDURE — 76705 ECHO EXAM OF ABDOMEN: CPT

## 2021-07-20 PROCEDURE — 85025 COMPLETE CBC W/AUTO DIFF WBC: CPT

## 2021-07-20 PROCEDURE — 6360000002 HC RX W HCPCS: Performed by: INTERNAL MEDICINE

## 2021-07-20 PROCEDURE — 6360000002 HC RX W HCPCS: Performed by: EMERGENCY MEDICINE

## 2021-07-20 PROCEDURE — 2000000000 HC ICU R&B

## 2021-07-20 PROCEDURE — 84100 ASSAY OF PHOSPHORUS: CPT

## 2021-07-20 PROCEDURE — 96365 THER/PROPH/DIAG IV INF INIT: CPT

## 2021-07-20 PROCEDURE — 93010 ELECTROCARDIOGRAM REPORT: CPT | Performed by: INTERNAL MEDICINE

## 2021-07-20 PROCEDURE — 83735 ASSAY OF MAGNESIUM: CPT

## 2021-07-20 PROCEDURE — 80179 DRUG ASSAY SALICYLATE: CPT

## 2021-07-20 PROCEDURE — C9113 INJ PANTOPRAZOLE SODIUM, VIA: HCPCS | Performed by: INTERNAL MEDICINE

## 2021-07-20 PROCEDURE — 82962 GLUCOSE BLOOD TEST: CPT

## 2021-07-20 PROCEDURE — 87081 CULTURE SCREEN ONLY: CPT

## 2021-07-20 PROCEDURE — 2500000003 HC RX 250 WO HCPCS: Performed by: EMERGENCY MEDICINE

## 2021-07-20 PROCEDURE — 80307 DRUG TEST PRSMV CHEM ANLYZR: CPT

## 2021-07-20 PROCEDURE — 2580000003 HC RX 258: Performed by: EMERGENCY MEDICINE

## 2021-07-20 PROCEDURE — 99291 CRITICAL CARE FIRST HOUR: CPT | Performed by: INTERNAL MEDICINE

## 2021-07-20 PROCEDURE — 74177 CT ABD & PELVIS W/CONTRAST: CPT

## 2021-07-20 PROCEDURE — 82140 ASSAY OF AMMONIA: CPT

## 2021-07-20 PROCEDURE — 99223 1ST HOSP IP/OBS HIGH 75: CPT | Performed by: INTERNAL MEDICINE

## 2021-07-20 PROCEDURE — 2580000003 HC RX 258

## 2021-07-20 PROCEDURE — 36415 COLL VENOUS BLD VENIPUNCTURE: CPT

## 2021-07-20 PROCEDURE — 96375 TX/PRO/DX INJ NEW DRUG ADDON: CPT

## 2021-07-20 PROCEDURE — 6370000000 HC RX 637 (ALT 250 FOR IP)

## 2021-07-20 PROCEDURE — 80074 ACUTE HEPATITIS PANEL: CPT

## 2021-07-20 PROCEDURE — 6360000004 HC RX CONTRAST MEDICATION: Performed by: RADIOLOGY

## 2021-07-20 PROCEDURE — 82077 ASSAY SPEC XCP UR&BREATH IA: CPT

## 2021-07-20 RX ORDER — SODIUM CHLORIDE 9 MG/ML
25 INJECTION, SOLUTION INTRAVENOUS PRN
Status: DISCONTINUED | OUTPATIENT
Start: 2021-07-20 | End: 2021-07-20 | Stop reason: SDUPTHER

## 2021-07-20 RX ORDER — SODIUM CHLORIDE 0.9 % (FLUSH) 0.9 %
5-40 SYRINGE (ML) INJECTION EVERY 12 HOURS SCHEDULED
Status: DISCONTINUED | OUTPATIENT
Start: 2021-07-20 | End: 2021-07-23 | Stop reason: HOSPADM

## 2021-07-20 RX ORDER — LORAZEPAM 2 MG/ML
1 INJECTION INTRAMUSCULAR
Status: DISCONTINUED | OUTPATIENT
Start: 2021-07-20 | End: 2021-07-23 | Stop reason: HOSPADM

## 2021-07-20 RX ORDER — SODIUM CHLORIDE 0.9 % (FLUSH) 0.9 %
10 SYRINGE (ML) INJECTION EVERY 12 HOURS SCHEDULED
Status: DISCONTINUED | OUTPATIENT
Start: 2021-07-20 | End: 2021-07-20 | Stop reason: SDUPTHER

## 2021-07-20 RX ORDER — LORAZEPAM 2 MG/ML
4 INJECTION INTRAMUSCULAR
Status: DISCONTINUED | OUTPATIENT
Start: 2021-07-20 | End: 2021-07-23 | Stop reason: HOSPADM

## 2021-07-20 RX ORDER — LORAZEPAM 2 MG/ML
2 INJECTION INTRAMUSCULAR ONCE
Status: COMPLETED | OUTPATIENT
Start: 2021-07-20 | End: 2021-07-20

## 2021-07-20 RX ORDER — PHENOBARBITAL 32.4 MG/1
97.2 TABLET ORAL EVERY 6 HOURS
Status: DISCONTINUED | OUTPATIENT
Start: 2021-07-20 | End: 2021-07-21 | Stop reason: SDUPTHER

## 2021-07-20 RX ORDER — SODIUM CHLORIDE 9 MG/ML
25 INJECTION, SOLUTION INTRAVENOUS PRN
Status: DISCONTINUED | OUTPATIENT
Start: 2021-07-20 | End: 2021-07-23 | Stop reason: HOSPADM

## 2021-07-20 RX ORDER — SODIUM CHLORIDE 9 MG/ML
INJECTION, SOLUTION INTRAVENOUS CONTINUOUS
Status: DISCONTINUED | OUTPATIENT
Start: 2021-07-20 | End: 2021-07-20

## 2021-07-20 RX ORDER — LORAZEPAM 1 MG/1
2 TABLET ORAL
Status: DISCONTINUED | OUTPATIENT
Start: 2021-07-20 | End: 2021-07-23 | Stop reason: HOSPADM

## 2021-07-20 RX ORDER — PROMETHAZINE HYDROCHLORIDE 25 MG/1
12.5 TABLET ORAL EVERY 6 HOURS PRN
Status: DISCONTINUED | OUTPATIENT
Start: 2021-07-20 | End: 2021-07-23 | Stop reason: HOSPADM

## 2021-07-20 RX ORDER — DIAZEPAM 5 MG/ML
5 INJECTION, SOLUTION INTRAMUSCULAR; INTRAVENOUS EVERY 4 HOURS PRN
Status: DISCONTINUED | OUTPATIENT
Start: 2021-07-20 | End: 2021-07-20

## 2021-07-20 RX ORDER — LORAZEPAM 2 MG/ML
2 INJECTION INTRAMUSCULAR
Status: DISCONTINUED | OUTPATIENT
Start: 2021-07-20 | End: 2021-07-23 | Stop reason: HOSPADM

## 2021-07-20 RX ORDER — FOLIC ACID 1 MG/1
1 TABLET ORAL DAILY
Status: DISCONTINUED | OUTPATIENT
Start: 2021-07-20 | End: 2021-07-23 | Stop reason: HOSPADM

## 2021-07-20 RX ORDER — SODIUM CHLORIDE, SODIUM LACTATE, POTASSIUM CHLORIDE, CALCIUM CHLORIDE 600; 310; 30; 20 MG/100ML; MG/100ML; MG/100ML; MG/100ML
INJECTION, SOLUTION INTRAVENOUS CONTINUOUS
Status: ACTIVE | OUTPATIENT
Start: 2021-07-20 | End: 2021-07-20

## 2021-07-20 RX ORDER — ACETAMINOPHEN 650 MG/1
650 SUPPOSITORY RECTAL EVERY 6 HOURS PRN
Status: DISCONTINUED | OUTPATIENT
Start: 2021-07-20 | End: 2021-07-23 | Stop reason: HOSPADM

## 2021-07-20 RX ORDER — SODIUM CHLORIDE, SODIUM LACTATE, POTASSIUM CHLORIDE, CALCIUM CHLORIDE 600; 310; 30; 20 MG/100ML; MG/100ML; MG/100ML; MG/100ML
INJECTION, SOLUTION INTRAVENOUS CONTINUOUS
Status: DISCONTINUED | OUTPATIENT
Start: 2021-07-20 | End: 2021-07-23 | Stop reason: HOSPADM

## 2021-07-20 RX ORDER — PROMETHAZINE HYDROCHLORIDE 25 MG/ML
25 INJECTION, SOLUTION INTRAMUSCULAR; INTRAVENOUS EVERY 4 HOURS PRN
Status: DISCONTINUED | OUTPATIENT
Start: 2021-07-20 | End: 2021-07-23 | Stop reason: HOSPADM

## 2021-07-20 RX ORDER — LORAZEPAM 1 MG/1
3 TABLET ORAL
Status: DISCONTINUED | OUTPATIENT
Start: 2021-07-20 | End: 2021-07-23 | Stop reason: HOSPADM

## 2021-07-20 RX ORDER — POLYETHYLENE GLYCOL 3350 17 G/17G
17 POWDER, FOR SOLUTION ORAL DAILY PRN
Status: DISCONTINUED | OUTPATIENT
Start: 2021-07-20 | End: 2021-07-23 | Stop reason: HOSPADM

## 2021-07-20 RX ORDER — PANTOPRAZOLE SODIUM 40 MG/10ML
40 INJECTION, POWDER, LYOPHILIZED, FOR SOLUTION INTRAVENOUS DAILY
Status: DISCONTINUED | OUTPATIENT
Start: 2021-07-20 | End: 2021-07-21

## 2021-07-20 RX ORDER — CHLORDIAZEPOXIDE HYDROCHLORIDE 5 MG/1
10 CAPSULE, GELATIN COATED ORAL 4 TIMES DAILY
Status: DISCONTINUED | OUTPATIENT
Start: 2021-07-20 | End: 2021-07-20

## 2021-07-20 RX ORDER — SODIUM CHLORIDE 0.9 % (FLUSH) 0.9 %
10 SYRINGE (ML) INJECTION PRN
Status: DISCONTINUED | OUTPATIENT
Start: 2021-07-20 | End: 2021-07-20 | Stop reason: SDUPTHER

## 2021-07-20 RX ORDER — SODIUM CHLORIDE 0.9 % (FLUSH) 0.9 %
5-40 SYRINGE (ML) INJECTION PRN
Status: DISCONTINUED | OUTPATIENT
Start: 2021-07-20 | End: 2021-07-23 | Stop reason: HOSPADM

## 2021-07-20 RX ORDER — THIAMINE HYDROCHLORIDE 100 MG/ML
100 INJECTION, SOLUTION INTRAMUSCULAR; INTRAVENOUS DAILY
Status: DISCONTINUED | OUTPATIENT
Start: 2021-07-20 | End: 2021-07-20

## 2021-07-20 RX ORDER — LORAZEPAM 2 MG/ML
INJECTION INTRAMUSCULAR
Status: COMPLETED
Start: 2021-07-20 | End: 2021-07-20

## 2021-07-20 RX ORDER — LORAZEPAM 2 MG/ML
3 INJECTION INTRAMUSCULAR
Status: DISCONTINUED | OUTPATIENT
Start: 2021-07-20 | End: 2021-07-23 | Stop reason: HOSPADM

## 2021-07-20 RX ORDER — DIAZEPAM 5 MG/1
5 TABLET ORAL EVERY 6 HOURS PRN
Status: DISCONTINUED | OUTPATIENT
Start: 2021-07-20 | End: 2021-07-20

## 2021-07-20 RX ORDER — ACETAMINOPHEN 325 MG/1
650 TABLET ORAL EVERY 6 HOURS PRN
Status: DISCONTINUED | OUTPATIENT
Start: 2021-07-20 | End: 2021-07-23 | Stop reason: HOSPADM

## 2021-07-20 RX ORDER — VITAMIN B COMPLEX
1000 TABLET ORAL DAILY
Status: DISCONTINUED | OUTPATIENT
Start: 2021-07-20 | End: 2021-07-23 | Stop reason: HOSPADM

## 2021-07-20 RX ORDER — LORAZEPAM 1 MG/1
4 TABLET ORAL
Status: DISCONTINUED | OUTPATIENT
Start: 2021-07-20 | End: 2021-07-23 | Stop reason: HOSPADM

## 2021-07-20 RX ORDER — THIAMINE HYDROCHLORIDE 100 MG/ML
100 INJECTION, SOLUTION INTRAMUSCULAR; INTRAVENOUS DAILY
Status: DISCONTINUED | OUTPATIENT
Start: 2021-07-21 | End: 2021-07-23 | Stop reason: HOSPADM

## 2021-07-20 RX ORDER — POTASSIUM CHLORIDE 20 MEQ/1
40 TABLET, EXTENDED RELEASE ORAL ONCE
Status: COMPLETED | OUTPATIENT
Start: 2021-07-20 | End: 2021-07-20

## 2021-07-20 RX ORDER — SODIUM CHLORIDE 9 MG/ML
10 INJECTION INTRAVENOUS DAILY
Status: DISCONTINUED | OUTPATIENT
Start: 2021-07-20 | End: 2021-07-21

## 2021-07-20 RX ORDER — POTASSIUM CHLORIDE 7.45 MG/ML
10 INJECTION INTRAVENOUS ONCE
Status: COMPLETED | OUTPATIENT
Start: 2021-07-20 | End: 2021-07-20

## 2021-07-20 RX ORDER — LORAZEPAM 1 MG/1
1 TABLET ORAL
Status: DISCONTINUED | OUTPATIENT
Start: 2021-07-20 | End: 2021-07-23 | Stop reason: HOSPADM

## 2021-07-20 RX ORDER — THIAMINE HYDROCHLORIDE 100 MG/ML
100 INJECTION, SOLUTION INTRAMUSCULAR; INTRAVENOUS ONCE
Status: COMPLETED | OUTPATIENT
Start: 2021-07-20 | End: 2021-07-20

## 2021-07-20 RX ORDER — ONDANSETRON 2 MG/ML
4 INJECTION INTRAMUSCULAR; INTRAVENOUS EVERY 6 HOURS PRN
Status: DISCONTINUED | OUTPATIENT
Start: 2021-07-20 | End: 2021-07-23 | Stop reason: HOSPADM

## 2021-07-20 RX ORDER — M-VIT,TX,IRON,MINS/CALC/FOLIC 27MG-0.4MG
1 TABLET ORAL DAILY
Status: DISCONTINUED | OUTPATIENT
Start: 2021-07-20 | End: 2021-07-23 | Stop reason: HOSPADM

## 2021-07-20 RX ADMIN — LORAZEPAM 2 MG: 2 INJECTION INTRAMUSCULAR at 09:14

## 2021-07-20 RX ADMIN — SODIUM CHLORIDE, POTASSIUM CHLORIDE, SODIUM LACTATE AND CALCIUM CHLORIDE: 600; 310; 30; 20 INJECTION, SOLUTION INTRAVENOUS at 16:18

## 2021-07-20 RX ADMIN — Medication 1000 UNITS: at 12:32

## 2021-07-20 RX ADMIN — POTASSIUM CHLORIDE 10 MEQ: 10 INJECTION, SOLUTION INTRAVENOUS at 05:03

## 2021-07-20 RX ADMIN — LORAZEPAM 2 MG: 2 INJECTION INTRAMUSCULAR; INTRAVENOUS at 09:14

## 2021-07-20 RX ADMIN — LORAZEPAM 2 MG: 2 INJECTION INTRAMUSCULAR; INTRAVENOUS at 14:16

## 2021-07-20 RX ADMIN — POTASSIUM CHLORIDE 40 MEQ: 20 TABLET, EXTENDED RELEASE ORAL at 23:59

## 2021-07-20 RX ADMIN — DIAZEPAM 5 MG: 5 INJECTION, SOLUTION INTRAMUSCULAR; INTRAVENOUS at 05:12

## 2021-07-20 RX ADMIN — SODIUM CHLORIDE, PRESERVATIVE FREE 10 ML: 5 INJECTION INTRAVENOUS at 12:40

## 2021-07-20 RX ADMIN — LORAZEPAM 2 MG: 1 TABLET ORAL at 18:10

## 2021-07-20 RX ADMIN — PROMETHAZINE HYDROCHLORIDE 25 MG: 25 INJECTION INTRAMUSCULAR; INTRAVENOUS at 16:40

## 2021-07-20 RX ADMIN — SODIUM CHLORIDE, PRESERVATIVE FREE 10 ML: 5 INJECTION INTRAVENOUS at 21:00

## 2021-07-20 RX ADMIN — FOLIC ACID 1 MG: 1 TABLET ORAL at 12:32

## 2021-07-20 RX ADMIN — PHENOBARBITAL 97.2 MG: 32.4 TABLET ORAL at 10:06

## 2021-07-20 RX ADMIN — PHENOBARBITAL 97.2 MG: 32.4 TABLET ORAL at 16:16

## 2021-07-20 RX ADMIN — ENOXAPARIN SODIUM 40 MG: 40 INJECTION SUBCUTANEOUS at 10:39

## 2021-07-20 RX ADMIN — SODIUM CHLORIDE, POTASSIUM CHLORIDE, SODIUM LACTATE AND CALCIUM CHLORIDE: 600; 310; 30; 20 INJECTION, SOLUTION INTRAVENOUS at 10:32

## 2021-07-20 RX ADMIN — IOPAMIDOL 80 ML: 755 INJECTION, SOLUTION INTRAVENOUS at 01:13

## 2021-07-20 RX ADMIN — LORAZEPAM 2 MG: 2 INJECTION INTRAMUSCULAR; INTRAVENOUS at 12:45

## 2021-07-20 RX ADMIN — POTASSIUM CHLORIDE 40 MEQ: 20 TABLET, EXTENDED RELEASE ORAL at 14:55

## 2021-07-20 RX ADMIN — SODIUM CHLORIDE: 9 INJECTION, SOLUTION INTRAVENOUS at 05:04

## 2021-07-20 RX ADMIN — PANTOPRAZOLE SODIUM 40 MG: 40 INJECTION, POWDER, FOR SOLUTION INTRAVENOUS at 10:39

## 2021-07-20 RX ADMIN — THIAMINE HYDROCHLORIDE 100 MG: 100 INJECTION, SOLUTION INTRAMUSCULAR; INTRAVENOUS at 07:10

## 2021-07-20 RX ADMIN — PROMETHAZINE HYDROCHLORIDE 25 MG: 25 INJECTION INTRAMUSCULAR; INTRAVENOUS at 12:37

## 2021-07-20 RX ADMIN — FOLIC ACID: 5 INJECTION, SOLUTION INTRAMUSCULAR; INTRAVENOUS; SUBCUTANEOUS at 00:04

## 2021-07-20 ASSESSMENT — PAIN SCALES - GENERAL
PAINLEVEL_OUTOF10: 0
PAINLEVEL_OUTOF10: 0

## 2021-07-20 NOTE — H&P
[address] [place], [place] [address]ist Group   HISTORY AND PHYSICAL EXAM      AUTHOR: Katie Pak MD PATIENT NAME: Champ Peck   DATE: 2021 MRN: [id number], : 1990   Primary Care Physician: No primary care provider on file. CHIEF COMPLAINT / REASON FOR ADMISSION:  Abdominal pain, nausea, vomiting, alcohol withdrawal      HPI:   This is a 31 y.o. male  has a past medical history of Anxiety, Arm pain, Concussion with loss of consciousness, Convulsions ([facility] [address] Depression, Flashing lights, Head injury, Headache, Leg pain, Numbness and tingling, PTSD (post-traumatic stress disorder), and Seizures ([facility] [address] presented with Abdominal pain, nausea, vomiting, alcohol withdrawal for last few days prior to arrival to the hospital. Abdominal pain pain is moderate to severe, dull, nonradiating. Initially pain was mild, intermittent but gradually getting more persistent. Associated with anxiety, tremors and shakes. He thinks he is withrdawing from alcohol and has DT's. Drinks alcohol daily including whiskey today. Also has a history of pancreatitis. The patient was seen and examined at bedside, appears alert and awake with no acute distress and is able to answer simple  questions. On direct questioning, patient denied any  resting ongoing chest pain, resting SOB, hemoptysis, productive cough, fever, ongoing palpitation, hematemesis, rectal bleeding, brielle, hematuria, any other  and GI complaints and any new focal neuro deficits.     ROS:  Pertinent positives and negatives are noted in the HPI, all other systems are reviewed and negative    PMH:  Past Medical History:   Diagnosis Date    Anxiety     Arm pain     Concussion with loss of consciousness     Convulsions (HCC)     Depression     Flashing lights     Head injury     Headache     Leg pain     Numbness and tingling     arms and hands    PTSD (post-traumatic stress disorder)     Seizures (HCC)        Surgical History:  Past Surgical History:   Procedure Laterality Date    COLONOSCOPY      ENDOSCOPY, COLON, DIAGNOSTIC         Medications Prior to Admission:    Prior to Admission medications    Medication Sig Start Date End Date Taking? Authorizing Provider   nicotine (NICODERM CQ) 21 MG/24HR Place 1 patch onto the skin daily 6/19/21   nOeal Hand MD   pantoprazole (PROTONIX) 40 MG tablet Take 1 tablet by mouth every morning (before breakfast) 5/19/21   Erika Arzate MD   folic acid (FOLVITE) 1 MG tablet Take 1 tablet by mouth daily 5/19/21   Erika Arzate MD   vitamin B-1 (THIAMINE) 100 MG tablet Take 1 tablet by mouth daily 5/19/21   Erika Arzate MD   vitamin D (ERGOCALCIFEROL) 1.25 MG (79683 UT) CAPS capsule Take 1 capsule by mouth once a week 5/24/21   Erika Arzate MD       Allergies:    Hydrocodone, Ativan [lorazepam], Invega sustenna [paliperidone palmitate er], Paliperidone, Pcn [penicillins], and Fluticasone    Social History:    reports that he has been smoking. He has a 18.00 pack-year smoking history. He has never used smokeless tobacco. He reports current alcohol use of about 24.0 standard drinks of alcohol per week. He reports current drug use. Frequency: 1.00 time per week. Drug: Marijuana. Family History:   family history includes Alcohol Abuse in his father and mother; Cancer in his father; Cirrhosis in his father; Mental Illness in his brother, mother, and sister; Substance Abuse in his father, maternal aunt, maternal uncle, mother, paternal aunt, paternal uncle, and sister. PHYSICAL EXAM:  Vitals:  /89   Pulse 88   Temp 98 °F (36.7 °C) (Oral)   Resp 24   SpO2 95%   GENERAL: No acute distress, Alert and awake, Afebrile, Appears tired and weak otherwise hemodynamically stable at present. HEENT: PERRLA, no icterus. OP clear and no exudates. NECK: Supple  no carotid/ophthalmic bruits, JVD None. RESPIRATORY:  Bilateral equal vesicular breath sound with no wheezing.  Lung bases are clear. HEART: No tachycardia at bedside and regular rhythm. Normal S1 and S2, No S3 or S4 is audible. No pulsation, thrills, murmur or friction rubs. ABDOMEN: There is abdominal tenderness  in Epigastric and RUQ parts. There is no guarding  Bowel sound is present. EXTREMITIES: All peripheral pulses are present. No calf tenderness or swelling. No pedal edema is present. [facility] NEUROLOGY: Alert and awake. No new focal neuro deficit. Bilateral Pupil is equal and reactive to light. CN-ii-xii otherwise grossly intact. Motor and Sensory: Grossly Intact bilaterally with no new focal signs   LABS:  Recent Labs     07/19/21  2223   WBC 7.7   RBC 3.96   HGB 14.3   HCT 38.9   MCV 98.2   MCH 36.1*   MCHC 36.8*   RDW 13.5   PLT 79*   MPV 12.8*     Recent Labs     07/19/21  2223      K 3.2*   CL 90*   CO2 28   BUN 5*   CREATININE 0.5*   GLUCOSE 109*   CALCIUM 9.1     No results for input(s): POCGLU in the last 72 hours. Results for orders placed or performed during the hospital encounter of 07/19/21   CBC auto differential   Result Value Ref Range    WBC 7.7 4.5 - 11.5 E9/L    RBC 3.96 3.80 - 5.80 E12/L    Hemoglobin 14.3 12.5 - 16.5 g/dL    Hematocrit 38.9 37.0 - 54.0 %    MCV 98.2 80.0 - 99.9 fL    MCH 36.1 (H) 26.0 - 35.0 pg    MCHC 36.8 (H) 32.0 - 34.5 %    RDW 13.5 11.5 - 15.0 fL    Platelets 79 (L) 126 - 450 E9/L    MPV 12.8 (H) 7.0 - 12.0 fL    Neutrophils % 85.3 (H) 43.0 - 80.0 %    Immature Granulocytes % 0.3 0.0 - 5.0 %    Lymphocytes % 8.1 (L) 20.0 - 42.0 %    Monocytes % 6.0 2.0 - 12.0 %    Eosinophils % 0.0 0.0 - 6.0 %    Basophils % 0.3 0.0 - 2.0 %    Neutrophils Absolute 6.58 1.80 - 7.30 E9/L    Immature Granulocytes # 0.02 E9/L    Lymphocytes Absolute 0.62 (L) 1.50 - 4.00 E9/L    Monocytes Absolute 0.46 0.10 - 0.95 E9/L    Eosinophils Absolute 0.00 (L) 0.05 - 0.50 E9/L    Basophils Absolute 0.02 0.00 - 0.20 E9/L    Ovalocytes 1+     Target Cells . 1+    Comprehensive Metabolic Panel   Result Value Ref hiatal hernia, with element of distal esophagitis not excluded. Organs: No visualized focal hepatic lesion. Advanced diffuse hepatic steatosis. No significant abnormality of the spleen, gallbladder or adrenal glands. Small accessory splenule noted. No acute renal abnormality. Mild hydroureter to few levels due to bladder distension. Extrarenal pelves noted bilaterally. The proximal pancreas is unremarkable. The distal pancreas demonstrates confluent cystic change with a few intervening areas of parenchymal enhancement, with larger pseudocystic changes seen more proximally in Dimple significantly improved. No new pancreatic finding or evidence of acute pancreatitis. GI/Bowel: No acute specific small or large bowel abnormality is identified. Pelvis: The salient finding in the pelvis is relatively severely distended urinary bladder, with the bladder dome projecting just cephalad to the umbilical level. No bladder wall thickening, stones or perivesical fat stranding. Normal appearance of the prostate gland and seminal vesicles. Peritoneum/Retroperitoneum: No free intraperitoneal air or fluid. Bones/Soft Tissues: No acute soft tissue abnormality is identified. No acute osseous abnormality. Transitional upper sacrum noted with pseudoarticulation on the right. The salient/acute finding on this exam is onofre distension of the urinary bladder, with the bladder extending out of the pelvis and the bladder dome projecting just cephalad to the umbilical level. Differential considerations include bladder outlet obstruction, bladder atony, and potential untoward side effects of anticholinergic medication. The prostate gland is normal in size. The bladder is otherwise unremarkable. Please correlate clinically.  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 Advanced diffuse hepatic steatosis. Small hiatal hernia versus borderline distal esophageal wall thickening, potentially mild partially imaged esophagitis if the latter. ASSESSMENT:    Present on Admission:   Pancreatitis, recurrent    PLAN:  # Acute abdominal pain due to acute pancreatitis           Currently alert and awake, hemodynamically stable. Will keep patient NPO  Lipase = elevated  CT abdomen, as above  consisted with recurrent pancreatitis  IV fluid + Antiemetics + Pain meds  Close monitoring of vitals  # Alcohol withdrwal  Will watch for withdrawals  Continue thiamine/MV/folic acid  On fall/ seizures/aspiration precautions  Started with Librium PO + PRN Diazepam (Ativan intolerant)   Eval for alcohol rehav  Will monitor and neurostatus/CIWA score closely  # Rest of the chronic medical problems are stable and will be managed with appropriately with home medications, placed nursing communication order to verify home medications before giving them to the patient. # Diet: On PO Diet  # IVF's: Yes  # Fall Precaution: Yes  # Disposition: Home/primary residence   # Code Status: Full code by default  # DVT Prophylaxis : SC Heparin or SC Lovenox as on chart  The patient at bedside was counseled about clinical status, laboratory/imaging results, diagnoses, medication side effects, risk, and treatment plan, all questions were answered to patient's satisfaction and verbalized understanding      SIGNATURE: Clotilde Jaquez MD PATIENT NAME: Sofiya Thompson #: Hospitalist on call MRN: [id number]     Disclaimer: Portions of this note may have been generated using Dragon voice recognition software. Reasonable efforts were made to correct any dictation errors that resulted due to the programming of this software but some may still be present.

## 2021-07-20 NOTE — CARE COORDINATION
21 0942 CM note:  NO COVID TESTING THIS ADMIT. SW consult noted for consideration of ETOH rehab. Unable to assess patient. He was an RRT this am d/t seizure and transferring to [facility]. Patient currently drowsy and unable to answer my questions appropriately. Called pts brother, Alvin Freire, and updated him on the above. Per Alvin Freire patient resides with him and states pts diet is basically non-existent. Alvin Freire forced patient to eat a beef stick prior to his admit yesterday. Before that the last thing he ate was 2 chicken wings 2 weeks ago. Before that patient hadn't eaten for a week. Alvin Freire states all patient does is drink and its to the point of \"self suicide\". Alvin Freire states patient was dx was ADHD years ago and although he never took meds for it Alvin Freire feels patient is \"OCD with his drinking\". He says patient starts to freak out as soon as he feels \"hang over symptoms\" and will start drinking again because he's afraid he's going to have a seizure. Alvin Freire states he feels is brother is \"mentally ill\" and feels he needs to go somewhere but doesn't want to make him go because that won't help him either. I had a bad phone connection while talking with Alvin Freire and had to call him back a few times till finally the call wouldn't go through at all. Per nursing patient's girlfriend broke up with him 2 weeks ago, and pts mom had hx ETOH  Abuse, had a seizure, and  about 1 month ago but I was unable to confirm this information with Alvin Freire. Patient does not have PCP listed. Patient was recently admitted to our hospital and per notes left AMA on . CM/SW will continue to follow for discharge planning.  Electronically signed by Diogenes Sargent RN on 2021 at 10:05 AM

## 2021-07-20 NOTE — ED NOTES
Bed: H3  Expected date:   Expected time:   Means of arrival:   Comments:  [address] [place] [address] Tania Guerrero RN  07/19/21 6557

## 2021-07-20 NOTE — PROGRESS NOTES
Patient arrives to [facility] via monitored bed accompanied by RNx2. Transferred to our bed without incident. Please see flowsheets for vitals.

## 2021-07-20 NOTE — ED NOTES
Gave nurse to nurse to christian, patient is ready for transfer     Alvenia Manual, RN  07/20/21 8846

## 2021-07-20 NOTE — FLOWSHEET NOTE
This note also relates to the following rows which could not be included:  BP - Cannot attach notes to unvalidated device data  Pulse - Cannot attach notes to unvalidated device data    Pt Sleping

## 2021-07-20 NOTE — ED NOTES
Called nurse to nurse to 3rd floor.  Will transfer patient once Potassium is complete     Jelly Kinney RN  07/20/21 3516

## 2021-07-20 NOTE — SIGNIFICANT EVENT
[address] [place], [place] [address]ist RRT/Code Blue Note    Subjective:    Called to bedside for rapid response team.  Patient was admitted with recurrent pancreatitis as well as severe alcohol withdrawal syndrome. Patient had a witnessed seizure. He vomited, but cleared airway and was protecting airway throughout the remainder of the seizure. On arrival at bedside patient was actively seizing. Given 2 mg IV Ativan and seizure resolved. He is awake, alert, oriented to person place only.  multivitamin  1 tablet Oral Daily    sodium chloride flush  10 mL Intravenous 2 times per day    enoxaparin  40 mg Subcutaneous Daily    PHENobarbital  97.2 mg Oral Q6H    pantoprazole  40 mg Intravenous Daily    And    sodium chloride (PF)  10 mL Intravenous Daily     promethazine, 25 mg, Q4H PRN  sodium chloride flush, 10 mL, PRN  sodium chloride, 25 mL, PRN  promethazine, 12.5 mg, Q6H PRN   Or  ondansetron, 4 mg, Q6H PRN  polyethylene glycol, 17 g, Daily PRN  acetaminophen, 650 mg, Q6H PRN   Or  acetaminophen, 650 mg, Q6H PRN  diazePAM, 5 mg, Q4H PRN         Objective:    /75   Pulse 88   Temp 98 °F (36.7 °C) (Oral)   Resp 19   SpO2 95%   General Appearance: Tremulous, disheveled appearing. Oriented to person and place only. Skin: Mildly diaphoretic, warm, no rash. Head: normocephalic and atraumatic  Eyes: pupils equal, round, and reactive to light, extraocular eye movements intact, conjunctivae normal  ENT: External nose and ears normal.  Oral mucosa moist without thrush. Neck: supple and non-tender without mass, no thyromegaly or thyroid nodules, no cervical lymphadenopathy  Pulmonary/Chest: Nonlabored on room air. Clear to auscultation bilaterally. Cardiovascular: Tachycardic. Normal rhythm. S1, S2 with grade 1/6 systolic murmur. No carotid bruits. Abdomen: soft, palpable hepatomegaly with right upper quadrant and epigastric tenderness, normal bowel sounds.   Extremities: no cyanosis, clubbing or edema. Peripheral pulses intact bilaterally. Musculoskeletal: Normal muscle mass. No joint swelling or deformity. Neurologic: Tremulous. Cranial nerves II through XII grossly intact. Recent Labs     07/19/21  2223      K 3.2*   CL 90*   CO2 28   BUN 5*   CREATININE 0.5*   GLUCOSE 109*   CALCIUM 9.1       Recent Labs     07/19/21  2223   WBC 7.7   RBC 3.96   HGB 14.3   HCT 38.9   MCV 98.2   MCH 36.1*   MCHC 36.8*   RDW 13.5   PLT 79*   MPV 12.8*           I/O last 3 completed shifts: In: 1000 [IV Piggyback:1000]  Out: -   I/O this shift:  In: 228.3 [IV Piggyback:228.3]  Out: -         Assessment/Plan:    Principal Problem:    Alcohol withdrawal syndrome with complication (HCC)  Active Problems:    Pancreatitis, recurrent    Alcohol withdrawal seizure ([address]    Alcoholic fatty liver    Tobacco abuse    Hypokalemia    Impending delirium tremens ([address]  Resolved Problems:    * No resolved hospital problems. *      1. Alcohol abuse with complicated alcohol withdrawal syndrome and seizures/impending delirium tremens  -given IV Ativan 2mg x 1 for withdrawal seizure with resolution   -start oral phenobarbital taper  -Transfer to ICU for today. If he does not have recurrence of withdrawal seizures worsening withdrawal, can go back to stepdown tomorrow     2. Recurrent pancreatitis due to alcohol abuse  -aggressive IVF fluid hydration  -NPO with exception of sips of clear liquids for now     3. Alcoholic fatty liver disease  -Patient has been counseled in the past about abstinence from alcohol. We will again counseled on abstinence and offered treatment for alcohol abuse    4. Hypokalemia   -given IV replacement in ED  -check magnesium   -repeat labs ordered     5. Tobacco abuse  -counseled on cessation     DVT prophylaxis: Subcutaneous enoxaparin  CODE STATUS: Full code    Discussed with Dr. Moreno Speak care time 30 minutes not including procedures.     NOTE: This report was transcribed using voice recognition software.  Every effort was made to ensure accuracy; however, inadvertent computerized transcription errors may be present.     Electronically signed by Wilton Lion DO on 7/20/2021 at 9:39 AM

## 2021-07-20 NOTE — ED NOTES
Pt moved to room 10, seizure pads placed, pt placed on monitor, urinal and bedside commode in room     Julienne Valentino RN  07/20/21 [id number]

## 2021-07-20 NOTE — CONSULTS
CRITICAL CARE PROGRESS NOTE    The patient's case was discussed in multidisciplinary rounds including critical care specialist, nursing, RT and pharmacy. His evaluation is as follows:     31year old man with PMH of epilepsy, chronic alcoholism, depression, anxiety, PTSD, traumatic brain injury and as described below admitted to ICU for management of seizures in the setting of alcohol withdrawal.    The patient suffered a seizure while at the general medicine floor; confused at his arrival to the ICU. Denied headache, chest pain, abdominal pain.      Review of Systems - History obtained from the patient  General ROS: negative for - malaise  Psychological ROS: negative  Ophthalmic ROS: negative for - blurry vision or decreased vision  ENT ROS: negative for - nasal discharge  Allergy and Immunology ROS: negative for - postnasal drip  Hematological and Lymphatic ROS: negative for - bleeding problems or blood clots  Endocrine ROS: negative for - breast changes  Breast ROS: negative for breast lumps  Respiratory ROS: negative  For shortness of breath and sputum changes  Cardiovascular ROS: negative dyspnea on exertion  Gastrointestinal ROS: no abdominal pain, change in bowel habits, or black or bloody stools  Genito-Urinary ROS: no dysuria, trouble voiding, or hematuria  Musculoskeletal ROS: negative for - muscular weakness  Neurological ROS: no TIA or stroke symptoms  Dermatological ROS: negative for - rash      1) Chronic alcoholism with withdrawal syndrome and delirium tremens  --CIWA protocol with benzodiazepines  --Clonidine q 6 hrs  / dexmedethomidine infusion   --On MVI and thiamine high dose for management of Wernicke encephalopathy    2) Acute pancreatitis with pseudocyst  --Continue fluid hydration     3) Hypokalemia  --Received supplementation     4) Hepatic steatosis, alcoholic hepatitis  --Improving    5) Macrocytic anemia  --Transfusion if Hb <7    6) Thrombocytopenia  --Monitor, maintain platelets >92Q or 50K if bleeding      DVT prophylaxis with SCDs  Nutrition: PO  Vascular catheters: Peripheral lines  Urinary catheter needed for strict input/output  GI prophylaxis with PPI  Goals of care: Full code    Last 3 CMP:    Recent Labs     07/19/21  2223 07/20/21  1400    141   K 3.2* 2.9*   CL 90* 100   CO2 28 31*   BUN 5* 9   CREATININE 0.5* 0.6*   GLUCOSE 109* 102*   CALCIUM 9.1 8.0*   PROT 6.7 5.4*   LABALBU 3.2* 2.9*   BILITOT 0.7 1.2   ALKPHOS 159* 118   * 94*   ALT 72* 51*     Recent Labs     07/20/21  1400   WBC 10.4   RBC 3.10*   HGB 11.1*   HCT 31.4*   .3*   MCH 35.8*   MCHC 35.4*   RDW 14.0   PLT 46*   MPV 12.7*       No results for input(s): BC in the last 72 hours. No results for input(s): Redgie Eulogio in the last 72 hours.     24 HR INTAKE/OUTPUT:      Intake/Output Summary (Last 24 hours) at 7/20/2021 1654  Last data filed at 7/20/2021 0720  Gross per 24 hour   Intake 1228.33 ml   Output --   Net 1228.33 ml     MEDICATIONS:   multivitamin  1 tablet Oral Daily    enoxaparin  40 mg Subcutaneous Daily    PHENobarbital  97.2 mg Oral Q6H    pantoprazole  40 mg Intravenous Daily    And    sodium chloride (PF)  10 mL Intravenous Daily    sodium chloride flush  5-40 mL Intravenous 2 times per day    folic acid  1 mg Oral Daily    [START ON 7/21/2021] thiamine  100 mg Intravenous Daily    Vitamin D  1,000 Units Oral Daily    potassium chloride  40 mEq Oral Once      lactated ringers 200 mL/hr at 07/20/21 1618    lactated ringers      sodium chloride       promethazine, promethazine **OR** ondansetron, polyethylene glycol, acetaminophen **OR** acetaminophen, sodium chloride flush, sodium chloride, LORazepam **OR** LORazepam **OR** LORazepam **OR** LORazepam **OR** LORazepam **OR** LORazepam **OR** LORazepam **OR** LORazepam    OBJECTIVE:  Vitals:    07/20/21 1700   BP: 113/76   Pulse: 79   Resp: 22   Temp:    SpO2: 95%           O2 Device: None (Room air)    LABS:  WBC   Date Value Ref Range Status   07/20/2021 10.4 4.5 - 11.5 E9/L Final   07/19/2021 7.7 4.5 - 11.5 E9/L Final   06/20/2021 9.9 4.5 - 11.5 E9/L Final     Hemoglobin   Date Value Ref Range Status   07/20/2021 11.1 (L) 12.5 - 16.5 g/dL Final   07/19/2021 14.3 12.5 - 16.5 g/dL Final   06/20/2021 11.7 (L) 12.5 - 16.5 g/dL Final     Hematocrit   Date Value Ref Range Status   07/20/2021 31.4 (L) 37.0 - 54.0 % Final   07/19/2021 38.9 37.0 - 54.0 % Final   06/20/2021 34.3 (L) 37.0 - 54.0 % Final     MCV   Date Value Ref Range Status   07/20/2021 101.3 (H) 80.0 - 99.9 fL Final   07/19/2021 98.2 80.0 - 99.9 fL Final   06/20/2021 104.9 (H) 80.0 - 99.9 fL Final     Platelets   Date Value Ref Range Status   07/20/2021 46 (L) 130 - 450 E9/L Final   07/19/2021 79 (L) 130 - 450 E9/L Final   06/20/2021 228 130 - 450 E9/L Final     Sodium   Date Value Ref Range Status   07/20/2021 141 132 - 146 mmol/L Final   07/19/2021 136 132 - 146 mmol/L Final   06/20/2021 138 132 - 146 mmol/L Final     Potassium   Date Value Ref Range Status   07/20/2021 2.9 (L) 3.5 - 5.0 mmol/L Final   07/19/2021 3.2 (L) 3.5 - 5.0 mmol/L Final     Comment:     Specimen is moderately Hemolyzed. Result may be artificially increased.    06/20/2021 4.2 3.5 - 5.0 mmol/L Final     Potassium reflex Magnesium   Date Value Ref Range Status   02/28/2021 3.3 (L) 3.5 - 5.0 mmol/L Final   02/27/2021 3.9 3.5 - 5.0 mmol/L Final   12/27/2020 3.9 3.5 - 5.0 mmol/L Final     Chloride   Date Value Ref Range Status   07/20/2021 100 98 - 107 mmol/L Final   07/19/2021 90 (L) 98 - 107 mmol/L Final   06/20/2021 103 98 - 107 mmol/L Final     CO2   Date Value Ref Range Status   07/20/2021 31 (H) 22 - 29 mmol/L Final   07/19/2021 28 22 - 29 mmol/L Final   06/20/2021 27 22 - 29 mmol/L Final     BUN   Date Value Ref Range Status   07/20/2021 9 6 - 20 mg/dL Final   07/19/2021 5 (L) 6 - 20 mg/dL Final   06/20/2021 4 (L) 6 - 20 mg/dL Final     CREATININE   Date Value Ref Range Status   07/20/2021 0.6 (L) 0.7 - 1.2 mg/dL Final   07/19/2021 0.5 (L) 0.7 - 1.2 mg/dL Final   06/20/2021 0.5 (L) 0.7 - 1.2 mg/dL Final     Glucose   Date Value Ref Range Status   07/20/2021 102 (H) 74 - 99 mg/dL Final   07/19/2021 109 (H) 74 - 99 mg/dL Final   06/20/2021 87 74 - 99 mg/dL Final     Calcium   Date Value Ref Range Status   07/20/2021 8.0 (L) 8.6 - 10.2 mg/dL Final   07/19/2021 9.1 8.6 - 10.2 mg/dL Final   06/20/2021 8.8 8.6 - 10.2 mg/dL Final     Total Protein   Date Value Ref Range Status   07/20/2021 5.4 (L) 6.4 - 8.3 g/dL Final   07/19/2021 6.7 6.4 - 8.3 g/dL Final   06/16/2021 6.5 6.4 - 8.3 g/dL Final     Albumin   Date Value Ref Range Status   07/20/2021 2.9 (L) 3.5 - 5.2 g/dL Final   07/19/2021 3.2 (L) 3.5 - 5.2 g/dL Final   06/16/2021 3.5 3.5 - 5.2 g/dL Final     Total Bilirubin   Date Value Ref Range Status   07/20/2021 1.2 0.0 - 1.2 mg/dL Final   07/19/2021 0.7 0.0 - 1.2 mg/dL Final   06/16/2021 0.9 0.0 - 1.2 mg/dL Final     Alkaline Phosphatase   Date Value Ref Range Status   07/20/2021 118 40 - 129 U/L Final   07/19/2021 159 (H) 40 - 129 U/L Final   06/16/2021 166 (H) 40 - 129 U/L Final     AST   Date Value Ref Range Status   07/20/2021 94 (H) 0 - 39 U/L Final   07/19/2021 123 (H) 0 - 39 U/L Final     Comment:     Specimen is moderately Hemolyzed. Result may be artificially increased. 06/16/2021 60 (H) 0 - 39 U/L Final     ALT   Date Value Ref Range Status   07/20/2021 51 (H) 0 - 40 U/L Final   07/19/2021 72 (H) 0 - 40 U/L Final   06/16/2021 30 0 - 40 U/L Final     GFR Non-   Date Value Ref Range Status   07/20/2021 >60 >=60 mL/min/1.73 Final     Comment:     Chronic Kidney Disease: less than 60 ml/min/1.73 sq.m. Kidney Failure: less than 15 ml/min/1.73 sq.m. Results valid for patients 18 years and older. 07/19/2021 >60 >=60 mL/min/1.73 Final     Comment:     Chronic Kidney Disease: less than 60 ml/min/1.73 sq.m. Kidney Failure: less than 15 ml/min/1.73 sq.m.   Results valid for patients 18 years and older. 06/20/2021 >60 >=60 mL/min/1.73 Final     Comment:     Chronic Kidney Disease: less than 60 ml/min/1.73 sq.m. Kidney Failure: less than 15 ml/min/1.73 sq.m. Results valid for patients 18 years and older. GFR    Date Value Ref Range Status   07/20/2021 >60  Final   07/19/2021 >60  Final   06/20/2021 >60  Final     Magnesium   Date Value Ref Range Status   07/20/2021 2.5 1.6 - 2.6 mg/dL Final   06/22/2021 2.0 1.6 - 2.6 mg/dL Final   06/21/2021 1.9 1.6 - 2.6 mg/dL Final     Phosphorus   Date Value Ref Range Status   07/20/2021 3.0 2.5 - 4.5 mg/dL Final   06/22/2021 3.7 2.5 - 4.5 mg/dL Final   06/21/2021 4.3 2.5 - 4.5 mg/dL Final     No results for input(s): PH, PO2, PCO2, HCO3, BE, O2SAT in the last 72 hours. RADIOLOGY:  US GALLBLADDER RUQ   Final Result   There is no acute right upper quadrant pathology. Specifically, there is no   acute cholecystitis      Hepatic steatosis      Previous pancreatitis with necrosis of the pancreatic tail. These findings   are best appreciated on the earlier CT scan of the abdomen and pelvis. CT ABDOMEN PELVIS W IV CONTRAST Additional Contrast? None   Final Result   The salient/acute finding on this exam is onofre distension of the urinary   bladder, with the bladder extending out of the pelvis and the bladder dome   projecting just cephalad to the umbilical level. Differential considerations   include bladder outlet obstruction, bladder atony, and potential untoward   side effects of anticholinergic medication. The prostate gland is normal in   size. The bladder is otherwise unremarkable. Please correlate clinically. Chronic changes of the distal pancreatic tail and body, compatible with   sequela of prior pancreatic necrosis, with intervally smaller cystic changes   compared to prior study and no evidence of acute pancreatitis at this time. Advanced diffuse hepatic steatosis.       Small hiatal hernia versus borderline distal esophageal wall thickening,   potentially mild partially imaged esophagitis if the latter. PROBLEM LIST:  Principal Problem:    Alcohol withdrawal syndrome with complication (HCC)  Active Problems:    Pancreatitis, recurrent    Alcohol withdrawal seizure ([facility] [address]    Alcoholic fatty liver    Tobacco abuse    Hypokalemia    Impending delirium tremens ([facility] [address]  Resolved Problems:    * No resolved hospital problems. *      ATTESTATION:  ICU Staff Physician note of personal involvement in Care  As the attending physician, I certify that I personally reviewed the patients history and personnally examined the patient to confirm the physical findings described above,  And that I reviewed the relevant imaging studies and available reports. I also discussed the differential diagnosis and all of the proposed management plans with the patient and individuals accompanying the patient to this visit. They had the opportunity to ask questions about the proposed management plans and to have those questions answered. This patient has a high probability of sudden, clinically significant deterioration, which requires the highest level of physician preparedness to intervene urgently. I managed/supervised life or organ supporting interventions that required frequent physician assessment. I devoted my full attention to the direct care of this patient for the amount of time indicated below. Time I spent with the family or surrogate(s) is included only if the patient was incapable of providing the necessary information or participating in medical decisions - Time devoted to teaching and to any procedures I billed separately is not included.      CRITICAL CARE TIME:  30 minutes    Parag Clark MD  Pulmonary and Critical Care Medicine

## 2021-07-20 NOTE — ED PROVIDER NOTES
Patient is a 32 y/o male who presents to the ED via EMS. Patient states that he is really sick. He states that he is withrdawing from alcohol and has DT's. He states that he has not eaten in the past 2 weeks. He states that every time he tries to eat he vomits. He admits to drinking alcohol daily including whiskey today. He reports epigastric abdominal pain and has a history of pancreatitis. He states that he is depressed but denies any suicidal or homicidal ideations. Review of Systems   Constitutional: Negative for chills and fever. HENT: Negative for ear pain, sinus pressure and sore throat. Eyes: Negative for pain, discharge and redness. Respiratory: Negative for cough, shortness of breath and wheezing. Cardiovascular: Negative for chest pain. Gastrointestinal: Positive for abdominal pain, nausea and vomiting. Negative for diarrhea. Genitourinary: Negative for dysuria and frequency. Musculoskeletal: Negative for arthralgias and back pain. Skin: Negative for rash and wound. Neurological: Negative for weakness and headaches. Hematological: Negative for adenopathy. Psychiatric/Behavioral: Positive for behavioral problems. All other systems reviewed and are negative. Physical Exam  Vitals and nursing note reviewed. Constitutional:       General: He is not in acute distress. HENT:      Head: Normocephalic and atraumatic. Right Ear: External ear normal.      Left Ear: External ear normal.      Nose: Nose normal.      Mouth/Throat:      Mouth: Mucous membranes are moist.   Eyes:      Conjunctiva/sclera: Conjunctivae normal.      Pupils: Pupils are equal, round, and reactive to light. Cardiovascular:      Rate and Rhythm: Normal rate and regular rhythm. Heart sounds: No murmur heard. Pulmonary:      Effort: Pulmonary effort is normal. No respiratory distress. Breath sounds: Normal breath sounds. No stridor. No wheezing, rhonchi or rales.    Abdominal: General: Bowel sounds are normal. There is no distension. Palpations: Abdomen is soft. Tenderness: There is abdominal tenderness (Epigastric, RUQ). There is no guarding. Musculoskeletal:         General: Normal range of motion. Cervical back: Normal range of motion and neck supple. Skin:     General: Skin is warm and dry. Findings: No rash. Neurological:      Mental Status: He is alert and oriented to person, place, and time. Psychiatric:         Attention and Perception: Attention and perception normal.         Mood and Affect: Mood is depressed. Affect is flat. Speech: Speech normal.         Behavior: Behavior is withdrawn. Thought Content: Thought content is not paranoid or delusional. Thought content does not include homicidal or suicidal ideation. Cognition and Memory: Cognition and memory normal.          Procedures     MDM             --------------------------------------------- PAST HISTORY ---------------------------------------------  Past Medical History:  has a past medical history of Anxiety, Arm pain, Concussion with loss of consciousness, Convulsions ([facility] [address] Depression, Flashing lights, Head injury, Headache, Leg pain, Numbness and tingling, PTSD (post-traumatic stress disorder), and Seizures ([facility] [address] Past Surgical History:  has a past surgical history that includes Colonoscopy and Endoscopy, colon, diagnostic. Social History:  reports that he has been smoking. He has a 18.00 pack-year smoking history. He has never used smokeless tobacco. He reports current alcohol use of about 24.0 standard drinks of alcohol per week. He reports current drug use. Frequency: 1.00 time per week. Drug: Marijuana.     Family History: family history includes Alcohol Abuse in his father and mother; Cancer in his father; Cirrhosis in his father; Mental Illness in his brother, mother, and sister; Substance Abuse in his father, maternal aunt, maternal uncle, mother, paternal aunt, paternal uncle, and sister. The patients home medications have been reviewed. Allergies: Hydrocodone, Ativan [lorazepam], Invega sustenna [paliperidone palmitate er], Paliperidone, Pcn [penicillins], and Fluticasone    -------------------------------------------------- RESULTS -------------------------------------------------    LABS:  Results for orders placed or performed during the hospital encounter of 07/19/21   CBC auto differential   Result Value Ref Range    WBC 7.7 4.5 - 11.5 E9/L    RBC 3.96 3.80 - 5.80 E12/L    Hemoglobin 14.3 12.5 - 16.5 g/dL    Hematocrit 38.9 37.0 - 54.0 %    MCV 98.2 80.0 - 99.9 fL    MCH 36.1 (H) 26.0 - 35.0 pg    MCHC 36.8 (H) 32.0 - 34.5 %    RDW 13.5 11.5 - 15.0 fL    Platelets 79 (L) 402 - 450 E9/L    MPV 12.8 (H) 7.0 - 12.0 fL    Neutrophils % 85.3 (H) 43.0 - 80.0 %    Immature Granulocytes % 0.3 0.0 - 5.0 %    Lymphocytes % 8.1 (L) 20.0 - 42.0 %    Monocytes % 6.0 2.0 - 12.0 %    Eosinophils % 0.0 0.0 - 6.0 %    Basophils % 0.3 0.0 - 2.0 %    Neutrophils Absolute 6.58 1.80 - 7.30 E9/L    Immature Granulocytes # 0.02 E9/L    Lymphocytes Absolute 0.62 (L) 1.50 - 4.00 E9/L    Monocytes Absolute 0.46 0.10 - 0.95 E9/L    Eosinophils Absolute 0.00 (L) 0.05 - 0.50 E9/L    Basophils Absolute 0.02 0.00 - 0.20 E9/L    Ovalocytes 1+     Target Cells . 1+    Comprehensive Metabolic Panel   Result Value Ref Range    Sodium 136 132 - 146 mmol/L    Potassium 3.2 (L) 3.5 - 5.0 mmol/L    Chloride 90 (L) 98 - 107 mmol/L    CO2 28 22 - 29 mmol/L    Anion Gap 18 (H) 7 - 16 mmol/L    Glucose 109 (H) 74 - 99 mg/dL    BUN 5 (L) 6 - 20 mg/dL    CREATININE 0.5 (L) 0.7 - 1.2 mg/dL    GFR Non-African American >60 >=60 mL/min/1.73    GFR African American >60     Calcium 9.1 8.6 - 10.2 mg/dL    Total Protein 6.7 6.4 - 8.3 g/dL    Albumin 3.2 (L) 3.5 - 5.2 g/dL    Total Bilirubin 0.7 0.0 - 1.2 mg/dL    Alkaline Phosphatase 159 (H) 40 - 129 U/L    ALT 72 (H) 0 - 40 U/L     (H) 0 116/78 96 -- 96 %       Oxygen Saturation Interpretation: Normal    ------------------------------------------ PROGRESS NOTES ------------------------------------------  Re-evaluation(s):  Time: 0345. Patients symptoms show no change  Repeat physical examination is not changed    Counseling:  I have spoken with the patient and discussed todays results, in addition to providing specific details for the plan of care and counseling regarding the diagnosis and prognosis. Their questions are answered at this time and they are agreeable with the plan of admission.    --------------------------------- ADDITIONAL PROVIDER NOTES ---------------------------------  Consultations:  Time: 0400. Spoke with Dr. Khalida Buchanan.  Discussed case. They will admit the patient. This patient's ED course included: a personal history and physicial examination, re-evaluation prior to disposition, multiple bedside re-evaluations, IV medications, cardiac monitoring and continuous pulse oximetry    This patient has remained hemodynamically stable during their ED course. Diagnosis:  1. Epigastric pain    2. Intractable nausea and vomiting    3. Hypokalemia    4. Transaminitis    5. Alcohol-induced chronic pancreatitis ([facility] [address]        Disposition:  Patient's disposition: Admit to [address]  Patient's condition is stable.          Basilio VALDEZ DO  07/20/21 2467

## 2021-07-21 ENCOUNTER — APPOINTMENT (OUTPATIENT)
Dept: GENERAL RADIOLOGY | Age: 31
DRG: 282 | End: 2021-07-21
Payer: COMMERCIAL

## 2021-07-21 ENCOUNTER — ANESTHESIA EVENT (OUTPATIENT)
Dept: ENDOSCOPY | Age: 31
DRG: 282 | End: 2021-07-21
Payer: COMMERCIAL

## 2021-07-21 PROBLEM — K92.1 MELENA: Status: ACTIVE | Noted: 2021-07-21

## 2021-07-21 PROBLEM — D69.6 THROMBOCYTOPENIA (HCC): Status: ACTIVE | Noted: 2021-07-21

## 2021-07-21 LAB
ALBUMIN SERPL-MCNC: 3.2 G/DL (ref 3.5–5.2)
ALP BLD-CCNC: 132 U/L (ref 40–129)
ALT SERPL-CCNC: 57 U/L (ref 0–40)
ANION GAP SERPL CALCULATED.3IONS-SCNC: 7 MMOL/L (ref 7–16)
AST SERPL-CCNC: 136 U/L (ref 0–39)
BASOPHILS ABSOLUTE: 0 E9/L (ref 0–0.2)
BASOPHILS RELATIVE PERCENT: 0.3 % (ref 0–2)
BILIRUB SERPL-MCNC: 1.3 MG/DL (ref 0–1.2)
BUN BLDV-MCNC: 5 MG/DL (ref 6–20)
CALCIUM SERPL-MCNC: 8.3 MG/DL (ref 8.6–10.2)
CHLORIDE BLD-SCNC: 101 MMOL/L (ref 98–107)
CO2: 32 MMOL/L (ref 22–29)
CREAT SERPL-MCNC: 0.6 MG/DL (ref 0.7–1.2)
EOSINOPHILS ABSOLUTE: 0.08 E9/L (ref 0.05–0.5)
EOSINOPHILS RELATIVE PERCENT: 0.9 % (ref 0–6)
GFR AFRICAN AMERICAN: >60
GFR NON-AFRICAN AMERICAN: >60 ML/MIN/1.73
GLUCOSE BLD-MCNC: 89 MG/DL (ref 74–99)
HAV IGM SER IA-ACNC: NORMAL
HCT VFR BLD CALC: 33.6 % (ref 37–54)
HEMOGLOBIN: 11.8 G/DL (ref 12.5–16.5)
HEPATITIS B CORE IGM ANTIBODY: NORMAL
HEPATITIS B SURFACE ANTIGEN INTERPRETATION: NORMAL
HEPATITIS C ANTIBODY INTERPRETATION: NORMAL
LYMPHOCYTES ABSOLUTE: 0.43 E9/L (ref 1.5–4)
LYMPHOCYTES RELATIVE PERCENT: 5.3 % (ref 20–42)
MAGNESIUM: 2 MG/DL (ref 1.6–2.6)
MCH RBC QN AUTO: 35.8 PG (ref 26–35)
MCHC RBC AUTO-ENTMCNC: 35.1 % (ref 32–34.5)
MCV RBC AUTO: 101.8 FL (ref 80–99.9)
MONOCYTES ABSOLUTE: 0 E9/L (ref 0.1–0.95)
MONOCYTES RELATIVE PERCENT: 5.6 % (ref 2–12)
MRSA CULTURE ONLY: NORMAL
NEUTROPHILS ABSOLUTE: 8.08 E9/L (ref 1.8–7.3)
NEUTROPHILS RELATIVE PERCENT: 93.8 % (ref 43–80)
OVALOCYTES: ABNORMAL
PDW BLD-RTO: 13.6 FL (ref 11.5–15)
PHOSPHORUS: 2.5 MG/DL (ref 2.5–4.5)
PLATELET # BLD: 46 E9/L (ref 130–450)
PLATELET CONFIRMATION: NORMAL
PMV BLD AUTO: 13.9 FL (ref 7–12)
POIKILOCYTES: ABNORMAL
POLYCHROMASIA: ABNORMAL
POTASSIUM SERPL-SCNC: 3.1 MMOL/L (ref 3.5–5)
RBC # BLD: 3.3 E12/L (ref 3.8–5.8)
SODIUM BLD-SCNC: 140 MMOL/L (ref 132–146)
TOTAL PROTEIN: 5.9 G/DL (ref 6.4–8.3)
WBC # BLD: 8.6 E9/L (ref 4.5–11.5)

## 2021-07-21 PROCEDURE — 6370000000 HC RX 637 (ALT 250 FOR IP): Performed by: INTERNAL MEDICINE

## 2021-07-21 PROCEDURE — 1200000000 HC SEMI PRIVATE

## 2021-07-21 PROCEDURE — 2580000003 HC RX 258: Performed by: INTERNAL MEDICINE

## 2021-07-21 PROCEDURE — 99233 SBSQ HOSP IP/OBS HIGH 50: CPT | Performed by: INTERNAL MEDICINE

## 2021-07-21 PROCEDURE — 80053 COMPREHEN METABOLIC PANEL: CPT

## 2021-07-21 PROCEDURE — C9113 INJ PANTOPRAZOLE SODIUM, VIA: HCPCS

## 2021-07-21 PROCEDURE — 71045 X-RAY EXAM CHEST 1 VIEW: CPT

## 2021-07-21 PROCEDURE — 6360000002 HC RX W HCPCS: Performed by: INTERNAL MEDICINE

## 2021-07-21 PROCEDURE — 2580000003 HC RX 258

## 2021-07-21 PROCEDURE — 6360000002 HC RX W HCPCS

## 2021-07-21 PROCEDURE — 36415 COLL VENOUS BLD VENIPUNCTURE: CPT

## 2021-07-21 PROCEDURE — 83735 ASSAY OF MAGNESIUM: CPT

## 2021-07-21 PROCEDURE — 6370000000 HC RX 637 (ALT 250 FOR IP)

## 2021-07-21 PROCEDURE — 85025 COMPLETE CBC W/AUTO DIFF WBC: CPT

## 2021-07-21 PROCEDURE — 84100 ASSAY OF PHOSPHORUS: CPT

## 2021-07-21 RX ORDER — PANTOPRAZOLE SODIUM 40 MG/10ML
40 INJECTION, POWDER, LYOPHILIZED, FOR SOLUTION INTRAVENOUS 2 TIMES DAILY
Status: DISCONTINUED | OUTPATIENT
Start: 2021-07-21 | End: 2021-07-22

## 2021-07-21 RX ORDER — PHENOBARBITAL 32.4 MG/1
64.8 TABLET ORAL EVERY 6 HOURS
Status: DISCONTINUED | OUTPATIENT
Start: 2021-07-21 | End: 2021-07-23

## 2021-07-21 RX ORDER — PHENOBARBITAL 32.4 MG/1
64.8 TABLET ORAL EVERY 8 HOURS
Status: DISCONTINUED | OUTPATIENT
Start: 2021-07-21 | End: 2021-07-21

## 2021-07-21 RX ORDER — PHENOBARBITAL 32.4 MG/1
97.2 TABLET ORAL ONCE
Status: COMPLETED | OUTPATIENT
Start: 2021-07-21 | End: 2021-07-21

## 2021-07-21 RX ORDER — POTASSIUM CHLORIDE 20 MEQ/1
40 TABLET, EXTENDED RELEASE ORAL 2 TIMES DAILY
Status: DISCONTINUED | OUTPATIENT
Start: 2021-07-22 | End: 2021-07-21

## 2021-07-21 RX ORDER — PHENOBARBITAL 32.4 MG/1
16.2 TABLET ORAL ONCE
Status: COMPLETED | OUTPATIENT
Start: 2021-07-21 | End: 2021-07-21

## 2021-07-21 RX ORDER — NICOTINE 21 MG/24HR
1 PATCH, TRANSDERMAL 24 HOURS TRANSDERMAL DAILY
Status: DISCONTINUED | OUTPATIENT
Start: 2021-07-21 | End: 2021-07-23 | Stop reason: HOSPADM

## 2021-07-21 RX ORDER — SODIUM CHLORIDE 9 MG/ML
10 INJECTION INTRAVENOUS 2 TIMES DAILY
Status: DISCONTINUED | OUTPATIENT
Start: 2021-07-21 | End: 2021-07-22

## 2021-07-21 RX ORDER — POTASSIUM CHLORIDE 750 MG/1
10 TABLET, EXTENDED RELEASE ORAL 2 TIMES DAILY
Status: COMPLETED | OUTPATIENT
Start: 2021-07-21 | End: 2021-07-21

## 2021-07-21 RX ADMIN — Medication 1 TABLET: at 09:04

## 2021-07-21 RX ADMIN — POTASSIUM CHLORIDE 10 MEQ: 750 TABLET, EXTENDED RELEASE ORAL at 20:16

## 2021-07-21 RX ADMIN — POTASSIUM CHLORIDE 10 MEQ: 750 TABLET, EXTENDED RELEASE ORAL at 09:04

## 2021-07-21 RX ADMIN — LORAZEPAM 1 MG: 2 INJECTION INTRAMUSCULAR; INTRAVENOUS at 20:27

## 2021-07-21 RX ADMIN — Medication 1000 UNITS: at 09:04

## 2021-07-21 RX ADMIN — PHENOBARBITAL 16.2 MG: 32.4 TABLET ORAL at 15:04

## 2021-07-21 RX ADMIN — SODIUM CHLORIDE, PRESERVATIVE FREE 10 ML: 5 INJECTION INTRAVENOUS at 20:16

## 2021-07-21 RX ADMIN — PHENOBARBITAL 64.8 MG: 32.4 TABLET ORAL at 18:37

## 2021-07-21 RX ADMIN — SODIUM CHLORIDE, PRESERVATIVE FREE 10 ML: 5 INJECTION INTRAVENOUS at 09:05

## 2021-07-21 RX ADMIN — SODIUM CHLORIDE, PRESERVATIVE FREE 10 ML: 5 INJECTION INTRAVENOUS at 09:04

## 2021-07-21 RX ADMIN — PHENOBARBITAL 64.8 MG: 32.4 TABLET ORAL at 12:25

## 2021-07-21 RX ADMIN — PHENOBARBITAL 97.2 MG: 32.4 TABLET ORAL at 00:01

## 2021-07-21 RX ADMIN — PHENOBARBITAL 64.8 MG: 32.4 TABLET ORAL at 23:37

## 2021-07-21 RX ADMIN — SODIUM CHLORIDE, POTASSIUM CHLORIDE, SODIUM LACTATE AND CALCIUM CHLORIDE: 600; 310; 30; 20 INJECTION, SOLUTION INTRAVENOUS at 23:40

## 2021-07-21 RX ADMIN — OCTREOTIDE ACETATE 50 MCG/HR: 500 INJECTION, SOLUTION INTRAVENOUS; SUBCUTANEOUS at 14:34

## 2021-07-21 RX ADMIN — LORAZEPAM 1 MG: 1 TABLET ORAL at 08:06

## 2021-07-21 RX ADMIN — PHENOBARBITAL 97.2 MG: 32.4 TABLET ORAL at 06:31

## 2021-07-21 RX ADMIN — LORAZEPAM 2 MG: 1 TABLET ORAL at 04:14

## 2021-07-21 RX ADMIN — SODIUM CHLORIDE, PRESERVATIVE FREE 10 ML: 5 INJECTION INTRAVENOUS at 23:39

## 2021-07-21 RX ADMIN — LORAZEPAM 1 MG: 1 TABLET ORAL at 11:34

## 2021-07-21 RX ADMIN — PANTOPRAZOLE SODIUM 40 MG: 40 INJECTION, POWDER, FOR SOLUTION INTRAVENOUS at 09:04

## 2021-07-21 RX ADMIN — PANTOPRAZOLE SODIUM 40 MG: 40 INJECTION, POWDER, FOR SOLUTION INTRAVENOUS at 20:16

## 2021-07-21 RX ADMIN — FOLIC ACID 1 MG: 1 TABLET ORAL at 09:04

## 2021-07-21 RX ADMIN — THIAMINE HYDROCHLORIDE 100 MG: 100 INJECTION, SOLUTION INTRAMUSCULAR; INTRAVENOUS at 09:05

## 2021-07-21 RX ADMIN — SODIUM CHLORIDE, POTASSIUM CHLORIDE, SODIUM LACTATE AND CALCIUM CHLORIDE: 600; 310; 30; 20 INJECTION, SOLUTION INTRAVENOUS at 04:19

## 2021-07-21 RX ADMIN — SODIUM CHLORIDE, POTASSIUM CHLORIDE, SODIUM LACTATE AND CALCIUM CHLORIDE: 600; 310; 30; 20 INJECTION, SOLUTION INTRAVENOUS at 03:19

## 2021-07-21 RX ADMIN — PROMETHAZINE HYDROCHLORIDE 25 MG: 25 INJECTION INTRAMUSCULAR; INTRAVENOUS at 06:35

## 2021-07-21 ASSESSMENT — PAIN SCALES - GENERAL
PAINLEVEL_OUTOF10: 0
PAINLEVEL_OUTOF10: 0
PAINLEVEL_OUTOF10: 5
PAINLEVEL_OUTOF10: 0

## 2021-07-21 ASSESSMENT — PAIN DESCRIPTION - LOCATION: LOCATION: LEG;ARM

## 2021-07-21 NOTE — DISCHARGE INSTR - COC
Continuity of Care Form    Patient Name: Donavon Lunsford   :  1990  MRN:  [id number]    Admit date:  2021  Discharge date:  ***    Code Status Order: Full Code   Advance Directives:     Admitting Physician:  Sola Lnagford DO  PCP: No primary care provider on file. Discharging Nurse: [facility] Unit/Room#: IC02/IC02-01  Discharging Unit Phone Number: ***    Emergency Contact:   Extended Emergency Contact Information  Primary Emergency Contact: Nuris Grimes Phone: [phone]  Relation: Brother/Sister   needed?  No    Past Surgical History:  Past Surgical History:   Procedure Laterality Date    COLONOSCOPY      ENDOSCOPY, COLON, DIAGNOSTIC         Immunization History:   Immunization History   Administered Date(s) Administered    Tdap (Boostrix, Adacel) 2021       Active Problems:  Patient Active Problem List   Diagnosis Code    Alcohol withdrawal syndrome with complication ([facility] [address] I02.064    Alcoholism ([facility] [address] F10.20    Acute alcoholic intoxication ([facility] [address] F10.929    Severe single current episode of major depressive disorder, without psychotic features ([address] F32.2    Marijuana smoker F12.90    Major depressive disorder, severe ([address] F32.2    Severe depressed bipolar I disorder without psychotic features ([address] F31.4    Alcohol-induced acute pancreatitis K85.20    Alcohol withdrawal ([address] F10.239    Alcohol abuse with withdrawal ([facility] [address] F10.139    Alcohol withdrawal, uncomplicated ([address] C92.727    Alcohol induced acute pancreatitis without necrosis or infection K85.20    Lactic acidosis E87.2    Pancreatitis, recurrent K85.90    Alcohol withdrawal seizure (HCC) F10.239, H70.8    Alcoholic fatty liver N03.7    Tobacco abuse Z72.0    Hypokalemia E87.6    Impending delirium tremens (HCC) F10.239       Isolation/Infection:   Isolation          No Isolation        Patient Infection Status     Infection Onset Added Last Indicated Last Indicated By Review Planned Expiration Resolved Resolved By    None active    Resolved    C-diff Rule Out 06/14/21 06/15/21 06/14/21 C. difficile toxin Molecular (Ordered)   06/15/21 Rule-Out Test Resulted    C-diff Rule Out 21 CLOSTRIDIUM DIFFICILE EIA (Ordered)   06/15/21 Rule-Out Test Resulted    COVID-19 Rule Out 20 COVID-19 (Ordered)   20 Rule-Out Test Resulted    COVID-19 Rule Out 20 COVID-19 (Ordered)   20 Rule-Out Test Resulted          Nurse Assessment:  Last Vital Signs: /88   Pulse 86   Temp 98.1 °F (36.7 °C)   Resp 18   Ht 5' 6\" (1.676 m)   Wt 140 lb 6.4 oz (63.7 kg)   SpO2 96%   BMI 22.66 kg/m²     Last documented pain score (0-10 scale): Pain Level: 0  Last Weight:   Wt Readings from Last 1 Encounters:   21 140 lb 6.4 oz (63.7 kg)     Mental Status:  {IP PT MENTAL STATUS:}    IV Access:  { OUMOU IV ACCESS:259936936}    Nursing Mobility/ADLs:  Walking   {CHP DME HQUR:535499393}  Transfer  {CHP DME WNRL:630825007}  Bathing  {CHP DME QDRX:418075116}  Dressing  {P DME DWYK:955286029}  Toileting  {P DME TULU:869776572}  Feeding  {P DME TTHO:246406634}  Med Admin  {P DME JFWF:100875557}  Med Delivery   { OUMOU MED Delivery:367055409}    Wound Care Documentation and Therapy:        Elimination:  Continence:   · Bowel: {YES / H}  · Bladder: {YES / MQ:92432}  Urinary Catheter: {Urinary Catheter:673507330}   Colostomy/Ileostomy/Ileal Conduit: {YES / JY:65067}       Date of Last BM: ***    Intake/Output Summary (Last 24 hours) at 2021 0730  Last data filed at 2021 0419  Gross per 24 hour   Intake --   Output 1150 ml   Net -1150 ml     I/O last 3 completed shifts:   In: 228.3 [IV Piggyback:228.3]  Out: 1150 [Urine:1150]    Safety Concerns:     [address] OUMOU Safety Concerns:534554699}    Impairments/Disabilities:      [address] OUMOU Impairments/Disabilities:337590042}    Nutrition Therapy:  Current Nutrition Therapy:   [address] OUMOU Diet List:839178842}    Routes of Feeding: {CHP DME Other Feedings:344655848}  Liquids: {Slp liquid thickness:43349}  Daily Fluid Restriction: {CHP DME Yes amt example:472833284}  Last Modified Barium Swallow with Video (Video Swallowing Test): {Done Not Done LTMJ:612323518}    Treatments at the Time of Hospital Discharge:   Respiratory Treatments: ***  Oxygen Therapy:  {Therapy; copd oxygen:92391}  Ventilator:    { CC Vent SDVP:062381258}    Rehab Therapies: {THERAPEUTIC INTERVENTION:5978711890}  Weight Bearing Status/Restrictions: { CC Weight Bearin}  Other Medical Equipment (for information only, NOT a DME order):  {EQUIPMENT:979245080}  Other Treatments: ***    Patient's personal belongings (please select all that are sent with patient):  {[facility] DME Belongings:824137452}    RN SIGNATURE:  {Esignature:169440026}    CASE MANAGEMENT/SOCIAL WORK SECTION    Inpatient Status Date: ***    Readmission Risk Assessment Score:  Readmission Risk              Risk of Unplanned Readmission:  39           Discharging to Facility/ Agency   · Name:   · Address:  · Phone:  · Fax:    Dialysis Facility (if applicable)   · Name:  · Address:  · Dialysis Schedule:  · Phone:  · Fax:    / signature: {Esignature:116322892}    PHYSICIAN SECTION    Prognosis: {Prognosis:0795164872}    Condition at Discharge: [address] Patient Condition:762265213}    Rehab Potential (if transferring to Rehab): {Prognosis:4555871418}    Recommended Labs or Other Treatments After Discharge: ***    Physician Certification: I certify the above information and transfer of Lucio Manuel  is necessary for the continuing treatment of the diagnosis listed and that he requires {Admit to Appropriate Level of Care:50184} for {GREATER/LESS:258865187} 30 days.      Update Admission H&P: {CHP DME Changes in MWSGF:079965278}    PHYSICIAN SIGNATURE:  {Esignature:387536689}

## 2021-07-21 NOTE — CONSULTS
[facility]  Dr. Mahi Braun M.D., Dr. Eduin Sawyer M.D., Dr. Halina Galeazzi, D.O., Dr. Arlette Ward M.D., SAMARA Mendoza.O. Patient Name: Shena Bales  MRN: [id number]  : 1990 (31 y.o. male)  Allergies: is allergic to hydrocodone, ativan [lorazepam], invega sustenna [paliperidone palmitate er], paliperidone, pcn [penicillins], and fluticasone. Date of Service: 2021       Reason for Consultation: Melena    HISTORY OF PRESENT ILLNESS:    Patient is a 33-year-old  male with a past history significant for significant alcohol abuse, acute on chronic pancreatitis due to ETOH abuse and acute on chronic macrocytic anemia. Patient was admitted to [place] on  alcohol withdrawals and concern for delirium tremors. Patient was admitted to the sixth floor. Rapid response was called in the morning of  for seizure-like activity. Concern for acute withdrawal was seen and patient was given Ativan 2 mg IV x1 and transferred to the ICU. Patient seen and evaluated in the ICU this AM.  Patient with some dark tarry stools overnight. Patient with some NSAID use the outpatient setting taking up to 3 ibuprofen sporadically over the past several weeks. For pain. Patient admits to drinking up to 4 bottles of STAFFANSTORP daily. Patient has any history of IV drug use or unprofessional tattoos. Patient does admit to family history of alcohol abuse and cirrhosis in his father who  from complication of cirrhosis. REVIEW OF SYSTEMS:   Constitutional: Denies fever, chills, or unintentional weight loss. HEENT: Denies double or blurry vision, headaches, ear pain or ringing in the ears. No drainage from the ears, nose or throat. Cardiovascular: Denies any chest pain, irregular heartbeats, or palpitations. Respiratory: Denies shortness of breath, coughing, sputum production, hemoptysis, or wheezing.    Gastrointestinal: Denies nausea, vomiting, diarrhea, or constipation. Genitourinary: Denies any urinary urgency, frequency, hematuria. Voiding without difficulty. Endocrine: Denies sensitivity to heat or cold. Denies changes in hair, skin or nails. Denies excessive thirst, hunger or going to the bathroom more frequently than usual.  Extremities: Denies swelling or calf pain. Musculoskeletal: Denies muscle or joint pain, stiffness, arthritis, gout, or instability. Dermatology / Skin: Denies any rashes, ulcers, or excoriations. Denies bruising. Neurology: Denies any bowel or bladder incontinence, headache or focal neurological deficits. No weakness or paresthesia. Denies numbness or tingling in the hands or feet. Psychiatric: Denies nervousness/anxiety, depression or memory loss. Past Medical History:  Past Medical History:   Diagnosis Date    Anxiety     Arm pain     Concussion with loss of consciousness     Convulsions ([facility] [address]     Depression     Flashing lights     Head injury     Headache     Leg pain     Numbness and tingling     arms and hands    PTSD (post-traumatic stress disorder)     Seizures (HCC)        Past Surgical History:  Past Surgical History:   Procedure Laterality Date    COLONOSCOPY      ENDOSCOPY, COLON, DIAGNOSTIC         Home Medications:  Prior to Admission medications    Medication Sig Start Date End Date Taking?  Authorizing Provider   nicotine (NICODERM CQ) 21 MG/24HR Place 1 patch onto the skin daily 6/19/21   Barb Johnson MD   pantoprazole (PROTONIX) 40 MG tablet Take 1 tablet by mouth every morning (before breakfast) 5/19/21   Noemy Jain MD   folic acid (FOLVITE) 1 MG tablet Take 1 tablet by mouth daily 5/19/21   Noemy Jain MD   vitamin B-1 (THIAMINE) 100 MG tablet Take 1 tablet by mouth daily 5/19/21   Noemy Jain MD   vitamin D (ERGOCALCIFEROL) 1.25 MG (04718 UT) CAPS capsule Take 1 capsule by mouth once a week 5/24/21   Noemy Jain MD       Allergies: Hydrocodone, Ativan [lorazepam], Invega sustenna [paliperidone palmitate er], Paliperidone, Pcn [penicillins], and Fluticasone    Social History:  Social History     Socioeconomic History    Marital status: Single     Spouse name: Not on file    Number of children: 0    Years of education: 9    Highest education level: Not on file   Occupational History    Occupation: [employer]      Employer: SELF     Comment: [employer]   Tobacco Use    Smoking status: Heavy Tobacco Smoker     Packs/day: 2.00     Years: 9.00     Pack years: 18.00    Smokeless tobacco: Never Used   Vaping Use    Vaping Use: Former    Substances: Always   Substance and Sexual Activity    Alcohol use: Yes     Alcohol/week: 24.0 standard drinks     Types: 4 Glasses of wine, 20 Cans of beer per week     Comment: heavy daily use ( 2 bottles of los daily)    Drug use: Yes     Frequency: 1.0 times per week     Types: Marijuana    Sexual activity: Not Currently     Partners: Female   Other Topics Concern    Not on file   Social History Narrative    Not on file     Social Determinants of Health     Financial Resource Strain:     Difficulty of Paying Living Expenses:    Food Insecurity:     Worried About Running Out of Food in the Last Year:     Ran Out of Food in the Last Year:    Transportation Needs:     Lack of Transportation (Medical):      Lack of Transportation (Non-Medical):    Physical Activity:     Days of Exercise per Week:     Minutes of Exercise per Session:    Stress:     Feeling of Stress :    Social Connections:     Frequency of Communication with Friends and Family:     Frequency of Social Gatherings with Friends and Family:     Attends Mu-ism Services:     Active Member of Clubs or Organizations:     Attends Club or Organization Meetings:     Marital Status:    Intimate Partner Violence:     Fear of Current or Ex-Partner:     Emotionally Abused:     Physically Abused:     Sexually Abused:        Family History:  Family History   Problem Relation Age of Onset    Mental Illness Mother     Substance Abuse Mother     Alcohol Abuse Mother     Cancer Father         lung     Substance Abuse Father     Cirrhosis Father     Alcohol Abuse Father     Mental Illness Sister     Substance Abuse Sister     Mental Illness Brother     Substance Abuse Maternal Aunt     Substance Abuse Maternal Uncle     Substance Abuse Paternal Aunt     Substance Abuse Paternal Uncle          PHYSICAL EXAM:  Vital Signs: /85   Pulse 84   Temp 100.2 °F (37.9 °C) (Oral)   Resp 22   Ht 5' 6\" (1.676 m)   Wt 140 lb 6.4 oz (63.7 kg)   SpO2 96%   BMI 22.66 kg/m²   GENERAL APPEARANCE:  awake, alert, oriented, cooperative, and in no acute distress  EYES:  Lids and lashes normal, PERRLA, EOMI, sclera clear, conjunctiva normal  HENT:  Normocephalic, without obvious abnormality, atramatic, oral pharynx with moist mucus membranes, tonsils without erythema or exudates  NECK:  Supple with no carotid bruits, JVD or thyromegaly. No cervical adenopathy  LUNGS:  Clear to auscultation bilaterally with no wheezes, rales or rhonchi. No increased work of breathing, good air exchange. CARDIOVASCULAR: Regular rate and rhythm, no murmur  ABDOMEN: Mild tenderness to palpation epigastric   MUSCULOSKELETAL:  There is no redness, warmth, or swelling of the joints. Full range of motion noted. Motor strength is 5 out of 5 all extremities bilaterally. Tone is normal.  EXTREMITIES: No edema, 2+ pulses bilaterally (radial and dorsalis pedis)  NEUROLOGIC:  Awake, alert, oriented to name, place and time. Cranial nerves II-XII are grossly intact. Motor is 5 out of 5 bilaterally. SKIN: Normal skin color, texture, and turgor. There is no redness, warmth, or swelling. No bruising or bleeding, no mottling. PSYCH: Affect, behavior and insight are all within normal limits.       DATA:  Results for orders placed or performed during the hospital encounter of 07/19/21   CBC auto differential   Result Value Ref Range    WBC 7.7 4.5 - 11.5 E9/L    RBC 3.96 3.80 - 5.80 E12/L    Hemoglobin 14.3 12.5 - 16.5 g/dL    Hematocrit 38.9 37.0 - 54.0 %    MCV 98.2 80.0 - 99.9 fL    MCH 36.1 (H) 26.0 - 35.0 pg    MCHC 36.8 (H) 32.0 - 34.5 %    RDW 13.5 11.5 - 15.0 fL    Platelets 79 (L) 855 - 450 E9/L    MPV 12.8 (H) 7.0 - 12.0 fL    Neutrophils % 85.3 (H) 43.0 - 80.0 %    Immature Granulocytes % 0.3 0.0 - 5.0 %    Lymphocytes % 8.1 (L) 20.0 - 42.0 %    Monocytes % 6.0 2.0 - 12.0 %    Eosinophils % 0.0 0.0 - 6.0 %    Basophils % 0.3 0.0 - 2.0 %    Neutrophils Absolute 6.58 1.80 - 7.30 E9/L    Immature Granulocytes # 0.02 E9/L    Lymphocytes Absolute 0.62 (L) 1.50 - 4.00 E9/L    Monocytes Absolute 0.46 0.10 - 0.95 E9/L    Eosinophils Absolute 0.00 (L) 0.05 - 0.50 E9/L    Basophils Absolute 0.02 0.00 - 0.20 E9/L    Ovalocytes 1+     Target Cells . 1+    Comprehensive Metabolic Panel   Result Value Ref Range    Sodium 136 132 - 146 mmol/L    Potassium 3.2 (L) 3.5 - 5.0 mmol/L    Chloride 90 (L) 98 - 107 mmol/L    CO2 28 22 - 29 mmol/L    Anion Gap 18 (H) 7 - 16 mmol/L    Glucose 109 (H) 74 - 99 mg/dL    BUN 5 (L) 6 - 20 mg/dL    CREATININE 0.5 (L) 0.7 - 1.2 mg/dL    GFR Non-African American >60 >=60 mL/min/1.73    GFR African American >60     Calcium 9.1 8.6 - 10.2 mg/dL    Total Protein 6.7 6.4 - 8.3 g/dL    Albumin 3.2 (L) 3.5 - 5.2 g/dL    Total Bilirubin 0.7 0.0 - 1.2 mg/dL    Alkaline Phosphatase 159 (H) 40 - 129 U/L    ALT 72 (H) 0 - 40 U/L     (H) 0 - 39 U/L   Lipase   Result Value Ref Range    Lipase 231 (H) 13 - 60 U/L   Serum Drug Screen   Result Value Ref Range    Ethanol Lvl 209 mg/dL    Acetaminophen Level <5.0 (L) 10.0 - 32.6 mcg/mL    Salicylate, Serum 1.0 0.0 - 30.0 mg/dL    TCA Scrn see note Cutoff:300 ng/mL   Platelet Confirmation   Result Value Ref Range    Platelet Confirmation CONFIRMED    Serum Drug Screen   Result Value Ref Range    Ethanol Lvl <10 mg/dL Ref Range    Magnesium 2.0 1.6 - 2.6 mg/dL   Phosphorus   Result Value Ref Range    Phosphorus 2.5 2.5 - 4.5 mg/dL   CBC auto differential   Result Value Ref Range    WBC 8.6 4.5 - 11.5 E9/L    RBC 3.30 (L) 3.80 - 5.80 E12/L    Hemoglobin 11.8 (L) 12.5 - 16.5 g/dL    Hematocrit 33.6 (L) 37.0 - 54.0 %    .8 (H) 80.0 - 99.9 fL    MCH 35.8 (H) 26.0 - 35.0 pg    MCHC 35.1 (H) 32.0 - 34.5 %    RDW 13.6 11.5 - 15.0 fL    Platelets 46 (L) 714 - 450 E9/L    MPV 13.9 (H) 7.0 - 12.0 fL    Neutrophils % 93.8 (H) 43.0 - 80.0 %    Lymphocytes % 5.3 (L) 20.0 - 42.0 %    Monocytes % 5.6 2.0 - 12.0 %    Eosinophils % 0.9 0.0 - 6.0 %    Basophils % 0.3 0.0 - 2.0 %    Neutrophils Absolute 8.08 (H) 1.80 - 7.30 E9/L    Lymphocytes Absolute 0.43 (L) 1.50 - 4.00 E9/L    Monocytes Absolute 0.00 (L) 0.10 - 0.95 E9/L    Eosinophils Absolute 0.08 0.05 - 0.50 E9/L    Basophils Absolute 0.00 0.00 - 0.20 E9/L    Polychromasia 1+     Poikilocytes 1+     Ovalocytes 1+    Comprehensive Metabolic Panel   Result Value Ref Range    Sodium 140 132 - 146 mmol/L    Potassium 3.1 (L) 3.5 - 5.0 mmol/L    Chloride 101 98 - 107 mmol/L    CO2 32 (H) 22 - 29 mmol/L    Anion Gap 7 7 - 16 mmol/L    Glucose 89 74 - 99 mg/dL    BUN 5 (L) 6 - 20 mg/dL    CREATININE 0.6 (L) 0.7 - 1.2 mg/dL    GFR Non-African American >60 >=60 mL/min/1.73    GFR African American >60     Calcium 8.3 (L) 8.6 - 10.2 mg/dL    Total Protein 5.9 (L) 6.4 - 8.3 g/dL    Albumin 3.2 (L) 3.5 - 5.2 g/dL    Total Bilirubin 1.3 (H) 0.0 - 1.2 mg/dL    Alkaline Phosphatase 132 (H) 40 - 129 U/L    ALT 57 (H) 0 - 40 U/L     (H) 0 - 39 U/L   Platelet Confirmation   Result Value Ref Range    Platelet Confirmation CONFIRMED    POCT Glucose   Result Value Ref Range    Meter Glucose 131 (H) 74 - 99 mg/dL   EKG 12 Lead   Result Value Ref Range    Ventricular Rate 74 BPM    Atrial Rate 74 BPM    P-R Interval 106 ms    QRS Duration 88 ms    Q-T Interval 442 ms    QTc Calculation (Bazett) 490 ms P Axis 58 degrees    R Axis 67 degrees    T Axis 74 degrees         IMAGING:  CT ABDOMEN PELVIS W IV CONTRAST Additional Contrast? None    Result Date: 7/20/2021  EXAMINATION: CT OF THE ABDOMEN AND PELVIS WITH CONTRAST 7/20/2021 1:06 am TECHNIQUE: CT of the abdomen and pelvis was performed with the administration of intravenous contrast. Multiplanar reformatted images are provided for review. Dose modulation, iterative reconstruction, and/or weight based adjustment of the mA/kV was utilized to reduce the radiation dose to as low as reasonably achievable. COMPARISON: Abdominopelvic CT from 06/13/2021. also triphasic pancreatic CT from 06/16/2021. HISTORY: ORDERING SYSTEM PROVIDED HISTORY: epigastric, RUQ pain TECHNOLOGIST PROVIDED HISTORY: Reason for exam:->epigastric, RUQ pain Additional Contrast?->None Decision Support Exception - unselect if not a suspected or confirmed emergency medical condition->Emergency Medical Condition (MA) FINDINGS: Lower Chest: Visualized lung bases are clear. Perceived borderline thickening of soft tissue just cephalad to the esophageal hiatus could represent small hiatal hernia, with element of distal esophagitis not excluded. Organs: No visualized focal hepatic lesion. Advanced diffuse hepatic steatosis. No significant abnormality of the spleen, gallbladder or adrenal glands. Small accessory splenule noted. No acute renal abnormality. Mild hydroureter to few levels due to bladder distension. Extrarenal pelves noted bilaterally. The proximal pancreas is unremarkable. The distal pancreas demonstrates confluent cystic change with a few intervening areas of parenchymal enhancement, with larger pseudocystic changes seen more proximally in June significantly improved. No new pancreatic finding or evidence of acute pancreatitis. GI/Bowel: No acute specific small or large bowel abnormality is identified. Pelvis:  The salient finding in the pelvis is relatively severely distended urinary bladder, with the bladder dome projecting just cephalad to the umbilical level. No bladder wall thickening, stones or perivesical fat stranding. Normal appearance of the prostate gland and seminal vesicles. Peritoneum/Retroperitoneum: No free intraperitoneal air or fluid. Bones/Soft Tissues: No acute soft tissue abnormality is identified. No acute osseous abnormality. Transitional upper sacrum noted with pseudoarticulation on the right. The salient/acute finding on this exam is onofre distension of the urinary bladder, with the bladder extending out of the pelvis and the bladder dome projecting just cephalad to the umbilical level. Differential considerations include bladder outlet obstruction, bladder atony, and potential untoward side effects of anticholinergic medication. The prostate gland is normal in size. The bladder is otherwise unremarkable. Please correlate clinically. 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 Advanced diffuse hepatic steatosis. Small hiatal hernia versus borderline distal esophageal wall thickening, potentially mild partially imaged esophagitis if the latter. US GALLBLADDER RUQ    Result Date: 7/20/2021  EXAMINATION: RIGHT UPPER QUADRANT ULTRASOUND 7/20/2021 12:42 pm COMPARISON: None. HISTORY: ORDERING SYSTEM PROVIDED HISTORY: pancreatitis/cholecystitis TECHNOLOGIST PROVIDED HISTORY: Reason for exam:->pancreatitis/cholecystitis What reading provider will be dictating this exam?->CRC FINDINGS: LIVER:  There is fatty infiltration of the liver. BILIARY SYSTEM:  Gallbladder is unremarkable without evidence of pericholecystic fluid, wall thickening or stones. Negative sonographic An's sign. Common bile duct is within normal limits measuring .  RIGHT KIDNEY: The right kidney is grossly unremarkable without evidence of hydronephrosis. PANCREAS:  There is no evidence of acute pancreatitis. There are findings of previous pancreatitis with necrosis of the pancreatic tail best appreciated on the earlier CT scan of the abdomen and pelvis. . OTHER: No evidence of right upper quadrant ascites. There is no acute right upper quadrant pathology. 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 These findings are best appreciated on the earlier CT scan of the abdomen and pelvis. ASSESSMENT/PLAN:      1. Melena/concern for acute GI bleed  -Vital signs stable  -Hemoglobin stable this a.m. at 11.8  -Protonix 40mg IV BID/Type and Cross 2 Units on hold/Elevate HOB 30 Degrees/q 6 Hr Hgb transfuse for Hgb less than 7.0 per admitting  -We will place patient on octreotide 50 MCG's with the thrombocytopenia present  -Her ultrasound shows hepatic steatosis no overt signs of cirrhosis on imaging  Recommend- outpatient FibroSure for evaluation  -Okay for  clear liquid diet today from GI point of view  -N.p.o. after midnight  -Plan on EGD 7/22 to evaluate    2. Acute on chronic pancreatitis  -CT the abdomen on admission showed chronic change the distal pancreatic tail and body compatible with sequela of prior pancreatic necrosis with smaller cystic changes with no evidence of acute pancreatitis at this time  -Continue with IV fluid hydration  Patient refrain from alcohol abuse-    3. EtOH withdrawal  -Management per admitting             Please see orders for further plan of care. Reviewed above with Dr. Sherwin Geiger DO, JHONNY.   GI Fellow  7/21/2021 at 8:55 AM    Pt seen and independently examined. Pertinent notes and lab work reviewed. D/w Dr. Damion Nicolas with physical exam and A&P. Discussed with patient - all questions answered - agreeable with the plan as delineated.   Thank you for the opportunity to see this patient in consultation.     Sami Galarza MD  7/21/2021  11:42 AM

## 2021-07-21 NOTE — ANESTHESIA PRE PROCEDURE
Department of Anesthesiology  Preprocedure Note       Name:  Charo Harris   Age:  31 y.o.  :  1990                                          MRN:  [id number]         Date:  2021      Surgeon: Emmett Khan):  John Bazan MD    Procedure: Procedure(s):  EGD ESOPHAGOGASTRODUODENOSCOPY    Medications prior to admission:   Prior to Admission medications    Medication Sig Start Date End Date Taking? Authorizing Provider   nicotine (NICODERM CQ) 21 MG/24HR Place 1 patch onto the skin daily 21   Salinas Malone MD   pantoprazole (PROTONIX) 40 MG tablet Take 1 tablet by mouth every morning (before breakfast) 21   Shelby Bolanos MD   folic acid (FOLVITE) 1 MG tablet Take 1 tablet by mouth daily 21   Shelby Bolanos MD   vitamin B-1 (THIAMINE) 100 MG tablet Take 1 tablet by mouth daily 21   Shelby Bolanos MD   vitamin D (ERGOCALCIFEROL) 1.25 MG (24334 UT) CAPS capsule Take 1 capsule by mouth once a week 21   Shelby Bolanos MD       Current medications:    No current facility-administered medications for this encounter. No current outpatient medications on file.      Facility-Administered Medications Ordered in Other Encounters   Medication Dose Route Frequency Provider Last Rate Last Admin    PHENobarbital (LUMINAL) tablet 64.8 mg  64.8 mg Oral Q8H Bob Carr DO        potassium chloride (KLOR-CON M) extended release tablet 10 mEq  10 mEq Oral BID Leticia Stapleton APRN - CNP   10 mEq at 21 0904    pantoprazole (PROTONIX) injection 40 mg  40 mg Intravenous BID Rolin Daya APRN - CNP   40 mg at 21 5050    And    sodium chloride (PF) 0.9 % injection 10 mL  10 mL Intravenous BID Leticia Stapleton APRN - CNP   10 mL at 21 0904    nicotine (NICODERM CQ) 21 MG/24HR 1 patch  1 patch Transdermal Daily Rolin Daya APRN - CNP   1 patch at 21 1003    octreotide (SANDOSTATIN) 500 mcg in sodium chloride 0.9 % 100 mL infusion  50 mcg/hr Intravenous Continuous Juan Dinh DO        promethazine (PHENERGAN) injection 25 mg  25 mg Intravenous Q4H PRN Traci Ponce MD   25 mg at 07/21/21 7171    therapeutic multivitamin-minerals 1 tablet  1 tablet Oral Daily Traci Ponce MD   1 tablet at 07/21/21 0904    promethazine (PHENERGAN) tablet 12.5 mg  12.5 mg Oral Q6H PRN Traci Ponce MD        Or    ondansetron (ZOFRAN) injection 4 mg  4 mg Intravenous Q6H PRN Traci Ponce MD        polyethylene glycol (GLYCOLAX) packet 17 g  17 g Oral Daily PRN Traci Ponce MD        acetaminophen (TYLENOL) tablet 650 mg  650 mg Oral Q6H PRN Traci Ponce MD        Or    acetaminophen (TYLENOL) suppository 650 mg  650 mg Rectal Q6H PRN Traci Ponce MD        lactated ringers infusion   Intravenous Continuous Bob Carr  mL/hr at 07/21/21 0858 Rate Change at 07/21/21 0858    sodium chloride flush 0.9 % injection 5-40 mL  5-40 mL Intravenous 2 times per day Joelene Homans, APRN - CNP   10 mL at 07/21/21 0905    sodium chloride flush 0.9 % injection 5-40 mL  5-40 mL Intravenous PRN Joelene Homans, APRN - CNP        0.9 % sodium chloride infusion  25 mL Intravenous PRN Joelene Homans, APRN - CNP        folic acid (FOLVITE) tablet 1 mg  1 mg Oral Daily Joelene Homans, APRN - CNP   1 mg at 07/21/21 0904    LORazepam (ATIVAN) tablet 1 mg  1 mg Oral Q1H PRN Joelene Homans, APRN - CNP   1 mg at 07/21/21 1134    Or    LORazepam (ATIVAN) injection 1 mg  1 mg Intravenous Q1H PRN Joelene Homans, APRN - CNP        Or    LORazepam (ATIVAN) tablet 2 mg  2 mg Oral Q1H PRN Joelene Homans, APRN - CNP   2 mg at 07/21/21 0414    Or    LORazepam (ATIVAN) injection 2 mg  2 mg Intravenous Q1H PRN Joelene Homans, APRN - CNP   2 mg at 07/20/21 1416    Or    LORazepam (ATIVAN) tablet 3 mg  3 mg Oral Q1H PRN Joelene Homans, APRN - CNP        Or    LORazepam (ATIVAN) injection 3 mg  3 mg Intravenous Q1H PRN Joelene Homans, APRN - AM[place]        Or  LORazepam (ATIVAN) tablet 4 mg  4 mg Oral Q1H PRN [facility], [facility] - [facility]        Or    LORazepam (ATIVAN) injection 4 mg  4 mg Intravenous Q1H PRN [facility], APRN - [facility]        thiamine (B-1) injection 100 mg  100 mg Intravenous Daily [facility], [facility] - CNP   100 mg at 07/21/21 0905    Vitamin D (CHOLECALCIFEROL) tablet 1,000 Units  1,000 Units Oral Daily [facility], APRN - CNP   1,000 Units at 07/21/21 8286       Allergies:     Allergies   Allergen Reactions    Hydrocodone Anaphylaxis    Ativan [Lorazepam] Other (See Comments)     \"Hallucinates\"     [place] [employer] [Paliperidone Palmitate Er] Other (See Comments)     \"Unable to Urinate\"    Paliperidone Other (See Comments)    Pcn [Penicillins] Nausea Only    Fluticasone Rash       Problem List:    Patient Active Problem List   Diagnosis Code    Alcohol withdrawal syndrome with complication ([facility] R66.085    Alcoholism ([address] F10.20    Acute alcoholic intoxication ([address] F10.929    Severe single current episode of major depressive disorder, without psychotic features ([address] F32.2    Marijuana smoker F12.90    Major depressive disorder, severe ([address] F32.2    Severe depressed bipolar I disorder without psychotic features ([address] F31.4    Alcohol-induced acute pancreatitis K85.20    Alcohol withdrawal ([address] F10.239    Alcohol abuse with withdrawal ([address] F10.139    Alcohol withdrawal, uncomplicated ([address] Y34.700    Alcohol induced acute pancreatitis without necrosis or infection K85.20    Lactic acidosis E87.2    Pancreatitis, recurrent K85.90    Alcohol withdrawal seizure ([address] F10.239, Q89.5    Alcoholic fatty liver C78.8    Tobacco abuse Z72.0    Hypokalemia E87.6    Impending delirium tremens ([address] F10.239    Thrombocytopenia (HCC) D69.6    Melena K92.1       Past Medical History:        Diagnosis Date    Anxiety     Arm pain     Concussion with loss of consciousness     Convulsions ([address]     Depression     Flashing lights     Head injury     Headache     Leg pain     Numbness and tingling     arms and hands    PTSD (post-traumatic stress disorder)     Seizures (HCC)        Past Surgical History:        Procedure Laterality Date    COLONOSCOPY      ENDOSCOPY, COLON, DIAGNOSTIC         Social History:    Social History     Tobacco Use    Smoking status: Heavy Tobacco Smoker     Packs/day: 2.00     Years: 9.00     Pack years: 18.00    Smokeless tobacco: Never Used   Substance Use Topics    Alcohol use: Yes     Alcohol/week: 24.0 standard drinks     Types: 4 Glasses of wine, 20 Cans of beer per week     Comment: heavy daily use ( 2 bottles of los daily)                                Ready to quit: Not Answered  Counseling given: Not Answered      Vital Signs (Current): There were no vitals filed for this visit.                                            BP Readings from Last 3 Encounters:   07/21/21 (!) 127/91   06/22/21 102/60   05/19/21 119/75       NPO Status:                                                                                 BMI:   Wt Readings from Last 3 Encounters:   07/20/21 140 lb 6.4 oz (63.7 kg)   06/13/21 146 lb 2.6 oz (66.3 kg)   05/16/21 150 lb (68 kg)     There is no height or weight on file to calculate BMI.    CBC:   Lab Results   Component Value Date    WBC 8.6 07/21/2021    RBC 3.30 07/21/2021    HGB 11.8 07/21/2021    HCT 33.6 07/21/2021    .8 07/21/2021    RDW 13.6 07/21/2021    PLT 46 07/21/2021       CMP:   Lab Results   Component Value Date     07/21/2021    K 3.1 07/21/2021    K 3.3 02/28/2021     07/21/2021    CO2 32 07/21/2021    BUN 5 07/21/2021    CREATININE 0.6 07/21/2021    GFRAA >60 07/21/2021    LABGLOM >60 07/21/2021    GLUCOSE 89 07/21/2021    PROT 5.9 07/21/2021    CALCIUM 8.3 07/21/2021    BILITOT 1.3 07/21/2021    ALKPHOS 132 07/21/2021     07/21/2021    ALT 57 07/21/2021       POC Tests: No results for input(s): POCGLU, POCNA, POCK, POCCL, [place], POCHCT in the last 72 hours. Coags:   Lab Results   Component Value Date    PROTIME 11.8 06/13/2021    INR 1.0 06/13/2021       HCG (If Applicable): No results found for: PREGTESTUR, PREGSERUM, HCG, HCGQUANT     ABGs: No results found for: PHART, PO2ART, VFN7QBE, NXJ8AQI, BEART, Q8YWWWPI     Type & Screen (If Applicable):  No results found for: LABABO, LABRH    Drug/Infectious Status (If Applicable):  No results found for: HIV, HEPCAB    COVID-19 Screening (If Applicable):   Lab Results   Component Value Date    COVID19 Not Detected 08/22/2020           Anesthesia Evaluation  Patient summary reviewed  Airway: Mallampati: III  TM distance: >3 FB   Neck ROM: full  Mouth opening: > = 3 FB Dental:      Comment: Dentition intact, patient denies any loose teeth. Pulmonary: breath sounds clear to auscultation  (+) decreased breath sounds,  current smoker ( Heavy Tobacco Smoker - 2-3 PPD x 12 years)                           Cardiovascular:Negative CV ROS            Rhythm: regular  Rate: normal                    Neuro/Psych:   (+) seizures:, headaches: tension headaches, psychiatric history ( PTSD (post-traumatic stress disorder), Severe depressed bipolar I disorder without psychotic features (HCC) , Alcohol withdrawal syndrome with complication ():depression/anxiety  ( Severe depressed bipolar I disorder without psychotic features )            GI/Hepatic/Renal:   (+) GERD:, PUD, liver disease ( Alcoholic fatty liver):,      Hepatitis: Alcoholic fatty liver. ROS comment: Upper GI bleed with black tarry stools,  H/O Melena. Endo/Other: Negative Endo/Other ROS   (+) blood dyscrasia: thrombocytopenia:., .                 Abdominal:             Vascular: negative vascular ROS. Other Findings:           Anesthesia Plan      MAC     ASA 3       Induction: intravenous. Anesthetic plan and risks discussed with patient. Plan discussed with CRNA.                   Yelena Ivy MD 7/21/2021

## 2021-07-21 NOTE — CARE COORDINATION
7/21/21 No covid testing. Cm transition of care: met with patient while in icu. He is in agreement to speak to someone from [facility] about his alcohol abuse. Referral placed to [facility]. Pt continues care in icu. Room air. Pt alert and oriented. CM to follow for possibly admission to rehab needs.  Electronically signed by MEENU Blanchard on 7/21/2021 at 11:36 AM

## 2021-07-21 NOTE — CONSULTS
CRITICAL CARE PROGRESS NOTE    The patient's case was discussed in multidisciplinary rounds including critical care specialist, nursing, RT and pharmacy. His evaluation is as follows:     31year old man with PMH of epilepsy, chronic alcoholism, depression, anxiety, PTSD, traumatic brain injury and as described below admitted to ICU for management of seizures in the setting of alcohol withdrawal.    The patient suffered a seizure while at the general medicine floor; confused at his arrival to the ICU. He is receiving phenobarbital and PO lorazepam, he is improved. Remains having abdominal pain when palpated in the lower quadrants    Has melena, GI consulted    BP (!) 128/91   Pulse 80   Temp 100.2 °F (37.9 °C) (Oral)   Resp 29   Ht 5' 6\" (1.676 m)   Wt 140 lb 6.4 oz (63.7 kg)   SpO2 98%   BMI 22.66 kg/m²   General: Awake, oriented to place, time and person  HEENT: No head lesions, PERRL, EOMI, mouth without lesions, no nasal lesions, no cervical adenopathy palpated  Respiratory: Lungs with equal breath sounds bilaterally, no adventitious sounds auscultated, no accessory muscle use  CV: Regular rate, no murmurs, JVD, no leg edema  Abdomen: Soft, non tender, + bowel sounds, no lesions  Skin: Hydrated, adequate turgor, no rash, capillary refill <2 seconds  Extremities: Muscular strength 4/5 in 4 limbs, moves 4 limbs spontaneously, distal pulses present  Neurology: Awake and alert, follows commands, moves 4 limbs on command and spontaneously, with mild tremor of extremities, neck is supple, no meningitic signs present.       A/P:  1) Chronic alcoholism with withdrawal syndrome and delirium tremens  --CIWA protocol with benzodiazepines and phenobarbital  --On MVI and thiamine high dose for management of Wernicke encephalopathy    2) Acute pancreatitis with pseudocyst  --Continue fluid hydration     3) Acute blood loss anemia due to gastrointestinal hemorrhage  --On PPI  --GI consulted    4) Hepatic steatosis, alcoholic hepatitis  --Improving    5) Macrocytic anemia  --Transfusion if Hb <7    6) Thrombocytopenia  --Monitor, maintain platelets >21P or 47K if bleeding    7) Hypokalemia  --Received supplementation         DVT prophylaxis with SCDs  Nutrition: PO  Vascular catheters: Peripheral lines  Urinary catheter needed for strict input/output  GI prophylaxis with PPI  Goals of care: Full code    Last 3 CMP:    Recent Labs     07/19/21  2223 07/20/21  1400 07/21/21  0415    141 140   K 3.2* 2.9* 3.1*   CL 90* 100 101   CO2 28 31* 32*   BUN 5* 9 5*   CREATININE 0.5* 0.6* 0.6*   GLUCOSE 109* 102* 89   CALCIUM 9.1 8.0* 8.3*   PROT 6.7 5.4* 5.9*   LABALBU 3.2* 2.9* 3.2*   BILITOT 0.7 1.2 1.3*   ALKPHOS 159* 118 132*   * 94* 136*   ALT 72* 51* 57*     Recent Labs     07/21/21  0415   WBC 8.6   RBC 3.30*   HGB 11.8*   HCT 33.6*   .8*   MCH 35.8*   MCHC 35.1*   RDW 13.6   PLT 46*   MPV 13.9*       No results for input(s): BC in the last 72 hours. No results for input(s): [place] in the last 72 hours.     24 HR INTAKE/OUTPUT:      Intake/Output Summary (Last 24 hours) at 7/21/2021 1021  Last data filed at 7/21/2021 3152  Gross per 24 hour   Intake --   Output 2550 ml   Net -2550 ml     MEDICATIONS:   PHENobarbital  64.8 mg Oral Q8H    potassium chloride  10 mEq Oral BID    pantoprazole  40 mg Intravenous BID    And    sodium chloride (PF)  10 mL Intravenous BID    nicotine  1 patch Transdermal Daily    multivitamin  1 tablet Oral Daily    sodium chloride flush  5-40 mL Intravenous 2 times per day    folic acid  1 mg Oral Daily    thiamine  100 mg Intravenous Daily    Vitamin D  1,000 Units Oral Daily      lactated ringers 100 mL/hr at 07/21/21 0858    sodium chloride       promethazine, promethazine **OR** ondansetron, polyethylene glycol, acetaminophen **OR** acetaminophen, sodium chloride flush, sodium chloride, LORazepam **OR** LORazepam **OR** LORazepam **OR** LORazepam **OR** LORazepam **OR** LORazepam **OR** LORazepam **OR** LORazepam    OBJECTIVE:  Vitals:    07/21/21 1000   BP: (!) 128/91   Pulse: 80   Resp: 29   Temp:    SpO2: 98%           O2 Device: None (Room air)    LABS:  WBC   Date Value Ref Range Status   07/21/2021 8.6 4.5 - 11.5 E9/L Final   07/20/2021 10.4 4.5 - 11.5 E9/L Final   07/19/2021 7.7 4.5 - 11.5 E9/L Final     Hemoglobin   Date Value Ref Range Status   07/21/2021 11.8 (L) 12.5 - 16.5 g/dL Final   07/20/2021 11.1 (L) 12.5 - 16.5 g/dL Final   07/19/2021 14.3 12.5 - 16.5 g/dL Final     Hematocrit   Date Value Ref Range Status   07/21/2021 33.6 (L) 37.0 - 54.0 % Final   07/20/2021 31.4 (L) 37.0 - 54.0 % Final   07/19/2021 38.9 37.0 - 54.0 % Final     MCV   Date Value Ref Range Status   07/21/2021 101.8 (H) 80.0 - 99.9 fL Final   07/20/2021 101.3 (H) 80.0 - 99.9 fL Final   07/19/2021 98.2 80.0 - 99.9 fL Final     Platelets   Date Value Ref Range Status   07/21/2021 46 (L) 130 - 450 E9/L Final   07/20/2021 46 (L) 130 - 450 E9/L Final   07/19/2021 79 (L) 130 - 450 E9/L Final     Sodium   Date Value Ref Range Status   07/21/2021 140 132 - 146 mmol/L Final   07/20/2021 141 132 - 146 mmol/L Final   07/19/2021 136 132 - 146 mmol/L Final     Potassium   Date Value Ref Range Status   07/21/2021 3.1 (L) 3.5 - 5.0 mmol/L Final   07/20/2021 2.9 (L) 3.5 - 5.0 mmol/L Final   07/19/2021 3.2 (L) 3.5 - 5.0 mmol/L Final     Comment:     Specimen is moderately Hemolyzed. Result may be artificially increased.      Potassium reflex Magnesium   Date Value Ref Range Status   02/28/2021 3.3 (L) 3.5 - 5.0 mmol/L Final   02/27/2021 3.9 3.5 - 5.0 mmol/L Final   12/27/2020 3.9 3.5 - 5.0 mmol/L Final     Chloride   Date Value Ref Range Status   07/21/2021 101 98 - 107 mmol/L Final   07/20/2021 100 98 - 107 mmol/L Final   07/19/2021 90 (L) 98 - 107 mmol/L Final     CO2   Date Value Ref Range Status   07/21/2021 32 (H) 22 - 29 mmol/L Final   07/20/2021 31 (H) 22 - 29 mmol/L Final   07/19/2021 28 22 - 29 mmol/L Final     BUN   Date Value Ref Range Status   07/21/2021 5 (L) 6 - 20 mg/dL Final   07/20/2021 9 6 - 20 mg/dL Final   07/19/2021 5 (L) 6 - 20 mg/dL Final     CREATININE   Date Value Ref Range Status   07/21/2021 0.6 (L) 0.7 - 1.2 mg/dL Final   07/20/2021 0.6 (L) 0.7 - 1.2 mg/dL Final   07/19/2021 0.5 (L) 0.7 - 1.2 mg/dL Final     Glucose   Date Value Ref Range Status   07/21/2021 89 74 - 99 mg/dL Final   07/20/2021 102 (H) 74 - 99 mg/dL Final   07/19/2021 109 (H) 74 - 99 mg/dL Final     Calcium   Date Value Ref Range Status   07/21/2021 8.3 (L) 8.6 - 10.2 mg/dL Final   07/20/2021 8.0 (L) 8.6 - 10.2 mg/dL Final   07/19/2021 9.1 8.6 - 10.2 mg/dL Final     Total Protein   Date Value Ref Range Status   07/21/2021 5.9 (L) 6.4 - 8.3 g/dL Final   07/20/2021 5.4 (L) 6.4 - 8.3 g/dL Final   07/19/2021 6.7 6.4 - 8.3 g/dL Final     Albumin   Date Value Ref Range Status   07/21/2021 3.2 (L) 3.5 - 5.2 g/dL Final   07/20/2021 2.9 (L) 3.5 - 5.2 g/dL Final   07/19/2021 3.2 (L) 3.5 - 5.2 g/dL Final     Total Bilirubin   Date Value Ref Range Status   07/21/2021 1.3 (H) 0.0 - 1.2 mg/dL Final   07/20/2021 1.2 0.0 - 1.2 mg/dL Final   07/19/2021 0.7 0.0 - 1.2 mg/dL Final     Alkaline Phosphatase   Date Value Ref Range Status   07/21/2021 132 (H) 40 - 129 U/L Final   07/20/2021 118 40 - 129 U/L Final   07/19/2021 159 (H) 40 - 129 U/L Final     AST   Date Value Ref Range Status   07/21/2021 136 (H) 0 - 39 U/L Final   07/20/2021 94 (H) 0 - 39 U/L Final   07/19/2021 123 (H) 0 - 39 U/L Final     Comment:     Specimen is moderately Hemolyzed. Result may be artificially increased. ALT   Date Value Ref Range Status   07/21/2021 57 (H) 0 - 40 U/L Final   07/20/2021 51 (H) 0 - 40 U/L Final   07/19/2021 72 (H) 0 - 40 U/L Final     GFR Non-   Date Value Ref Range Status   07/21/2021 >60 >=60 mL/min/1.73 Final     Comment:     Chronic Kidney Disease: less than 60 ml/min/1.73 sq.m.           Kidney Failure: less than 15 ml/min/1.73 sq.m. Results valid for patients 18 years and older. 07/20/2021 >60 >=60 mL/min/1.73 Final     Comment:     Chronic Kidney Disease: less than 60 ml/min/1.73 sq.m. Kidney Failure: less than 15 ml/min/1.73 sq.m. Results valid for patients 18 years and older. 07/19/2021 >60 >=60 mL/min/1.73 Final     Comment:     Chronic Kidney Disease: less than 60 ml/min/1.73 sq.m. Kidney Failure: less than 15 ml/min/1.73 sq.m. Results valid for patients 18 years and older. GFR    Date Value Ref Range Status   07/21/2021 >60  Final   07/20/2021 >60  Final   07/19/2021 >60  Final     Magnesium   Date Value Ref Range Status   07/21/2021 2.0 1.6 - 2.6 mg/dL Final   07/20/2021 2.5 1.6 - 2.6 mg/dL Final   06/22/2021 2.0 1.6 - 2.6 mg/dL Final     Phosphorus   Date Value Ref Range Status   07/21/2021 2.5 2.5 - 4.5 mg/dL Final   07/20/2021 3.0 2.5 - 4.5 mg/dL Final   06/22/2021 3.7 2.5 - 4.5 mg/dL Final     No results for input(s): PH, PO2, PCO2, HCO3, BE, O2SAT in the last 72 hours. RADIOLOGY:  US GALLBLADDER RUQ   Final Result   There is no acute right upper quadrant pathology. Specifically, there is no   acute cholecystitis      Hepatic steatosis      Previous pancreatitis with necrosis of the pancreatic tail. These findings   are best appreciated on the earlier CT scan of the abdomen and pelvis. CT ABDOMEN PELVIS W IV CONTRAST Additional Contrast? None   Final Result   The salient/acute finding on this exam is onofre distension of the urinary   bladder, with the bladder extending out of the pelvis and the bladder dome   projecting just cephalad to the umbilical level. Differential considerations   include bladder outlet obstruction, bladder atony, and potential untoward   side effects of anticholinergic medication. The prostate gland is normal in   size. The bladder is otherwise unremarkable. Please correlate clinically.       Chronic changes of the distal pancreatic tail and body, compatible with   sequela of prior pancreatic necrosis, with intervally smaller cystic changes   compared to prior study and no evidence of acute pancreatitis at this time. Advanced diffuse hepatic steatosis. Small hiatal hernia versus borderline distal esophageal wall thickening,   potentially mild partially imaged esophagitis if the latter. XR CHEST PORTABLE    (Results Pending)     PROBLEM LIST:  Principal Problem:    Alcohol withdrawal syndrome with complication (HCC)  Active Problems:    Pancreatitis, recurrent    Alcohol withdrawal seizure ([facility]    Alcoholic fatty liver    Tobacco abuse    Hypokalemia    Impending delirium tremens ([facility] [facility]    Thrombocytopenia ([facility] [facility]    Melena  Resolved Problems:    * No resolved hospital problems. *      ATTESTATION:  ICU Staff Physician note of personal involvement in Care  As the attending physician, I certify that I personally reviewed the patients history and personnally examined the patient to confirm the physical findings described above,  And that I reviewed the relevant imaging studies and available reports. I also discussed the differential diagnosis and all of the proposed management plans with the patient and individuals accompanying the patient to this visit. They had the opportunity to ask questions about the proposed management plans and to have those questions answered. This patient has a high probability of sudden, clinically significant deterioration, which requires the highest level of physician preparedness to intervene urgently. I managed/supervised life or organ supporting interventions that required frequent physician assessment. I devoted my full attention to the direct care of this patient for the amount of time indicated below.   Time I spent with the family or surrogate(s) is included only if the patient was incapable of providing the necessary information or participating in medical decisions - Time devoted to teaching and to any procedures I billed separately is not included.      CRITICAL CARE TIME:  30 minutes    Mag Arteaga MD  Pulmonary and Critical Care Medicine

## 2021-07-21 NOTE — PROGRESS NOTES
no wheezes, rales or rhonchi, normal air movement, no respiratory distress  Cardiovascular: normal rate, normal S1 and S2 and no carotid bruits  Abdomen: soft, non-tender, non-distended, normal bowel sounds, no masses or organomegaly  Extremities: 1+ edema of the right hand around IV site. No LE edema, cyanosis, or clubbing  Neurologic: no cranial nerve deficit and speech normal      Recent Labs     07/19/21 2223 07/20/21 1400 07/21/21  0415    141 140   K 3.2* 2.9* 3.1*   CL 90* 100 101   CO2 28 31* 32*   BUN 5* 9 5*   CREATININE 0.5* 0.6* 0.6*   GLUCOSE 109* 102* 89   CALCIUM 9.1 8.0* 8.3*       Recent Labs     07/19/21 2223 07/20/21 1400 07/21/21  0415   ALKPHOS 159* 118 132*   PROT 6.7 5.4* 5.9*   LABALBU 3.2* 2.9* 3.2*   BILITOT 0.7 1.2 1.3*   * 94* 136*   ALT 72* 51* 57*       Recent Labs     07/19/21 2223 07/20/21 1400 07/21/21  0415   WBC 7.7 10.4 8.6   RBC 3.96 3.10* 3.30*   HGB 14.3 11.1* 11.8*   HCT 38.9 31.4* 33.6*   MCV 98.2 101.3* 101.8*   MCH 36.1* 35.8* 35.8*   MCHC 36.8* 35.4* 35.1*   RDW 13.5 14.0 13.6   PLT 79* 46* 46*   MPV 12.8* 12.7* 13.9*           Radiology:   US GALLBLADDER RUQ   Final Result   There is no acute right upper quadrant pathology. Specifically, there is no   acute cholecystitis      Hepatic steatosis      Previous pancreatitis with necrosis of the pancreatic tail. These findings   are best appreciated on the earlier CT scan of the abdomen and pelvis. CT ABDOMEN PELVIS W IV CONTRAST Additional Contrast? None   Final Result   The salient/acute finding on this exam is onofre distension of the urinary   bladder, with the bladder extending out of the pelvis and the bladder dome   projecting just cephalad to the umbilical level. Differential considerations   include bladder outlet obstruction, bladder atony, and potential untoward   side effects of anticholinergic medication. The prostate gland is normal in   size. The bladder is otherwise unremarkable. Please correlate clinically. Chronic changes of the distal pancreatic tail and body, compatible with   sequela of prior pancreatic necrosis, with intervally smaller cystic changes   compared to prior study and no evidence of acute pancreatitis at this time. Advanced diffuse hepatic steatosis. Small hiatal hernia versus borderline distal esophageal wall thickening,   potentially mild partially imaged esophagitis if the latter. XR CHEST PORTABLE    (Results Pending)       Assessment/Plan:  Principal Problem:    Alcohol withdrawal syndrome with complication (HCC)  Active Problems:    Pancreatitis, recurrent    Alcohol withdrawal seizure ([address]    Alcoholic fatty liver    Tobacco abuse    Hypokalemia    Impending delirium tremens ([address]    Thrombocytopenia ([address]    Melena  Resolved Problems:    * No resolved hospital problems. *       1. Alcohol abuse with complicated alcohol withdrawal syndrome and seizures/impending delirium tremens  -given IV Ativan 2mg x 1 for withdrawal seizure on 7/20 with resolution   -decrease phenobarbital to 64.8mg q 8hrs today  -continue prn Ativan per CIWA scale   -stable for transfer to stepdown     2. Recurrent pancreatitis due to alcohol abuse  -decrease IVF rate to LR at 100mL/hr today  -advance diet to clear liquid     3. Melena  -increase IV PPI to bid  -hold enoxaparin   -GI consult for possible endoscopy      4. Alcoholic fatty liver disease  -Patient has been counseled in the past about abstinence from alcohol. Counseled again today about complete abstinence. Case management and social work following for possible rehab when medically stable for discharge. Today, patient states he will \"think about it\".     5. Hypokalemia   -continue po bid supplementation     6. Thrombocytopenia  -due to alcoholic liver disease   -platelets decreased to 46 today  -hold enoxaparin     7.  Tobacco abuse  -counseled on cessation      DVT prophylaxis: SCDs  CODE STATUS: Full code    Disposition: Transfer to stepdown unit     Discussed with RN and ICU NP       NOTE: This report was transcribed using voice recognition software. Every effort was made to ensure accuracy; however, inadvertent computerized transcription errors may be present.      Electronically signed by Jd Christiansen DO on 7/21/2021 at 8:57 AM

## 2021-07-22 ENCOUNTER — APPOINTMENT (OUTPATIENT)
Dept: ULTRASOUND IMAGING | Age: 31
DRG: 282 | End: 2021-07-22
Payer: COMMERCIAL

## 2021-07-22 ENCOUNTER — ANESTHESIA (OUTPATIENT)
Dept: ENDOSCOPY | Age: 31
DRG: 282 | End: 2021-07-22
Payer: COMMERCIAL

## 2021-07-22 VITALS
RESPIRATION RATE: 12 BRPM | DIASTOLIC BLOOD PRESSURE: 70 MMHG | OXYGEN SATURATION: 94 % | SYSTOLIC BLOOD PRESSURE: 106 MMHG

## 2021-07-22 LAB
ALBUMIN SERPL-MCNC: 3 G/DL (ref 3.5–5.2)
ALP BLD-CCNC: 126 U/L (ref 40–129)
ALT SERPL-CCNC: 55 U/L (ref 0–40)
ANION GAP SERPL CALCULATED.3IONS-SCNC: 9 MMOL/L (ref 7–16)
AST SERPL-CCNC: 106 U/L (ref 0–39)
BILIRUB SERPL-MCNC: 0.9 MG/DL (ref 0–1.2)
BILIRUBIN DIRECT: 0.4 MG/DL (ref 0–0.3)
BILIRUBIN, INDIRECT: 0.5 MG/DL (ref 0–1)
BUN BLDV-MCNC: <2 MG/DL (ref 6–20)
CALCIUM SERPL-MCNC: 8.2 MG/DL (ref 8.6–10.2)
CHLORIDE BLD-SCNC: 102 MMOL/L (ref 98–107)
CO2: 27 MMOL/L (ref 22–29)
CREAT SERPL-MCNC: 0.5 MG/DL (ref 0.7–1.2)
GFR AFRICAN AMERICAN: >60
GFR NON-AFRICAN AMERICAN: >60 ML/MIN/1.73
GLUCOSE BLD-MCNC: 134 MG/DL (ref 74–99)
HCT VFR BLD CALC: 33.8 % (ref 37–54)
HEMOGLOBIN: 11.6 G/DL (ref 12.5–16.5)
MAGNESIUM: 1.7 MG/DL (ref 1.6–2.6)
MCH RBC QN AUTO: 35.6 PG (ref 26–35)
MCHC RBC AUTO-ENTMCNC: 34.3 % (ref 32–34.5)
MCV RBC AUTO: 103.7 FL (ref 80–99.9)
PDW BLD-RTO: 13.5 FL (ref 11.5–15)
PLATELET # BLD: 72 E9/L (ref 130–450)
PLATELET CONFIRMATION: NORMAL
PMV BLD AUTO: 14 FL (ref 7–12)
POTASSIUM SERPL-SCNC: 3.3 MMOL/L (ref 3.5–5)
RBC # BLD: 3.26 E12/L (ref 3.8–5.8)
SODIUM BLD-SCNC: 138 MMOL/L (ref 132–146)
TOTAL PROTEIN: 5.8 G/DL (ref 6.4–8.3)
WBC # BLD: 7.8 E9/L (ref 4.5–11.5)

## 2021-07-22 PROCEDURE — 6370000000 HC RX 637 (ALT 250 FOR IP): Performed by: INTERNAL MEDICINE

## 2021-07-22 PROCEDURE — 36415 COLL VENOUS BLD VENIPUNCTURE: CPT

## 2021-07-22 PROCEDURE — 99232 SBSQ HOSP IP/OBS MODERATE 35: CPT | Performed by: INTERNAL MEDICINE

## 2021-07-22 PROCEDURE — 88312 SPECIAL STAINS GROUP 1: CPT

## 2021-07-22 PROCEDURE — 6360000002 HC RX W HCPCS: Performed by: INTERNAL MEDICINE

## 2021-07-22 PROCEDURE — 2580000003 HC RX 258: Performed by: NURSE ANESTHETIST, CERTIFIED REGISTERED

## 2021-07-22 PROCEDURE — 93971 EXTREMITY STUDY: CPT

## 2021-07-22 PROCEDURE — C9113 INJ PANTOPRAZOLE SODIUM, VIA: HCPCS

## 2021-07-22 PROCEDURE — 1200000000 HC SEMI PRIVATE

## 2021-07-22 PROCEDURE — 6360000002 HC RX W HCPCS

## 2021-07-22 PROCEDURE — 2580000003 HC RX 258

## 2021-07-22 PROCEDURE — 0DB68ZX EXCISION OF STOMACH, VIA NATURAL OR ARTIFICIAL OPENING ENDOSCOPIC, DIAGNOSTIC: ICD-10-PCS | Performed by: INTERNAL MEDICINE

## 2021-07-22 PROCEDURE — 2709999900 HC NON-CHARGEABLE SUPPLY: Performed by: INTERNAL MEDICINE

## 2021-07-22 PROCEDURE — 88305 TISSUE EXAM BY PATHOLOGIST: CPT

## 2021-07-22 PROCEDURE — 6360000002 HC RX W HCPCS: Performed by: NURSE ANESTHETIST, CERTIFIED REGISTERED

## 2021-07-22 PROCEDURE — 7100000011 HC PHASE II RECOVERY - ADDTL 15 MIN: Performed by: INTERNAL MEDICINE

## 2021-07-22 PROCEDURE — 85027 COMPLETE CBC AUTOMATED: CPT

## 2021-07-22 PROCEDURE — 6370000000 HC RX 637 (ALT 250 FOR IP)

## 2021-07-22 PROCEDURE — 3700000000 HC ANESTHESIA ATTENDED CARE: Performed by: INTERNAL MEDICINE

## 2021-07-22 PROCEDURE — 80048 BASIC METABOLIC PNL TOTAL CA: CPT

## 2021-07-22 PROCEDURE — 7100000010 HC PHASE II RECOVERY - FIRST 15 MIN: Performed by: INTERNAL MEDICINE

## 2021-07-22 PROCEDURE — 80076 HEPATIC FUNCTION PANEL: CPT

## 2021-07-22 PROCEDURE — 2580000003 HC RX 258: Performed by: INTERNAL MEDICINE

## 2021-07-22 PROCEDURE — 0DB58ZX EXCISION OF ESOPHAGUS, VIA NATURAL OR ARTIFICIAL OPENING ENDOSCOPIC, DIAGNOSTIC: ICD-10-PCS | Performed by: INTERNAL MEDICINE

## 2021-07-22 PROCEDURE — 3700000001 HC ADD 15 MINUTES (ANESTHESIA): Performed by: INTERNAL MEDICINE

## 2021-07-22 PROCEDURE — 3609012400 HC EGD TRANSORAL BIOPSY SINGLE/MULTIPLE: Performed by: INTERNAL MEDICINE

## 2021-07-22 PROCEDURE — 83735 ASSAY OF MAGNESIUM: CPT

## 2021-07-22 RX ORDER — PROPOFOL 10 MG/ML
INJECTION, EMULSION INTRAVENOUS CONTINUOUS PRN
Status: DISCONTINUED | OUTPATIENT
Start: 2021-07-22 | End: 2021-07-22 | Stop reason: SDUPTHER

## 2021-07-22 RX ORDER — MORPHINE SULFATE 2 MG/ML
2 INJECTION, SOLUTION INTRAMUSCULAR; INTRAVENOUS ONCE
Status: COMPLETED | OUTPATIENT
Start: 2021-07-22 | End: 2021-07-22

## 2021-07-22 RX ORDER — CYCLOBENZAPRINE HCL 10 MG
10 TABLET ORAL 3 TIMES DAILY PRN
Status: DISCONTINUED | OUTPATIENT
Start: 2021-07-22 | End: 2021-07-23 | Stop reason: HOSPADM

## 2021-07-22 RX ORDER — SODIUM CHLORIDE, SODIUM LACTATE, POTASSIUM CHLORIDE, CALCIUM CHLORIDE 600; 310; 30; 20 MG/100ML; MG/100ML; MG/100ML; MG/100ML
INJECTION, SOLUTION INTRAVENOUS CONTINUOUS PRN
Status: DISCONTINUED | OUTPATIENT
Start: 2021-07-22 | End: 2021-07-22 | Stop reason: SDUPTHER

## 2021-07-22 RX ORDER — PANTOPRAZOLE SODIUM 40 MG/1
40 TABLET, DELAYED RELEASE ORAL
Status: DISCONTINUED | OUTPATIENT
Start: 2021-07-23 | End: 2021-07-23 | Stop reason: HOSPADM

## 2021-07-22 RX ORDER — MAGNESIUM SULFATE IN WATER 40 MG/ML
2000 INJECTION, SOLUTION INTRAVENOUS ONCE
Status: COMPLETED | OUTPATIENT
Start: 2021-07-22 | End: 2021-07-22

## 2021-07-22 RX ORDER — POTASSIUM CHLORIDE 20 MEQ/1
40 TABLET, EXTENDED RELEASE ORAL 2 TIMES DAILY
Status: COMPLETED | OUTPATIENT
Start: 2021-07-22 | End: 2021-07-22

## 2021-07-22 RX ADMIN — LORAZEPAM 2 MG: 2 INJECTION INTRAMUSCULAR; INTRAVENOUS at 19:58

## 2021-07-22 RX ADMIN — PHENOBARBITAL 64.8 MG: 32.4 TABLET ORAL at 12:18

## 2021-07-22 RX ADMIN — PANTOPRAZOLE SODIUM 40 MG: 40 INJECTION, POWDER, FOR SOLUTION INTRAVENOUS at 09:42

## 2021-07-22 RX ADMIN — OCTREOTIDE ACETATE 50 MCG/HR: 500 INJECTION, SOLUTION INTRAVENOUS; SUBCUTANEOUS at 11:26

## 2021-07-22 RX ADMIN — MORPHINE SULFATE 2 MG: 2 INJECTION, SOLUTION INTRAMUSCULAR; INTRAVENOUS at 15:06

## 2021-07-22 RX ADMIN — MAGNESIUM SULFATE HEPTAHYDRATE 2000 MG: 40 INJECTION, SOLUTION INTRAVENOUS at 15:10

## 2021-07-22 RX ADMIN — OCTREOTIDE ACETATE 50 MCG/HR: 500 INJECTION, SOLUTION INTRAVENOUS; SUBCUTANEOUS at 01:01

## 2021-07-22 RX ADMIN — ACETAMINOPHEN 650 MG: 325 TABLET ORAL at 02:25

## 2021-07-22 RX ADMIN — PHENOBARBITAL 64.8 MG: 32.4 TABLET ORAL at 05:45

## 2021-07-22 RX ADMIN — SODIUM CHLORIDE, POTASSIUM CHLORIDE, SODIUM LACTATE AND CALCIUM CHLORIDE: 600; 310; 30; 20 INJECTION, SOLUTION INTRAVENOUS at 19:43

## 2021-07-22 RX ADMIN — POTASSIUM CHLORIDE 40 MEQ: 1500 TABLET, EXTENDED RELEASE ORAL at 19:57

## 2021-07-22 RX ADMIN — PHENOBARBITAL 64.8 MG: 32.4 TABLET ORAL at 19:30

## 2021-07-22 RX ADMIN — SODIUM CHLORIDE, PRESERVATIVE FREE 10 ML: 5 INJECTION INTRAVENOUS at 19:57

## 2021-07-22 RX ADMIN — SODIUM CHLORIDE, POTASSIUM CHLORIDE, SODIUM LACTATE AND CALCIUM CHLORIDE: 600; 310; 30; 20 INJECTION, SOLUTION INTRAVENOUS at 09:41

## 2021-07-22 RX ADMIN — SODIUM CHLORIDE, POTASSIUM CHLORIDE, SODIUM LACTATE AND CALCIUM CHLORIDE: 600; 310; 30; 20 INJECTION, SOLUTION INTRAVENOUS at 16:13

## 2021-07-22 RX ADMIN — ACETAMINOPHEN 650 MG: 325 TABLET ORAL at 13:03

## 2021-07-22 RX ADMIN — POTASSIUM CHLORIDE 40 MEQ: 1500 TABLET, EXTENDED RELEASE ORAL at 13:27

## 2021-07-22 RX ADMIN — LORAZEPAM 2 MG: 2 INJECTION INTRAMUSCULAR; INTRAVENOUS at 10:02

## 2021-07-22 RX ADMIN — SODIUM CHLORIDE, PRESERVATIVE FREE 10 ML: 5 INJECTION INTRAVENOUS at 09:44

## 2021-07-22 RX ADMIN — PROPOFOL 180 MCG/KG/MIN: 10 INJECTION, EMULSION INTRAVENOUS at 16:18

## 2021-07-22 RX ADMIN — LORAZEPAM 2 MG: 2 INJECTION INTRAMUSCULAR; INTRAVENOUS at 13:42

## 2021-07-22 ASSESSMENT — PAIN DESCRIPTION - PAIN TYPE: TYPE: ACUTE PAIN

## 2021-07-22 ASSESSMENT — PAIN SCALES - GENERAL
PAINLEVEL_OUTOF10: 8
PAINLEVEL_OUTOF10: 7
PAINLEVEL_OUTOF10: 7
PAINLEVEL_OUTOF10: 8
PAINLEVEL_OUTOF10: 8

## 2021-07-22 ASSESSMENT — PAIN DESCRIPTION - LOCATION: LOCATION: ARM;BACK

## 2021-07-22 ASSESSMENT — PAIN DESCRIPTION - ONSET: ONSET: ON-GOING

## 2021-07-22 ASSESSMENT — LIFESTYLE VARIABLES: SMOKING_STATUS: 1

## 2021-07-22 ASSESSMENT — PAIN DESCRIPTION - DESCRIPTORS: DESCRIPTORS: ACHING;DISCOMFORT

## 2021-07-22 ASSESSMENT — PAIN - FUNCTIONAL ASSESSMENT: PAIN_FUNCTIONAL_ASSESSMENT: PREVENTS OR INTERFERES SOME ACTIVE ACTIVITIES AND ADLS

## 2021-07-22 ASSESSMENT — PAIN DESCRIPTION - ORIENTATION: ORIENTATION: RIGHT;LEFT;MID

## 2021-07-22 ASSESSMENT — PAIN DESCRIPTION - FREQUENCY: FREQUENCY: CONTINUOUS

## 2021-07-22 ASSESSMENT — PAIN DESCRIPTION - PROGRESSION: CLINICAL_PROGRESSION: NOT CHANGED

## 2021-07-22 NOTE — OP NOTE
Procedure:  Esophagogastroduodenoscopy    Indication:  Melena, Anemia      Consent:   Informed consent was obtained from the patient including and not limited to risk of perforation, aspiration of gastric contents or teeth, bleeding, infection, dental breakage, ileus, need for surgery, or worst case death. Sedation:  MAC    Estimated Blood Loss: 2cc     Endoscope was advanced easily through mouth to second portion of duodenum      Oropharynx views are limited but grossly normal.    Esophagus:   LA Grade B Erosive Esophagitis, biopsies taken. No esophageal varices present   GEJ at 40 cm. Stomach:   Antrum with moderate gastritis biopsies taken to rule out h. Pylori     Gastric body is normal.    Retroflexed views show normal fundus and cardia. Duodenum: Bulb is normal.    Second portion of duodenum is normal.      No fresh or old blood present. IMPRESSION AND PLAN:     1. LA Grade B Erosive Esophagitis, biopsies taken. 2. Antrum with moderate gastritis biopsies taken to rule out h. Pylori. Place on Protonix 40mg daily. 3.  No etiology of patient's reported melena seen. 4.  Hemoglobin has remained stable no plans for colonoscopy at this time. 5.  Refrain from alcohol abuse    6. Okay for diet from GI point of view        Follow up as outpatient in office, call [phone] to schedule for appointment.       Pt was seen and procedure was performed with Dr. Deo Harvey present for the entire procedure    I was present for entire duration of procedure; discussed findings with resident and agree with recommendations above    Juan Aguilar MD  Gastroenterology

## 2021-07-22 NOTE — FLOWSHEET NOTE
Patient stated \"if he didn't get more pain meds he was going to check himself out and go to the bridge and drink himself to death. \"  Nurse was in the room and overheard conversation.  and nurse debriefed after the visit.

## 2021-07-22 NOTE — CARE COORDINATION
7-22-Cm note: ( no covid testing) attempted to see pt for transition of care , he is writhing in pain states it's in both flank areas , I spoke to Dr. Philipp Ceron, he will see the pt again, this pain is new from earlier. Pt still states he is ok with [facility] to assist with E[facility] recovery services at [place], per [place] notes they will follow up with him today . CM/SS will follow .  Electronically signed by Rosa Maria Bridges RN on 7/22/2021 at 2:15 PM

## 2021-07-22 NOTE — PROGRESS NOTES
[address] [place], [place] [address]   Progress Note    Admitting Date and Time: 7/19/2021  9:31 PM  Admit Dx: Pancreatitis, recurrent [K85.90]    Subjective/interval history:    7/21: Pt complaining of mild midthoracic back pain. States he had a bowel movement yesterday that was black and tarry. He also notes swelling of his right hand around IV site. Hgb this morning 11.8 from 11.1 yesterday. 7/22: Patient is much more conversant today. He feels very anxious this morning. Notes his tremors and withdrawal symptoms have improved overall, but still present. Currently on phenobarbital 64.8 mg every 6 hours as well as as needed Ativan. Scheduled for EGD this afternoon. Per RN: Patient expressed desire to leave AGAINST MEDICAL ADVICE    ROS: denies fever, chills, cp, sob, n/v, HA unless stated above.      pantoprazole  40 mg Intravenous BID    And    sodium chloride (PF)  10 mL Intravenous BID    nicotine  1 patch Transdermal Daily    PHENobarbital  64.8 mg Oral Q6H    multivitamin  1 tablet Oral Daily    sodium chloride flush  5-40 mL Intravenous 2 times per day    folic acid  1 mg Oral Daily    thiamine  100 mg Intravenous Daily    Vitamin D  1,000 Units Oral Daily     promethazine, 25 mg, Q4H PRN  promethazine, 12.5 mg, Q6H PRN   Or  ondansetron, 4 mg, Q6H PRN  polyethylene glycol, 17 g, Daily PRN  acetaminophen, 650 mg, Q6H PRN   Or  acetaminophen, 650 mg, Q6H PRN  sodium chloride flush, 5-40 mL, PRN  sodium chloride, 25 mL, PRN  LORazepam, 1 mg, Q1H PRN   Or  LORazepam, 1 mg, Q1H PRN   Or  LORazepam, 2 mg, Q1H PRN   Or  LORazepam, 2 mg, Q1H PRN   Or  LORazepam, 3 mg, Q1H PRN   Or  LORazepam, 3 mg, Q1H PRN   Or  LORazepam, 4 mg, Q1H PRN   Or  LORazepam, 4 mg, Q1H PRN         Objective:    /83   Pulse 71   Temp 99.9 °F (37.7 °C) (Oral)   Resp 16   Ht 5' 6\" (1.676 m)   Wt 140 lb 6.4 oz (63.7 kg)   SpO2 100%   BMI 22.66 kg/m²   General Appearance: alert and oriented to person, place and time and in no acute distress  Skin: warm and dry  Head: normocephalic and atraumatic  Eyes: pupils equal, round, and reactive to light, extraocular eye movements intact, conjunctivae normal, anicteric sclerae   Neck: neck supple and non tender without mass   Pulmonary/Chest: clear to auscultation bilaterally- no wheezes, rales or rhonchi, normal air movement, no respiratory distress  Cardiovascular: normal rate, normal S1 and S2 and no carotid bruits  Abdomen: soft, non-tender, non-distended, normal bowel sounds, no masses or organomegaly  Extremities: 1+ edema of the right hand around IV site. No LE edema, cyanosis, or clubbing  Neurologic: no cranial nerve deficit and speech normal      Recent Labs     07/20/21 1400 07/21/21  0415 07/22/21  0738    140 138   K 2.9* 3.1* 3.3*    101 102   CO2 31* 32* 27   BUN 9 5* <2*   CREATININE 0.6* 0.6* 0.5*   GLUCOSE 102* 89 134*   CALCIUM 8.0* 8.3* 8.2*       Recent Labs     07/20/21 1400 07/21/21 0415 07/22/21  0738   ALKPHOS 118 132* 126   PROT 5.4* 5.9* 5.8*   LABALBU 2.9* 3.2* 3.0*   BILITOT 1.2 1.3* 0.9   AST 94* 136* 106*   ALT 51* 57* 55*       Recent Labs     07/20/21 1400 07/21/21 0415 07/22/21  0738   WBC 10.4 8.6 7.8   RBC 3.10* 3.30* 3.26*   HGB 11.1* 11.8* 11.6*   HCT 31.4* 33.6* 33.8*   .3* 101.8* 103.7*   MCH 35.8* 35.8* 35.6*   MCHC 35.4* 35.1* 34.3   RDW 14.0 13.6 13.5   PLT 46* 46* 72*   MPV 12.7* 13.9* 14.0*           Radiology:   XR CHEST PORTABLE   Final Result   No acute cardiopulmonary abnormality. US GALLBLADDER RUQ   Final Result   There is no acute right upper quadrant pathology. Specifically, there is no   acute cholecystitis      Hepatic steatosis      Previous pancreatitis with necrosis of the pancreatic tail. These findings   are best appreciated on the earlier CT scan of the abdomen and pelvis.          CT ABDOMEN PELVIS W IV CONTRAST Additional Contrast? None   Final Result   The salient/acute finding on this exam is onofre distension of the urinary   bladder, with the bladder extending out of the pelvis and the bladder dome   projecting just cephalad to the umbilical level. Differential considerations   include bladder outlet obstruction, bladder atony, and potential untoward   side effects of anticholinergic medication. The prostate gland is normal in   size. The bladder is otherwise unremarkable. Please correlate clinically. Chronic changes of the distal pancreatic tail and body, compatible with   sequela of prior pancreatic necrosis, with intervally smaller cystic changes   compared to prior study and no evidence of acute pancreatitis at this time. Advanced diffuse hepatic steatosis. Small hiatal hernia versus borderline distal esophageal wall thickening,   potentially mild partially imaged esophagitis if the latter. Assessment/Plan:  Principal Problem:    Alcohol withdrawal syndrome with complication (HCC)  Active Problems:    Pancreatitis, recurrent    Alcohol withdrawal seizure ([address]    Alcoholic fatty liver    Tobacco abuse    Hypokalemia    Impending delirium tremens ([address]    Thrombocytopenia ([address]    Melena  Resolved Problems:    * No resolved hospital problems. *       1. Alcohol abuse with complicated alcohol withdrawal syndrome and seizures/impending delirium tremens  -given IV Ativan 2mg x 1 for withdrawal seizure on 7/20 with resolution   -Phenobarbital was increased slightly to 64.8 mg every 6 hours yesterday given inadequate control of the every 8 hour dosing. Withdrawal symptoms are controlled today. We will continue every 6 hour dosing for now, and start to taper dose again tomorrow.  -continue prn Ativan per CIWA scale   -Long discussion today about strategies to maintain sobriety. Patient states he has tried AA several times and this does not work for him. He has also tried inpatient rehab several times.   [facility] following for treatment options, though he is at least a couple of days away from being medically ready for this.       2. Recurrent pancreatitis due to alcohol abuse  -Symptoms improved  -Tolerating clear liquid diet  -Plan to advance to general diet after EGD this afternoon    3. Melena  -increased IV PPI to twice daily on 7/21  -GI following; plan for upper endoscopy this afternoon     4. Alcoholic fatty liver disease  -Discussed maintaining abstinence from alcohol as noted above     5. Hypokalemia   -Potassium 3.3 this morning. We will give an additional 40 mEq twice daily orally for 2 doses today and recheck tomorrow    6. Thrombocytopenia  -due to alcoholic liver disease   -platelets improved to 72 today  -hold enoxaparin     7. Tobacco abuse  -counseled on cessation      DVT prophylaxis: SCDs  CODE STATUS: Full code       NOTE: This report was transcribed using voice recognition software. Every effort was made to ensure accuracy; however, inadvertent computerized transcription errors may be present.      Electronically signed by Marleni Tejeda DO on 7/22/2021 at 12:11 PM

## 2021-07-22 NOTE — ANESTHESIA PRE PROCEDURE
Department of Anesthesiology  Preprocedure Note       Name:  Safia Fitch   Age:  31 y.o.  :  1990                                          MRN:  [id number]         Date:  2021      Surgeon: Nato Spence):  Enma Schwartz MD    Procedure: Procedure(s):  EGD ESOPHAGOGASTRODUODENOSCOPY    Medications prior to admission:   Prior to Admission medications    Medication Sig Start Date End Date Taking?  Authorizing Provider   nicotine (NICODERM CQ) 21 MG/24HR Place 1 patch onto the skin daily 21   Lupe Epstein MD   pantoprazole (PROTONIX) 40 MG tablet Take 1 tablet by mouth every morning (before breakfast) 21   Irina Caballero MD   folic acid (FOLVITE) 1 MG tablet Take 1 tablet by mouth daily 21   Irina Caballero MD   vitamin B-1 (THIAMINE) 100 MG tablet Take 1 tablet by mouth daily 21   Irina Caballero MD   vitamin D (ERGOCALCIFEROL) 1.25 MG (26953 UT) CAPS capsule Take 1 capsule by mouth once a week 21   Irina Caballero MD       Current medications:    Current Facility-Administered Medications   Medication Dose Route Frequency Provider Last Rate Last Admin    pantoprazole (PROTONIX) injection 40 mg  40 mg Intravenous BID LI Lazcano - CNP   40 mg at 21 0380    And    sodium chloride (PF) 0.9 % injection 10 mL  10 mL Intravenous BID LI Lazcano - CNP   10 mL at 21 0944    nicotine (NICODERM CQ) 21 MG/24HR 1 patch  1 patch Transdermal Daily LI Lazcano - CNP   1 patch at 21 0948    octreotide (SANDOSTATIN) 500 mcg in sodium chloride 0.9 % 100 mL infusion  50 mcg/hr Intravenous Continuous Alison Keto, DO 10 mL/hr at 21 1126 50 mcg/hr at 21 1126    PHENobarbital (LUMINAL) tablet 64.8 mg  64.8 mg Oral Q6H Bob Carr, DO   64.8 mg at 21 0545    promethazine (PHENERGAN) injection 25 mg  25 mg Intravenous Q4H PRN David Milner MD   25 mg at 21 0635    therapeutic multivitamin-minerals 1 tablet  1 tablet Oral Daily Dino Jones MD   1 tablet at 07/21/21 0904    promethazine (PHENERGAN) tablet 12.5 mg  12.5 mg Oral Q6H PRN Dino Jones MD        Or    ondansetron (ZOFRAN) injection 4 mg  4 mg Intravenous Q6H PRN Dino Jones MD        polyethylene glycol (GLYCOLAX) packet 17 g  17 g Oral Daily PRN Dino Jones MD        acetaminophen (TYLENOL) tablet 650 mg  650 mg Oral Q6H PRN Dino Jones MD   650 mg at 07/22/21 0225    Or    acetaminophen (TYLENOL) suppository 650 mg  650 mg Rectal Q6H PRN Dino Jones MD        lactated ringers infusion   Intravenous Continuous Bob Carr,  mL/hr at 07/22/21 0941 New Bag at 07/22/21 0941    sodium chloride flush 0.9 % injection 5-40 mL  5-40 mL Intravenous 2 times per day Aamir Fall, APRN - CNP   10 mL at 07/21/21 2339    sodium chloride flush 0.9 % injection 5-40 mL  5-40 mL Intravenous PRN Aamir Fall, APRN - CNP        0.9 % sodium chloride infusion  25 mL Intravenous PRN Aamir Fall, APRN - CNP        folic acid (FOLVITE) tablet 1 mg  1 mg Oral Daily Aamir Fall, APRN - CNP   1 mg at 07/21/21 0904    LORazepam (ATIVAN) tablet 1 mg  1 mg Oral Q1H PRN Aamir Fall, APRN - CNP   1 mg at 07/21/21 1134    Or    LORazepam (ATIVAN) injection 1 mg  1 mg Intravenous Q1H PRN Aamir Fall, APRN - CNP   1 mg at 07/21/21 2027    Or    LORazepam (ATIVAN) tablet 2 mg  2 mg Oral Q1H PRN Aamir Fall, APRN - CNP   2 mg at 07/21/21 0414    Or    LORazepam (ATIVAN) injection 2 mg  2 mg Intravenous Q1H PRN Aamir Fall, APRN - CNP   2 mg at 07/22/21 1002    Or    LORazepam (ATIVAN) tablet 3 mg  3 mg Oral Q1H PRN Aamir Fall, APRN - CNP        Or    LORazepam (ATIVAN) injection 3 mg  3 mg Intravenous Q1H PRN Aamir Fall, APRN - CNP        Or    LORazepam (ATIVAN) tablet 4 mg  4 mg Oral Q1H PRN Aamir Fall, APRN - CNP        Or    LORazepam (ATIVAN) injection 4 mg  4 mg Intravenous Q1H PRN Arlen Quan APRN - CNP        thiamine (B-1) injection 100 mg  100 mg Intravenous Daily Arlen Quan APRN - CNP   100 mg at 07/21/21 0905    Vitamin D (CHOLECALCIFEROL) tablet 1,000 Units  1,000 Units Oral Daily Arlen Quan APRN - CNP   1,000 Units at 07/21/21 5906       Allergies:     Allergies   Allergen Reactions    Hydrocodone Anaphylaxis    Ativan [Lorazepam] Other (See Comments)     \"Hallucinates\"     Murtis Heir Sustenna [Paliperidone Palmitate Er] Other (See Comments)     \"Unable to Urinate\"    Paliperidone Other (See Comments)    Pcn [Penicillins] Nausea Only    Fluticasone Rash       Problem List:    Patient Active Problem List   Diagnosis Code    Alcohol withdrawal syndrome with complication ([address] I15.691    Alcoholism ([address] F10.20    Acute alcoholic intoxication ([address] F10.929    Severe single current episode of major depressive disorder, without psychotic features ([address] F32.2    Marijuana smoker F12.90    Major depressive disorder, severe ([address] F32.2    Severe depressed bipolar I disorder without psychotic features ([address] F31.4    Alcohol-induced acute pancreatitis K85.20    Alcohol withdrawal ([address] F10.239    Alcohol abuse with withdrawal ([address] F10.139    Alcohol withdrawal, uncomplicated ([address] D25.917    Alcohol induced acute pancreatitis without necrosis or infection K85.20    Lactic acidosis E87.2    Pancreatitis, recurrent K85.90    Alcohol withdrawal seizure ([address] F10.239, X37.3    Alcoholic fatty liver M29.9    Tobacco abuse Z72.0    Hypokalemia E87.6    Impending delirium tremens (HCC) F10.239    Thrombocytopenia (HCC) D69.6    Melena K92.1       Past Medical History:        Diagnosis Date    Anxiety     Arm pain     Concussion with loss of consciousness     Convulsions (HCC)     Depression     Flashing lights     Head injury     Headache     Leg pain     Numbness and tingling     arms and hands    PTSD (post-traumatic stress disorder)     Se[facility]        Past Surgical History:        Procedure Laterality Date    COLONOSCOPY      ENDOSCOPY, COLON, DIAGNOSTIC         Social History:    Social History     Tobacco Use    Smoking status: Heavy Tobacco Smoker     Packs/day: 2.00     Years: 9.00     Pack years: 18.00    Smokeless tobacco: Never Used   Substance Use Topics    Alcohol use: Yes     Alcohol/week: 24.0 standard drinks     Types: 4 Glasses of wine, 20 Cans of beer per week     Comment: heavy daily use ( 2 bottles of los daily)                                Ready to quit: Not Answered  Counseling given: Not Answered      Vital Signs (Current):   Vitals:    07/21/21 1300 07/21/21 2000 07/21/21 2130 07/22/21 0800   BP: 109/76 130/85 (!) 122/90 128/83   Pulse: 97 88 84 71   Resp: 18 20  16   Temp: 98.7 °F (37.1 °C) 99.5 °F (37.5 °C)  99.9 °F (37.7 °C)   TempSrc: Oral Oral  Oral   SpO2:  95%  100%   Weight:       Height:                                                  BP Readings from Last 3 Encounters:   07/22/21 128/83   06/22/21 102/60   05/19/21 119/75       NPO Status:                                                                                 BMI:   Wt Readings from Last 3 Encounters:   07/20/21 140 lb 6.4 oz (63.7 kg)   06/13/21 146 lb 2.6 oz (66.3 kg)   05/16/21 150 lb (68 kg)     Body mass index is 22.66 kg/m².     CBC:   Lab Results   Component Value Date    WBC 7.8 07/22/2021    RBC 3.26 07/22/2021    HGB 11.6 07/22/2021    HCT 33.8 07/22/2021    .7 07/22/2021    RDW 13.5 07/22/2021    PLT 72 07/22/2021       CMP:   Lab Results   Component Value Date     07/22/2021    K 3.3 07/22/2021    K 3.3 02/28/2021     07/22/2021    CO2 27 07/22/2021    BUN <2 07/22/2021    CREATININE 0.5 07/22/2021    GFRAA >60 07/22/2021    LABGLOM >60 07/22/2021    GLUCOSE 134 07/22/2021    PROT 5.8 07/22/2021    CALCIUM 8.2 07/22/2021    BILITOT 0.9 07/22/2021    ALKPHOS 126 07/22/2021     07/22/2021 ALT 55 07/22/2021       POC Tests: No results for input(s): POCGLU, POCNA, POCK, POCCL, POCBUN, POCHEMO, POCHCT in the last 72 hours. Coags:   Lab Results   Component Value Date    PROTIME 11.8 06/13/2021    INR 1.0 06/13/2021       HCG (If Applicable): No results found for: PREGTESTUR, PREGSERUM, HCG, HCGQUANT     ABGs: No results found for: PHART, PO2ART, BAH0LPO, IBU7ILZ, BEART, E7SGDYED     Type & Screen (If Applicable):  No results found for: LABABO, LABRH    Drug/Infectious Status (If Applicable):  No results found for: HIV, HEPCAB    COVID-19 Screening (If Applicable):   Lab Results   Component Value Date    COVID19 Not Detected 08/22/2020           Anesthesia Evaluation  Patient summary reviewed no history of anesthetic complications:   Airway:         Dental:          Pulmonary:   (+) current smoker                           Cardiovascular:Negative CV ROS                      Neuro/Psych:   (+) seizures:, headaches:, psychiatric history:depression/anxiety             GI/Hepatic/Renal:   (+) liver disease ( Alcoholic fatty liver):,           Endo/Other:    (+) electrolyte abnormalities, . ROS comment: Alcoholism Abdominal:             Vascular: negative vascular ROS. Other Findings:             Anesthesia Plan      MAC     ASA 3       Induction: intravenous. Plan discussed with CRNA. PAT Chart Review:  Chart reviewed per routine by Monika Hanson MD.  Above represents information available via shared medical record including previous anesthesia history, drug and allergy history.   Confirmation of above and final plan per Day of Surgery (DOS) anesthesiologist.    Monika Hanson MD   7/22/2021

## 2021-07-22 NOTE — PROGRESS NOTES
Interval H&P    Patient seen evaluated for for the same. Patient denies any overt dark tarry stools overnight. Patient complaint this morning of some back pain from being in bed and not allowed to walk around.     Vitals:    07/22/21 0800   BP: 128/83   Pulse: 71   Resp: 16   Temp: 99.9 °F (37.7 °C)   SpO2: 100%      Plan: Proceed with EGD this afternoon to evaluate      China Delacruz DO  GI Fellow   9:53 AM

## 2021-07-23 VITALS
HEART RATE: 92 BPM | OXYGEN SATURATION: 99 % | SYSTOLIC BLOOD PRESSURE: 104 MMHG | BODY MASS INDEX: 22.56 KG/M2 | DIASTOLIC BLOOD PRESSURE: 73 MMHG | RESPIRATION RATE: 18 BRPM | TEMPERATURE: 98.4 F | WEIGHT: 140.4 LBS | HEIGHT: 66 IN

## 2021-07-23 PROBLEM — I82.621 ACUTE DEEP VEIN THROMBOSIS (DVT) OF RIGHT UPPER EXTREMITY (HCC): Status: ACTIVE | Noted: 2021-07-23

## 2021-07-23 LAB
ALBUMIN SERPL-MCNC: 2.9 G/DL (ref 3.5–5.2)
ALP BLD-CCNC: 120 U/L (ref 40–129)
ALT SERPL-CCNC: 39 U/L (ref 0–40)
ANION GAP SERPL CALCULATED.3IONS-SCNC: 10 MMOL/L (ref 7–16)
AST SERPL-CCNC: 58 U/L (ref 0–39)
BILIRUB SERPL-MCNC: 0.5 MG/DL (ref 0–1.2)
BILIRUBIN DIRECT: 0.2 MG/DL (ref 0–0.3)
BILIRUBIN, INDIRECT: 0.3 MG/DL (ref 0–1)
BUN BLDV-MCNC: 4 MG/DL (ref 6–20)
CALCIUM SERPL-MCNC: 8.2 MG/DL (ref 8.6–10.2)
CHLORIDE BLD-SCNC: 105 MMOL/L (ref 98–107)
CO2: 23 MMOL/L (ref 22–29)
CREAT SERPL-MCNC: 0.5 MG/DL (ref 0.7–1.2)
GFR AFRICAN AMERICAN: >60
GFR NON-AFRICAN AMERICAN: >60 ML/MIN/1.73
GLUCOSE BLD-MCNC: 74 MG/DL (ref 74–99)
HCT VFR BLD CALC: 34.4 % (ref 37–54)
HEMOGLOBIN: 12 G/DL (ref 12.5–16.5)
MAGNESIUM: 2 MG/DL (ref 1.6–2.6)
MCH RBC QN AUTO: 36.1 PG (ref 26–35)
MCHC RBC AUTO-ENTMCNC: 34.9 % (ref 32–34.5)
MCV RBC AUTO: 103.6 FL (ref 80–99.9)
PDW BLD-RTO: 13.5 FL (ref 11.5–15)
PLATELET # BLD: 93 E9/L (ref 130–450)
PLATELET CONFIRMATION: NORMAL
PMV BLD AUTO: 14.4 FL (ref 7–12)
POTASSIUM SERPL-SCNC: 4.6 MMOL/L (ref 3.5–5)
RBC # BLD: 3.32 E12/L (ref 3.8–5.8)
SODIUM BLD-SCNC: 138 MMOL/L (ref 132–146)
TOTAL PROTEIN: 5.7 G/DL (ref 6.4–8.3)
WBC # BLD: 6.7 E9/L (ref 4.5–11.5)

## 2021-07-23 PROCEDURE — 6360000002 HC RX W HCPCS

## 2021-07-23 PROCEDURE — 99239 HOSP IP/OBS DSCHRG MGMT >30: CPT | Performed by: INTERNAL MEDICINE

## 2021-07-23 PROCEDURE — 6360000002 HC RX W HCPCS: Performed by: INTERNAL MEDICINE

## 2021-07-23 PROCEDURE — 6370000000 HC RX 637 (ALT 250 FOR IP)

## 2021-07-23 PROCEDURE — 2580000003 HC RX 258

## 2021-07-23 PROCEDURE — 6370000000 HC RX 637 (ALT 250 FOR IP): Performed by: INTERNAL MEDICINE

## 2021-07-23 PROCEDURE — 36415 COLL VENOUS BLD VENIPUNCTURE: CPT

## 2021-07-23 PROCEDURE — 2580000003 HC RX 258: Performed by: INTERNAL MEDICINE

## 2021-07-23 PROCEDURE — 85027 COMPLETE CBC AUTOMATED: CPT

## 2021-07-23 PROCEDURE — 83735 ASSAY OF MAGNESIUM: CPT

## 2021-07-23 PROCEDURE — 80076 HEPATIC FUNCTION PANEL: CPT

## 2021-07-23 PROCEDURE — 80048 BASIC METABOLIC PNL TOTAL CA: CPT

## 2021-07-23 RX ORDER — PANTOPRAZOLE SODIUM 40 MG/1
40 TABLET, DELAYED RELEASE ORAL
Qty: 30 TABLET | Refills: 1 | Status: SHIPPED | OUTPATIENT
Start: 2021-07-23 | End: 2021-07-23 | Stop reason: SDUPTHER

## 2021-07-23 RX ORDER — PHENOBARBITAL 32.4 MG/1
32.4 TABLET ORAL EVERY 6 HOURS
Status: DISCONTINUED | OUTPATIENT
Start: 2021-07-23 | End: 2021-07-23

## 2021-07-23 RX ORDER — PANTOPRAZOLE SODIUM 40 MG/1
40 TABLET, DELAYED RELEASE ORAL
Qty: 30 TABLET | Refills: 1 | Status: ON HOLD | OUTPATIENT
Start: 2021-07-23 | End: 2021-09-03

## 2021-07-23 RX ORDER — HYDROXYZINE PAMOATE 25 MG/1
25 CAPSULE ORAL 3 TIMES DAILY PRN
Qty: 20 CAPSULE | Refills: 0 | Status: SHIPPED | OUTPATIENT
Start: 2021-07-23 | End: 2021-07-23 | Stop reason: SDUPTHER

## 2021-07-23 RX ORDER — M-VIT,TX,IRON,MINS/CALC/FOLIC 27MG-0.4MG
1 TABLET ORAL DAILY
Qty: 30 TABLET | Status: ON HOLD | COMMUNITY
Start: 2021-07-24 | End: 2021-09-03

## 2021-07-23 RX ORDER — HYDROXYZINE PAMOATE 25 MG/1
25 CAPSULE ORAL 3 TIMES DAILY PRN
Qty: 20 CAPSULE | Refills: 0 | Status: SHIPPED | OUTPATIENT
Start: 2021-07-23 | End: 2021-08-06

## 2021-07-23 RX ADMIN — SODIUM CHLORIDE, PRESERVATIVE FREE 10 ML: 5 INJECTION INTRAVENOUS at 08:09

## 2021-07-23 RX ADMIN — FOLIC ACID 1 MG: 1 TABLET ORAL at 08:03

## 2021-07-23 RX ADMIN — SODIUM CHLORIDE, POTASSIUM CHLORIDE, SODIUM LACTATE AND CALCIUM CHLORIDE: 600; 310; 30; 20 INJECTION, SOLUTION INTRAVENOUS at 04:05

## 2021-07-23 RX ADMIN — LORAZEPAM 3 MG: 2 INJECTION INTRAMUSCULAR; INTRAVENOUS at 04:05

## 2021-07-23 RX ADMIN — Medication 1 TABLET: at 08:03

## 2021-07-23 RX ADMIN — THIAMINE HYDROCHLORIDE 100 MG: 100 INJECTION, SOLUTION INTRAMUSCULAR; INTRAVENOUS at 08:08

## 2021-07-23 RX ADMIN — LORAZEPAM 4 MG: 2 INJECTION INTRAMUSCULAR; INTRAVENOUS at 06:09

## 2021-07-23 RX ADMIN — PANTOPRAZOLE SODIUM 40 MG: 40 TABLET, DELAYED RELEASE ORAL at 06:09

## 2021-07-23 RX ADMIN — ONDANSETRON 4 MG: 2 INJECTION INTRAMUSCULAR; INTRAVENOUS at 04:04

## 2021-07-23 RX ADMIN — Medication 1000 UNITS: at 08:03

## 2021-07-23 RX ADMIN — CYCLOBENZAPRINE 10 MG: 10 TABLET, FILM COATED ORAL at 08:03

## 2021-07-23 RX ADMIN — ENOXAPARIN SODIUM 60 MG: 60 INJECTION SUBCUTANEOUS at 11:24

## 2021-07-23 RX ADMIN — PHENOBARBITAL 64.8 MG: 32.4 TABLET ORAL at 06:09

## 2021-07-23 ASSESSMENT — PAIN SCALES - GENERAL: PAINLEVEL_OUTOF10: 0

## 2021-07-23 NOTE — PROGRESS NOTES
Educated patient on how to administer a Lovenox shot. Patient was able to demonstrate understanding by properly injecting his first shot. Educated patient on side effects and safety precautions. During discharge, reinforced importance of patient selecting a primary care provider from the list given and following up with the provider.

## 2021-07-23 NOTE — DISCHARGE SUMMARY
[facility] Physician Discharge Summary           Schedule an appointment as soon as possible for a visit in 1 week  Call from list of PCPs provided to establish as soon as possible     Selenadanial Hank, [address] [id number] [facility]    Schedule an appointment as soon as possible for a visit in 2 weeks        Activity level: Resume normal activity. Abstain from alcohol use    Diet: ADULT DIET; Regular; GI Wren (GERD/Peptic Ulcer)    Labs: Routine labs per primary care physician    Condition at discharge: Stable    Dispo: Home    Patient ID:  Home Michaels  [id number]  31 y.o.  1990    Admit date: 7/19/2021    Discharge date and time:  7/23/2021  10:30 AM    Admission Diagnoses: Principal Problem:    Alcohol withdrawal syndrome with complication ([address]  Active Problems:    Pancreatitis, recurrent    Alcohol withdrawal seizure ([address]    Alcoholic fatty liver    Tobacco abuse    Hypokalemia    Impending delirium tremens ([address]    Thrombocytopenia ([address]    Melena    Acute deep vein thrombosis (DVT) of right upper extremity ([address]  Resolved Problems:    * No resolved hospital problems. *      Discharge Diagnoses: Principal Problem:    Alcohol withdrawal syndrome with complication (HCC)  Active Problems:    Pancreatitis, recurrent    Alcohol withdrawal seizure ([address]    Alcoholic fatty liver    Tobacco abuse    Hypokalemia    Impending delirium tremens (HCC)    Thrombocytopenia (HCC)    Melena    Acute deep vein thrombosis (DVT) of right upper extremity ([address]  Resolved Problems:    * No resolved hospital problems. *      Consults:  IP CONSULT TO SOCIAL WORK  IP CONSULT TO SOCIAL WORK  IP CONSULT TO GI    Procedures: EGD which showed esophagitis but no active bleeding or esophageal varices. Hospital Course: This is a 61-year-old male with a history significant for ongoing alcohol abuse multiple episodes of alcohol withdrawal syndrome.   He was admitted to the hospital with acute alcohol withdrawal syndrome. On the stepdown unit, he experienced withdrawal seizure despite treatment with as needed Ativan. He was started on phenobarbital taper and as needed Ativan until his withdrawal symptoms had subsided. He noted black tarry stools, so GI was consulted for EGD. He was found to have esophagitis and gastritis, but no varices or active bleeding. He was started on oral pantoprazole per GI. He noticed some swelling in his right hand from prior IV site. The IV was removed, and right upper extremity venous duplex ultrasound showed basilic and cephalic DVT. Given the contraindication of starting apixaban while on phenobarbital, and the long half-life of phenobarbital, he was started on subcutaneous enoxaparin to treat his DVT. He will need 3 months total of anticoagulation. He was instructed to establish a primary care physician to follow-up with. He could potentially change to apixaban after about 2 weeks after phenobarbital.  Recommend continuing pantoprazole for the duration of anticoagulation. Peer recovery followed during hospitalization and offered patient inpatient treatment for alcohol abuse. He declined, and opted to have outpatient counseling instead.     Discharge Exam:  Vitals:    07/22/21 1658 07/22/21 1700 07/22/21 1945 07/23/21 0820   BP:  113/82 113/79 104/73   Pulse: 64 69 79 92   Resp: 18 18 18 18   Temp:  98.1 °F (36.7 °C) 98.1 °F (36.7 °C) 98.4 °F (36.9 °C)   TempSrc:  Oral Oral Oral   SpO2: 98% 98% 97% 99%   Weight:       Height:           General Appearance: alert and oriented to person, place and time and in no acute distress  Skin: warm and dry  Head: normocephalic and atraumatic  Eyes: pupils equal, round, and reactive to light, extraocular eye movements intact, conjunctivae normal, anicteric sclerae   Neck: neck supple and non tender without mass   Pulmonary/Chest: clear to auscultation bilaterally- no wheezes, rales or rhonchi, normal air movement, no respiratory distress  Cardiovascular: normal rate, normal S1 and S2 and no carotid bruits  Abdomen: soft, non-tender, non-distended, normal bowel sounds, no masses or organomegaly  Extremities: 1+ edema of the right hand around IV site. No LE edema, cyanosis, or clubbing  Neurologic: no cranial nerve deficit and speech normal  I/O last 3 completed shifts: In: 7696 [I.V.:950; IV Piggyback:90]  Out: 1150 [Urine:1150]  No intake/output data recorded. LABS:  Recent Labs     07/21/21 0415 07/22/21  0738 07/23/21  0558    138 138   K 3.1* 3.3* 4.6    102 105   CO2 32* 27 23   BUN 5* <2* 4*   CREATININE 0.6* 0.5* 0.5*   GLUCOSE 89 134* 74   CALCIUM 8.3* 8.2* 8.2*       Recent Labs     07/21/21 0415 07/22/21  0738 07/23/21  0558   WBC 8.6 7.8 6.7   RBC 3.30* 3.26* 3.32*   HGB 11.8* 11.6* 12.0*   HCT 33.6* 33.8* 34.4*   .8* 103.7* 103.6*   MCH 35.8* 35.6* 36.1*   MCHC 35.1* 34.3 34.9*   RDW 13.6 13.5 13.5   PLT 46* 72* 93*   MPV 13.9* 14.0* 14.4*       No results for input(s): POCGLU in the last 72 hours. Imaging:  CT ABDOMEN PELVIS W IV CONTRAST Additional Contrast? None    Result Date: 7/20/2021  EXAMINATION: CT OF THE ABDOMEN AND PELVIS WITH CONTRAST 7/20/2021 1:06 am TECHNIQUE: CT of the abdomen and pelvis was performed with the administration of intravenous contrast. Multiplanar reformatted images are provided for review. Dose modulation, iterative reconstruction, and/or weight based adjustment of the mA/kV was utilized to reduce the radiation dose to as low as reasonably achievable. COMPARISON: Abdominopelvic CT from 06/13/2021. also triphasic pancreatic CT from 06/16/2021.  HISTORY: ORDERING SYSTEM PROVIDED HISTORY: epigastric, RUQ pain TECHNOLOGIST PROVIDED HISTORY: Reason for exam:->epigastric, RUQ pain Additional Contrast?->None Decision Support Exception - unselect if not a suspected or confirmed emergency medical condition->Emergency Medical Condition (MA) FINDINGS: Lower Chest: Visualized lung bases are clear. Perceived borderline thickening of soft tissue just cephalad to the esophageal hiatus could represent small hiatal hernia, with element of distal esophagitis not excluded. Organs: No visualized focal hepatic lesion. Advanced diffuse hepatic steatosis. No significant abnormality of the spleen, gallbladder or adrenal glands. Small accessory splenule noted. No acute renal abnormality. Mild hydroureter to few levels due to bladder distension. Extrarenal pelves noted bilaterally. The proximal pancreas is unremarkable. The distal pancreas demonstrates confluent cystic change with a few intervening areas of parenchymal enhancement, with larger pseudocystic changes seen more proximally in June significantly improved. No new pancreatic finding or evidence of acute pancreatitis. GI/Bowel: No acute specific small or large bowel abnormality is identified. Pelvis: The salient finding in the pelvis is relatively severely distended urinary bladder, with the bladder dome projecting just cephalad to the umbilical level. No bladder wall thickening, stones or perivesical fat stranding. Normal appearance of the prostate gland and seminal vesicles. Peritoneum/Retroperitoneum: No free intraperitoneal air or fluid. Bones/Soft Tissues: No acute soft tissue abnormality is identified. No acute osseous abnormality. Transitional upper sacrum noted with pseudoarticulation on the right. The salient/acute finding on this exam is onofre distension of the urinary bladder, with the bladder extending out of the pelvis and the bladder dome projecting just cephalad to the umbilical level. Differential considerations include bladder outlet obstruction, bladder atony, and potential untoward side effects of anticholinergic medication. The prostate gland is normal in size. The bladder is otherwise unremarkable. Please correlate clinically.  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 Advanced diffuse hepatic steatosis. Small hiatal hernia versus borderline distal esophageal wall thickening, potentially mild partially imaged esophagitis if the latter. US GALLBLADDER RUQ    Result Date: 7/20/2021  EXAMINATION: RIGHT UPPER QUADRANT ULTRASOUND 7/20/2021 12:42 pm COMPARISON: None. HISTORY: ORDERING SYSTEM PROVIDED HISTORY: pancreatitis/cholecystitis TECHNOLOGIST PROVIDED HISTORY: Reason for exam:->pancreatitis/cholecystitis What reading provider will be dictating this exam?->CRC FINDINGS: LIVER:  There is fatty infiltration of the liver. BILIARY SYSTEM:  Gallbladder is unremarkable without evidence of pericholecystic fluid, wall thickening or stones. Negative sonographic An's sign. Common bile duct is within normal limits measuring . RIGHT KIDNEY: The right kidney is grossly unremarkable without evidence of hydronephrosis. PANCREAS:  There is no evidence of acute pancreatitis. There are findings of previous pancreatitis with necrosis of the pancreatic tail best appreciated on the earlier CT scan of the abdomen and pelvis. . OTHER: No evidence of right upper quadrant ascites. There is no acute right upper quadrant pathology. 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 These findings are best appreciated on the earlier CT scan of the abdomen and pelvis.          Patient Instructions:      Medication List      START taking these medications    enoxaparin 60 MG/0.6ML injection  Commonly known as: Lovenox  Inject 0.6 mLs into the skin 2 times daily     hydrOXYzine 25 MG capsule  Commonly known as: Vistaril  Take 1 capsule by mouth 3 times daily as needed for Anxiety     therapeutic multivitamin-minerals tablet  Take 1 tablet by mouth daily  Start taking on: July 24, 2021        CONTINUE taking these medications    folic acid 1 MG tablet  Commonly known as: FOLVITE  Take 1 tablet by mouth daily     nicotine 21 MG/24HR  Commonly known as: NICODERM CQ  Place 1 patch onto the skin daily     pantoprazole 40 MG tablet  Commonly known as: PROTONIX  Take 1 tablet by mouth every morning (before breakfast)     vitamin B-1 100 MG tablet  Commonly known as: THIAMINE  Take 1 tablet by mouth daily     vitamin D 1.25 MG (65688 UT) Caps capsule  Commonly known as: ERGOCALCIFEROL  Take 1 capsule by mouth once a week           Where to Get Your Medications      These medications were sent to [facility], [address] Avinash Banuelosx [phone]  [address] [place] [address]    Phone: [phone]   · enoxaparin 60 MG/0.6ML injection  · hydrOXYzine 25 MG capsule  · pantoprazole 40 MG tablet     You can get these medications from any pharmacy    You don't need a prescription for these medications  · therapeutic multivitamin-minerals tablet           Note that greater than 30 minutes was spent in preparing discharge papers, discussing discharge with patient, medication review, etc.    NOTE: This report was transcribed using voice recognition software. Every effort was made to ensure accuracy; however, inadvertent computerized transcription errors may be present.      Signed:  Electronically signed by Esperanza Peterson DO on 7/23/2021 at 10:30 AM

## 2021-07-23 NOTE — PROGRESS NOTES
PROGRESS NOTE    By Yessy Yu D.O GI Fellow    [facility]  Dr. Aldo Mercer MD, Dr. Gray Frye MD, Dr Jovani Miller, Dr. Libia Ho MD, Dr. Lisa Huggins,       Franklyn Davila  31 y.o.  male    SUBJECTIVE:    Teaerful complains of back pain. EGD was explained to the patient and he voiced understanding     OBJECTIVE:*    /73   Pulse 92   Temp 98.4 °F (36.9 °C) (Oral)   Resp 18   Ht 5' 6\" (1.676 m)   Wt 140 lb 6.4 oz (63.7 kg)   SpO2 99%   BMI 22.66 kg/m²     Gen: NAD, AAO x 3  HEENT:PEERL, no icterus  Heart: RRR, no M/R/G  Lungs: CTAB  Abd.: soft, NT, ND, BS +, no G/R, no HSM  Extr.: no C/C/E, no bruising         Lab Results   Component Value Date    WBC 6.7 07/23/2021    WBC 7.8 07/22/2021    WBC 8.6 07/21/2021    HGB 12.0 07/23/2021    HGB 11.6 07/22/2021    HGB 11.8 07/21/2021    HCT 34.4 07/23/2021    .6 07/23/2021    RDW 13.5 07/23/2021    PLT 93 07/23/2021    PLT 72 07/22/2021    PLT 46 07/21/2021     Lab Results   Component Value Date     07/23/2021    K 4.6 07/23/2021    K 3.3 02/28/2021     07/23/2021    CO2 23 07/23/2021    BUN 4 07/23/2021    CREATININE 0.5 07/23/2021    CALCIUM 8.2 07/23/2021    PROT 5.7 07/23/2021    LABALBU 2.9 07/23/2021    BILITOT 0.5 07/23/2021    BILITOT 0.9 07/22/2021    BILITOT 1.3 07/21/2021    ALKPHOS 120 07/23/2021    ALKPHOS 126 07/22/2021    ALKPHOS 132 07/21/2021    AST 58 07/23/2021     07/22/2021     07/21/2021    ALT 39 07/23/2021    ALT 55 07/22/2021    ALT 57 07/21/2021     Lab Results   Component Value Date    LIPASE 231 07/19/2021    LIPASE 461 06/19/2021    LIPASE 551 06/16/2021     Lab Results   Component Value Date    AMYLASE 101 12/09/2018         ASSESSMENT/PLAN:    1.   Melena/concern for acute GI bleed  -Vital signs stable no further dark bowel movements   -Hemoglobin stable this a.m. at 11.8  - EGD 7/22 with moderate gastritis no source of bleeding identifeid   - Hgb 12.0 thi

## 2021-07-29 ENCOUNTER — HOSPITAL ENCOUNTER (EMERGENCY)
Age: 31
Discharge: HOME | End: 2021-07-29
Attending: EMERGENCY MEDICINE
Payer: COMMERCIAL

## 2021-07-29 VITALS
BODY MASS INDEX: 24.21 KG/M2 | OXYGEN SATURATION: 96 % | RESPIRATION RATE: 20 BRPM | DIASTOLIC BLOOD PRESSURE: 88 MMHG | TEMPERATURE: 99.2 F | SYSTOLIC BLOOD PRESSURE: 123 MMHG | HEART RATE: 90 BPM | WEIGHT: 150 LBS

## 2021-07-29 DIAGNOSIS — E86.0 DEHYDRATION: ICD-10-CM

## 2021-07-29 DIAGNOSIS — F10.930 ALCOHOL WITHDRAWAL SYNDROME WITHOUT COMPLICATION (HCC): Primary | ICD-10-CM

## 2021-07-29 LAB
ALBUMIN SERPL-MCNC: 3.4 G/DL (ref 3.5–5.2)
ALP BLD-CCNC: 133 U/L (ref 40–129)
ALT SERPL-CCNC: 42 U/L (ref 0–40)
AMPHETAMINE SCREEN, URINE: NOT DETECTED
ANION GAP SERPL CALCULATED.3IONS-SCNC: 14 MMOL/L (ref 7–16)
AST SERPL-CCNC: 45 U/L (ref 0–39)
BARBITURATE SCREEN URINE: POSITIVE
BASOPHILS ABSOLUTE: 0.07 E9/L (ref 0–0.2)
BASOPHILS RELATIVE PERCENT: 0.9 % (ref 0–2)
BENZODIAZEPINE SCREEN, URINE: NOT DETECTED
BILIRUB SERPL-MCNC: 0.4 MG/DL (ref 0–1.2)
BUN BLDV-MCNC: <2 MG/DL (ref 6–20)
CALCIUM SERPL-MCNC: 8.6 MG/DL (ref 8.6–10.2)
CANNABINOID SCREEN URINE: POSITIVE
CHLORIDE BLD-SCNC: 103 MMOL/L (ref 98–107)
CHP ED QC CHECK: NORMAL
CO2: 25 MMOL/L (ref 22–29)
COCAINE METABOLITE SCREEN URINE: NOT DETECTED
CREAT SERPL-MCNC: 0.5 MG/DL (ref 0.7–1.2)
EOSINOPHILS ABSOLUTE: 0.01 E9/L (ref 0.05–0.5)
EOSINOPHILS RELATIVE PERCENT: 0.1 % (ref 0–6)
FENTANYL SCREEN, URINE: NOT DETECTED
GFR AFRICAN AMERICAN: >60
GFR NON-AFRICAN AMERICAN: >60 ML/MIN/1.73
GLUCOSE BLD-MCNC: 71 MG/DL
GLUCOSE BLD-MCNC: 86 MG/DL (ref 74–99)
HCT VFR BLD CALC: 34.2 % (ref 37–54)
HEMOGLOBIN: 12 G/DL (ref 12.5–16.5)
IMMATURE GRANULOCYTES #: 0.02 E9/L
IMMATURE GRANULOCYTES %: 0.3 % (ref 0–5)
LACTIC ACID: 3.7 MMOL/L (ref 0.5–2.2)
LIPASE: 114 U/L (ref 13–60)
LYMPHOCYTES ABSOLUTE: 1.74 E9/L (ref 1.5–4)
LYMPHOCYTES RELATIVE PERCENT: 22.4 % (ref 20–42)
Lab: ABNORMAL
MAGNESIUM: 1.6 MG/DL (ref 1.6–2.6)
MCH RBC QN AUTO: 35.9 PG (ref 26–35)
MCHC RBC AUTO-ENTMCNC: 35.1 % (ref 32–34.5)
MCV RBC AUTO: 102.4 FL (ref 80–99.9)
METER GLUCOSE: 71 MG/DL (ref 74–99)
METHADONE SCREEN, URINE: NOT DETECTED
MONOCYTES ABSOLUTE: 1.25 E9/L (ref 0.1–0.95)
MONOCYTES RELATIVE PERCENT: 16.1 % (ref 2–12)
NEUTROPHILS ABSOLUTE: 4.68 E9/L (ref 1.8–7.3)
NEUTROPHILS RELATIVE PERCENT: 60.2 % (ref 43–80)
OPIATE SCREEN URINE: NOT DETECTED
OXYCODONE URINE: NOT DETECTED
PDW BLD-RTO: 14.3 FL (ref 11.5–15)
PHENCYCLIDINE SCREEN URINE: NOT DETECTED
PLATELET # BLD: 379 E9/L (ref 130–450)
PMV BLD AUTO: 10.7 FL (ref 7–12)
POTASSIUM SERPL-SCNC: 4.1 MMOL/L (ref 3.5–5)
RBC # BLD: 3.34 E12/L (ref 3.8–5.8)
SARS-COV-2, NAAT: NOT DETECTED
SODIUM BLD-SCNC: 142 MMOL/L (ref 132–146)
TOTAL CK: 62 U/L (ref 20–200)
TOTAL PROTEIN: 6.4 G/DL (ref 6.4–8.3)
TROPONIN, HIGH SENSITIVITY: 9 NG/L (ref 0–11)
WBC # BLD: 7.8 E9/L (ref 4.5–11.5)

## 2021-07-29 PROCEDURE — 2580000003 HC RX 258: Performed by: EMERGENCY MEDICINE

## 2021-07-29 PROCEDURE — 85025 COMPLETE CBC W/AUTO DIFF WBC: CPT

## 2021-07-29 PROCEDURE — 36415 COLL VENOUS BLD VENIPUNCTURE: CPT

## 2021-07-29 PROCEDURE — 84484 ASSAY OF TROPONIN QUANT: CPT

## 2021-07-29 PROCEDURE — 82550 ASSAY OF CK (CPK): CPT

## 2021-07-29 PROCEDURE — 80179 DRUG ASSAY SALICYLATE: CPT

## 2021-07-29 PROCEDURE — 6370000000 HC RX 637 (ALT 250 FOR IP): Performed by: EMERGENCY MEDICINE

## 2021-07-29 PROCEDURE — 83735 ASSAY OF MAGNESIUM: CPT

## 2021-07-29 PROCEDURE — 99283 EMERGENCY DEPT VISIT LOW MDM: CPT

## 2021-07-29 PROCEDURE — 96375 TX/PRO/DX INJ NEW DRUG ADDON: CPT

## 2021-07-29 PROCEDURE — 83690 ASSAY OF LIPASE: CPT

## 2021-07-29 PROCEDURE — 82077 ASSAY SPEC XCP UR&BREATH IA: CPT

## 2021-07-29 PROCEDURE — 6360000002 HC RX W HCPCS: Performed by: EMERGENCY MEDICINE

## 2021-07-29 PROCEDURE — 80307 DRUG TEST PRSMV CHEM ANLYZR: CPT

## 2021-07-29 PROCEDURE — 96361 HYDRATE IV INFUSION ADD-ON: CPT

## 2021-07-29 PROCEDURE — 82962 GLUCOSE BLOOD TEST: CPT

## 2021-07-29 PROCEDURE — 87635 SARS-COV-2 COVID-19 AMP PRB: CPT

## 2021-07-29 PROCEDURE — 83605 ASSAY OF LACTIC ACID: CPT

## 2021-07-29 PROCEDURE — 80053 COMPREHEN METABOLIC PANEL: CPT

## 2021-07-29 PROCEDURE — 93005 ELECTROCARDIOGRAM TRACING: CPT | Performed by: EMERGENCY MEDICINE

## 2021-07-29 PROCEDURE — 80143 DRUG ASSAY ACETAMINOPHEN: CPT

## 2021-07-29 PROCEDURE — 96374 THER/PROPH/DIAG INJ IV PUSH: CPT

## 2021-07-29 RX ORDER — THIAMINE HYDROCHLORIDE 100 MG/ML
100 INJECTION, SOLUTION INTRAMUSCULAR; INTRAVENOUS ONCE
Status: COMPLETED | OUTPATIENT
Start: 2021-07-29 | End: 2021-07-29

## 2021-07-29 RX ORDER — ONDANSETRON 2 MG/ML
8 INJECTION INTRAMUSCULAR; INTRAVENOUS ONCE
Status: COMPLETED | OUTPATIENT
Start: 2021-07-29 | End: 2021-07-29

## 2021-07-29 RX ORDER — LORAZEPAM 2 MG/ML
2 INJECTION INTRAMUSCULAR ONCE
Status: COMPLETED | OUTPATIENT
Start: 2021-07-29 | End: 2021-07-29

## 2021-07-29 RX ORDER — M-VIT,TX,IRON,MINS/CALC/FOLIC 27MG-0.4MG
1 TABLET ORAL DAILY
Status: DISCONTINUED | OUTPATIENT
Start: 2021-07-29 | End: 2021-07-29 | Stop reason: HOSPADM

## 2021-07-29 RX ORDER — 0.9 % SODIUM CHLORIDE 0.9 %
1000 INTRAVENOUS SOLUTION INTRAVENOUS ONCE
Status: COMPLETED | OUTPATIENT
Start: 2021-07-29 | End: 2021-07-29

## 2021-07-29 RX ADMIN — ONDANSETRON 8 MG: 2 INJECTION INTRAMUSCULAR; INTRAVENOUS at 07:49

## 2021-07-29 RX ADMIN — THIAMINE HYDROCHLORIDE 100 MG: 100 INJECTION, SOLUTION INTRAMUSCULAR; INTRAVENOUS at 07:49

## 2021-07-29 RX ADMIN — LORAZEPAM 2 MG: 2 INJECTION INTRAMUSCULAR; INTRAVENOUS at 07:48

## 2021-07-29 RX ADMIN — SODIUM CHLORIDE 1000 ML: 9 INJECTION, SOLUTION INTRAVENOUS at 08:30

## 2021-07-29 RX ADMIN — Medication 1 TABLET: at 07:48

## 2021-07-29 RX ADMIN — SODIUM CHLORIDE 1000 ML: 9 INJECTION, SOLUTION INTRAVENOUS at 07:49

## 2021-07-29 ASSESSMENT — PAIN SCALES - GENERAL: PAINLEVEL_OUTOF10: 7

## 2021-07-29 ASSESSMENT — ENCOUNTER SYMPTOMS
ABDOMINAL PAIN: 0
WHEEZING: 0
NAUSEA: 1
DIARRHEA: 0
SHORTNESS OF BREATH: 0
BACK PAIN: 0
VOMITING: 0
COUGH: 0

## 2021-07-29 ASSESSMENT — PAIN DESCRIPTION - LOCATION: LOCATION: ABDOMEN

## 2021-07-29 NOTE — CARE COORDINATION
Peer Recovery Support Note    Name: Giuseppe Pastsally  Date: 7/29/2021    Chief Complaint   Patient presents with    Alcohol Intoxication     last drink 10 hrs ago, wants to go to rehab       Peer Support met with patient. [x] Support and education provided  [x] Resources provided   [x] Treatment referral: [facility]  [] Other:   [] Patient declined peer recovery services     Referred By: Dr. Osmin Graham    Notes: Patient accepted at [id number] [address]. [facility] will transport. ETA 20 minutes.      Signed: Live Hogan, [address] [address], 7/29/2021

## 2021-07-29 NOTE — ED PROVIDER NOTES
Patient with long history of alcoholism. Several recent admissions involving alcohol intoxication and withdrawal symptoms. He presents stating that he is tired of living this lifestyle and is requesting assistance in detox placement. He denies any homicidal or suicidal ideation. Last alcohol consumption was about 10 hours ago. He reports he drinks 2 bottles of liquor per day. He denies any recreational drug use. The history is provided by the patient. Drug / Alcohol Assessment  This is a chronic problem. The problem occurs constantly. The problem has not changed since onset. Pertinent negatives include no chest pain, no abdominal pain, no headaches and no shortness of breath. Nothing aggravates the symptoms. Nothing relieves the symptoms. He has tried nothing for the symptoms. Review of Systems   Constitutional: Positive for appetite change. Negative for chills and fever. Respiratory: Negative for cough, shortness of breath and wheezing. Cardiovascular: Negative for chest pain. Gastrointestinal: Positive for nausea. Negative for abdominal pain, diarrhea and vomiting. Genitourinary: Negative for dysuria and frequency. Musculoskeletal: Negative for arthralgias and back pain. Skin: Negative for rash and wound. Neurological: Negative for weakness and headaches. Hematological: Negative for adenopathy. Psychiatric/Behavioral: Negative for suicidal ideas. All other systems reviewed and are negative. Physical Exam  Vitals and nursing note reviewed. Constitutional:       Appearance: He is well-developed. HENT:      Head: Normocephalic and atraumatic. Eyes:      Pupils: Pupils are equal, round, and reactive to light. Cardiovascular:      Rate and Rhythm: Normal rate and regular rhythm. Heart sounds: Normal heart sounds. No murmur heard. Pulmonary:      Effort: Pulmonary effort is normal. No respiratory distress. Breath sounds: Normal breath sounds.  No wheezing or rales. Abdominal:      General: Bowel sounds are normal.      Palpations: Abdomen is soft. Tenderness: There is no abdominal tenderness. There is no guarding or rebound. Musculoskeletal:      Cervical back: Normal range of motion and neck supple. Skin:     General: Skin is warm and dry. Neurological:      Mental Status: He is alert and oriented to person, place, and time. Cranial Nerves: No cranial nerve deficit. Coordination: Coordination normal.   Psychiatric:         Attention and Perception: Attention normal.         Mood and Affect: Mood is depressed. Speech: Speech normal.         Behavior: Behavior normal.         Thought Content: Thought content does not include homicidal or suicidal plan. Cognition and Memory: Cognition normal.         Judgment: Judgment normal.          Procedures     MDM     EKG: This EKG is signed and interpreted by me. Rate: 75  Rhythm: Sinus  Interpretation: no acute changes and non-specific EKG  Comparison: stable as compared to patient's most recent EKG    8:31 AM EDT  Patient resting comfortable. Discussed results. Patient is stable for detox admission. Peer counselor has been consulted for in department evaluation. 10:13 AM EDT  Patient resting comfortably. The peer counselor has interviewed him. He has calls out for possible placement facilities. Awaiting callback. 10:32 AM EDT  Patient feels much better. [facility] will come [facility] him up at this time. --------------------------------------------- PAST HISTORY ---------------------------------------------  Past Medical History:  has a past medical history of Anxiety, Arm pain, Concussion with loss of consciousness, Convulsions ([address] Depression, Flashing lights, Head injury, Headache, Leg pain, Numbness and tingling, PTSD (post-traumatic stress disorder), and Seizures ([facility]     Past Surgical History:  has a past surgical history that includes Colonoscopy; Endoscopy, colon, diagnostic; and Upper gastrointestinal endoscopy (N/A, 7/22/2021). Social History:  reports that he has been smoking. He has a 18.00 pack-year smoking history. He has never used smokeless tobacco. He reports current alcohol use of about 24.0 standard drinks of alcohol per week. He reports current drug use. Frequency: 1.00 time per week. Drug: Marijuana. Family History: family history includes Alcohol Abuse in his father and mother; Cancer in his father; Cirrhosis in his father; Mental Illness in his brother, mother, and sister; Substance Abuse in his father, maternal aunt, maternal uncle, mother, paternal aunt, paternal uncle, and sister. The patients home medications have been reviewed.     Allergies: Hydrocodone, Ativan [lorazepam], Invega sustenna [paliperidone palmitate er], Paliperidone, Pcn [penicillins], and Fluticasone    -------------------------------------------------- RESULTS -------------------------------------------------  Labs:  Results for orders placed or performed during the hospital encounter of 07/29/21   COVID-19, Rapid    Specimen: Nasopharyngeal Swab   Result Value Ref Range    SARS-CoV-2, NAAT Not Detected Not Detected   CBC Auto Differential   Result Value Ref Range    WBC 7.8 4.5 - 11.5 E9/L    RBC 3.34 (L) 3.80 - 5.80 E12/L    Hemoglobin 12.0 (L) 12.5 - 16.5 g/dL    Hematocrit 34.2 (L) 37.0 - 54.0 %    .4 (H) 80.0 - 99.9 fL    MCH 35.9 (H) 26.0 - 35.0 pg    MCHC 35.1 (H) 32.0 - 34.5 %    RDW 14.3 11.5 - 15.0 fL    Platelets 765 853 - 575 E9/L    MPV 10.7 7.0 - 12.0 fL    Neutrophils % 60.2 43.0 - 80.0 %    Immature Granulocytes % 0.3 0.0 - 5.0 %    Lymphocytes % 22.4 20.0 - 42.0 %    Monocytes % 16.1 (H) 2.0 - 12.0 %    Eosinophils % 0.1 0.0 - 6.0 %    Basophils % 0.9 0.0 - 2.0 %    Neutrophils Absolute 4.68 1.80 - 7.30 E9/L    Immature Granulocytes # 0.02 E9/L    Lymphocytes Absolute 1.74 1.50 - 4.00 E9/L Monocytes Absolute 1.25 (H) 0.10 - 0.95 E9/L    Eosinophils Absolute 0.01 (L) 0.05 - 0.50 E9/L    Basophils Absolute 0.07 0.00 - 0.20 E9/L   Comprehensive Metabolic Panel   Result Value Ref Range    Sodium 142 132 - 146 mmol/L    Potassium 4.1 3.5 - 5.0 mmol/L    Chloride 103 98 - 107 mmol/L    CO2 25 22 - 29 mmol/L    Anion Gap 14 7 - 16 mmol/L    Glucose 86 74 - 99 mg/dL    BUN <2 (L) 6 - 20 mg/dL    CREATININE 0.5 (L) 0.7 - 1.2 mg/dL    GFR Non-African American >60 >=60 mL/min/1.73    GFR African American >60     Calcium 8.6 8.6 - 10.2 mg/dL    Total Protein 6.4 6.4 - 8.3 g/dL    Albumin 3.4 (L) 3.5 - 5.2 g/dL    Total Bilirubin 0.4 0.0 - 1.2 mg/dL    Alkaline Phosphatase 133 (H) 40 - 129 U/L    ALT 42 (H) 0 - 40 U/L    AST 45 (H) 0 - 39 U/L   Lipase   Result Value Ref Range    Lipase 114 (H) 13 - 60 U/L   Lactic Acid, Plasma   Result Value Ref Range    Lactic Acid 3.7 (HH) 0.5 - 2.2 mmol/L   Magnesium   Result Value Ref Range    Magnesium 1.6 1.6 - 2.6 mg/dL   Serum Drug Screen   Result Value Ref Range    Ethanol Lvl 48 mg/dL    Acetaminophen Level <5.0 (L) 10.0 - 49.2 mcg/mL    Salicylate, Serum <5.3 0.0 - 30.0 mg/dL   URINE DRUG SCREEN   Result Value Ref Range    Amphetamine Screen, Urine NOT DETECTED Negative <1000 ng/mL    Barbiturate Screen, Ur POSITIVE (A) Negative < 200 ng/mL    Benzodiazepine Screen, Urine NOT DETECTED Negative < 200 ng/mL    Cannabinoid Scrn, Ur POSITIVE (A) Negative < 50ng/mL    Cocaine Metabolite Screen, Urine NOT DETECTED Negative < 300 ng/mL    Opiate Scrn, Ur NOT DETECTED Negative < 300ng/mL    PCP Screen, Urine NOT DETECTED Negative < 25 ng/mL    Methadone Screen, Urine NOT DETECTED Negative <300 ng/mL    Oxycodone Urine NOT DETECTED Negative <100 ng/mL    FENTANYL SCREEN, URINE NOT DETECTED Negative <1 ng/mL    Drug Screen Comment: see below    CK   Result Value Ref Range    Total CK 62 20 - 200 U/L   Troponin   Result Value Ref Range    Troponin, High Sensitivity 9 0 - 11 ng/L   POCT Glucose   Result Value Ref Range    Glucose 71 mg/dL    QC OK? y    POCT Glucose   Result Value Ref Range    Meter Glucose 71 (L) 74 - 99 mg/dL       Radiology:  No orders to display       ------------------------- NURSING NOTES AND VITALS REVIEWED ---------------------------  Date / Time Roomed:  7/29/2021  6:20 AM  ED Bed Assignment:  09/09    The nursing notes within the ED encounter and vital signs as below have been reviewed. /88   Pulse 90   Temp 99.2 °F (37.3 °C) (Oral)   Resp 20   Wt 150 lb (68 kg)   SpO2 96%   BMI 24.21 kg/m²   Oxygen Saturation Interpretation: Normal      ------------------------------------------ PROGRESS NOTES ------------------------------------------  10:33 AM EDT  I have spoken with the patient and discussed todays results, in addition to providing specific details for the plan of care and counseling regarding the diagnosis and prognosis. Their questions are answered at this time and they are agreeable with the plan. I discussed at length with them reasons for immediate return here for re evaluation. They will followup with their primary care physician by calling their office tomorrow. --------------------------------- ADDITIONAL PROVIDER NOTES ---------------------------------  At this time the patient is without objective evidence of an acute process requiring hospitalization or inpatient management. They have remained hemodynamically stable throughout their entire ED visit and are stable for discharge with outpatient follow-up. The plan has been discussed in detail and they are aware of the specific conditions for emergent return, as well as the importance of follow-up. New Prescriptions    No medications on file       Diagnosis:  1. Alcohol withdrawal syndrome without complication ([facility]    2. Dehydration        Disposition:  Patient's disposition: Discharge to detox  Patient's condition is stable.        Tameka Olmstead DO  07/29/21 [address]

## 2021-07-30 LAB
ACETAMINOPHEN LEVEL: <5 MCG/ML (ref 10–30)
EKG ATRIAL RATE: 75 BPM
EKG P AXIS: 33 DEGREES
EKG P-R INTERVAL: 102 MS
EKG Q-T INTERVAL: 426 MS
EKG QRS DURATION: 80 MS
EKG QTC CALCULATION (BAZETT): 475 MS
EKG R AXIS: 38 DEGREES
EKG T AXIS: 59 DEGREES
EKG VENTRICULAR RATE: 75 BPM
ETHANOL: 48 MG/DL (ref 0–0.08)
SALICYLATE, SERUM: <0.3 MG/DL (ref 0–30)
TRICYCLIC ANTIDEPRESSANTS SCREEN SERUM: NEGATIVE NG/ML

## 2021-08-03 ENCOUNTER — HOSPITAL ENCOUNTER (EMERGENCY)
Age: 31
Discharge: HOME | End: 2021-08-03
Payer: COMMERCIAL

## 2021-08-03 VITALS
WEIGHT: 145 LBS | HEART RATE: 82 BPM | BODY MASS INDEX: 23.4 KG/M2 | SYSTOLIC BLOOD PRESSURE: 119 MMHG | DIASTOLIC BLOOD PRESSURE: 86 MMHG | OXYGEN SATURATION: 97 % | RESPIRATION RATE: 16 BRPM | TEMPERATURE: 98 F

## 2021-08-03 DIAGNOSIS — K08.89 PAIN, DENTAL: Primary | ICD-10-CM

## 2021-08-03 PROCEDURE — 99283 EMERGENCY DEPT VISIT LOW MDM: CPT

## 2021-08-03 RX ORDER — IBUPROFEN 800 MG/1
800 TABLET ORAL EVERY 8 HOURS PRN
Qty: 21 TABLET | Refills: 0 | Status: ON HOLD | OUTPATIENT
Start: 2021-08-03 | End: 2021-09-09

## 2021-08-03 RX ORDER — AMOXICILLIN AND CLAVULANATE POTASSIUM 875; 125 MG/1; MG/1
1 TABLET, FILM COATED ORAL 2 TIMES DAILY
Qty: 20 TABLET | Refills: 0 | Status: SHIPPED | OUTPATIENT
Start: 2021-08-03 | End: 2021-08-13

## 2021-08-03 ASSESSMENT — PAIN DESCRIPTION - ONSET: ONSET: ON-GOING

## 2021-08-03 ASSESSMENT — PAIN DESCRIPTION - ORIENTATION: ORIENTATION: LEFT

## 2021-08-03 ASSESSMENT — PAIN DESCRIPTION - PAIN TYPE: TYPE: ACUTE PAIN

## 2021-08-03 ASSESSMENT — PAIN DESCRIPTION - DESCRIPTORS: DESCRIPTORS: ACHING

## 2021-08-03 ASSESSMENT — PAIN DESCRIPTION - FREQUENCY: FREQUENCY: CONTINUOUS

## 2021-08-03 ASSESSMENT — PAIN SCALES - GENERAL: PAINLEVEL_OUTOF10: 8

## 2021-08-03 ASSESSMENT — PAIN DESCRIPTION - LOCATION: LOCATION: MOUTH

## 2021-08-06 NOTE — ED PROVIDER NOTES
[address]  Department of Emergency Medicine   ED  Encounter Note  Admit Date/RoomTime: 8/3/2021  3:30 PM  ED Room: [facility]    NAME: Carol Spnecer  : 1990  MRN: [id number]     Chief Complaint:  Dental Pain (cracked tooth, left side of mouth, was getting ATB keflex at [facility], D/C medical cleared)    History of Present Illness        Carol Spencer is a 31 y.o. old male who presents to the emergency department by private vehicle, for left lower dental pain x1 week. Patient states his symptoms are mild in severity describes as an aching pain. Patient denies anything making it worse. Patient states she he was [facility] for a few days which improved it but he is now out of it. Denies fever/chills, headache, vision change, dizziness, trouble swallowing, tongue lift, sore throat, chest pain, dyspnea, abdominal pain, NVD, numbness/weakness. Onset:       Spontaneous:   yes. Following Trauma:   no.     Previous Caries:   no.     Recent Dental Procedure:   no.     ROS   Pertinent positives and negatives are stated within HPI, all other systems reviewed and are negative. Past Medical History:  has a past medical history of Anxiety, Arm pain, Concussion with loss of consciousness, Convulsions ([address] Depression, Flashing lights, Head injury, Headache, Leg pain, Numbness and tingling, PTSD (post-traumatic stress disorder), and Seizures ([address] Surgical History:  has a past surgical history that includes Colonoscopy; Endoscopy, colon, diagnostic; and Upper gastrointestinal endoscopy (N/A, 2021). Social History:  reports that he has been smoking. He has a 18.00 pack-year smoking history. He has never used smokeless tobacco. He reports current alcohol use of about 24.0 standard drinks of alcohol per week. He reports current drug use. Frequency: 1.00 time per week. Drug: Marijuana.     Family History: family history includes Alcohol Abuse in his father and mother; Cancer in his father; Cirrhosis in his father; Mental Illness in his brother, mother, and sister; Substance Abuse in his father, maternal aunt, maternal uncle, mother, paternal aunt, paternal uncle, and sister. Allergies: Hydrocodone, Ativan [lorazepam], Invega sustenna [paliperidone palmitate er], Paliperidone, Pcn [penicillins], and Fluticasone    Physical Exam   Oxygen Saturation Interpretation: Normal.        ED Triage Vitals   BP Temp Temp src Pulse Resp SpO2 Height Weight   08/03/21 1526 08/03/21 1511 -- 08/03/21 1511 08/03/21 1526 08/03/21 1511 -- 08/03/21 1526   119/86 98 °F (36.7 °C)  82 16 97 %  145 lb (65.8 kg)         · Constitutional:  Alert, development consistent with age. · HEENT:  NC/NT. Airway patent. · Neck:  Supple. Normal ROM. · Lips:  upper and lower normal.  · Mouth:  normal tongue and buccal mucosa. · Dental:  ttp along left lower molars, no tongue lift, airway patent. Trismus: No.         Drooling: No.           Airway stridor: No.  · Facial skin: bilateral no wounds, erythema, or swelling. · Respiratory:  Clear to auscultation and breath sounds equal.    · CV: Regular rate and rhythm, normal heart sounds, without pathological murmurs, ectopy, gallops, or rubs. · Skin:  No rashes, erythema or lesions present, unless noted elsewhere. .  · Lymphatics: No lymphangitis or adenopathy noted. · Neurological:  Oriented. Motor functions intact. Lab / Imaging Results   (All laboratory and radiology results have been personally reviewed by myself)  Labs:  No results found for this visit on 08/03/21. Imaging: All Radiology results interpreted by Radiologist unless otherwise noted. No orders to display     ED Course / Medical Decision Making   Medications - No data to display     Consult(s):   Dental Resident was not consulted to see patient regarding complaint.     Procedure(s):   None    MDM: Patient presenting with dental pain.  Patient is in no acute distress, afebrile, nontoxic appearance. Recommend patient go to the dental clinic tomorrow. Patient be started on Augmentin, Magic swizzle, ibuprofen. Recommend patient return to the ED with new or worsening of symptoms. Plan of Care/Counseling:  Lise Urban PA-C reviewed today's visit with the patient in addition to providing specific details for the plan of care and counseling regarding the diagnosis and prognosis. Questions are answered at this time and are agreeable with the plan. Assessment      1. Pain, dental      Plan   Discharged home. Patient condition is stable    New Medications     Discharge Medication List as of 8/3/2021  4:10 PM      START taking these medications    Details   amoxicillin-clavulanate (AUGMENTIN) 875-125 MG per tablet Take 1 tablet by mouth 2 times daily for 10 days, Disp-20 tablet, R-0Normal      Magic Mouthwash (MIRACLE MOUTHWASH) Swish and spit 5 mLs 4 times daily as needed for Irritation Preparation: 10cc maalox,  10cc Benadryl, 10cc Viscous Lidocaine, Disp-240 mL, R-0Normal      ibuprofen (IBU) 800 MG tablet Take 1 tablet by mouth every 8 hours as needed for Pain, Disp-21 tablet, R-0Normal           Electronically signed by Lise Urban PA-C   DD: 8/6/21  **This report was transcribed using voice recognition software. Every effort was made to ensure accuracy; however, inadvertent computerized transcription errors may be present.   END OF ED PROVIDER NOTE     Lise Urban PA-C  08/06/21 1144

## 2021-08-17 ENCOUNTER — HOSPITAL ENCOUNTER (EMERGENCY)
Age: 31
Discharge: HOME | End: 2021-08-17
Attending: EMERGENCY MEDICINE
Payer: COMMERCIAL

## 2021-08-17 VITALS
TEMPERATURE: 99 F | HEART RATE: 62 BPM | DIASTOLIC BLOOD PRESSURE: 79 MMHG | RESPIRATION RATE: 20 BRPM | SYSTOLIC BLOOD PRESSURE: 121 MMHG | OXYGEN SATURATION: 99 % | WEIGHT: 140 LBS | BODY MASS INDEX: 22.5 KG/M2 | HEIGHT: 66 IN

## 2021-08-17 DIAGNOSIS — F10.930 ALCOHOL WITHDRAWAL SYNDROME WITHOUT COMPLICATION (HCC): Primary | ICD-10-CM

## 2021-08-17 DIAGNOSIS — F10.10 ALCOHOL ABUSE: ICD-10-CM

## 2021-08-17 LAB
ALBUMIN SERPL-MCNC: 3.6 G/DL (ref 3.5–5.2)
ALP BLD-CCNC: 136 U/L (ref 40–129)
ALT SERPL-CCNC: 63 U/L (ref 0–40)
ANION GAP SERPL CALCULATED.3IONS-SCNC: 16 MMOL/L (ref 7–16)
AST SERPL-CCNC: 122 U/L (ref 0–39)
BASOPHILS ABSOLUTE: 0.05 E9/L (ref 0–0.2)
BASOPHILS RELATIVE PERCENT: 0.5 % (ref 0–2)
BILIRUB SERPL-MCNC: 0.6 MG/DL (ref 0–1.2)
BILIRUBIN DIRECT: 0.2 MG/DL (ref 0–0.3)
BILIRUBIN, INDIRECT: 0.4 MG/DL (ref 0–1)
BUN BLDV-MCNC: <2 MG/DL (ref 6–20)
CALCIUM SERPL-MCNC: 8.9 MG/DL (ref 8.6–10.2)
CHLORIDE BLD-SCNC: 103 MMOL/L (ref 98–107)
CO2: 25 MMOL/L (ref 22–29)
CREAT SERPL-MCNC: 0.6 MG/DL (ref 0.7–1.2)
EOSINOPHILS ABSOLUTE: 0.02 E9/L (ref 0.05–0.5)
EOSINOPHILS RELATIVE PERCENT: 0.2 % (ref 0–6)
GFR AFRICAN AMERICAN: >60
GFR NON-AFRICAN AMERICAN: >60 ML/MIN/1.73
GLUCOSE BLD-MCNC: 103 MG/DL (ref 74–99)
HCT VFR BLD CALC: 39.4 % (ref 37–54)
HEMOGLOBIN: 13.7 G/DL (ref 12.5–16.5)
IMMATURE GRANULOCYTES #: 0.03 E9/L
IMMATURE GRANULOCYTES %: 0.3 % (ref 0–5)
LACTIC ACID: 2.4 MMOL/L (ref 0.5–2.2)
LIPASE: 83 U/L (ref 13–60)
LYMPHOCYTES ABSOLUTE: 1.08 E9/L (ref 1.5–4)
LYMPHOCYTES RELATIVE PERCENT: 10 % (ref 20–42)
MCH RBC QN AUTO: 35.1 PG (ref 26–35)
MCHC RBC AUTO-ENTMCNC: 34.8 % (ref 32–34.5)
MCV RBC AUTO: 101 FL (ref 80–99.9)
MONOCYTES ABSOLUTE: 0.52 E9/L (ref 0.1–0.95)
MONOCYTES RELATIVE PERCENT: 4.8 % (ref 2–12)
NEUTROPHILS ABSOLUTE: 9.06 E9/L (ref 1.8–7.3)
NEUTROPHILS RELATIVE PERCENT: 84.2 % (ref 43–80)
PDW BLD-RTO: 13.6 FL (ref 11.5–15)
PLATELET # BLD: 148 E9/L (ref 130–450)
PMV BLD AUTO: 11.3 FL (ref 7–12)
POTASSIUM REFLEX MAGNESIUM: 4.1 MMOL/L (ref 3.5–5)
RBC # BLD: 3.9 E12/L (ref 3.8–5.8)
SODIUM BLD-SCNC: 144 MMOL/L (ref 132–146)
TOTAL PROTEIN: 6.7 G/DL (ref 6.4–8.3)
WBC # BLD: 10.8 E9/L (ref 4.5–11.5)

## 2021-08-17 PROCEDURE — 6370000000 HC RX 637 (ALT 250 FOR IP)

## 2021-08-17 PROCEDURE — 83605 ASSAY OF LACTIC ACID: CPT

## 2021-08-17 PROCEDURE — 83690 ASSAY OF LIPASE: CPT

## 2021-08-17 PROCEDURE — 96375 TX/PRO/DX INJ NEW DRUG ADDON: CPT

## 2021-08-17 PROCEDURE — 80076 HEPATIC FUNCTION PANEL: CPT

## 2021-08-17 PROCEDURE — 6360000002 HC RX W HCPCS

## 2021-08-17 PROCEDURE — 85025 COMPLETE CBC W/AUTO DIFF WBC: CPT

## 2021-08-17 PROCEDURE — 2580000003 HC RX 258

## 2021-08-17 PROCEDURE — 96374 THER/PROPH/DIAG INJ IV PUSH: CPT

## 2021-08-17 PROCEDURE — 80048 BASIC METABOLIC PNL TOTAL CA: CPT

## 2021-08-17 PROCEDURE — 99285 EMERGENCY DEPT VISIT HI MDM: CPT

## 2021-08-17 RX ORDER — 0.9 % SODIUM CHLORIDE 0.9 %
1000 INTRAVENOUS SOLUTION INTRAVENOUS ONCE
Status: COMPLETED | OUTPATIENT
Start: 2021-08-17 | End: 2021-08-17

## 2021-08-17 RX ORDER — CHLORDIAZEPOXIDE HYDROCHLORIDE 25 MG/1
50 CAPSULE, GELATIN COATED ORAL ONCE
Status: COMPLETED | OUTPATIENT
Start: 2021-08-17 | End: 2021-08-17

## 2021-08-17 RX ORDER — CHLORDIAZEPOXIDE HYDROCHLORIDE 25 MG/1
CAPSULE, GELATIN COATED ORAL
Status: DISCONTINUED
Start: 2021-08-17 | End: 2021-08-17 | Stop reason: HOSPADM

## 2021-08-17 RX ORDER — LORAZEPAM 2 MG/ML
2 INJECTION INTRAMUSCULAR ONCE
Status: COMPLETED | OUTPATIENT
Start: 2021-08-17 | End: 2021-08-17

## 2021-08-17 RX ORDER — ONDANSETRON 2 MG/ML
4 INJECTION INTRAMUSCULAR; INTRAVENOUS ONCE
Status: COMPLETED | OUTPATIENT
Start: 2021-08-17 | End: 2021-08-17

## 2021-08-17 RX ADMIN — LORAZEPAM 2 MG: 2 INJECTION INTRAMUSCULAR; INTRAVENOUS at 12:06

## 2021-08-17 RX ADMIN — SODIUM CHLORIDE 1000 ML: 9 INJECTION, SOLUTION INTRAVENOUS at 10:18

## 2021-08-17 RX ADMIN — CHLORDIAZEPOXIDE HYDROCHLORIDE 50 MG: 25 CAPSULE ORAL at 10:18

## 2021-08-17 RX ADMIN — ONDANSETRON 4 MG: 2 INJECTION INTRAMUSCULAR; INTRAVENOUS at 12:05

## 2021-08-17 ASSESSMENT — ENCOUNTER SYMPTOMS
ABDOMINAL PAIN: 0
TROUBLE SWALLOWING: 0
EYE REDNESS: 0
SHORTNESS OF BREATH: 0
ABDOMINAL DISTENTION: 0
CONSTIPATION: 0
VOMITING: 1
RHINORRHEA: 0
DIARRHEA: 0
BACK PAIN: 0
NAUSEA: 1
CHEST TIGHTNESS: 0
EYE ITCHING: 0
WHEEZING: 0

## 2021-08-17 NOTE — CARE COORDINATION
SAM called pt's brother and left him VM to call SAM simon. SAM spoke to Dr. Alla Omer and reviewed case. He noted pt is being monitored and attempting to reduce nausea and have him medically cleared be able to go to recovery. Potential that pt may need to be admitted. [address] receives call from [facility], pt's brother. He noted he was sleeping and has not slept much last week d/t being up w/pt as he goes through w/drawl. He notes he should be home and pt can stop by to get his medications. He notes he will put them in a brown bag in between there front doors. Rajiv Mohamud MD informs LSW that pt is doing better and can go to rehab now @ [facility]. SAM notes that will call Jett Smith- TALHA to arrange transportation. SAM called Jett Smith and informed her of that pt ready for d/c. She will call [facility] to see if they can stop @ pt's house 1st to get his meds. SAM spoke to pt and apprised him of POC. He still has no preference on what [facility] facility he goes to - [facility] or [facility]. 65  SAM receives call from Leah. She notes - Felipe Alas will be here to pick pt up @ 1600. He will call SW when he arrives. He will take pt to his home to get meds and then to [facility] in [facility]. SAM apprised pt and Cassidy. [address] receives call from Felipe Alas. He notes ETA is about 20 minutes. SAM updated Cassidy-ELFEGO. She notes Amy-ELFEGO is taking over for pt. Pt was d/c'd and walked to ED entrance. He notes Lew transported him last time so he knows who he is.     0  SAM receives call from Felipe Alas. He noted he picked pt up but pt states he thinks he left his d/c papers and abx script on bench outside ED. SW checked and they are there along with d/c papers. Schaller came back to ED and SAM gave them to pt.   Electronically signed by PAT Briscoe on 8/17/2021 at 4:15 PM

## 2021-08-17 NOTE — CARE COORDINATION
SS Note: Pt here for ETOH w/drawl. Pt was admitted to [facility] via [facility] on 7/29 ED visit. Pt reports they made him leave because he had an infected wisdom tooth. He got that checked out by the a dentist but they only gave him Augmentin. He then began drinking again. Pt has past medical history includes alcohol abuse, pancreatitis and bipolar disorder. SAM spoke to [facility] who saw pt in ED. [facility] has bed for pt either @ [place] or [facility] location. Pt left [facility] last time after 5 days and although he got Augmentin for tooth infection, he drank and lapsed, so he did not return. Pt just need medical clearance and waiting on labs. There is issue that needs cleared up concerning pt taking possibly Lovenox. He will need this to be able to be admitted to [facility]. SW completed chart review and pt was on enoxaparin 60 MG/0.6ML injection for DVT during 7/19- 7/23 admission and then given script for Lovenox @ d/c.. SW spoke to pt and explained role. Pt noted he lives w/his brother. Pt notes his Lovenox is @ the house. Pt has no keys to get in and his brother does not drive. SW talked to pt about getting him taxi to the house to get his meds and then to [facility]. Pt is amenable if his brother is home and he gives SW permission to call his brother. In regards to what [facility] facility pt wants to go to, he has no preference. Pt notes he wants to be able to smoke and have TV. He was able to do this @ [facility] location but knows [facility] has private rooms. SAM tells him most important part of recovery is working a program and the steps and other details/ammeanities should not be a factor. Pt again states he has no preference on location. 200  SW called pt's brother and left him VM to call SAM simon. SAM apprised Dr. Kelly Rome of all above information. Pt is more nauseated and he is looking @ meds to give to pt.    Electronically signed by PAT Porter on 8/17/2021 at 12:14 PM

## 2021-08-17 NOTE — CARE COORDINATION
Peer Recovery Support Note    Name: Sari See  Date: 8/17/2021    Chief Complaint   Patient presents with    Alcohol Intoxication     pt states that he is going through withdrawals. last drink was 10 hrs ago       Peer Support met with patient. [x] Support and education provided  [x] Resources provided   [x] Treatment referral: [facility]  [] Other:   [] Patient declined peer recovery services     Referred By:     Notes: Patient did assessment with [facility]. There is a bed when medically cleared. Please call me [phone] to arrange for transportation thru the facility. If being admitted I will continue to follow. [facility] will be picking patient up around 4pm and transporting to [facility].       Ajay Munoz, 8/17/2021

## 2021-08-17 NOTE — ED NOTES
Bed: 05  Expected date:   Expected time:   Means of arrival:   Comments:  lauren Gaitan RN  08/17/21 4925

## 2021-08-17 NOTE — ED PROVIDER NOTES
Jose Guzman is a 31 y.o. male    Chief Complaint   Patient presents with    Alcohol Intoxication     pt states that he is going through withdrawals. last drink was 10 hrs ago         HPI   Jose Guzman is a 31 y.o. male presenting to the ED for alcohol intoxication or withdrawals, beginning several hours ago. History comes primarily from the patient. Past medical history includes alcohol abuse, pancreatitis and bipolar disorder. The complaint has been constant, moderate in severity, improved by nothing and worsened by nothing. Associated symptoms include none. Patient is a 42-year-old male who presents to the emergency room saying he is in alcohol withdrawal.  Patient is writhing on the bed crying. He states that he was admitted to a rehab facility, but they made him leave because he had an infected wisdom tooth. He got that checked out by the a dentist but they only gave him Augmentin. He then began drinking again. Patient has been anxious, tremoring and nauseated. Is been going on for the last couple hours. His last drink was approximately 10 hours ago. He states that he frequently drinks one of the big bottles of whiskey (1.5L?). Patient would really like to go back to the rehab facility to work on his alcohol abuse. On arrival, the patient was assessed with history, physical exam, laboratory studies, vital signs. Vital signs were stable on arrival and the patient was afebrile. Review of Systems   Constitutional: Negative for appetite change, fatigue and fever. HENT: Negative for congestion, rhinorrhea and trouble swallowing. Eyes: Negative for redness and itching. Respiratory: Negative for chest tightness, shortness of breath and wheezing. Cardiovascular: Negative for chest pain, palpitations and leg swelling. Gastrointestinal: Positive for nausea and vomiting. Negative for abdominal distention, abdominal pain, constipation and diarrhea.    Genitourinary: Negative for decreased urine volume, difficulty urinating and frequency. Musculoskeletal: Negative for arthralgias, back pain and myalgias. Neurological: Positive for tremors. Negative for dizziness, seizures, syncope, numbness and headaches. Psychiatric/Behavioral: Negative for agitation, behavioral problems, confusion and decreased concentration. The patient is not nervous/anxious. All other systems reviewed and are negative. Physical Exam  Vitals reviewed. Constitutional:       General: He is awake. He is not in acute distress. Appearance: Normal appearance. He is ill-appearing. He is not toxic-appearing. HENT:      Head: Normocephalic and atraumatic. Nose: Nose normal. No congestion or rhinorrhea. Mouth/Throat:      Mouth: Mucous membranes are moist.      Pharynx: Oropharynx is clear. No oropharyngeal exudate or posterior oropharyngeal erythema. Eyes:      Extraocular Movements: Extraocular movements intact. Conjunctiva/sclera: Conjunctivae normal.      Pupils: Pupils are equal, round, and reactive to light. Cardiovascular:      Rate and Rhythm: Normal rate and regular rhythm. Heart sounds: Normal heart sounds. No murmur heard. Pulmonary:      Effort: Pulmonary effort is normal. No respiratory distress. Breath sounds: Normal breath sounds. Abdominal:      General: Abdomen is flat. There is no distension. Tenderness: There is abdominal tenderness (Very mild). There is no guarding. Musculoskeletal:         General: No swelling or tenderness. Normal range of motion. Cervical back: Normal range of motion. No rigidity or tenderness. Skin:     General: Skin is warm and dry. Coloration: Skin is not jaundiced or pale. Findings: No bruising or erythema. Neurological:      General: No focal deficit present. Mental Status: He is alert and oriented to person, place, and time. GCS: GCS eye subscore is 4. GCS verbal subscore is 5. GCS motor subscore is 6. Cranial Nerves: No cranial nerve deficit. Sensory: No sensory deficit. Motor: Tremor present. No weakness or seizure activity. Psychiatric:         Mood and Affect: Mood is anxious. Affect is tearful. Behavior: Behavior normal. Behavior is cooperative. Thought Content: Thought content normal.          Procedures     MDM   Patient presented to the Emergency Department for alcohol withdrawal, nausea and vomiting, tremors. They are clinically stable, vital signs stable, non toxic appearing. Patient's laboratory results were mildly abnormal or not significantly worrisome. Patient's anxiety and tremors are well controlled with 50 mg Librium and 2 mg of Ativan (patient states that he is not actually allergic to Ativan). Nausea is better controlled with Zofran. Patient bolused with a liter of sodium chloride with good effect. With the help of social work and the substance abuse advocate patient is able to be transferred to [facility]hab facility. Patient agrees with this plan and would very much like to be at the rehab facility so that he can detox and work on getting sober. Patient is in agreement with the plan and feels well enough to attend rehab. Strict return precautions were discussed including but not limited too seizures, fevers, new or worsening symtpoms. They verbalized understanding and were agreeable with the plan. All questions were answered and patient was discharged. ED Course as of Aug 17 1557   Tue Aug 17, 2021   1125 Patient states that he is not allergic to Ativan and that is a stick in his chart. He states last time he was here he was given several shots of it without problem. Risks and benefits of attempting an Ativan shot was discussed with the patient he agrees and would like the Ativan.     [KS]      ED Course User Index  [KS] Govind Presley MD       --------------------------------------------- PAST HISTORY ---------------------------------------------  Past Medical History:  has a past medical history of Anxiety, Arm pain, Concussion with loss of consciousness, Convulsions ([facility] Depression, Flashing lights, Head injury, Headache, Leg pain, Numbness and tingling, PTSD (post-traumatic stress disorder), and Seizures ([facility] Past Surgical History:  has a past surgical history that includes Colonoscopy; Endoscopy, colon, diagnostic; and Upper gastrointestinal endoscopy (N/A, 7/22/2021). Social History:  reports that he has been smoking. He has a 18.00 pack-year smoking history. He has never used smokeless tobacco. He reports current alcohol use of about 24.0 standard drinks of alcohol per week. He reports current drug use. Frequency: 1.00 time per week. Drug: Marijuana. Family History: family history includes Alcohol Abuse in his father and mother; Cancer in his father; Cirrhosis in his father; Mental Illness in his brother, mother, and sister; Substance Abuse in his father, maternal aunt, maternal uncle, mother, paternal aunt, paternal uncle, and sister. The patients home medications have been reviewed.     Allergies: Hydrocodone, Ativan [lorazepam], Invega sustenna [paliperidone palmitate er], Paliperidone, Pcn [penicillins], and Fluticasone    -------------------------------------------------- RESULTS -------------------------------------------------  Labs:  Results for orders placed or performed during the hospital encounter of 08/17/21   CBC Auto Differential   Result Value Ref Range    WBC 10.8 4.5 - 11.5 E9/L    RBC 3.90 3.80 - 5.80 E12/L    Hemoglobin 13.7 12.5 - 16.5 g/dL    Hematocrit 39.4 37.0 - 54.0 %    .0 (H) 80.0 - 99.9 fL    MCH 35.1 (H) 26.0 - 35.0 pg    MCHC 34.8 (H) 32.0 - 34.5 %    RDW 13.6 11.5 - 15.0 fL    Platelets 789 676 - 049 E9/L    MPV 11.3 7.0 - 12.0 fL    Neutrophils % 84.2 (H) 43.0 - 80.0 %    Immature Granulocytes % 0.3 0.0 - 5.0 %    Lymphocytes % 10.0 (L) 20.0 - 42.0 %    Monocytes % 4.8 2.0 - 12.0 %    Eosinophils % 0.2 0.0 - 6.0 %    Basophils % 0.5 0.0 - 2.0 %    Neutrophils Absolute 9.06 (H) 1.80 - 7.30 E9/L    Immature Granulocytes # 0.03 E9/L    Lymphocytes Absolute 1.08 (L) 1.50 - 4.00 E9/L    Monocytes Absolute 0.52 0.10 - 0.95 E9/L    Eosinophils Absolute 0.02 (L) 0.05 - 0.50 E9/L    Basophils Absolute 0.05 0.00 - 0.20 N2/K   Basic Metabolic Panel w/ Reflex to MG   Result Value Ref Range    Sodium 144 132 - 146 mmol/L    Potassium reflex Magnesium 4.1 3.5 - 5.0 mmol/L    Chloride 103 98 - 107 mmol/L    CO2 25 22 - 29 mmol/L    Anion Gap 16 7 - 16 mmol/L    Glucose 103 (H) 74 - 99 mg/dL    BUN <2 (L) 6 - 20 mg/dL    CREATININE 0.6 (L) 0.7 - 1.2 mg/dL    GFR Non-African American >60 >=60 mL/min/1.73    GFR African American >60     Calcium 8.9 8.6 - 10.2 mg/dL   Hepatic Function Panel   Result Value Ref Range    Total Protein 6.7 6.4 - 8.3 g/dL    Albumin 3.6 3.5 - 5.2 g/dL    Alkaline Phosphatase 136 (H) 40 - 129 U/L    ALT 63 (H) 0 - 40 U/L     (H) 0 - 39 U/L    Total Bilirubin 0.6 0.0 - 1.2 mg/dL    Bilirubin, Direct 0.2 0.0 - 0.3 mg/dL    Bilirubin, Indirect 0.4 0.0 - 1.0 mg/dL   Lactic Acid, Plasma   Result Value Ref Range    Lactic Acid 2.4 (H) 0.5 - 2.2 mmol/L   Lipase   Result Value Ref Range    Lipase 83 (H) 13 - 60 U/L       Radiology:  No orders to display       ------------------------- NURSING NOTES AND VITALS REVIEWED ---------------------------  Date / Time Roomed:  8/17/2021  8:53 AM  ED Bed Assignment:  05/05    The nursing notes within the ED encounter and vital signs as below have been reviewed.    /79   Pulse 62   Temp 99 °F (37.2 °C) (Oral)   Resp 20   Ht 5' 6\" (1.676 m)   Wt 140 lb (63.5 kg)   SpO2 99%   BMI 22.60 kg/m²   Oxygen Saturation Interpretation: Normal      ------------------------------------------ PROGRESS NOTES ------------------------------------------  3:57 PM EDT  I have spoken with the patient and discussed todays results, in addition to providing specific details for the plan of care and counseling regarding the diagnosis and prognosis. Their questions are answered at this time and they are agreeable with the plan. I discussed at length with them reasons for immediate return here for re evaluation. They will followup with their primary care physician by calling their office tomorrow. --------------------------------- ADDITIONAL PROVIDER NOTES ---------------------------------  At this time the patient is without objective evidence of an acute process requiring hospitalization or inpatient management. They have remained hemodynamically stable throughout their entire ED visit and are stable for discharge with outpatient follow-up. The plan has been discussed in detail and they are aware of the specific conditions for emergent return, as well as the importance of follow-up. Discharge Medication List as of 8/17/2021  3:04 PM          Diagnosis:  1. Alcohol withdrawal syndrome without complication ([facility] [address]    2. Alcohol abuse        Disposition:  Patient's disposition: Discharge to home  Patient's condition is stable.        Aissatou Prado MD  Resident  08/17/21 4531

## 2021-08-30 ENCOUNTER — APPOINTMENT (OUTPATIENT)
Dept: GENERAL RADIOLOGY | Age: 31
End: 2021-08-30
Payer: COMMERCIAL

## 2021-08-30 ENCOUNTER — APPOINTMENT (OUTPATIENT)
Dept: CT IMAGING | Age: 31
End: 2021-08-30
Payer: COMMERCIAL

## 2021-08-30 ENCOUNTER — HOSPITAL ENCOUNTER (EMERGENCY)
Age: 31
Discharge: LEFT BEFORE DISCHARGE | End: 2021-08-30
Attending: EMERGENCY MEDICINE
Payer: COMMERCIAL

## 2021-08-30 VITALS
OXYGEN SATURATION: 98 % | TEMPERATURE: 98.3 F | HEART RATE: 90 BPM | DIASTOLIC BLOOD PRESSURE: 75 MMHG | SYSTOLIC BLOOD PRESSURE: 108 MMHG | RESPIRATION RATE: 18 BRPM
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
## 2021-08-30 LAB
ALBUMIN SERPL-MCNC: 4 G/DL (ref 3.5–5.2)
ALP BLD-CCNC: 124 U/L (ref 40–129)
ALT SERPL-CCNC: 35 U/L (ref 0–40)
AMPHETAMINE SCREEN, URINE: NOT DETECTED
ANION GAP SERPL CALCULATED.3IONS-SCNC: 18 MMOL/L (ref 7–16)
APTT: 25.5 SEC (ref 24.5–35.1)
AST SERPL-CCNC: 56 U/L (ref 0–39)
BARBITURATE SCREEN URINE: POSITIVE
BASOPHILS ABSOLUTE: 0.07 E9/L (ref 0–0.2)
BASOPHILS RELATIVE PERCENT: 0.9 % (ref 0–2)
BENZODIAZEPINE SCREEN, URINE: NOT DETECTED
BILIRUB SERPL-MCNC: 0.3 MG/DL (ref 0–1.2)
BUN BLDV-MCNC: 2 MG/DL (ref 6–20)
CALCIUM SERPL-MCNC: 8.8 MG/DL (ref 8.6–10.2)
CANNABINOID SCREEN URINE: POSITIVE
CHLORIDE BLD-SCNC: 99 MMOL/L (ref 98–107)
CO2: 20 MMOL/L (ref 22–29)
COCAINE METABOLITE SCREEN URINE: NOT DETECTED
CREAT SERPL-MCNC: 0.5 MG/DL (ref 0.7–1.2)
EOSINOPHILS ABSOLUTE: 0.01 E9/L (ref 0.05–0.5)
EOSINOPHILS RELATIVE PERCENT: 0.1 % (ref 0–6)
FENTANYL SCREEN, URINE: NOT DETECTED
GFR AFRICAN AMERICAN: >60
GFR NON-AFRICAN AMERICAN: >60 ML/MIN/1.73
GLUCOSE BLD-MCNC: 137 MG/DL (ref 74–99)
HCT VFR BLD CALC: 45 % (ref 37–54)
HEMOGLOBIN: 15.7 G/DL (ref 12.5–16.5)
IMMATURE GRANULOCYTES #: 0.02 E9/L
IMMATURE GRANULOCYTES %: 0.3 % (ref 0–5)
INR BLD: 1
LYMPHOCYTES ABSOLUTE: 1.95 E9/L (ref 1.5–4)
LYMPHOCYTES RELATIVE PERCENT: 25.1 % (ref 20–42)
Lab: ABNORMAL
MCH RBC QN AUTO: 34.5 PG (ref 26–35)
MCHC RBC AUTO-ENTMCNC: 34.9 % (ref 32–34.5)
MCV RBC AUTO: 98.9 FL (ref 80–99.9)
METHADONE SCREEN, URINE: NOT DETECTED
MONOCYTES ABSOLUTE: 0.69 E9/L (ref 0.1–0.95)
MONOCYTES RELATIVE PERCENT: 8.9 % (ref 2–12)
NEUTROPHILS ABSOLUTE: 5.02 E9/L (ref 1.8–7.3)
NEUTROPHILS RELATIVE PERCENT: 64.7 % (ref 43–80)
OPIATE SCREEN URINE: NOT DETECTED
OXYCODONE URINE: NOT DETECTED
PDW BLD-RTO: 13.4 FL (ref 11.5–15)
PHENCYCLIDINE SCREEN URINE: NOT DETECTED
PLATELET # BLD: 456 E9/L (ref 130–450)
PMV BLD AUTO: 10.6 FL (ref 7–12)
POTASSIUM REFLEX MAGNESIUM: 4.6 MMOL/L (ref 3.5–5)
PROTHROMBIN TIME: 11.2 SEC (ref 9.3–12.4)
RBC # BLD: 4.55 E12/L (ref 3.8–5.8)
SODIUM BLD-SCNC: 137 MMOL/L (ref 132–146)
TOTAL CK: 56 U/L (ref 20–200)
TOTAL PROTEIN: 6.6 G/DL (ref 6.4–8.3)
WBC # BLD: 7.8 E9/L (ref 4.5–11.5)

## 2021-08-30 PROCEDURE — 80143 DRUG ASSAY ACETAMINOPHEN: CPT

## 2021-08-30 PROCEDURE — 85610 PROTHROMBIN TIME: CPT

## 2021-08-30 PROCEDURE — 6830039000 HC L3 TRAUMA ALERT

## 2021-08-30 PROCEDURE — 82550 ASSAY OF CK (CPK): CPT

## 2021-08-30 PROCEDURE — 71045 X-RAY EXAM CHEST 1 VIEW: CPT

## 2021-08-30 PROCEDURE — 72125 CT NECK SPINE W/O DYE: CPT

## 2021-08-30 PROCEDURE — 73110 X-RAY EXAM OF WRIST: CPT

## 2021-08-30 PROCEDURE — 80053 COMPREHEN METABOLIC PANEL: CPT

## 2021-08-30 PROCEDURE — 70450 CT HEAD/BRAIN W/O DYE: CPT

## 2021-08-30 PROCEDURE — 85025 COMPLETE CBC W/AUTO DIFF WBC: CPT

## 2021-08-30 PROCEDURE — 99284 EMERGENCY DEPT VISIT MOD MDM: CPT

## 2021-08-30 PROCEDURE — 82077 ASSAY SPEC XCP UR&BREATH IA: CPT

## 2021-08-30 PROCEDURE — 6360000004 HC RX CONTRAST MEDICATION: Performed by: RADIOLOGY

## 2021-08-30 PROCEDURE — 72170 X-RAY EXAM OF PELVIS: CPT

## 2021-08-30 PROCEDURE — 71260 CT THORAX DX C+: CPT

## 2021-08-30 PROCEDURE — 74177 CT ABD & PELVIS W/CONTRAST: CPT

## 2021-08-30 PROCEDURE — 80307 DRUG TEST PRSMV CHEM ANLYZR: CPT

## 2021-08-30 PROCEDURE — 85730 THROMBOPLASTIN TIME PARTIAL: CPT

## 2021-08-30 PROCEDURE — 80179 DRUG ASSAY SALICYLATE: CPT

## 2021-08-30 RX ADMIN — IOPAMIDOL 75 ML: 755 INJECTION, SOLUTION INTRAVENOUS at 18:31

## 2021-08-30 ASSESSMENT — ENCOUNTER SYMPTOMS
EYE ITCHING: 0
EYE REDNESS: 0
ABDOMINAL PAIN: 0
WHEEZING: 0
RHINORRHEA: 0
SHORTNESS OF BREATH: 0
BACK PAIN: 0
EYE PAIN: 1
CHEST TIGHTNESS: 0
ABDOMINAL DISTENTION: 0
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
## 2021-08-30 NOTE — ED NOTES
[address] @ [id number] DR. Abe Prieto RETURNED CALL @[phone]     Salvador Woodson  08/30/21 8437
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
## 2021-08-30 NOTE — ED NOTES
Right shoulder abrasion, right wrist swollen, right knee abrasion.  Right eye swollen      Deloras Prader, RN  08/30/21 [id number]
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
## 2021-08-30 NOTE — ED NOTES
Bed: 01  Expected date:   Expected time:   Means of arrival:   Comments:  [address] T[address]ha Dexter, RN  08/30/21 7217

## 2021-09-01 LAB
ACETAMINOPHEN LEVEL: <5 MCG/ML (ref 10–30)
ETHANOL: 385 MG/DL (ref 0–0.08)
SALICYLATE, SERUM: <0.3 MG/DL (ref 0–30)
TRICYCLIC ANTIDEPRESSANTS SCREEN SERUM: NEGATIVE NG/ML

## 2021-09-02 ENCOUNTER — APPOINTMENT (OUTPATIENT)
Dept: CT IMAGING | Age: 31
End: 2021-09-02
Payer: COMMERCIAL

## 2021-09-02 ENCOUNTER — HOSPITAL ENCOUNTER (INPATIENT)
Age: 31
LOS: 6 days | Discharge: PSYCHIATRIC OR SUBSTANCE USE FACILITY | End: 2021-09-09
Attending: EMERGENCY MEDICINE | Admitting: INTERNAL MEDICINE
Payer: COMMERCIAL

## 2021-09-02 ENCOUNTER — APPOINTMENT (OUTPATIENT)
Dept: GENERAL RADIOLOGY | Age: 31
End: 2021-09-02
Payer: COMMERCIAL

## 2021-09-02 DIAGNOSIS — F10.930 ALCOHOL WITHDRAWAL, UNCOMPLICATED (HCC): Primary | ICD-10-CM

## 2021-09-02 LAB
ANION GAP SERPL CALCULATED.3IONS-SCNC: 18 MMOL/L (ref 7–16)
BASOPHILS ABSOLUTE: 0.09 E9/L (ref 0–0.2)
BASOPHILS RELATIVE PERCENT: 1.1 % (ref 0–2)
BUN BLDV-MCNC: <2 MG/DL (ref 6–20)
CALCIUM SERPL-MCNC: 9 MG/DL (ref 8.6–10.2)
CHLORIDE BLD-SCNC: 94 MMOL/L (ref 98–107)
CO2: 24 MMOL/L (ref 22–29)
CREAT SERPL-MCNC: 0.6 MG/DL (ref 0.7–1.2)
EOSINOPHILS ABSOLUTE: 0.04 E9/L (ref 0.05–0.5)
EOSINOPHILS RELATIVE PERCENT: 0.5 % (ref 0–6)
GFR AFRICAN AMERICAN: >60
GFR NON-AFRICAN AMERICAN: >60 ML/MIN/1.73
GLUCOSE BLD-MCNC: 112 MG/DL (ref 74–99)
HCT VFR BLD CALC: 50.8 % (ref 37–54)
HEMOGLOBIN: 17.9 G/DL (ref 12.5–16.5)
IMMATURE GRANULOCYTES #: 0.03 E9/L
IMMATURE GRANULOCYTES %: 0.4 % (ref 0–5)
LACTIC ACID: 4.3 MMOL/L (ref 0.5–2.2)
LIPASE: 39 U/L (ref 13–60)
LYMPHOCYTES ABSOLUTE: 2.15 E9/L (ref 1.5–4)
LYMPHOCYTES RELATIVE PERCENT: 25.8 % (ref 20–42)
MAGNESIUM: 2.1 MG/DL (ref 1.6–2.6)
MCH RBC QN AUTO: 34.6 PG (ref 26–35)
MCHC RBC AUTO-ENTMCNC: 35.2 % (ref 32–34.5)
MCV RBC AUTO: 98.3 FL (ref 80–99.9)
MONOCYTES ABSOLUTE: 0.75 E9/L (ref 0.1–0.95)
MONOCYTES RELATIVE PERCENT: 9 % (ref 2–12)
NEUTROPHILS ABSOLUTE: 5.27 E9/L (ref 1.8–7.3)
NEUTROPHILS RELATIVE PERCENT: 63.2 % (ref 43–80)
PDW BLD-RTO: 13.2 FL (ref 11.5–15)
PLATELET # BLD: 386 E9/L (ref 130–450)
PMV BLD AUTO: 11.2 FL (ref 7–12)
POTASSIUM SERPL-SCNC: 5.1 MMOL/L (ref 3.5–5)
RBC # BLD: 5.17 E12/L (ref 3.8–5.8)
SODIUM BLD-SCNC: 136 MMOL/L (ref 132–146)
TROPONIN, HIGH SENSITIVITY: <6 NG/L (ref 0–11)
WBC # BLD: 8.3 E9/L (ref 4.5–11.5)

## 2021-09-02 PROCEDURE — 84484 ASSAY OF TROPONIN QUANT: CPT

## 2021-09-02 PROCEDURE — 6360000002 HC RX W HCPCS

## 2021-09-02 PROCEDURE — 82077 ASSAY SPEC XCP UR&BREATH IA: CPT

## 2021-09-02 PROCEDURE — 83690 ASSAY OF LIPASE: CPT

## 2021-09-02 PROCEDURE — 71045 X-RAY EXAM CHEST 1 VIEW: CPT

## 2021-09-02 PROCEDURE — 80143 DRUG ASSAY ACETAMINOPHEN: CPT

## 2021-09-02 PROCEDURE — 93005 ELECTROCARDIOGRAM TRACING: CPT | Performed by: EMERGENCY MEDICINE

## 2021-09-02 PROCEDURE — 96375 TX/PRO/DX INJ NEW DRUG ADDON: CPT

## 2021-09-02 PROCEDURE — 96374 THER/PROPH/DIAG INJ IV PUSH: CPT

## 2021-09-02 PROCEDURE — 80076 HEPATIC FUNCTION PANEL: CPT

## 2021-09-02 PROCEDURE — 74177 CT ABD & PELVIS W/CONTRAST: CPT

## 2021-09-02 PROCEDURE — 80307 DRUG TEST PRSMV CHEM ANLYZR: CPT

## 2021-09-02 PROCEDURE — 96372 THER/PROPH/DIAG INJ SC/IM: CPT

## 2021-09-02 PROCEDURE — 83735 ASSAY OF MAGNESIUM: CPT

## 2021-09-02 PROCEDURE — 87635 SARS-COV-2 COVID-19 AMP PRB: CPT

## 2021-09-02 PROCEDURE — 2580000003 HC RX 258: Performed by: EMERGENCY MEDICINE

## 2021-09-02 PROCEDURE — 99285 EMERGENCY DEPT VISIT HI MDM: CPT

## 2021-09-02 PROCEDURE — 6360000002 HC RX W HCPCS: Performed by: EMERGENCY MEDICINE

## 2021-09-02 PROCEDURE — 96361 HYDRATE IV INFUSION ADD-ON: CPT

## 2021-09-02 PROCEDURE — 80048 BASIC METABOLIC PNL TOTAL CA: CPT

## 2021-09-02 PROCEDURE — 85025 COMPLETE CBC W/AUTO DIFF WBC: CPT

## 2021-09-02 PROCEDURE — 80179 DRUG ASSAY SALICYLATE: CPT

## 2021-09-02 PROCEDURE — 83605 ASSAY OF LACTIC ACID: CPT

## 2021-09-02 PROCEDURE — 6360000004 HC RX CONTRAST MEDICATION: Performed by: RADIOLOGY

## 2021-09-02 RX ORDER — SODIUM CHLORIDE 0.9 % (FLUSH) 0.9 %
5-40 SYRINGE (ML) INJECTION EVERY 12 HOURS SCHEDULED
Status: DISCONTINUED | OUTPATIENT
Start: 2021-09-02 | End: 2021-09-07 | Stop reason: SDUPTHER

## 2021-09-02 RX ORDER — 0.9 % SODIUM CHLORIDE 0.9 %
1000 INTRAVENOUS SOLUTION INTRAVENOUS ONCE
Status: COMPLETED | OUTPATIENT
Start: 2021-09-02 | End: 2021-09-03

## 2021-09-02 RX ORDER — ONDANSETRON 2 MG/ML
4 INJECTION INTRAMUSCULAR; INTRAVENOUS ONCE
Status: COMPLETED | OUTPATIENT
Start: 2021-09-02 | End: 2021-09-02

## 2021-09-02 RX ORDER — SODIUM CHLORIDE 9 MG/ML
25 INJECTION, SOLUTION INTRAVENOUS PRN
Status: DISCONTINUED | OUTPATIENT
Start: 2021-09-02 | End: 2021-09-07 | Stop reason: SDUPTHER

## 2021-09-02 RX ORDER — SODIUM CHLORIDE 0.9 % (FLUSH) 0.9 %
5-40 SYRINGE (ML) INJECTION PRN
Status: DISCONTINUED | OUTPATIENT
Start: 2021-09-02 | End: 2021-09-07 | Stop reason: SDUPTHER

## 2021-09-02 RX ORDER — THIAMINE HYDROCHLORIDE 100 MG/ML
100 INJECTION, SOLUTION INTRAMUSCULAR; INTRAVENOUS ONCE
Status: COMPLETED | OUTPATIENT
Start: 2021-09-02 | End: 2021-09-02

## 2021-09-02 RX ORDER — LORAZEPAM 2 MG/ML
INJECTION INTRAMUSCULAR
Status: COMPLETED
Start: 2021-09-02 | End: 2021-09-02

## 2021-09-02 RX ADMIN — IOPAMIDOL 80 ML: 755 INJECTION, SOLUTION INTRAVENOUS at 23:48

## 2021-09-02 RX ADMIN — ONDANSETRON 4 MG: 2 INJECTION INTRAMUSCULAR; INTRAVENOUS at 22:50

## 2021-09-02 RX ADMIN — SODIUM CHLORIDE 1000 ML: 9 INJECTION, SOLUTION INTRAVENOUS at 22:50

## 2021-09-02 RX ADMIN — LORAZEPAM 2 MG: 2 INJECTION INTRAMUSCULAR; INTRAVENOUS at 22:25

## 2021-09-02 RX ADMIN — THIAMINE HYDROCHLORIDE 100 MG: 100 INJECTION, SOLUTION INTRAMUSCULAR; INTRAVENOUS at 22:50

## 2021-09-02 ASSESSMENT — ENCOUNTER SYMPTOMS
DIARRHEA: 1
COUGH: 0
ABDOMINAL PAIN: 1
BLOOD IN STOOL: 0
VOMITING: 1
NAUSEA: 1
RHINORRHEA: 0
SHORTNESS OF BREATH: 0
TROUBLE SWALLOWING: 0
COLOR CHANGE: 0

## 2021-09-03 LAB
ACETAMINOPHEN LEVEL: <5 MCG/ML (ref 10–30)
ALBUMIN SERPL-MCNC: 4.2 G/DL (ref 3.5–5.2)
ALP BLD-CCNC: 139 U/L (ref 40–129)
ALT SERPL-CCNC: 54 U/L (ref 0–40)
AST SERPL-CCNC: 72 U/L (ref 0–39)
BILIRUB SERPL-MCNC: 0.5 MG/DL (ref 0–1.2)
BILIRUBIN DIRECT: <0.2 MG/DL (ref 0–0.3)
BILIRUBIN, INDIRECT: ABNORMAL MG/DL (ref 0–1)
EKG ATRIAL RATE: 98 BPM
EKG P AXIS: 57 DEGREES
EKG P-R INTERVAL: 122 MS
EKG Q-T INTERVAL: 374 MS
EKG QRS DURATION: 84 MS
EKG QTC CALCULATION (BAZETT): 477 MS
EKG R AXIS: 47 DEGREES
EKG T AXIS: 63 DEGREES
EKG VENTRICULAR RATE: 98 BPM
ETHANOL: 160 MG/DL (ref 0–0.08)
LACTIC ACID: 2.8 MMOL/L (ref 0.5–2.2)
SALICYLATE, SERUM: <0.3 MG/DL (ref 0–30)
SARS-COV-2, NAAT: NOT DETECTED
TOTAL PROTEIN: 7.7 G/DL (ref 6.4–8.3)
TRICYCLIC ANTIDEPRESSANTS SCREEN SERUM: NEGATIVE NG/ML

## 2021-09-03 PROCEDURE — 2580000003 HC RX 258: Performed by: INTERNAL MEDICINE

## 2021-09-03 PROCEDURE — 83605 ASSAY OF LACTIC ACID: CPT

## 2021-09-03 PROCEDURE — 6360000002 HC RX W HCPCS: Performed by: INTERNAL MEDICINE

## 2021-09-03 PROCEDURE — 99232 SBSQ HOSP IP/OBS MODERATE 35: CPT | Performed by: INTERNAL MEDICINE

## 2021-09-03 PROCEDURE — 6360000002 HC RX W HCPCS: Performed by: EMERGENCY MEDICINE

## 2021-09-03 PROCEDURE — 1200000000 HC SEMI PRIVATE

## 2021-09-03 PROCEDURE — 2580000003 HC RX 258: Performed by: EMERGENCY MEDICINE

## 2021-09-03 PROCEDURE — 99223 1ST HOSP IP/OBS HIGH 75: CPT | Performed by: INTERNAL MEDICINE

## 2021-09-03 PROCEDURE — 6370000000 HC RX 637 (ALT 250 FOR IP): Performed by: INTERNAL MEDICINE

## 2021-09-03 RX ORDER — ACETAMINOPHEN 325 MG/1
650 TABLET ORAL EVERY 6 HOURS PRN
Status: DISCONTINUED | OUTPATIENT
Start: 2021-09-03 | End: 2021-09-09 | Stop reason: HOSPADM

## 2021-09-03 RX ORDER — LORAZEPAM 1 MG/1
1 TABLET ORAL
Status: DISCONTINUED | OUTPATIENT
Start: 2021-09-03 | End: 2021-09-03 | Stop reason: SDUPTHER

## 2021-09-03 RX ORDER — LORAZEPAM 1 MG/1
3 TABLET ORAL
Status: DISCONTINUED | OUTPATIENT
Start: 2021-09-03 | End: 2021-09-05

## 2021-09-03 RX ORDER — FOLIC ACID 1 MG/1
1 TABLET ORAL DAILY
Status: DISCONTINUED | OUTPATIENT
Start: 2021-09-03 | End: 2021-09-09 | Stop reason: HOSPADM

## 2021-09-03 RX ORDER — M-VIT,TX,IRON,MINS/CALC/FOLIC 27MG-0.4MG
1 TABLET ORAL DAILY
Status: DISCONTINUED | OUTPATIENT
Start: 2021-09-03 | End: 2021-09-09 | Stop reason: HOSPADM

## 2021-09-03 RX ORDER — LORAZEPAM 2 MG/ML
2 INJECTION INTRAMUSCULAR ONCE
Status: COMPLETED | OUTPATIENT
Start: 2021-09-03 | End: 2021-09-03

## 2021-09-03 RX ORDER — LORAZEPAM 1 MG/1
4 TABLET ORAL
Status: DISCONTINUED | OUTPATIENT
Start: 2021-09-03 | End: 2021-09-03 | Stop reason: SDUPTHER

## 2021-09-03 RX ORDER — SODIUM CHLORIDE 9 MG/ML
INJECTION, SOLUTION INTRAVENOUS CONTINUOUS
Status: DISCONTINUED | OUTPATIENT
Start: 2021-09-03 | End: 2021-09-05

## 2021-09-03 RX ORDER — LORAZEPAM 2 MG/ML
4 INJECTION INTRAMUSCULAR
Status: DISCONTINUED | OUTPATIENT
Start: 2021-09-03 | End: 2021-09-05

## 2021-09-03 RX ORDER — LORAZEPAM 2 MG/ML
1 INJECTION INTRAMUSCULAR
Status: DISCONTINUED | OUTPATIENT
Start: 2021-09-03 | End: 2021-09-05

## 2021-09-03 RX ORDER — GAUZE BANDAGE 2" X 2"
100 BANDAGE TOPICAL DAILY
Status: DISCONTINUED | OUTPATIENT
Start: 2021-09-03 | End: 2021-09-09 | Stop reason: HOSPADM

## 2021-09-03 RX ORDER — SODIUM CHLORIDE 0.9 % (FLUSH) 0.9 %
5-40 SYRINGE (ML) INJECTION EVERY 12 HOURS SCHEDULED
Status: DISCONTINUED | OUTPATIENT
Start: 2021-09-03 | End: 2021-09-07 | Stop reason: SDUPTHER

## 2021-09-03 RX ORDER — ONDANSETRON 4 MG/1
4 TABLET, ORALLY DISINTEGRATING ORAL EVERY 8 HOURS PRN
Status: DISCONTINUED | OUTPATIENT
Start: 2021-09-03 | End: 2021-09-09 | Stop reason: HOSPADM

## 2021-09-03 RX ORDER — LORAZEPAM 2 MG/ML
4 INJECTION INTRAMUSCULAR
Status: DISCONTINUED | OUTPATIENT
Start: 2021-09-03 | End: 2021-09-03 | Stop reason: SDUPTHER

## 2021-09-03 RX ORDER — SODIUM CHLORIDE 9 MG/ML
25 INJECTION, SOLUTION INTRAVENOUS PRN
Status: DISCONTINUED | OUTPATIENT
Start: 2021-09-03 | End: 2021-09-07 | Stop reason: SDUPTHER

## 2021-09-03 RX ORDER — NICOTINE 21 MG/24HR
1 PATCH, TRANSDERMAL 24 HOURS TRANSDERMAL DAILY
Status: DISCONTINUED | OUTPATIENT
Start: 2021-09-03 | End: 2021-09-09 | Stop reason: HOSPADM

## 2021-09-03 RX ORDER — LORAZEPAM 2 MG/ML
2 INJECTION INTRAMUSCULAR
Status: DISCONTINUED | OUTPATIENT
Start: 2021-09-03 | End: 2021-09-05

## 2021-09-03 RX ORDER — LORAZEPAM 2 MG/ML
3 INJECTION INTRAMUSCULAR
Status: DISCONTINUED | OUTPATIENT
Start: 2021-09-03 | End: 2021-09-03 | Stop reason: SDUPTHER

## 2021-09-03 RX ORDER — LORAZEPAM 2 MG/ML
2 INJECTION INTRAMUSCULAR
Status: DISCONTINUED | OUTPATIENT
Start: 2021-09-03 | End: 2021-09-03 | Stop reason: SDUPTHER

## 2021-09-03 RX ORDER — LORAZEPAM 1 MG/1
3 TABLET ORAL
Status: DISCONTINUED | OUTPATIENT
Start: 2021-09-03 | End: 2021-09-03 | Stop reason: SDUPTHER

## 2021-09-03 RX ORDER — MORPHINE SULFATE 2 MG/ML
2 INJECTION, SOLUTION INTRAMUSCULAR; INTRAVENOUS EVERY 4 HOURS PRN
Status: DISCONTINUED | OUTPATIENT
Start: 2021-09-03 | End: 2021-09-09 | Stop reason: HOSPADM

## 2021-09-03 RX ORDER — LORAZEPAM 1 MG/1
1 TABLET ORAL
Status: DISCONTINUED | OUTPATIENT
Start: 2021-09-03 | End: 2021-09-05

## 2021-09-03 RX ORDER — ONDANSETRON 2 MG/ML
4 INJECTION INTRAMUSCULAR; INTRAVENOUS EVERY 6 HOURS PRN
Status: DISCONTINUED | OUTPATIENT
Start: 2021-09-03 | End: 2021-09-09 | Stop reason: HOSPADM

## 2021-09-03 RX ORDER — PANTOPRAZOLE SODIUM 40 MG/1
40 TABLET, DELAYED RELEASE ORAL
Status: DISCONTINUED | OUTPATIENT
Start: 2021-09-03 | End: 2021-09-09 | Stop reason: HOSPADM

## 2021-09-03 RX ORDER — LORAZEPAM 2 MG/ML
1 INJECTION INTRAMUSCULAR
Status: DISCONTINUED | OUTPATIENT
Start: 2021-09-03 | End: 2021-09-03 | Stop reason: SDUPTHER

## 2021-09-03 RX ORDER — PROMETHAZINE HYDROCHLORIDE 25 MG/ML
12.5 INJECTION, SOLUTION INTRAMUSCULAR; INTRAVENOUS ONCE
Status: COMPLETED | OUTPATIENT
Start: 2021-09-03 | End: 2021-09-03

## 2021-09-03 RX ORDER — SODIUM CHLORIDE 0.9 % (FLUSH) 0.9 %
5-40 SYRINGE (ML) INJECTION PRN
Status: DISCONTINUED | OUTPATIENT
Start: 2021-09-03 | End: 2021-09-07 | Stop reason: SDUPTHER

## 2021-09-03 RX ORDER — LORAZEPAM 2 MG/ML
3 INJECTION INTRAMUSCULAR
Status: DISCONTINUED | OUTPATIENT
Start: 2021-09-03 | End: 2021-09-05

## 2021-09-03 RX ORDER — ACETAMINOPHEN 650 MG/1
650 SUPPOSITORY RECTAL EVERY 6 HOURS PRN
Status: DISCONTINUED | OUTPATIENT
Start: 2021-09-03 | End: 2021-09-09 | Stop reason: HOSPADM

## 2021-09-03 RX ORDER — POLYETHYLENE GLYCOL 3350 17 G/17G
17 POWDER, FOR SOLUTION ORAL DAILY PRN
Status: DISCONTINUED | OUTPATIENT
Start: 2021-09-03 | End: 2021-09-09 | Stop reason: HOSPADM

## 2021-09-03 RX ORDER — LORAZEPAM 1 MG/1
2 TABLET ORAL
Status: DISCONTINUED | OUTPATIENT
Start: 2021-09-03 | End: 2021-09-05

## 2021-09-03 RX ORDER — LORAZEPAM 2 MG/ML
1 INJECTION INTRAMUSCULAR EVERY 4 HOURS PRN
Status: DISCONTINUED | OUTPATIENT
Start: 2021-09-03 | End: 2021-09-05

## 2021-09-03 RX ORDER — LORAZEPAM 1 MG/1
2 TABLET ORAL
Status: DISCONTINUED | OUTPATIENT
Start: 2021-09-03 | End: 2021-09-03 | Stop reason: SDUPTHER

## 2021-09-03 RX ORDER — LORAZEPAM 1 MG/1
4 TABLET ORAL
Status: DISCONTINUED | OUTPATIENT
Start: 2021-09-03 | End: 2021-09-05

## 2021-09-03 RX ADMIN — PROMETHAZINE HYDROCHLORIDE 12.5 MG: 25 INJECTION INTRAMUSCULAR; INTRAVENOUS at 01:13

## 2021-09-03 RX ADMIN — LORAZEPAM 2 MG: 2 INJECTION INTRAMUSCULAR; INTRAVENOUS at 01:13

## 2021-09-03 RX ADMIN — FOLIC ACID 1 MG: 1 TABLET ORAL at 10:54

## 2021-09-03 RX ADMIN — Medication 100 MG: at 10:54

## 2021-09-03 RX ADMIN — MORPHINE SULFATE 2 MG: 2 INJECTION, SOLUTION INTRAMUSCULAR; INTRAVENOUS at 14:51

## 2021-09-03 RX ADMIN — SODIUM CHLORIDE 1000 ML: 9 INJECTION, SOLUTION INTRAVENOUS at 00:25

## 2021-09-03 RX ADMIN — SODIUM CHLORIDE: 9 INJECTION, SOLUTION INTRAVENOUS at 05:42

## 2021-09-03 RX ADMIN — LORAZEPAM 1 MG: 2 INJECTION INTRAMUSCULAR; INTRAVENOUS at 17:53

## 2021-09-03 RX ADMIN — MORPHINE SULFATE 2 MG: 2 INJECTION, SOLUTION INTRAMUSCULAR; INTRAVENOUS at 06:03

## 2021-09-03 RX ADMIN — Medication 10 ML: at 10:55

## 2021-09-03 RX ADMIN — Medication 1 TABLET: at 10:54

## 2021-09-03 RX ADMIN — ENOXAPARIN SODIUM 40 MG: 40 INJECTION SUBCUTANEOUS at 10:54

## 2021-09-03 RX ADMIN — LORAZEPAM 2 MG: 2 INJECTION INTRAMUSCULAR; INTRAVENOUS at 04:44

## 2021-09-03 RX ADMIN — Medication 10 ML: at 21:55

## 2021-09-03 RX ADMIN — MORPHINE SULFATE 2 MG: 2 INJECTION, SOLUTION INTRAMUSCULAR; INTRAVENOUS at 20:50

## 2021-09-03 RX ADMIN — PANTOPRAZOLE SODIUM 40 MG: 40 TABLET, DELAYED RELEASE ORAL at 06:03

## 2021-09-03 RX ADMIN — LORAZEPAM 1 MG: 2 INJECTION INTRAMUSCULAR; INTRAVENOUS at 10:55

## 2021-09-03 ASSESSMENT — PAIN DESCRIPTION - LOCATION
LOCATION: ARM
LOCATION: ARM

## 2021-09-03 ASSESSMENT — PAIN SCALES - GENERAL
PAINLEVEL_OUTOF10: 9
PAINLEVEL_OUTOF10: 0
PAINLEVEL_OUTOF10: 9
PAINLEVEL_OUTOF10: 8
PAINLEVEL_OUTOF10: 10

## 2021-09-03 ASSESSMENT — PAIN DESCRIPTION - PAIN TYPE
TYPE: ACUTE PAIN

## 2021-09-03 ASSESSMENT — PAIN DESCRIPTION - ORIENTATION
ORIENTATION: OTHER (COMMENT)
ORIENTATION: RIGHT
ORIENTATION: RIGHT

## 2021-09-03 ASSESSMENT — PAIN DESCRIPTION - DESCRIPTORS: DESCRIPTORS: CONSTANT;ACHING

## 2021-09-03 ASSESSMENT — PAIN DESCRIPTION - ONSET: ONSET: ON-GOING

## 2021-09-03 ASSESSMENT — PAIN DESCRIPTION - FREQUENCY: FREQUENCY: CONTINUOUS
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
## 2021-09-03 NOTE — CARE COORDINATION
Peer Recovery Support Note    Name: Home Michaels  Date: 9/3/2021    Chief Complaint   Patient presents with    Alcohol Problem     LLQ pain and ETOH withdrawal.        Peer Support met with patient. [x] Support and education provided  [] Resources provided   [] Treatment referral:   [] Other:   [] Patient declined peer recovery services     Referred By: SAM Wright. Notes: Waiting on psych first. If and when medically cleared I will help with placement in treatment. Patient prefers [facility] for residential.  Will continue to follow. I will have Caitie with Peer Support follow over the weekend.    Rupinder Hannon, 9/3/2021
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
## 2021-09-03 NOTE — PROGRESS NOTES
Pt admits to SI w/o plan. MD aware. Cords removed from room. C.O. provided. Paper gown provided. Pt reports lack of support system. Reports recent death of parents to addiction and several siblings. Pt reports stained relationship with living sibling.

## 2021-09-04 PROBLEM — E44.1 MILD PROTEIN-CALORIE MALNUTRITION (HCC): Chronic | Status: ACTIVE | Noted: 2021-09-04

## 2021-09-04 LAB
AMPHETAMINE SCREEN, URINE: NOT DETECTED
BACTERIA: NORMAL /HPF
BARBITURATE SCREEN URINE: POSITIVE
BENZODIAZEPINE SCREEN, URINE: NOT DETECTED
BILIRUBIN URINE: NEGATIVE
BLOOD, URINE: NEGATIVE
CANNABINOID SCREEN URINE: POSITIVE
CLARITY: CLEAR
COCAINE METABOLITE SCREEN URINE: NOT DETECTED
COLOR: YELLOW
FENTANYL SCREEN, URINE: NOT DETECTED
GLUCOSE URINE: NEGATIVE MG/DL
KETONES, URINE: NEGATIVE MG/DL
LEUKOCYTE ESTERASE, URINE: NEGATIVE
Lab: ABNORMAL
METHADONE SCREEN, URINE: NOT DETECTED
NITRITE, URINE: NEGATIVE
OPIATE SCREEN URINE: POSITIVE
OXYCODONE URINE: NOT DETECTED
PH UA: 7 (ref 5–9)
PHENCYCLIDINE SCREEN URINE: NOT DETECTED
PROTEIN UA: NEGATIVE MG/DL
RBC UA: NORMAL /HPF (ref 0–2)
SPECIFIC GRAVITY UA: <=1.005 (ref 1–1.03)
UROBILINOGEN, URINE: 1 E.U./DL
WBC UA: NORMAL /HPF (ref 0–5)

## 2021-09-04 PROCEDURE — 6360000002 HC RX W HCPCS: Performed by: EMERGENCY MEDICINE

## 2021-09-04 PROCEDURE — 99232 SBSQ HOSP IP/OBS MODERATE 35: CPT | Performed by: INTERNAL MEDICINE

## 2021-09-04 PROCEDURE — 6360000002 HC RX W HCPCS

## 2021-09-04 PROCEDURE — 80307 DRUG TEST PRSMV CHEM ANLYZR: CPT

## 2021-09-04 PROCEDURE — 1200000000 HC SEMI PRIVATE

## 2021-09-04 PROCEDURE — 2580000003 HC RX 258: Performed by: INTERNAL MEDICINE

## 2021-09-04 PROCEDURE — 81001 URINALYSIS AUTO W/SCOPE: CPT

## 2021-09-04 PROCEDURE — 6370000000 HC RX 637 (ALT 250 FOR IP): Performed by: EMERGENCY MEDICINE

## 2021-09-04 PROCEDURE — 99252 IP/OBS CONSLTJ NEW/EST SF 35: CPT | Performed by: NURSE PRACTITIONER

## 2021-09-04 PROCEDURE — 6360000002 HC RX W HCPCS: Performed by: INTERNAL MEDICINE

## 2021-09-04 PROCEDURE — 6370000000 HC RX 637 (ALT 250 FOR IP): Performed by: INTERNAL MEDICINE

## 2021-09-04 RX ORDER — DIPHENHYDRAMINE HCL 25 MG
25 TABLET ORAL ONCE
Status: COMPLETED | OUTPATIENT
Start: 2021-09-04 | End: 2021-09-04

## 2021-09-04 RX ORDER — OXYCODONE HYDROCHLORIDE 5 MG/1
5 TABLET ORAL EVERY 4 HOURS PRN
Status: DISCONTINUED | OUTPATIENT
Start: 2021-09-04 | End: 2021-09-09 | Stop reason: HOSPADM

## 2021-09-04 RX ORDER — HALOPERIDOL 5 MG/ML
INJECTION INTRAMUSCULAR
Status: COMPLETED
Start: 2021-09-04 | End: 2021-09-04

## 2021-09-04 RX ORDER — HALOPERIDOL 5 MG/ML
4 INJECTION INTRAMUSCULAR ONCE
Status: COMPLETED | OUTPATIENT
Start: 2021-09-04 | End: 2021-09-04

## 2021-09-04 RX ADMIN — LORAZEPAM 2 MG: 1 TABLET ORAL at 08:34

## 2021-09-04 RX ADMIN — SODIUM CHLORIDE: 9 INJECTION, SOLUTION INTRAVENOUS at 11:15

## 2021-09-04 RX ADMIN — LORAZEPAM 4 MG: 2 INJECTION INTRAMUSCULAR; INTRAVENOUS at 19:32

## 2021-09-04 RX ADMIN — HALOPERIDOL 3 MG: 5 INJECTION INTRAMUSCULAR at 16:34

## 2021-09-04 RX ADMIN — Medication 5 ML: at 21:18

## 2021-09-04 RX ADMIN — LORAZEPAM 3 MG: 1 TABLET ORAL at 23:03

## 2021-09-04 RX ADMIN — HALOPERIDOL LACTATE 3 MG: 5 INJECTION, SOLUTION INTRAMUSCULAR at 16:34

## 2021-09-04 RX ADMIN — PANTOPRAZOLE SODIUM 40 MG: 40 TABLET, DELAYED RELEASE ORAL at 06:43

## 2021-09-04 RX ADMIN — Medication 1 TABLET: at 08:34

## 2021-09-04 RX ADMIN — LORAZEPAM 4 MG: 2 INJECTION INTRAMUSCULAR; INTRAVENOUS at 16:48

## 2021-09-04 RX ADMIN — LORAZEPAM 2 MG: 1 TABLET ORAL at 01:23

## 2021-09-04 RX ADMIN — DIPHENHYDRAMINE HYDROCHLORIDE 25 MG: 25 TABLET ORAL at 01:38

## 2021-09-04 RX ADMIN — LORAZEPAM 2 MG: 1 TABLET ORAL at 11:21

## 2021-09-04 RX ADMIN — ENOXAPARIN SODIUM 40 MG: 40 INJECTION SUBCUTANEOUS at 08:35

## 2021-09-04 RX ADMIN — Medication 100 MG: at 08:34

## 2021-09-04 RX ADMIN — LORAZEPAM 4 MG: 2 INJECTION INTRAMUSCULAR; INTRAVENOUS at 12:33

## 2021-09-04 RX ADMIN — MORPHINE SULFATE 2 MG: 2 INJECTION, SOLUTION INTRAMUSCULAR; INTRAVENOUS at 20:50

## 2021-09-04 RX ADMIN — SODIUM CHLORIDE: 9 INJECTION, SOLUTION INTRAVENOUS at 01:24

## 2021-09-04 RX ADMIN — FOLIC ACID 1 MG: 1 TABLET ORAL at 08:34

## 2021-09-04 ASSESSMENT — PAIN DESCRIPTION - ORIENTATION: ORIENTATION: RIGHT

## 2021-09-04 ASSESSMENT — PAIN DESCRIPTION - LOCATION
LOCATION: ARM
LOCATION: ABDOMEN;ARM

## 2021-09-04 ASSESSMENT — PAIN DESCRIPTION - PAIN TYPE
TYPE: ACUTE PAIN
TYPE: ACUTE PAIN

## 2021-09-04 ASSESSMENT — PAIN DESCRIPTION - DESCRIPTORS: DESCRIPTORS: CONSTANT;DISCOMFORT;ACHING

## 2021-09-04 ASSESSMENT — PAIN DESCRIPTION - ONSET: ONSET: ON-GOING

## 2021-09-04 ASSESSMENT — PAIN DESCRIPTION - FREQUENCY: FREQUENCY: CONTINUOUS

## 2021-09-04 ASSESSMENT — PAIN SCALES - GENERAL
PAINLEVEL_OUTOF10: 8

## 2021-09-04 NOTE — PROGRESS NOTES
Nutrition Assessment     Type and Reason for Visit: Initial, Positive Nutrition Screen    Nutrition Recommendations/Plan:  Continue Current Diet. Start Oral Nutrition Supplement Ensure BID. Nutrition Assessment:  Pt admitted for alcohol withdrawal. H/o ETOH abuse. Pt meets criteria for mild malnutrition. Will start ONS and monitor. Malnutrition Assessment:  Malnutrition Status: Mild malnutrition    Estimated Daily Nutrient Needs:  Energy (kcal): 3266-3251 (MSJ 1548 x 1.2 SF); Weight Used for Energy Requirements:  Current     Protein (g): 65-75 (1.0-1.2 g/kg CBW); Weight Used for Protein Requirements:  Current        Fluid (ml/day): 2672-6864; Weight Used for Fluid Requirements:  1 ml/kcal      Nutrition Related Findings: flat effect, hyperkalemia, elevated LFTs, +I/O, soft abd, N/V/D, +BS, periorbital edema      Current Nutrition Therapies:    ADULT DIET; Regular    Anthropometric Measures:  · Height: 5' 6\" (167.6 cm)  · Current Body Wt: 140 lb (63.5 kg) (9/3 bed scale)   · BMI: 22.6    Nutrition Diagnosis:   · Mild malnutrition, In context of chronic illness related to catabolic illness (ETOH abuse) as evidenced by weight loss, poor intake prior to admission      Nutrition Interventions:   Nutrition Education/Counseling:  Education not indicated   Coordination of Nutrition Care:  Continue to monitor while inpatient, No recommendation at this time    Goals:  Consume >75% of meals and ONS       Nutrition Monitoring and Evaluation:   Behavioral-Environmental Outcomes:  None Identified   Food/Nutrient Intake Outcomes:  Food and Nutrient Intake, Supplement Intake  Physical Signs/Symptoms Outcomes:  Biochemical Data, Nutrition Focused Physical Findings, Skin, Weight, Diarrhea, Nausea or Vomiting, GI Status, Fluid Status or Edema     Discharge Planning:     Too soon to determine     Electronically signed by Mau Chong, MS, RD, LD on 9/4/21 at 11:57 AM EDT    Contact:
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
## 2021-09-05 PROCEDURE — 6370000000 HC RX 637 (ALT 250 FOR IP): Performed by: EMERGENCY MEDICINE

## 2021-09-05 PROCEDURE — 2580000003 HC RX 258

## 2021-09-05 PROCEDURE — 6360000002 HC RX W HCPCS: Performed by: INTERNAL MEDICINE

## 2021-09-05 PROCEDURE — 1200000000 HC SEMI PRIVATE

## 2021-09-05 PROCEDURE — 99232 SBSQ HOSP IP/OBS MODERATE 35: CPT | Performed by: INTERNAL MEDICINE

## 2021-09-05 PROCEDURE — 6370000000 HC RX 637 (ALT 250 FOR IP): Performed by: INTERNAL MEDICINE

## 2021-09-05 RX ORDER — HALOPERIDOL 5 MG/ML
5 INJECTION INTRAMUSCULAR EVERY 4 HOURS PRN
Status: DISCONTINUED | OUTPATIENT
Start: 2021-09-05 | End: 2021-09-09 | Stop reason: HOSPADM

## 2021-09-05 RX ORDER — ZIPRASIDONE MESYLATE 20 MG/ML
20 INJECTION, POWDER, LYOPHILIZED, FOR SOLUTION INTRAMUSCULAR ONCE
Status: COMPLETED | OUTPATIENT
Start: 2021-09-05 | End: 2021-09-05

## 2021-09-05 RX ADMIN — LORAZEPAM 4 MG: 1 TABLET ORAL at 12:32

## 2021-09-05 RX ADMIN — HALOPERIDOL LACTATE 5 MG: 5 INJECTION, SOLUTION INTRAMUSCULAR at 20:53

## 2021-09-05 RX ADMIN — LORAZEPAM 3 MG: 1 TABLET ORAL at 09:29

## 2021-09-05 RX ADMIN — PANTOPRAZOLE SODIUM 40 MG: 40 TABLET, DELAYED RELEASE ORAL at 07:59

## 2021-09-05 RX ADMIN — Medication 100 MG: at 09:29

## 2021-09-05 RX ADMIN — FOLIC ACID 1 MG: 1 TABLET ORAL at 09:30

## 2021-09-05 RX ADMIN — OXYCODONE 5 MG: 5 TABLET ORAL at 00:24

## 2021-09-05 RX ADMIN — ZIPRASIDONE MESYLATE 20 MG: 20 INJECTION, POWDER, LYOPHILIZED, FOR SOLUTION INTRAMUSCULAR at 22:40

## 2021-09-05 RX ADMIN — WATER 10 ML: 1 INJECTION INTRAMUSCULAR; INTRAVENOUS; SUBCUTANEOUS at 22:40

## 2021-09-05 RX ADMIN — ENOXAPARIN SODIUM 40 MG: 40 INJECTION SUBCUTANEOUS at 09:30

## 2021-09-05 RX ADMIN — ACETAMINOPHEN 650 MG: 325 TABLET ORAL at 09:29

## 2021-09-05 RX ADMIN — Medication 1 TABLET: at 09:29

## 2021-09-05 RX ADMIN — LORAZEPAM 1 MG: 1 TABLET ORAL at 03:49

## 2021-09-05 RX ADMIN — HALOPERIDOL LACTATE 5 MG: 5 INJECTION, SOLUTION INTRAMUSCULAR at 15:22

## 2021-09-05 RX ADMIN — LORAZEPAM 3 MG: 1 TABLET ORAL at 07:59

## 2021-09-05 RX ADMIN — LORAZEPAM 3 MG: 1 TABLET ORAL at 02:40

## 2021-09-05 RX ADMIN — OXYCODONE 5 MG: 5 TABLET ORAL at 23:56

## 2021-09-05 ASSESSMENT — PAIN DESCRIPTION - ORIENTATION: ORIENTATION: RIGHT;LEFT

## 2021-09-05 ASSESSMENT — PAIN DESCRIPTION - PAIN TYPE
TYPE: ACUTE PAIN

## 2021-09-05 ASSESSMENT — PAIN DESCRIPTION - FREQUENCY: FREQUENCY: CONTINUOUS

## 2021-09-05 ASSESSMENT — PAIN SCALES - GENERAL
PAINLEVEL_OUTOF10: 7
PAINLEVEL_OUTOF10: 8
PAINLEVEL_OUTOF10: 8
PAINLEVEL_OUTOF10: 7
PAINLEVEL_OUTOF10: 8
PAINLEVEL_OUTOF10: 6

## 2021-09-05 ASSESSMENT — PAIN DESCRIPTION - LOCATION
LOCATION: GENERALIZED
LOCATION: GENERALIZED
LOCATION: GENERALIZED;ARM

## 2021-09-05 ASSESSMENT — PAIN DESCRIPTION - ONSET: ONSET: ON-GOING

## 2021-09-05 ASSESSMENT — PAIN DESCRIPTION - DESCRIPTORS: DESCRIPTORS: ACHING;DISCOMFORT;HEADACHE
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
## 2021-09-05 NOTE — PROGRESS NOTES
Medically cleared per Dr. Julian Liang. Call [facility] [phone] to let patient know about patient being medically cleared. Gave information. No bed at this time. They will start paperwork to get patient transferred.

## 2021-09-06 PROCEDURE — 6370000000 HC RX 637 (ALT 250 FOR IP): Performed by: NURSE PRACTITIONER

## 2021-09-06 PROCEDURE — 6370000000 HC RX 637 (ALT 250 FOR IP): Performed by: INTERNAL MEDICINE

## 2021-09-06 PROCEDURE — 6360000002 HC RX W HCPCS: Performed by: INTERNAL MEDICINE

## 2021-09-06 PROCEDURE — 1200000000 HC SEMI PRIVATE

## 2021-09-06 PROCEDURE — 99232 SBSQ HOSP IP/OBS MODERATE 35: CPT | Performed by: INTERNAL MEDICINE

## 2021-09-06 RX ORDER — SODIUM CHLORIDE 0.9 % (FLUSH) 0.9 %
5-40 SYRINGE (ML) INJECTION PRN
Status: DISCONTINUED | OUTPATIENT
Start: 2021-09-06 | End: 2021-09-07 | Stop reason: SDUPTHER

## 2021-09-06 RX ORDER — HEPARIN SODIUM (PORCINE) LOCK FLUSH IV SOLN 100 UNIT/ML 100 UNIT/ML
1 SOLUTION INTRAVENOUS PRN
Status: DISCONTINUED | OUTPATIENT
Start: 2021-09-06 | End: 2021-09-09 | Stop reason: HOSPADM

## 2021-09-06 RX ORDER — SODIUM CHLORIDE 0.9 % (FLUSH) 0.9 %
5-40 SYRINGE (ML) INJECTION EVERY 12 HOURS SCHEDULED
Status: DISCONTINUED | OUTPATIENT
Start: 2021-09-06 | End: 2021-09-07 | Stop reason: SDUPTHER

## 2021-09-06 RX ORDER — SODIUM CHLORIDE 9 MG/ML
25 INJECTION, SOLUTION INTRAVENOUS PRN
Status: DISCONTINUED | OUTPATIENT
Start: 2021-09-06 | End: 2021-09-07 | Stop reason: SDUPTHER

## 2021-09-06 RX ORDER — ALPRAZOLAM 0.5 MG/1
0.5 TABLET ORAL NIGHTLY PRN
Status: DISCONTINUED | OUTPATIENT
Start: 2021-09-06 | End: 2021-09-07

## 2021-09-06 RX ORDER — DIPHENHYDRAMINE HCL 25 MG
12.5 TABLET ORAL ONCE
Status: COMPLETED | OUTPATIENT
Start: 2021-09-06 | End: 2021-09-06

## 2021-09-06 RX ORDER — DIPHENHYDRAMINE HCL 25 MG
25 TABLET ORAL ONCE
Status: DISCONTINUED | OUTPATIENT
Start: 2021-09-06 | End: 2021-09-06

## 2021-09-06 RX ORDER — HEPARIN SODIUM (PORCINE) LOCK FLUSH IV SOLN 100 UNIT/ML 100 UNIT/ML
1 SOLUTION INTRAVENOUS EVERY 12 HOURS SCHEDULED
Status: DISCONTINUED | OUTPATIENT
Start: 2021-09-06 | End: 2021-09-09 | Stop reason: HOSPADM

## 2021-09-06 RX ADMIN — Medication 100 MG: at 09:20

## 2021-09-06 RX ADMIN — FOLIC ACID 1 MG: 1 TABLET ORAL at 09:21

## 2021-09-06 RX ADMIN — Medication 1 TABLET: at 09:20

## 2021-09-06 RX ADMIN — OXYCODONE 5 MG: 5 TABLET ORAL at 06:27

## 2021-09-06 RX ADMIN — ENOXAPARIN SODIUM 40 MG: 40 INJECTION SUBCUTANEOUS at 09:21

## 2021-09-06 RX ADMIN — PANTOPRAZOLE SODIUM 40 MG: 40 TABLET, DELAYED RELEASE ORAL at 06:27

## 2021-09-06 RX ADMIN — ALPRAZOLAM 0.5 MG: 0.5 TABLET ORAL at 22:10

## 2021-09-06 RX ADMIN — HALOPERIDOL LACTATE 5 MG: 5 INJECTION, SOLUTION INTRAMUSCULAR at 16:23

## 2021-09-06 RX ADMIN — OXYCODONE 5 MG: 5 TABLET ORAL at 13:16

## 2021-09-06 RX ADMIN — HALOPERIDOL LACTATE 5 MG: 5 INJECTION, SOLUTION INTRAMUSCULAR at 11:54

## 2021-09-06 RX ADMIN — ONDANSETRON 4 MG: 4 TABLET, ORALLY DISINTEGRATING ORAL at 13:16

## 2021-09-06 RX ADMIN — DIPHENHYDRAMINE HYDROCHLORIDE 12.5 MG: 25 TABLET ORAL at 16:28

## 2021-09-06 ASSESSMENT — PAIN SCALES - GENERAL
PAINLEVEL_OUTOF10: 7
PAINLEVEL_OUTOF10: 7
PAINLEVEL_OUTOF10: 4
PAINLEVEL_OUTOF10: 0
PAINLEVEL_OUTOF10: 9

## 2021-09-06 ASSESSMENT — PAIN DESCRIPTION - PAIN TYPE: TYPE: ACUTE PAIN

## 2021-09-06 ASSESSMENT — PAIN DESCRIPTION - LOCATION: LOCATION: HEAD

## 2021-09-06 ASSESSMENT — PAIN DESCRIPTION - FREQUENCY: FREQUENCY: INTERMITTENT

## 2021-09-06 NOTE — PROGRESS NOTES
[facility]ed [facility]. No seizer meds ordered. Patient did not have anything recent ordered. Called [facility] again and gave information.  No beds

## 2021-09-06 NOTE — PROGRESS NOTES
Department of Internal Medicine  General Internal Medicine  Attending Progress Note  Chief Complaint   Patient presents with    Alcohol Problem     LLQ pain and ETOH withdrawal.      SUBJECTIVE:    Reports that he is seeing various form of light. He noted that he's had a hx of DT in the past and feels like we are not doing anything for him in the last 2 days. He reports prior hx of DT/Seizures.  He is aware of that we are waiting for bed at [facility]. E.      OBJECTIVE      Medications    Current Facility-Administered Medications: haloperidol lactate (HALDOL) injection 5 mg, 5 mg, IntraVENous, Q4H PRN  haloperidol lactate (HALDOL) injection 5 mg, 5 mg, IntraMUSCular, Q4H PRN  oxyCODONE (ROXICODONE) immediate release tablet 5 mg, 5 mg, Oral, Q4H PRN  sodium chloride flush 0.9 % injection 5-40 mL, 5-40 mL, IntraVENous, 2 times per day  sodium chloride flush 0.9 % injection 5-40 mL, 5-40 mL, IntraVENous, PRN  0.9 % sodium chloride infusion, 25 mL, IntraVENous, PRN  enoxaparin (LOVENOX) injection 40 mg, 40 mg, SubCUTAneous, Daily  ondansetron (ZOFRAN-ODT) disintegrating tablet 4 mg, 4 mg, Oral, Q8H PRN **OR** ondansetron (ZOFRAN) injection 4 mg, 4 mg, IntraVENous, Q6H PRN  polyethylene glycol (GLYCOLAX) packet 17 g, 17 g, Oral, Daily PRN  acetaminophen (TYLENOL) tablet 650 mg, 650 mg, Oral, Q6H PRN **OR** acetaminophen (TYLENOL) suppository 650 mg, 650 mg, Rectal, E7N PRN  folic acid (FOLVITE) tablet 1 mg, 1 mg, Oral, Daily  nicotine (NICODERM CQ) 21 MG/24HR 1 patch, 1 patch, TransDERmal, Daily  thiamine mononitrate tablet 100 mg, 100 mg, Oral, Daily  therapeutic multivitamin-minerals 1 tablet, 1 tablet, Oral, Daily  morphine (PF) injection 2 mg, 2 mg, IntraVENous, Q4H PRN  pantoprazole (PROTONIX) tablet 40 mg, 40 mg, Oral, QAM AC  sodium chloride flush 0.9 % injection 5-40 mL, 5-40 mL, IntraVENous, 2 times per day  sodium chloride flush 0.9 % injection 5-40 mL, 5-40 mL, IntraVENous, PRN  0.9 % sodium chloride infusion, 25 mL, IntraVENous, PRN  Physical    VITALS:  BP (!) 144/88   Pulse 78   Temp 98 °F (36.7 °C) (Oral)   Resp 18   Ht 5' 6\" (1.676 m)   Wt 140 lb (63.5 kg)   SpO2 98%   BMI 22.60 kg/m²   CONSTITUTIONAL:  awake, alert, cooperative, no apparent distress, and appears stated age  EYES:  Lids and lashes normal, pupils equal, round and reactive to light, extra ocular muscles intact, sclera clear, conjunctiva normal  ENT:  normocepalic, without obvious abnormality  BACK:  Symmetric, no curvature, spinous processes are non-tender on palpation, paraspinous muscles are non-tender on palpation, no costal vertebral tenderness  LUNGS:  No increased work of breathing, good air exchange, clear to auscultation bilaterally, no crackles or wheezing  CARDIOVASCULAR:  Normal apical impulse, regular rate and rhythm, normal S1 and S2, no S3 or S4, and no murmur noted  ABDOMEN:  No scars, normal bowel sounds, soft, non-distended, non-tender, no masses palpated, no hepatosplenomegally  MUSCULOSKELETAL:  there is no redness, warmth, or swelling of the joints  NEUROLOGIC:  No focal neuro deficit  SKIN:  no bruising or bleeding  Data    CBC:   Lab Results   Component Value Date    WBC 8.3 09/02/2021    RBC 5.17 09/02/2021    HGB 17.9 09/02/2021    HCT 50.8 09/02/2021    MCV 98.3 09/02/2021    MCH 34.6 09/02/2021    MCHC 35.2 09/02/2021    RDW 13.2 09/02/2021     09/02/2021    MPV 11.2 09/02/2021     BMP:    Lab Results   Component Value Date     09/02/2021    K 5.1 09/02/2021    K 4.6 08/30/2021    CL 94 09/02/2021    CO2 24 09/02/2021    BUN <2 09/02/2021    LABALBU 4.2 09/02/2021    CREATININE 0.6 09/02/2021    CALCIUM 9.0 09/02/2021    GFRAA >60 09/02/2021    LABGLOM >60 09/02/2021    GLUCOSE 112 09/02/2021       ASSESSMENT AND PLAN      Active Problems:    Alcohol withdrawal, uncomplicated (HCC)  Plan:     Mild protein-calorie malnutrition (HCC)  Plan:     Tobacco Abuse: nicotine patch.   Plan:     Erythrocytosis: suspects that this may be due to tobacco abuse vs dehydration. Will continue to monitor  Plans:  Patient has ativan as an allergy on chart. Unsure if this is true allergy as he has tolerated this in the past.  Will continue to monitor while awaiting patient's transfer to [address] [place] [address]. Patient is medically stable as at now and can be transferred if bed becomes available.

## 2021-09-06 NOTE — PROGRESS NOTES
Went  Into room. Patient agitated and said wanted to leave. I told him that he knew he cannot leave becouse he is pink slipped. He said we are not doing anything for him. He also said that he takes seizer meds. I found not on his hme axel. Weill call   [facility] at 10 am when they open. Called [facility], still not beds.

## 2021-09-07 LAB — SARS-COV-2, NAAT: NOT DETECTED

## 2021-09-07 PROCEDURE — 36410 VNPNXR 3YR/> PHY/QHP DX/THER: CPT

## 2021-09-07 PROCEDURE — 6370000000 HC RX 637 (ALT 250 FOR IP): Performed by: INTERNAL MEDICINE

## 2021-09-07 PROCEDURE — 6360000002 HC RX W HCPCS: Performed by: INTERNAL MEDICINE

## 2021-09-07 PROCEDURE — 2580000003 HC RX 258: Performed by: INTERNAL MEDICINE

## 2021-09-07 PROCEDURE — 05HA33Z INSERTION OF INFUSION DEVICE INTO LEFT BRACHIAL VEIN, PERCUTANEOUS APPROACH: ICD-10-PCS | Performed by: INTERNAL MEDICINE

## 2021-09-07 PROCEDURE — 1200000000 HC SEMI PRIVATE

## 2021-09-07 PROCEDURE — 87635 SARS-COV-2 COVID-19 AMP PRB: CPT

## 2021-09-07 PROCEDURE — 76937 US GUIDE VASCULAR ACCESS: CPT

## 2021-09-07 PROCEDURE — C1751 CATH, INF, PER/CENT/MIDLINE: HCPCS

## 2021-09-07 PROCEDURE — 99233 SBSQ HOSP IP/OBS HIGH 50: CPT | Performed by: INTERNAL MEDICINE

## 2021-09-07 RX ORDER — LORAZEPAM 2 MG/ML
4 INJECTION INTRAMUSCULAR
Status: DISCONTINUED | OUTPATIENT
Start: 2021-09-07 | End: 2021-09-09 | Stop reason: HOSPADM

## 2021-09-07 RX ORDER — SODIUM CHLORIDE 0.9 % (FLUSH) 0.9 %
5-40 SYRINGE (ML) INJECTION PRN
Status: DISCONTINUED | OUTPATIENT
Start: 2021-09-07 | End: 2021-09-09 | Stop reason: HOSPADM

## 2021-09-07 RX ORDER — LORAZEPAM 1 MG/1
3 TABLET ORAL
Status: DISCONTINUED | OUTPATIENT
Start: 2021-09-07 | End: 2021-09-09 | Stop reason: HOSPADM

## 2021-09-07 RX ORDER — LORAZEPAM 1 MG/1
4 TABLET ORAL
Status: DISCONTINUED | OUTPATIENT
Start: 2021-09-07 | End: 2021-09-09 | Stop reason: HOSPADM

## 2021-09-07 RX ORDER — LORAZEPAM 2 MG/ML
2 INJECTION INTRAMUSCULAR
Status: DISCONTINUED | OUTPATIENT
Start: 2021-09-07 | End: 2021-09-09 | Stop reason: HOSPADM

## 2021-09-07 RX ORDER — LORAZEPAM 2 MG/ML
1 INJECTION INTRAMUSCULAR
Status: DISCONTINUED | OUTPATIENT
Start: 2021-09-07 | End: 2021-09-09 | Stop reason: HOSPADM

## 2021-09-07 RX ORDER — LORAZEPAM 1 MG/1
1 TABLET ORAL
Status: DISCONTINUED | OUTPATIENT
Start: 2021-09-07 | End: 2021-09-09 | Stop reason: HOSPADM

## 2021-09-07 RX ORDER — LORAZEPAM 2 MG/ML
3 INJECTION INTRAMUSCULAR
Status: DISCONTINUED | OUTPATIENT
Start: 2021-09-07 | End: 2021-09-09 | Stop reason: HOSPADM

## 2021-09-07 RX ORDER — LORAZEPAM 1 MG/1
2 TABLET ORAL
Status: DISCONTINUED | OUTPATIENT
Start: 2021-09-07 | End: 2021-09-09 | Stop reason: HOSPADM

## 2021-09-07 RX ORDER — SODIUM CHLORIDE 9 MG/ML
25 INJECTION, SOLUTION INTRAVENOUS PRN
Status: DISCONTINUED | OUTPATIENT
Start: 2021-09-07 | End: 2021-09-09 | Stop reason: HOSPADM

## 2021-09-07 RX ORDER — SODIUM CHLORIDE 0.9 % (FLUSH) 0.9 %
5-40 SYRINGE (ML) INJECTION EVERY 12 HOURS SCHEDULED
Status: DISCONTINUED | OUTPATIENT
Start: 2021-09-07 | End: 2021-09-09 | Stop reason: HOSPADM

## 2021-09-07 RX ADMIN — Medication 1 TABLET: at 08:44

## 2021-09-07 RX ADMIN — OXYCODONE 5 MG: 5 TABLET ORAL at 20:23

## 2021-09-07 RX ADMIN — LORAZEPAM 4 MG: 1 TABLET ORAL at 20:23

## 2021-09-07 RX ADMIN — Medication 100 MG: at 08:44

## 2021-09-07 RX ADMIN — LORAZEPAM 2 MG: 1 TABLET ORAL at 23:56

## 2021-09-07 RX ADMIN — LORAZEPAM 2 MG: 1 TABLET ORAL at 21:30

## 2021-09-07 RX ADMIN — FOLIC ACID 1 MG: 1 TABLET ORAL at 08:44

## 2021-09-07 RX ADMIN — SODIUM CHLORIDE, PRESERVATIVE FREE 10 ML: 5 INJECTION INTRAVENOUS at 20:25

## 2021-09-07 RX ADMIN — LORAZEPAM 4 MG: 2 INJECTION INTRAMUSCULAR; INTRAVENOUS at 12:04

## 2021-09-07 RX ADMIN — ENOXAPARIN SODIUM 40 MG: 40 INJECTION SUBCUTANEOUS at 08:43

## 2021-09-07 RX ADMIN — LORAZEPAM 2 MG: 2 INJECTION INTRAMUSCULAR; INTRAVENOUS at 17:24

## 2021-09-07 RX ADMIN — HEPARIN 100 UNITS: 100 SYRINGE at 20:24

## 2021-09-07 RX ADMIN — HEPARIN 100 UNITS: 100 SYRINGE at 12:04

## 2021-09-07 RX ADMIN — PANTOPRAZOLE SODIUM 40 MG: 40 TABLET, DELAYED RELEASE ORAL at 06:05

## 2021-09-07 RX ADMIN — ONDANSETRON 4 MG: 2 INJECTION INTRAMUSCULAR; INTRAVENOUS at 17:16

## 2021-09-07 RX ADMIN — ONDANSETRON 4 MG: 4 TABLET, ORALLY DISINTEGRATING ORAL at 11:05

## 2021-09-07 ASSESSMENT — PAIN DESCRIPTION - LOCATION
LOCATION: BACK;NECK;HAND
LOCATION: HEAD;GENERALIZED

## 2021-09-07 ASSESSMENT — PAIN DESCRIPTION - DESCRIPTORS
DESCRIPTORS: ACHING;DISCOMFORT;SORE
DESCRIPTORS: CONSTANT;ACHING;PINS AND NEEDLES

## 2021-09-07 ASSESSMENT — PAIN SCALES - GENERAL
PAINLEVEL_OUTOF10: 8
PAINLEVEL_OUTOF10: 4
PAINLEVEL_OUTOF10: 7
PAINLEVEL_OUTOF10: 8
PAINLEVEL_OUTOF10: 5

## 2021-09-07 ASSESSMENT — PAIN DESCRIPTION - PAIN TYPE
TYPE: ACUTE PAIN
TYPE: CHRONIC PAIN

## 2021-09-07 ASSESSMENT — PAIN DESCRIPTION - ONSET: ONSET: GRADUAL

## 2021-09-07 ASSESSMENT — PAIN DESCRIPTION - ORIENTATION
ORIENTATION: UPPER
ORIENTATION: INNER

## 2021-09-07 ASSESSMENT — PAIN - FUNCTIONAL ASSESSMENT: PAIN_FUNCTIONAL_ASSESSMENT: ACTIVITIES ARE NOT PREVENTED

## 2021-09-07 ASSESSMENT — PAIN DESCRIPTION - FREQUENCY: FREQUENCY: INTERMITTENT

## 2021-09-07 ASSESSMENT — PAIN DESCRIPTION - PROGRESSION: CLINICAL_PROGRESSION: GRADUALLY IMPROVING

## 2021-09-07 NOTE — PROGRESS NOTES
Called to room. Pt expressed c/o anxiety, cold sweats and n/v into emesis bag. Pt skin is dry , smooth and neutral temperature. Pt notably shaking. Emesis bag noted as small of saliva mixed with water, no \"chunks\" observed by this nurse. Visible shaking decreased with deep breathing. Nurse educated pt on relaxation and distraction techniques.  Pt noted reclined back on pillow, resp normal.

## 2021-09-07 NOTE — CARE COORDINATION
9/7/2021 1515 CM note: Negative covid test 9/7/21. Pt has C. O. and is pink slipped-expires in 72hours. Per nursing,pt received Ativan for withdrawal(CIWA protocol) and is sleeping soundly. Plan is for pt to be transferred to [facility] when medically stable. Per Nick,[facility], will follow with pt when discharged to [address] [place] [address].  Sanjuana TELLES

## 2021-09-07 NOTE — ED NOTES
SW spoke to [employer] and requested for the pink slip to be faxed as well as a new COVID screening. SW was informed that current pink slip expires in a hour. RN will work on obtaining new pink slip and faxed to [facility] AN AFFILIATE OF [facility] [phone].       MELISSA Moncada, [place]  09/07/21 MELISSA Delarosa, [place]  09/07/21 0503

## 2021-09-07 NOTE — PROCEDURES
SL power midline Placement 9/7/2021    Product number: [facility] [id number]   Lot Number: [id number]   Consult: limited access   Ultrasound: yes   Left Brachial vein:                Upper Arm Circumference: 26cm    Size: 4.5fr    Exposed Length: 0   Internal Length: 15cm   Cut: 0   Vein Measurement: 0.55cm    Brisk blood retuen noted, flushed well and capped.  RN notified    Cornelio Whitmore RN  9/7/2021  11:51 AM

## 2021-09-07 NOTE — PROGRESS NOTES
Department of Internal Medicine  General Internal Medicine  Attending Progress Note  Chief Complaint   Patient presents with    Alcohol Problem     LLQ pain and ETOH withdrawal.      SUBJECTIVE:    Reports that he is feeling better now that he is getting benzo for withdrawal treatment. He denied fever and chills. No chest pain.  He is aware of plans to transfer him to [facility] for further management    OBJECTIVE      Medications    Current Facility-Administered Medications: sodium chloride flush 0.9 % injection 5-40 mL, 5-40 mL, IntraVENous, 2 times per day  sodium chloride flush 0.9 % injection 5-40 mL, 5-40 mL, IntraVENous, PRN  0.9 % sodium chloride infusion, 25 mL, IntraVENous, PRN  LORazepam (ATIVAN) tablet 1 mg, 1 mg, Oral, Q1H PRN **OR** LORazepam (ATIVAN) injection 1 mg, 1 mg, IntraVENous, Q1H PRN **OR** LORazepam (ATIVAN) tablet 2 mg, 2 mg, Oral, Q1H PRN **OR** LORazepam (ATIVAN) injection 2 mg, 2 mg, IntraVENous, Q1H PRN **OR** LORazepam (ATIVAN) tablet 3 mg, 3 mg, Oral, Q1H PRN **OR** LORazepam (ATIVAN) injection 3 mg, 3 mg, IntraVENous, Q1H PRN **OR** LORazepam (ATIVAN) tablet 4 mg, 4 mg, Oral, Q1H PRN **OR** LORazepam (ATIVAN) injection 4 mg, 4 mg, IntraVENous, Q1H PRN  lidocaine 1 % injection 5 mL, 5 mL, IntraDERmal, Once  heparin flush 100 UNIT/ML injection 100 Units, 1 mL, IntraVENous, 2 times per day  heparin flush 100 UNIT/ML injection 100 Units, 1 mL, IntraCATHeter, PRN  haloperidol lactate (HALDOL) injection 5 mg, 5 mg, IntraVENous, Q4H PRN  haloperidol lactate (HALDOL) injection 5 mg, 5 mg, IntraMUSCular, Q4H PRN  oxyCODONE (ROXICODONE) immediate release tablet 5 mg, 5 mg, Oral, Q4H PRN  enoxaparin (LOVENOX) injection 40 mg, 40 mg, SubCUTAneous, Daily  ondansetron (ZOFRAN-ODT) disintegrating tablet 4 mg, 4 mg, Oral, Q8H PRN **OR** ondansetron (ZOFRAN) injection 4 mg, 4 mg, IntraVENous, Q6H PRN  polyethylene glycol (GLYCOLAX) packet 17 g, 17 g, Oral, Daily PRN  acetaminophen (TYLENOL) tablet 650 mg, 650 mg, Oral, Q6H PRN **OR** acetaminophen (TYLENOL) suppository 650 mg, 650 mg, Rectal, E5Q PRN  folic acid (FOLVITE) tablet 1 mg, 1 mg, Oral, Daily  nicotine (NICODERM CQ) 21 MG/24HR 1 patch, 1 patch, TransDERmal, Daily  thiamine mononitrate tablet 100 mg, 100 mg, Oral, Daily  therapeutic multivitamin-minerals 1 tablet, 1 tablet, Oral, Daily  morphine (PF) injection 2 mg, 2 mg, IntraVENous, Q4H PRN  pantoprazole (PROTONIX) tablet 40 mg, 40 mg, Oral, QAM AC  Physical    VITALS:  /80   Pulse 59   Temp 98.4 °F (36.9 °C) (Oral)   Resp 20   Ht 5' 6\" (1.676 m)   Wt 140 lb (63.5 kg)   SpO2 97%   BMI 22.60 kg/m²   CONSTITUTIONAL:  awake, alert, cooperative, no apparent distress, and appears stated age  EYES:  Lids and lashes normal, pupils equal, round and reactive to light, extra ocular muscles intact, sclera clear, conjunctiva normal  ENT:  normocepalic, without obvious abnormality  BACK:  Symmetric, no curvature, spinous processes are non-tender on palpation, paraspinous muscles are non-tender on palpation, no costal vertebral tenderness  LUNGS:  No increased work of breathing, good air exchange, clear to auscultation bilaterally, no crackles or wheezing  CARDIOVASCULAR:  Normal apical impulse, regular rate and rhythm, normal S1 and S2, no S3 or S4, and no murmur noted  ABDOMEN:  No scars, normal bowel sounds, soft, non-distended, non-tender, no masses palpated, no hepatosplenomegally  MUSCULOSKELETAL:  there is no redness, warmth, or swelling of the joints  NEUROLOGIC:  Mental Status Exam:  Level of Alertness:   awake  Orientation:   person, place, time  SKIN:  no bruising or bleeding  Data    CBC:   Lab Results   Component Value Date    WBC 8.3 09/02/2021    RBC 5.17 09/02/2021    HGB 17.9 09/02/2021    HCT 50.8 09/02/2021    MCV 98.3 09/02/2021    MCH 34.6 09/02/2021    MCHC 35.2 09/02/2021    RDW 13.2 09/02/2021     09/02/2021    MPV 11.2 09/02/2021     BMP:    Lab Results   Component Value Date

## 2021-09-08 LAB
ANION GAP SERPL CALCULATED.3IONS-SCNC: 7 MMOL/L (ref 7–16)
BASOPHILS ABSOLUTE: 0.05 E9/L (ref 0–0.2)
BASOPHILS RELATIVE PERCENT: 0.7 % (ref 0–2)
BUN BLDV-MCNC: 2 MG/DL (ref 6–20)
CALCIUM SERPL-MCNC: 8.4 MG/DL (ref 8.6–10.2)
CHLORIDE BLD-SCNC: 105 MMOL/L (ref 98–107)
CO2: 28 MMOL/L (ref 22–29)
CREAT SERPL-MCNC: 0.6 MG/DL (ref 0.7–1.2)
EOSINOPHILS ABSOLUTE: 0.08 E9/L (ref 0.05–0.5)
EOSINOPHILS RELATIVE PERCENT: 1.2 % (ref 0–6)
GFR AFRICAN AMERICAN: >60
GFR NON-AFRICAN AMERICAN: >60 ML/MIN/1.73
GLUCOSE BLD-MCNC: 110 MG/DL (ref 74–99)
HCT VFR BLD CALC: 37.1 % (ref 37–54)
HEMOGLOBIN: 12.8 G/DL (ref 12.5–16.5)
IMMATURE GRANULOCYTES #: 0.03 E9/L
IMMATURE GRANULOCYTES %: 0.4 % (ref 0–5)
LYMPHOCYTES ABSOLUTE: 1.83 E9/L (ref 1.5–4)
LYMPHOCYTES RELATIVE PERCENT: 26.4 % (ref 20–42)
MCH RBC QN AUTO: 34.7 PG (ref 26–35)
MCHC RBC AUTO-ENTMCNC: 34.5 % (ref 32–34.5)
MCV RBC AUTO: 100.5 FL (ref 80–99.9)
MONOCYTES ABSOLUTE: 0.8 E9/L (ref 0.1–0.95)
MONOCYTES RELATIVE PERCENT: 11.6 % (ref 2–12)
NEUTROPHILS ABSOLUTE: 4.13 E9/L (ref 1.8–7.3)
NEUTROPHILS RELATIVE PERCENT: 59.7 % (ref 43–80)
PDW BLD-RTO: 12.7 FL (ref 11.5–15)
PLATELET # BLD: 152 E9/L (ref 130–450)
PMV BLD AUTO: 13.8 FL (ref 7–12)
POTASSIUM SERPL-SCNC: 3.6 MMOL/L (ref 3.5–5)
RBC # BLD: 3.69 E12/L (ref 3.8–5.8)
SODIUM BLD-SCNC: 140 MMOL/L (ref 132–146)
WBC # BLD: 6.9 E9/L (ref 4.5–11.5)

## 2021-09-08 PROCEDURE — 99232 SBSQ HOSP IP/OBS MODERATE 35: CPT | Performed by: INTERNAL MEDICINE

## 2021-09-08 PROCEDURE — 6360000002 HC RX W HCPCS: Performed by: INTERNAL MEDICINE

## 2021-09-08 PROCEDURE — 2580000003 HC RX 258: Performed by: INTERNAL MEDICINE

## 2021-09-08 PROCEDURE — 1200000000 HC SEMI PRIVATE

## 2021-09-08 PROCEDURE — 6370000000 HC RX 637 (ALT 250 FOR IP): Performed by: INTERNAL MEDICINE

## 2021-09-08 PROCEDURE — 85025 COMPLETE CBC W/AUTO DIFF WBC: CPT

## 2021-09-08 PROCEDURE — 80048 BASIC METABOLIC PNL TOTAL CA: CPT

## 2021-09-08 PROCEDURE — 36415 COLL VENOUS BLD VENIPUNCTURE: CPT

## 2021-09-08 RX ADMIN — PANTOPRAZOLE SODIUM 40 MG: 40 TABLET, DELAYED RELEASE ORAL at 09:00

## 2021-09-08 RX ADMIN — LORAZEPAM 2 MG: 1 TABLET ORAL at 09:32

## 2021-09-08 RX ADMIN — OXYCODONE 5 MG: 5 TABLET ORAL at 13:23

## 2021-09-08 RX ADMIN — FOLIC ACID 1 MG: 1 TABLET ORAL at 09:24

## 2021-09-08 RX ADMIN — Medication 1 TABLET: at 09:24

## 2021-09-08 RX ADMIN — SODIUM CHLORIDE, PRESERVATIVE FREE 10 ML: 5 INJECTION INTRAVENOUS at 06:39

## 2021-09-08 RX ADMIN — ONDANSETRON 4 MG: 2 INJECTION INTRAMUSCULAR; INTRAVENOUS at 09:23

## 2021-09-08 RX ADMIN — ENOXAPARIN SODIUM 40 MG: 40 INJECTION SUBCUTANEOUS at 09:24

## 2021-09-08 RX ADMIN — Medication 100 MG: at 09:24

## 2021-09-08 RX ADMIN — OXYCODONE 5 MG: 5 TABLET ORAL at 03:29

## 2021-09-08 RX ADMIN — HEPARIN 100 UNITS: 100 SYRINGE at 09:24

## 2021-09-08 RX ADMIN — SODIUM CHLORIDE, PRESERVATIVE FREE 10 ML: 5 INJECTION INTRAVENOUS at 21:33

## 2021-09-08 RX ADMIN — MORPHINE SULFATE 2 MG: 2 INJECTION, SOLUTION INTRAMUSCULAR; INTRAVENOUS at 06:40

## 2021-09-08 RX ADMIN — MORPHINE SULFATE 2 MG: 2 INJECTION, SOLUTION INTRAMUSCULAR; INTRAVENOUS at 22:38

## 2021-09-08 RX ADMIN — HEPARIN 100 UNITS: 100 SYRINGE at 22:38

## 2021-09-08 ASSESSMENT — PAIN DESCRIPTION - FREQUENCY
FREQUENCY: INTERMITTENT
FREQUENCY: INTERMITTENT

## 2021-09-08 ASSESSMENT — PAIN SCALES - GENERAL
PAINLEVEL_OUTOF10: 8
PAINLEVEL_OUTOF10: 8
PAINLEVEL_OUTOF10: 0
PAINLEVEL_OUTOF10: 7
PAINLEVEL_OUTOF10: 6
PAINLEVEL_OUTOF10: 2
PAINLEVEL_OUTOF10: 0
PAINLEVEL_OUTOF10: 4
PAINLEVEL_OUTOF10: 8
PAINLEVEL_OUTOF10: 0

## 2021-09-08 ASSESSMENT — PAIN DESCRIPTION - LOCATION
LOCATION: GENERALIZED;BACK;ARM
LOCATION: GENERALIZED

## 2021-09-08 ASSESSMENT — PAIN - FUNCTIONAL ASSESSMENT
PAIN_FUNCTIONAL_ASSESSMENT: PREVENTS OR INTERFERES SOME ACTIVE ACTIVITIES AND ADLS
PAIN_FUNCTIONAL_ASSESSMENT: PREVENTS OR INTERFERES SOME ACTIVE ACTIVITIES AND ADLS

## 2021-09-08 ASSESSMENT — PAIN DESCRIPTION - ONSET
ONSET: GRADUAL
ONSET: GRADUAL

## 2021-09-08 ASSESSMENT — PAIN DESCRIPTION - ORIENTATION: ORIENTATION: OTHER (COMMENT)

## 2021-09-08 ASSESSMENT — PAIN DESCRIPTION - PROGRESSION
CLINICAL_PROGRESSION: OTHER (COMMENT)
CLINICAL_PROGRESSION: GRADUALLY IMPROVING
CLINICAL_PROGRESSION: GRADUALLY IMPROVING

## 2021-09-08 ASSESSMENT — PAIN DESCRIPTION - DESCRIPTORS
DESCRIPTORS: ACHING;DISCOMFORT
DESCRIPTORS: ACHING;DISCOMFORT;SORE

## 2021-09-08 ASSESSMENT — PAIN DESCRIPTION - PAIN TYPE
TYPE: CHRONIC PAIN;ACUTE PAIN
TYPE: CHRONIC PAIN

## 2021-09-08 NOTE — PLAN OF CARE
Problem: Falls - Risk of:  Goal: Will remain free from falls  Outcome: Met This Shift  Goal: Absence of physical injury  Outcome: Met This Shift     Problem: Pain:  Goal: Pain level will decrease  Outcome: Met This Shift  Goal: Control of acute pain  Outcome: Met This Shift  Goal: Control of chronic pain  Outcome: Met This Shift     Problem: Suicide risk  Goal: Provide patient with safe environment  Outcome: Met This Shift

## 2021-09-08 NOTE — PLAN OF CARE
Problem: Violence - Risk of, Self/Other-Directed:  Goal: Knowledge of developmental care interventions  Outcome: Met This Shift

## 2021-09-08 NOTE — PROGRESS NOTES
Department of Internal Medicine  General Internal Medicine  Attending Progress Note  Chief Complaint   Patient presents with    Alcohol Problem     LLQ pain and ETOH withdrawal.      SUBJECTIVE:    overall, looks better than yesterday. However he said he felt terrible and seems to be worse than he was yesterday. She was cough seems to be improving. There is no overt shaking and the he denied hallucination. Current Facility-Administered Medications: sodium chloride flush 0.9 % injection 5-40 mL, 5-40 mL, IntraVENous, 2 times per day  sodium chloride flush 0.9 % injection 5-40 mL, 5-40 mL, IntraVENous, PRN  0.9 % sodium chloride infusion, 25 mL, IntraVENous, PRN  LORazepam (ATIVAN) tablet 1 mg, 1 mg, Oral, Q1H PRN **OR** LORazepam (ATIVAN) injection 1 mg, 1 mg, IntraVENous, Q1H PRN **OR** LORazepam (ATIVAN) tablet 2 mg, 2 mg, Oral, Q1H PRN **OR** LORazepam (ATIVAN) injection 2 mg, 2 mg, IntraVENous, Q1H PRN **OR** LORazepam (ATIVAN) tablet 3 mg, 3 mg, Oral, Q1H PRN **OR** LORazepam (ATIVAN) injection 3 mg, 3 mg, IntraVENous, Q1H PRN **OR** LORazepam (ATIVAN) tablet 4 mg, 4 mg, Oral, Q1H PRN **OR** LORazepam (ATIVAN) injection 4 mg, 4 mg, IntraVENous, Q1H PRN  lidocaine 1 % injection 5 mL, 5 mL, IntraDERmal, Once  heparin flush 100 UNIT/ML injection 100 Units, 1 mL, IntraVENous, 2 times per day  heparin flush 100 UNIT/ML injection 100 Units, 1 mL, IntraCATHeter, PRN  haloperidol lactate (HALDOL) injection 5 mg, 5 mg, IntraVENous, Q4H PRN  haloperidol lactate (HALDOL) injection 5 mg, 5 mg, IntraMUSCular, Q4H PRN  oxyCODONE (ROXICODONE) immediate release tablet 5 mg, 5 mg, Oral, Q4H PRN  enoxaparin (LOVENOX) injection 40 mg, 40 mg, SubCUTAneous, Daily  ondansetron (ZOFRAN-ODT) disintegrating tablet 4 mg, 4 mg, Oral, Q8H PRN **OR** ondansetron (ZOFRAN) injection 4 mg, 4 mg, IntraVENous, Q6H PRN  polyethylene glycol (GLYCOLAX) packet 17 g, 17 g, Oral, Daily PRN  acetaminophen (TYLENOL) tablet 650 mg, 650 mg, Oral, Q6H PRN **OR** acetaminophen (TYLENOL) suppository 650 mg, 650 mg, Rectal, O1E PRN  folic acid (FOLVITE) tablet 1 mg, 1 mg, Oral, Daily  nicotine (NICODERM CQ) 21 MG/24HR 1 patch, 1 patch, TransDERmal, Daily  thiamine mononitrate tablet 100 mg, 100 mg, Oral, Daily  therapeutic multivitamin-minerals 1 tablet, 1 tablet, Oral, Daily  morphine (PF) injection 2 mg, 2 mg, IntraVENous, Q4H PRN  pantoprazole (PROTONIX) tablet 40 mg, 40 mg, Oral, QAM AC    OBJECTIVE      Medications    Current Facility-Administered Medications: sodium chloride flush 0.9 % injection 5-40 mL, 5-40 mL, IntraVENous, 2 times per day  sodium chloride flush 0.9 % injection 5-40 mL, 5-40 mL, IntraVENous, PRN  0.9 % sodium chloride infusion, 25 mL, IntraVENous, PRN  LORazepam (ATIVAN) tablet 1 mg, 1 mg, Oral, Q1H PRN **OR** LORazepam (ATIVAN) injection 1 mg, 1 mg, IntraVENous, Q1H PRN **OR** LORazepam (ATIVAN) tablet 2 mg, 2 mg, Oral, Q1H PRN **OR** LORazepam (ATIVAN) injection 2 mg, 2 mg, IntraVENous, Q1H PRN **OR** LORazepam (ATIVAN) tablet 3 mg, 3 mg, Oral, Q1H PRN **OR** LORazepam (ATIVAN) injection 3 mg, 3 mg, IntraVENous, Q1H PRN **OR** LORazepam (ATIVAN) tablet 4 mg, 4 mg, Oral, Q1H PRN **OR** LORazepam (ATIVAN) injection 4 mg, 4 mg, IntraVENous, Q1H PRN  lidocaine 1 % injection 5 mL, 5 mL, IntraDERmal, Once  heparin flush 100 UNIT/ML injection 100 Units, 1 mL, IntraVENous, 2 times per day  heparin flush 100 UNIT/ML injection 100 Units, 1 mL, IntraCATHeter, PRN  haloperidol lactate (HALDOL) injection 5 mg, 5 mg, IntraVENous, Q4H PRN  haloperidol lactate (HALDOL) injection 5 mg, 5 mg, IntraMUSCular, Q4H PRN  oxyCODONE (ROXICODONE) immediate release tablet 5 mg, 5 mg, Oral, Q4H PRN  enoxaparin (LOVENOX) injection 40 mg, 40 mg, SubCUTAneous, Daily  ondansetron (ZOFRAN-ODT) disintegrating tablet 4 mg, 4 mg, Oral, Q8H PRN **OR** ondansetron (ZOFRAN) injection 4 mg, 4 mg, IntraVENous, Q6H PRN  polyethylene glycol (GLYCOLAX) packet 17 g, 17 g, Oral, 09/08/2021    RBC 3.69 09/08/2021    HGB 12.8 09/08/2021    HCT 37.1 09/08/2021    .5 09/08/2021    MCH 34.7 09/08/2021    MCHC 34.5 09/08/2021    RDW 12.7 09/08/2021     09/08/2021    MPV 13.8 09/08/2021     BMP:    Lab Results   Component Value Date     09/08/2021    K 3.6 09/08/2021    K 4.6 08/30/2021     09/08/2021    CO2 28 09/08/2021    BUN 2 09/08/2021    LABALBU 4.2 09/02/2021    CREATININE 0.6 09/08/2021    CALCIUM 8.4 09/08/2021    GFRAA >60 09/08/2021    LABGLOM >60 09/08/2021    GLUCOSE 110 09/08/2021       ASSESSMENT AND PLAN      1. Alcohol withdrawal, uncomplicated ([address]    2. Mild protein-calorie malnutrition ([facility] [address]    3. Tobacco abuse:    Plans:  Continue current withdrawal protocol. Awaits further information from psych intake. Continue nicotine patch. Counseled on importance of with cessation. ADDENDUM:  Patient withdrawal symptoms has improved significantly and he is stable for transfer to inpatient psych.

## 2021-09-09 ENCOUNTER — HOSPITAL ENCOUNTER (INPATIENT)
Age: 31
LOS: 4 days | Discharge: HOME | DRG: 751 | End: 2021-09-13
Attending: PSYCHIATRY & NEUROLOGY | Admitting: PSYCHIATRY & NEUROLOGY
Payer: COMMERCIAL

## 2021-09-09 VITALS
WEIGHT: 140 LBS | DIASTOLIC BLOOD PRESSURE: 76 MMHG | BODY MASS INDEX: 22.5 KG/M2 | HEART RATE: 60 BPM | SYSTOLIC BLOOD PRESSURE: 135 MMHG | OXYGEN SATURATION: 99 % | RESPIRATION RATE: 20 BRPM | HEIGHT: 66 IN | TEMPERATURE: 98.8 F

## 2021-09-09 PROBLEM — F41.9 ANXIETY DISORDER: Status: ACTIVE | Noted: 2021-09-09

## 2021-09-09 PROBLEM — F10.20 ALCOHOL DEPENDENCE (HCC): Status: ACTIVE | Noted: 2021-09-09

## 2021-09-09 PROBLEM — F33.2 MDD (MAJOR DEPRESSIVE DISORDER), RECURRENT SEVERE, WITHOUT PSYCHOSIS (HCC): Status: ACTIVE | Noted: 2021-09-09

## 2021-09-09 PROBLEM — F33.2 MDD (MAJOR DEPRESSIVE DISORDER), RECURRENT EPISODE, SEVERE (HCC): Status: ACTIVE | Noted: 2021-09-09

## 2021-09-09 PROCEDURE — 6370000000 HC RX 637 (ALT 250 FOR IP): Performed by: INTERNAL MEDICINE

## 2021-09-09 PROCEDURE — 6370000000 HC RX 637 (ALT 250 FOR IP): Performed by: NURSE PRACTITIONER

## 2021-09-09 PROCEDURE — 1240000000 HC EMOTIONAL WELLNESS R&B

## 2021-09-09 PROCEDURE — 99231 SBSQ HOSP IP/OBS SF/LOW 25: CPT | Performed by: NURSE PRACTITIONER

## 2021-09-09 PROCEDURE — 99238 HOSP IP/OBS DSCHRG MGMT 30/<: CPT | Performed by: INTERNAL MEDICINE

## 2021-09-09 PROCEDURE — 6360000002 HC RX W HCPCS: Performed by: INTERNAL MEDICINE

## 2021-09-09 RX ORDER — GAUZE BANDAGE 2" X 2"
100 BANDAGE TOPICAL DAILY
Status: DISCONTINUED | OUTPATIENT
Start: 2021-09-09 | End: 2021-09-13 | Stop reason: HOSPADM

## 2021-09-09 RX ORDER — HYDROXYZINE PAMOATE 50 MG/1
50 CAPSULE ORAL 3 TIMES DAILY PRN
Status: DISCONTINUED | OUTPATIENT
Start: 2021-09-09 | End: 2021-09-13 | Stop reason: HOSPADM

## 2021-09-09 RX ORDER — HYDROXYZINE PAMOATE 25 MG/1
25 CAPSULE ORAL 3 TIMES DAILY
Status: DISCONTINUED | OUTPATIENT
Start: 2021-09-09 | End: 2021-09-13 | Stop reason: HOSPADM

## 2021-09-09 RX ORDER — HYDROXYZINE PAMOATE 25 MG/1
25 CAPSULE ORAL 3 TIMES DAILY
Status: DISCONTINUED | OUTPATIENT
Start: 2021-09-09 | End: 2021-09-09

## 2021-09-09 RX ORDER — HALOPERIDOL 5 MG/ML
5 INJECTION INTRAMUSCULAR EVERY 6 HOURS PRN
Status: DISCONTINUED | OUTPATIENT
Start: 2021-09-09 | End: 2021-09-13 | Stop reason: HOSPADM

## 2021-09-09 RX ORDER — HALOPERIDOL 5 MG
5 TABLET ORAL EVERY 6 HOURS PRN
Status: DISCONTINUED | OUTPATIENT
Start: 2021-09-09 | End: 2021-09-13 | Stop reason: HOSPADM

## 2021-09-09 RX ORDER — FOLIC ACID 1 MG/1
1 TABLET ORAL DAILY
Status: DISCONTINUED | OUTPATIENT
Start: 2021-09-09 | End: 2021-09-13 | Stop reason: HOSPADM

## 2021-09-09 RX ORDER — TRAZODONE HYDROCHLORIDE 50 MG/1
50 TABLET ORAL NIGHTLY PRN
Status: DISCONTINUED | OUTPATIENT
Start: 2021-09-09 | End: 2021-09-13 | Stop reason: HOSPADM

## 2021-09-09 RX ORDER — NALTREXONE HYDROCHLORIDE 50 MG/1
50 TABLET, FILM COATED ORAL
Status: DISCONTINUED | OUTPATIENT
Start: 2021-09-10 | End: 2021-09-10

## 2021-09-09 RX ORDER — MAGNESIUM HYDROXIDE/ALUMINUM HYDROXICE/SIMETHICONE 120; 1200; 1200 MG/30ML; MG/30ML; MG/30ML
30 SUSPENSION ORAL PRN
Status: DISCONTINUED | OUTPATIENT
Start: 2021-09-09 | End: 2021-09-13 | Stop reason: HOSPADM

## 2021-09-09 RX ORDER — ACETAMINOPHEN 325 MG/1
650 TABLET ORAL EVERY 6 HOURS PRN
Status: DISCONTINUED | OUTPATIENT
Start: 2021-09-09 | End: 2021-09-13 | Stop reason: HOSPADM

## 2021-09-09 RX ORDER — DIAZEPAM 5 MG/1
5 TABLET ORAL EVERY 6 HOURS PRN
Status: DISCONTINUED | OUTPATIENT
Start: 2021-09-09 | End: 2021-09-13 | Stop reason: HOSPADM

## 2021-09-09 RX ORDER — MULTIVITAMIN WITH IRON
1 TABLET ORAL DAILY
Status: DISCONTINUED | OUTPATIENT
Start: 2021-09-09 | End: 2021-09-13 | Stop reason: HOSPADM

## 2021-09-09 RX ORDER — OXCARBAZEPINE 300 MG/1
300 TABLET, FILM COATED ORAL 2 TIMES DAILY
Status: DISCONTINUED | OUTPATIENT
Start: 2021-09-09 | End: 2021-09-13 | Stop reason: HOSPADM

## 2021-09-09 RX ORDER — NICOTINE 21 MG/24HR
1 PATCH, TRANSDERMAL 24 HOURS TRANSDERMAL DAILY
Status: DISCONTINUED | OUTPATIENT
Start: 2021-09-09 | End: 2021-09-13 | Stop reason: HOSPADM

## 2021-09-09 RX ORDER — HYDROXYZINE HYDROCHLORIDE 10 MG/1
50 TABLET, FILM COATED ORAL 3 TIMES DAILY PRN
Status: DISCONTINUED | OUTPATIENT
Start: 2021-09-09 | End: 2021-09-09 | Stop reason: SDUPTHER

## 2021-09-09 RX ADMIN — MAGNESIUM HYDROXIDE/ALUMINUM HYDROXICE/SIMETHICONE 30 ML: 120; 1200; 1200 SUSPENSION ORAL at 12:09

## 2021-09-09 RX ADMIN — OXCARBAZEPINE 300 MG: 300 TABLET, FILM COATED ORAL at 14:32

## 2021-09-09 RX ADMIN — Medication 1 TABLET: at 14:32

## 2021-09-09 RX ADMIN — DIAZEPAM 5 MG: 5 TABLET ORAL at 20:27

## 2021-09-09 RX ADMIN — HYDROXYZINE PAMOATE 50 MG: 25 CAPSULE ORAL at 20:22

## 2021-09-09 RX ADMIN — FOLIC ACID 1 MG: 1 TABLET ORAL at 14:32

## 2021-09-09 RX ADMIN — HYDROXYZINE PAMOATE 50 MG: 25 CAPSULE ORAL at 12:38

## 2021-09-09 RX ADMIN — SERTRALINE 50 MG: 50 TABLET, FILM COATED ORAL at 14:32

## 2021-09-09 RX ADMIN — OXCARBAZEPINE 300 MG: 300 TABLET, FILM COATED ORAL at 20:22

## 2021-09-09 RX ADMIN — TRAZODONE HYDROCHLORIDE 50 MG: 50 TABLET ORAL at 21:54

## 2021-09-09 RX ADMIN — HALOPERIDOL LACTATE 5 MG: 5 INJECTION, SOLUTION INTRAMUSCULAR at 03:18

## 2021-09-09 RX ADMIN — OXYCODONE 5 MG: 5 TABLET ORAL at 03:55

## 2021-09-09 RX ADMIN — Medication 100 MG: at 14:32

## 2021-09-09 RX ADMIN — HYDROXYZINE PAMOATE 25 MG: 25 CAPSULE ORAL at 20:22

## 2021-09-09 RX ADMIN — PANTOPRAZOLE SODIUM 40 MG: 40 TABLET, DELAYED RELEASE ORAL at 06:50

## 2021-09-09 ASSESSMENT — SLEEP AND FATIGUE QUESTIONNAIRES
DIFFICULTY ARISING: YES
AVERAGE NUMBER OF SLEEP HOURS: 3
DIFFICULTY STAYING ASLEEP: YES
DO YOU HAVE DIFFICULTY SLEEPING: YES
AVERAGE NUMBER OF SLEEP HOURS: 3
DIFFICULTY FALLING ASLEEP: YES
DO YOU USE A SLEEP AID: YES
DIFFICULTY FALLING ASLEEP: YES
DIFFICULTY ARISING: YES
SLEEP PATTERN: RESTLESSNESS;DIFFICULTY FALLING ASLEEP;DIFFICULTY ARISING;DISTURBED/INTERRUPTED SLEEP
DO YOU HAVE DIFFICULTY SLEEPING: YES
DIFFICULTY STAYING ASLEEP: YES
RESTFUL SLEEP: NO
SLEEP PATTERN: DISTURBED/INTERRUPTED SLEEP;INSOMNIA;RESTLESSNESS
RESTFUL SLEEP: NO

## 2021-09-09 ASSESSMENT — PAIN SCALES - GENERAL
PAINLEVEL_OUTOF10: 0
PAINLEVEL_OUTOF10: 7
PAINLEVEL_OUTOF10: 8
PAINLEVEL_OUTOF10: 0

## 2021-09-09 ASSESSMENT — PATIENT HEALTH QUESTIONNAIRE - PHQ9
SUM OF ALL RESPONSES TO PHQ QUESTIONS 1-9: 13
SUM OF ALL RESPONSES TO PHQ QUESTIONS 1-9: 13

## 2021-09-09 ASSESSMENT — PAIN DESCRIPTION - PROGRESSION
CLINICAL_PROGRESSION: GRADUALLY IMPROVING
CLINICAL_PROGRESSION: GRADUALLY WORSENING

## 2021-09-09 ASSESSMENT — PAIN DESCRIPTION - DESCRIPTORS
DESCRIPTORS: ACHING
DESCRIPTORS: ACHING

## 2021-09-09 ASSESSMENT — PAIN DESCRIPTION - PAIN TYPE: TYPE: CHRONIC PAIN

## 2021-09-09 ASSESSMENT — PAIN - FUNCTIONAL ASSESSMENT
PAIN_FUNCTIONAL_ASSESSMENT: 0-10
PAIN_FUNCTIONAL_ASSESSMENT: PREVENTS OR INTERFERES SOME ACTIVE ACTIVITIES AND ADLS

## 2021-09-09 ASSESSMENT — PAIN DESCRIPTION - ONSET: ONSET: ON-GOING

## 2021-09-09 ASSESSMENT — PAIN DESCRIPTION - FREQUENCY: FREQUENCY: INTERMITTENT

## 2021-09-09 ASSESSMENT — LIFESTYLE VARIABLES
HISTORY_ALCOHOL_USE: YES
HISTORY_ALCOHOL_USE: YES

## 2021-09-09 ASSESSMENT — PAIN DESCRIPTION - LOCATION: LOCATION: OTHER (COMMENT)

## 2021-09-09 NOTE — ED NOTES
The pt was accepted to [facility] room 3020. Disposition called to Edmond Weinstein in admitting. Accepting info given to [facility] on the floor. N to N to be called to [phone].      [facility]  09/08/21 [facility] [address]IL[address], [facility]  09/08/21 1816

## 2021-09-09 NOTE — PROGRESS NOTES
Hospitalist Progress Note      Received consult for \"midline removal and pain management meds to oral if needed. \"  Reviewed home medication list- no meds to resume. VSS. Pt apparently reporting that he had his last drink 7 days ago and \"might have a seizure\"- he is now outside of window for ETOH withdrawal.   OK to remove midline- nursing communication order placed. Nursing to continue to monitor and notify us for re-consult if needed.    Thank you.    +++++++++++++++++++++++++++++++++++++++++++++++++  [address] [facility]
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
## 2021-09-09 NOTE — PROGRESS NOTES
[facility] Unit regarding patient was upset last night and security called because refusing to go to [facility] took pt to transport  him with midline cathter intact. Vimal Fish RN on Psych unit immediately regarding issue and line needing discontinued.

## 2021-09-09 NOTE — PLAN OF CARE
Problem: Falls - Risk of:  Goal: Will remain free from falls  Description: Will remain free from falls  Outcome: Completed     Problem: Falls - Risk of:  Goal: Absence of physical injury  Description: Absence of physical injury  Outcome: Completed     Problem: Pain:  Goal: Pain level will decrease  Description: Pain level will decrease  Outcome: Completed     Problem: Pain:  Goal: Control of acute pain  Description: Control of acute pain  Outcome: Completed     Problem: Pain:  Goal: Control of chronic pain  Description: Control of chronic pain  Outcome: Completed No
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
## 2021-09-09 NOTE — GROUP NOTE
Group Therapy Note    Date: 9/9/2021    Group Start Time: 1100  Group End Time: 1130  Group Topic: Cognitive Skills    [facility]    MELISSA Mcgill LSW        Group Therapy Note    Attendees: 12         Patient's Goal: To participate in group discussion on goal planning and time management. Notes:  Pt was an active participant in group. Status After Intervention:  Unchanged    Participation Level:  Active Listener and Interactive    Participation Quality: Appropriate, Attentive, Sharing and Supportive      Speech:  normal      Thought Process/Content: Logical      Affective Functioning: Congruent      Mood: depressed      Level of consciousness:  Alert and Oriented x4      Response to Learning: Able to verbalize current knowledge/experience      Endings: None Reported    Modes of Intervention: Education, Support, Socialization, Exploration, Clarifying and Problem-solving      Discipline Responsible: /Counselor      Signature:  MELISSA Leija LSW
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
## 2021-09-09 NOTE — PROGRESS NOTES
Notified Happy at [employer] that patient was discharged to the Adult Psych unit room 7528 and currently in transit and IV Midline remains intact and needs removed. Dispatcher notifying ambulance crew.

## 2021-09-09 NOTE — BH NOTE
Pt denies suicidal or homicidal ideations. Pt denies hallucinations. Pt is cooperative. No other issues presently. Pt is without other distress. Will follow and monitor.

## 2021-09-10 PROCEDURE — 1240000000 HC EMOTIONAL WELLNESS R&B

## 2021-09-10 PROCEDURE — 6370000000 HC RX 637 (ALT 250 FOR IP): Performed by: NURSE PRACTITIONER

## 2021-09-10 PROCEDURE — 99232 SBSQ HOSP IP/OBS MODERATE 35: CPT | Performed by: NURSE PRACTITIONER

## 2021-09-10 RX ORDER — ACAMPROSATE CALCIUM 333 MG/1
333 TABLET, DELAYED RELEASE ORAL 3 TIMES DAILY
Status: DISCONTINUED | OUTPATIENT
Start: 2021-09-10 | End: 2021-09-13 | Stop reason: HOSPADM

## 2021-09-10 RX ADMIN — HYDROXYZINE PAMOATE 50 MG: 25 CAPSULE ORAL at 05:34

## 2021-09-10 RX ADMIN — FOLIC ACID 1 MG: 1 TABLET ORAL at 09:03

## 2021-09-10 RX ADMIN — DIAZEPAM 5 MG: 5 TABLET ORAL at 20:55

## 2021-09-10 RX ADMIN — TRAZODONE HYDROCHLORIDE 50 MG: 50 TABLET ORAL at 21:16

## 2021-09-10 RX ADMIN — ACAMPROSATE CALCIUM 333 MG: 333 TABLET, DELAYED RELEASE ORAL at 14:27

## 2021-09-10 RX ADMIN — HYDROXYZINE PAMOATE 25 MG: 25 CAPSULE ORAL at 13:56

## 2021-09-10 RX ADMIN — SERTRALINE 50 MG: 50 TABLET, FILM COATED ORAL at 09:03

## 2021-09-10 RX ADMIN — HYDROXYZINE PAMOATE 25 MG: 25 CAPSULE ORAL at 20:49

## 2021-09-10 RX ADMIN — Medication 1 TABLET: at 09:03

## 2021-09-10 RX ADMIN — Medication 100 MG: at 09:03

## 2021-09-10 RX ADMIN — OXCARBAZEPINE 300 MG: 300 TABLET, FILM COATED ORAL at 09:03

## 2021-09-10 RX ADMIN — OXCARBAZEPINE 300 MG: 300 TABLET, FILM COATED ORAL at 20:49

## 2021-09-10 RX ADMIN — ACAMPROSATE CALCIUM 333 MG: 333 TABLET, DELAYED RELEASE ORAL at 20:49

## 2021-09-10 RX ADMIN — HYDROXYZINE PAMOATE 25 MG: 25 CAPSULE ORAL at 09:02

## 2021-09-10 ASSESSMENT — PAIN SCALES - GENERAL: PAINLEVEL_OUTOF10: 0

## 2021-09-10 ASSESSMENT — PAIN - FUNCTIONAL ASSESSMENT: PAIN_FUNCTIONAL_ASSESSMENT: 0-10
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
## 2021-09-10 NOTE — GROUP NOTE
Group Therapy Note    Date: 9/10/2021    Group Start Time: 1055  Group End Time: 1120  Group Topic: Cognitive Skills    [facility]  Av[address] MELISSA Donovan, JERRYW        Group Therapy Note    Attendees: 10         Patient's Goal:  Pt will be able to identify how to use active listening and communication skills when speaking with others. Notes:  Pt participated in group and made connections. Status After Intervention:  Unchanged    Participation Level:  Active Listener    Participation Quality: Appropriate and Attentive      Speech:  normal      Thought Process/Content: Linear      Affective Functioning: Congruent      Mood: anxious      Level of consciousness:  Alert and Oriented x4      Response to Learning: Able to verbalize current knowledge/experience and Able to retain information      Endings: None Reported    Modes of Intervention: Education, Support, Socialization, Exploration, Clarifying and Problem-solving      Discipline Responsible: /Counselor      Signature:  MELISSA Su, [place]

## 2021-09-10 NOTE — PLAN OF CARE
Problem: Altered Mood, Depressive Behavior:  Goal: Able to verbalize acceptance of life and situations over which he or she has no control  Description: Able to verbalize acceptance of life and situations over which he or she has no control  9/10/2021 1736 by Amy Ruiz RN  Outcome: Ongoing  9/10/2021 0509 by Jorge Donovan RN  Outcome: Ongoing     Problem: Altered Mood, Depressive Behavior:  Goal: Able to verbalize and/or display a decrease in depressive symptoms  Description: Able to verbalize and/or display a decrease in depressive symptoms  9/10/2021 1736 by Amy Ruiz RN  Outcome: Ongoing  9/10/2021 0931 by Cindy Amador RN  Outcome: Ongoing  9/10/2021 0509 by Jorge Donovan RN  Outcome: Ongoing     Problem: Altered Mood, Depressive Behavior:  Goal: Ability to disclose and discuss suicidal ideas will improve  Description: Ability to disclose and discuss suicidal ideas will improve  9/10/2021 1736 by Amy Ruiz RN  Outcome: Met This Shift  9/10/2021 0509 by Jorge Donovan RN  Outcome: Ongoing     Pt denies suicidal ideations, homicidal ideations and hallucinations. Pt out on the unit at intervals. Pt is anxious. Pt spoke to his brother and has a good support system with him. \"My sister  on fentanyl. My mom was sober for 30 yrs then  from withdrawal after relapsing right after my sister . My dad  of cirrhosis. I only have my brother left and he doesn't do any drugs. Avoided it all just by observance. I'm done with it. I truly am. I'm tired of being tired of it. I have a great job I do from home. 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 I'm good at my job. I have my brother. I don't want to be next in the grave. \" Pt has improved insight and judgment. Hopeful of discharge on Monday. Will continue to monitor.

## 2021-09-10 NOTE — PLAN OF CARE
Pt  is stable and alert. Pt denies suicidal or homicidal ideations. Pt denies hallucinations. Pt is ambulatory. Pt denies concerns with withdrawal symptoms or other issues. With follow and monitor.

## 2021-09-10 NOTE — PROGRESS NOTES
BEHAVIORAL HEALTH FOLLOW-UP NOTE     9/10/2021     Patient was seen and examined in person, Chart reviewed   Patient's case discussed with staff/team    Chief Complaint: \" I went to go home and be with my family. \"    Interim History: Patient up on the unit social with peers attending groups. He is not laughing social relaxed with peers on assessment he becomes more flat blunted reports anxiety. He states he did not take naltrexone to the fact that he was on IV morphine when he was at [facility]. He is willing to take Campral.  He denies SI/HI intent or plan he denies any auditory or visual hallucinations. States he no longer wants to go to the sober living house and rather wants to go home and see his family.   He is eating well sleeping well no neurovegetative signs of depression no overt overt signs of psychosis      Appetite:   [x] Normal/Unchanged  [] Increased  [] Decreased      Sleep:       [x] Normal/Unchanged  [] Fair       [] Poor              Energy:    [x] Normal/Unchanged  [] Increased  [] Decreased        SI [] Present  [x] Absent    HI  []Present  [x] Absent     Aggression:  [] yes  [x] no    Patient is [x] able  [] unable to CONTRACT FOR SAFETY     PAST MEDICAL/PSYCHIATRIC HISTORY:   Past Medical History:   Diagnosis Date    Anxiety     Arm pain     Asthma     Concussion with loss of consciousness     Convulsions ([address]     Depression     Flashing lights     Head injury     Headache     Leg pain     Numbness and tingling     arms and hands    PTSD (post-traumatic stress disorder)     Seizures ([address]        FAMILY/SOCIAL HISTORY:  Family History   Problem Relation Age of Onset    Mental Illness Mother     Substance Abuse Mother     Alcohol Abuse Mother     Cancer Father         lung     Substance Abuse Father     Cirrhosis Father     Alcohol Abuse Father     Mental Illness Sister     Substance Abuse Sister     Mental Illness Brother     Substance Abuse Maternal Aunt     Substance Abuse Maternal Uncle     Substance Abuse Paternal Aunt     Substance Abuse Paternal Uncle      Social History     Socioeconomic History    Marital status: Single     Spouse name: Not on file    Number of children: 0    Years of education: 5    Highest education level: Not on file   Occupational History    Occupation: [address] [employer]     Employer: SELF     Comment: [employer]   Tobacco Use    Smoking status: Heavy Tobacco Smoker     Packs/day: 2.00     Years: 9.00     Pack years: 18.00    Smokeless tobacco: Never Used   Vaping Use    Vaping Use: Former    Substances: Always   Substance and Sexual Activity    Alcohol use: Yes     Alcohol/week: 24.0 standard drinks     Types: 4 Glasses of wine, 20 Cans of beer per week     Comment: heavy daily use ( 2 bottles of los daily)    Drug use: Yes     Frequency: 1.0 times per week     Types: Marijuana    Sexual activity: Not Currently     Partners: Female   Other Topics Concern    Not on file   Social History Narrative    Not on file     Social Determinants of Health     Financial Resource Strain:     Difficulty of Paying Living Expenses:    Food Insecurity:     Worried About Running Out of Food in the Last Year:     Ran Out of Food in the Last Year:    Transportation Needs:     Lack of Transportation (Medical):      Lack of Transportation (Non-Medical):    Physical Activity:     Days of Exercise per Week:     Minutes of Exercise per Session:    Stress:     Feeling of Stress :    Social Connections:     Frequency of Communication with Friends and Family:     Frequency of Social Gatherings with Friends and Family:     Attends Confucianism Services:     Active Member of Clubs or Organizations:     Attends Club or Organization Meetings:     Marital Status:    Intimate Partner Violence:     Fear of Current or Ex-Partner:     Emotionally Abused:     Physically Abused:     Sexually Abused:            ROS:  [x] All negative/unchanged except if checked.  Explain positive(checked items) below:  [] Constitutional  [] Eyes  [] Ear/Nose/Mouth/Throat  [] Respiratory  [] CV  [] GI  []   [] Musculoskeletal  [] Skin/Breast  [] Neurological  [] Endocrine  [] Heme/Lymph  [] Allergic/Immunologic    Explanation:     MEDICATIONS:    Current Facility-Administered Medications:     acetaminophen (TYLENOL) tablet 650 mg, 650 mg, Oral, Y9H PRN, Herminiatta Pennant Yaronlick, APRN - CNP    magnesium hydroxide (MILK OF MAGNESIA) 400 MG/5ML suspension 30 mL, 30 mL, Oral, Daily PRN, Loyasmintta Pennant Yaronlick, APRN - CNP    nicotine (NICODERM CQ) 21 MG/24HR 1 patch, 1 patch, TransDERmal, Daily, Lokaylyna Pennant Yaronlick, APRN - CNP, 1 patch at 09/09/21 1037    aluminum & magnesium hydroxide-simethicone (MAALOX) 200-200-20 MG/5ML suspension 30 mL, 30 mL, Oral, PRN, Guevaraa Pennant Yaronlick, APRN - CNP, 30 mL at 09/09/21 1209    haloperidol (HALDOL) tablet 5 mg, 5 mg, Oral, Q6H PRN **OR** haloperidol lactate (HALDOL) injection 5 mg, 5 mg, IntraMUSCular, B2L PRN, Lokaylyna Pennant Yaronlick, APRN - CNP    traZODone (DESYREL) tablet 50 mg, 50 mg, Oral, Nightly PRN, Cristy Marrufolick, APRN - CNP, 50 mg at 09/09/21 2154    hydrOXYzine (VISTARIL) capsule 50 mg, 50 mg, Oral, TID PRN, Opalurepraneetha Pennant Dellick, APRN - CNP, 50 mg at 09/10/21 0534    naltrexone (DEPADE) tablet 50 mg, 50 mg, Oral, Daily with breakfast, Liliya Ding, APRN - CNP    OXcarbazepine (TRILEPTAL) tablet 300 mg, 300 mg, Oral, BID, Guevaraa Pennant Dellick, APRN - CNP, 071 mg at 09/09/21 2022    sertraline (ZOLOFT) tablet 50 mg, 50 mg, Oral, Daily, LI Lind - CNP, 50 mg at 09/09/21 1432    diazePAM (VALIUM) tablet 5 mg, 5 mg, Oral, D0D PRN, Cristy Ding, APRN - CNP, 5 mg at 09/09/21 2027    hydrOXYzine (VISTARIL) capsule 25 mg, 25 mg, Oral, TID, Cristy Ding, APRN - CNP, 25 mg at 09/09/21 2022    multivitamin 1 tablet, 1 tablet, Oral, Daily, Cristy Ding, APRN - CNP, 1 tablet at 24/78/80 9313    folic acid (FOLVITE) tablet 1 mg, 1 mg, Oral, Daily, Reed PENNINGTON Yaronlick, APRN - CNP, 1 mg at 09/09/21 1432    thiamine mononitrate tablet 100 mg, 100 mg, Oral, Daily, LI Munson CNP, 737 mg at 09/09/21 1432      Examination:  /73   Pulse 59   Temp 97.9 °F (36.6 °C) (Temporal)   Resp 16   Ht 5' 6\" (1.676 m)   Wt 140 lb (63.5 kg)   SpO2 97%   BMI 22.60 kg/m²   Gait - steady  Medication side effects(SE): Denies     Mental Status Examination:    Level of consciousness:  within normal limits   Appearance:  fair grooming and fair hygiene  Behavior/Motor:  no abnormalities noted  Attitude toward examiner:  cooperative  Speech:  spontaneous, normal rate and normal volume   Mood: \" My mood is good. \"  Affect: Appropriate and pleasant  Thought processes: Linear without flight of ideas loose associations  Thought content: Devoid of any auditory visualizations delusions or other perceptual abnormalities. Denies SI/HI intent or plan  Cognition:  oriented to person, place, and time   Concentration intact  Insight improving  Judgement improving    ASSESSMENT:   Patient symptoms are:  [] Well controlled  [x] Improving  [] Worsening  [] No change      Diagnosis:   Principal Problem:    MDD (major depressive disorder), recurrent episode, severe without psychosis ([facility]  Active Problems:    Anxiety disorder    Alcohol dependence ([facility]    MDD (major depressive disorder), recurrent severe, without psychosis ([facility]  Resolved Problems:    * No resolved hospital problems. *      LABS:    Recent Labs     09/08/21  0515   WBC 6.9   HGB 12.8        Recent Labs     09/08/21  0515      K 3.6      CO2 28   BUN 2*   CREATININE 0.6*   GLUCOSE 110*     No results for input(s): BILITOT, ALKPHOS, AST, ALT in the last 72 hours.   Lab Results   Component Value Date    LABAMPH NOT DETECTED 09/04/2021    BARBSCNU POSITIVE 09/04/2021    LABBENZ NOT DETECTED 09/04/2021    LABMETH NOT DETECTED 09/04/2021    OPIATESCREENURINE POSITIVE 09/04/2021    PHENCYCLIDINESCREENURINE NOT DETECTED 09/04/2021    PPXUR NA 06/29/2018    ETOH 160 09/02/2021     Lab Results   Component Value Date    TSH 1.790 01/31/2019     No results found for: LITHIUM  Lab Results   Component Value Date    VALPROATE 83 09/12/2018           Treatment Plan:  Reviewed current Medications with the patient. Risks, benefits, side effects, drug-to-drug interactions and alternatives to treatment were discussed. Collateral information:   CD evaluation  Encourage patient to attend group and other milieu activities.   Discharge planning discussed with the patient and treatment team.    Campral 333 3 times daily  Continue Trileptal 300 mg twice daily   Continue Zoloft 50 mg daily    PSYCHOTHERAPY/COUNSELING:  [x] Therapeutic interview  [x] Supportive  [] CBT  [] Ongoing  [] Other    [x] Patient continues to need, on a daily basis, active treatment furnished directly by or requiring the supervision of inpatient psychiatric personnel      Anticipated Length of stay: 3 to 7 days based on stability            Electronically signed by LI Patel CNP on 0/85/6426 at 8:55 AM

## 2021-09-10 NOTE — GROUP NOTE
Group Therapy Note    Date: 9/10/2021    Group Start Time: 1000  Group End Time: 4468  Group Topic: Psychoeducation    [facility]    Rosey Sanchez, [address]                                                                        Group Therapy Note    Date: 9/10/2021    Type of Group: Psychoeducation    Wellness Binder Information  Module Name:  id of physical effects of stress    Patient's Goal:  patient will be able to id physical effects of stress and how to identify them in the future. Notes:  pleasant and engaged in group. Status After Intervention:  Improved    Participation Level:  Active Listener and Interactive    Participation Quality: Appropriate, Attentive, and Sharing      Speech: normal       Thought Process/Content: Logical      Affective Functioning: Congruent      Mood: euthymic      Level of consciousness:  Alert, Oriented x4, and Attentive      Response to Learning: Able to verbalize/acknowledge new learning, Able to retain information, and Progressing to goal      Endings: None Reported    Modes of Intervention: Education, Support, Socialization, Exploration, and Problem-solving      Discipline Responsible: Psychoeducational Specialist      Signature:  Sherwin Jerez

## 2021-09-10 NOTE — PROGRESS NOTES
Attended community meeting. Updated on staffing and daily routines. Shared goal for the day as to stay calm.

## 2021-09-11 PROCEDURE — 6370000000 HC RX 637 (ALT 250 FOR IP): Performed by: NURSE PRACTITIONER

## 2021-09-11 PROCEDURE — 99231 SBSQ HOSP IP/OBS SF/LOW 25: CPT | Performed by: NURSE PRACTITIONER

## 2021-09-11 PROCEDURE — 1240000000 HC EMOTIONAL WELLNESS R&B

## 2021-09-11 RX ADMIN — DIAZEPAM 5 MG: 5 TABLET ORAL at 22:16

## 2021-09-11 RX ADMIN — Medication 100 MG: at 08:57

## 2021-09-11 RX ADMIN — HYDROXYZINE PAMOATE 25 MG: 25 CAPSULE ORAL at 21:18

## 2021-09-11 RX ADMIN — ACAMPROSATE CALCIUM 333 MG: 333 TABLET, DELAYED RELEASE ORAL at 08:56

## 2021-09-11 RX ADMIN — FOLIC ACID 1 MG: 1 TABLET ORAL at 08:59

## 2021-09-11 RX ADMIN — HYDROXYZINE PAMOATE 25 MG: 25 CAPSULE ORAL at 13:22

## 2021-09-11 RX ADMIN — OXCARBAZEPINE 300 MG: 300 TABLET, FILM COATED ORAL at 21:18

## 2021-09-11 RX ADMIN — ACAMPROSATE CALCIUM 333 MG: 333 TABLET, DELAYED RELEASE ORAL at 21:18

## 2021-09-11 RX ADMIN — DIAZEPAM 5 MG: 5 TABLET ORAL at 08:56

## 2021-09-11 RX ADMIN — Medication 1 TABLET: at 09:03

## 2021-09-11 RX ADMIN — ACAMPROSATE CALCIUM 333 MG: 333 TABLET, DELAYED RELEASE ORAL at 13:22

## 2021-09-11 RX ADMIN — OXCARBAZEPINE 300 MG: 300 TABLET, FILM COATED ORAL at 09:03

## 2021-09-11 RX ADMIN — TRAZODONE HYDROCHLORIDE 50 MG: 50 TABLET ORAL at 21:18

## 2021-09-11 RX ADMIN — HYDROXYZINE PAMOATE 50 MG: 25 CAPSULE ORAL at 01:25

## 2021-09-11 RX ADMIN — SERTRALINE 50 MG: 50 TABLET, FILM COATED ORAL at 08:57

## 2021-09-11 RX ADMIN — HYDROXYZINE PAMOATE 25 MG: 25 CAPSULE ORAL at 08:58

## 2021-09-11 ASSESSMENT — PAIN SCALES - GENERAL: PAINLEVEL_OUTOF10: 0

## 2021-09-11 NOTE — PLAN OF CARE
Problem: Altered Mood, Depressive Behavior:  Goal: Able to verbalize acceptance of life and situations over which he or she has no control  Description: Able to verbalize acceptance of life and situations over which he or she has no control  9/11/2021 0955 by Júnior Ram RN  Outcome: Ongoing  9/11/2021 0546 by Gricel Loco RN  Outcome: Ongoing  Goal: Able to verbalize and/or display a decrease in depressive symptoms  Description: Able to verbalize and/or display a decrease in depressive symptoms  9/11/2021 0955 by Júnior Ram RN  Outcome: Ongoing  9/11/2021 0546 by Gricel Loco RN  Outcome: Ongoing  Goal: Ability to disclose and discuss suicidal ideas will improve  Description: Ability to disclose and discuss suicidal ideas will improve  9/11/2021 0955 by Júnior Ram RN  Outcome: Ongoing  9/11/2021 0546 by Gricel Loco RN  Outcome: Ongoing  Goal: Able to verbalize support systems  Description: Able to verbalize support systems  9/11/2021 0955 by Júnior Ram RN  Outcome: Ongoing  9/11/2021 0546 by Gricel Loco RN  Outcome: Ongoing  Goal: Absence of self-harm  Description: Absence of self-harm  9/11/2021 0955 by Júnior Ram RN  Outcome: Ongoing  9/11/2021 0546 by Gricel Loco RN  Outcome: Met This Shift     Problem: Depressive Behavior With or Without Suicide Precautions:  Goal: Able to verbalize acceptance of life and situations over which he or she has no control  Description: Able to verbalize acceptance of life and situations over which he or she has no control  9/11/2021 0546 by Gricel Loco RN  Outcome: Ongoing  Goal: Able to verbalize and/or display a decrease in depressive symptoms  Description: Able to verbalize and/or display a decrease in depressive symptoms  9/11/2021 0546 by Gricel Loco RN  Outcome: Ongoing  Goal: Ability to disclose and discuss suicidal ideas will improve  Description: Ability to disclose and discuss suicidal ideas will improve  9/11/2021 0546 by Gricel Loco RN  Outcome: Ongoing  Goal: Able to verbalize support systems  Description: Able to verbalize support systems  9/11/2021 0546 by Wilfrid Vaughan RN  Outcome: Ongoing  Goal: Absence of self-harm  Description: Absence of self-harm  9/11/2021 0546 by Wilfrid Vaughan RN  Outcome: Met This Shift

## 2021-09-11 NOTE — PLAN OF CARE
Problem: Altered Mood, Depressive Behavior:  Goal: Able to verbalize and/or display a decrease in depressive symptoms  Description: Able to verbalize and/or display a decrease in depressive symptoms  9/11/2021 1813 by Evan Castro RN  Outcome: Met This Shift     Problem: Altered Mood, Depressive Behavior:  Goal: Ability to disclose and discuss suicidal ideas will improve  Description: Ability to disclose and discuss suicidal ideas will improve  9/11/2021 1813 by Evan Castro RN  Outcome: Met This Shift   Pt has been out on the unit and social with peers. Affect is brightened during interaction with him but also appears worried at times. He denies any depression and suicidal ideations and states that he only said he was suicidal so he would be admitted. Appears to be a little med seeking with valium.   Speech is also a little tangential.

## 2021-09-11 NOTE — GROUP NOTE
Group Therapy Note    Date: 9/11/2021    Group Start Time: 1100  Group End Time: 1125  Group Topic: Cognitive Skills    [facility]    MELISSA Mcgill LSW        Group Therapy Note    Attendees: 14         Patient's Goal:  To participate in group discussion on self care tips and the importance of self care    Notes:  Pt was an active participant  in group. Status After Intervention:  Improved    Participation Level:  Active Listener and Interactive    Participation Quality: Appropriate, Attentive, Sharing and Supportive      Speech:  normal      Thought Process/Content: Logical      Affective Functioning: Congruent      Mood: anxious      Level of consciousness:  Alert and Oriented x4      Response to Learning: Able to verbalize current knowledge/experience      Endings: None Reported    Modes of Intervention: Education, Support, Socialization, Exploration, Clarifying and Problem-solving      Discipline Responsible: /Counselor      Signature:  MELISSA Duvall LSW

## 2021-09-11 NOTE — PROGRESS NOTES
Patient denies SI,HI, or any Halluciantions at this time. He denies depression or any anxiety. He is impulsive stating he shouldn't be here. States he went through alcohol detox at [address] [place] [address]. He is discharge focused, argumentive on his labs and alcohol. States he only wanted admitted because of detox and that he is ready to go home now that alcohol is out of his system. He is upset with this nurse, states : You told me to tell the truth and I did now I have to stay longer. \". \"I should have lied, I should not have listened to you\". Patient educated on the importance of telling the truth to get the proper help while inpatient. Patient continues to be upset. Valium given for anxiety. Patient took his meds and groups encouraged.

## 2021-09-11 NOTE — PROGRESS NOTES
BEHAVIORAL HEALTH FOLLOW-UP NOTE     9/11/2021     Patient was seen and examined in person, Chart reviewed   Patient's case discussed with staff/team    Chief Complaint: Misinterpreting, depressed    Interim History: Patient assessed in his room this morning, he is misinterpreting, he states that he should've never been brought to an inpatient psychiatric unit. He is extremely argumentative this morning discharge focused. Demonstrating absolutely no insight or judgment into need for treatment. He states that he doesn't want to tell anyone his symptoms because he is afraid that'll keep him here longer. He states that he thinks the Zoloft is causing him to stay awake. But states he doesn't want his medications changed because he just wants to get out of here. He is not laughing social relaxed with peers on assessment he becomes more flat blunted reports anxiety. He states he did not take naltrexone to the fact that he was on IV morphine when he was at [facility]. He is willing to take Campral.  He denies SI/HI intent or plan he denies any auditory or visual hallucinations. States he no longer wants to go to the sober living house and rather wants to go home and see his family.   He is eating well sleeping well no neurovegetative signs of depression no overt overt signs of psychosis       Appetite:   [x] Normal/Unchanged  [] Increased  [] Decreased      Sleep:       [x] Normal/Unchanged  [] Fair       [] Poor              Energy:    [x] Normal/Unchanged  [] Increased  [] Decreased        SI [] Present  [x] Absent    HI  []Present  [x] Absent     Aggression:  [] yes  [x] no    Patient is [x] able  [] unable to CONTRACT FOR SAFETY     PAST MEDICAL/PSYCHIATRIC HISTORY:   Past Medical History:   Diagnosis Date    Anxiety     Arm pain     Asthma     Concussion with loss of consciousness     Convulsions (HCC)     Depression     Flashing lights     Head injury     Headache     Leg pain     Numbness and of Stress :    Social Connections:     Frequency of Communication with Friends and Family:     Frequency of Social Gatherings with Friends and Family:     Attends Anabaptism Services:     Active Member of Clubs or Organizations:     Attends Club or Organization Meetings:     Marital Status:    Intimate Partner Violence:     Fear of Current or Ex-Partner:     Emotionally Abused:     Physically Abused:     Sexually Abused:            ROS:  [x] All negative/unchanged except if checked.  Explain positive(checked items) below:  [] Constitutional  [] Eyes  [] Ear/Nose/Mouth/Throat  [] Respiratory  [] CV  [] GI  []   [] Musculoskeletal  [] Skin/Breast  [] Neurological  [] Endocrine  [] Heme/Lymph  [] Allergic/Immunologic    Explanation:     MEDICATIONS:    Current Facility-Administered Medications:     acamprosate (CAMPRAL) tablet 333 mg, 333 mg, Oral, TID, LI Ramirez - CNP, 850 mg at 09/11/21 0856    acetaminophen (TYLENOL) tablet 650 mg, 650 mg, Oral, V9M PRN, LI Ramirez - CNP    magnesium hydroxide (MILK OF MAGNESIA) 400 MG/5ML suspension 30 mL, 30 mL, Oral, Daily PRN, LI Ramirez - CNP    nicotine (NICODERM CQ) 21 MG/24HR 1 patch, 1 patch, TransDERmal, Daily, LI Moran CNP, 1 patch at 09/11/21 0857    aluminum & magnesium hydroxide-simethicone (MAALOX) 200-200-20 MG/5ML suspension 30 mL, 30 mL, Oral, PRN, LI Ramirez - CNP, 30 mL at 09/09/21 1209    haloperidol (HALDOL) tablet 5 mg, 5 mg, Oral, Q6H PRN **OR** haloperidol lactate (HALDOL) injection 5 mg, 5 mg, IntraMUSCular, W7O PRN, LI Ramirez CNP    traZODone (DESYREL) tablet 50 mg, 50 mg, Oral, Nightly PRN, LI Ramirez - CNP, 50 mg at 09/10/21 2116    hydrOXYzine (VISTARIL) capsule 50 mg, 50 mg, Oral, TID PRN, LI Ramirez - CNP, 50 mg at 09/11/21 0125    OXcarbazepine (TRILEPTAL) tablet 300 mg, 300 mg, Oral, BID, LI Ramirez CNP, 502 mg at 09/11/21 0903   sertraline (ZOLOFT) tablet 50 mg, 50 mg, Oral, Daily, Tyrell Cullens Dellick, APRN - CNP, 50 mg at 09/11/21 0857    diazePAM (VALIUM) tablet 5 mg, 5 mg, Oral, D2G PRN, Ruthine Girt, APRN - CNP, 5 mg at 09/11/21 0856    hydrOXYzine (VISTARIL) capsule 25 mg, 25 mg, Oral, TID, Ruthine Girt, APRN - CNP, 25 mg at 09/11/21 0858    multivitamin 1 tablet, 1 tablet, Oral, Daily, Ruthine Girt, APRN - CNP, 1 tablet at 97/48/66 6438    folic acid (FOLVITE) tablet 1 mg, 1 mg, Oral, Daily, Ruthine Girt, APRN - CNP, 1 mg at 09/11/21 0859    thiamine mononitrate tablet 100 mg, 100 mg, Oral, Daily, Tyrell Cullens Dellick, APRN - CNP, 938 mg at 09/11/21 0857      Examination:  BP (!) 146/79   Pulse 72   Temp 97.4 °F (36.3 °C)   Resp 20   Ht 5' 6\" (1.676 m)   Wt 140 lb (63.5 kg)   SpO2 97%   BMI 22.60 kg/m²   Gait - steady  Medication side effects(SE): Denies     Mental Status Examination:    Level of consciousness:  within normal limits   Appearance:  fair grooming and fair hygiene  Behavior/Motor:  no abnormalities noted  Attitude toward examiner:  cooperative  Speech:  spontaneous, normal rate and normal volume   Mood: \" My mood is good. \"  Affect: Appropriate and pleasant  Thought processes: Linear without flight of ideas loose associations  Thought content: Devoid of any auditory visualizations delusions or other perceptual abnormalities. Denies SI/HI intent or plan  Cognition:  oriented to person, place, and time   Concentration intact  Insight improving  Judgement improving    ASSESSMENT:   Patient symptoms are:  [] Well controlled  [x] Improving  [] Worsening  [] No change      Diagnosis:   Principal Problem:    MDD (major depressive disorder), recurrent episode, severe without psychosis ([facility]  Active Problems:    Anxiety disorder    Alcohol dependence [facility]    MDD (major depressive disorder), recurrent severe, without psychosis ([facility]  Resolved Problems:    * No resolved hospital problems.  *      LABS:    No results for input(s): WBC, HGB, PLT in the last 72 hours. No results for input(s): NA, K, CL, CO2, BUN, CREATININE, GLUCOSE in the last 72 hours. No results for input(s): BILITOT, ALKPHOS, AST, ALT in the last 72 hours. Lab Results   Component Value Date    LABAMPH NOT DETECTED 09/04/2021    BARBSCNU POSITIVE 09/04/2021    LABBENZ NOT DETECTED 09/04/2021    LABMETH NOT DETECTED 09/04/2021    OPIATESCREENURINE POSITIVE 09/04/2021    PHENCYCLIDINESCREENURINE NOT DETECTED 09/04/2021    PPXUR NA 06/29/2018    ETOH 160 09/02/2021     Lab Results   Component Value Date    TSH 1.790 01/31/2019     No results found for: LITHIUM  Lab Results   Component Value Date    VALPROATE 83 09/12/2018           Treatment Plan:  The patient's diagnosis, treatment plan, medication management were formulated after patient was seen directly by the attending physician and myself and all relevant documentation was reviewed. Reviewed current Medications with the patient. Risk, benefit, side effects, possible outcomes of the medication and alternatives discussed with the patient and the patient demonstrated understanding. The patient was also educated that the outcome of treatment will depend on the medication compliance as directed by the prescribers along with regular follow-up, compliance with the labs and other work-up, as clinically indicated. The patient was referred to outpatient/inpatient substance abuse rehabilitation programming. He was educated multiple times during the hospitalization that if he chooses to continue to use drugs or alcohol, he may potentially act out impulsively, resulting in serious harm to self or others, even though unintentional.  He was also educated that mental health treatment cannot be optimized with ongoing use of drugs. He demonstrated understanding has the capacity to understand that.       Collateral information:   CD evaluation  Encourage patient to attend group and other milieu activities.   Discharge planning discussed with the patient and treatment team.    Campral 333 3 times daily  Continue Trileptal 300 mg twice daily   Continue Zoloft 50 mg daily    PSYCHOTHERAPY/COUNSELING:  [x] Therapeutic interview  [x] Supportive  [] CBT  [] Ongoing  [] Other    [x] Patient continues to need, on a daily basis, active treatment furnished directly by or requiring the supervision of inpatient psychiatric personnel      Anticipated Length of stay: 3 to 7 days based on stability            Electronically signed by LI Cox CNP on 9/11/2021 at 11:47 AM

## 2021-09-11 NOTE — GROUP NOTE
Group Therapy Note    Date: 9/11/2021    Group Start Time: 1000  Group End Time: 4445  Group Topic: Psychoeducation    [facility]    [place], [place]                                                                        Group Therapy Note    Date: 9/11/2021      Wellness Binder Information  Module Name:  id of stressors   Patient goal: Patient will be able to id daily and life events one is experiencing. Notes: pleasant and engaged in group willing to share when prompted. Status After Intervention:  Improved    Participation Level:  Active Listener and Interactive    Participation Quality: Appropriate, Attentive, Sharing, and Supportive      Speech:  normal  Thought Process/Content: Logical      Affective Functioning: Congruent      Mood: euthymic      Level of consciousness:  Alert, Oriented x4, and Attentive      Response to Learning: Able to verbalize/acknowledge new learning, Able to retain information, and Progressing to goal      Endings: None Reported    Modes of Intervention: Education, Support, Socialization, Exploration, and Problem-solving      Discipline Responsible: Psychoeducational Specialist      Signature:  Patricia Miller

## 2021-09-12 PROCEDURE — 6370000000 HC RX 637 (ALT 250 FOR IP): Performed by: NURSE PRACTITIONER

## 2021-09-12 PROCEDURE — 99231 SBSQ HOSP IP/OBS SF/LOW 25: CPT | Performed by: NURSE PRACTITIONER

## 2021-09-12 PROCEDURE — 1240000000 HC EMOTIONAL WELLNESS R&B

## 2021-09-12 RX ADMIN — OXCARBAZEPINE 300 MG: 300 TABLET, FILM COATED ORAL at 08:01

## 2021-09-12 RX ADMIN — Medication 100 MG: at 08:01

## 2021-09-12 RX ADMIN — ACETAMINOPHEN 650 MG: 325 TABLET ORAL at 13:15

## 2021-09-12 RX ADMIN — HYDROXYZINE PAMOATE 25 MG: 25 CAPSULE ORAL at 08:06

## 2021-09-12 RX ADMIN — ACAMPROSATE CALCIUM 333 MG: 333 TABLET, DELAYED RELEASE ORAL at 13:14

## 2021-09-12 RX ADMIN — HYDROXYZINE PAMOATE 25 MG: 25 CAPSULE ORAL at 13:14

## 2021-09-12 RX ADMIN — FOLIC ACID 1 MG: 1 TABLET ORAL at 08:01

## 2021-09-12 RX ADMIN — ACAMPROSATE CALCIUM 333 MG: 333 TABLET, DELAYED RELEASE ORAL at 08:01

## 2021-09-12 RX ADMIN — HYDROXYZINE PAMOATE 25 MG: 25 CAPSULE ORAL at 21:53

## 2021-09-12 RX ADMIN — ACAMPROSATE CALCIUM 333 MG: 333 TABLET, DELAYED RELEASE ORAL at 21:58

## 2021-09-12 RX ADMIN — Medication 1 TABLET: at 08:01

## 2021-09-12 RX ADMIN — OXCARBAZEPINE 300 MG: 300 TABLET, FILM COATED ORAL at 21:54

## 2021-09-12 RX ADMIN — ACETAMINOPHEN 650 MG: 325 TABLET ORAL at 21:54

## 2021-09-12 RX ADMIN — DIAZEPAM 5 MG: 5 TABLET ORAL at 13:15

## 2021-09-12 RX ADMIN — SERTRALINE 50 MG: 50 TABLET, FILM COATED ORAL at 08:02

## 2021-09-12 RX ADMIN — TRAZODONE HYDROCHLORIDE 50 MG: 50 TABLET ORAL at 21:54

## 2021-09-12 ASSESSMENT — PAIN SCALES - GENERAL
PAINLEVEL_OUTOF10: 6
PAINLEVEL_OUTOF10: 5
PAINLEVEL_OUTOF10: 0

## 2021-09-12 NOTE — PLAN OF CARE
Problem: Pain:  Goal: Pain level will decrease  Description: Pain level will decrease  Outcome: Met This Shift  Goal: Control of acute pain  Description: Control of acute pain  Outcome: Met This Shift  Goal: Control of chronic pain  Description: Control of chronic pain  Outcome: Met This Shift     Problem: Altered Mood, Depressive Behavior:  Goal: Able to verbalize acceptance of life and situations over which he or she has no control  Description: Able to verbalize acceptance of life and situations over which he or she has no control  9/12/2021 1201 by Sabrina Goodman RN  Outcome: Ongoing  9/12/2021 0554 by Edmar Kent RN  Outcome: Ongoing  Goal: Able to verbalize and/or display a decrease in depressive symptoms  Description: Able to verbalize and/or display a decrease in depressive symptoms  9/12/2021 1201 by Sabrina Goodman RN  Outcome: Ongoing  9/12/2021 0554 by Edmar Kent RN  Outcome: Ongoing  Goal: Ability to disclose and discuss suicidal ideas will improve  Description: Ability to disclose and discuss suicidal ideas will improve  9/12/2021 1201 by Sabrina Goodman RN  Outcome: Ongoing  9/12/2021 0554 by Edmar Kent RN  Outcome: Ongoing  Goal: Able to verbalize support systems  Description: Able to verbalize support systems  9/12/2021 1201 by Sabrina Goodman RN  Outcome: Ongoing  9/12/2021 0554 by Edmar Kent RN  Outcome: Ongoing  Goal: Absence of self-harm  Description: Absence of self-harm  9/12/2021 0554 by Edmar Kent RN  Outcome: Met This Shift

## 2021-09-12 NOTE — PROGRESS NOTES
[facility]  Day 3 Interdisciplinary Treatment Plan NOTE    Review Date & Time: 9/12/21 1000    Patient was in treatment team    Estimated Length of Stay Update:  3-5 days  Estimated Discharge Date Update: 3-5 days    EDUCATION:   Learner Progress Toward Treatment Goals: Reviewed results and recommendations of this team    Method: Individual    Outcome: Verbalized understanding    PATIENT GOALS: Keep my cool    PLAN/TREATMENT RECOMMENDATIONS UPDATE: continue current treatment plan and monitor.      GOALS UPDATE:   Time frame for Short-Term Goals: 3-5 days      Patrice Dee RN

## 2021-09-12 NOTE — GROUP NOTE
Group Therapy Note    Date: 9/12/2021    Group Start Time: 1100  Group End Time: 1130  Group Topic: Cognitive Skills    [facility]    MELISSA Mcgill LSW        Group Therapy Note    Attendees: 17         Patient's Goal:  To participate in the group activities on ice breakers and food trivia. Notes:  Pt was an active participant in group discussion. Status After Intervention:  Improved    Participation Level:  Active Listener and Interactive    Participation Quality: Appropriate, Attentive, Sharing and Supportive      Speech:  normal      Thought Process/Content: Logical      Affective Functioning: Congruent      Mood: anxious      Level of consciousness:  Alert and Oriented x4      Response to Learning: Able to verbalize current knowledge/experience      Endings: None Reported    Modes of Intervention: Education, Support, Socialization, Exploration, Clarifying and Problem-solving      Discipline Responsible: /Counselor      Signature:  MELISSA Belle, PAT

## 2021-09-12 NOTE — PROGRESS NOTES
Paternal Aunt     Substance Abuse Paternal Uncle      Social History     Socioeconomic History    Marital status: Single     Spouse name: Not on file    Number of children: 0    Years of education: 9    Highest education level: Not on file   Occupational History    Occupation: [address]     Employer: SELF     Comment: [employer]   Tobacco Use    Smoking status: Heavy Tobacco Smoker     Packs/day: 2.00     Years: 9.00     Pack years: 18.00    Smokeless tobacco: Never Used   Vaping Use    Vaping Use: Former    Substances: Always   Substance and Sexual Activity    Alcohol use: Yes     Alcohol/week: 24.0 standard drinks     Types: 4 Glasses of wine, 20 Cans of beer per week     Comment: heavy daily use ( 2 bottles of los daily)    Drug use: Yes     Frequency: 1.0 times per week     Types: Marijuana    Sexual activity: Not Currently     Partners: Female   Other Topics Concern    Not on file   Social History Narrative    Not on file     Social Determinants of Health     Financial Resource Strain:     Difficulty of Paying Living Expenses:    Food Insecurity:     Worried About Running Out of Food in the Last Year:     Ran Out of Food in the Last Year:    Transportation Needs:     Lack of Transportation (Medical):  Lack of Transportation (Non-Medical):    Physical Activity:     Days of Exercise per Week:     Minutes of Exercise per Session:    Stress:     Feeling of Stress :    Social Connections:     Frequency of Communication with Friends and Family:     Frequency of Social Gatherings with Friends and Family:     Attends Episcopal Services:     Active Member of Clubs or Organizations:     Attends Club or Organization Meetings:     Marital Status:    Intimate Partner Violence:     Fear of Current or Ex-Partner:     Emotionally Abused:     Physically Abused:     Sexually Abused:            ROS:  [x] All negative/unchanged except if checked.  Explain positive(checked items) below:  [] Constitutional  [] Eyes  [] Ear/Nose/Mouth/Throat  [] Respiratory  [] CV  [] GI  []   [] Musculoskeletal  [] Skin/Breast  [] Neurological  [] Endocrine  [] Heme/Lymph  [] Allergic/Immunologic    Explanation:     MEDICATIONS:    Current Facility-Administered Medications:     acamprosate (CAMPRAL) tablet 333 mg, 333 mg, Oral, TID, Viri Dapper Dellick, APRN - CNP, 042 mg at 09/12/21 0801    acetaminophen (TYLENOL) tablet 650 mg, 650 mg, Oral, L9B PRN, Viri Matthewper Dellick, APRN - CNP    magnesium hydroxide (MILK OF MAGNESIA) 400 MG/5ML suspension 30 mL, 30 mL, Oral, Daily PRN, Viri Dapper Dellick, APRN - CNP    nicotine (NICODERM CQ) 21 MG/24HR 1 patch, 1 patch, TransDERmal, Daily, Viri Castroper Dellick, APRN - CNP, 1 patch at 09/12/21 0803    aluminum & magnesium hydroxide-simethicone (MAALOX) 200-200-20 MG/5ML suspension 30 mL, 30 mL, Oral, PRN, Viri Dapper Dellick, APRN - CNP, 30 mL at 09/09/21 1209    haloperidol (HALDOL) tablet 5 mg, 5 mg, Oral, Q6H PRN **OR** haloperidol lactate (HALDOL) injection 5 mg, 5 mg, IntraMUSCular, D2E PRN, Viri Dapper Dellick, APRN - CNP    traZODone (DESYREL) tablet 50 mg, 50 mg, Oral, Nightly PRN, Viri Dapper Dellick, APRN - CNP, 50 mg at 09/11/21 2118    hydrOXYzine (VISTARIL) capsule 50 mg, 50 mg, Oral, TID PRN, Viri Dapper Dellick, APRN - CNP, 50 mg at 09/11/21 0125    OXcarbazepine (TRILEPTAL) tablet 300 mg, 300 mg, Oral, BID, Liliya B Dellick, APRN - CNP, 108 mg at 09/12/21 0801    sertraline (ZOLOFT) tablet 50 mg, 50 mg, Oral, Daily, Liliya B Dellick, APRN - CNP, 50 mg at 09/12/21 0802    diazePAM (VALIUM) tablet 5 mg, 5 mg, Oral, R3L PRN, LI Mireles - CNP, 5 mg at 09/11/21 2216    hydrOXYzine (VISTARIL) capsule 25 mg, 25 mg, Oral, TID, LI Mireles CNP, 25 mg at 09/12/21 0938    multivitamin 1 tablet, 1 tablet, Oral, Daily, LI Mireles CNP, 1 tablet at 40/70/08 3951    folic acid (FOLVITE) tablet 1 mg, 1 mg, Oral, Daily, LI Mireles - CNP, 1 mg at 09/12/21 Lab Results   Component Value Date    TSH 1.790 01/31/2019     No results found for: LITHIUM  Lab Results   Component Value Date    VALPROATE 83 09/12/2018           Treatment Plan:  The patient's diagnosis, treatment plan, medication management were formulated after patient was seen directly by the attending physician and myself and all relevant documentation was reviewed. Reviewed current Medications with the patient. Risk, benefit, side effects, possible outcomes of the medication and alternatives discussed with the patient and the patient demonstrated understanding. The patient was also educated that the outcome of treatment will depend on the medication compliance as directed by the prescribers along with regular follow-up, compliance with the labs and other work-up, as clinically indicated. The patient was referred to outpatient/inpatient substance abuse rehabilitation programming. He was educated multiple times during the hospitalization that if he chooses to continue to use drugs or alcohol, he may potentially act out impulsively, resulting in serious harm to self or others, even though unintentional.  He was also educated that mental health treatment cannot be optimized with ongoing use of drugs. He demonstrated understanding has the capacity to understand that. Collateral information:   CD evaluation  Encourage patient to attend group and other milieu activities.   Discharge planning discussed with the patient and treatment team.    Campral 333 3 times daily  Continue Trileptal 300 mg twice daily   Continue Zoloft 50 mg daily    PSYCHOTHERAPY/COUNSELING:  [x] Therapeutic interview  [x] Supportive  [] CBT  [] Ongoing  [] Other    [x] Patient continues to need, on a daily basis, active treatment furnished directly by or requiring the supervision of inpatient psychiatric personnel      Anticipated Length of stay: 3 to 7 days based on stability            Electronically signed by Emma Melo Jovany Taylor - CNP on 9/12/2021 at 12:07 PM

## 2021-09-12 NOTE — CARE COORDINATION
Peer Recovery Support Note    Name: Derrick Day  Date: 9/12/2021    No chief complaint on file. Peer Support met with patient.   [x] Support and education provided  [] Resources provided   [] Treatment referral:   [] Other:   [] Patient declined peer recovery services     Referred By:     Notes:     [place]ena Cu[place]a, 9/12/2021

## 2021-09-12 NOTE — PROGRESS NOTES
Patient denies SI, HI, or any Hallucinations at this time. He denies depression or any anxiety. States he feels better today. He has been cooperative this shift and social on the unit. Takes his meds and attends groups. Patient states he shakes and needs his prn Valium. Patient is discharged focused. Will continue to monitor.

## 2021-09-12 NOTE — GROUP NOTE
Group Therapy Note    Date: 9/12/2021    Group Start Time: 1000  Group End Time: 9039  Group Topic: Psychoeducation    [facility]    Kristi Madan, CTRS        Group Therapy Note    AType of Group: Psychoeducation    Wellness Binder Information  Module Name:  football/[place] trivia       Patient's Devi Hudson will be able to participate in turn taking and a group activity of [place]. Notes:  pleasant and sharing thru-out group. Status After Intervention:  Improved    Participation Level:  Active Listener and Interactive    Participation Quality: Appropriate, Attentive, Sharing, and Supportive      Speech: normal       Thought Process/Content: Logical      Affective Functioning: Congruent      Mood: euthymic      Level of consciousness:  Alert, Oriented x4, and Attentive      Response to Learning: Able to verbalize/acknowledge new learning, Able to retain information, and Progressing to goal      Endings: None Reported    Modes of Intervention: Education, Support, Socialization, Exploration, and Problem-solving      Discipline Responsible: Psychoeducational Specialist      Signature:  Liban Abdul

## 2021-09-12 NOTE — PROGRESS NOTES
Attended afternoon recreation group of activity of patients choice. Patient pleasant and engaged in game on television.

## 2021-09-13 VITALS
SYSTOLIC BLOOD PRESSURE: 128 MMHG | HEIGHT: 66 IN | RESPIRATION RATE: 14 BRPM | BODY MASS INDEX: 22.5 KG/M2 | WEIGHT: 140 LBS | HEART RATE: 56 BPM | TEMPERATURE: 97.3 F | OXYGEN SATURATION: 97 % | DIASTOLIC BLOOD PRESSURE: 87 MMHG

## 2021-09-13 PROCEDURE — 99239 HOSP IP/OBS DSCHRG MGMT >30: CPT | Performed by: NURSE PRACTITIONER

## 2021-09-13 PROCEDURE — 6370000000 HC RX 637 (ALT 250 FOR IP): Performed by: NURSE PRACTITIONER

## 2021-09-13 RX ORDER — FOLIC ACID 1 MG/1
1 TABLET ORAL DAILY
Qty: 30 TABLET | Refills: 0 | Status: SHIPPED | OUTPATIENT
Start: 2021-09-13 | End: 2021-11-15 | Stop reason: ALTCHOICE

## 2021-09-13 RX ORDER — ACAMPROSATE CALCIUM 333 MG/1
666 TABLET, DELAYED RELEASE ORAL 3 TIMES DAILY
Qty: 180 TABLET | Refills: 0 | Status: ON HOLD | OUTPATIENT
Start: 2021-09-13 | End: 2021-09-23 | Stop reason: HOSPADM

## 2021-09-13 RX ORDER — THIAMINE MONONITRATE (VIT B1) 100 MG
100 TABLET ORAL DAILY
Qty: 30 TABLET | Refills: 0 | Status: SHIPPED | OUTPATIENT
Start: 2021-09-14 | End: 2021-11-15 | Stop reason: ALTCHOICE

## 2021-09-13 RX ORDER — MULTIVITAMIN WITH IRON
1 TABLET ORAL DAILY
Qty: 30 TABLET | Refills: 0 | Status: SHIPPED | OUTPATIENT
Start: 2021-09-14 | End: 2022-03-16

## 2021-09-13 RX ORDER — NICOTINE 21 MG/24HR
1 PATCH, TRANSDERMAL 24 HOURS TRANSDERMAL DAILY
Qty: 30 PATCH | Refills: 0 | Status: ON HOLD
Start: 2021-09-14 | End: 2021-09-23 | Stop reason: HOSPADM

## 2021-09-13 RX ORDER — OXCARBAZEPINE 300 MG/1
300 TABLET, FILM COATED ORAL 2 TIMES DAILY
Qty: 60 TABLET | Refills: 0 | Status: ON HOLD | OUTPATIENT
Start: 2021-09-13 | End: 2021-09-23 | Stop reason: HOSPADM

## 2021-09-13 RX ADMIN — ACAMPROSATE CALCIUM 333 MG: 333 TABLET, DELAYED RELEASE ORAL at 13:55

## 2021-09-13 RX ADMIN — ACAMPROSATE CALCIUM 333 MG: 333 TABLET, DELAYED RELEASE ORAL at 08:53

## 2021-09-13 RX ADMIN — HYDROXYZINE PAMOATE 25 MG: 25 CAPSULE ORAL at 08:53

## 2021-09-13 RX ADMIN — Medication 100 MG: at 08:53

## 2021-09-13 RX ADMIN — HYDROXYZINE PAMOATE 25 MG: 25 CAPSULE ORAL at 13:55

## 2021-09-13 RX ADMIN — SERTRALINE 50 MG: 50 TABLET, FILM COATED ORAL at 08:53

## 2021-09-13 RX ADMIN — FOLIC ACID 1 MG: 1 TABLET ORAL at 08:53

## 2021-09-13 RX ADMIN — OXCARBAZEPINE 300 MG: 300 TABLET, FILM COATED ORAL at 08:53

## 2021-09-13 RX ADMIN — Medication 1 TABLET: at 08:53

## 2021-09-13 ASSESSMENT — PAIN SCALES - GENERAL: PAINLEVEL_OUTOF10: 0

## 2021-09-13 ASSESSMENT — PAIN - FUNCTIONAL ASSESSMENT: PAIN_FUNCTIONAL_ASSESSMENT: 0-10

## 2021-09-13 NOTE — SUICIDE SAFETY PLAN
SAFETY PLAN    A suicide Safety Plan is a document that supports someone when they are having thoughts of suicide. Warning Signs that indicate a suicidal crisis may be developing: What (situations, thoughts, feelings, body sensations, behaviors, etc.) do you experience that lets you know you are beginning to think about suicide? 1. n/a  2.   3. Internal Coping Strategies:  What things can I do (relaxation techniques, hobbies, physical activities, etc.) to take my mind off my problems without contacting another person? 1. Meditation  2. Computer programming  3. Having healthy relationships. People and social settings that provide distraction: Who can I call or where can I go to distract me? 1. Name: Jose-Friend    2. Name: My Brother    3. Place: To friends. 4. Place: To walk in the woods. People whom I can ask for help: Who can I call when I need help - for example, friends, family, clergy, someone else? 1. Name: Family members. 2. Name: Friends. 3. Name: Joby Rand or [address] [place] [address] I can contact during a crisis: Who can I call for help - for example, my doctor, my psychiatrist, my psychologist, a mental health provider, a suicide hotline? 1. Clinician Name: N/a          3. Suicide Prevention Lifeline: 5-453-367-TALK (6973)    4. [address] [place] [address] Emergency Services -  for example, [facility] suicide hotline, [facility] Hotline: As noted in discharge instructions. Emergency Services Address:      Emergency Services Phone:     Making the environment safe: How can I make my environment (house/apartment/living space) safer? For example, can I remove guns, medications, and other items? 1. Brother removed all alcohol from house. 2. Hunting equipment removed.

## 2021-09-13 NOTE — GROUP NOTE
Group Therapy Note    Date: 9/13/2021    Group Start Time: 1000  Group End Time: 56  Group Topic: Psychoeducation    [facility]    Alondra Quiros, CTRS        Group Therapy Note      Number of participants: 17  Type of group: Psychoeducation  Mode of intervention: Education, Support, Socialization, Exploration, Clarifying, and Problem-solving  Topic: Coping with Stress  Objective: Pt will identify 3 new ways to cope with stress in recovery. Notes:  Pt was interactive during group sharing 3 ways to cope with stress in recovery. Pt gave support and feedback to others. Enjoyed Emotive activity. Status After Intervention:  Improved    Participation Level:  Active Listener and Interactive    Participation Quality: Appropriate, Attentive, Sharing and Supportive      Speech:  normal      Thought Process/Content: Logical      Affective Functioning: Congruent      Mood: euthymic      Level of consciousness:  Alert, Oriented x4 and Attentive      Response to Learning: Able to verbalize current knowledge/experience, Able to verbalize/acknowledge new learning, Able to retain information, Capable of insight, Able to change behavior and Progressing to goal      Endings: None Reported    Modes of Intervention: Education, Support, Socialization, Exploration, Clarifying, Problem-solving and Activity

## 2021-09-13 NOTE — PROGRESS NOTES
Attended morning community meeting. Updated on staffing and daily routine. Shared goal for the day as to stay positive and start better counseling.

## 2021-09-13 NOTE — DISCHARGE SUMMARY
DISCHARGE SUMMARY      Patient ID:  Georgette Hightower  [id number]  31 y.o.  1990    Admit date: 9/9/2021    Discharge date and time: 9/13/2021    Admitting Physician: Constantino Alex MD     Discharge Physician: Dr Guille Rubio MD    Discharge Diagnoses:   Patient Active Problem List   Diagnosis    Alcohol withdrawal syndrome with complication ([facility]    Alcoholism ([address]    Acute alcoholic intoxication ([address]    Severe single current episode of major depressive disorder, without psychotic features ([address]    Marijuana smoker    Major depressive disorder, severe ([address]    Severe depressed bipolar I disorder without psychotic features ([address]    Alcohol-induced acute pancreatitis    Alcohol withdrawal ([address]    Alcohol abuse with withdrawal ([address]    Alcohol withdrawal, uncomplicated ([address]    Alcohol induced acute pancreatitis without necrosis or infection    Lactic acidosis    Pancreatitis, recurrent    Alcohol withdrawal seizure ([address]    Alcoholic fatty liver    Tobacco abuse    Hypokalemia    Impending delirium tremens ([address]    Thrombocytopenia ([address]    Melena    Acute deep vein thrombosis (DVT) of right upper extremity (HCC)    Mild protein-calorie malnutrition ([address]    MDD (major depressive disorder), recurrent episode, severe without psychosis ([address]    Anxiety disorder    Alcohol dependence ([address]    MDD (major depressive disorder), recurrent severe, without psychosis ([address]       Admission Condition: poor    Discharged Condition: stable    Admission Circumstance: Came from [facility] floor after patient made suicidal statements reporting he was suicidal with a plan      PAST MEDICAL/PSYCHIATRIC HISTORY:   Past Medical History:   Diagnosis Date    Anxiety     Arm pain     Asthma     Concussion with loss of consciousness     Convulsions (HCC)     Depression     Flashing lights     Head injury     Headache     Leg pain     Numbness and tingling     arms and hands    PTSD (post-traumatic stress disorder)     Seizures (HCC)        FAMILY/SOCIAL HISTORY:  Family History   Problem Relation Age of Onset    Mental Illness Mother     Substance Abuse Mother     Alcohol Abuse Mother     Cancer Father         lung     Substance Abuse Father     Cirrhosis Father     Alcohol Abuse Father     Mental Illness Sister     Substance Abuse Sister     Mental Illness Brother     Substance Abuse Maternal Aunt     Substance Abuse Maternal Uncle     Substance Abuse Paternal Aunt     Substance Abuse Paternal Uncle      Social History     Socioeconomic History    Marital status: Single     Spouse name: Not on file    Number of children: 0    Years of education: 5    Highest education level: Not on file   Occupational History    Occupation: [employer]     Employer: SELF     Comment: [employer]   Tobacco Use    Smoking status: Heavy Tobacco Smoker     Packs/day: 2.00     Years: 9.00     Pack years: 18.00    Smokeless tobacco: Never Used   Vaping Use    Vaping Use: Former    Substances: Always   Substance and Sexual Activity    Alcohol use: Yes     Alcohol/week: 24.0 standard drinks     Types: 4 Glasses of wine, 20 Cans of beer per week     Comment: heavy daily use ( 2 bottles of los daily)    Drug use: Yes     Frequency: 1.0 times per week     Types: Marijuana    Sexual activity: Not Currently     Partners: Female   Other Topics Concern    Not on file   Social History Narrative    Not on file     Social Determinants of Health     Financial Resource Strain:     Difficulty of Paying Living Expenses:    Food Insecurity:     Worried About Running Out of Food in the Last Year:     Ran Out of Food in the Last Year:    Transportation Needs:     Lack of Transportation (Medical):      Lack of Transportation (Non-Medical):    Physical Activity:     Days of Exercise per Week:     Minutes of Exercise per Session:    Stress:     Feeling of Stress :    Social Connections:  Frequency of Communication with Friends and Family:     Frequency of Social Gatherings with Friends and Family:     Attends Mandaeism Services:     Active Member of Clubs or Organizations:     Attends Club or Organization Meetings:     Marital Status:    Intimate Partner Violence:     Fear of Current or Ex-Partner:     Emotionally Abused:     Physically Abused:     Sexually Abused:        MEDICATIONS:    Current Facility-Administered Medications:     acamprosate (CAMPRAL) tablet 333 mg, 333 mg, Oral, TID, Orly Ding APRN - CNP, 050 mg at 09/13/21 0853    acetaminophen (TYLENOL) tablet 650 mg, 650 mg, Oral, H7X PRN, Orly Ding, APRN - CNP, 544 mg at 09/12/21 2154    magnesium hydroxide (MILK OF MAGNESIA) 400 MG/5ML suspension 30 mL, 30 mL, Oral, Daily PRN, LI He - CNP    nicotine (NICODERM CQ) 21 MG/24HR 1 patch, 1 patch, TransDERmal, Daily, LI Sena CNP, 1 patch at 09/13/21 0855    aluminum & magnesium hydroxide-simethicone (MAALOX) 200-200-20 MG/5ML suspension 30 mL, 30 mL, Oral, PRN, Orly Ding APRN - CNP, 30 mL at 09/09/21 1209    haloperidol (HALDOL) tablet 5 mg, 5 mg, Oral, Q6H PRN **OR** haloperidol lactate (HALDOL) injection 5 mg, 5 mg, IntraMUSCular, A2Q PRN, Orly Ding APRN - CNP    traZODone (DESYREL) tablet 50 mg, 50 mg, Oral, Nightly PRN, Orly Ding APRN - CNP, 50 mg at 09/12/21 2154    hydrOXYzine (VISTARIL) capsule 50 mg, 50 mg, Oral, TID PRN, Orly Ding, APRN - CNP, 50 mg at 09/11/21 0125    OXcarbazepine (TRILEPTAL) tablet 300 mg, 300 mg, Oral, BID, Orly Ding APRN - CNP, 010 mg at 09/13/21 0853    sertraline (ZOLOFT) tablet 50 mg, 50 mg, Oral, Daily, LI Lind CNP, 50 mg at 09/13/21 0853    diazePAM (VALIUM) tablet 5 mg, 5 mg, Oral, A1Z PRN, LI He CNP, 5 mg at 09/12/21 1315    hydrOXYzine (VISTARIL) capsule 25 mg, 25 mg, Oral, TID, LI He CNP, 25 mg at 09/13/21 0853   multivitamin 1 tablet, 1 tablet, Oral, Daily, Rodrigo Ding, APRN - CNP, 1 tablet at 41/27/04 0219    folic acid (FOLVITE) tablet 1 mg, 1 mg, Oral, Daily, Rodrigo Ding, APRN - CNP, 1 mg at 09/13/21 4506    thiamine mononitrate tablet 100 mg, 100 mg, Oral, Daily, Rodrigo Ding APRN - CNP, 426 mg at 09/13/21 7470    Examination:  /87   Pulse 56   Temp 97.3 °F (36.3 °C)   Resp 14   Ht 5' 6\" (1.676 m)   Wt 140 lb (63.5 kg)   SpO2 97%   BMI 22.60 kg/m²   Gait - steady    HOSPITAL COURSE[de-identified]   Patient was admitted to the unit on 9/9/2021 was closely monitored for suicidal ideations. He was evaluated was treated with Trileptal which is optimized up to 300 mg twice daily, Zoloft 50 mg daily for anxiety and Campral 333 3 times daily for alcohol cravings which is optimized up to 666 3 times daily he is also continued on multivitamin folic acid and thiamine for alcohol withdrawal.  Medical events were insignificant and patient continued to improve on the floor. He start coming out of his room he is attending groups to socializing with peers. He never made any suicidal statements or any suicidal gestures while in the unit. Social workers obtain collateral information from patient's brother who was able to voicing concerns that he had. He reported no safety concerns no access to any guns. Treatment team felt the patient obtain the maximum benefit from his hospitalization he was set up with an outpatient mental health agency for outpatient follow-up services. At the time of discharge patient did not show any impulsive behavior. He was up on the unit he was attending groups and socializing with peers. He vehemently denied any suicidal homicidal ideations intent or plan. He was eating well and sleeping well there are no neurovegetative signs or symptoms of depression he denied any auditory or visual hallucinations. There are no overt or covert signs of psychosis.   He was appreciative of the help that he received here. This patient no longer meets criteria for inpatient hospitalization. No AVH or paranoid thoughts  No hopeless or worthless feeling  No active SI/HI  Appetite:  [x] Normal  [] Increased  [] Decreased    Sleep:       [x] Normal  [] Fair       [] Poor            Energy:    [x] Normal  [] Increased  [] Decreased     SI [] Present  [x] Absent  HI  []Present  [x] Absent   Aggression:  [] yes  [x] no  Patient is [x] able  [] unable to CONTRACT FOR SAFETY   Medication side effects(SE):  [x] None(Psych. Meds.) [] Other      Mental Status Examination on discharge:    Level of consciousness:  within normal limits   Appearance:  well-appearing  Behavior/Motor:  no abnormalities noted  Attitude toward examiner:  attentive and good eye contact  Speech:  spontaneous, normal rate and normal volume   Mood: \" I feel a lot better. \"  Affect: Very unpleasant  Thought processes: Linear without flight of ideas loose associations  Thought content: Devoid of any auditory visualizations delusions or other perceptual abnormalities. Denies SI/HI intent or plan  Cognition:  oriented to person, place, and time   Concentration intact  Memory intact  Insight good   Judgement fair   Fund of Knowledge adequate      ASSESSMENT:  Patient symptoms are:  [x] Well controlled  [x] Improving  [] Worsening  [] No change    Reason for more than one antipsychotic:  [x] N/A  [] 3 Failed Monotherapy attempts (Drugs tried:)  [] Crossover to a new antipsychotic  [] Taper to Monotherapy from Polypharmacy  [] Augmentation of clozapine therapy due to treatment resistance to single therapy    Diagnosis:  Principal Problem:    MDD (major depressive disorder), recurrent episode, severe without psychosis ([facility]  Active Problems:    Anxiety disorder    Alcohol dependence ([facility]    MDD (major depressive disorder), recurrent severe, without psychosis ([facility]  Resolved Problems:    * No resolved hospital problems.  *      LABS:    No results for input(s): WBC, HGB, PLT in the last 72 hours. No results for input(s): NA, K, CL, CO2, BUN, CREATININE, GLUCOSE in the last 72 hours. No results for input(s): BILITOT, ALKPHOS, AST, ALT in the last 72 hours. Lab Results   Component Value Date    LABAMPH NOT DETECTED 09/04/2021    BARBSCNU POSITIVE 09/04/2021    LABBENZ NOT DETECTED 09/04/2021    LABMETH NOT DETECTED 09/04/2021    OPIATESCREENURINE POSITIVE 09/04/2021    PHENCYCLIDINESCREENURINE NOT DETECTED 09/04/2021    PPXUR NA 06/29/2018    ETOH 160 09/02/2021     Lab Results   Component Value Date    TSH 1.790 01/31/2019     No results found for: LITHIUM  Lab Results   Component Value Date    VALPROATE 83 09/12/2018       RISK ASSESSMENT AT DISCHARGE: Low risk for suicide and homicide. Treatment Plan:  Reviewed current Medications with the patient. Education provided on the complaince with treatment. Risks, benefits, side effects, drug-to-drug interactions and alternatives to treatment were discussed. Encourage patient to attend outpatient follow up appointment and therapy. Patient was advised to call the outpatient provider, visit the nearest ED or call 911 if symptoms are not manageable. Patient's family member was contacted prior to the discharge.          Medication List      START taking these medications    acamprosate 333 MG tablet  Commonly known as: CAMPRAL  Take 2 tablets by mouth 3 times daily     multivitamin Tabs tablet  Take 1 tablet by mouth daily  Start taking on: September 14, 2021     nicotine 21 MG/24HR  Commonly known as: [address] [facility] 1 patch onto the skin daily  Start taking on: September 14, 2021     OXcarbazepine 300 MG tablet  Commonly known as: TRILEPTAL  Take 1 tablet by mouth 2 times daily     sertraline 50 MG tablet  Commonly known as: ZOLOFT  Take 1 tablet by mouth daily  Start taking on: September 14, 2021     vitamin B-1 100 MG tablet  Commonly known as: THIAMINE  Take 1 tablet by mouth daily  Start taking on: September 14, 2021        CONTINUE taking these medications    folic acid 1 MG tablet  Commonly known as: FOLVITE  Take 1 tablet by mouth daily           Where to Get Your Medications      These medications were sent to [facility] [address] [address]    Phone: [phone]   · acamprosate 165 MG tablet  · folic acid 1 MG tablet  · multivitamin Tabs tablet  · OXcarbazepine 300 MG tablet  · sertraline 50 MG tablet  · vitamin B-1 100 MG tablet     Information about where to get these medications is not yet available    Ask your nurse or doctor about these medications  · nicotine 21 MG/24HR       Patient is counseled if he continues to abuse drugs or alcohol he could act out impulsively causing serious harm to himself or others even though may be unintentional.  He demonstrated understanding of this and has the capacity understand this    Patient is counseled hisr mental health treatment will be difficult to optimize with ongoing use of drugs or alcohol he demonstrate understanding of this as the past understand this     Patient is counseled that he he uses any amount of opiates after any clean time he could have an unintentional overdose he demonstrated understanding of this and has the capacity to understand this    Patient is counseled he must remain compliant with all medications outpatient follow-up ointments    Patient is discharged home in stable condition    TIME SPEND - 35 MINUTES TO COMPLETE THE EVALUATION, DISCHARGE SUMMARY, MEDICATION RECONCILIATION AND FOLLOW UP CARE     Signed:  LI Jacinto CNP  8/30/6720  12:07 PM

## 2021-09-13 NOTE — CARE COORDINATION
In order to ensure appropriate transition and discharge planning is in place, the following documents have been transmitted to [facility], as the new outpatient provider:     The d/c diagnosis was transmitted to the next care provider   The reason for hospitalization was transmitted to the next care provider   The d/c medications (dosage and indication) were transmitted to the next care provider    The continuing care plan was transmitted to the next care provider

## 2021-09-13 NOTE — CARE COORDINATION
Sw contacted pt insurance as they have not arrived. Was informed that pt ride will be here within the next 10 minutes. Rn informed.

## 2021-09-13 NOTE — PLAN OF CARE
Pt is stable and alert. Pt denies suicidal or homicidal ideations. Pt denies hallucinations. Pt reports goal, \"to get updates on progress\" pr pt. .  Will follow and  monitor.

## 2021-09-13 NOTE — BH NOTE
[facility]  Discharge Note    Pt discharged with followings belongings:   Jewelry: None  Clothing: Footwear, Pants, Shirt, Socks  Were All Patient Medications Collected?: Not Applicable  Other Valuables: Cell phone, Other (Comment) (, lighter)   Valuables sent home with pt or returned to patient. Patient education on aftercare instructions: yes . Patient verbalize understanding of AVS:  yes.     Status EXAM upon discharge:  Status and Exam  Normal: Yes  Facial Expression: Brightened  Affect: Congruent  Level of Consciousness: Alert  Mood:Normal: No  Mood: Anxious  Motor Activity:Normal: Yes  Motor Activity: Increased  Interview Behavior: Cooperative  Preception: [place] to Person, [place] to Time, [place] to Place, [place] to Situation  Attention:Normal: No  Attention: Distractible  Thought Processes: Circumstantial  Thought Content:Normal: No  Thought Content: Preoccupations  Hallucinations: None  Delusions: No  Memory:Normal: Yes  Memory: Poor Recent  Insight and Judgment: No  Insight and Judgment: Poor Insight  Present Suicidal Ideation: No  Present Homicidal Ideation: No      Metabolic Screening:    Lab Results   Component Value Date    LABA1C 5.1 09/08/2018       Lab Results   Component Value Date    CHOL 166 05/17/2021    CHOL 79 12/08/2018    CHOL 189 09/08/2018     Lab Results   Component Value Date    TRIG 108 05/17/2021    TRIG 122 12/08/2018    TRIG 176 (H) 09/08/2018     Lab Results   Component Value Date    HDL 78 05/17/2021    HDL 35 12/08/2018    HDL 48 09/08/2018     No components found for: [facility]UATION AND [facility]RE[facility] [place]  Lab Results   Component Value Date    LABVLDL 22 05/17/2021    LABVLDL 24 12/08/2018    LABVLDL 35 09/08/2018       Neal Cox RN

## 2021-09-13 NOTE — GROUP NOTE
Group Therapy Note    Date: 9/13/2021    Group Start Time: 1050  Group End Time: 1125  Group Topic: Cognitive Skills    [facility] MELISSA Novak, LSW        Group Therapy Note    Attendees: 18         Patient's Goal:  Pt will be able to identify how to create healthy boundaries in relationships. Notes:  Pt participated in group and made connections. Status After Intervention:  Improved    Participation Level:  Active Listener and Interactive    Participation Quality: Appropriate, Attentive and Supportive      Speech:  normal      Thought Process/Content: Logical  Linear      Affective Functioning: Congruent      Mood: anxious      Level of consciousness:  Alert, Oriented x4 and Attentive      Response to Learning: Able to verbalize current knowledge/experience, Able to verbalize/acknowledge new learning and Able to retain information      Endings: None Reported    Modes of Intervention: Education, Support, Socialization, Exploration, Clarifying and Problem-solving      Discipline Responsible: /Counselor      Signature:  MELISSA Su, [facility], [place]

## 2021-09-21 ENCOUNTER — APPOINTMENT (OUTPATIENT)
Dept: GENERAL RADIOLOGY | Age: 31
End: 2021-09-21
Payer: COMMERCIAL

## 2021-09-21 ENCOUNTER — HOSPITAL ENCOUNTER (INPATIENT)
Age: 31
LOS: 1 days | Discharge: OTHER INSTITUTION | End: 2021-09-23
Attending: EMERGENCY MEDICINE | Admitting: INTERNAL MEDICINE
Payer: COMMERCIAL

## 2021-09-21 ENCOUNTER — APPOINTMENT (OUTPATIENT)
Dept: CT IMAGING | Age: 31
End: 2021-09-21
Payer: COMMERCIAL

## 2021-09-21 DIAGNOSIS — S09.90XA INJURY OF HEAD, INITIAL ENCOUNTER: ICD-10-CM

## 2021-09-21 DIAGNOSIS — F10.930 ALCOHOL WITHDRAWAL SEIZURE WITHOUT COMPLICATION (HCC): Primary | ICD-10-CM

## 2021-09-21 DIAGNOSIS — R56.9 ALCOHOL WITHDRAWAL SEIZURE WITHOUT COMPLICATION (HCC): Primary | ICD-10-CM

## 2021-09-21 LAB
ACETAMINOPHEN LEVEL: <5 MCG/ML (ref 10–30)
ALBUMIN SERPL-MCNC: 4.4 G/DL (ref 3.5–5.2)
ALP BLD-CCNC: 95 U/L (ref 40–129)
ALT SERPL-CCNC: 21 U/L (ref 0–40)
AMPHETAMINE SCREEN, URINE: NOT DETECTED
ANION GAP SERPL CALCULATED.3IONS-SCNC: 16 MMOL/L (ref 7–16)
AST SERPL-CCNC: 21 U/L (ref 0–39)
BARBITURATE SCREEN URINE: POSITIVE
BASOPHILS ABSOLUTE: 0.07 E9/L (ref 0–0.2)
BASOPHILS RELATIVE PERCENT: 0.6 % (ref 0–2)
BENZODIAZEPINE SCREEN, URINE: NOT DETECTED
BILIRUB SERPL-MCNC: 0.4 MG/DL (ref 0–1.2)
BUN BLDV-MCNC: 5 MG/DL (ref 6–20)
CALCIUM SERPL-MCNC: 9.6 MG/DL (ref 8.6–10.2)
CANNABINOID SCREEN URINE: POSITIVE
CHLORIDE BLD-SCNC: 98 MMOL/L (ref 98–107)
CO2: 25 MMOL/L (ref 22–29)
COCAINE METABOLITE SCREEN URINE: NOT DETECTED
CREAT SERPL-MCNC: 0.7 MG/DL (ref 0.7–1.2)
EOSINOPHILS ABSOLUTE: 0.04 E9/L (ref 0.05–0.5)
EOSINOPHILS RELATIVE PERCENT: 0.3 % (ref 0–6)
ETHANOL: <10 MG/DL (ref 0–0.08)
FENTANYL SCREEN, URINE: NOT DETECTED
GFR AFRICAN AMERICAN: >60
GFR NON-AFRICAN AMERICAN: >60 ML/MIN/1.73
GLUCOSE BLD-MCNC: 85 MG/DL (ref 74–99)
HCT VFR BLD CALC: 41.7 % (ref 37–54)
HEMOGLOBIN: 14.3 G/DL (ref 12.5–16.5)
IMMATURE GRANULOCYTES #: 0.07 E9/L
IMMATURE GRANULOCYTES %: 0.6 % (ref 0–5)
LYMPHOCYTES ABSOLUTE: 1.52 E9/L (ref 1.5–4)
LYMPHOCYTES RELATIVE PERCENT: 13 % (ref 20–42)
Lab: ABNORMAL
MCH RBC QN AUTO: 33.4 PG (ref 26–35)
MCHC RBC AUTO-ENTMCNC: 34.3 % (ref 32–34.5)
MCV RBC AUTO: 97.4 FL (ref 80–99.9)
METHADONE SCREEN, URINE: NOT DETECTED
MONOCYTES ABSOLUTE: 0.83 E9/L (ref 0.1–0.95)
MONOCYTES RELATIVE PERCENT: 7.1 % (ref 2–12)
NEUTROPHILS ABSOLUTE: 9.2 E9/L (ref 1.8–7.3)
NEUTROPHILS RELATIVE PERCENT: 78.4 % (ref 43–80)
OPIATE SCREEN URINE: NOT DETECTED
OXYCODONE URINE: NOT DETECTED
PDW BLD-RTO: 13.3 FL (ref 11.5–15)
PHENCYCLIDINE SCREEN URINE: NOT DETECTED
PLATELET # BLD: 338 E9/L (ref 130–450)
PMV BLD AUTO: 12.2 FL (ref 7–12)
POTASSIUM SERPL-SCNC: 4 MMOL/L (ref 3.5–5)
RBC # BLD: 4.28 E12/L (ref 3.8–5.8)
SALICYLATE, SERUM: <0.3 MG/DL (ref 0–30)
SODIUM BLD-SCNC: 139 MMOL/L (ref 132–146)
TOTAL PROTEIN: 6.9 G/DL (ref 6.4–8.3)
TRICYCLIC ANTIDEPRESSANTS SCREEN SERUM: NEGATIVE NG/ML
TROPONIN, HIGH SENSITIVITY: 10 NG/L (ref 0–11)
WBC # BLD: 11.7 E9/L (ref 4.5–11.5)

## 2021-09-21 PROCEDURE — 84484 ASSAY OF TROPONIN QUANT: CPT

## 2021-09-21 PROCEDURE — 72125 CT NECK SPINE W/O DYE: CPT

## 2021-09-21 PROCEDURE — 80143 DRUG ASSAY ACETAMINOPHEN: CPT

## 2021-09-21 PROCEDURE — 2580000003 HC RX 258: Performed by: EMERGENCY MEDICINE

## 2021-09-21 PROCEDURE — 71045 X-RAY EXAM CHEST 1 VIEW: CPT

## 2021-09-21 PROCEDURE — 99283 EMERGENCY DEPT VISIT LOW MDM: CPT

## 2021-09-21 PROCEDURE — 80307 DRUG TEST PRSMV CHEM ANLYZR: CPT

## 2021-09-21 PROCEDURE — 80179 DRUG ASSAY SALICYLATE: CPT

## 2021-09-21 PROCEDURE — 85025 COMPLETE CBC W/AUTO DIFF WBC: CPT

## 2021-09-21 PROCEDURE — 82077 ASSAY SPEC XCP UR&BREATH IA: CPT

## 2021-09-21 PROCEDURE — 80053 COMPREHEN METABOLIC PANEL: CPT

## 2021-09-21 PROCEDURE — 70450 CT HEAD/BRAIN W/O DYE: CPT

## 2021-09-21 PROCEDURE — 93005 ELECTROCARDIOGRAM TRACING: CPT | Performed by: EMERGENCY MEDICINE

## 2021-09-21 RX ORDER — THIAMINE HYDROCHLORIDE 100 MG/ML
100 INJECTION, SOLUTION INTRAMUSCULAR; INTRAVENOUS DAILY
Status: DISCONTINUED | OUTPATIENT
Start: 2021-09-22 | End: 2021-09-23 | Stop reason: DRUGHIGH

## 2021-09-21 RX ORDER — 0.9 % SODIUM CHLORIDE 0.9 %
1000 INTRAVENOUS SOLUTION INTRAVENOUS ONCE
Status: COMPLETED | OUTPATIENT
Start: 2021-09-21 | End: 2021-09-22

## 2021-09-21 RX ADMIN — SODIUM CHLORIDE 1000 ML: 9 INJECTION, SOLUTION INTRAVENOUS at 23:24

## 2021-09-21 ASSESSMENT — PAIN DESCRIPTION - LOCATION: LOCATION: HEAD

## 2021-09-21 ASSESSMENT — PAIN DESCRIPTION - DESCRIPTORS: DESCRIPTORS: ACHING

## 2021-09-21 ASSESSMENT — PAIN DESCRIPTION - FREQUENCY: FREQUENCY: CONTINUOUS

## 2021-09-21 ASSESSMENT — PAIN SCALES - WONG BAKER: WONGBAKER_NUMERICALRESPONSE: 4

## 2021-09-21 ASSESSMENT — PAIN DESCRIPTION - PAIN TYPE: TYPE: ACUTE PAIN

## 2021-09-22 PROBLEM — G40.209 NONINTRACTABLE EPILEPSY WITH COMPLEX PARTIAL SEIZURES (HCC): Chronic | Status: ACTIVE | Noted: 2021-09-22

## 2021-09-22 LAB
ALBUMIN SERPL-MCNC: 3.3 G/DL (ref 3.5–5.2)
ALP BLD-CCNC: 69 U/L (ref 40–129)
ALT SERPL-CCNC: 15 U/L (ref 0–40)
ANION GAP SERPL CALCULATED.3IONS-SCNC: 10 MMOL/L (ref 7–16)
AST SERPL-CCNC: 18 U/L (ref 0–39)
BASOPHILS ABSOLUTE: 0.07 E9/L (ref 0–0.2)
BASOPHILS RELATIVE PERCENT: 0.6 % (ref 0–2)
BILIRUB SERPL-MCNC: 0.6 MG/DL (ref 0–1.2)
BUN BLDV-MCNC: 5 MG/DL (ref 6–20)
CALCIUM SERPL-MCNC: 8 MG/DL (ref 8.6–10.2)
CHLORIDE BLD-SCNC: 109 MMOL/L (ref 98–107)
CO2: 23 MMOL/L (ref 22–29)
CREAT SERPL-MCNC: 0.6 MG/DL (ref 0.7–1.2)
EKG ATRIAL RATE: 62 BPM
EKG P AXIS: 25 DEGREES
EKG P-R INTERVAL: 114 MS
EKG Q-T INTERVAL: 434 MS
EKG QRS DURATION: 88 MS
EKG QTC CALCULATION (BAZETT): 440 MS
EKG R AXIS: 37 DEGREES
EKG T AXIS: 62 DEGREES
EKG VENTRICULAR RATE: 62 BPM
EOSINOPHILS ABSOLUTE: 0.1 E9/L (ref 0.05–0.5)
EOSINOPHILS RELATIVE PERCENT: 0.8 % (ref 0–6)
GFR AFRICAN AMERICAN: >60
GFR NON-AFRICAN AMERICAN: >60 ML/MIN/1.73
GLUCOSE BLD-MCNC: 85 MG/DL (ref 74–99)
HCT VFR BLD CALC: 36.8 % (ref 37–54)
HEMOGLOBIN: 12.4 G/DL (ref 12.5–16.5)
IMMATURE GRANULOCYTES #: 0.03 E9/L
IMMATURE GRANULOCYTES %: 0.3 % (ref 0–5)
LYMPHOCYTES ABSOLUTE: 2.07 E9/L (ref 1.5–4)
LYMPHOCYTES RELATIVE PERCENT: 17.4 % (ref 20–42)
MAGNESIUM: 2.2 MG/DL (ref 1.6–2.6)
MCH RBC QN AUTO: 33.2 PG (ref 26–35)
MCHC RBC AUTO-ENTMCNC: 33.7 % (ref 32–34.5)
MCV RBC AUTO: 98.4 FL (ref 80–99.9)
MONOCYTES ABSOLUTE: 0.97 E9/L (ref 0.1–0.95)
MONOCYTES RELATIVE PERCENT: 8.1 % (ref 2–12)
NEUTROPHILS ABSOLUTE: 8.69 E9/L (ref 1.8–7.3)
NEUTROPHILS RELATIVE PERCENT: 72.8 % (ref 43–80)
PDW BLD-RTO: 13.3 FL (ref 11.5–15)
PHOSPHORUS: 4 MG/DL (ref 2.5–4.5)
PLATELET # BLD: 269 E9/L (ref 130–450)
PMV BLD AUTO: 12 FL (ref 7–12)
POTASSIUM SERPL-SCNC: 3.3 MMOL/L (ref 3.5–5)
RBC # BLD: 3.74 E12/L (ref 3.8–5.8)
SODIUM BLD-SCNC: 142 MMOL/L (ref 132–146)
TOTAL PROTEIN: 5.5 G/DL (ref 6.4–8.3)
WBC # BLD: 11.9 E9/L (ref 4.5–11.5)

## 2021-09-22 PROCEDURE — 83735 ASSAY OF MAGNESIUM: CPT

## 2021-09-22 PROCEDURE — 2060000000 HC ICU INTERMEDIATE R&B

## 2021-09-22 PROCEDURE — 2500000003 HC RX 250 WO HCPCS: Performed by: INTERNAL MEDICINE

## 2021-09-22 PROCEDURE — 6370000000 HC RX 637 (ALT 250 FOR IP): Performed by: INTERNAL MEDICINE

## 2021-09-22 PROCEDURE — 84100 ASSAY OF PHOSPHORUS: CPT

## 2021-09-22 PROCEDURE — 6360000002 HC RX W HCPCS: Performed by: INTERNAL MEDICINE

## 2021-09-22 PROCEDURE — 85025 COMPLETE CBC W/AUTO DIFF WBC: CPT

## 2021-09-22 PROCEDURE — 99222 1ST HOSP IP/OBS MODERATE 55: CPT | Performed by: INTERNAL MEDICINE

## 2021-09-22 PROCEDURE — 80183 DRUG SCRN QUANT OXCARBAZEPIN: CPT

## 2021-09-22 PROCEDURE — 2580000003 HC RX 258: Performed by: INTERNAL MEDICINE

## 2021-09-22 PROCEDURE — 80053 COMPREHEN METABOLIC PANEL: CPT

## 2021-09-22 PROCEDURE — 6370000000 HC RX 637 (ALT 250 FOR IP): Performed by: EMERGENCY MEDICINE

## 2021-09-22 RX ORDER — SENNA PLUS 8.6 MG/1
1 TABLET ORAL DAILY PRN
Status: DISCONTINUED | OUTPATIENT
Start: 2021-09-22 | End: 2021-09-23 | Stop reason: HOSPADM

## 2021-09-22 RX ORDER — LORAZEPAM 2 MG/ML
4 INJECTION INTRAMUSCULAR
Status: DISCONTINUED | OUTPATIENT
Start: 2021-09-22 | End: 2021-09-23 | Stop reason: HOSPADM

## 2021-09-22 RX ORDER — LORAZEPAM 1 MG/1
2 TABLET ORAL
Status: DISCONTINUED | OUTPATIENT
Start: 2021-09-22 | End: 2021-09-23 | Stop reason: HOSPADM

## 2021-09-22 RX ORDER — LORAZEPAM 1 MG/1
4 TABLET ORAL
Status: DISCONTINUED | OUTPATIENT
Start: 2021-09-22 | End: 2021-09-23 | Stop reason: HOSPADM

## 2021-09-22 RX ORDER — SODIUM CHLORIDE 0.9 % (FLUSH) 0.9 %
5-40 SYRINGE (ML) INJECTION PRN
Status: DISCONTINUED | OUTPATIENT
Start: 2021-09-22 | End: 2021-09-23 | Stop reason: HOSPADM

## 2021-09-22 RX ORDER — ACETAMINOPHEN 650 MG/1
650 SUPPOSITORY RECTAL EVERY 6 HOURS PRN
Status: DISCONTINUED | OUTPATIENT
Start: 2021-09-22 | End: 2021-09-23 | Stop reason: HOSPADM

## 2021-09-22 RX ORDER — LORAZEPAM 2 MG/ML
3 INJECTION INTRAMUSCULAR
Status: DISCONTINUED | OUTPATIENT
Start: 2021-09-22 | End: 2021-09-23 | Stop reason: HOSPADM

## 2021-09-22 RX ORDER — LORAZEPAM 1 MG/1
1 TABLET ORAL ONCE
Status: COMPLETED | OUTPATIENT
Start: 2021-09-22 | End: 2021-09-22

## 2021-09-22 RX ORDER — FOLIC ACID 5 MG/ML
1 INJECTION, SOLUTION INTRAMUSCULAR; INTRAVENOUS; SUBCUTANEOUS DAILY
Status: DISCONTINUED | OUTPATIENT
Start: 2021-09-22 | End: 2021-09-23

## 2021-09-22 RX ORDER — LORAZEPAM 2 MG/ML
2 INJECTION INTRAMUSCULAR
Status: DISCONTINUED | OUTPATIENT
Start: 2021-09-22 | End: 2021-09-23 | Stop reason: HOSPADM

## 2021-09-22 RX ORDER — PHENOBARBITAL SODIUM 65 MG/ML
65 INJECTION INTRAMUSCULAR ONCE
Status: COMPLETED | OUTPATIENT
Start: 2021-09-22 | End: 2021-09-22

## 2021-09-22 RX ORDER — SODIUM CHLORIDE 9 MG/ML
25 INJECTION, SOLUTION INTRAVENOUS PRN
Status: DISCONTINUED | OUTPATIENT
Start: 2021-09-22 | End: 2021-09-23 | Stop reason: HOSPADM

## 2021-09-22 RX ORDER — LORAZEPAM 2 MG/ML
1 INJECTION INTRAMUSCULAR
Status: DISCONTINUED | OUTPATIENT
Start: 2021-09-22 | End: 2021-09-23 | Stop reason: HOSPADM

## 2021-09-22 RX ORDER — LORAZEPAM 1 MG/1
3 TABLET ORAL
Status: DISCONTINUED | OUTPATIENT
Start: 2021-09-22 | End: 2021-09-23 | Stop reason: HOSPADM

## 2021-09-22 RX ORDER — LEVETIRACETAM 500 MG/1
500 TABLET ORAL 2 TIMES DAILY
Status: DISCONTINUED | OUTPATIENT
Start: 2021-09-22 | End: 2021-09-23 | Stop reason: HOSPADM

## 2021-09-22 RX ORDER — POTASSIUM CHLORIDE 20 MEQ/1
40 TABLET, EXTENDED RELEASE ORAL ONCE
Status: COMPLETED | OUTPATIENT
Start: 2021-09-22 | End: 2021-09-22

## 2021-09-22 RX ORDER — THIAMINE HYDROCHLORIDE 100 MG/ML
100 INJECTION, SOLUTION INTRAMUSCULAR; INTRAVENOUS DAILY
Status: DISCONTINUED | OUTPATIENT
Start: 2021-09-25 | End: 2021-09-23

## 2021-09-22 RX ORDER — NICOTINE 21 MG/24HR
1 PATCH, TRANSDERMAL 24 HOURS TRANSDERMAL DAILY
Status: DISCONTINUED | OUTPATIENT
Start: 2021-09-22 | End: 2021-09-23 | Stop reason: HOSPADM

## 2021-09-22 RX ORDER — THIAMINE HYDROCHLORIDE 100 MG/ML
250 INJECTION, SOLUTION INTRAMUSCULAR; INTRAVENOUS DAILY
Status: DISCONTINUED | OUTPATIENT
Start: 2021-09-22 | End: 2021-09-23 | Stop reason: HOSPADM

## 2021-09-22 RX ORDER — SODIUM CHLORIDE 0.9 % (FLUSH) 0.9 %
5-40 SYRINGE (ML) INJECTION EVERY 12 HOURS SCHEDULED
Status: DISCONTINUED | OUTPATIENT
Start: 2021-09-22 | End: 2021-09-23 | Stop reason: HOSPADM

## 2021-09-22 RX ORDER — ACETAMINOPHEN 325 MG/1
650 TABLET ORAL EVERY 6 HOURS PRN
Status: DISCONTINUED | OUTPATIENT
Start: 2021-09-22 | End: 2021-09-23 | Stop reason: HOSPADM

## 2021-09-22 RX ORDER — MULTIVITAMIN WITH IRON
1 TABLET ORAL DAILY
Status: DISCONTINUED | OUTPATIENT
Start: 2021-09-22 | End: 2021-09-23 | Stop reason: HOSPADM

## 2021-09-22 RX ORDER — LORAZEPAM 1 MG/1
1 TABLET ORAL
Status: DISCONTINUED | OUTPATIENT
Start: 2021-09-22 | End: 2021-09-23 | Stop reason: HOSPADM

## 2021-09-22 RX ADMIN — ACETAMINOPHEN 650 MG: 325 TABLET ORAL at 20:49

## 2021-09-22 RX ADMIN — FOLIC ACID 1 MG: 5 INJECTION, SOLUTION INTRAMUSCULAR; INTRAVENOUS; SUBCUTANEOUS at 09:53

## 2021-09-22 RX ADMIN — LORAZEPAM 3 MG: 2 INJECTION INTRAMUSCULAR; INTRAVENOUS at 20:49

## 2021-09-22 RX ADMIN — SODIUM CHLORIDE, PRESERVATIVE FREE 10 ML: 5 INJECTION INTRAVENOUS at 20:49

## 2021-09-22 RX ADMIN — LORAZEPAM 2 MG: 2 INJECTION INTRAMUSCULAR; INTRAVENOUS at 18:49

## 2021-09-22 RX ADMIN — PHENOBARBITAL SODIUM 65 MG: 65 INJECTION INTRAMUSCULAR at 02:08

## 2021-09-22 RX ADMIN — LORAZEPAM 2 MG: 2 INJECTION INTRAMUSCULAR; INTRAVENOUS at 16:52

## 2021-09-22 RX ADMIN — LORAZEPAM 2 MG: 1 TABLET ORAL at 10:05

## 2021-09-22 RX ADMIN — POTASSIUM CHLORIDE 40 MEQ: 1500 TABLET, EXTENDED RELEASE ORAL at 10:05

## 2021-09-22 RX ADMIN — LEVETIRACETAM 500 MG: 500 TABLET, FILM COATED ORAL at 20:49

## 2021-09-22 RX ADMIN — LORAZEPAM 2 MG: 2 INJECTION INTRAMUSCULAR; INTRAVENOUS at 04:49

## 2021-09-22 RX ADMIN — Medication 1 TABLET: at 09:52

## 2021-09-22 RX ADMIN — LORAZEPAM 1 MG: 1 TABLET ORAL at 00:50

## 2021-09-22 RX ADMIN — THIAMINE HYDROCHLORIDE 250 MG: 100 INJECTION, SOLUTION INTRAMUSCULAR; INTRAVENOUS at 10:09

## 2021-09-22 RX ADMIN — ENOXAPARIN SODIUM 40 MG: 40 INJECTION SUBCUTANEOUS at 09:53

## 2021-09-22 RX ADMIN — SERTRALINE 50 MG: 50 TABLET, FILM COATED ORAL at 09:52

## 2021-09-22 ASSESSMENT — ENCOUNTER SYMPTOMS
VOMITING: 0
BACK PAIN: 0
BLURRED VISION: 0
WHEEZING: 0
SHORTNESS OF BREATH: 0
DOUBLE VISION: 0
PHOTOPHOBIA: 0
SORE THROAT: 0
COUGH: 0
CONSTIPATION: 0
NAUSEA: 0
DIARRHEA: 0
HEARTBURN: 0
ABDOMINAL PAIN: 0

## 2021-09-22 ASSESSMENT — PAIN SCALES - GENERAL
PAINLEVEL_OUTOF10: 0
PAINLEVEL_OUTOF10: 6
PAINLEVEL_OUTOF10: 0

## 2021-09-22 NOTE — ED PROVIDER NOTES
HPI:  9/21/21, Time: 9:19 PM EDT         Lakisha Marrufo is a 31 y.o. male presenting to the ED for seizure, beginning short time ago. The complaint has been persistent, moderate in severity, and worsened by nothing. Patient has history of alcohol abuse. Patient alcohol about 1 day ago. Patient reportedly had seizure that lasted 3 to 5 minutes he did bite down his tongue he did not have incontinence. Patient does have prior history is on no medications though. Patient reporting no chest pain no difficulty breathing. Reports no abdominal pain or vomiting did strike his head. .  Patient reporting no numbness or tingling he reports no photophobia    ROS:   Pertinent positives and negatives are stated within HPI, all other systems reviewed and are negative.  --------------------------------------------- PAST HISTORY ---------------------------------------------  Past Medical History:  has a past medical history of Anxiety, Arm pain, Asthma, Concussion with loss of consciousness, Convulsions ([facility] Depression, Flashing lights, Head injury, Headache, Leg pain, Numbness and tingling, PTSD (post-traumatic stress disorder), and Seizures ([facility] Past Surgical History:  has a past surgical history that includes Colonoscopy; Endoscopy, colon, diagnostic; and Upper gastrointestinal endoscopy (N/A, 7/22/2021). Social History:  reports that he has been smoking. He has a 18.00 pack-year smoking history. He has never used smokeless tobacco. He reports current alcohol use of about 24.0 standard drinks of alcohol per week. He reports current drug use. Frequency: 1.00 time per week. Drug: Marijuana. Family History: family history includes Alcohol Abuse in his father and mother; Cancer in his father; Cirrhosis in his father; Mental Illness in his brother, mother, and sister; Substance Abuse in his father, maternal aunt, maternal uncle, mother, paternal aunt, paternal uncle, and sister.      The patients home medications have been reviewed. Allergies: Hydrocodone, Invega sustenna [paliperidone palmitate er], Paliperidone, Pcn [penicillins], and Fluticasone    ---------------------------------------------------PHYSICAL EXAM--------------------------------------    Constitutional/General: Alert and oriented x3, well appearing, non toxic in NAD  Head: Normocephalic contusion to left side of scalp  Eyes: PERRL, EOMI  Mouth: Oropharynx clear, handling secretions, no trismus noted superficial wound to tongue  Neck: Supple, full ROM, non tender to palpation in the midline, no stridor, no crepitus, no meningeal signs  Pulmonary: Lungs clear to auscultation bilaterally, no wheezes, rales, or rhonchi. Not in respiratory distress  Cardiovascular:  Regular rate. Regular rhythm. No murmurs, gallops, or rubs. 2+ distal pulses  Chest: no chest wall tenderness  Abdomen: Soft. Non tender. Non distended. +BS. No rebound, guarding, or rigidity. No pulsatile masses appreciated. Musculoskeletal: Moves all extremities x 4. Warm and well perfused, no clubbing, cyanosis, or edema. Capillary refill <3 seconds  Skin: warm and dry. No rashes. Neurologic: GCS 15, CN 2-12 grossly intact, no focal deficits, symmetric strength 5/5 in the upper and lower extremities bilaterally  Psych: Normal Affect    -------------------------------------------------- RESULTS -------------------------------------------------  I have personally reviewed all laboratory and imaging results for this patient. Results are listed below.      LABS:  Results for orders placed or performed during the hospital encounter of 09/21/21   CBC auto differential   Result Value Ref Range    WBC 11.7 (H) 4.5 - 11.5 E9/L    RBC 4.28 3.80 - 5.80 E12/L    Hemoglobin 14.3 12.5 - 16.5 g/dL    Hematocrit 41.7 37.0 - 54.0 %    MCV 97.4 80.0 - 99.9 fL    MCH 33.4 26.0 - 35.0 pg    MCHC 34.3 32.0 - 34.5 %    RDW 13.3 11.5 - 15.0 fL    Platelets 589 178 - 940 E9/L    MPV 12.2 (H) 7.0 - 12.0 fL    Neutrophils % 78.4 43.0 - 80.0 %    Immature Granulocytes % 0.6 0.0 - 5.0 %    Lymphocytes % 13.0 (L) 20.0 - 42.0 %    Monocytes % 7.1 2.0 - 12.0 %    Eosinophils % 0.3 0.0 - 6.0 %    Basophils % 0.6 0.0 - 2.0 %    Neutrophils Absolute 9.20 (H) 1.80 - 7.30 E9/L    Immature Granulocytes # 0.07 E9/L    Lymphocytes Absolute 1.52 1.50 - 4.00 E9/L    Monocytes Absolute 0.83 0.10 - 0.95 E9/L    Eosinophils Absolute 0.04 (L) 0.05 - 0.50 E9/L    Basophils Absolute 0.07 0.00 - 0.20 E9/L   Comprehensive Metabolic Panel   Result Value Ref Range    Sodium 139 132 - 146 mmol/L    Potassium 4.0 3.5 - 5.0 mmol/L    Chloride 98 98 - 107 mmol/L    CO2 25 22 - 29 mmol/L    Anion Gap 16 7 - 16 mmol/L    Glucose 85 74 - 99 mg/dL    BUN 5 (L) 6 - 20 mg/dL    CREATININE 0.7 0.7 - 1.2 mg/dL    GFR Non-African American >60 >=60 mL/min/1.73    GFR African American >60     Calcium 9.6 8.6 - 10.2 mg/dL    Total Protein 6.9 6.4 - 8.3 g/dL    Albumin 4.4 3.5 - 5.2 g/dL    Total Bilirubin 0.4 0.0 - 1.2 mg/dL    Alkaline Phosphatase 95 40 - 129 U/L    ALT 21 0 - 40 U/L    AST 21 0 - 39 U/L   Troponin   Result Value Ref Range    Troponin, High Sensitivity 10 0 - 11 ng/L   Serum Drug Screen   Result Value Ref Range    Ethanol Lvl <10 mg/dL    Acetaminophen Level <5.0 (L) 10.0 - 49.4 mcg/mL    Salicylate, Serum <5.4 0.0 - 30.0 mg/dL    TCA Scrn NEGATIVE Cutoff:300 ng/mL   Urine Drug Screen   Result Value Ref Range    Amphetamine Screen, Urine NOT DETECTED Negative <1000 ng/mL    Barbiturate Screen, Ur POSITIVE (A) Negative < 200 ng/mL    Benzodiazepine Screen, Urine NOT DETECTED Negative < 200 ng/mL    Cannabinoid Scrn, Ur POSITIVE (A) Negative < 50ng/mL    Cocaine Metabolite Screen, Urine NOT DETECTED Negative < 300 ng/mL    Opiate Scrn, Ur NOT DETECTED Negative < 300ng/mL    PCP Screen, Urine NOT DETECTED Negative < 25 ng/mL    Methadone Screen, Urine NOT DETECTED Negative <300 ng/mL    Oxycodone Urine NOT DETECTED Negative <100 ng/mL FENTANYL SCREEN, URINE NOT DETECTED Negative <1 ng/mL    Drug Screen Comment: see below        RADIOLOGY:  Interpreted by Radiologist.  [address]   Final Result   No acute intracranial abnormality. Left posterior parietal scalp hematoma. CT CERVICAL SPINE WO CONTRAST   Final Result   No acute abnormality of the cervical spine. XR CHEST PORTABLE    (Results Pending)       EKG: This EKG is signed and interpreted by me. Rate: 62  Rhythm: Sinus  Interpretation: no acute changes  Comparison: no previous EKG available        ------------------------- NURSING NOTES AND VITALS REVIEWED ---------------------------   The nursing notes within the ED encounter and vital signs as below have been reviewed by myself. /81   Pulse 97   Temp 97.8 °F (36.6 °C)   Resp 16   Ht 5' 6\" (1.676 m)   Wt 140 lb (63.5 kg)   SpO2 97%   BMI 22.60 kg/m²   Oxygen Saturation Interpretation: Normal    The patients available past medical records and past encounters were reviewed. ------------------------------ ED COURSE/MEDICAL DECISION MAKING----------------------  Medications   0.9 % sodium chloride bolus (has no administration in time range)   thiamine (B-1) injection 100 mg (has no administration in time range)             Medical Decision Making:      Has history of alcohol abuse. Patient reportedly had seizure while at rehab facility. Patient did strike his head. Patient awake alert oriented x3. Patient reporting no chest pain or difficulty breathing. Labs noted reviewed alcohol less than 10. Patient ordered IV Keppra. Plan will be to admit to monitored bed  Re-Evaluations:             Re-evaluation. Patients symptoms show no change  Patient reevaluated no acute distress. Patient made aware of findings and plan. Consultations:           House attending    Critical Care:          This patient's ED course included: a personal history and physicial eaxmination    This patient has been closely monitored during their ED course. Counseling: The emergency provider has spoken with the patient and discussed todays results, in addition to providing specific details for the plan of care and counseling regarding the diagnosis and prognosis. Questions are answered at this time and they are agreeable with the plan.       --------------------------------- IMPRESSION AND DISPOSITION ---------------------------------    IMPRESSION  1. Alcohol withdrawal seizure without complication ([facility] [address]    2. Injury of head, initial encounter        DISPOSITION  Disposition: Admit to telemetry  Patient condition is stable        NOTE: This report was transcribed using voice recognition software.  Every effort was made to ensure accuracy; however, inadvertent computerized transcription errors may be present          Benjie Bynum MD  09/21/21 2624       Benjie Bynum MD  09/21/21 3542

## 2021-09-22 NOTE — CARE COORDINATION
Social Work / Transition of Care:    Pt presents to the ED secondary to alcohol withdrawal and a seizure. Pt sent to ED from [place] detox facility. SW met with pt who reports hx of alcohol addiction, drinking a bottle of Dequan Saumya or a bottle of vodka daily. Pt reports last drink was yesterday morning (9/21) around 9am.   Pt reports plan will be to return to [place] detox program once he is discharged and told SW to contact [place]. SAM spoke to Miguel A Lane in detox ([phone]) who reports pt would be able to return and will need a nurse to nurse report once he is medically clear for discharge. Nuria from [facility] is following for further assistance. SAM/DEON to follow.

## 2021-09-22 NOTE — ED NOTES
Bed: 24  Expected date:   Expected time:   Means of arrival:   Comments:  ems     Gray Adams RN  09/21/21 2122

## 2021-09-22 NOTE — H&P
[facility]  Internal Medicine Residency Program  History and Physical    Patient:  Georgette Hightower 31 y.o. male MRN: [id number]     Date of Service: 9/22/2021    Hospital Day: 2      Chief complaint: Seizure    History of Present Illness   The patient is a 31 y.o. male with a past medical history of seizures, convulsions, alcohol abuse, Depression, Anxiety, PTSD and numbness/tingling who presents to the ED due to a seizure. Patient has history of alcohol abuse staying in the las days at [place] [facility] detox facility. He reports that he did not get any medication in the last 7 days. He usually gets Phenobarbital and Ativan for seizure. Patient took alcohol for the last time 1 day ago. He drinks 1 bottle of Dequan Saumya usually. Patient reportedly had seizure that lasted 3 to 5 minutes, he did bite down his tongue and he did not have bladder/bowel incontinence. He did strike his head. He had a prodrome before the seizure (headache and dizziness). He denies that this episode was as bad as when he had epilepsy in the past. Patient reporting no chest pain or difficulty breathing. Denies abdominal pain or nausea/vomiting. Patient reporting no numbness or tingling. Denies photophobia. ED  Patient hemodynamically stable  Vitals:  WNL  Labs: BUN 5, WBCs 1.7, Lymphopenia, Neutrophilia  UDS: + for barbiturate and cannabinoid  EKG: Sinus rhythm, Normal axis, no ST or T wave abnormalities  CXR: No acute process  CT head wo contrast: left posterior parietal scalp hematoma, no acute intracranial abnormality  CT cervical spine wo contrast: no acute abnormality  Meds: Ativan 1 mg PO once, NS 1 L IV once    Patient admitted due to seizure and alcohol withdrawal    Past Medical History:      Diagnosis Date    Anxiety     Arm pain     Asthma     Concussion with loss of consciousness     Convulsions (HCC)     Depression     Flashing lights     Head injury     Headache     Leg pain     Numbness and tingling     arms and hands    PTSD (post-traumatic stress disorder)     Seizures (HCC)        Past Surgical History:        Procedure Laterality Date    COLONOSCOPY      ENDOSCOPY, COLON, DIAGNOSTIC      UPPER GASTROINTESTINAL ENDOSCOPY N/A 7/22/2021    EGD BIOPSY performed by Lucio Rai MD at [facility]       Medications Prior to Admission:    Prior to Admission medications    Medication Sig Start Date End Date Taking? Authorizing Provider   folic acid (FOLVITE) 1 MG tablet Take 1 tablet by mouth daily 9/13/21 95/10/79  Tabby Samples, APRN - CNP   nicotine (NICODERM CQ) 21 MG/24HR Place 1 patch onto the skin daily 9/14/21 45/18/25  Tabby Samples, APRN - CNP   acamprosate (CAMPRAL) 333 MG tablet Take 2 tablets by mouth 3 times daily 9/13/21 56/66/24  Tabby Samples, APRN - CNP   Multiple Vitamin (MULTIVITAMIN) TABS tablet Take 1 tablet by mouth daily 9/14/21 95/29/03  Tabby Samples, APRN - CNP   OXcarbazepine (TRILEPTAL) 300 MG tablet Take 1 tablet by mouth 2 times daily 9/13/21 13/01/19  Tabby Samples, APRN - CNP   sertraline (ZOLOFT) 50 MG tablet Take 1 tablet by mouth daily 9/14/21 93/42/33  Atbby Samples, APRN - CNP   thiamine mononitrate (THIAMINE) 100 MG tablet Take 1 tablet by mouth daily 9/14/21 78/69/15  Viri Ding, APRN - CNP       Allergies:  Hydrocodone, Gela Ditto sustenna [paliperidone palmitate er], Paliperidone, Pcn [penicillins], and Fluticasone    Social History:   TOBACCO:   reports that he has been smoking. He has a 18.00 pack-year smoking history. He has never used smokeless tobacco.  ETOH:   reports current alcohol use of about 24.0 standard drinks of alcohol per week.     Family History:       Problem Relation Age of Onset    Mental Illness Mother     Substance Abuse Mother     Alcohol Abuse Mother     Cancer Father         lung     Substance Abuse Father     Cirrhosis Father     Alcohol Abuse Father     Mental Illness Sister     Substance Abuse Sister     Mental Illness Brother     Substance Abuse Maternal Aunt     Substance Abuse Maternal Uncle     Substance Abuse Paternal Aunt     Substance Abuse Paternal Uncle        REVIEW OF SYSTEMS:  Review of Systems   Constitutional: Negative for chills, fever and malaise/fatigue. HENT: Negative for congestion and sore throat. Eyes: Negative for blurred vision, double vision, photophobia and visual disturbance. Cardiovascular: Negative for chest pain, dyspnea on exertion, leg swelling and palpitations. Respiratory: Negative for cough, shortness of breath and wheezing. Skin: Negative for rash. Musculoskeletal: Negative for arthritis, back pain and neck pain. Gastrointestinal: Negative for abdominal pain, constipation, diarrhea, dysphagia, heartburn, nausea and vomiting. Genitourinary: Negative for bladder incontinence, frequency, hematuria and urgency. Neurological: Positive for dizziness, headaches, light-headedness and tremors. Negative for numbness and weakness. Psychiatric/Behavioral: Positive for substance abuse. Negative for altered mental status, hallucinations and suicidal ideas. The patient is nervous/anxious. Physical Exam   · Vitals: /88   Pulse 75   Temp 97.8 °F (36.6 °C)   Resp 17   Ht 5' 6\" (1.676 m)   Wt 140 lb (63.5 kg)   SpO2 99%   BMI 22.60 kg/m²    Physical Exam  Vitals reviewed. Constitutional:       General: He is not in acute distress. HENT:      Head: Normocephalic and atraumatic. Comments: Contusion to left side of scalp  Tongue swollen     Mouth/Throat:      Mouth: Mucous membranes are moist.   Eyes:      Extraocular Movements: Extraocular movements intact. Conjunctiva/sclera: Conjunctivae normal.      Pupils: Pupils are equal, round, and reactive to light. Neck:      Vascular: No carotid bruit. Cardiovascular:      Rate and Rhythm: Regular rhythm. Tachycardia present. Pulses: Normal pulses. Heart sounds: Normal heart sounds. Pulmonary:      Effort: Pulmonary effort is normal. No respiratory distress. Breath sounds: Normal breath sounds. Abdominal:      General: Bowel sounds are normal.      Palpations: Abdomen is soft. Tenderness: There is no abdominal tenderness. There is no guarding or rebound. Musculoskeletal:         General: No swelling. Cervical back: No tenderness. Right lower leg: No edema. Left lower leg: No edema. Skin:     General: Skin is warm. Coloration: Skin is not pale. Findings: No rash. Neurological:      General: No focal deficit present. Mental Status: He is alert and oriented to person, place, and time. Sensory: No sensory deficit. Motor: No weakness. Psychiatric:         Thought Content: Thought content normal.         Judgment: Judgment normal.        Labs and Imaging Studies   Basic Labs  Recent Labs     09/21/21 2145 09/22/21  0547    142   K 4.0 3.3*   CL 98 109*   CO2 25 23   BUN 5* 5*   CREATININE 0.7 0.6*   GLUCOSE 85 85   CALCIUM 9.6 8.0*       Recent Labs     09/21/21 2145 09/22/21  0547   WBC 11.7* 11.9*   RBC 4.28 3.74*   HGB 14.3 12.4*   HCT 41.7 36.8*   MCV 97.4 98.4   MCH 33.4 33.2   MCHC 34.3 33.7   RDW 13.3 13.3    269   MPV 12.2* 12.0         Imaging Studies:  XR CHEST PORTABLE   Final Result   No acute process. CT HEAD WO CONTRAST   Final Result   No acute intracranial abnormality. Left posterior parietal scalp hematoma. CT CERVICAL SPINE WO CONTRAST   Final Result   No acute abnormality of the cervical spine. Resident's Assessment and Plan     Assessment and Plan:    1.  Seizure 2/2 Alcohol withdrawal  · Hx of complex partial seizures diagnosed in 2018 with poor follow up  · Last alcohol intake was 1 day ago (1 bottle of Iris Blaze)  · Patient has tremors and 1 seizure episode  · Ativan and Phenobarbital given in ED  · On folic acid and thiamine  · EKG ordered  · Patient states he was not taking oxcarbazepine and sertraline - Oxcarbazepine level ordered  · Started on Sertraline and Tigan  · Seizures precautions placed  · CIWA protocol placed    2.  Polysubstance abuse  · Taking alcohol, tobacco and marijuana  · UDS + for barbiturates and cannabinoid  · On Nicotine 21 mg/24 hours 1 patch  · SW and CM consulted      PT/OT evaluation: consulted  DVT prophylaxis: Lovenox and PCDs  GI prophylaxis: none  Diet: NPO  Disposition: strict bedrest, on telemetry      Zeinab Aviles MD, PGY-1  Attending physician: Dr. Ronal Blackwood

## 2021-09-23 VITALS
RESPIRATION RATE: 16 BRPM | SYSTOLIC BLOOD PRESSURE: 113 MMHG | HEIGHT: 66 IN | OXYGEN SATURATION: 98 % | HEART RATE: 64 BPM | WEIGHT: 140 LBS | BODY MASS INDEX: 22.5 KG/M2 | TEMPERATURE: 98.1 F | DIASTOLIC BLOOD PRESSURE: 82 MMHG

## 2021-09-23 LAB
ALBUMIN SERPL-MCNC: 3.5 G/DL (ref 3.5–5.2)
ALP BLD-CCNC: 77 U/L (ref 40–129)
ALT SERPL-CCNC: 14 U/L (ref 0–40)
ANION GAP SERPL CALCULATED.3IONS-SCNC: 10 MMOL/L (ref 7–16)
AST SERPL-CCNC: 21 U/L (ref 0–39)
BASOPHILS ABSOLUTE: 0.06 E9/L (ref 0–0.2)
BASOPHILS RELATIVE PERCENT: 0.6 % (ref 0–2)
BILIRUB SERPL-MCNC: 0.6 MG/DL (ref 0–1.2)
BUN BLDV-MCNC: 6 MG/DL (ref 6–20)
CALCIUM SERPL-MCNC: 8.9 MG/DL (ref 8.6–10.2)
CHLORIDE BLD-SCNC: 102 MMOL/L (ref 98–107)
CO2: 25 MMOL/L (ref 22–29)
CREAT SERPL-MCNC: 0.5 MG/DL (ref 0.7–1.2)
EOSINOPHILS ABSOLUTE: 0.14 E9/L (ref 0.05–0.5)
EOSINOPHILS RELATIVE PERCENT: 1.5 % (ref 0–6)
GFR AFRICAN AMERICAN: >60
GFR NON-AFRICAN AMERICAN: >60 ML/MIN/1.73
GLUCOSE BLD-MCNC: 77 MG/DL (ref 74–99)
HCT VFR BLD CALC: 40 % (ref 37–54)
HEMOGLOBIN: 13.5 G/DL (ref 12.5–16.5)
IMMATURE GRANULOCYTES #: 0.03 E9/L
IMMATURE GRANULOCYTES %: 0.3 % (ref 0–5)
LYMPHOCYTES ABSOLUTE: 1.94 E9/L (ref 1.5–4)
LYMPHOCYTES RELATIVE PERCENT: 20.6 % (ref 20–42)
MAGNESIUM: 2.1 MG/DL (ref 1.6–2.6)
MCH RBC QN AUTO: 33.4 PG (ref 26–35)
MCHC RBC AUTO-ENTMCNC: 33.8 % (ref 32–34.5)
MCV RBC AUTO: 99 FL (ref 80–99.9)
MONOCYTES ABSOLUTE: 0.71 E9/L (ref 0.1–0.95)
MONOCYTES RELATIVE PERCENT: 7.5 % (ref 2–12)
NEUTROPHILS ABSOLUTE: 6.53 E9/L (ref 1.8–7.3)
NEUTROPHILS RELATIVE PERCENT: 69.5 % (ref 43–80)
PDW BLD-RTO: 12.9 FL (ref 11.5–15)
PHOSPHORUS: 4 MG/DL (ref 2.5–4.5)
PLATELET # BLD: 291 E9/L (ref 130–450)
PMV BLD AUTO: 12.8 FL (ref 7–12)
POTASSIUM SERPL-SCNC: 4.1 MMOL/L (ref 3.5–5)
RBC # BLD: 4.04 E12/L (ref 3.8–5.8)
SODIUM BLD-SCNC: 137 MMOL/L (ref 132–146)
TOTAL PROTEIN: 6.1 G/DL (ref 6.4–8.3)
WBC # BLD: 9.4 E9/L (ref 4.5–11.5)

## 2021-09-23 PROCEDURE — 85025 COMPLETE CBC W/AUTO DIFF WBC: CPT

## 2021-09-23 PROCEDURE — 6370000000 HC RX 637 (ALT 250 FOR IP): Performed by: INTERNAL MEDICINE

## 2021-09-23 PROCEDURE — 83735 ASSAY OF MAGNESIUM: CPT

## 2021-09-23 PROCEDURE — 80053 COMPREHEN METABOLIC PANEL: CPT

## 2021-09-23 PROCEDURE — 97165 OT EVAL LOW COMPLEX 30 MIN: CPT

## 2021-09-23 PROCEDURE — 6360000002 HC RX W HCPCS: Performed by: EMERGENCY MEDICINE

## 2021-09-23 PROCEDURE — 97161 PT EVAL LOW COMPLEX 20 MIN: CPT

## 2021-09-23 PROCEDURE — 6360000002 HC RX W HCPCS: Performed by: INTERNAL MEDICINE

## 2021-09-23 PROCEDURE — 2580000003 HC RX 258: Performed by: INTERNAL MEDICINE

## 2021-09-23 PROCEDURE — 84100 ASSAY OF PHOSPHORUS: CPT

## 2021-09-23 PROCEDURE — 36415 COLL VENOUS BLD VENIPUNCTURE: CPT

## 2021-09-23 PROCEDURE — 2500000003 HC RX 250 WO HCPCS: Performed by: INTERNAL MEDICINE

## 2021-09-23 PROCEDURE — 97530 THERAPEUTIC ACTIVITIES: CPT

## 2021-09-23 PROCEDURE — 93005 ELECTROCARDIOGRAM TRACING: CPT | Performed by: INTERNAL MEDICINE

## 2021-09-23 PROCEDURE — 99238 HOSP IP/OBS DSCHRG MGMT 30/<: CPT | Performed by: INTERNAL MEDICINE

## 2021-09-23 RX ORDER — GAUZE BANDAGE 2" X 2"
100 BANDAGE TOPICAL DAILY
Status: DISCONTINUED | OUTPATIENT
Start: 2021-09-25 | End: 2021-09-23 | Stop reason: HOSPADM

## 2021-09-23 RX ORDER — FOLIC ACID 1 MG/1
1 TABLET ORAL DAILY
Status: CANCELLED | OUTPATIENT
Start: 2021-09-24

## 2021-09-23 RX ORDER — LEVETIRACETAM 500 MG/1
500 TABLET ORAL 2 TIMES DAILY
Qty: 60 TABLET | Refills: 3 | Status: SHIPPED | OUTPATIENT
Start: 2021-09-23 | End: 2021-11-15 | Stop reason: ALTCHOICE

## 2021-09-23 RX ORDER — FOLIC ACID 1 MG/1
1 TABLET ORAL DAILY
Status: DISCONTINUED | OUTPATIENT
Start: 2021-09-23 | End: 2021-09-23 | Stop reason: HOSPADM

## 2021-09-23 RX ORDER — GAUZE BANDAGE 2" X 2"
100 BANDAGE TOPICAL DAILY
Status: DISCONTINUED | OUTPATIENT
Start: 2021-09-23 | End: 2021-09-23

## 2021-09-23 RX ORDER — FOLIC ACID 1 MG/1
1 TABLET ORAL DAILY
Qty: 30 TABLET | Refills: 3 | Status: SHIPPED | OUTPATIENT
Start: 2021-09-23 | End: 2021-09-23 | Stop reason: HOSPADM

## 2021-09-23 RX ADMIN — FOLIC ACID 1 MG: 5 INJECTION, SOLUTION INTRAMUSCULAR; INTRAVENOUS; SUBCUTANEOUS at 09:35

## 2021-09-23 RX ADMIN — SODIUM CHLORIDE, PRESERVATIVE FREE 10 ML: 5 INJECTION INTRAVENOUS at 08:17

## 2021-09-23 RX ADMIN — SERTRALINE 50 MG: 50 TABLET, FILM COATED ORAL at 08:16

## 2021-09-23 RX ADMIN — Medication 1 TABLET: at 08:16

## 2021-09-23 RX ADMIN — THIAMINE HYDROCHLORIDE 100 MG: 100 INJECTION, SOLUTION INTRAMUSCULAR; INTRAVENOUS at 08:17

## 2021-09-23 RX ADMIN — LORAZEPAM 2 MG: 1 TABLET ORAL at 11:52

## 2021-09-23 RX ADMIN — THIAMINE HYDROCHLORIDE 250 MG: 100 INJECTION, SOLUTION INTRAMUSCULAR; INTRAVENOUS at 08:16

## 2021-09-23 RX ADMIN — LORAZEPAM 3 MG: 2 INJECTION INTRAMUSCULAR; INTRAVENOUS at 00:47

## 2021-09-23 RX ADMIN — LEVETIRACETAM 500 MG: 500 TABLET, FILM COATED ORAL at 08:16

## 2021-09-23 RX ADMIN — LORAZEPAM 2 MG: 2 INJECTION INTRAMUSCULAR; INTRAVENOUS at 06:17

## 2021-09-23 RX ADMIN — LORAZEPAM 2 MG: 1 TABLET ORAL at 09:35

## 2021-09-23 ASSESSMENT — PAIN SCALES - GENERAL: PAINLEVEL_OUTOF10: 0

## 2021-09-23 ASSESSMENT — PAIN DESCRIPTION - PROGRESSION: CLINICAL_PROGRESSION: GRADUALLY IMPROVING

## 2021-09-23 NOTE — PROGRESS NOTES
Physical Therapy    Physical Therapy Initial Assessment     Name: Niki Diaz  : 1990  MRN: [id number]      Date of Service: 2021    Evaluating PT: Bharathi Duvall, PT, DPT [id number]      Room #:  2500/9903-M  Diagnosis:  Injury of head, initial encounter [S09.90XA]  [facility] [Y67.842, R56.9]  PMHx/PSHx:  Seizures, convulsions, PTSD, anxiety, asthma, concussion with loss of consciousness, depression  Precautions:  Fall risk, seizure precautions    SUBJECTIVE:    Pt lives with brother in a 2 story house with 6 stair(s) and 2 rail(s) to enter. Bed is on the second floor and bath is on the second floor. Full flight of stairs and 2 rails to second floor. Pt ambulated without AD prior to admission. OBJECTIVE:   Initial Evaluation  Date: 21 Treatment Date: Short Term/ Long Term   Goals   AM-PAC 6 Clicks      Was pt agreeable to Eval/treatment? Yes     Does pt have pain? 2/10 tongue pain     Bed Mobility  Rolling: NT  Supine to sit: Independent   Sit to supine: Independent   Scooting: Independent   NA   Transfers Sit to stand: Supervision  Stand to sit: Supervision  Stand pivot: SBA without AD  Sit to stand: Independent   Stand to sit: Independent   Stand pivot: Independent    Ambulation   200, 100 feet without AD with SBA  800 feet Independent    Stair negotiation: ascended and descended 4 step(s) with 1 rail(s) with SBA  10 step(s) with 1 rail(s) Mod Independent    ROM BUE: Refer to OT note  BLE: WFL     Strength BUE: Refer to OT note  BLE: 5/5     Balance Sitting EOB: Independent   Dynamic Standing: SBA without AD  Dynamic Standing: Independent      Pt is A & O x: 4 to person, place, month/year, and situation. Sensation: Pt denies numbness and tingling of extremities. Edema: Unremarkable. Patient education  Pt educated on PT role in acute care setting.     Patient response to education:   Pt verbalized understanding Pt demonstrated skill Pt requires further education in this area   Yes NA No      ASSESSMENT:    Conditions Requiring Skilled Therapeutic Intervention:    [x]Decreased strength     []Decreased ROM  [x]Decreased functional mobility  [x]Decreased balance   []Decreased endurance   []Decreased posture  []Decreased sensation  []Decreased coordination   []Decreased vision  [x]Decreased safety awareness   []Increased pain       Comments:    Pt was in bed upon room entry, agreeable to PT evaluation. Pt can be impulsive and is unsteady when ambulating. Pt completed stand and ambulated to doorway when this PT instructed him to wait. Pt ambulated in hallway. Pt tended to deviate to the L when ambulating and nearly bumped into obstacles/walls. Pt negotiated stairs with reciprocal step pattern and had good steadiness with rail. Pt ambulated back to room and returned to bed. Pt is eager to discharge back to rehab facility. Pt was left in bed with all needs met at conclusion of session. Treatment:  Patient practiced and was instructed in the following treatment:     Therapeutic activities:  o Transfers: Pt was cued for hand placement during sit <> stand transfers. Pt completed x2 transfers from EOB. o Ambulation: Pt ambulated extended distance x2 reps without AD as standing balance was challenged. Pt was cued for safety and for awareness of L sided drift.  o Stairs: Pt was cued for safety on stairs. o Vitals and symptoms were closely monitored throughout session. Pt's/family goals:  1. To return home. Prognosis is Good for reaching above PT goals. Patient and or family understand(s) diagnosis, prognosis, and plan of care. Yes.     PHYSICAL THERAPY PLAN OF CARE:    PT POC is established based on physician order and patient diagnosis     Referring provider/PT Order:    Start   Ordering Provider    09/22/21 9853  PT evaluation and treat Start: 09/22/21 0545, End: 09/22/21 0545, ONE TIME, Standing Count: 1 Occurrences R      Cynthia Finley, DO        Diagnosis:  Injury of head, initial encounter [S09.90XA]  [facility] [I95.335, R56.9]  Specific instructions for next treatment:  Increase ambulation distance. Current Treatment Recommendations:     [x] Strengthening to improve independence with functional mobility   [] ROM to improve independence with functional mobility   [x] Balance Training to improve static/dynamic balance and to reduce fall risk  [] Endurance Training to improve activity tolerance during functional mobility   [x] Transfer Training to improve safety and independence with all functional transfers   [x] Gait Training to improve gait mechanics, endurance and assess need for appropriate assistive device  [x] Stair Training in preparation for safe discharge home and/or into the community   [] Positioning to prevent skin breakdown and contractures  [x] Safety and Education Training   [] Patient/Caregiver Education   [] HEP  [] Other     PT long term treatment goals are located in above grid    Frequency of treatments: 2-5x/week x 1-2 days. Time in  0851  Time out  0915    Total Treatment Time  10 minutes     Evaluation Time includes thorough review of current medical information, gathering information on past medical history/social history and prior level of function, completion of standardized testing/informal observation of tasks, assessment of data and education on plan of care and goals.     CPT codes:  [x] Low Complexity PT evaluation 71229  [] Moderate Complexity PT evaluation 24377  [] High Complexity PT evaluation 34651  [] PT Re-evaluation 15475  [] Gait training 92499 0 minutes  [] Manual therapy 27021 0 minutes  [x] Therapeutic activities 48233 10 minutes  [] Therapeutic exercises 15934 0 minutes  [] Neuromuscular reeducation 67593 0 minutes     Flores Tucker, PT, DPT  [id number]

## 2021-09-23 NOTE — PROGRESS NOTES
[facility]  Internal Medicine Residency Program  Progress Note - House Team 1    Patient:  Isaias Qureshi 31 y.o. male MRN: [id number]     Date of Service: 9/23/2021     CC: seizure  Overnight events: No acute overnight events    Subjective     Patient seen and examined this morning. Patient is on bed, resting, not in acute distress. He expresses his desire to go back to [place]. He did not have any seizure overnight. He had 16 mg of lorazepam for the past 24 hrs per [facility] protocol. Objective     Physical Exam:  · Vitals: /88   Pulse 78   Temp 97.3 °F (36.3 °C) (Temporal)   Resp 18   Ht 5' 6\" (1.676 m)   Wt 140 lb (63.5 kg)   SpO2 98%   BMI 22.60 kg/m²     · I & O - 24hr: No intake/output data recorded. · General Appearance: alert, appears stated age and cooperative  · HEENT:  Head: Normocephalic, no lesions, without obvious abnormality. · Neck: no adenopathy, no carotid bruit, no JVD, supple, symmetrical, trachea midline and thyroid not enlarged, symmetric, no tenderness/mass/nodules  · Lung: clear to auscultation bilaterally  · Heart: regular rate and rhythm, S1, S2 normal, no murmur, click, rub or gallop  · Abdomen: soft, non-tender; bowel sounds normal; no masses,  no organomegaly  · Extremities:  extremities normal, atraumatic, no cyanosis or edema  · Musculokeletal: No joint swelling, no muscle tenderness. ROM normal in all joints of extremities.    · Neurologic: Mental status: Alert, oriented, thought content appropriate  Subject  Pertinent Labs & Imaging Studies   caesar  CBC with Differential:    Lab Results   Component Value Date    WBC 9.4 09/23/2021    RBC 4.04 09/23/2021    HGB 13.5 09/23/2021    HCT 40.0 09/23/2021     09/23/2021    MCV 99.0 09/23/2021    MCH 33.4 09/23/2021    MCHC 33.8 09/23/2021    RDW 12.9 09/23/2021    NRBC 0.9 07/20/2021    LYMPHOPCT 20.6 09/23/2021    MONOPCT 7.5 09/23/2021    BASOPCT 0.6 09/23/2021    MONOSABS 0.71 09/23/2021 LYMPHSABS 1.94 09/23/2021    EOSABS 0.14 09/23/2021    BASOSABS 0.06 09/23/2021     BMP:    Lab Results   Component Value Date     09/23/2021    K 4.1 09/23/2021    K 4.6 08/30/2021     09/23/2021    CO2 25 09/23/2021    BUN 6 09/23/2021    LABALBU 3.5 09/23/2021    CREATININE 0.5 09/23/2021    CALCIUM 8.9 09/23/2021    GFRAA >60 09/23/2021    LABGLOM >60 09/23/2021    GLUCOSE 77 09/23/2021     Hepatic Function Panel:    Lab Results   Component Value Date    ALKPHOS 77 09/23/2021    ALT 14 09/23/2021    AST 21 09/23/2021    PROT 6.1 09/23/2021    BILITOT 0.6 09/23/2021    BILIDIR <0.2 09/02/2021    IBILI see below 09/02/2021    LABALBU 3.5 09/23/2021     Magnesium:    Lab Results   Component Value Date    MG 2.1 09/23/2021     Phosphorus:    Lab Results   Component Value Date    PHOS 4.0 09/23/2021     U/A:    Lab Results   Component Value Date    COLORU Yellow 09/04/2021    PROTEINU Negative 09/04/2021    PHUR 7.0 09/04/2021    WBCUA 0-1 09/04/2021    RBCUA NONE 09/04/2021    BACTERIA NONE SEEN 09/04/2021    CLARITYU Clear 09/04/2021    SPECGRAV <=1.005 09/04/2021    LEUKOCYTESUR Negative 09/04/2021    UROBILINOGEN 1.0 09/04/2021    BILIRUBINUR Negative 09/04/2021    BLOODU Negative 09/04/2021    GLUCOSEU Negative 09/04/2021       Resident's Assessment and Plan     Seizure probably 2/2 alcohol withdrawal in the setting of Partial-complex seizure  -last intake of alcohol a day before admission (1  of [place])  -with Hx of partial-complex seizure  -not on any medication  -previously on oxcarbazepine-but states that it makes him foggy  -started on CIWA  -amenable to be started on levetiracetam  -no episodes of seizure since admission    Left posterior parietal scalp hematoma  -had a seizure episode and hit his head  -CT scan-no fracture, with L posterior parietal scalp hematoma    Hypokalemia, resolved  -K 3.3, replaced  -K up to 4.1    Polysubstance abuse  -UDS + for barbiturates and cannabinoids  -alcohol drinker  -smokes tobacco  -placed on nicotine patch 21 mg/24 hrs  -SW and CM consulted    PT/OT evaluation:  DVT prophylaxis/ GI prophylaxis:   Disposition: home +/- home health / Nery Franklin / Ashleigh Duggan / Drea Hsu MD, PGY-1  Attending physician: Dr. Edil Sexton

## 2021-09-23 NOTE — DISCHARGE INSTR - COC
Continuity of Care Form    Patient Name: Eugene Chambers   :  1990  MRN:  [id number]    Admit date:  2021  Discharge date:  ***    Code Status Order: Full Code   Advance Directives:      Admitting Physician:  Viviane Fraire DO  PCP: No primary care provider on file. Discharging Nurse: [facility]/Room#: 8503/8503-B  Discharging Unit Phone Number: ***    Emergency Contact:   Extended Emergency Contact Information  Primary Emergency Contact: Bao Atwood  Home Phone: [phone]  Mobile Phone: [phone]  Relation: Brother/Sister   needed?  No    Past Surgical History:  Past Surgical History:   Procedure Laterality Date    COLONOSCOPY      ENDOSCOPY, COLON, DIAGNOSTIC      UPPER GASTROINTESTINAL ENDOSCOPY N/A 2021    EGD BIOPSY performed by Wilmer Singleton MD at [facility] ENDOSCOPY       Immunization History:   Immunization History   Administered Date(s) Administered    Tdap (Boostrix, Adacel) 2021       Active Problems:  Patient Active Problem List   Diagnosis Code    Alcohol withdrawal syndrome with complication ([address] A96.725    Alcoholism ([address] B97.28    Acute alcoholic intoxication ([address] F10.929    Severe single current episode of major depressive disorder, without psychotic features ([address] F32.2    Marijuana smoker F12.90    Major depressive disorder, severe (HCC) F32.2    Severe depressed bipolar I disorder without psychotic features ([address] F31.4    Alcohol-induced acute pancreatitis K85.20    Alcohol withdrawal ([address] F10.239    Alcohol abuse with withdrawal ([address] F10.139    Alcohol withdrawal, uncomplicated ([address] E73.186    Alcohol induced acute pancreatitis without necrosis or infection K85.20    Lactic acidosis E87.2    Pancreatitis, recurrent K85.90    Alcohol withdrawal seizure ([address] F10.239, E74.9    Alcoholic fatty liver T37.9    Tobacco abuse Z72.0    Hypokalemia E87.6    Impending delirium tremens ([address] F10.239    Thrombocytopenia (HCC) D69.6 Melena K92.1    Acute deep vein thrombosis (DVT) of right upper extremity (HCC) I82.621    Mild protein-calorie malnutrition (HCC) E44.1    MDD (major depressive disorder), recurrent episode, severe without psychosis ([facility] F33.2    Anxiety disorder F41.9    Alcohol dependence ([facility] F10.20    MDD (major depressive disorder), recurrent severe, without psychosis ([facility] F33.2    Nonintractable epilepsy with complex partial seizures ([facility] G40.209       Isolation/Infection:   Isolation            No Isolation          Patient Infection Status       Infection Onset Added Last Indicated Last Indicated By Review Planned Expiration Resolved Resolved By    None active    Resolved    C-diff Rule Out 09/03/21 09/03/21 09/03/21 Clostridium difficile EIA (Ordered)   09/07/21 Elyse Centeno RN    This Order Has Been Canceled    Order Status Reason By On  Canceled None Luz Maria Cook RN 9/3/21 0956        COVID-19 Rule Out 09/02/21 09/02/21 09/02/21 COVID-19, Rapid (Ordered)   09/03/21 Rule-Out Test Resulted    C-diff Rule Out 06/14/21 06/15/21 06/14/21 C. difficile toxin Molecular (Ordered)   06/15/21 Rule-Out Test Resulted    C-diff Rule Out 05/18/21 05/18/21 06/14/21 CLOSTRIDIUM DIFFICILE EIA (Ordered)   06/15/21 Rule-Out Test Resulted    COVID-19 Rule Out 08/22/20 08/22/20 08/22/20 COVID-19 (Ordered)   08/22/20 Rule-Out Test Resulted    COVID-19 Rule Out 08/02/20 08/02/20 08/02/20 COVID-19 (Ordered)   08/02/20 Rule-Out Test Resulted            Nurse Assessment:  Last Vital Signs: /82   Pulse 64   Temp 98.1 °F (36.7 °C) (Temporal)   Resp 16   Ht 5' 6\" (1.676 m)   Wt 140 lb (63.5 kg)   SpO2 98%   BMI 22.60 kg/m²     Last documented pain score (0-10 scale): Pain Level: 0  Last Weight:   Wt Readings from Last 1 Encounters:   09/21/21 140 lb (63.5 kg)     Mental Status:  {IP PT MENTAL STATUS:20030:::0}    IV Access:  {[facility] IV ACCESS:695143685:::0}    Nursing Mobility/ADLs:  Walking   {P DME BLJQ:427094338:::4}  Transfer  {CHP DME ADLs:031895459:::0}  Bathing  {CHP DME ADLs:365606571:::0}  Dressing  {CHP DME ADLs:457909963:::0}  Toileting  {CHP DME ADLs:928579577:::0}  Feeding  {CHP DME ADLs:078815688:::0}  Med Admin  {CHP DME ADLs:120841495:::0}  Med Delivery   {MH OUMOU MED Delivery:109689049:::0}    Wound Care Documentation and Therapy:  Wound 21 Axilla Right BRUISING TO RIGHT SHOULDER (Active)   Number of days: 20       Wound 21 Right periorbital bruising (Active)   Number of days: 20       Wound 21 Radial Left;Proximal bruising (Active)   Number of days: 20        Elimination:  Continence:    Bowel: {YES / KE:20645}  Bladder: {YES / HS:65359}  Urinary Catheter: {Urinary Catheter:368006061:::0}   Colostomy/Ileostomy/Ileal Conduit: {YES / YC:13596}       Date of Last BM: ***    Intake/Output Summary (Last 24 hours) at 2021 1308  Last data filed at 2021  Gross per 24 hour   Intake --   Output 650 ml   Net -650 ml     I/O last 3 completed shifts:  In: -   Out: 650 [Urine:650]    Safety Concerns:     [employer] Safety Concerns:036480111:::0}    Impairments/Disabilities:      [employer] Impairments/Disabilities:220144085:::0}    Nutrition Therapy:  Current Nutrition Therapy:   [employer] Diet List:825833180:::0}    Routes of Feeding: {CHP DME Other Feedings:226699080:::0}  Liquids: {Slp liquid thickness:00065}  Daily Fluid Restriction: {CHP DME Yes amt example:745605449:::0}  Last Modified Barium Swallow with Video (Video Swallowing Test): {Done Not Done GSTV:119950201:::8}    Treatments at the Time of Hospital Discharge:   Respiratory Treatments: ***  Oxygen Therapy:  {Therapy; copd oxygen:45541:::0}  Ventilator:    { ALONSO Vent List:413856085:::0}    Rehab Therapies: {THERAPEUTIC INTERVENTION:0329692785}  Weight Bearing Status/Restrictions: {[facility] Weight Bearin:::0}  Other Medical Equipment (for information only, NOT a DME order):  {EQUIPMENT:673197242}  Other Treatments: ***    Patient's personal belongings (please select all that are sent with patient):  {CHP DME Belongings:404112113:::0}    RN SIGNATURE:  {Esignature:388017119:::0}    CASE MANAGEMENT/SOCIAL WORK SECTION    Inpatient Status Date: ***    Readmission Risk Assessment Score:  Readmission Risk              Risk of Unplanned Readmission:  52           Discharging to Facility/ Agency   Name:   Address:  Phone:  Fax:    Dialysis Facility (if applicable)   Name:  Address:  Dialysis Schedule:  Phone:  Fax:    / signature: {Esignature:418945371:::0}    PHYSICIAN SECTION    Prognosis: Good    Condition at Discharge: Stable    Rehab Potential (if transferring to Rehab): Good    Recommended Labs or Other Treatments After Discharge: None    Physician Certification: I certify the above information and transfer of Champ Peck  is necessary for the continuing treatment of the diagnosis listed and that he requires for detox facility. Update Admission H&P: Changes in H&P as follows -    The patient is a 31 y.o. male with a past medical history of seizures, convulsions, alcohol abuse, Depression, Anxiety, PTSD and numbness/tingling who presents to the ED due to a seizure. In the ED, he was hemodynamically stable. Upon further history, he is not currently taking oxcarbazepine which is listed as his home medication for seizure because it makes him foggy. No episode of seizure overnight. He did require 14 mg of lorazepam per [facility] protocol. The following morning, he is awake, wanting to go back to [place]. Vital signs stable. Denies any fever, headache, dyspnea, shortness of breath, abdominal pain. He was then discharged to [place].      PHYSICIAN SIGNATURE:  Electronically signed by Ross Gamble MD on 9/23/21 at 1:10 PM EDT

## 2021-09-23 NOTE — PROGRESS NOTES
facilitate/challenge dynamic balance, stand tolerance for increased safety and independence with ADLs      Modified [place] Scale (MRS)  Score     Description  0             No symptoms  1             No significant disability despite symptoms  2             Slight disability; able to look after own affairs  3             Moderate disability; able to ambulate without assist/ requires assist with ADLs  4             Moderate/Severe disability;requires assist to ambulate/assist with ADLs  5             Severe disability;bedridden/incontinent   6               Score:   2     Recommended Adaptive Equipment: TBD     Home Living: Pt admitted from [facility]. Prior Level of Function: Indep with ADLs , Indep with IADLs; ambulated w/ no AD  Driving: No  Occupation: not stated    Pain Level: Pt reports 0/10 pain this session  Cognition: A&O: 4/4; Follows 2 step directions   Memory:  good   Sequencing:  good   Problem solving:  fair   Judgement/safety:  fair     Functional Assessment:  AM-PAC Daily Activity Raw Score:    Initial Eval Status  Date: 21 Treatment Status  Date: STGs = LTGs  Time frame: 10-14 days   Feeding Independent      Grooming Stand by Assist (standing at sink)  Independent    UB Dressing Stand by Assist   Independent    LB Dressing Stand by Assist don/doff socks  Independent    Bathing Stand by Assist  Independent    Toileting Stand by Assist   Independent    Bed Mobility  Supine to sit: Stand by Assist   Sit to supine: Stand by Assist   Supine to sit:  Independent   Sit to supine: Independent    Functional Transfers Stand by Assist   Independent    Functional Mobility Stand by Assist w/ no AD (short distance in hallway, mild unsteadiness)  Independent    Balance Sitting:     Dynamic: supervision  Standing: SBA w/ no AD  Sitting:     Dynamic: Indep  Standing: Indep   Activity Tolerance Fair+  good   Visual/  Perceptual Glasses: None    WFL                Hand Dominance R AROM (PROM) Strength Additional Info:    RUE  WFL 4/5 good  and wfl FMC/dexterity noted during ADL tasks       LUE WFL 4/5 good  and wfl FMC/dexterity noted during ADL tasks       Hearing: [facility]   Sensation:  Pt c/o numbness/tingling in mouth \"from where I bit down on lip during seizure\"  Tone: WFL   Edema: none noted    Comments: Obtained nursing clearance prior to session. Upon arrival patient lying in bed. Pt demonstrating fair+ understanding of education/techniques, requiring additional training / education for increased independence for ADL completion. At end of session, patient lying in bed with bed alarm on with call light and phone within reach, all lines and tubes intact. Pt instructed on use of call light for assistance and fall prevention. Line management and environmental modifications made prior to and end of session to ensure patient safety and to increase efficiency of session. Skilled monitoring of HR, O2 saturation, blood pressure and patient's response to activity performed throughout session. Overall pt demonstrated mild decreased independence and safety during completion of ADL/functional transfers/mobility tasks. Pt would benefit from continued skilled OT to increase safety and independence with completion of ADL/IADL tasks for functional independence and quality of life. Therapist assisted pt to EOB, pt donned/doffed socks, performed sit<>stand transfer and ambulated to bathroom for commode transfer. Then, pt completed functional mobility in hallway with minimal unsteadiness (cueing for safety). Pt returned to EOB>supine and left with all needs met at end of session. Rehab Potential: Good for established goals     Patient / Family Goal: go back to detox facility     Patient and/or family were instructed on functional diagnosis, prognosis/goals and OT plan of care. Demonstrated good understanding.      Eval Complexity: Low    Time In: 8:25  Time Out: 8:35  Total Treatment Time:

## 2021-09-23 NOTE — DISCHARGE SUMMARY
[address]  Discharge Summary    PCP: No primary care provider on file. Admit Date:9/21/2021  Discharge Date: 9/23/2021    Admission Diagnosis:   1. Seizure secondary to alcohol withdrawal in the setting of Hx of partial complex seizures  2. Partial complex seizure  3. Polysubstance abuse    Discharge Diagnosis:  1. Seizure secondary to alcohol withdrawal in the setting of Hx of partial complex seizures  2. Left posterior parietal scalp hematoma, resolving  3. Partial complex seizure  4. Polysubstance abuse  5. Hypokalemia, resolved    Hospital Course: The patient is a 31 y.o. male with a past medical history of seizures, convulsions, alcohol abuse, Depression, Anxiety, PTSD and numbness/tingling who presents to the ED due to a seizure. In the ED, he was hemodynamically stable. Upon further history, he is not currently taking oxcarbazepine which is listed as his home medication for seizure because it makes him foggy. No episode of seizure overnight. He did require 14 mg of lorazepam per [facility] protocol. The following morning, he is awake, wanting to go back to [place]. Vital signs stable. Denies any fever, headache, dyspnea, shortness of breath, abdominal pain. He was then discharged to [place].        Significant findings (history and exam, laboratory, radiological, pathology, other tests):   · General Appearance: alert, appears stated age and cooperative  · HEENT:  Head: Normocephalic, no lesions, without obvious abnormality.   · Neck: no adenopathy, no carotid bruit, no JVD, supple, symmetrical, trachea midline and thyroid not enlarged, symmetric, no tenderness/mass/nodules  · Lung: clear to auscultation bilaterally  · Heart: regular rate and rhythm, S1, S2 normal, no murmur, click, rub or gallop  · Abdomen: soft, non-tender; bowel sounds normal; no masses,  no organomegaly  · Extremities:  extremities normal, atraumatic, no cyanosis or edema  · Musculokeletal: No joint swelling, no muscle tenderness. ROM normal in all joints of extremities. · Neurologic: Mental status: Alert, oriented, thought content appropriate  ·     Pending test results:   1. None    Consults:  1. None    Procedures:  1. None    Condition at discharge: Stable    Disposition: [place]-detox facility    Discharge Medications:     Medication List      START taking these medications    levETIRAcetam 500 MG tablet  Commonly known as: KEPPRA  Take 1 tablet by mouth 2 times daily        CONTINUE taking these medications    folic acid 1 MG tablet  Commonly known as: FOLVITE  Take 1 tablet by mouth daily     multivitamin Tabs tablet  Take 1 tablet by mouth daily     sertraline 50 MG tablet  Commonly known as: ZOLOFT  Take 1 tablet by mouth daily     vitamin B-1 100 MG tablet  Commonly known as: THIAMINE  Take 1 tablet by mouth daily        STOP taking these medications    acamprosate 333 MG tablet  Commonly known as: CAMPRAL     nicotine 21 MG/24HR  Commonly known as: NICODERM CQ     OXcarbazepine 300 MG tablet  Commonly known as: TRILEPTAL           Where to Get Your Medications      These medications were sent to [facility], [address] [address]    Phone: [phone]   · levETIRAcetam 500 MG tablet  · sertraline 50 MG tablet        Please follow up with [address]. Please call to make an appointment at [phone].     Xiomy Mcdaniels MD  PGY-1   1:33 PM 9/23/2021

## 2021-09-23 NOTE — CARE COORDINATION
SAM spoke with Blanca at [facility], [phone]. She confirmed they can take patient back today at 2p. Will need N/N called to them at [phone]. Have provided [employer] for discharge. Have sent perfect serve message to [facility] Team 1 for discharge orders.

## 2021-09-23 NOTE — PROGRESS NOTES
Attempted to call nurse to nurse at [phone]. No answer and voicemail box full.  Will attempt again later

## 2021-09-24 LAB
EKG ATRIAL RATE: 57 BPM
EKG P AXIS: 20 DEGREES
EKG P-R INTERVAL: 112 MS
EKG Q-T INTERVAL: 438 MS
EKG QRS DURATION: 82 MS
EKG QTC CALCULATION (BAZETT): 426 MS
EKG R AXIS: 42 DEGREES
EKG T AXIS: 55 DEGREES
EKG VENTRICULAR RATE: 57 BPM

## 2021-09-24 PROCEDURE — 93010 ELECTROCARDIOGRAM REPORT: CPT | Performed by: INTERNAL MEDICINE

## 2021-09-25 LAB — OXCARBAZEPINE: <1 UG/ML (ref 3–35)

## 2021-09-27 LAB — OXCARBAZEPINE: <1 UG/ML (ref 3–35)

## 2021-10-04 ENCOUNTER — HOSPITAL ENCOUNTER (EMERGENCY)
Age: 31
Discharge: LEFT BEFORE DISCHARGE | End: 2021-10-05
Attending: EMERGENCY MEDICINE
Payer: COMMERCIAL

## 2021-10-04 DIAGNOSIS — Z53.20 PATIENT LEFT BEFORE TREATMENT COMPLETED: Primary | ICD-10-CM

## 2021-10-04 PROCEDURE — 99284 EMERGENCY DEPT VISIT MOD MDM: CPT

## 2021-10-05 VITALS
HEART RATE: 100 BPM | SYSTOLIC BLOOD PRESSURE: 111 MMHG | WEIGHT: 140 LBS | BODY MASS INDEX: 22.6 KG/M2 | OXYGEN SATURATION: 95 % | DIASTOLIC BLOOD PRESSURE: 84 MMHG | RESPIRATION RATE: 30 BRPM | TEMPERATURE: 99.4 F

## 2021-10-05 LAB
ALBUMIN SERPL-MCNC: 4.4 G/DL (ref 3.5–5.2)
ALP BLD-CCNC: 109 U/L (ref 40–129)
ALT SERPL-CCNC: 62 U/L (ref 0–40)
ANION GAP SERPL CALCULATED.3IONS-SCNC: 19 MMOL/L (ref 7–16)
AST SERPL-CCNC: 100 U/L (ref 0–39)
BASOPHILS ABSOLUTE: 0 E9/L (ref 0–0.2)
BASOPHILS RELATIVE PERCENT: 0.3 % (ref 0–2)
BILIRUB SERPL-MCNC: 0.5 MG/DL (ref 0–1.2)
BUN BLDV-MCNC: 3 MG/DL (ref 6–20)
CALCIUM SERPL-MCNC: 8.8 MG/DL (ref 8.6–10.2)
CHLORIDE BLD-SCNC: 96 MMOL/L (ref 98–107)
CO2: 21 MMOL/L (ref 22–29)
CREAT SERPL-MCNC: 0.6 MG/DL (ref 0.7–1.2)
EOSINOPHILS ABSOLUTE: 0 E9/L (ref 0.05–0.5)
EOSINOPHILS RELATIVE PERCENT: 0.1 % (ref 0–6)
GFR AFRICAN AMERICAN: >60
GFR NON-AFRICAN AMERICAN: >60 ML/MIN/1.73
GLUCOSE BLD-MCNC: 281 MG/DL (ref 74–99)
HCT VFR BLD CALC: 49.1 % (ref 37–54)
HEMOGLOBIN: 17.3 G/DL (ref 12.5–16.5)
LYMPHOCYTES ABSOLUTE: 0.93 E9/L (ref 1.5–4)
LYMPHOCYTES RELATIVE PERCENT: 9.6 % (ref 20–42)
MCH RBC QN AUTO: 33.3 PG (ref 26–35)
MCHC RBC AUTO-ENTMCNC: 35.2 % (ref 32–34.5)
MCV RBC AUTO: 94.6 FL (ref 80–99.9)
MONOCYTES ABSOLUTE: 0.65 E9/L (ref 0.1–0.95)
MONOCYTES RELATIVE PERCENT: 7 % (ref 2–12)
NEUTROPHILS ABSOLUTE: 7.72 E9/L (ref 1.8–7.3)
NEUTROPHILS RELATIVE PERCENT: 83.3 % (ref 43–80)
PDW BLD-RTO: 12.6 FL (ref 11.5–15)
PLATELET # BLD: 249 E9/L (ref 130–450)
PMV BLD AUTO: 11.2 FL (ref 7–12)
POTASSIUM SERPL-SCNC: 4 MMOL/L (ref 3.5–5)
RBC # BLD: 5.19 E12/L (ref 3.8–5.8)
RBC # BLD: NORMAL 10*6/UL
SODIUM BLD-SCNC: 136 MMOL/L (ref 132–146)
TOTAL PROTEIN: 6.9 G/DL (ref 6.4–8.3)
WBC # BLD: 9.3 E9/L (ref 4.5–11.5)

## 2021-10-05 PROCEDURE — 96374 THER/PROPH/DIAG INJ IV PUSH: CPT

## 2021-10-05 PROCEDURE — 2580000003 HC RX 258: Performed by: EMERGENCY MEDICINE

## 2021-10-05 PROCEDURE — 82077 ASSAY SPEC XCP UR&BREATH IA: CPT

## 2021-10-05 PROCEDURE — 80053 COMPREHEN METABOLIC PANEL: CPT

## 2021-10-05 PROCEDURE — 2500000003 HC RX 250 WO HCPCS: Performed by: EMERGENCY MEDICINE

## 2021-10-05 PROCEDURE — 6360000002 HC RX W HCPCS: Performed by: EMERGENCY MEDICINE

## 2021-10-05 PROCEDURE — 85025 COMPLETE CBC W/AUTO DIFF WBC: CPT

## 2021-10-05 PROCEDURE — 80143 DRUG ASSAY ACETAMINOPHEN: CPT

## 2021-10-05 PROCEDURE — 80179 DRUG ASSAY SALICYLATE: CPT

## 2021-10-05 PROCEDURE — 80307 DRUG TEST PRSMV CHEM ANLYZR: CPT

## 2021-10-05 RX ORDER — ONDANSETRON 2 MG/ML
4 INJECTION INTRAMUSCULAR; INTRAVENOUS ONCE
Status: COMPLETED | OUTPATIENT
Start: 2021-10-05 | End: 2021-10-05

## 2021-10-05 RX ORDER — PROMETHAZINE HYDROCHLORIDE 25 MG/ML
25 INJECTION, SOLUTION INTRAMUSCULAR; INTRAVENOUS ONCE
Status: DISCONTINUED | OUTPATIENT
Start: 2021-10-05 | End: 2021-10-05 | Stop reason: HOSPADM

## 2021-10-05 RX ADMIN — ONDANSETRON 4 MG: 2 INJECTION INTRAMUSCULAR; INTRAVENOUS at 00:58

## 2021-10-05 ASSESSMENT — ENCOUNTER SYMPTOMS
SORE THROAT: 0
ABDOMINAL PAIN: 0
DIARRHEA: 0
EYE DISCHARGE: 0
SINUS PRESSURE: 0
VOMITING: 1
WHEEZING: 0
BACK PAIN: 0
NAUSEA: 1
COUGH: 0
EYE PAIN: 0
SHORTNESS OF BREATH: 0
EYE REDNESS: 0

## 2021-10-05 ASSESSMENT — PAIN SCALES - GENERAL: PAINLEVEL_OUTOF10: 7

## 2021-10-05 NOTE — ED PROVIDER NOTES
Patient is a 30 y/o male who presents to the ED via EMS. Patient states that he is unable to eat or drink and believes that he is dehydrated. He states that he is an alcoholic and vomits every day. His last drink was approximately 8 hours prior to arrival. He denies any abdominal pain. He admits to using marijuana. He denies any other drug use. Review of Systems   Constitutional: Negative for chills and fever. HENT: Negative for ear pain, sinus pressure and sore throat. Eyes: Negative for pain, discharge and redness. Respiratory: Negative for cough, shortness of breath and wheezing. Cardiovascular: Negative for chest pain. Gastrointestinal: Positive for nausea and vomiting. Negative for abdominal pain and diarrhea. Genitourinary: Negative for dysuria and frequency. Musculoskeletal: Negative for arthralgias and back pain. Skin: Negative for rash and wound. Neurological: Negative for weakness and headaches. Hematological: Negative for adenopathy. All other systems reviewed and are negative. Physical Exam  Vitals and nursing note reviewed. Constitutional:       General: He is not in acute distress. HENT:      Head: Normocephalic and atraumatic. Right Ear: External ear normal.      Left Ear: External ear normal.      Nose: Nose normal.      Mouth/Throat:      Mouth: Mucous membranes are moist.   Eyes:      Conjunctiva/sclera: Conjunctivae normal.      Pupils: Pupils are equal, round, and reactive to light. Cardiovascular:      Rate and Rhythm: Regular rhythm. Tachycardia present. Heart sounds: No murmur heard. Pulmonary:      Effort: Pulmonary effort is normal. No respiratory distress. Breath sounds: Normal breath sounds. No stridor. No wheezing, rhonchi or rales. Abdominal:      General: Bowel sounds are normal. There is no distension. Palpations: Abdomen is soft. Tenderness: There is no abdominal tenderness. There is no guarding. Musculoskeletal:         General: Normal range of motion. Cervical back: Normal range of motion and neck supple. Skin:     General: Skin is warm and dry. Findings: No rash. Neurological:      Mental Status: He is alert and oriented to person, place, and time. Procedures     [facility]               --------------------------------------------- PAST HISTORY ---------------------------------------------  Past Medical History:  has a past medical history of Anxiety, Arm pain, Asthma, Concussion with loss of consciousness, Convulsions ([facility] [address] Depression, Flashing lights, Head injury, Headache, Leg pain, Numbness and tingling, PTSD (post-traumatic stress disorder), and Seizures ([facility] [address] Past Surgical History:  has a past surgical history that includes Colonoscopy; Endoscopy, colon, diagnostic; and Upper gastrointestinal endoscopy (N/A, 7/22/2021). Social History:  reports that he has been smoking. He has a 18.00 pack-year smoking history. He has never used smokeless tobacco. He reports current alcohol use of about 24.0 standard drinks of alcohol per week. He reports current drug use. Frequency: 1.00 time per week. Drug: Marijuana. Family History: family history includes Alcohol Abuse in his father and mother; Cancer in his father; Cirrhosis in his father; Mental Illness in his brother, mother, and sister; Substance Abuse in his father, maternal aunt, maternal uncle, mother, paternal aunt, paternal uncle, and sister. The patients home medications have been reviewed.     Allergies: Hydrocodone, Invega sustenna [paliperidone palmitate er], Paliperidone, Pcn [penicillins], and Fluticasone    -------------------------------------------------- RESULTS -------------------------------------------------  Labs:  Results for orders placed or performed during the hospital encounter of 10/04/21   CBC auto differential   Result Value Ref Range    WBC 9.3 4.5 - 11.5 E9/L    RBC 5.19 3.80 - 5.80 E12/L Hemoglobin 17.3 (H) 12.5 - 16.5 g/dL    Hematocrit 49.1 37.0 - 54.0 %    MCV 94.6 80.0 - 99.9 fL    MCH 33.3 26.0 - 35.0 pg    MCHC 35.2 (H) 32.0 - 34.5 %    RDW 12.6 11.5 - 15.0 fL    Platelets 647 256 - 371 E9/L    MPV 11.2 7.0 - 12.0 fL    Neutrophils % 83.3 (H) 43.0 - 80.0 %    Lymphocytes % 9.6 (L) 20.0 - 42.0 %    Monocytes % 7.0 2.0 - 12.0 %    Eosinophils % 0.1 0.0 - 6.0 %    Basophils % 0.3 0.0 - 2.0 %    Neutrophils Absolute 7.72 (H) 1.80 - 7.30 E9/L    Lymphocytes Absolute 0.93 (L) 1.50 - 4.00 E9/L    Monocytes Absolute 0.65 0.10 - 0.95 E9/L    Eosinophils Absolute 0.00 (L) 0.05 - 0.50 E9/L    Basophils Absolute 0.00 0.00 - 0.20 E9/L    RBC Morphology Normal    Comprehensive Metabolic Panel   Result Value Ref Range    Sodium 136 132 - 146 mmol/L    Potassium 4.0 3.5 - 5.0 mmol/L    Chloride 96 (L) 98 - 107 mmol/L    CO2 21 (L) 22 - 29 mmol/L    Anion Gap 19 (H) 7 - 16 mmol/L    Glucose 281 (H) 74 - 99 mg/dL    BUN 3 (L) 6 - 20 mg/dL    CREATININE 0.6 (L) 0.7 - 1.2 mg/dL    GFR Non-African American >60 >=60 mL/min/1.73    GFR African American >60     Calcium 8.8 8.6 - 10.2 mg/dL    Total Protein 6.9 6.4 - 8.3 g/dL    Albumin 4.4 3.5 - 5.2 g/dL    Total Bilirubin 0.5 0.0 - 1.2 mg/dL    Alkaline Phosphatase 109 40 - 129 U/L    ALT 62 (H) 0 - 40 U/L     (H) 0 - 39 U/L   Serum Drug Screen   Result Value Ref Range    Ethanol Lvl 298 mg/dL    Acetaminophen Level <5.0 (L) 10.0 - 84.7 mcg/mL    Salicylate, Serum <4.6 0.0 - 30.0 mg/dL       Radiology:  No orders to display       ------------------------- NURSING NOTES AND VITALS REVIEWED ---------------------------  Date / Time Roomed:  10/4/2021 11:56 PM  ED Bed Assignment:  ELLEN/ELLEN    The nursing notes within the ED encounter and vital signs as below have been reviewed.    /84   Pulse 100   Temp 99.4 °F (37.4 °C)   Resp 30   Wt 140 lb (63.5 kg)   SpO2 95%   BMI 22.60 kg/m²   Oxygen Saturation Interpretation: Normal      10/5/2021 / Time:  3:44 AM EDT.   PATIENT HAS ELOPED FROM DEPARTMENT PRIOR TO DISCHARGE / COMPLETION OF CARE AND DID NOT RETURN.   ------------------------------------------------------------------------------------------------------------------         [facility] [facility],   10/05/21 4702

## 2021-10-06 LAB
ACETAMINOPHEN LEVEL: <5 MCG/ML (ref 10–30)
ETHANOL: 298 MG/DL (ref 0–0.08)
SALICYLATE, SERUM: <0.3 MG/DL (ref 0–30)
TRICYCLIC ANTIDEPRESSANTS SCREEN SERUM: NEGATIVE NG/ML

## 2021-10-14 ENCOUNTER — HOSPITAL ENCOUNTER (EMERGENCY)
Age: 31
Discharge: HOME | End: 2021-10-15
Attending: EMERGENCY MEDICINE
Payer: COMMERCIAL

## 2021-10-14 VITALS
SYSTOLIC BLOOD PRESSURE: 103 MMHG | DIASTOLIC BLOOD PRESSURE: 70 MMHG | BODY MASS INDEX: 22.6 KG/M2 | WEIGHT: 140 LBS | HEART RATE: 86 BPM | TEMPERATURE: 99 F | OXYGEN SATURATION: 97 % | RESPIRATION RATE: 20 BRPM

## 2021-10-14 DIAGNOSIS — F10.930 ALCOHOL WITHDRAWAL SYNDROME WITHOUT COMPLICATION (HCC): Primary | ICD-10-CM

## 2021-10-14 LAB
ALBUMIN SERPL-MCNC: 3.8 G/DL (ref 3.5–5.2)
ALP BLD-CCNC: 102 U/L (ref 40–129)
ALT SERPL-CCNC: 30 U/L (ref 0–40)
ANION GAP SERPL CALCULATED.3IONS-SCNC: 11 MMOL/L (ref 7–16)
AST SERPL-CCNC: 33 U/L (ref 0–39)
BASOPHILS ABSOLUTE: 0.04 E9/L (ref 0–0.2)
BASOPHILS RELATIVE PERCENT: 0.7 % (ref 0–2)
BILIRUB SERPL-MCNC: 0.4 MG/DL (ref 0–1.2)
BUN BLDV-MCNC: 3 MG/DL (ref 6–20)
CALCIUM SERPL-MCNC: 8.6 MG/DL (ref 8.6–10.2)
CHLORIDE BLD-SCNC: 100 MMOL/L (ref 98–107)
CO2: 27 MMOL/L (ref 22–29)
CREAT SERPL-MCNC: 0.6 MG/DL (ref 0.7–1.2)
EOSINOPHILS ABSOLUTE: 0.07 E9/L (ref 0.05–0.5)
EOSINOPHILS RELATIVE PERCENT: 1.2 % (ref 0–6)
GFR AFRICAN AMERICAN: >60
GFR NON-AFRICAN AMERICAN: >60 ML/MIN/1.73
GLUCOSE BLD-MCNC: 105 MG/DL (ref 74–99)
HCT VFR BLD CALC: 38.7 % (ref 37–54)
HEMOGLOBIN: 13.4 G/DL (ref 12.5–16.5)
IMMATURE GRANULOCYTES #: 0.01 E9/L
IMMATURE GRANULOCYTES %: 0.2 % (ref 0–5)
LYMPHOCYTES ABSOLUTE: 2.55 E9/L (ref 1.5–4)
LYMPHOCYTES RELATIVE PERCENT: 45.2 % (ref 20–42)
MCH RBC QN AUTO: 33.4 PG (ref 26–35)
MCHC RBC AUTO-ENTMCNC: 34.6 % (ref 32–34.5)
MCV RBC AUTO: 96.5 FL (ref 80–99.9)
MONOCYTES ABSOLUTE: 1.02 E9/L (ref 0.1–0.95)
MONOCYTES RELATIVE PERCENT: 18.1 % (ref 2–12)
NEUTROPHILS ABSOLUTE: 1.95 E9/L (ref 1.8–7.3)
NEUTROPHILS RELATIVE PERCENT: 34.6 % (ref 43–80)
PDW BLD-RTO: 12.9 FL (ref 11.5–15)
PLATELET # BLD: 237 E9/L (ref 130–450)
PMV BLD AUTO: 10.3 FL (ref 7–12)
POTASSIUM REFLEX MAGNESIUM: 4 MMOL/L (ref 3.5–5)
RBC # BLD: 4.01 E12/L (ref 3.8–5.8)
SODIUM BLD-SCNC: 138 MMOL/L (ref 132–146)
TOTAL PROTEIN: 6.2 G/DL (ref 6.4–8.3)
WBC # BLD: 5.6 E9/L (ref 4.5–11.5)

## 2021-10-14 PROCEDURE — 85025 COMPLETE CBC W/AUTO DIFF WBC: CPT

## 2021-10-14 PROCEDURE — 36415 COLL VENOUS BLD VENIPUNCTURE: CPT

## 2021-10-14 PROCEDURE — 96372 THER/PROPH/DIAG INJ SC/IM: CPT

## 2021-10-14 PROCEDURE — 99284 EMERGENCY DEPT VISIT MOD MDM: CPT

## 2021-10-14 PROCEDURE — 80053 COMPREHEN METABOLIC PANEL: CPT

## 2021-10-14 PROCEDURE — 6360000002 HC RX W HCPCS: Performed by: EMERGENCY MEDICINE

## 2021-10-14 PROCEDURE — 6370000000 HC RX 637 (ALT 250 FOR IP): Performed by: EMERGENCY MEDICINE

## 2021-10-14 RX ORDER — CHLORDIAZEPOXIDE HYDROCHLORIDE 25 MG/1
50 CAPSULE, GELATIN COATED ORAL ONCE
Status: COMPLETED | OUTPATIENT
Start: 2021-10-14 | End: 2021-10-14

## 2021-10-14 RX ORDER — PHENOBARBITAL SODIUM 65 MG/ML
130 INJECTION INTRAMUSCULAR ONCE
Status: COMPLETED | OUTPATIENT
Start: 2021-10-14 | End: 2021-10-14

## 2021-10-14 RX ADMIN — PHENOBARBITAL SODIUM 130 MG: 65 INJECTION INTRAMUSCULAR; INTRAVENOUS at 22:51

## 2021-10-14 RX ADMIN — CHLORDIAZEPOXIDE HYDROCHLORIDE 50 MG: 25 CAPSULE ORAL at 21:30

## 2021-10-14 ASSESSMENT — ENCOUNTER SYMPTOMS
ABDOMINAL PAIN: 0
COLOR CHANGE: 0
COUGH: 0
RHINORRHEA: 0
NAUSEA: 0
DIARRHEA: 0
SHORTNESS OF BREATH: 0
VOMITING: 0
PHOTOPHOBIA: 0

## 2021-10-15 NOTE — ED NOTES
Pt. C/o feeling \"pressure\" in his eyes and is concernded he may have a seizure. No seizure activity noted at this time. Pt. Is at [facility] for alcohol detox.      Ryan Nair RN  10/14/21 0871

## 2021-10-15 NOTE — ED PROVIDER NOTES
Patient presents to the ED for evaluation. Patient currently enrolled at [facility]. He started going there this morning. States he was brought here because he was too symptomatic. Currently going on alcohol withdrawals. States he feels shaky and just not well. No seizure activity. No visual hallucinations noted. He states however when he closes his eyes he does see kaleidoscope appearance. No associated nausea or vomiting. Denies any actual pain. States that he wants to get better now so that he can go back to [facility]. His last drink was approximately 10 hours ago. Patient has been dealing with abuse issues for a long time. Currently denies any shortness of breath, fever, or chills. States that he was given a dose of phenobarbital earlier which provided mild relief. Symptoms are moderate in severity and have been gradually worsening. Him to have been persistent. Having alcohol recently and is making his symptoms worse. Review of Systems   Constitutional: Negative for chills, diaphoresis, fatigue and fever. HENT: Negative for congestion and rhinorrhea. Eyes: Negative for photophobia and visual disturbance. Respiratory: Negative for cough and shortness of breath. Cardiovascular: Negative for chest pain, palpitations and leg swelling. Gastrointestinal: Negative for abdominal pain, diarrhea, nausea and vomiting. Genitourinary: Negative for decreased urine volume and difficulty urinating. Musculoskeletal: Negative for arthralgias, myalgias, neck pain and neck stiffness. Skin: Negative for color change and pallor. Neurological: Positive for tremors. Negative for dizziness, syncope, light-headedness and headaches. Psychiatric/Behavioral: Negative for confusion and hallucinations. Physical Exam  Vitals and nursing note reviewed. Constitutional:       General: He is not in acute distress. Appearance: He is well-developed.  He is not diaphoretic. Comments: Patient appears slightly anxious. HENT:      Head: Normocephalic and atraumatic. Eyes:      Conjunctiva/sclera: Conjunctivae normal.   Cardiovascular:      Rate and Rhythm: Normal rate and regular rhythm. Heart sounds: Normal heart sounds. No murmur heard. Pulmonary:      Effort: Pulmonary effort is normal. No respiratory distress. Breath sounds: Normal breath sounds. No wheezing or rales. Abdominal:      General: Bowel sounds are normal.      Palpations: Abdomen is soft. Tenderness: There is no abdominal tenderness. There is no guarding or rebound. Musculoskeletal:      Cervical back: Normal range of motion and neck supple. Skin:     General: Skin is warm and dry. Neurological:      Mental Status: He is alert and oriented to person, place, and time. Sensory: No sensory deficit. Comments: Patient has very mild tremoring. Procedures     MDM   Patient presents to the ED for withdrawal symptoms. Patient is a chronic alcoholic. Currently has [facility]. They sent him here to be further evaluated and treated. He was given a dose of Librium as well as phenobarbital.  Since these treatments he has been doing better. He is not having any tremoring at this time. He is resting comfortably in bed no distress. No signs of DTs. No seizure activity. He is comfortable being discharged back to [facility] where he will continue his detox treatment. Labs were assessed in the ED. CBC showed no evidence of anemia or leukocytosis. CMP showed no evidence of electrolyte abnormalities specifically no hyponatremia. Normal renal function. Normal liver function. ED Course as of Oct 14 2310   Thu Oct 14, 2021   2202 Patient states that he is feeling a lot better but is still very shaky. Will order dose of phenobarbital IM and reassess. [MS]   2308 Patient states that he is feeling much better after the phenobarb.   He states that he would like to try to go back to [facility]. He understands that if his symptoms worsen or if he has new concerns that he can return to the ED. [MS]      ED Course User Index  [MS] Asia Mathews, DO       --------------------------------------------- PAST HISTORY ---------------------------------------------  Past Medical History:  has a past medical history of Anxiety, Arm pain, Asthma, Concussion with loss of consciousness, Convulsions ([facility] [address] Depression, Flashing lights, Head injury, Headache, Leg pain, Numbness and tingling, PTSD (post-traumatic stress disorder), and Seizures ([facility] [address] Past Surgical History:  has a past surgical history that includes Colonoscopy; Endoscopy, colon, diagnostic; and Upper gastrointestinal endoscopy (N/A, 7/22/2021). Social History:  reports that he has been smoking. He has a 18.00 pack-year smoking history. He has never used smokeless tobacco. He reports current alcohol use of about 24.0 standard drinks of alcohol per week. He reports current drug use. Frequency: 1.00 time per week. Drug: Marijuana. Family History: family history includes Alcohol Abuse in his father and mother; Cancer in his father; Cirrhosis in his father; Mental Illness in his brother, mother, and sister; Substance Abuse in his father, maternal aunt, maternal uncle, mother, paternal aunt, paternal uncle, and sister. The patients home medications have been reviewed.     Allergies: Hydrocodone, Invega sustenna [paliperidone palmitate er], Paliperidone, Pcn [penicillins], and Fluticasone    -------------------------------------------------- RESULTS -------------------------------------------------  Labs:  Results for orders placed or performed during the hospital encounter of 10/14/21   CBC Auto Differential   Result Value Ref Range    WBC 5.6 4.5 - 11.5 E9/L    RBC 4.01 3.80 - 5.80 E12/L    Hemoglobin 13.4 12.5 - 16.5 g/dL    Hematocrit 38.7 37.0 - 54.0 %    MCV 96.5 80.0 - 99.9 fL    MCH 33.4 26.0 - 35.0 pg    MCHC 34.6 (H) 32.0 - 34.5 %    RDW 12.9 11.5 - 15.0 fL    Platelets 763 997 - 261 E9/L    MPV 10.3 7.0 - 12.0 fL    Neutrophils % 34.6 (L) 43.0 - 80.0 %    Immature Granulocytes % 0.2 0.0 - 5.0 %    Lymphocytes % 45.2 (H) 20.0 - 42.0 %    Monocytes % 18.1 (H) 2.0 - 12.0 %    Eosinophils % 1.2 0.0 - 6.0 %    Basophils % 0.7 0.0 - 2.0 %    Neutrophils Absolute 1.95 1.80 - 7.30 E9/L    Immature Granulocytes # 0.01 E9/L    Lymphocytes Absolute 2.55 1.50 - 4.00 E9/L    Monocytes Absolute 1.02 (H) 0.10 - 0.95 E9/L    Eosinophils Absolute 0.07 0.05 - 0.50 E9/L    Basophils Absolute 0.04 0.00 - 0.20 E9/L   Comprehensive Metabolic Panel w/ Reflex to MG   Result Value Ref Range    Sodium 138 132 - 146 mmol/L    Potassium reflex Magnesium 4.0 3.5 - 5.0 mmol/L    Chloride 100 98 - 107 mmol/L    CO2 27 22 - 29 mmol/L    Anion Gap 11 7 - 16 mmol/L    Glucose 105 (H) 74 - 99 mg/dL    BUN 3 (L) 6 - 20 mg/dL    CREATININE 0.6 (L) 0.7 - 1.2 mg/dL    GFR Non-African American >60 >=60 mL/min/1.73    GFR African American >60     Calcium 8.6 8.6 - 10.2 mg/dL    Total Protein 6.2 (L) 6.4 - 8.3 g/dL    Albumin 3.8 3.5 - 5.2 g/dL    Total Bilirubin 0.4 0.0 - 1.2 mg/dL    Alkaline Phosphatase 102 40 - 129 U/L    ALT 30 0 - 40 U/L    AST 33 0 - 39 U/L       Radiology:  No orders to display       ------------------------- NURSING NOTES AND VITALS REVIEWED ---------------------------  Date / Time Roomed:  10/14/2021  8:35 PM  ED Bed Assignment:  19/19    The nursing notes within the ED encounter and vital signs as below have been reviewed.    /70   Pulse 86   Temp 99 °F (37.2 °C) (Oral)   Resp 20   Wt 140 lb (63.5 kg)   SpO2 97%   BMI 22.60 kg/m²   Oxygen Saturation Interpretation: Normal      ------------------------------------------ PROGRESS NOTES ------------------------------------------  I have spoken with the patient and discussed todays results, in addition to providing specific details for the plan of care and counseling regarding the diagnosis and prognosis. Their questions are answered at this time and they are agreeable with the plan. I discussed at length with them reasons for immediate return here for re evaluation. They will followup with primary care by calling their office tomorrow. --------------------------------- ADDITIONAL PROVIDER NOTES ---------------------------------  At this time the patient is without objective evidence of an acute process requiring hospitalization or inpatient management. They have remained hemodynamically stable throughout their entire ED visit and are stable for discharge with outpatient follow-up. The plan has been discussed in detail and they are aware of the specific conditions for emergent return, as well as the importance of follow-up. New Prescriptions    No medications on file       Diagnosis:  1. Alcohol withdrawal syndrome without complication ([facility] [address]        Disposition:  Patient's disposition: Discharge to [facility]  Patient's condition is stable.       Cheyenne Do DO  10/14/21 7310

## 2021-11-11 ENCOUNTER — HOSPITAL ENCOUNTER (EMERGENCY)
Age: 31
Discharge: LEFT BEFORE DISCHARGE | End: 2021-11-11
Attending: STUDENT IN AN ORGANIZED HEALTH CARE EDUCATION/TRAINING PROGRAM
Payer: COMMERCIAL

## 2021-11-11 VITALS
OXYGEN SATURATION: 98 % | SYSTOLIC BLOOD PRESSURE: 104 MMHG | RESPIRATION RATE: 18 BRPM | DIASTOLIC BLOOD PRESSURE: 81 MMHG | TEMPERATURE: 98.6 F | HEART RATE: 71 BPM

## 2021-11-11 DIAGNOSIS — F10.920 ACUTE ALCOHOLIC INTOXICATION WITHOUT COMPLICATION (HCC): Primary | ICD-10-CM

## 2021-11-11 LAB
CHP ED QC CHECK: NORMAL
GLUCOSE BLD-MCNC: 110 MG/DL
METER GLUCOSE: 110 MG/DL (ref 74–99)

## 2021-11-11 PROCEDURE — 99281 EMR DPT VST MAYX REQ PHY/QHP: CPT

## 2021-11-11 PROCEDURE — 82962 GLUCOSE BLOOD TEST: CPT

## 2021-11-11 RX ORDER — NICOTINE 21 MG/24HR
1 PATCH, TRANSDERMAL 24 HOURS TRANSDERMAL DAILY
Status: DISCONTINUED | OUTPATIENT
Start: 2021-11-11 | End: 2021-11-11 | Stop reason: HOSPADM

## 2021-11-11 NOTE — CARE COORDINATION
SW Consult. Pt here for ETOH intoxication. He was accepted for rehab (facility unknown) but when facility went to pick him up, he was declined as he was intoxicated. No Covid test. Pt has hx of ETOH abuse and recent psychiatric admission @ [facility] in [address]. Pt has been @ [facility] [phone] as well as [facility] numerous times. He has also refused & left treatment in past. He also left hospital AMA during June admission. SW attempted to meet w/pt and he has left the hospital, per [employer]. He did not want to be seen.    Electronically signed by PAT Brizuela on 11/11/2021 at 4:35 PM

## 2021-11-11 NOTE — ED PROVIDER NOTES
Ilene Tan is a 31year old male with PMH of alcohol abuse, pancreatits, who present emergency department after being declined by rehab for alcohol intoxication. The area facility stated that patient was intoxicated and needed to come to emergency department for evaluation as they can accept him currently at a rehab facility. Patient denies any complaints and feels well. Patient is alert and oriented and denies SI/HI. Patient denies nausea, vomiting, chest pain, shortness of breath. Patient not tried thing for his intoxication nothing makes it better or worse patient does not have any symptoms. The history is provided by the patient and medical records. Review of Systems   Constitutional: Negative for chills, diaphoresis, fatigue and fever. Eyes: Negative for photophobia and visual disturbance. Respiratory: Negative for cough, chest tightness and shortness of breath. Cardiovascular: Negative for chest pain, palpitations and leg swelling. Gastrointestinal: Negative for abdominal distention, abdominal pain, diarrhea, nausea and vomiting. Genitourinary: Negative for dysuria. Musculoskeletal: Negative for back pain, neck pain and neck stiffness. Skin: Negative for pallor and rash. Neurological: Negative for headaches. Psychiatric/Behavioral: Negative for confusion. Physical Exam  Vitals and nursing note reviewed. Constitutional:       General: He is not in acute distress. Appearance: Normal appearance. He is not ill-appearing. HENT:      Head: Normocephalic and atraumatic. Eyes:      General: No scleral icterus. Conjunctiva/sclera: Conjunctivae normal.      Pupils: Pupils are equal, round, and reactive to light. Cardiovascular:      Rate and Rhythm: Normal rate and regular rhythm. Pulmonary:      Effort: Pulmonary effort is normal.      Breath sounds: Normal breath sounds. Abdominal:      General: Bowel sounds are normal. There is no distension. Palpations: Abdomen is soft. Tenderness: There is no abdominal tenderness. There is no guarding or rebound. Musculoskeletal:      Cervical back: Normal range of motion and neck supple. No rigidity. No muscular tenderness. Right lower leg: No edema. Left lower leg: No edema. Skin:     General: Skin is warm and dry. Capillary Refill: Capillary refill takes less than 2 seconds. Coloration: Skin is not pale. Findings: No erythema or rash. Neurological:      Mental Status: He is alert and oriented to person, place, and time. Psychiatric:         Mood and Affect: Mood normal.          Procedures     MDM  Number of Diagnoses or Management Options  Acute alcoholic intoxication without complication ([facility] [address]  Diagnosis management comments: Modesta Cotto is a 31year old male who presented to emergency department any acute distress with complaint of alcohol intoxication who was refused from rehab facility due to intoxication. Patient does not have any complaints. Patient was alert and oriented to time of evaluation. Patient had normal blood sugar. Patient was listening to music and talking while in ED not in any distress. Patient was observed to ambulate well to the restroom while in emergency department. Patient was being observed in emergency department for intoxication but did not have any complaints. During patient's observation. Patient was reported to have run out the door of the hospital and could not be located. Patient then walked back into the emergency department several minutes later after eloping and decided that he would like to stay in the emergency department because he does want to be placed in rehab. Patient had eaten in ED, and then eloped again before lab work could be drawn, patient was alert and oriented, patient did not appear to be in any distress and was observed ambulating in ED to restroom without difficulty. Patient did not want family contacted. ED Course as of 11/12/21 1034   Thu Nov 11, 2021   1558 Patient eloped from ED [SS]      ED Course User Index  [SS] Yolanda Davis MD       --------------------------------------------- PAST HISTORY ---------------------------------------------  Past Medical History:  has a past medical history of Anxiety, Arm pain, Asthma, Concussion with loss of consciousness, Convulsions ([facility] [address] Depression, Flashing lights, Head injury, Headache, Leg pain, Numbness and tingling, PTSD (post-traumatic stress disorder), and Seizures ([facility] [address] Past Surgical History:  has a past surgical history that includes Colonoscopy; Endoscopy, colon, diagnostic; and Upper gastrointestinal endoscopy (N/A, 7/22/2021). Social History:  reports that he has been smoking. He has a 18.00 pack-year smoking history. He has never used smokeless tobacco. He reports current alcohol use of about 24.0 standard drinks of alcohol per week. He reports current drug use. Frequency: 1.00 time per week. Drug: Marijuana Derek Jock). Family History: family history includes Alcohol Abuse in his father and mother; Cancer in his father; Cirrhosis in his father; Mental Illness in his brother, mother, and sister; Substance Abuse in his father, maternal aunt, maternal uncle, mother, paternal aunt, paternal uncle, and sister. The patients home medications have been reviewed.     Allergies: Hydrocodone, Invega sustenna [paliperidone palmitate er], Paliperidone, Pcn [penicillins], and Fluticasone    -------------------------------------------------- RESULTS -------------------------------------------------  Labs:  Results for orders placed or performed during the hospital encounter of 11/11/21   POCT Glucose   Result Value Ref Range    Glucose 110 mg/dL    QC OK? ok    POCT Glucose   Result Value Ref Range    Meter Glucose 110 (H) 74 - 99 mg/dL       Radiology:  No results found.    ------------------------- NURSING NOTES AND VITALS REVIEWED

## 2021-11-12 ASSESSMENT — ENCOUNTER SYMPTOMS
SHORTNESS OF BREATH: 0
COUGH: 0
PHOTOPHOBIA: 0
NAUSEA: 0
ABDOMINAL PAIN: 0
DIARRHEA: 0
VOMITING: 0
CHEST TIGHTNESS: 0
ABDOMINAL DISTENTION: 0
BACK PAIN: 0

## 2021-11-15 ENCOUNTER — HOSPITAL ENCOUNTER (EMERGENCY)
Age: 31
Discharge: HOME | End: 2021-11-16
Attending: NURSE PRACTITIONER
Payer: COMMERCIAL

## 2021-11-15 DIAGNOSIS — F10.10 ALCOHOL ABUSE: Primary | ICD-10-CM

## 2021-11-15 LAB
ALBUMIN SERPL-MCNC: 4.7 G/DL (ref 3.5–5.2)
ALP BLD-CCNC: 102 U/L (ref 40–129)
ALT SERPL-CCNC: 15 U/L (ref 0–40)
AMPHETAMINE SCREEN, URINE: NOT DETECTED
ANION GAP SERPL CALCULATED.3IONS-SCNC: 18 MMOL/L (ref 7–16)
AST SERPL-CCNC: 25 U/L (ref 0–39)
BACTERIA: NORMAL /HPF
BARBITURATE SCREEN URINE: POSITIVE
BASOPHILS ABSOLUTE: 0.07 E9/L (ref 0–0.2)
BASOPHILS RELATIVE PERCENT: 0.8 % (ref 0–2)
BENZODIAZEPINE SCREEN, URINE: POSITIVE
BILIRUB SERPL-MCNC: 0.5 MG/DL (ref 0–1.2)
BILIRUBIN URINE: NEGATIVE
BLOOD, URINE: NEGATIVE
BUN BLDV-MCNC: 6 MG/DL (ref 6–20)
CALCIUM SERPL-MCNC: 9.2 MG/DL (ref 8.6–10.2)
CANNABINOID SCREEN URINE: NOT DETECTED
CHLORIDE BLD-SCNC: 99 MMOL/L (ref 98–107)
CLARITY: CLEAR
CO2: 22 MMOL/L (ref 22–29)
COCAINE METABOLITE SCREEN URINE: NOT DETECTED
COLOR: YELLOW
CREAT SERPL-MCNC: 0.6 MG/DL (ref 0.7–1.2)
EOSINOPHILS ABSOLUTE: 0.06 E9/L (ref 0.05–0.5)
EOSINOPHILS RELATIVE PERCENT: 0.7 % (ref 0–6)
EPITHELIAL CELLS, UA: NORMAL /HPF
FENTANYL SCREEN, URINE: NOT DETECTED
GFR AFRICAN AMERICAN: >60
GFR NON-AFRICAN AMERICAN: >60 ML/MIN/1.73
GLUCOSE BLD-MCNC: 73 MG/DL (ref 74–99)
GLUCOSE URINE: NEGATIVE MG/DL
HCT VFR BLD CALC: 42.4 % (ref 37–54)
HEMOGLOBIN: 14.8 G/DL (ref 12.5–16.5)
IMMATURE GRANULOCYTES #: 0.02 E9/L
IMMATURE GRANULOCYTES %: 0.2 % (ref 0–5)
INFLUENZA A BY PCR: NOT DETECTED
INFLUENZA B BY PCR: NOT DETECTED
KETONES, URINE: NEGATIVE MG/DL
LEUKOCYTE ESTERASE, URINE: NEGATIVE
LYMPHOCYTES ABSOLUTE: 2.48 E9/L (ref 1.5–4)
LYMPHOCYTES RELATIVE PERCENT: 30.1 % (ref 20–42)
Lab: ABNORMAL
MCH RBC QN AUTO: 33 PG (ref 26–35)
MCHC RBC AUTO-ENTMCNC: 34.9 % (ref 32–34.5)
MCV RBC AUTO: 94.4 FL (ref 80–99.9)
METHADONE SCREEN, URINE: NOT DETECTED
MONOCYTES ABSOLUTE: 0.63 E9/L (ref 0.1–0.95)
MONOCYTES RELATIVE PERCENT: 7.6 % (ref 2–12)
NEUTROPHILS ABSOLUTE: 4.98 E9/L (ref 1.8–7.3)
NEUTROPHILS RELATIVE PERCENT: 60.6 % (ref 43–80)
NITRITE, URINE: NEGATIVE
OPIATE SCREEN URINE: NOT DETECTED
OXYCODONE URINE: NOT DETECTED
PDW BLD-RTO: 13.5 FL (ref 11.5–15)
PH UA: 6 (ref 5–9)
PHENCYCLIDINE SCREEN URINE: NOT DETECTED
PLATELET # BLD: 274 E9/L (ref 130–450)
PMV BLD AUTO: 10.8 FL (ref 7–12)
POTASSIUM SERPL-SCNC: 3.8 MMOL/L (ref 3.5–5)
PROTEIN UA: NEGATIVE MG/DL
RBC # BLD: 4.49 E12/L (ref 3.8–5.8)
RBC UA: NORMAL /HPF (ref 0–2)
SARS-COV-2, NAAT: NOT DETECTED
SODIUM BLD-SCNC: 139 MMOL/L (ref 132–146)
SPECIFIC GRAVITY UA: 1.02 (ref 1–1.03)
TOTAL PROTEIN: 7.4 G/DL (ref 6.4–8.3)
UROBILINOGEN, URINE: 0.2 E.U./DL
WBC # BLD: 8.2 E9/L (ref 4.5–11.5)
WBC UA: NORMAL /HPF (ref 0–5)

## 2021-11-15 PROCEDURE — 96365 THER/PROPH/DIAG IV INF INIT: CPT

## 2021-11-15 PROCEDURE — 87502 INFLUENZA DNA AMP PROBE: CPT

## 2021-11-15 PROCEDURE — 96366 THER/PROPH/DIAG IV INF ADDON: CPT

## 2021-11-15 PROCEDURE — 84484 ASSAY OF TROPONIN QUANT: CPT

## 2021-11-15 PROCEDURE — 80143 DRUG ASSAY ACETAMINOPHEN: CPT

## 2021-11-15 PROCEDURE — 80307 DRUG TEST PRSMV CHEM ANLYZR: CPT

## 2021-11-15 PROCEDURE — 85025 COMPLETE CBC W/AUTO DIFF WBC: CPT

## 2021-11-15 PROCEDURE — 99285 EMERGENCY DEPT VISIT HI MDM: CPT

## 2021-11-15 PROCEDURE — 87635 SARS-COV-2 COVID-19 AMP PRB: CPT

## 2021-11-15 PROCEDURE — 80053 COMPREHEN METABOLIC PANEL: CPT

## 2021-11-15 PROCEDURE — 93005 ELECTROCARDIOGRAM TRACING: CPT | Performed by: NURSE PRACTITIONER

## 2021-11-15 PROCEDURE — 81001 URINALYSIS AUTO W/SCOPE: CPT

## 2021-11-15 PROCEDURE — 82077 ASSAY SPEC XCP UR&BREATH IA: CPT

## 2021-11-15 PROCEDURE — 96374 THER/PROPH/DIAG INJ IV PUSH: CPT

## 2021-11-15 PROCEDURE — 80179 DRUG ASSAY SALICYLATE: CPT

## 2021-11-15 PROCEDURE — 96375 TX/PRO/DX INJ NEW DRUG ADDON: CPT

## 2021-11-15 ASSESSMENT — PAIN DESCRIPTION - PAIN TYPE: TYPE: ACUTE PAIN

## 2021-11-15 ASSESSMENT — PAIN DESCRIPTION - ORIENTATION: ORIENTATION: RIGHT

## 2021-11-15 ASSESSMENT — PAIN DESCRIPTION - LOCATION: LOCATION: RIB CAGE

## 2021-11-15 NOTE — ED NOTES
FIRST PROVIDER CONTACT ASSESSMENT NOTE      Department of Emergency Medicine   11/15/21  6:50 PM EST    Chief Complaint: Delirium Tremens (DTS) (withdrawl from alcohol . Has not consumed alcohol in 12 hours.  ) and Medication Reaction (reverse reaction to Ativan)      History of Present Illness:   Kaila Meier is a 31 y.o. male who presents to the ED for    Medical History:  has a past medical history of Anxiety, Arm pain, Asthma, Concussion with loss of consciousness, Convulsions ([facility] [address] Depression, Flashing lights, Head injury, Headache, Leg pain, Numbness and tingling, PTSD (post-traumatic stress disorder), and Seizures ([facility] [address] Surgical History:  has a past surgical history that includes Colonoscopy; Endoscopy, colon, diagnostic; and Upper gastrointestinal endoscopy (N/A, 7/22/2021). Social History:  reports that he has been smoking. He has a 18.00 pack-year smoking history. He has never used smokeless tobacco. He reports current alcohol use of about 24.0 standard drinks of alcohol per week. He reports current drug use. Frequency: 1.00 time per week. Drug: Marijuana Bertrand Mustard). Family History: family history includes Alcohol Abuse in his father and mother; Cancer in his father; Cirrhosis in his father; Mental Illness in his brother, mother, and sister; Substance Abuse in his father, maternal aunt, maternal uncle, mother, paternal aunt, paternal uncle, and sister. *ALLERGIES*     Hydrocodone, Invega sustenna [paliperidone palmitate er], Paliperidone, Pcn [penicillins], and Fluticasone     Physical Exam:      VS:  There were no vitals taken for this visit.      Initial Plan of Care:  Initiate Treatment-Testing, Proceed toTreatment Area When Bed Available for ED Attending/MLP to Continue Care    -----------------END OF FIRST PROVIDER CONTACT ASSESSMENT NOTE--------------  Electronically signed by LI Glasgow CNP   DD: 11/15/21             LI Schwartz CNP  11/15/21 5599

## 2021-11-16 ENCOUNTER — APPOINTMENT (OUTPATIENT)
Dept: GENERAL RADIOLOGY | Age: 31
End: 2021-11-16
Payer: COMMERCIAL

## 2021-11-16 VITALS
DIASTOLIC BLOOD PRESSURE: 88 MMHG | HEART RATE: 57 BPM | WEIGHT: 140 LBS | RESPIRATION RATE: 14 BRPM | TEMPERATURE: 98 F | BODY MASS INDEX: 22.5 KG/M2 | SYSTOLIC BLOOD PRESSURE: 125 MMHG | HEIGHT: 66 IN | OXYGEN SATURATION: 100 %

## 2021-11-16 LAB
ACETAMINOPHEN LEVEL: <5 MCG/ML (ref 10–30)
EKG ATRIAL RATE: 89 BPM
EKG P AXIS: 69 DEGREES
EKG P-R INTERVAL: 124 MS
EKG Q-T INTERVAL: 358 MS
EKG QRS DURATION: 82 MS
EKG QTC CALCULATION (BAZETT): 435 MS
EKG R AXIS: 99 DEGREES
EKG T AXIS: 68 DEGREES
EKG VENTRICULAR RATE: 89 BPM
ETHANOL: 71 MG/DL (ref 0–0.08)
SALICYLATE, SERUM: <0.3 MG/DL (ref 0–30)
TRICYCLIC ANTIDEPRESSANTS SCREEN SERUM: NEGATIVE NG/ML
TROPONIN, HIGH SENSITIVITY: <6 NG/L (ref 0–11)

## 2021-11-16 PROCEDURE — 71101 X-RAY EXAM UNILAT RIBS/CHEST: CPT

## 2021-11-16 PROCEDURE — 96365 THER/PROPH/DIAG IV INF INIT: CPT

## 2021-11-16 PROCEDURE — 96375 TX/PRO/DX INJ NEW DRUG ADDON: CPT

## 2021-11-16 PROCEDURE — 96366 THER/PROPH/DIAG IV INF ADDON: CPT

## 2021-11-16 PROCEDURE — 6370000000 HC RX 637 (ALT 250 FOR IP): Performed by: EMERGENCY MEDICINE

## 2021-11-16 PROCEDURE — 6360000002 HC RX W HCPCS: Performed by: EMERGENCY MEDICINE

## 2021-11-16 PROCEDURE — 2500000003 HC RX 250 WO HCPCS: Performed by: EMERGENCY MEDICINE

## 2021-11-16 PROCEDURE — 2580000003 HC RX 258: Performed by: EMERGENCY MEDICINE

## 2021-11-16 RX ORDER — CHLORDIAZEPOXIDE HYDROCHLORIDE 25 MG/1
50 CAPSULE, GELATIN COATED ORAL ONCE
Status: COMPLETED | OUTPATIENT
Start: 2021-11-16 | End: 2021-11-16

## 2021-11-16 RX ORDER — KETOROLAC TROMETHAMINE 30 MG/ML
30 INJECTION, SOLUTION INTRAMUSCULAR; INTRAVENOUS ONCE
Status: COMPLETED | OUTPATIENT
Start: 2021-11-16 | End: 2021-11-16

## 2021-11-16 RX ADMIN — CHLORDIAZEPOXIDE HYDROCHLORIDE 50 MG: 25 CAPSULE ORAL at 06:32

## 2021-11-16 RX ADMIN — KETOROLAC TROMETHAMINE 30 MG: 30 INJECTION, SOLUTION INTRAMUSCULAR at 01:32

## 2021-11-16 RX ADMIN — FOLIC ACID: 5 INJECTION, SOLUTION INTRAMUSCULAR; INTRAVENOUS; SUBCUTANEOUS at 02:00

## 2021-11-16 ASSESSMENT — PAIN DESCRIPTION - ORIENTATION
ORIENTATION: RIGHT
ORIENTATION: RIGHT

## 2021-11-16 ASSESSMENT — ENCOUNTER SYMPTOMS
NAUSEA: 0
DIARRHEA: 0
WHEEZING: 0
EYE REDNESS: 0
SORE THROAT: 0
BACK PAIN: 0
SHORTNESS OF BREATH: 1
SINUS PRESSURE: 0
COUGH: 0
EYE PAIN: 0
VOMITING: 0
EYE DISCHARGE: 0
ABDOMINAL PAIN: 0

## 2021-11-16 ASSESSMENT — PAIN SCALES - GENERAL
PAINLEVEL_OUTOF10: 8

## 2021-11-16 ASSESSMENT — PAIN DESCRIPTION - LOCATION
LOCATION: RIB CAGE
LOCATION: RIB CAGE

## 2021-11-16 NOTE — ED PROVIDER NOTES
Patient is a 30 y/o male who presents to the ED with alcohol withdrawal and right rib pain. Patient states that he spoke with a peer counselor that works here and she is supposed to get him into rehab tomorrow. He states that he usually drinks a bottle of whiskey per day. His last drink was approximately 24 hours ago. He also states that he woke up with pain in his right ribs today. He states that the pain increases when he takes a breath. He is short of breath. He does not recall any injury. Currently, his pain is 7/10. Review of Systems   Constitutional: Negative for chills and fever. HENT: Negative for ear pain, sinus pressure and sore throat. Eyes: Negative for pain, discharge and redness. Respiratory: Positive for shortness of breath. Negative for cough and wheezing. Cardiovascular: Positive for chest pain. Gastrointestinal: Negative for abdominal pain, diarrhea, nausea and vomiting. Genitourinary: Negative for dysuria and frequency. Musculoskeletal: Negative for arthralgias and back pain. Skin: Negative for rash and wound. Neurological: Negative for weakness and headaches. Hematological: Negative for adenopathy. All other systems reviewed and are negative. Physical Exam  Vitals and nursing note reviewed. Constitutional:       General: He is not in acute distress. HENT:      Head: Normocephalic and atraumatic. Right Ear: External ear normal.      Left Ear: External ear normal.      Nose: Nose normal.      Mouth/Throat:      Mouth: Mucous membranes are moist.   Eyes:      Conjunctiva/sclera: Conjunctivae normal.      Pupils: Pupils are equal, round, and reactive to light. Cardiovascular:      Rate and Rhythm: Normal rate and regular rhythm. Heart sounds: No murmur heard. Pulmonary:      Effort: Pulmonary effort is normal. No respiratory distress. Breath sounds: Normal breath sounds. No stridor. No wheezing, rhonchi or rales.    Chest:      Chest wall: No tenderness. Abdominal:      General: Bowel sounds are normal. There is no distension. Palpations: Abdomen is soft. Tenderness: There is no abdominal tenderness. There is no guarding. Musculoskeletal:         General: Normal range of motion. Cervical back: Normal range of motion and neck supple. Skin:     General: Skin is warm and dry. Findings: No rash. Neurological:      Mental Status: He is alert and oriented to person, place, and time. Procedures     MDM     ED Course as of 11/17/21 2116 Tue Nov 16, 2021   1131 Patient was received in signout. Patient was reevaluated not any acute distress. Social work evaluated patient and was able to arrange placement for patient for treatment of alcohol abuse. Patient denies any signs or symptoms at time of reevaluation of active withdrawal.  A cab was ordered to bring patient to facility. [SS]      ED Course User Index  [SS] Cara Herrera MD         1082. Patient asking for pain medicine and medication for seizures. ED Course as of 11/17/21 2117 Tue Nov 16, 2021   1131 Patient was received in signout. Patient was reevaluated not any acute distress. Social work evaluated patient and was able to arrange placement for patient for treatment of alcohol abuse. Patient denies any signs or symptoms at time of reevaluation of active withdrawal.  A cab was ordered to bring patient to facility. [SS]      ED Course User Index  [SS] Cara Herrera MD       --------------------------------------------- PAST HISTORY ---------------------------------------------  Past Medical History:  has a past medical history of Anxiety, Arm pain, Asthma, Concussion with loss of consciousness, Convulsions ([address] Depression, Flashing lights, Head injury, Headache, Leg pain, Numbness and tingling, PTSD (post-traumatic stress disorder), and Seizures ([address]     Past Surgical History:  has a past surgical history that includes Colonoscopy; Endoscopy, colon, diagnostic; and Upper gastrointestinal endoscopy (N/A, 7/22/2021). Social History:  reports that he has been smoking. He has a 18.00 pack-year smoking history. He has never used smokeless tobacco. He reports current alcohol use of about 24.0 standard drinks of alcohol per week. He reports current drug use. Frequency: 1.00 time per week. Drug: Marijuana Henrietta Mancini). Family History: family history includes Alcohol Abuse in his father and mother; Cancer in his father; Cirrhosis in his father; Mental Illness in his brother, mother, and sister; Substance Abuse in his father, maternal aunt, maternal uncle, mother, paternal aunt, paternal uncle, and sister. The patients home medications have been reviewed.     Allergies: Hydrocodone, Invega sustenna [paliperidone palmitate er], Paliperidone, Pcn [penicillins], and Fluticasone    -------------------------------------------------- RESULTS -------------------------------------------------  Labs:  Results for orders placed or performed during the hospital encounter of 11/15/21   COVID-19, Rapid    Specimen: Nasopharyngeal Swab   Result Value Ref Range    SARS-CoV-2, NAAT Not Detected Not Detected   RAPID INFLUENZA A/B ANTIGENS    Specimen: Nasopharyngeal   Result Value Ref Range    Influenza A by PCR Not Detected Not Detected    Influenza B by PCR Not Detected Not Detected   CBC Auto Differential   Result Value Ref Range    WBC 8.2 4.5 - 11.5 E9/L    RBC 4.49 3.80 - 5.80 E12/L    Hemoglobin 14.8 12.5 - 16.5 g/dL    Hematocrit 42.4 37.0 - 54.0 %    MCV 94.4 80.0 - 99.9 fL    MCH 33.0 26.0 - 35.0 pg    MCHC 34.9 (H) 32.0 - 34.5 %    RDW 13.5 11.5 - 15.0 fL    Platelets 988 797 - 921 E9/L    MPV 10.8 7.0 - 12.0 fL    Neutrophils % 60.6 43.0 - 80.0 %    Immature Granulocytes % 0.2 0.0 - 5.0 %    Lymphocytes % 30.1 20.0 - 42.0 %    Monocytes % 7.6 2.0 - 12.0 %    Eosinophils % 0.7 0.0 - 6.0 %    Basophils % 0.8 0.0 - 2.0 %    Neutrophils Absolute 4.98 1.80 - Gravity, UA 1.020 1.005 - 1.030    Blood, Urine Negative Negative    pH, UA 6.0 5.0 - 9.0    Protein, UA Negative Negative mg/dL    Urobilinogen, Urine 0.2 <2.0 E.U./dL    Nitrite, Urine Negative Negative    Leukocyte Esterase, Urine Negative Negative    WBC, UA 0-1 0 - 5 /HPF    RBC, UA NONE 0 - 2 /HPF    Epithelial Cells, UA RARE /HPF    Bacteria, UA NONE SEEN None Seen /HPF   Troponin   Result Value Ref Range    Troponin, High Sensitivity <6 0 - 11 ng/L   EKG 12 Lead   Result Value Ref Range    Ventricular Rate 89 BPM    Atrial Rate 89 BPM    P-R Interval 124 ms    QRS Duration 82 ms    Q-T Interval 358 ms    QTc Calculation (Bazett) 435 ms    P Axis 69 degrees    R Axis 99 degrees    T Axis 68 degrees       Radiology:  XR RIBS RIGHT INCLUDE CHEST (MIN 3 VIEWS)   Final Result   New nonspecific hazy increased density in the mid to lower right hemithorax   of unclear etiology and significance. I cannot exclude infiltrate or pleural   process as a cause for this finding. Unremarkable right ribs             ------------------------- NURSING NOTES AND VITALS REVIEWED ---------------------------  Date / Time Roomed:  11/15/2021 11:47 PM  ED Bed Assignment:  ELLEN/ELLEN    The nursing notes within the ED encounter and vital signs as below have been reviewed. /88   Pulse 57   Temp 98 °F (36.7 °C) (Oral)   Resp 14   Ht 5' 6\" (1.676 m)   Wt 140 lb (63.5 kg)   SpO2 100%   BMI 22.60 kg/m²   Oxygen Saturation Interpretation: Normal      ------------------------------------------ PROGRESS NOTES ------------------------------------------  I have spoken with the patient and discussed todays results, in addition to providing specific details for the plan of care and counseling regarding the diagnosis and prognosis. Their questions are answered at this time and they are agreeable with the plan. I discussed at length with them reasons for immediate return here for re evaluation.  They will followup with primary care by calling their office tomorrow. --------------------------------- ADDITIONAL PROVIDER NOTES ---------------------------------  At this time the patient is without objective evidence of an acute process requiring hospitalization or inpatient management. They have remained hemodynamically stable throughout their entire ED visit and are stable for discharge with outpatient follow-up. The plan has been discussed in detail and they are aware of the specific conditions for emergent return, as well as the importance of follow-up. Discharge Medication List as of 11/16/2021 11:55 AM          Diagnosis:  1. Alcohol abuse        Disposition:  Patient's disposition: Discharge to home  Patient's condition is stable.            [facility] [facility],   11/17/21 [id number]

## 2021-11-16 NOTE — CARE COORDINATION
Peer Recovery Support Note    Name: Ghada Lake  Date: 11/16/2021    Chief Complaint   Patient presents with    Delirium Tremens (DTS)     withdrawl from alcohol . Has not consumed alcohol in 12 hours.  Medication Reaction     reverse reaction to Ativan       Peer Support met with patient. [x] Support and education provided  [] Resources provided   [x] Treatment referral: [facility] for detox  [] Other:   [] Patient declined peer recovery services     Referred By:     Notes: Patient contacted peer last night to let us know he was at [address] [place] [address],[address] ED. Peer met with patient and helped him with his  assessment with [facility]. Peer provided a taxi voucher to Sandy Mckinney in [facility]     Cherry [place], 11/16/2021

## 2021-11-16 NOTE — CARE COORDINATION
SS Note: Pt is in ED, SW Consults received for \"psychiatric evaluation\" \"alcohol abuse\". SW spoke with ED physician who stated pt is in ED due to withdrawal symptoms and pt is interested in detox/rehab facility. SAM met with pt in [place] to inquire if he is in agreement with meeting with [facility] and pt stated he already spoke to Lisa and she is meeting him here today. SW called [facility] office and they verified [facility] is en route now to visit pt here.   Electronically signed by PAT Davies on 11/16/2021 at 9:58 AM

## 2022-03-16 ENCOUNTER — OFFICE VISIT (OUTPATIENT)
Dept: PRIMARY CARE CLINIC | Age: 32
End: 2022-03-16
Payer: COMMERCIAL

## 2022-03-16 VITALS
TEMPERATURE: 97.7 F | BODY MASS INDEX: 22.13 KG/M2 | SYSTOLIC BLOOD PRESSURE: 118 MMHG | OXYGEN SATURATION: 97 % | HEART RATE: 87 BPM | WEIGHT: 137.7 LBS | DIASTOLIC BLOOD PRESSURE: 79 MMHG | HEIGHT: 66 IN

## 2022-03-16 DIAGNOSIS — R59.0 ENLARGED LYMPH NODES IN ARMPIT: Primary | ICD-10-CM

## 2022-03-16 DIAGNOSIS — Z76.89 ENCOUNTER FOR ASSESSMENT OF STD EXPOSURE: ICD-10-CM

## 2022-03-16 DIAGNOSIS — Z11.59 ENCOUNTER FOR HEPATITIS C SCREENING TEST FOR LOW RISK PATIENT: ICD-10-CM

## 2022-03-16 PROCEDURE — 99213 OFFICE O/P EST LOW 20 MIN: CPT | Performed by: STUDENT IN AN ORGANIZED HEALTH CARE EDUCATION/TRAINING PROGRAM

## 2022-03-16 SDOH — ECONOMIC STABILITY: FOOD INSECURITY: WITHIN THE PAST 12 MONTHS, THE FOOD YOU BOUGHT JUST DIDN'T LAST AND YOU DIDN'T HAVE MONEY TO GET MORE.: NEVER TRUE

## 2022-03-16 SDOH — ECONOMIC STABILITY: FOOD INSECURITY: WITHIN THE PAST 12 MONTHS, YOU WORRIED THAT YOUR FOOD WOULD RUN OUT BEFORE YOU GOT MONEY TO BUY MORE.: NEVER TRUE

## 2022-03-16 ASSESSMENT — PATIENT HEALTH QUESTIONNAIRE - PHQ9
1. LITTLE INTEREST OR PLEASURE IN DOING THINGS: 0
SUM OF ALL RESPONSES TO PHQ QUESTIONS 1-9: 6
9. THOUGHTS THAT YOU WOULD BE BETTER OFF DEAD, OR OF HURTING YOURSELF: 0
SUM OF ALL RESPONSES TO PHQ QUESTIONS 1-9: 6
4. FEELING TIRED OR HAVING LITTLE ENERGY: 3
2. FEELING DOWN, DEPRESSED OR HOPELESS: 0
8. MOVING OR SPEAKING SO SLOWLY THAT OTHER PEOPLE COULD HAVE NOTICED. OR THE OPPOSITE, BEING SO FIGETY OR RESTLESS THAT YOU HAVE BEEN MOVING AROUND A LOT MORE THAN USUAL: 0
SUM OF ALL RESPONSES TO PHQ QUESTIONS 1-9: 6
3. TROUBLE FALLING OR STAYING ASLEEP: 3
6. FEELING BAD ABOUT YOURSELF - OR THAT YOU ARE A FAILURE OR HAVE LET YOURSELF OR YOUR FAMILY DOWN: 0
10. IF YOU CHECKED OFF ANY PROBLEMS, HOW DIFFICULT HAVE THESE PROBLEMS MADE IT FOR YOU TO DO YOUR WORK, TAKE CARE OF THINGS AT HOME, OR GET ALONG WITH OTHER PEOPLE: 1
5. POOR APPETITE OR OVEREATING: 0
SUM OF ALL RESPONSES TO PHQ QUESTIONS 1-9: 6
SUM OF ALL RESPONSES TO PHQ9 QUESTIONS 1 & 2: 0
7. TROUBLE CONCENTRATING ON THINGS, SUCH AS READING THE NEWSPAPER OR WATCHING TELEVISION: 0

## 2022-03-16 ASSESSMENT — SOCIAL DETERMINANTS OF HEALTH (SDOH): HOW HARD IS IT FOR YOU TO PAY FOR THE VERY BASICS LIKE FOOD, HOUSING, MEDICAL CARE, AND HEATING?: NOT HARD AT ALL

## 2022-03-16 NOTE — PROGRESS NOTES
Jean Schreiber (:  1990) is a 36 y.o. male,New patient, here for evaluation of the following chief complaint(s):  New Patient (sober for almost a year) and Mass (under left arm size of a pea and very hard/was smaller and soft )         ASSESSMENT/PLAN:  1. Enlarged lymph nodes in armpit  -     US EXTREMITY JOINT LEFT NON VASC COMPLETE; Future  -     CBC with Auto Differential; Future  -     HIV Screen; Future  -     Comprehensive Metabolic Panel; Future  2. Encounter for hepatitis C screening test for low risk patient  -     Hepatitis C Antibody; Future  3. Encounter for assessment of STD exposure  -     RPR; Future      Return in about 1 month (around 2022). Subjective   SUBJECTIVE/OBJECTIVE:  HPI    Patient is a 37 y/o M with a PMHx of Etoh abuse in remission who presents to establish care and discuss lump in left axilla. He reports left axillary lesion that he describes as hard and he thinks it has slowly gotten bigger; first noticed this 2 years ago and has never had this worked, updenies any pain, no drainage from area or skin changes, reports some night sweats but denies fever, chills, changes in weight, rash, sore throat, neck pain, and has not noticed any other enlarged lymph nodes; prior CBC wnl    Reports he went to rehab last fall for his etoh use and has not had anything to drink since late last year    He does have a history of anxiety and depression and had been on several different medications for this in the past; reports that he feels right now that his biggest issues has been anxiety and he has been dealing with this; denies feeling down or depressed; no SI    FMHx- alcoholism in both parents    SGHx-denies    Social- 2 PPD smoker; forme etoh use; denies illicit drug use         Review of Systems   Constitutional: Negative. HENT: Negative. Respiratory: Negative. Cardiovascular: Negative. Gastrointestinal: Negative. Genitourinary: Negative. Musculoskeletal: Negative. Skin: Negative. Neurological: Negative. Hematological: Positive for adenopathy (enlarged L axillary lymph node). Psychiatric/Behavioral: Negative for self-injury and suicidal ideas. The patient is nervous/anxious. Objective   Physical Exam  Vitals reviewed. Constitutional:       General: He is not in acute distress. Appearance: Normal appearance. HENT:      Head: Normocephalic and atraumatic. Mouth/Throat:      Mouth: Mucous membranes are moist.      Pharynx: Oropharynx is clear. Eyes:      General:         Right eye: No discharge. Left eye: No discharge. Cardiovascular:      Rate and Rhythm: Normal rate and regular rhythm. Pulses: Normal pulses. Heart sounds: Normal heart sounds. Pulmonary:      Effort: Pulmonary effort is normal.      Breath sounds: Normal breath sounds. Abdominal:      General: Bowel sounds are normal.      Palpations: Abdomen is soft. Musculoskeletal:      Cervical back: Neck supple. No tenderness. Lymphadenopathy:      Cervical: No cervical adenopathy. Skin:     General: Skin is warm and dry. Comments: Small 0.5 x 0.5 cm firm mobile lesion in L axilla   Neurological:      General: No focal deficit present. Mental Status: He is alert and oriented to person, place, and time. Psychiatric:         Mood and Affect: Mood normal.         Behavior: Behavior normal.                  An electronic signature was used to authenticate this note.     --Ina Iraheta MD

## 2022-03-17 PROBLEM — F33.2 MDD (MAJOR DEPRESSIVE DISORDER), RECURRENT SEVERE, WITHOUT PSYCHOSIS (HCC): Status: RESOLVED | Noted: 2021-09-09 | Resolved: 2022-03-17

## 2022-03-17 PROBLEM — K85.20 ALCOHOL INDUCED ACUTE PANCREATITIS WITHOUT NECROSIS OR INFECTION: Status: RESOLVED | Noted: 2021-05-17 | Resolved: 2022-03-17

## 2022-03-17 PROBLEM — E44.1 MILD PROTEIN-CALORIE MALNUTRITION (HCC): Chronic | Status: RESOLVED | Noted: 2021-09-04 | Resolved: 2022-03-17

## 2022-03-17 PROBLEM — K92.1 MELENA: Status: RESOLVED | Noted: 2021-07-21 | Resolved: 2022-03-17

## 2022-03-17 PROBLEM — F33.2 MDD (MAJOR DEPRESSIVE DISORDER), RECURRENT EPISODE, SEVERE (HCC): Status: RESOLVED | Noted: 2021-09-09 | Resolved: 2022-03-17

## 2022-03-17 PROBLEM — I82.621 ACUTE DEEP VEIN THROMBOSIS (DVT) OF RIGHT UPPER EXTREMITY (HCC): Status: RESOLVED | Noted: 2021-07-23 | Resolved: 2022-03-17

## 2022-03-17 PROBLEM — E87.6 HYPOKALEMIA: Status: RESOLVED | Noted: 2021-07-20 | Resolved: 2022-03-17

## 2022-03-17 PROBLEM — Z72.0 TOBACCO ABUSE: Status: RESOLVED | Noted: 2021-07-20 | Resolved: 2022-03-17

## 2022-03-17 PROBLEM — F10.939 ALCOHOL WITHDRAWAL (HCC): Status: RESOLVED | Noted: 2020-12-27 | Resolved: 2022-03-17

## 2022-03-17 PROBLEM — K85.20 ALCOHOL-INDUCED ACUTE PANCREATITIS: Status: RESOLVED | Noted: 2018-12-07 | Resolved: 2022-03-17

## 2022-03-17 PROBLEM — F31.4 SEVERE DEPRESSED BIPOLAR I DISORDER WITHOUT PSYCHOTIC FEATURES (HCC): Status: RESOLVED | Noted: 2018-09-08 | Resolved: 2022-03-17

## 2022-03-17 PROBLEM — F10.239 IMPENDING DELIRIUM TREMENS (HCC): Status: RESOLVED | Noted: 2021-07-20 | Resolved: 2022-03-17

## 2022-03-17 PROBLEM — R56.9 ALCOHOL WITHDRAWAL SEIZURE (HCC): Status: RESOLVED | Noted: 2021-07-20 | Resolved: 2022-03-17

## 2022-03-17 PROBLEM — E87.20 LACTIC ACIDOSIS: Status: RESOLVED | Noted: 2021-05-17 | Resolved: 2022-03-17

## 2022-03-17 PROBLEM — K85.90 PANCREATITIS, RECURRENT: Status: RESOLVED | Noted: 2021-06-13 | Resolved: 2022-03-17

## 2022-03-17 PROBLEM — F10.939 ALCOHOL WITHDRAWAL SEIZURE (HCC): Status: RESOLVED | Noted: 2021-07-20 | Resolved: 2022-03-17

## 2022-03-17 PROBLEM — F10.139 ALCOHOL ABUSE WITH WITHDRAWAL (HCC): Status: RESOLVED | Noted: 2020-12-27 | Resolved: 2022-03-17

## 2022-03-17 PROBLEM — F10.930 ALCOHOL WITHDRAWAL, UNCOMPLICATED (HCC): Status: RESOLVED | Noted: 2021-02-01 | Resolved: 2022-03-17

## 2022-03-17 PROBLEM — D69.6 THROMBOCYTOPENIA (HCC): Status: RESOLVED | Noted: 2021-07-21 | Resolved: 2022-03-17

## 2022-03-17 PROBLEM — F10.20 ALCOHOL DEPENDENCE (HCC): Status: RESOLVED | Noted: 2021-09-09 | Resolved: 2022-03-17

## 2022-03-17 ASSESSMENT — ENCOUNTER SYMPTOMS
GASTROINTESTINAL NEGATIVE: 1
RESPIRATORY NEGATIVE: 1

## 2022-03-24 ENCOUNTER — HOSPITAL ENCOUNTER (EMERGENCY)
Age: 32
Discharge: HOME | End: 2022-03-25
Attending: STUDENT IN AN ORGANIZED HEALTH CARE EDUCATION/TRAINING PROGRAM
Payer: COMMERCIAL

## 2022-03-24 ENCOUNTER — APPOINTMENT (OUTPATIENT)
Dept: ULTRASOUND IMAGING | Age: 32
End: 2022-03-24
Payer: COMMERCIAL

## 2022-03-24 ENCOUNTER — APPOINTMENT (OUTPATIENT)
Dept: GENERAL RADIOLOGY | Age: 32
End: 2022-03-24
Payer: COMMERCIAL

## 2022-03-24 DIAGNOSIS — R59.1 LYMPHADENOPATHY: Primary | ICD-10-CM

## 2022-03-24 LAB
ALBUMIN SERPL-MCNC: 4.9 G/DL (ref 3.5–5.2)
ALP BLD-CCNC: 100 U/L (ref 40–129)
ALT SERPL-CCNC: 11 U/L (ref 0–40)
ANION GAP SERPL CALCULATED.3IONS-SCNC: 14 MMOL/L (ref 7–16)
AST SERPL-CCNC: 19 U/L (ref 0–39)
BASOPHILS ABSOLUTE: 0.08 E9/L (ref 0–0.2)
BASOPHILS RELATIVE PERCENT: 0.6 % (ref 0–2)
BILIRUB SERPL-MCNC: 0.6 MG/DL (ref 0–1.2)
BUN BLDV-MCNC: 6 MG/DL (ref 6–20)
CALCIUM SERPL-MCNC: 9.7 MG/DL (ref 8.6–10.2)
CHLORIDE BLD-SCNC: 97 MMOL/L (ref 98–107)
CO2: 26 MMOL/L (ref 22–29)
CREAT SERPL-MCNC: 0.8 MG/DL (ref 0.7–1.2)
EOSINOPHILS ABSOLUTE: 0.13 E9/L (ref 0.05–0.5)
EOSINOPHILS RELATIVE PERCENT: 1 % (ref 0–6)
GFR AFRICAN AMERICAN: >60
GFR NON-AFRICAN AMERICAN: >60 ML/MIN/1.73
GLUCOSE BLD-MCNC: 86 MG/DL (ref 74–99)
HCT VFR BLD CALC: 47.4 % (ref 37–54)
HEMOGLOBIN: 16.5 G/DL (ref 12.5–16.5)
IMMATURE GRANULOCYTES #: 0.04 E9/L
IMMATURE GRANULOCYTES %: 0.3 % (ref 0–5)
LYMPHOCYTES ABSOLUTE: 3.4 E9/L (ref 1.5–4)
LYMPHOCYTES RELATIVE PERCENT: 26 % (ref 20–42)
MCH RBC QN AUTO: 31.5 PG (ref 26–35)
MCHC RBC AUTO-ENTMCNC: 34.8 % (ref 32–34.5)
MCV RBC AUTO: 90.6 FL (ref 80–99.9)
MONO TEST: NEGATIVE
MONOCYTES ABSOLUTE: 0.94 E9/L (ref 0.1–0.95)
MONOCYTES RELATIVE PERCENT: 7.2 % (ref 2–12)
NEUTROPHILS ABSOLUTE: 8.49 E9/L (ref 1.8–7.3)
NEUTROPHILS RELATIVE PERCENT: 64.9 % (ref 43–80)
PDW BLD-RTO: 12.5 FL (ref 11.5–15)
PLATELET # BLD: 223 E9/L (ref 130–450)
PMV BLD AUTO: 13.3 FL (ref 7–12)
POTASSIUM REFLEX MAGNESIUM: 4.5 MMOL/L (ref 3.5–5)
RBC # BLD: 5.23 E12/L (ref 3.8–5.8)
SODIUM BLD-SCNC: 137 MMOL/L (ref 132–146)
TOTAL PROTEIN: 7.8 G/DL (ref 6.4–8.3)
WBC # BLD: 13.1 E9/L (ref 4.5–11.5)

## 2022-03-24 PROCEDURE — 93005 ELECTROCARDIOGRAM TRACING: CPT | Performed by: STUDENT IN AN ORGANIZED HEALTH CARE EDUCATION/TRAINING PROGRAM

## 2022-03-24 PROCEDURE — 80053 COMPREHEN METABOLIC PANEL: CPT

## 2022-03-24 PROCEDURE — 99283 EMERGENCY DEPT VISIT LOW MDM: CPT

## 2022-03-24 PROCEDURE — 86308 HETEROPHILE ANTIBODY SCREEN: CPT

## 2022-03-24 PROCEDURE — 2580000003 HC RX 258: Performed by: STUDENT IN AN ORGANIZED HEALTH CARE EDUCATION/TRAINING PROGRAM

## 2022-03-24 PROCEDURE — 71045 X-RAY EXAM CHEST 1 VIEW: CPT

## 2022-03-24 PROCEDURE — 76881 US COMPL JOINT R-T W/IMG: CPT

## 2022-03-24 PROCEDURE — 85025 COMPLETE CBC W/AUTO DIFF WBC: CPT

## 2022-03-24 PROCEDURE — 87880 STREP A ASSAY W/OPTIC: CPT

## 2022-03-24 RX ORDER — 0.9 % SODIUM CHLORIDE 0.9 %
1000 INTRAVENOUS SOLUTION INTRAVENOUS ONCE
Status: COMPLETED | OUTPATIENT
Start: 2022-03-24 | End: 2022-03-25

## 2022-03-24 RX ADMIN — SODIUM CHLORIDE 1000 ML: 9 INJECTION, SOLUTION INTRAVENOUS at 22:29

## 2022-03-24 ASSESSMENT — PAIN SCALES - GENERAL: PAINLEVEL_OUTOF10: 4

## 2022-03-24 ASSESSMENT — PAIN DESCRIPTION - PAIN TYPE: TYPE: ACUTE PAIN

## 2022-03-24 ASSESSMENT — PAIN DESCRIPTION - LOCATION: LOCATION: NECK

## 2022-03-25 VITALS
HEART RATE: 65 BPM | RESPIRATION RATE: 18 BRPM | WEIGHT: 140 LBS | BODY MASS INDEX: 22.6 KG/M2 | SYSTOLIC BLOOD PRESSURE: 101 MMHG | DIASTOLIC BLOOD PRESSURE: 65 MMHG | OXYGEN SATURATION: 98 % | TEMPERATURE: 98.3 F

## 2022-03-25 LAB
EKG ATRIAL RATE: 80 BPM
EKG P AXIS: 37 DEGREES
EKG P-R INTERVAL: 130 MS
EKG Q-T INTERVAL: 378 MS
EKG QRS DURATION: 84 MS
EKG QTC CALCULATION (BAZETT): 435 MS
EKG R AXIS: 52 DEGREES
EKG T AXIS: 55 DEGREES
EKG VENTRICULAR RATE: 80 BPM
STREP GRP A PCR: NEGATIVE

## 2022-03-25 NOTE — ED NOTES
Pt. C/o feeling dizzy and lethargic for the past 2 weeks. Pt. also c/o painful lump on neck for past 2 days and lump on L. axillary for the past 2 years.      Danielle García RN  03/24/22 [id number]

## 2022-03-25 NOTE — ED PROVIDER NOTES
72-year-old male presenting for fatigue, lymph node swelling. Symptoms have been persistent and moderate in severity, no exacerbating or relieving factors. Patient states that for the past 5 days, he has felt lethargic. He has had night sweats. He had a sore throat, which has since resolved. He states that a few days ago, he developed swelling on the back of his neck that is painful. He states he has had an area of swelling under his left armpit that has been present for couple years. He saw PCP for this and they scheduled an ultrasound on Tuesday. He denies any fevers, nausea, emesis, or diarrhea. Review of Systems   Constitutional: Positive for fatigue. Negative for chills and fever. Night sweats   HENT: Positive for sore throat. Negative for congestion. Respiratory: Negative for cough and shortness of breath. Cardiovascular: Negative for chest pain. Gastrointestinal: Negative for abdominal pain, diarrhea and nausea. Musculoskeletal:        Neck swelling, left axilla swelling   Skin: Negative for rash and wound. Neurological: Negative for dizziness and headaches. All other systems reviewed and are negative. Physical Exam  Constitutional:       General: He is not in acute distress. Appearance: He is not ill-appearing. HENT:      Head: Normocephalic and atraumatic. Right Ear: External ear normal.      Left Ear: External ear normal.      Nose: Nose normal.      Mouth/Throat:      Mouth: Mucous membranes are moist.      Pharynx: No oropharyngeal exudate or posterior oropharyngeal erythema. Eyes:      Conjunctiva/sclera: Conjunctivae normal.   Neck:      Comments: Approximately 1 cm nodule left posterior/inferior neck, tender to palpation  Cardiovascular:      Rate and Rhythm: Normal rate and regular rhythm. Pulmonary:      Effort: Pulmonary effort is normal.      Breath sounds: Normal breath sounds. Abdominal:      General: There is no distension. Palpations: Abdomen is soft. Tenderness: There is no abdominal tenderness. Musculoskeletal:      Comments: Approximately 1 cm firm nodule left axilla, nontender to palpation   Skin:     General: Skin is warm and dry. Neurological:      General: No focal deficit present. Mental Status: He is alert. Psychiatric:         Mood and Affect: Mood normal.         Behavior: Behavior normal.          Procedures     MDM  Number of Diagnoses or Management Options  Lymphadenopathy  Diagnosis management comments: 44-year-old male presenting for lymphadenopathy, fatigue, and night sweats. Monospot and rapid strep negative. Labs unremarkable. No acute process on chest x-ray. Ultrasound obtained of left lower lymph node, suggestive of sebaceous cyst.  Patient did have what appeared to be some posterior cervical adenopathy in the setting of likely recent viral infection, however he did not have any meningeal signs on exam.  Patient updated on results. He has close follow-up with PCP, therefore he is felt stable for discharge with outpatient follow-up at this time. He was discharged home with instructions to follow-up with PCP. Amount and/or Complexity of Data Reviewed  Clinical lab tests: reviewed  Tests in the radiology section of CPT®: reviewed  Tests in the medicine section of CPT®: reviewed                           --------------------------------------------- PAST HISTORY ---------------------------------------------  Past Medical History:  has a past medical history of Anxiety, Arm pain, Asthma, Concussion with loss of consciousness, Convulsions ([address] Depression, Flashing lights, Head injury, Headache, Leg pain, Numbness and tingling, PTSD (post-traumatic stress disorder), and Seizures ([facility] [address] Past Surgical History:  has a past surgical history that includes Colonoscopy; Endoscopy, colon, diagnostic; and Upper gastrointestinal endoscopy (N/A, 7/22/2021).     Social History:  reports that he has been smoking. He has a 18.00 pack-year smoking history. He has never used smokeless tobacco. He reports previous alcohol use of about 24.0 standard drinks of alcohol per week. He reports previous drug use. Frequency: 1.00 time per week. Drug: Marijuana Brad Serna). Family History: family history includes Alcohol Abuse in his father and mother; Cancer in his father; Cirrhosis in his father; Mental Illness in his brother, mother, and sister; Substance Abuse in his father, maternal aunt, maternal uncle, mother, paternal aunt, paternal uncle, and sister. The patients home medications have been reviewed.     Allergies: Hydrocodone, Invega sustenna [paliperidone palmitate er], Paliperidone, Pcn [penicillins], and Fluticasone    -------------------------------------------------- RESULTS -------------------------------------------------  Labs:  Results for orders placed or performed during the hospital encounter of 03/24/22   Strep Screen Group A Throat    Specimen: Throat   Result Value Ref Range    Strep Grp A PCR Negative Negative   Mononucleosis Screen   Result Value Ref Range    Mono Test Negative Negative   CBC with Auto Differential   Result Value Ref Range    WBC 13.1 (H) 4.5 - 11.5 E9/L    RBC 5.23 3.80 - 5.80 E12/L    Hemoglobin 16.5 12.5 - 16.5 g/dL    Hematocrit 47.4 37.0 - 54.0 %    MCV 90.6 80.0 - 99.9 fL    MCH 31.5 26.0 - 35.0 pg    MCHC 34.8 (H) 32.0 - 34.5 %    RDW 12.5 11.5 - 15.0 fL    Platelets 958 619 - 078 E9/L    MPV 13.3 (H) 7.0 - 12.0 fL    Neutrophils % 64.9 43.0 - 80.0 %    Immature Granulocytes % 0.3 0.0 - 5.0 %    Lymphocytes % 26.0 20.0 - 42.0 %    Monocytes % 7.2 2.0 - 12.0 %    Eosinophils % 1.0 0.0 - 6.0 %    Basophils % 0.6 0.0 - 2.0 %    Neutrophils Absolute 8.49 (H) 1.80 - 7.30 E9/L    Immature Granulocytes # 0.04 E9/L    Lymphocytes Absolute 3.40 1.50 - 4.00 E9/L    Monocytes Absolute 0.94 0.10 - 0.95 E9/L    Eosinophils Absolute 0.13 0.05 - 0.50 E9/L    Basophils Absolute 0.08 0.00 - immediate return here for re evaluation. They will followup with their primary care physician by calling their office tomorrow. --------------------------------- ADDITIONAL PROVIDER NOTES ---------------------------------  At this time the patient is without objective evidence of an acute process requiring hospitalization or inpatient management. They have remained hemodynamically stable throughout their entire ED visit and are stable for discharge with outpatient follow-up. The plan has been discussed in detail and they are aware of the specific conditions for emergent return, as well as the importance of follow-up. There are no discharge medications for this patient. Diagnosis:  1. Lymphadenopathy        Disposition:  Patient's disposition: Discharge to home  Patient's condition is stable.        Shravan Franklin MD  Resident  03/27/22 5553

## 2022-03-27 ASSESSMENT — ENCOUNTER SYMPTOMS
SORE THROAT: 1
SHORTNESS OF BREATH: 0
ABDOMINAL PAIN: 0
NAUSEA: 0
COUGH: 0
DIARRHEA: 0

## 2022-06-13 PROCEDURE — 99284 EMERGENCY DEPT VISIT MOD MDM: CPT

## 2022-06-14 ENCOUNTER — HOSPITAL ENCOUNTER (EMERGENCY)
Age: 32
Discharge: HOME | End: 2022-06-14
Attending: EMERGENCY MEDICINE
Payer: COMMERCIAL

## 2022-06-14 VITALS
OXYGEN SATURATION: 97 % | DIASTOLIC BLOOD PRESSURE: 78 MMHG | WEIGHT: 145 LBS | BODY MASS INDEX: 23.3 KG/M2 | HEART RATE: 77 BPM | TEMPERATURE: 98.6 F | HEIGHT: 66 IN | SYSTOLIC BLOOD PRESSURE: 119 MMHG | RESPIRATION RATE: 18 BRPM

## 2022-06-14 DIAGNOSIS — S40.812A: Primary | ICD-10-CM

## 2022-06-14 PROCEDURE — 90714 TD VACC NO PRESV 7 YRS+ IM: CPT | Performed by: EMERGENCY MEDICINE

## 2022-06-14 PROCEDURE — 90471 IMMUNIZATION ADMIN: CPT | Performed by: EMERGENCY MEDICINE

## 2022-06-14 PROCEDURE — 6360000002 HC RX W HCPCS: Performed by: EMERGENCY MEDICINE

## 2022-06-14 RX ORDER — TETANUS AND DIPHTHERIA TOXOIDS ADSORBED 2; 2 [LF]/.5ML; [LF]/.5ML
0.5 INJECTION INTRAMUSCULAR ONCE
Status: COMPLETED | OUTPATIENT
Start: 2022-06-14 | End: 2022-06-14

## 2022-06-14 RX ADMIN — TETANUS AND DIPHTHERIA TOXOIDS ADSORBED 0.5 ML: 2; 2 INJECTION INTRAMUSCULAR at 01:36

## 2022-06-14 ASSESSMENT — ENCOUNTER SYMPTOMS
COUGH: 0
DIARRHEA: 0
SINUS PAIN: 0
WHEEZING: 0
SORE THROAT: 0
ABDOMINAL PAIN: 0
NAUSEA: 0
EYE REDNESS: 0
SHORTNESS OF BREATH: 0
EYE PAIN: 0
BACK PAIN: 0
VOMITING: 0

## 2022-06-14 ASSESSMENT — LIFESTYLE VARIABLES
HOW MANY STANDARD DRINKS CONTAINING ALCOHOL DO YOU HAVE ON A TYPICAL DAY: 10 OR MORE
HOW OFTEN DO YOU HAVE A DRINK CONTAINING ALCOHOL: 4 OR MORE TIMES A WEEK

## 2022-06-14 ASSESSMENT — PAIN - FUNCTIONAL ASSESSMENT: PAIN_FUNCTIONAL_ASSESSMENT: NONE - DENIES PAIN

## 2022-06-14 NOTE — ED PROVIDER NOTES
Chief Complaint   Patient presents with    Abscess     Pt comes in for a cyst/abscess under left arm -- Pt states he asked his brother to try and open it, so he cut it open and now he is in a lot of pain    Alcohol Problem     Pt states he wants to stop drinking -- states he drinks every day (6-7 80% liquor bottles a day, last drink a couple hours ago)       79-year-old gentleman with past medical history of substance abuse, alcohol abuse, seizures, asthma presenting today intoxicated with concern for a cyst under his left arm. He generally drinks very heavily, up to 30 pack daily. He has had a cyst under his left arm for several months, he was tired of it and today asked his brother to cardioverting him with a razor blade. He has not been having fevers, chills, active bleeding from the site, he is just concerned about it. Nothing is made it better or worse, no other associations. He states he does want to quit drinking alcohol and already has a counselor to follow-up with. No other acute complaints. Review of Systems   Constitutional: Negative for chills and fever. HENT: Negative for ear pain, sinus pain and sore throat. Eyes: Negative for pain and redness. Respiratory: Negative for cough, shortness of breath and wheezing. Cardiovascular: Negative for chest pain. Gastrointestinal: Negative for abdominal pain, diarrhea, nausea and vomiting. Genitourinary: Negative for dysuria and flank pain. Musculoskeletal: Negative for back pain and neck pain. Skin: Positive for wound. Negative for rash. Neurological: Negative for seizures and headaches. Hematological: Negative for adenopathy. All other systems reviewed and are negative. Physical Exam  Vitals and nursing note reviewed. Constitutional:       General: He is not in acute distress. Appearance: He is well-developed. HENT:      Head: Normocephalic and atraumatic.       Right Ear: External ear normal.      Left Ear: External ear normal.      Mouth/Throat:      Mouth: Mucous membranes are moist.      Pharynx: Oropharynx is clear. Eyes:      Pupils: Pupils are equal, round, and reactive to light. Cardiovascular:      Rate and Rhythm: Normal rate and regular rhythm. Heart sounds: Normal heart sounds. No murmur heard. Pulmonary:      Effort: Pulmonary effort is normal.      Breath sounds: Normal breath sounds. Abdominal:      General: Bowel sounds are normal.      Palpations: Abdomen is soft. Tenderness: There is no abdominal tenderness. There is no guarding or rebound. Musculoskeletal:         General: No tenderness. Cervical back: Normal range of motion and neck supple. Skin:     General: Skin is warm and dry. Findings: Lesion present. Comments: One centimeter long abrasion mid left axilla, nontender to palpation, no fluctuance noted, no streaking   Neurological:      General: No focal deficit present. Mental Status: He is alert and oriented to person, place, and time. Comments: Acutely intoxicated, no focal neurological deficits          Procedures     MDM  Number of Diagnoses or Management Options  Abrasion of left axilla, initial encounter  Diagnosis management comments: 72-year-old female past medical history of substance abuse, alcohol abuse, seizures, asthma presents today intoxicated with concern for cyst under his left axilla. He was hemodynamically stable on arrival to the emergency department. There was no palpable cyst in his axilla, notes a very small abrasion where his brother had used a razor. No lacerations noted. Tetanus was updated in the emergency department and we encouraged follow-up with rehab facility if he would like to quit his alcohol drinking.   He verbalized understanding the plan.                    --------------------------------------------- PAST HISTORY ---------------------------------------------  Past Medical History:  has a past medical history of Anxiety, Arm pain, Asthma, Concussion with loss of consciousness, Convulsions ([facility] Depression, Flashing lights, Head injury, Headache, Leg pain, Numbness and tingling, PTSD (post-traumatic stress disorder), and Seizures ([facility] Past Surgical History:  has a past surgical history that includes Colonoscopy; Endoscopy, colon, diagnostic; and Upper gastrointestinal endoscopy (N/A, 7/22/2021). Social History:  reports that he has been smoking. He has a 18.00 pack-year smoking history. He has never used smokeless tobacco. He reports previous alcohol use of about 24.0 standard drinks of alcohol per week. He reports previous drug use. Frequency: 1.00 time per week. Drug: Marijuana Gaynelle [place]). Family History: family history includes Alcohol Abuse in his father and mother; Cancer in his father; Cirrhosis in his father; Mental Illness in his brother, mother, and sister; Substance Abuse in his father, maternal aunt, maternal uncle, mother, paternal aunt, paternal uncle, and sister. The patients home medications have been reviewed. Allergies: Hydrocodone, Invega sustenna [paliperidone palmitate er], Paliperidone, Pcn [penicillins], and Fluticasone    -------------------------------------------------- RESULTS -------------------------------------------------  Labs:  No results found for this visit on 06/14/22. Radiology:  No orders to display       ------------------------- NURSING NOTES AND VITALS REVIEWED ---------------------------  Date / Time Roomed:  6/14/2022 12:10 AM  ED Bed Assignment:  06/06    The nursing notes within the ED encounter and vital signs as below have been reviewed.    /78   Pulse 77   Temp 98.6 °F (37 °C) (Oral)   Resp 18   Ht 5' 6\" (1.676 m)   Wt 145 lb (65.8 kg)   SpO2 97%   BMI 23.40 kg/m²   Oxygen Saturation Interpretation: Normal      ------------------------------------------ PROGRESS NOTES ------------------------------------------  I have spoken with the patient and discussed todays results, in addition to providing specific details for the plan of care and counseling regarding the diagnosis and prognosis. Their questions are answered at this time and they are agreeable with the plan. I discussed at length with them reasons for immediate return here for re evaluation. They will followup with primary care by calling their office tomorrow. --------------------------------- ADDITIONAL PROVIDER NOTES ---------------------------------  At this time the patient is without objective evidence of an acute process requiring hospitalization or inpatient management. They have remained hemodynamically stable throughout their entire ED visit and are stable for discharge with outpatient follow-up. The plan has been discussed in detail and they are aware of the specific conditions for emergent return, as well as the importance of follow-up. There are no discharge medications for this patient. Diagnosis:  1. Abrasion of left axilla, initial encounter        Disposition:  Patient's disposition: Discharge to home  Patient's condition is stable. Dayanna Reich DO  Resident  06/14/22 7369    ATTENDING PROVIDER ATTESTATION:     Maty Matos presented to the emergency department for evaluation of Abscess (Pt comes in for a cyst/abscess under left arm -- Pt states he asked his brother to try and open it, so he cut it open and now he is in a lot of pain) and Alcohol Problem (Pt states he wants to stop drinking -- states he drinks every day (6-7 80% liquor bottles a day, last drink a couple hours ago))   and was initially evaluated by the Medical Resident. See Original ED Note for H&P and ED course above. I have reviewed and discussed the case, including pertinent history (medical, surgical, family and social) and exam findings with the Medical Resident assigned to Maty Shawna.   I have personally performed and/or participated in the history, exam, medical decision making, and procedures and agree with all pertinent clinical information. I have reviewed my findings and recommendations with the assigned Medical Resident, Wendall Nyhan and members of family present at the time of disposition. My findings/plan: The encounter diagnosis was Abrasion of left axilla, initial encounter. There are no discharge medications for this patient.     Basilio VALDEZ DO         [address] [place] [address]  07/15/22 2014

## 2022-06-14 NOTE — ED NOTES
Pt states understanding of teachings at this time, as well as follow-up. Pt has no further questions or complaints.        Abdi Lee RN  06/14/22 5125

## 2022-08-18 ENCOUNTER — HOSPITAL ENCOUNTER (EMERGENCY)
Age: 32
Discharge: HOME | End: 2022-08-18
Payer: COMMERCIAL

## 2022-08-18 VITALS
HEART RATE: 97 BPM | DIASTOLIC BLOOD PRESSURE: 96 MMHG | SYSTOLIC BLOOD PRESSURE: 135 MMHG | OXYGEN SATURATION: 95 % | TEMPERATURE: 97.9 F | RESPIRATION RATE: 14 BRPM

## 2022-08-18 DIAGNOSIS — F10.930 ALCOHOL WITHDRAWAL SYNDROME WITHOUT COMPLICATION (HCC): Primary | ICD-10-CM

## 2022-08-18 PROCEDURE — 6370000000 HC RX 637 (ALT 250 FOR IP): Performed by: NURSE PRACTITIONER

## 2022-08-18 PROCEDURE — 99284 EMERGENCY DEPT VISIT MOD MDM: CPT

## 2022-08-18 RX ORDER — LORAZEPAM 1 MG/1
1 TABLET ORAL ONCE
Status: DISCONTINUED | OUTPATIENT
Start: 2022-08-18 | End: 2022-08-18

## 2022-08-18 RX ORDER — PHENOBARBITAL 32.4 MG/1
97.2 TABLET ORAL ONCE
Status: COMPLETED | OUTPATIENT
Start: 2022-08-18 | End: 2022-08-18

## 2022-08-18 RX ORDER — ONDANSETRON 4 MG/1
4 TABLET, ORALLY DISINTEGRATING ORAL ONCE
Status: COMPLETED | OUTPATIENT
Start: 2022-08-18 | End: 2022-08-18

## 2022-08-18 RX ORDER — NICOTINE 21 MG/24HR
1 PATCH, TRANSDERMAL 24 HOURS TRANSDERMAL DAILY
Status: DISCONTINUED | OUTPATIENT
Start: 2022-08-18 | End: 2022-08-18 | Stop reason: HOSPADM

## 2022-08-18 RX ADMIN — PHENOBARBITAL 97.2 MG: 32.4 TABLET ORAL at 10:41

## 2022-08-18 RX ADMIN — ONDANSETRON 4 MG: 4 TABLET, ORALLY DISINTEGRATING ORAL at 13:40

## 2022-08-18 NOTE — ED PROVIDER NOTES
[facility]    HPI:  8/18/22,   Time: 10:18 AM EDT         Juanjo Mena is a 36 y.o. male presenting to the ED for alcohol withdrawal, beginning yesterday ago. The complaint has been persistent, mild in severity, and worsened by nothing. Patient presents for complaints of alcohol withdrawal states his last alcohol consumption was approximate 24 hours ago. He did call peers support and was advised to come here for assistance. He was evaluated in the triage area by myself as well as the clinical  Lindalou Gottron who has been in contact with peers support and they will be coming down to evaluate him. Patient denies any suicidal homicidal ideations. He is tachycardic at 116.  1 dose of Ativan will be given for seizure prevention as well as anxiety. ROS:   Pertinent positives and negatives are stated within HPI, all other systems reviewed and are negative.  --------------------------------------------- PAST HISTORY ---------------------------------------------  Past Medical History:  has a past medical history of Anxiety, Arm pain, Asthma, Concussion with loss of consciousness, Convulsions ([facility] Depression, Flashing lights, Head injury, Headache, Leg pain, Numbness and tingling, PTSD (post-traumatic stress disorder), and Seizures ([facility] Past Surgical History:  has a past surgical history that includes Colonoscopy; Endoscopy, colon, diagnostic; and Upper gastrointestinal endoscopy (N/A, 7/22/2021). Social History:  reports that he has been smoking. He has a 18.00 pack-year smoking history. He has never used smokeless tobacco. He reports that he does not currently use alcohol after a past usage of about 24.0 standard drinks per week. He reports that he does not currently use drugs after having used the following drugs: Marijuana Gaby Aroldo). Frequency: 1.00 time per week.     Family History: family history includes Alcohol Abuse in his father and mother; Cancer in his father; Cirrhosis in his father; Mental Illness in his brother, mother, and sister; Substance Abuse in his father, maternal aunt, maternal uncle, mother, paternal aunt, paternal uncle, and sister. The patients home medications have been reviewed. Allergies: Hydrocodone, Invega sustenna [paliperidone palmitate er], Paliperidone, Pcn [penicillins], and Fluticasone    -------------------------------------------------- RESULTS -------------------------------------------------  All laboratory and radiology results have been personally reviewed by myself   LABS:  No results found for this visit on 08/18/22. RADIOLOGY:  Interpreted by Radiologist.  No orders to display       ------------------------- NURSING NOTES AND VITALS REVIEWED ---------------------------   The nursing notes within the ED encounter and vital signs as below have been reviewed. BP (!) 135/96   Pulse 97   Temp 97.9 °F (36.6 °C) (Oral)   Resp 14   SpO2 95%   Oxygen Saturation Interpretation: Normal      ---------------------------------------------------PHYSICAL EXAM--------------------------------------      Constitutional/General: Alert and oriented x3, well appearing, non toxic in NAD  Head: NC/AT  Eyes: PERRL, EOMI  Mouth: Oropharynx clear, handling secretions, no trismus  Neck: Supple, full ROM, no meningeal signs  Pulmonary: Lungs clear to auscultation bilaterally, no wheezes, rales, or rhonchi. Not in respiratory distress  Cardiovascular: Tachycardic rate and rhythm, no murmurs, gallops, or rubs. 2+ distal pulses  Abdomen: Soft, non tender, non distended,   Extremities: Moves all extremities x 4.  Warm and well perfused  Skin: warm and dry without rash  Neurologic: GCS 15,  Psych: Normal Affect      ------------------------------ ED COURSE/MEDICAL DECISION MAKING----------------------  Medications   PHENobarbital (LUMINAL) tablet 97.2 mg (97.2 mg Oral Given 8/18/22 1041)   ondansetron (ZOFRAN-ODT) disintegrating tablet 4 mg (4 mg Oral Given 8/18/22 1340) Medical Decision Making:    Patient was evaluated by the clinical  as well as Patricia Dc from peers support plan will be for discharge from this facility to [facility] for additional alcohol detox planning. Counseling: The emergency provider has spoken with the patient and discussed todays results, in addition to providing specific details for the plan of care and counseling regarding the diagnosis and prognosis. Questions are answered at this time and they are agreeable with the plan.      --------------------------------- IMPRESSION AND DISPOSITION ---------------------------------    IMPRESSION  1.  [facility]        DISPOSITION  Disposition: Discharge to [facility]   Patient condition is good                  [facility], APRN - CNP  08/19/22 9428

## 2022-08-18 NOTE — CARE COORDINATION
SS Note: Nuria-TALHA spent time with pt & coordinated for pt to go to [place] [address]. Intake was completed via phone. Doc Silverman is contact  there @ [phone]. Pt needs transport set up via [employer] [id number]. If this does not work, can use voucher which Lizandro Garcia provided. SAM called [employer] [id number] & spoke to [facility] & coordinated trip. This took 31 minutes. She had to call [facility] to confirm acceptance. Conf # [id number]. Then, after SW on hold for ETA, Ericka explained earliest pick-up time is 1730. SAM updated Lizandro Garcia and noted to use [employer].     [phone]  SAM called [facility] [employer] and coordinated trip for pt. She noted ETA is about 30 minutes. SAM updated pt and Jeanie-Christy. SAM completed voucher and faxed it to [employer]. [employer] here to transport pt. Pt got into cab and voucher given.   Electronically signed by PAT Sanchez on 8/18/2022 at 2:05 PM

## 2022-08-18 NOTE — CARE COORDINATION
SS note: pt here for ETOH w/drawl. SW spoke to [address] [place] [address] who noted she has been in contact w/pt thsi am. She has placed him in past for ETOH and he is wanting her assistance again. Alvin Nguyen noted once pt is medically cleared, she can work on placement for pt. She noted pt is anxious and and may benefit from medication to assist with withdrawal, especially if he goes to facility in [place]. Pt is currently waiting to be triaged and has not been seen by physician @ this time. Alvin Nguyen will come to ED to see pt and will wait for physicain's input.    Electronically signed by PAT Gomez on 8/18/2022 at 10:11 AM

## 2022-08-18 NOTE — CARE COORDINATION
Peer Recovery Support Note    Name: Tato Fountain  Date: 8/18/2022    Chief Complaint   Patient presents with    Alcohol Problem     Patient sent by his peer councelor for alcohol detox issues and withdrawal       Peer Support met with patient. [x] Support and education provided  [] Resources provided   [x] Treatment referral: Eliazar Chaves  [] Other:   [] Patient declined peer recovery services     Referred By:     Notes: Patient called peer at 8am this morning asking for help. Patient willing to go to [facility] [address] [place] [address] to make sure he was medically cleared for detox. Patient did assessment with Costa Mendoza from [employer] and was accepted. Taxi voucher was given for transport.     Wally Guerrero, 8/18/2022

## 2022-09-10 ENCOUNTER — HOSPITAL ENCOUNTER (EMERGENCY)
Age: 32
Discharge: LEFT BEFORE DISCHARGE | End: 2022-09-11
Attending: EMERGENCY MEDICINE
Payer: COMMERCIAL

## 2022-09-10 DIAGNOSIS — F10.932 ALCOHOL WITHDRAWAL SYNDROME WITH PERCEPTUAL DISTURBANCE (HCC): Primary | ICD-10-CM

## 2022-09-10 PROCEDURE — 96361 HYDRATE IV INFUSION ADD-ON: CPT

## 2022-09-10 PROCEDURE — 99284 EMERGENCY DEPT VISIT MOD MDM: CPT

## 2022-09-10 PROCEDURE — 96376 TX/PRO/DX INJ SAME DRUG ADON: CPT

## 2022-09-10 PROCEDURE — 93005 ELECTROCARDIOGRAM TRACING: CPT | Performed by: EMERGENCY MEDICINE

## 2022-09-10 PROCEDURE — 96374 THER/PROPH/DIAG INJ IV PUSH: CPT

## 2022-09-10 PROCEDURE — 96375 TX/PRO/DX INJ NEW DRUG ADDON: CPT

## 2022-09-10 PROCEDURE — 6370000000 HC RX 637 (ALT 250 FOR IP): Performed by: EMERGENCY MEDICINE

## 2022-09-10 RX ORDER — 0.9 % SODIUM CHLORIDE 0.9 %
1000 INTRAVENOUS SOLUTION INTRAVENOUS ONCE
Status: COMPLETED | OUTPATIENT
Start: 2022-09-10 | End: 2022-09-11

## 2022-09-10 RX ORDER — LANOLIN ALCOHOL/MO/W.PET/CERES
100 CREAM (GRAM) TOPICAL DAILY
Status: DISCONTINUED | OUTPATIENT
Start: 2022-09-11 | End: 2022-09-11

## 2022-09-10 RX ORDER — DIAZEPAM 5 MG/1
5 TABLET ORAL ONCE
Status: COMPLETED | OUTPATIENT
Start: 2022-09-10 | End: 2022-09-10

## 2022-09-10 RX ORDER — FOLIC ACID 1 MG/1
1 TABLET ORAL DAILY
Status: DISCONTINUED | OUTPATIENT
Start: 2022-09-11 | End: 2022-09-11

## 2022-09-10 RX ADMIN — DIAZEPAM 5 MG: 5 TABLET ORAL at 22:47

## 2022-09-10 ASSESSMENT — PAIN DESCRIPTION - ORIENTATION: ORIENTATION: OTHER (COMMENT)

## 2022-09-10 ASSESSMENT — PAIN DESCRIPTION - ONSET: ONSET: ON-GOING

## 2022-09-10 ASSESSMENT — PAIN SCALES - GENERAL: PAINLEVEL_OUTOF10: 7

## 2022-09-10 ASSESSMENT — PAIN DESCRIPTION - PAIN TYPE: TYPE: ACUTE PAIN

## 2022-09-10 ASSESSMENT — PAIN DESCRIPTION - FREQUENCY: FREQUENCY: CONTINUOUS

## 2022-09-10 ASSESSMENT — PAIN - FUNCTIONAL ASSESSMENT
PAIN_FUNCTIONAL_ASSESSMENT: PREVENTS OR INTERFERES SOME ACTIVE ACTIVITIES AND ADLS
PAIN_FUNCTIONAL_ASSESSMENT: 0-10

## 2022-09-10 ASSESSMENT — PAIN DESCRIPTION - LOCATION: LOCATION: HEAD

## 2022-09-10 ASSESSMENT — PAIN DESCRIPTION - DESCRIPTORS: DESCRIPTORS: ACHING

## 2022-09-11 VITALS
HEART RATE: 76 BPM | RESPIRATION RATE: 17 BRPM | OXYGEN SATURATION: 98 % | TEMPERATURE: 98.7 F | WEIGHT: 155 LBS | DIASTOLIC BLOOD PRESSURE: 76 MMHG | BODY MASS INDEX: 25.02 KG/M2 | SYSTOLIC BLOOD PRESSURE: 118 MMHG

## 2022-09-11 LAB
ALBUMIN SERPL-MCNC: 5 G/DL (ref 3.5–5.2)
ALP BLD-CCNC: 114 U/L (ref 40–129)
ALT SERPL-CCNC: 53 U/L (ref 0–40)
AMMONIA: 18 UMOL/L (ref 16–60)
ANION GAP SERPL CALCULATED.3IONS-SCNC: 19 MMOL/L (ref 7–16)
APTT: 25.4 SEC (ref 24.5–35.1)
AST SERPL-CCNC: 83 U/L (ref 0–39)
BASOPHILS ABSOLUTE: 0.06 E9/L (ref 0–0.2)
BASOPHILS RELATIVE PERCENT: 0.5 % (ref 0–2)
BILIRUB SERPL-MCNC: 1.3 MG/DL (ref 0–1.2)
BUN BLDV-MCNC: 9 MG/DL (ref 6–20)
CALCIUM SERPL-MCNC: 10.5 MG/DL (ref 8.6–10.2)
CHLORIDE BLD-SCNC: 90 MMOL/L (ref 98–107)
CO2: 29 MMOL/L (ref 22–29)
CREAT SERPL-MCNC: 0.6 MG/DL (ref 0.7–1.2)
EOSINOPHILS ABSOLUTE: 0.14 E9/L (ref 0.05–0.5)
EOSINOPHILS RELATIVE PERCENT: 1.1 % (ref 0–6)
GFR AFRICAN AMERICAN: >60
GFR NON-AFRICAN AMERICAN: >60 ML/MIN/1.73
GLUCOSE BLD-MCNC: 95 MG/DL (ref 74–99)
HCT VFR BLD CALC: 39.1 % (ref 37–54)
HEMOGLOBIN: 14.5 G/DL (ref 12.5–16.5)
IMMATURE GRANULOCYTES #: 0.04 E9/L
IMMATURE GRANULOCYTES %: 0.3 % (ref 0–5)
INR BLD: 1
LYMPHOCYTES ABSOLUTE: 1.62 E9/L (ref 1.5–4)
LYMPHOCYTES RELATIVE PERCENT: 12.7 % (ref 20–42)
MCH RBC QN AUTO: 33.1 PG (ref 26–35)
MCHC RBC AUTO-ENTMCNC: 37.1 % (ref 32–34.5)
MCV RBC AUTO: 89.3 FL (ref 80–99.9)
MONOCYTES ABSOLUTE: 0.81 E9/L (ref 0.1–0.95)
MONOCYTES RELATIVE PERCENT: 6.3 % (ref 2–12)
NEUTROPHILS ABSOLUTE: 10.09 E9/L (ref 1.8–7.3)
NEUTROPHILS RELATIVE PERCENT: 79.1 % (ref 43–80)
PDW BLD-RTO: 13.9 FL (ref 11.5–15)
PLATELET # BLD: 153 E9/L (ref 130–450)
PMV BLD AUTO: 10.3 FL (ref 7–12)
POTASSIUM REFLEX MAGNESIUM: 3.9 MMOL/L (ref 3.5–5)
PROTHROMBIN TIME: 11 SEC (ref 9.3–12.4)
RBC # BLD: 4.38 E12/L (ref 3.8–5.8)
SODIUM BLD-SCNC: 138 MMOL/L (ref 132–146)
TOTAL PROTEIN: 7.9 G/DL (ref 6.4–8.3)
TROPONIN, HIGH SENSITIVITY: <6 NG/L (ref 0–11)
WBC # BLD: 12.8 E9/L (ref 4.5–11.5)

## 2022-09-11 PROCEDURE — 82140 ASSAY OF AMMONIA: CPT

## 2022-09-11 PROCEDURE — 96376 TX/PRO/DX INJ SAME DRUG ADON: CPT

## 2022-09-11 PROCEDURE — 84484 ASSAY OF TROPONIN QUANT: CPT

## 2022-09-11 PROCEDURE — 80053 COMPREHEN METABOLIC PANEL: CPT

## 2022-09-11 PROCEDURE — 6370000000 HC RX 637 (ALT 250 FOR IP): Performed by: EMERGENCY MEDICINE

## 2022-09-11 PROCEDURE — 85610 PROTHROMBIN TIME: CPT

## 2022-09-11 PROCEDURE — 36415 COLL VENOUS BLD VENIPUNCTURE: CPT

## 2022-09-11 PROCEDURE — 85730 THROMBOPLASTIN TIME PARTIAL: CPT

## 2022-09-11 PROCEDURE — 96374 THER/PROPH/DIAG INJ IV PUSH: CPT

## 2022-09-11 PROCEDURE — 6360000002 HC RX W HCPCS: Performed by: EMERGENCY MEDICINE

## 2022-09-11 PROCEDURE — 96361 HYDRATE IV INFUSION ADD-ON: CPT

## 2022-09-11 PROCEDURE — 2580000003 HC RX 258: Performed by: EMERGENCY MEDICINE

## 2022-09-11 PROCEDURE — 96375 TX/PRO/DX INJ NEW DRUG ADDON: CPT

## 2022-09-11 PROCEDURE — 85025 COMPLETE CBC W/AUTO DIFF WBC: CPT

## 2022-09-11 RX ORDER — LANOLIN ALCOHOL/MO/W.PET/CERES
100 CREAM (GRAM) TOPICAL DAILY
Status: DISCONTINUED | OUTPATIENT
Start: 2022-09-11 | End: 2022-09-11

## 2022-09-11 RX ORDER — ONDANSETRON 2 MG/ML
4 INJECTION INTRAMUSCULAR; INTRAVENOUS ONCE
Status: COMPLETED | OUTPATIENT
Start: 2022-09-11 | End: 2022-09-11

## 2022-09-11 RX ORDER — 0.9 % SODIUM CHLORIDE 0.9 %
1000 INTRAVENOUS SOLUTION INTRAVENOUS ONCE
Status: COMPLETED | OUTPATIENT
Start: 2022-09-11 | End: 2022-09-11

## 2022-09-11 RX ORDER — DIAZEPAM 5 MG/1
5 TABLET ORAL ONCE
Status: COMPLETED | OUTPATIENT
Start: 2022-09-11 | End: 2022-09-11

## 2022-09-11 RX ORDER — DIAZEPAM 5 MG/ML
5 INJECTION, SOLUTION INTRAMUSCULAR; INTRAVENOUS ONCE
Status: DISCONTINUED | OUTPATIENT
Start: 2022-09-11 | End: 2022-09-11

## 2022-09-11 RX ORDER — THIAMINE HYDROCHLORIDE 100 MG/ML
100 INJECTION, SOLUTION INTRAMUSCULAR; INTRAVENOUS ONCE
Status: COMPLETED | OUTPATIENT
Start: 2022-09-11 | End: 2022-09-11

## 2022-09-11 RX ORDER — FOLIC ACID 1 MG/1
1 TABLET ORAL DAILY
Status: DISCONTINUED | OUTPATIENT
Start: 2022-09-11 | End: 2022-09-11 | Stop reason: HOSPADM

## 2022-09-11 RX ADMIN — THIAMINE HYDROCHLORIDE 100 MG: 100 INJECTION, SOLUTION INTRAMUSCULAR; INTRAVENOUS at 00:40

## 2022-09-11 RX ADMIN — DIAZEPAM 5 MG: 5 TABLET ORAL at 04:55

## 2022-09-11 RX ADMIN — SODIUM CHLORIDE 1000 ML: 9 INJECTION, SOLUTION INTRAVENOUS at 04:56

## 2022-09-11 RX ADMIN — ONDANSETRON HYDROCHLORIDE 4 MG: 2 SOLUTION INTRAMUSCULAR; INTRAVENOUS at 04:55

## 2022-09-11 RX ADMIN — FOLIC ACID 1 MG: 1 TABLET ORAL at 00:14

## 2022-09-11 RX ADMIN — SODIUM CHLORIDE 1000 ML: 9 INJECTION, SOLUTION INTRAVENOUS at 00:14

## 2022-09-11 RX ADMIN — ONDANSETRON HYDROCHLORIDE 4 MG: 2 SOLUTION INTRAMUSCULAR; INTRAVENOUS at 03:18

## 2022-09-11 RX ADMIN — DIAZEPAM 5 MG: 5 TABLET ORAL at 01:52

## 2022-09-11 ASSESSMENT — PAIN DESCRIPTION - LOCATION
LOCATION: HEAD
LOCATION: HEAD;OTHER (COMMENT)

## 2022-09-11 ASSESSMENT — PAIN SCALES - GENERAL
PAINLEVEL_OUTOF10: 10
PAINLEVEL_OUTOF10: 7

## 2022-09-11 ASSESSMENT — PAIN DESCRIPTION - DESCRIPTORS: DESCRIPTORS: ACHING

## 2022-09-11 ASSESSMENT — PAIN DESCRIPTION - ONSET
ONSET: ON-GOING
ONSET: ON-GOING

## 2022-09-11 ASSESSMENT — PAIN DESCRIPTION - FREQUENCY
FREQUENCY: CONTINUOUS
FREQUENCY: CONTINUOUS

## 2022-09-11 ASSESSMENT — PAIN - FUNCTIONAL ASSESSMENT
PAIN_FUNCTIONAL_ASSESSMENT: PREVENTS OR INTERFERES SOME ACTIVE ACTIVITIES AND ADLS
PAIN_FUNCTIONAL_ASSESSMENT: 0-10
PAIN_FUNCTIONAL_ASSESSMENT: PREVENTS OR INTERFERES SOME ACTIVE ACTIVITIES AND ADLS
PAIN_FUNCTIONAL_ASSESSMENT: 0-10

## 2022-09-11 ASSESSMENT — PAIN DESCRIPTION - PAIN TYPE
TYPE: ACUTE PAIN
TYPE: ACUTE PAIN

## 2022-09-11 NOTE — DISCHARGE INSTRUCTIONS
Leaving Against Medical Advice    Discharge against medical advice means that you choose to leave the hospital before your caregiver recommends that you do. Your caregiver may still need to diagnose or treat your condition or your treatment may not be complete. INSTRUCTIONS: contact your doctor or the provider assigned on your instructions as soon as possible to arrange follow-up. Risks:  Risks of leaving the hospital before your caregivers recommend include the following: Your condition may cause other health problems if not treated properly including disability or death. .        You may need to be admitted to the hospital again for the same condition. Your condition could become life-threatening. Driving and Equipment Restrictions  Some medical problems make it dangerous to drive, ride a bike, or use machines. Some of these problems are:  A hard blow to the head (concussion). Passing out (fainting). Twitching and shaking (seizures). Low blood sugar. Taking medicine to help you relax (sedatives). Taking pain medicines. Wearing an eye patch. Wearing splints. This can make it hard to use parts of your body that you need to drive safely. HOME CARE   Do not drive until your doctor says it is okay. Do not use machines until your doctor says it is okay. You may need a form signed by your doctor (medical release) before you can drive again. You may also need this form before you do other tasks where you need to be fully alert. MAKE SURE YOU:  Understand these instructions. Will watch your condition. Will get help right away if you are not doing well or get worse.

## 2022-09-11 NOTE — ED NOTES
Called nurse access line and spoke to Marla Basurto, [facility] regarding pt status of AMA and cancelling transfer     Anna Marie Orta RN  09/11/22 7851

## 2022-09-11 NOTE — ED NOTES
Spoke to [address] [place] [address] from [facility] for update on patient and has cleared patient to return to [place] at this time.  Dr. Christopher Guerra notified     Kathrine Castro RN  09/11/22 7935

## 2022-09-11 NOTE — ED NOTES
Nuria from Peer support called for update on patient at this time.      Padmini Morton RN  09/11/22 8252

## 2022-09-11 NOTE — ED PROVIDER NOTES
HPI:  9/10/22, Time: 10:42 PM EDT         Iris Arteaga is a 36 y.o. male presenting to the ED for etoh withdrawal, beginning earlier today. The complaint has been persistent, moderate in severity, and worsened by nothing. Patient checked himself into [facility]. He has been receiving Tegretol and Valium throughout the day. This evening he was feeling worse. Says that he was shaking and unable to control his himself. He was sent to the emergency department for further evaluation. Patient states that he drinks 2 bottles of Gavin a day. He has been doing that for the last couple of months. He denies any drug use. ROS:   Pertinent positives and negatives are stated within HPI, all other systems reviewed and are negative.  --------------------------------------------- PAST HISTORY ---------------------------------------------  Past Medical History:  has a past medical history of Anxiety, Arm pain, Asthma, Concussion with loss of consciousness, Convulsions ([facility] [address] Depression, Flashing lights, Head injury, Headache, Leg pain, Numbness and tingling, PTSD (post-traumatic stress disorder), and Seizures ([facility] [address] Past Surgical History:  has a past surgical history that includes Colonoscopy; Endoscopy, colon, diagnostic; and Upper gastrointestinal endoscopy (N/A, 7/22/2021). Social History:  reports that he has been smoking cigarettes. He has a 18.00 pack-year smoking history. He has never used smokeless tobacco. He reports that he does not currently use alcohol after a past usage of about 44.0 standard drinks per week. He reports that he does not currently use drugs after having used the following drugs: Marijuana Ashutosh Ngo). Frequency: 1.00 time per week.     Family History: family history includes Alcohol Abuse in his father and mother; Cancer in his father; Cirrhosis in his father; Mental Illness in his brother, mother, and sister; Substance Abuse in his father, maternal aunt, maternal uncle, mother, paternal aunt, paternal uncle, and sister. The patients home medications have been reviewed. Allergies: Hydrocodone, Ativan [lorazepam], Invega sustenna [paliperidone palmitate er], Paliperidone, Pcn [penicillins], and Fluticasone    ---------------------------------------------------PHYSICAL EXAM--------------------------------------    Constitutional/General: Alert and oriented x3, well appearing, non toxic in NAD  Head: Normocephalic and atraumatic  Eyes: PERRL, EOMI  Mouth: Oropharynx clear, handling secretions, no trismus  Neck: Supple, full ROM, non tender to palpation in the midline, no stridor, no crepitus, no meningeal signs  Pulmonary: Lungs clear to auscultation bilaterally, no wheezes, rales, or rhonchi. Not in respiratory distress  Cardiovascular: Tachycardic. Regular rhythm. No murmurs, gallops, or rubs. 2+ distal pulses  Chest: no chest wall tenderness  Abdomen: Soft. Non tender. Non distended. +BS. No rebound, guarding, or rigidity. No pulsatile masses appreciated. Musculoskeletal: Moves all extremities x 4. Warm and well perfused, no clubbing, cyanosis, or edema. Capillary refill <3 seconds  Skin: warm and dry. No rashes. Neurologic: GCS 15, CN 2-12 grossly intact, no focal deficits, symmetric strength 5/5 in the upper and lower extremities bilaterally  Psych: Normal Affect    -------------------------------------------------- RESULTS -------------------------------------------------  I have personally reviewed all laboratory and imaging results for this patient. Results are listed below.      LABS:  Results for orders placed or performed during the hospital encounter of 09/10/22   CBC with Auto Differential   Result Value Ref Range    WBC 12.8 (H) 4.5 - 11.5 E9/L    RBC 4.38 3.80 - 5.80 E12/L    Hemoglobin 14.5 12.5 - 16.5 g/dL    Hematocrit 39.1 37.0 - 54.0 %    MCV 89.3 80.0 - 99.9 fL    MCH 33.1 26.0 - 35.0 pg    MCHC 37.1 (H) 32.0 - 34.5 %    RDW 13.9 11.5 - 15.0 fL    Platelets 153 130 - 450 E9/L    MPV 10.3 7.0 - 12.0 fL    Neutrophils % 79.1 43.0 - 80.0 %    Immature Granulocytes % 0.3 0.0 - 5.0 %    Lymphocytes % 12.7 (L) 20.0 - 42.0 %    Monocytes % 6.3 2.0 - 12.0 %    Eosinophils % 1.1 0.0 - 6.0 %    Basophils % 0.5 0.0 - 2.0 %    Neutrophils Absolute 10.09 (H) 1.80 - 7.30 E9/L    Immature Granulocytes # 0.04 E9/L    Lymphocytes Absolute 1.62 1.50 - 4.00 E9/L    Monocytes Absolute 0.81 0.10 - 0.95 E9/L    Eosinophils Absolute 0.14 0.05 - 0.50 E9/L    Basophils Absolute 0.06 0.00 - 0.20 E9/L   Comprehensive Metabolic Panel w/ Reflex to MG   Result Value Ref Range    Sodium 138 132 - 146 mmol/L    Potassium reflex Magnesium 3.9 3.5 - 5.0 mmol/L    Chloride 90 (L) 98 - 107 mmol/L    CO2 29 22 - 29 mmol/L    Anion Gap 19 (H) 7 - 16 mmol/L    Glucose 95 74 - 99 mg/dL    BUN 9 6 - 20 mg/dL    Creatinine 0.6 (L) 0.7 - 1.2 mg/dL    GFR Non-African American >60 >=60 mL/min/1.73    GFR African American >60     Calcium 10.5 (H) 8.6 - 10.2 mg/dL    Total Protein 7.9 6.4 - 8.3 g/dL    Albumin 5.0 3.5 - 5.2 g/dL    Total Bilirubin 1.3 (H) 0.0 - 1.2 mg/dL    Alkaline Phosphatase 114 40 - 129 U/L    ALT 53 (H) 0 - 40 U/L    AST 83 (H) 0 - 39 U/L   Troponin   Result Value Ref Range    Troponin, High Sensitivity <6 0 - 11 ng/L   Protime-INR   Result Value Ref Range    Protime 11.0 9.3 - 12.4 sec    INR 1.0    APTT   Result Value Ref Range    aPTT 25.4 24.5 - 35.1 sec   EKG 12 Lead   Result Value Ref Range    Ventricular Rate 104 BPM    Atrial Rate 104 BPM    P-R Interval 124 ms    QRS Duration 90 ms    Q-T Interval 358 ms    QTc Calculation (Bazett) 470 ms    P Axis 75 degrees    R Axis 77 degrees    T Axis 90 degrees       RADIOLOGY:  Interpreted by Radiologist.  No orders to display         EKG Interpretation  Interpreted by emergency department physician    Rhythm: sinus tachycardia  Rate: tachycardia  Axis: normal  Conduction: normal  ST Segments: no acute change  T Waves: no acute change    Clinical Impression: non-specific EKG  Comparison to prior EKG: stable as compared to patient's most recent EKG      ------------------------- NURSING NOTES AND VITALS REVIEWED ---------------------------   The nursing notes within the ED encounter and vital signs as below have been reviewed by myself. /78   Pulse (!) 108   Temp 98.7 °F (37.1 °C)   Resp 16   Wt 155 lb (70.3 kg)   SpO2 97%   BMI 25.02 kg/m²   Oxygen Saturation Interpretation: Normal    The patients available past medical records and past encounters were reviewed. ------------------------------ ED COURSE/MEDICAL DECISION MAKING----------------------  Medications   folic acid (FOLVITE) tablet 1 mg (1 mg Oral Given 9/11/22 0014)   diazePAM (VALIUM) tablet 5 mg (has no administration in time range)   0.9 % sodium chloride bolus (0 mLs IntraVENous Stopped 9/11/22 0040)   diazePAM (VALIUM) tablet 5 mg (5 mg Oral Given 9/10/22 2247)   thiamine (B-1) injection 100 mg (100 mg IntraVENous Given 9/11/22 0040)             Medical Decision Making:     Patient is having some withdrawal symptoms. He's tremulous and having hallucinations. He says that he sees blue dots all around him. He says that he is unable to take Ativan. He says he has an adverse reaction to it. We do not have any phenobarbital at this facility. Patient's been getting Valium. He received fluids, thiamine, folic acid. I spoke with Dr. Gary Franklin. He accepts admission for alcohol withdrawal.    Re-Evaluations:             Re-evaluation.   Patients symptoms are improving      Consultations:                 Critical Care: 0        This patient's ED course included: a personal history and physicial examination, re-evaluation prior to disposition, multiple bedside re-evaluations, IV medications, cardiac monitoring, continuous pulse oximetry, and a personal history and physicial eaxmination    This patient has remained hemodynamically stable during their ED

## 2022-09-11 NOTE — ED NOTES
Patient states to RN that he is done waiting here and feels better and is demanding to go back to rehab. Detox facility has a bed for him and a ride was arranged  He is neurovascularly intact, hemodynamically stable, asymptomatic at this time and stable for return back to detox/rehab. This patient has chosen to leave against medical advice. We have explained to them that choosing to do so may result in permanent bodily harm or death. We discussed at length that without further evaluation and monitoring there may be unforeseen circumstances and deterioration resulting in permanent bodily harm or death as a result of their choice. They are alert, oriented, and competent at this time. They state that they are aware of the serious risks as explained, but they continue to wish to leave against medical advice. In light of their decision to leave against medical advice, follow-up has been discussed and they are aware of the importance of following up as instructed. They have been advised that they should return to the ED immediately if they change their mind at any time, or if their condition begins to change or worsen. --------------------------------- IMPRESSION AND DISPOSITION ---------------------------------    IMPRESSION  1.  Alcohol withdrawal syndrome with perceptual disturbance ([facility] [address]        DISPOSITION  Disposition: left AMA  Patient condition is stable       Trecia Goldberg, DO  09/11/22 1159

## 2022-09-11 NOTE — ED NOTES
States feeling pull on head and having hallucinations; seeing walls moving and flashing lights.  Notably anxious/crying     Jaspreet Urena RN  09/11/22 [id number]

## 2022-09-12 LAB
EKG ATRIAL RATE: 104 BPM
EKG P AXIS: 75 DEGREES
EKG P-R INTERVAL: 124 MS
EKG Q-T INTERVAL: 358 MS
EKG QRS DURATION: 90 MS
EKG QTC CALCULATION (BAZETT): 470 MS
EKG R AXIS: 77 DEGREES
EKG T AXIS: 90 DEGREES
EKG VENTRICULAR RATE: 104 BPM

## 2023-07-02 ENCOUNTER — HOSPITAL ENCOUNTER (EMERGENCY)
Age: 33
Discharge: HOME | End: 2023-07-02
Payer: COMMERCIAL

## 2023-07-02 VITALS
HEART RATE: 73 BPM | OXYGEN SATURATION: 99 % | DIASTOLIC BLOOD PRESSURE: 82 MMHG | TEMPERATURE: 98.6 F | BODY MASS INDEX: 23.54 KG/M2 | SYSTOLIC BLOOD PRESSURE: 109 MMHG | HEIGHT: 67 IN | WEIGHT: 150 LBS | RESPIRATION RATE: 20 BRPM

## 2023-07-02 DIAGNOSIS — K02.9 DENTAL CARIES: ICD-10-CM

## 2023-07-02 DIAGNOSIS — K08.89 DENTALGIA: Primary | ICD-10-CM

## 2023-07-02 PROCEDURE — 99211 OFF/OP EST MAY X REQ PHY/QHP: CPT

## 2023-07-02 RX ORDER — LIDOCAINE HYDROCHLORIDE 20 MG/ML
10 SOLUTION OROPHARYNGEAL
Qty: 200 ML | Refills: 0 | Status: SHIPPED | OUTPATIENT
Start: 2023-07-02

## 2023-07-02 RX ORDER — PENICILLIN V POTASSIUM 500 MG/1
500 TABLET ORAL 3 TIMES DAILY
Qty: 30 TABLET | Refills: 0 | Status: SHIPPED | OUTPATIENT
Start: 2023-07-02 | End: 2023-07-12

## 2023-07-02 RX ORDER — NAPROXEN 500 MG/1
500 TABLET ORAL 2 TIMES DAILY WITH MEALS
Qty: 20 TABLET | Refills: 0 | Status: SHIPPED | OUTPATIENT
Start: 2023-07-02

## 2023-07-02 ASSESSMENT — PAIN DESCRIPTION - LOCATION: LOCATION: TEETH

## 2023-07-02 ASSESSMENT — PAIN SCALES - GENERAL: PAINLEVEL_OUTOF10: 7

## 2023-07-02 ASSESSMENT — PAIN DESCRIPTION - DESCRIPTORS: DESCRIPTORS: ACHING;DISCOMFORT

## 2023-07-02 ASSESSMENT — PAIN - FUNCTIONAL ASSESSMENT: PAIN_FUNCTIONAL_ASSESSMENT: 0-10

## 2023-08-10 ENCOUNTER — HOSPITAL ENCOUNTER (INPATIENT)
Age: 33
LOS: 1 days | Discharge: LEFT BEFORE DISCHARGE | End: 2023-08-11
Attending: EMERGENCY MEDICINE | Admitting: INTERNAL MEDICINE
Payer: COMMERCIAL

## 2023-08-10 DIAGNOSIS — F10.932 ALCOHOL WITHDRAWAL SYNDROME WITH PERCEPTUAL DISTURBANCE (HCC): ICD-10-CM

## 2023-08-10 DIAGNOSIS — N39.0 URINARY TRACT INFECTION IN MALE: Primary | ICD-10-CM

## 2023-08-10 PROBLEM — F10.931 ALCOHOL WITHDRAWAL DELIRIUM (HCC): Status: ACTIVE | Noted: 2023-08-10

## 2023-08-10 LAB
ALBUMIN SERPL-MCNC: 4.3 G/DL (ref 3.5–5.2)
ALP SERPL-CCNC: 117 U/L (ref 40–129)
ALT SERPL-CCNC: 20 U/L (ref 0–40)
AMPHET UR QL SCN: NEGATIVE
ANION GAP SERPL CALCULATED.3IONS-SCNC: 15 MMOL/L (ref 7–16)
APAP SERPL-MCNC: <5 UG/ML (ref 10–30)
AST SERPL-CCNC: 35 U/L (ref 0–39)
BACTERIA URNS QL MICRO: ABNORMAL
BARBITURATES UR QL SCN: NEGATIVE
BENZODIAZ UR QL: POSITIVE
BILIRUB SERPL-MCNC: 0.9 MG/DL (ref 0–1.2)
BILIRUB UR QL STRIP: ABNORMAL
BUN SERPL-MCNC: 6 MG/DL (ref 6–20)
BUPRENORPHINE UR QL: NEGATIVE
CALCIUM SERPL-MCNC: 9.7 MG/DL (ref 8.6–10.2)
CANNABINOIDS UR QL SCN: POSITIVE
CHLORIDE SERPL-SCNC: 93 MMOL/L (ref 98–107)
CLARITY UR: CLEAR
CO2 SERPL-SCNC: 31 MMOL/L (ref 22–29)
COCAINE UR QL SCN: NEGATIVE
COLOR UR: YELLOW
CREAT SERPL-MCNC: 0.7 MG/DL (ref 0.7–1.2)
EKG ATRIAL RATE: 81 BPM
EKG P AXIS: 71 DEGREES
EKG P-R INTERVAL: 146 MS
EKG Q-T INTERVAL: 404 MS
EKG QRS DURATION: 92 MS
EKG QTC CALCULATION (BAZETT): 469 MS
EKG R AXIS: 73 DEGREES
EKG T AXIS: 69 DEGREES
EKG VENTRICULAR RATE: 81 BPM
ERYTHROCYTE [DISTWIDTH] IN BLOOD BY AUTOMATED COUNT: 13.4 % (ref 11.5–15)
ETHANOLAMINE SERPL-MCNC: <10 MG/DL
FENTANYL UR QL: NEGATIVE
GFR SERPL CREATININE-BSD FRML MDRD: >60 ML/MIN/1.73M2
GLUCOSE SERPL-MCNC: 96 MG/DL (ref 74–99)
GLUCOSE UR STRIP-MCNC: NEGATIVE MG/DL
HCT VFR BLD AUTO: 49.3 % (ref 37–54)
HGB BLD-MCNC: 16.5 G/DL (ref 12.5–16.5)
HGB UR QL STRIP.AUTO: NEGATIVE
KETONES UR STRIP-MCNC: ABNORMAL MG/DL
LEUKOCYTE ESTERASE UR QL STRIP: ABNORMAL
MAGNESIUM SERPL-MCNC: 2.2 MG/DL (ref 1.6–2.6)
MCH RBC QN AUTO: 31.5 PG (ref 26–35)
MCHC RBC AUTO-ENTMCNC: 33.5 G/DL (ref 32–34.5)
MCV RBC AUTO: 94.1 FL (ref 80–99.9)
METHADONE UR QL: NEGATIVE
NITRITE UR QL STRIP: NEGATIVE
OPIATES UR QL SCN: NEGATIVE
OXYCODONE UR QL SCN: NEGATIVE
PCP UR QL SCN: NEGATIVE
PH UR STRIP: 8.5 [PH] (ref 5–9)
PLATELET # BLD AUTO: 198 K/UL (ref 130–450)
PMV BLD AUTO: 12.9 FL (ref 7–12)
POTASSIUM SERPL-SCNC: 3.6 MMOL/L (ref 3.5–5)
PROT SERPL-MCNC: 7.1 G/DL (ref 6.4–8.3)
PROT UR STRIP-MCNC: ABNORMAL MG/DL
RBC # BLD AUTO: 5.24 M/UL (ref 3.8–5.8)
RBC #/AREA URNS HPF: ABNORMAL /HPF
SALICYLATES SERPL-MCNC: <0.3 MG/DL (ref 0–30)
SODIUM SERPL-SCNC: 139 MMOL/L (ref 132–146)
SP GR UR STRIP: 1.01 (ref 1–1.03)
TEST INFORMATION: ABNORMAL
UROBILINOGEN UR STRIP-ACNC: 1 EU/DL (ref 0–1)
WBC #/AREA URNS HPF: ABNORMAL /HPF
WBC OTHER # BLD: 8 K/UL (ref 4.5–11.5)

## 2023-08-10 PROCEDURE — 96376 TX/PRO/DX INJ SAME DRUG ADON: CPT

## 2023-08-10 PROCEDURE — 2580000003 HC RX 258

## 2023-08-10 PROCEDURE — 80307 DRUG TEST PRSMV CHEM ANLYZR: CPT

## 2023-08-10 PROCEDURE — 80179 DRUG ASSAY SALICYLATE: CPT

## 2023-08-10 PROCEDURE — 93005 ELECTROCARDIOGRAM TRACING: CPT

## 2023-08-10 PROCEDURE — G0480 DRUG TEST DEF 1-7 CLASSES: HCPCS

## 2023-08-10 PROCEDURE — 6370000000 HC RX 637 (ALT 250 FOR IP)

## 2023-08-10 PROCEDURE — 85027 COMPLETE CBC AUTOMATED: CPT

## 2023-08-10 PROCEDURE — 81001 URINALYSIS AUTO W/SCOPE: CPT

## 2023-08-10 PROCEDURE — 2060000000 HC ICU INTERMEDIATE R&B

## 2023-08-10 PROCEDURE — 83735 ASSAY OF MAGNESIUM: CPT

## 2023-08-10 PROCEDURE — 80053 COMPREHEN METABOLIC PANEL: CPT

## 2023-08-10 PROCEDURE — 96374 THER/PROPH/DIAG INJ IV PUSH: CPT

## 2023-08-10 PROCEDURE — 96375 TX/PRO/DX INJ NEW DRUG ADDON: CPT

## 2023-08-10 PROCEDURE — 6370000000 HC RX 637 (ALT 250 FOR IP): Performed by: EMERGENCY MEDICINE

## 2023-08-10 PROCEDURE — 99285 EMERGENCY DEPT VISIT HI MDM: CPT

## 2023-08-10 PROCEDURE — 80143 DRUG ASSAY ACETAMINOPHEN: CPT

## 2023-08-10 PROCEDURE — 6360000002 HC RX W HCPCS

## 2023-08-10 PROCEDURE — 93010 ELECTROCARDIOGRAM REPORT: CPT | Performed by: INTERNAL MEDICINE

## 2023-08-10 RX ORDER — SODIUM CHLORIDE 9 MG/ML
INJECTION, SOLUTION INTRAVENOUS PRN
Status: DISCONTINUED | OUTPATIENT
Start: 2023-08-10 | End: 2023-08-11 | Stop reason: HOSPADM

## 2023-08-10 RX ORDER — ONDANSETRON 2 MG/ML
4 INJECTION INTRAMUSCULAR; INTRAVENOUS ONCE
Status: COMPLETED | OUTPATIENT
Start: 2023-08-10 | End: 2023-08-10

## 2023-08-10 RX ORDER — PHENOBARBITAL 32.4 MG/1
16.2 TABLET ORAL EVERY 6 HOURS PRN
Status: DISCONTINUED | OUTPATIENT
Start: 2023-08-13 | End: 2023-08-11 | Stop reason: HOSPADM

## 2023-08-10 RX ORDER — ACETAMINOPHEN 325 MG/1
650 TABLET ORAL EVERY 6 HOURS PRN
Status: DISCONTINUED | OUTPATIENT
Start: 2023-08-10 | End: 2023-08-11 | Stop reason: HOSPADM

## 2023-08-10 RX ORDER — ENOXAPARIN SODIUM 100 MG/ML
40 INJECTION SUBCUTANEOUS DAILY
Status: DISCONTINUED | OUTPATIENT
Start: 2023-08-10 | End: 2023-08-11 | Stop reason: HOSPADM

## 2023-08-10 RX ORDER — PHENOBARBITAL SODIUM 65 MG/ML
130 INJECTION INTRAMUSCULAR ONCE
Status: COMPLETED | OUTPATIENT
Start: 2023-08-10 | End: 2023-08-10

## 2023-08-10 RX ORDER — SODIUM CHLORIDE 0.9 % (FLUSH) 0.9 %
5-40 SYRINGE (ML) INJECTION EVERY 12 HOURS SCHEDULED
Status: DISCONTINUED | OUTPATIENT
Start: 2023-08-10 | End: 2023-08-11 | Stop reason: HOSPADM

## 2023-08-10 RX ORDER — PHENOBARBITAL 32.4 MG/1
32.4 TABLET ORAL 4 TIMES DAILY
Status: DISCONTINUED | OUTPATIENT
Start: 2023-08-11 | End: 2023-08-11 | Stop reason: HOSPADM

## 2023-08-10 RX ORDER — BISACODYL 5 MG/1
5 TABLET, DELAYED RELEASE ORAL DAILY PRN
Status: DISCONTINUED | OUTPATIENT
Start: 2023-08-10 | End: 2023-08-11 | Stop reason: HOSPADM

## 2023-08-10 RX ORDER — LANOLIN ALCOHOL/MO/W.PET/CERES
100 CREAM (GRAM) TOPICAL DAILY
Status: DISCONTINUED | OUTPATIENT
Start: 2023-08-10 | End: 2023-08-11

## 2023-08-10 RX ORDER — MULTIVITAMIN WITH IRON
1 TABLET ORAL DAILY
Status: DISCONTINUED | OUTPATIENT
Start: 2023-08-10 | End: 2023-08-10

## 2023-08-10 RX ORDER — SODIUM CHLORIDE 0.9 % (FLUSH) 0.9 %
5-40 SYRINGE (ML) INJECTION PRN
Status: DISCONTINUED | OUTPATIENT
Start: 2023-08-10 | End: 2023-08-11 | Stop reason: HOSPADM

## 2023-08-10 RX ORDER — NICOTINE 21 MG/24HR
1 PATCH, TRANSDERMAL 24 HOURS TRANSDERMAL ONCE
Status: COMPLETED | OUTPATIENT
Start: 2023-08-10 | End: 2023-08-11

## 2023-08-10 RX ORDER — PHENOBARBITAL 32.4 MG/1
32.4 TABLET ORAL EVERY 6 HOURS PRN
Status: DISCONTINUED | OUTPATIENT
Start: 2023-08-11 | End: 2023-08-11 | Stop reason: HOSPADM

## 2023-08-10 RX ORDER — ONDANSETRON 4 MG/1
4 TABLET, ORALLY DISINTEGRATING ORAL EVERY 8 HOURS PRN
Status: DISCONTINUED | OUTPATIENT
Start: 2023-08-10 | End: 2023-08-11 | Stop reason: HOSPADM

## 2023-08-10 RX ORDER — PHENOBARBITAL 32.4 MG/1
64.8 TABLET ORAL 4 TIMES DAILY
Status: DISCONTINUED | OUTPATIENT
Start: 2023-08-11 | End: 2023-08-11 | Stop reason: HOSPADM

## 2023-08-10 RX ORDER — FOLIC ACID 1 MG/1
1 TABLET ORAL DAILY
Status: DISCONTINUED | OUTPATIENT
Start: 2023-08-11 | End: 2023-08-11 | Stop reason: HOSPADM

## 2023-08-10 RX ORDER — PHENOBARBITAL 32.4 MG/1
16.2 TABLET ORAL 2 TIMES DAILY
Status: DISCONTINUED | OUTPATIENT
Start: 2023-08-13 | End: 2023-08-11 | Stop reason: HOSPADM

## 2023-08-10 RX ORDER — ACETAMINOPHEN 650 MG/1
650 SUPPOSITORY RECTAL EVERY 6 HOURS PRN
Status: DISCONTINUED | OUTPATIENT
Start: 2023-08-10 | End: 2023-08-11 | Stop reason: HOSPADM

## 2023-08-10 RX ORDER — PHENOBARBITAL 32.4 MG/1
64.8 TABLET ORAL EVERY 6 HOURS PRN
Status: DISCONTINUED | OUTPATIENT
Start: 2023-08-10 | End: 2023-08-11 | Stop reason: HOSPADM

## 2023-08-10 RX ORDER — PHENOBARBITAL 32.4 MG/1
32.4 TABLET ORAL 2 TIMES DAILY
Status: DISCONTINUED | OUTPATIENT
Start: 2023-08-12 | End: 2023-08-11 | Stop reason: HOSPADM

## 2023-08-10 RX ORDER — ONDANSETRON 2 MG/ML
4 INJECTION INTRAMUSCULAR; INTRAVENOUS EVERY 6 HOURS PRN
Status: DISCONTINUED | OUTPATIENT
Start: 2023-08-10 | End: 2023-08-11 | Stop reason: HOSPADM

## 2023-08-10 RX ADMIN — ONDANSETRON 4 MG: 2 INJECTION INTRAMUSCULAR; INTRAVENOUS at 18:17

## 2023-08-10 RX ADMIN — MULTIVITAMIN TABLET 1 TABLET: TABLET at 14:39

## 2023-08-10 RX ADMIN — ENOXAPARIN SODIUM 40 MG: 100 INJECTION SUBCUTANEOUS at 22:46

## 2023-08-10 RX ADMIN — SODIUM CHLORIDE, PRESERVATIVE FREE 10 ML: 5 INJECTION INTRAVENOUS at 21:18

## 2023-08-10 RX ADMIN — PHENOBARBITAL SODIUM 130 MG: 65 INJECTION INTRAMUSCULAR; INTRAVENOUS at 18:20

## 2023-08-10 RX ADMIN — PHENOBARBITAL SODIUM 130 MG: 65 INJECTION INTRAMUSCULAR; INTRAVENOUS at 14:44

## 2023-08-10 RX ADMIN — Medication 10 ML: at 21:18

## 2023-08-10 RX ADMIN — Medication 100 MG: at 14:39

## 2023-08-10 RX ADMIN — CEFTRIAXONE SODIUM 1000 MG: 1 INJECTION, POWDER, FOR SOLUTION INTRAMUSCULAR; INTRAVENOUS at 18:23

## 2023-08-10 ASSESSMENT — PAIN DESCRIPTION - LOCATION
LOCATION: GENERALIZED
LOCATION: GENERALIZED

## 2023-08-10 ASSESSMENT — PAIN DESCRIPTION - FREQUENCY
FREQUENCY: CONTINUOUS
FREQUENCY: CONTINUOUS

## 2023-08-10 ASSESSMENT — PAIN DESCRIPTION - DESCRIPTORS
DESCRIPTORS: ACHING
DESCRIPTORS: ACHING

## 2023-08-10 ASSESSMENT — PAIN DESCRIPTION - ONSET
ONSET: ON-GOING
ONSET: ON-GOING

## 2023-08-10 ASSESSMENT — PAIN SCALES - GENERAL
PAINLEVEL_OUTOF10: 7
PAINLEVEL_OUTOF10: 7

## 2023-08-10 NOTE — ED PROVIDER NOTES
(LUMINAL) injection 130 mg (130 mg IntraVENous Given 8/10/23 1444)   cefTRIAXone (ROCEPHIN) 1,000 mg in sterile water 10 mL IV syringe (1,000 mg IntraVENous Given 8/10/23 1823)   PHENobarbital (LUMINAL) injection 130 mg (130 mg IntraVENous Given 8/10/23 1820)   ondansetron (ZOFRAN) injection 4 mg (4 mg IntraVENous Given 8/10/23 1817)       Medical Decision Making/Differential Diagnosis:  CC/HPI Summary, Social Determinants of health, Records Reviewed, DDx, testing done/not done, ED Course, Reassessment, disposition considerations/shared decision making with patient, consults, disposition:        CC/HPI Summary, DDx, ED Course, Reassessment, Tests Considered, Patient expectation:   Patient presents for concerns of alcohol withdrawal.  Patient states his last drink was approximately 2 days ago. Patient was getting Valium in the rehab facility. Patient initial CIWA of 24. Patient was given 130 mg IV of phenobarbital.  Patient was found in routine labs to have urinary tract infection for which she was treated with Rocephin. Patient was also given supplemental thiamine and multivitamin. Patient did not have the signs of Warnicke Korsakoff syndrome. Patient did not have hallucinations at time of evaluation. Patient has not informed delirium tremens. Patient was given phenobarbital to stay off alcohol withdrawal seizures. Patient did not have any seizures. Patient will be admitted for further management of his alcohol withdrawal.              Chronic Conditions:   Past Medical History:   Diagnosis Date    Anxiety     Arm pain     Asthma     Concussion with loss of consciousness     Convulsions (HCC)     Depression     Flashing lights     Head injury     Headache     Leg pain     Numbness and tingling     arms and hands    PTSD (post-traumatic stress disorder)     Seizures ([address])          Records Reviewed: Note from 9/10/2022 for alcohol withdrawal for patient's past presentation.     CONSULTS: (Who and What was discussed)  IP CONSULT TO SOCIAL WORK  IP CONSULT TO INTERNAL MEDICINE  Inpatient consult to internal medicine: Spoke to Dr. Kristy Curtis where clinical course was relayed. She agreed to admit this patient. FINAL IMPRESSION      1. Urinary tract infection in male    2. Alcohol withdrawal syndrome with perceptual disturbance [facility])          DISPOSITION/PLAN     DISPOSITION Admitted 08/10/2023 07:32:50 PM      PATIENT REFERRED TO:  No follow-up provider specified.     DISCHARGE MEDICATIONS:  Current Discharge Medication List               (Please note that portions of this note were completed with a voice recognition program.  Efforts were made to edit the dictations but occasionally words are mis-transcribed.)    Christianne Chery DO (electronically signed)           Christianne Chery DO  Resident  08/11/23 7742

## 2023-08-10 NOTE — ED NOTES
Family brought in more belongings. Stored into [facility].       Dinorah Watson  08/10/23 7337

## 2023-08-10 NOTE — ED NOTES
Belongings bag with pants,shirt, socks shoes and cigarettes placed in locker Fredy Roberts RN  08/10/23 1350

## 2023-08-10 NOTE — ED NOTES
Pt has a hx of drinking with alcoholic induced pancreatitis, alcohol withdrawal with seizures. Pt is here today from [place] detox rehab facility with increased hallucinations most likely stemming from \"delirium tremors\" and alcohol withdrawal.  Pt arrived at [place] on Tuesday. Pt adamantly denies SI, no HI and no commanding hallucinations. Pt said that he has been having visions of pattern, no auditory psychosis. Per EMS, pt is here for medical concerns and can return back to [place] once medically cleared.     THIS PT DOES NOT REQUIRE A FULL  PSYCH ASSESSMENT          Carlin Bautista, [facility]  08/10/23 Rk Conner, [facility]  08/10/23 1294

## 2023-08-11 VITALS
SYSTOLIC BLOOD PRESSURE: 116 MMHG | BODY MASS INDEX: 24.11 KG/M2 | TEMPERATURE: 97.2 F | HEIGHT: 66 IN | WEIGHT: 150 LBS | DIASTOLIC BLOOD PRESSURE: 84 MMHG | OXYGEN SATURATION: 97 % | HEART RATE: 87 BPM | RESPIRATION RATE: 17 BRPM

## 2023-08-11 LAB
ANION GAP SERPL CALCULATED.3IONS-SCNC: 12 MMOL/L (ref 7–16)
BASOPHILS # BLD: 0.06 K/UL (ref 0–0.2)
BASOPHILS NFR BLD: 1 % (ref 0–2)
BUN SERPL-MCNC: 8 MG/DL (ref 6–20)
CALCIUM SERPL-MCNC: 9.2 MG/DL (ref 8.6–10.2)
CHLORIDE SERPL-SCNC: 93 MMOL/L (ref 98–107)
CO2 SERPL-SCNC: 30 MMOL/L (ref 22–29)
CREAT SERPL-MCNC: 0.8 MG/DL (ref 0.7–1.2)
EOSINOPHIL # BLD: 0.17 K/UL (ref 0.05–0.5)
EOSINOPHILS RELATIVE PERCENT: 2 % (ref 0–6)
ERYTHROCYTE [DISTWIDTH] IN BLOOD BY AUTOMATED COUNT: 13.3 % (ref 11.5–15)
GFR SERPL CREATININE-BSD FRML MDRD: >60 ML/MIN/1.73M2
GLUCOSE SERPL-MCNC: 100 MG/DL (ref 74–99)
HCT VFR BLD AUTO: 48.8 % (ref 37–54)
HGB BLD-MCNC: 16.2 G/DL (ref 12.5–16.5)
IMM GRANULOCYTES # BLD AUTO: 0.05 K/UL (ref 0–0.58)
IMM GRANULOCYTES NFR BLD: 1 % (ref 0–5)
LYMPHOCYTES NFR BLD: 3.23 K/UL (ref 1.5–4)
LYMPHOCYTES RELATIVE PERCENT: 30 % (ref 20–42)
MCH RBC QN AUTO: 31.5 PG (ref 26–35)
MCHC RBC AUTO-ENTMCNC: 33.2 G/DL (ref 32–34.5)
MCV RBC AUTO: 94.9 FL (ref 80–99.9)
MONOCYTES NFR BLD: 0.89 K/UL (ref 0.1–0.95)
MONOCYTES NFR BLD: 8 % (ref 2–12)
NEUTROPHILS NFR BLD: 59 % (ref 43–80)
NEUTS SEG NFR BLD: 6.37 K/UL (ref 1.8–7.3)
PLATELET, FLUORESCENCE: 192 K/UL (ref 130–450)
PMV BLD AUTO: 13.2 FL (ref 7–12)
POTASSIUM SERPL-SCNC: 3.4 MMOL/L (ref 3.5–5)
RBC # BLD AUTO: 5.14 M/UL (ref 3.8–5.8)
SODIUM SERPL-SCNC: 135 MMOL/L (ref 132–146)
WBC OTHER # BLD: 10.8 K/UL (ref 4.5–11.5)

## 2023-08-11 PROCEDURE — 99231 SBSQ HOSP IP/OBS SF/LOW 25: CPT | Performed by: INTERNAL MEDICINE

## 2023-08-11 PROCEDURE — 6360000002 HC RX W HCPCS

## 2023-08-11 PROCEDURE — 51798 US URINE CAPACITY MEASURE: CPT

## 2023-08-11 PROCEDURE — 6370000000 HC RX 637 (ALT 250 FOR IP)

## 2023-08-11 PROCEDURE — 51701 INSERT BLADDER CATHETER: CPT

## 2023-08-11 PROCEDURE — 2580000003 HC RX 258

## 2023-08-11 PROCEDURE — 36415 COLL VENOUS BLD VENIPUNCTURE: CPT

## 2023-08-11 PROCEDURE — 85025 COMPLETE CBC W/AUTO DIFF WBC: CPT

## 2023-08-11 PROCEDURE — 80048 BASIC METABOLIC PNL TOTAL CA: CPT

## 2023-08-11 RX ORDER — THIAMINE HYDROCHLORIDE 100 MG/ML
200 INJECTION, SOLUTION INTRAMUSCULAR; INTRAVENOUS DAILY
Status: DISCONTINUED | OUTPATIENT
Start: 2023-08-11 | End: 2023-08-11 | Stop reason: HOSPADM

## 2023-08-11 RX ORDER — LANOLIN ALCOHOL/MO/W.PET/CERES
100 CREAM (GRAM) TOPICAL DAILY
Qty: 30 TABLET | Refills: 3 | Status: SHIPPED | OUTPATIENT
Start: 2023-08-14

## 2023-08-11 RX ORDER — LANOLIN ALCOHOL/MO/W.PET/CERES
100 CREAM (GRAM) TOPICAL DAILY
Status: DISCONTINUED | OUTPATIENT
Start: 2023-08-14 | End: 2023-08-11 | Stop reason: HOSPADM

## 2023-08-11 RX ORDER — ALPRAZOLAM 1 MG/1
0.5 TABLET ORAL ONCE
Status: COMPLETED | OUTPATIENT
Start: 2023-08-11 | End: 2023-08-11

## 2023-08-11 RX ORDER — CEFPODOXIME PROXETIL 200 MG/1
200 TABLET, FILM COATED ORAL 2 TIMES DAILY
Qty: 10 TABLET | Refills: 0 | Status: SHIPPED | OUTPATIENT
Start: 2023-08-11 | End: 2023-08-15

## 2023-08-11 RX ADMIN — SODIUM CHLORIDE, PRESERVATIVE FREE 10 ML: 5 INJECTION INTRAVENOUS at 09:59

## 2023-08-11 RX ADMIN — ALPRAZOLAM 0.5 MG: 1 TABLET ORAL at 15:53

## 2023-08-11 RX ADMIN — PHENOBARBITAL 64.8 MG: 32.4 TABLET ORAL at 12:50

## 2023-08-11 RX ADMIN — WATER 1000 MG: 1 INJECTION INTRAMUSCULAR; INTRAVENOUS; SUBCUTANEOUS at 10:02

## 2023-08-11 RX ADMIN — PHENOBARBITAL 64.8 MG: 32.4 TABLET ORAL at 10:04

## 2023-08-11 RX ADMIN — POTASSIUM BICARBONATE 20 MEQ: 782 TABLET, EFFERVESCENT ORAL at 06:18

## 2023-08-11 RX ADMIN — PHENOBARBITAL 64.8 MG: 32.4 TABLET ORAL at 00:22

## 2023-08-11 RX ADMIN — THIAMINE HYDROCHLORIDE 200 MG: 100 INJECTION, SOLUTION INTRAMUSCULAR; INTRAVENOUS at 10:03

## 2023-08-11 RX ADMIN — FOLIC ACID 1 MG: 1 TABLET ORAL at 10:04

## 2023-08-11 ASSESSMENT — PAIN DESCRIPTION - LOCATION
LOCATION: GENERALIZED
LOCATION: GENERALIZED

## 2023-08-11 ASSESSMENT — PAIN DESCRIPTION - ONSET
ONSET: ON-GOING
ONSET: ON-GOING

## 2023-08-11 ASSESSMENT — PAIN DESCRIPTION - DESCRIPTORS
DESCRIPTORS: ACHING
DESCRIPTORS: ACHING

## 2023-08-11 ASSESSMENT — PAIN DESCRIPTION - FREQUENCY
FREQUENCY: CONTINUOUS
FREQUENCY: CONTINUOUS

## 2023-08-11 ASSESSMENT — PAIN SCALES - GENERAL
PAINLEVEL_OUTOF10: 6
PAINLEVEL_OUTOF10: 5

## 2023-08-11 NOTE — PLAN OF CARE
Team received a perfectserve about patient wanting to leave [place]. Patient was evaluated at the bedside. He was met agitated and anxious saying he needs to go home to his brother. He also stats that he was to smoke and worried about his girlfriend tampering with his bank account. He declined offer for nicotine patch. Patient has been on valium for a while and sates he takes xanax for anxiety. P.o xanax 0.5 mg was prescribed for anxiety. Continue Phenobarb taper as planned.     Electronically signed by Angie Senior MD on 8/11/2023 at 3:56 PM

## 2023-08-11 NOTE — PROGRESS NOTES
Pt is screaming saying his girlfriend is robbing his bank account and wants to home. IM team 1 resident notified to come speak to him regarding his need to stay. I asked if they would change his antibiotic from IV to PO since he's probably going to leave. They spoke with him and ordered one dose of xanax. Pt took it then said he's leaving AMA. Heart monitor and IV removed, he signed AMA paper.

## 2023-08-11 NOTE — PROGRESS NOTES
[address]  Internal Medicine Residency / [address]    Attending Physician Statement  I have discussed the case, including pertinent history and exam findings with the resident and the team.  I have seen and examined the patient and the key elements of the encounter have been performed by me. I agree with the assessment, plan and orders as documented by the resident. ETOH withdrawal syndrome  A&O  VS stable  Responding well to phenobarbital   Will avoid BZ's  with hx of adverse side effects  Has UTI on Rocephin  Bladder dynamics to be reviewed  Plan: Continue same for now   Remainder of medical problems as per resident note.       Brandyn Obregon MD [facility] SPECIALTY [facility]  Internal Medicine Residency Faculty

## 2023-08-11 NOTE — CARE COORDINATION
CM transition of care. Pt presented to [facility] ER secondary to hallucinations and concerns for alcohol withdrawal.  Pt came from [facility] recovery. Admitted with Alcohol withdrawal with delirium. Phenobarbital and rocephin IV ordered. Met with pt at bedside. He was at [facility] and does not plan on returning. He reports once he is done detoxing he will go back to his brothers house. PTA he was independent with ADLs and working full time job. Pt offered additional resources and has declined at this time. He reports he plans to go for Outpatient follow up with [facility] as he has used them in the past.  Pt does not have a PCP and is agreeable to having on set up at d/c. Wants to stay in the [facility]. He uses [facility] on [address] in [place]. Pt will need a ride set up through [facility] ([phone]) at d/c.  / to follow. Robyn De Luna RN [facility] [phone]. Case Management Assessment  Initial Evaluation    Date/Time of Evaluation: 8/11/2023 10:46 AM  Assessment Completed by: Nely Roper RN    If patient is discharged prior to next notation, then this note serves as note for discharge by case management. Patient Name: Ulices Grimaldo                   YOB: 1990  Diagnosis: Alcohol withdrawal delirium ([address]) [F10.931]  Alcohol withdrawal syndrome with perceptual disturbance ([address]) [F10.932]  Urinary tract infection in male [N39.0]                   Date / Time: 8/10/2023  1:37 PM    Patient Admission Status: Inpatient   Readmission Risk (Low < 19, Mod (19-27), High > 27): Readmission Risk Score: 5    Current PCP: On File Not (Inactive)  PCP verified by CM? No    Chart Reviewed: Yes      History Provided by: Patient  Patient Orientation: Alert and Oriented    Patient Cognition: Alert    Hospitalization in the last 30 days (Readmission):  No    If yes, Readmission Assessment in CM Navigator will be completed.     Advance Directives:      Code Status: Full Code with a choice of provider and agrees with the discharge plan. Freedom of choice list with basic dialogue that supports the patient's individualized plan of care/goals and shares the quality data associated with the providers was provided to:     Patient Representative Name:       The Patient and/or Patient Representative Agree with the Discharge Plan?       Celso Bell RN  Case Management Department  Ph: [phone] Fax:

## 2023-08-11 NOTE — PLAN OF CARE
Received a perfect serve at 16:32 that Colt Duffy already left AMA. Day residents already discussed with patient about the concerns for leaving and still having pending labs for infectious work up. Did send for continued antibiotics to his home pharmacy and call was made to clinic for a hospital follow up with our clinic.      Electronically signed by Deanna Cowart MD on 8/11/2023 at 5:47 PM

## 2023-08-11 NOTE — H&P
[address] [place] [address]  Internal Medicine Residency Program  History and Physical    Patient:  Anna Marie Daniel 29 y.o. male   MRN: [id number]       Date of Service: 8/10/2023          Chief complaint: had concerns including Hallucinations (Detoxing from ETOH since Tuesday. Today reports shake and visual hallucinations. Clonidine and valium did not help this morning). History of Present Illness   The patient is a 29 y.o. male with a PMHx of alcohol abuse, alcohol-induced pancreatitis, anxiety who presented with a chief complaint of alcohol withdrawals. Patient has had multiple admissions for alcohol withdrawals, some of which had seizure episodes. Patient reports that he was sober for 1 year, but recently started drinking again for the past month due to relationship problems with his girlfriend eventually ending in a breakup. He has been drinking around 6 24oz 8% beers and 1/5 whiskey daily. 2 days prior, he was admitted at [facility] rehabilitation facility. He immediately noted onset of withdrawal symptoms such as anxiety, mild tremors, hot flashes. He also reports visual hallucinations - visual geometric shapes, crawling insects. He notes that the symptoms were worsened by being in a dark room. At the facility, he received multiple doses of Valium and Tegretol which he says are not effective. No recent seizures or seizure-like episodes. He currently complains of anxiety, hot flashes, tremors, hallucinations (visual and tactile). He is able to tolerate regular diet. At the ED, initial vitals were stable. Workup revealed Cl 93, CO2 31, normal liver function tests, Gluc 96, UDS (+) for BZD (patient was receiving valium) and Cannabinoid. Acetaminophen level <5, Salicylate <4.6, Ethanol <10. CBC normal. UA revealed trace protein, large leukocyte esterase, 2+ bacteria. EKG NSR.  He was given Rocephin, thiamine, Phenobarbital 2 doses (last dose at 6:20 pm) with noted improvement of tremors, Ondansetron,

## 2023-08-11 NOTE — PROGRESS NOTES
Pt voiding w/ more ease, output is more clear as well. Pt instructed to let me know the next time he voids so we can do a PVR.

## 2023-08-13 NOTE — DISCHARGE SUMMARY
[address]  Discharge Summary    PCP: On File Not (Inactive)    Admit Date:8/10/2023  Discharge Date: 8/11/2023    Admission Diagnosis:   Alcohol Withdrawal with Alcoholic Hallucinosis  Hx of alcohol abuse with multiple admissions for withdrawals (previous episodes with delirium tremens and seizures)  Anxiety   Asymptomatic bacteriuria   Hx of alcohol-induced pancreatitis (2021)   Hx of tobacco abuse    Discharge Diagnosis:  Patient left AMA, unable to reconcile final diagnosis    Hospital Course: The patient is a 29 y.o. male with a PMHx of alcohol abuse, alcohol-induced pancreatitis, anxiety who presented with a chief complaint of alcohol withdrawals. Patient has had multiple admissions for alcohol withdrawals, some of which had seizure episodes. Patient reports that he was sober for 1 year, but recently started drinking again for the past month due to relationship problems with his girlfriend eventually ending in a breakup. He has been drinking around 6 24oz 8% beers and 1/5 whiskey daily. 2 days prior, he was admitted at [facility] rehabilitation facility. He immediately noted onset of withdrawal symptoms such as anxiety, mild tremors, hot flashes, visual hallucinations (geometric shapes), crawling insects -- symptoms worsened by being in a dark room. At the facility, he received multiple doses of Valium and Tegretol. He presented with persistent anxiety, hot flashes, tremors, hallucinations (visual and tactile). At the ED, initial vitals were stable. Workup revealed Cl 93, CO2 31, normal liver function tests, Gluc 96, UDS (+) for BZD (patient was receiving valium) and Cannabinoid. Acetaminophen level <5, Salicylate <8.3, Ethanol <10. CBC normal. UA revealed trace protein, large leukocyte esterase, 2+ bacteria. EKG NSR. He was given Rocephin, thiamine, Phenobarbital 2 doses (last dose at 6:20 pm) with noted improvement of tremors, Ondansetron, and Nicotine patch.

## 2023-08-15 ENCOUNTER — TELEPHONE (OUTPATIENT)
Dept: INTERNAL MEDICINE | Age: 33
End: 2023-08-15

## 2023-08-15 RX ORDER — CEFDINIR 300 MG/1
300 CAPSULE ORAL 2 TIMES DAILY
Qty: 14 CAPSULE | Refills: 0 | Status: CANCELLED | OUTPATIENT
Start: 2023-08-15 | End: 2023-08-22

## 2023-08-15 NOTE — TELEPHONE ENCOUNTER
Received message that patients insurance denied Cefpodoxime (Vantin) for treatment of asymptomatic bacteruria . Called patients pharmacy and verified that they will cover Ciprofloxacin 500 mg. Called patient and was not able to reach him. Plan to prescribe Ciprofloxacin 500 mg for 7 days.      Electronically signed by Annabel Villareal MD on 8/15/2023 at 3:56 PM

## 2023-09-12 ENCOUNTER — HOSPITAL ENCOUNTER (OUTPATIENT)
Dept: HOSPITAL 101 - ER | Age: 33
Setting detail: OBSERVATION
LOS: 2 days | Discharge: NURSING, REHAB OR LONG-TERM CARE | End: 2023-09-14
Payer: COMMERCIAL

## 2023-09-12 VITALS — RESPIRATION RATE: 13 BRPM | OXYGEN SATURATION: 96 % | HEART RATE: 88 BPM

## 2023-09-12 VITALS
HEART RATE: 105 BPM | OXYGEN SATURATION: 95 % | DIASTOLIC BLOOD PRESSURE: 78 MMHG | SYSTOLIC BLOOD PRESSURE: 111 MMHG | RESPIRATION RATE: 23 BRPM

## 2023-09-12 VITALS — HEART RATE: 70 BPM | OXYGEN SATURATION: 97 % | RESPIRATION RATE: 17 BRPM

## 2023-09-12 VITALS
SYSTOLIC BLOOD PRESSURE: 117 MMHG | RESPIRATION RATE: 20 BRPM | OXYGEN SATURATION: 100 % | DIASTOLIC BLOOD PRESSURE: 80 MMHG | HEART RATE: 88 BPM | TEMPERATURE: 97.34 F

## 2023-09-12 VITALS
SYSTOLIC BLOOD PRESSURE: 114 MMHG | RESPIRATION RATE: 16 BRPM | OXYGEN SATURATION: 95 % | HEART RATE: 69 BPM | TEMPERATURE: 98.24 F | DIASTOLIC BLOOD PRESSURE: 79 MMHG

## 2023-09-12 VITALS
RESPIRATION RATE: 14 BRPM | HEART RATE: 57 BPM | TEMPERATURE: 98.2 F | SYSTOLIC BLOOD PRESSURE: 99 MMHG | DIASTOLIC BLOOD PRESSURE: 63 MMHG | OXYGEN SATURATION: 98 %

## 2023-09-12 VITALS — RESPIRATION RATE: 22 BRPM | HEART RATE: 84 BPM | OXYGEN SATURATION: 97 %

## 2023-09-12 VITALS — HEART RATE: 74 BPM

## 2023-09-12 VITALS
RESPIRATION RATE: 22 BRPM | DIASTOLIC BLOOD PRESSURE: 86 MMHG | OXYGEN SATURATION: 96 % | SYSTOLIC BLOOD PRESSURE: 123 MMHG | HEART RATE: 75 BPM | TEMPERATURE: 98.2 F

## 2023-09-12 VITALS — OXYGEN SATURATION: 96 % | HEART RATE: 70 BPM | RESPIRATION RATE: 19 BRPM

## 2023-09-12 VITALS — RESPIRATION RATE: 17 BRPM | HEART RATE: 73 BPM | OXYGEN SATURATION: 96 %

## 2023-09-12 VITALS — OXYGEN SATURATION: 95 % | HEART RATE: 79 BPM | RESPIRATION RATE: 21 BRPM

## 2023-09-12 VITALS — OXYGEN SATURATION: 95 % | RESPIRATION RATE: 21 BRPM | HEART RATE: 75 BPM

## 2023-09-12 VITALS
OXYGEN SATURATION: 97 % | SYSTOLIC BLOOD PRESSURE: 123 MMHG | RESPIRATION RATE: 19 BRPM | HEART RATE: 84 BPM | DIASTOLIC BLOOD PRESSURE: 86 MMHG

## 2023-09-12 VITALS — OXYGEN SATURATION: 90 % | SYSTOLIC BLOOD PRESSURE: 123 MMHG | DIASTOLIC BLOOD PRESSURE: 86 MMHG

## 2023-09-12 VITALS — HEART RATE: 72 BPM

## 2023-09-12 VITALS — HEART RATE: 75 BPM | RESPIRATION RATE: 17 BRPM | OXYGEN SATURATION: 97 %

## 2023-09-12 VITALS — OXYGEN SATURATION: 96 % | RESPIRATION RATE: 22 BRPM | HEART RATE: 87 BPM

## 2023-09-12 VITALS — OXYGEN SATURATION: 95 % | HEART RATE: 66 BPM | RESPIRATION RATE: 16 BRPM

## 2023-09-12 VITALS — HEART RATE: 112 BPM | RESPIRATION RATE: 24 BRPM | OXYGEN SATURATION: 97 %

## 2023-09-12 VITALS — DIASTOLIC BLOOD PRESSURE: 70 MMHG | SYSTOLIC BLOOD PRESSURE: 118 MMHG

## 2023-09-12 VITALS — HEART RATE: 84 BPM

## 2023-09-12 VITALS — HEART RATE: 93 BPM | RESPIRATION RATE: 17 BRPM

## 2023-09-12 VITALS — BODY MASS INDEX: 21.1 KG/M2 | BODY MASS INDEX: 22.6 KG/M2

## 2023-09-12 VITALS — OXYGEN SATURATION: 96 %

## 2023-09-12 VITALS — HEART RATE: 65 BPM

## 2023-09-12 VITALS — OXYGEN SATURATION: 95 %

## 2023-09-12 DIAGNOSIS — F10.131: Primary | ICD-10-CM

## 2023-09-12 DIAGNOSIS — F17.210: ICD-10-CM

## 2023-09-12 DIAGNOSIS — E83.51: ICD-10-CM
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
## 2023-09-12 DIAGNOSIS — E86.0: ICD-10-CM

## 2023-09-12 DIAGNOSIS — D72.829: ICD-10-CM

## 2023-09-12 LAB
ADD MANUAL DIFF: NO
ALANINE AMINOTRANSFERASE: 25 U/L (ref 16–63)
ALBUMIN GLOBULIN RATIO: 1.2
ALBUMIN LEVEL: 4.5 G/DL (ref 3.4–5)
ALKALINE PHOSPHATASE: 96 U/L (ref 46–116)
AMYLASE: 39 U/L (ref 25–115)
ANION GAP: 23.4
ASPARTATE AMINO TRANSFERASE: 28 U/L (ref 15–37)
BLOOD UREA NITROGEN: 6 MG/DL (ref 7–18)
CALCIUM: 9.3 MG/DL (ref 8.5–10.1)
CARBON DIOXIDE: 20.9 MMOL/L (ref 21–32)
CHLORIDE: 99 MMOL/L (ref 98–107)
CO2 BLD-SCNC: 20.9 MMOL/L (ref 21–32)
ESTIMATED GFR (AFRICAN AMERICA: >60 (ref 60–?)
ESTIMATED GFR (NON-AFRICAN AME: >60 (ref 60–?)
GLOBULIN: 3.7 G/DL
GLUCOSE BLD-MCNC: 64 MG/DL (ref 74–106)
HCT VFR BLD CALC: 51.4 % (ref 42–54)
HEMATOCRIT: 51.4 % (ref 42–54)
HEMOGLOBIN: 17.7 G/DL (ref 14–18)
IMMATURE GRANULOCYTES ABS AUTO: 0.04 10^3/UL (ref 0–0.03)
IMMATURE GRANULOCYTES PCT AUTO: 0.3 % (ref 0–0.5)
LACTATE/LACTIC ACID: 0.7 MMOL/L (ref 0.4–2)
LACTATE/LACTIC ACID: 3.1 MMOL/L (ref 0.4–2)
LACTATE/LACTIC ACID: 6.5 MMOL/L (ref 0.4–2)
LIPASE: 40 U/L (ref 73–393)
LYMPHOCYTES  ABSOLUTE AUTO: 2.4 10^3/UL (ref 1.2–3.8)
MCV RBC: 92.1 FL (ref 80–94)
MEAN CORPUSCULAR HEMOGLOBIN: 31.7 PG (ref 25.9–34)
MEAN CORPUSCULAR HGB CONC: 34.4 G/DL (ref 29.9–35.2)
MEAN CORPUSCULAR VOLUME: 92.1 FL (ref 80–94)
PLATELET # BLD: 284 10^3/UL (ref 150–450)
PLATELET COUNT: 284 10^3/UL (ref 150–450)
POTASSIUM SERPLBLD-SCNC: 4.3 MMOL/L (ref 3.5–5.1)
POTASSIUM: 4.3 MMOL/L (ref 3.5–5.1)
RED BLOOD COUNT: 5.58 10^6/UL (ref 4.7–6.1)
SODIUM BLD-SCNC: 139 MMOL/L (ref 136–145)
SODIUM: 139 MMOL/L (ref 136–145)
TOTAL PROTEIN: 8.2 G/DL (ref 6.4–8.2)
WBC # BLD: 15 10^3/UL (ref 4–11)
WHITE BLOOD COUNT: 15 10^3/UL (ref 4–11)

## 2023-09-12 PROCEDURE — 94761 N-INVAS EAR/PLS OXIMETRY MLT: CPT

## 2023-09-12 PROCEDURE — 96375 TX/PRO/DX INJ NEW DRUG ADDON: CPT

## 2023-09-12 PROCEDURE — 96376 TX/PRO/DX INJ SAME DRUG ADON: CPT

## 2023-09-12 PROCEDURE — 82150 ASSAY OF AMYLASE: CPT

## 2023-09-12 PROCEDURE — 80048 BASIC METABOLIC PNL TOTAL CA: CPT

## 2023-09-12 PROCEDURE — G0378 HOSPITAL OBSERVATION PER HR: HCPCS

## 2023-09-12 PROCEDURE — 96374 THER/PROPH/DIAG INJ IV PUSH: CPT

## 2023-09-12 PROCEDURE — 83605 ASSAY OF LACTIC ACID: CPT

## 2023-09-12 PROCEDURE — 36415 COLL VENOUS BLD VENIPUNCTURE: CPT

## 2023-09-12 PROCEDURE — 94667 MNPJ CHEST WALL 1ST: CPT

## 2023-09-12 PROCEDURE — 83690 ASSAY OF LIPASE: CPT

## 2023-09-12 PROCEDURE — 80076 HEPATIC FUNCTION PANEL: CPT

## 2023-09-12 PROCEDURE — 93005 ELECTROCARDIOGRAM TRACING: CPT

## 2023-09-12 PROCEDURE — 94668 MNPJ CHEST WALL SBSQ: CPT

## 2023-09-12 PROCEDURE — 80320 DRUG SCREEN QUANTALCOHOLS: CPT

## 2023-09-12 PROCEDURE — 85025 COMPLETE CBC W/AUTO DIFF WBC: CPT

## 2023-09-12 PROCEDURE — 99285 EMERGENCY DEPT VISIT HI MDM: CPT

## 2023-09-12 PROCEDURE — 81001 URINALYSIS AUTO W/SCOPE: CPT

## 2023-09-12 PROCEDURE — 71045 X-RAY EXAM CHEST 1 VIEW: CPT

## 2023-09-12 RX ADMIN — SODIUM CHLORIDE 200 ML: 900 INJECTION, SOLUTION INTRAVENOUS at 15:29

## 2023-09-12 RX ADMIN — THIAMINE HCL TAB 100 MG 100 MG: 100 TAB at 17:56

## 2023-09-12 RX ADMIN — SODIUM CHLORIDE 1000 ML: 900 INJECTION, SOLUTION INTRAVENOUS at 12:52

## 2023-09-12 RX ADMIN — DIAZEPAM 10 MG: 10 INJECTION, SOLUTION INTRAMUSCULAR; INTRAVENOUS at 13:21

## 2023-09-12 RX ADMIN — DIAZEPAM 5 MG: 10 INJECTION, SOLUTION INTRAMUSCULAR; INTRAVENOUS at 17:56

## 2023-09-12 RX ADMIN — CHLORDIAZEPOXIDE HYDROCHLORIDE 25 MG: 25 CAPSULE ORAL at 15:57

## 2023-09-12 RX ADMIN — ROPINIROLE HYDROCHLORIDE 0.25 MG: 0.25 TABLET, FILM COATED ORAL at 21:30

## 2023-09-12 RX ADMIN — Medication 1 MG: at 17:56

## 2023-09-12 RX ADMIN — HYOSCYAMINE SULFATE 0.12 MG: 0.12 TABLET SUBLINGUAL at 15:57

## 2023-09-12 RX ADMIN — HYOSCYAMINE SULFATE 0.12 MG: 0.12 TABLET SUBLINGUAL at 21:30

## 2023-09-12 RX ADMIN — CHLORDIAZEPOXIDE HYDROCHLORIDE 25 MG: 25 CAPSULE ORAL at 20:23

## 2023-09-12 RX ADMIN — DIAZEPAM 5 MG: 10 INJECTION, SOLUTION INTRAMUSCULAR; INTRAVENOUS at 21:29

## 2023-09-12 RX ADMIN — CLONIDINE HYDROCHLORIDE 0.1 MG: 0.1 TABLET ORAL at 20:23

## 2023-09-12 NOTE — PM.HP
H&P: HPI
History of Present Illness
Chief complaint: ALCOHOL INTOXICATION, ALCOHOL WITHDRAWAL
Narrative: 
Patient who is desiring to seek alcohol abuse treatment.  Is a patient currently at [facility].  Unable to control his symptoms there.  Was seen and evaluated in the emergency room given multiple medications to try to improve his alcohol withdrawal 
symptoms.  Unable to complete that in the emergency room was that this was admitted for work-up and treatment of same.

Review of Systems
ROS  
 Status of ROS 10 or more systems reviewed and unremarkable except as noted in history and below   

PFSH
PFSH
Social History
Smoking status:  Current every day smoker 



Meds
Home Medications and Allergies
Allergies

Allergy/AdvReac Type Severity Reaction Status Date / Time
lorazepam [From Ativan] AdvReac Severe opposite Verified 09/12/23 12:37
   affect  



Exam
Constitutional
Vital Signs, click to edit/add: 
Last Vital Signs

Temp  98.3 F   09/12/23 15:26
Pulse  69   09/12/23 15:26
Resp  16   09/12/23 15:26
BP  114/79   09/12/23 15:26
Pulse Ox  95   09/12/23 15:26
O2 Del Method  Room Air  09/12/23 15:26


Documenting provider has reviewed patient's vital signs: yes
Common normals: apparent distress
Respiratory
Common normals: normal respiratory effort, no use of accessory muscles and clear to auscultation bilaterally
Cardio
Common normals: regular rate, regular rhythm and no murmurs
GI
Common normals: Normal to inspection, nondistended, normoactive bowel sounds present
Neuro
Common normals: oriented x3 (Agitated, clearly with some withdrawal symptoms)

Results
Labs
Labs: 
Short CBC

  09/12/23 Range/Units
  12:46 
WBC  15.0 H  (4.0-11.0)  10^3/uL
Hgb  17.7  (14.0-18.0)  g/dL
Hct  51.4  (42.0-54.0)  %
Plt Count  284  (150-450)  10^3/uL

BMP

  09/12/23
  12:46
Sodium  139
Potassium  4.3
Chloride  99
Carbon Dioxide  20.9 L
BUN  6.0 L
Creatinine  0.90
Glucose  64 L
Calcium  9.3

Liver Function

  09/12/23 Range/Units
  12:46 
Total Bilirubin  0.6  (0.2-1.0)  mg/dL
Direct Bilirubin  0.2  (0.0-0.2)  mg/dL
AST  28  (15-37)  U/L
ALT  25  (16-63)  U/L
Alkaline Phosphatase  96  ()  U/L
Albumin  4.5  (3.4-5.0)  g/dL



Assessment and Plan
Assessment and Plan
(1) Alcohol withdrawal delirium: 

Plan
Medical hold abuse, patient has been through this before.  Was clean for over a year.  I think he is well motivated for success.  [facility] protocol in place.  IV fluids.  He has some mild hypoglycemia in the emergency room.


Dehydration secondary to alcohol abuse-continue with hydration as above, monitor labs

## 2023-09-12 NOTE — ED_ITS
HPI - Alcohol    
General    
Chief Complaint: Alcohol    
Stated Complaint: ALCOHOL INTOXICATION    
Time Seen by Provider: 09/12/23 12:37    
Source: patient and other    
Source comment: EMS     
    
Mode of arrival: ambulance    
Limitations: no limitations    
History of Present Illness    
HPI narrative:     
32-year-old male presents for alcohol issues. He was sent here from alcohol   
treatment center because he was shaky and a thought that he was going into   
delirium tremens. He last drank alcohol last night and that facility reported an  
alcohol level of 143 today. He states he might have some marijuana in him but no  
other  drugs. He has some visual hallucinations but only when he closes his   
eyes. He does not complain of fever or headache.    
Related Data    
                                    Allergies    
    
    
    
Allergy/AdvReac Type Severity Reaction Status Date / Time    
     
lorazepam [From Ativan] AdvReac Severe opposite Verified 09/12/23 12:37    
    
   affect      
    
    
    
    
Review of Systems    
    
    
ROS      
    
 Narrative A ten point review of systems is negative except as noted above.       
    
    
PFSH    
PFS    
Social History    
Smoking status:  Current every day smoker     
    
    
    
Exam    
Narrative    
Exam Narrative:     
Nurses note and vital signs reviewed and patient is not hypoxic.    
    
General:  The patient appears anxious and is tremorous.    
Skin:  Warm, dry, no pallor noted.  There is no rash noted.    
Head:  Normocephalic, atraumatic    
Eye: Normal conjunctiva, no drainage    
Ears, Nose, Mouth, and Throat: oral mucosa is moist. Nares patent.    
Cardiovascular:  Regular Rate and Rhythm    
Respiratory:  Patient is in no distress, no accessory muscle use, lungs are   
clear to auscultation, no wheezing, rales or rhonchi    
Back:  non-tender    
GI: soft and nontender    
Musculoskeletal: The patient has no evidence of calf tenderness, no pitting   
edema, symmetrical pulses noted bilaterally    
Neurological:  A&O x4, normal speech; tremorous    
Psychiatric:  Cooperative    
Constitutional    
Vital Signs, click to edit/add:     
    
                                Last Vital Signs    
    
    
    
Temp  98.2 F   09/12/23 12:37    
     
Pulse  75   09/12/23 12:37    
     
Resp  22   09/12/23 12:37    
     
BP  123/86   09/12/23 12:46    
     
Pulse Ox  96   09/12/23 12:37    
     
O2 Del Method  Room Air  09/12/23 12:37    
    
    
    
    
    
Course    
Vital Signs    
Vital signs:     
    
                                   Vital Signs    
    
    
    
Temperature  98.2 F   09/12/23 12:37    
     
Pulse Rate  75   09/12/23 12:37    
     
Respiratory Rate  22   09/12/23 12:37    
     
Blood Pressure  123/86   09/12/23 12:37    
     
Pulse Oximetry  96   09/12/23 12:37    
     
Oxygen Delivery Method  Room Air  09/12/23 12:37    
    
    
                                            
    
    
    
Temperature  98.2 F   09/12/23 12:37    
     
Pulse Rate  75   09/12/23 12:37    
     
Respiratory Rate  22   09/12/23 12:37    
     
Blood Pressure  123/86   09/12/23 12:46    
     
Pulse Oximetry  96   09/12/23 12:37    
     
Oxygen Delivery Method  Room Air  09/12/23 12:37    
    
    
    
    
    
MDM - Alcohol    
MDM Narrative    
Medical decision making narrative:     
the patient was given IV fluids and IV Valium and seems to be somewhat improved.  
He still mildly tremorous. LFTs are essentially normal and he is being admitted.  
Findings are discussed with the patient.    
Differential Diagnosis    
Differential diagnosis: Likely alcohol withdrawal delirium, alcohol intoxication  
and alcohol withdrawal syndrome    
Lab Data    
Attestation: I reviewed the patient's lab results.    
Labs:     
    
                                   Lab Results    
    
    
    
  09/12/23 Range/Units    
    
  12:46     
     
WBC  15.0 H  (4.0-11.0)  10^3/uL    
     
RBC  5.58  (4.70-6.10)  10^6/uL    
     
Hgb  17.7  (14.0-18.0)  g/dL    
     
Hct  51.4  (42.0-54.0)  %    
     
MCV  92.1  (80.0-94.0)  fL    
     
MCH  31.7  (25.9-34.0)  pg    
     
MCHC  34.4  (29.9-35.2)  g/dL    
     
RDW  14.6  (11.0-15.0)  %    
     
Plt Count  284  (150-450)  10^3/uL    
     
MPV  12.1  (9.5-13.5)  fL    
     
Neut % (Auto)  76.5 H  (43.0-75.0)  %    
     
Lymph % (Auto)  16.0 L  (20.5-60.0)  %    
     
Mono % (Auto)  5.8  (1.7-12.0)  %    
     
Eos % (Auto)  0.7 L  (0.9-7.0)  %    
     
Baso % (Auto)  0.7  (0.2-2.0)  %    
     
Neut # (Auto)  11.5 H  (1.4-6.5)  10^3/uL    
     
Lymph # (Auto)  2.4  (1.2-3.8)  10^3/uL    
     
Mono # (Auto)  0.9 H  (0.3-0.8)  10^3/uL    
     
Eos # (Auto)  0.1  (0.0-0.7)  10^3/uL    
     
Baso # (Auto)  0.1  (0.0-0.1)  10^3/uL    
     
Abs Immat Gran (auto)  0.04 H  (0.00-0.03)  10^3/uL    
     
Imm/Tot Granulo (auto)  0.3  (0.0-0.5)  %    
     
Sodium  139  (136-145)  mmol/L    
     
Potassium  4.3  (3.5-5.1)  mmol/L    
     
Chloride  99  ()  mmol/L    
     
Carbon Dioxide  20.9 L  (21.0-32.0)  mmol/L    
     
Anion Gap  23.4      
     
BUN  6.0 L  (7.0-18.0)  mg/dL    
     
Creatinine  0.90  (0.70-1.30)  mg/dL    
     
Est GFR ( Amer)  >60  (>=60)      
     
Est GFR (Non-Af Amer)  >60  (>=60)      
     
BUN/Creatinine Ratio  6.7      
     
Glucose  64 L  ()  mg/dL    
     
Calcium  9.3  (8.5-10.1)  mg/dL    
     
Total Bilirubin  0.6  (0.2-1.0)  mg/dL    
     
Direct Bilirubin  0.2  (0.0-0.2)  mg/dL    
     
AST  28  (15-37)  U/L    
     
ALT  25  (16-63)  U/L    
     
Alkaline Phosphatase  96  ()  U/L    
     
Total Protein  8.2  (6.4-8.2)  g/dL    
     
Albumin  4.5  (3.4-5.0)  g/dL    
     
Globulin  3.7  g/dL    
     
Albumin/Globulin Ratio  1.2      
     
Ethanol Quant  146  mg/dL    
    
    
    
    
Imaging Data    
Chest x-ray:     
      Radiologist's impression:     
Procedure:  XR chest 1V    
EXAMINATION: XR chest    
 1V    
 9/12/2023 10:22 AM PDT    
HISTORY: alcohol withdrawal    
TECHNIQUE: Single frontal view of the chest acquired.    
COMPARISONS: None.    
FINDINGS:    
Lines/tubes/other: None.    
Heart and    
 mediastinum: The heart and the mediastinum    
 are within normal    
 limits    
for technique.    
Bones: No acute osseous abnormality.    
Lungs: The    
 lungs are    
 clear.    
 There is    
 no evidence of pneumonia or pulmonary     
edema.    
Pleura: There is no significant pleural    
 effusion or pneumothorax.    
Other: None.    
IMPRESSION:    
No acute cardiopulmonary abnormality.    
Electronically authenticated by: JD BACON   Date: 9/12/2023  13:23    
ECG Data    
Attestation: I personally reviewed and interpreted this ECG as follows: (EKG on   
my interpretation shows normal sinus rhythm with a rate of 79.)    
    
Discharge Plan    
Discharge    
Chief Complaint: Alcohol    
    
Clinical Impression:    
 Alcohol withdrawal delirium    
    
    
Patient Disposition: Admitted As Inpatient    
    
Time of Disposition Decision: 13:58    
    
Condition: Fair

## 2023-09-12 NOTE — SWNOTE1
SUSU spoke with pt for 15 minutes. He just got to [place] today. He lives 3 hours from here. Pt had a relapse about a month ago after he broke with with girlfriend. He started just having a drink a day which turned in to several. Pt started making 
phone calls as he new he needed help and ended up talking with Sedrick at [facility] and they took him. Pt's mother passed away not long ago from alcohol withdrawal and his brother  of overdose and so did his sister. He has one brother who is clean 
and sober who is good support for him. Pt is very, very knowledgeable when it comes to alcoholism. 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 His plan is to return to [facility] at discharge. Pt has been dealing with alcoholism for 15 years, he 
stayed sober for a year and half recently but then the break up caused him to drink.

## 2023-09-12 NOTE — ECG_ITS
The [facility] 
                                        
                                       Test Date:    2023 
Pat Name:     CLARIBEL SINGH        Department:    
Patient ID:   [id number]               Room:         - 
Gender:       Male                     Technician:    
:          1990               Requested By: 1030 
Order Number: [id number]              Reading MD:   JAIR MCDOWELL 
                                 Measurements 
Intervals                              Axis           
Rate:         79                       P:            46 
TN:           120                      QRS:          62 
QRSD:         86                       T:            72 
QT:           400                                     
QTc:          435                                     
                           Interpretive Statements 
1100 Sinus rhythm 
1970 with occasional ectopic premature complexes 
4164 Twave abnormality, possible anterior ischemia 
9150 **  abnormal ECG  ** 
No previous ECG available for comparison 
Electronically Signed On 2023 5:36:37 EDT by JAIR MCDOWELL

## 2023-09-12 NOTE — P.HP_ITS
H&P: HPI    
History of Present Illness    
Chief complaint: ALCOHOL INTOXICATION, ALCOHOL WITHDRAWAL    
Narrative:     
Patient who is desiring to seek alcohol abuse treatment.  Is a patient currently  
at [facility].  Unable to control his symptoms there.  Was seen and evaluated in   
the emergency room given multiple medications to try to improve his alcohol   
withdrawal symptoms.  Unable to complete that in the emergency room was that   
this was admitted for work-up and treatment of same.    
    
Review of Systems    
    
    
ROS      
    
 Status of ROS 10 or more systems reviewed and unremarkable except as noted in   
history and below       
    
    
PFSH    
PFSH    
Social History    
Smoking status:  Current every day smoker     
    
    
    
Meds    
Home Medications and Allergies    
                                    Allergies    
    
    
    
Allergy/AdvReac Type Severity Reaction Status Date / Time    
     
lorazepam [From Ativan] AdvReac Severe opposite Verified 09/12/23 12:37    
    
   affect      
    
    
    
    
Exam    
Constitutional    
Vital Signs, click to edit/add:     
                                Last Vital Signs    
    
    
    
Temp  98.3 F   09/12/23 15:26    
     
Pulse  69   09/12/23 15:26    
     
Resp  16   09/12/23 15:26    
     
BP  114/79   09/12/23 15:26    
     
Pulse Ox  95   09/12/23 15:26    
     
O2 Del Method  Room Air  09/12/23 15:26    
    
    
    
Documenting provider has reviewed patient's vital signs: yes    
Common normals: apparent distress    
Respiratory    
Common normals: normal respiratory effort, no use of accessory muscles and clear  
to auscultation bilaterally    
Cardio    
Common normals: regular rate, regular rhythm and no murmurs    
GI    
Common normals: Normal to inspection, nondistended, normoactive bowel sounds   
present    
Neuro    
Common normals: oriented x3 (Agitated, clearly with some withdrawal symptoms)    
    
Results    
Labs    
Labs:     
                                    Short CBC    
    
    
    
  09/12/23 Range/Units    
    
  12:46     
     
WBC  15.0 H  (4.0-11.0)  10^3/uL    
     
Hgb  17.7  (14.0-18.0)  g/dL    
     
Hct  51.4  (42.0-54.0)  %    
     
Plt Count  284  (150-450)  10^3/uL    
    
    
                                       BMP    
    
    
    
  09/12/23    
    
  12:46    
     
Sodium  139    
     
Potassium  4.3    
     
Chloride  99    
     
Carbon Dioxide  20.9 L    
     
BUN  6.0 L    
     
Creatinine  0.90    
     
Glucose  64 L    
     
Calcium  9.3    
    
    
                                 Liver Function    
    
    
    
  09/12/23 Range/Units    
    
  12:46     
     
Total Bilirubin  0.6  (0.2-1.0)  mg/dL    
     
Direct Bilirubin  0.2  (0.0-0.2)  mg/dL    
     
AST  28  (15-37)  U/L    
     
ALT  25  (16-63)  U/L    
     
Alkaline Phosphatase  96  ()  U/L    
     
Albumin  4.5  (3.4-5.0)  g/dL    
    
    
    
    
Assessment and Plan    
Assessment and Plan    
(1) Alcohol withdrawal delirium:     
    
Plan    
Medical hold abuse, patient has been through this before.  Was clean for over a   
year.  I think he is well motivated for success.  [facility] protocol in place.  IV   
fluids.  He has some mild hypoglycemia in the emergency room.    
--------------------------------------------------------------------------------    
    
    
Dehydration secondary to alcohol abuse-continue with hydration as above, monitor  
labs    
--------------------------------------------------------------------------------

## 2023-09-12 NOTE — XR_ITS
The [address] [address] 
     [phone] 
  
  
Patient Name: 
CLARIBEL SINGH 
  
MRN: [id number]    YOB: 1990    Sex: M 
Assigned Patient Location: ER 
Current Patient Location: ED.MAIN 
Accession/Order Number: [id number] 
Exam Date: 9/12/2023  12:50    Report Date: 9/12/2023  13:23 
  
At the request of: 
PORTIA BUTTERFIELD   
  
Procedure:  XR chest 1V 
  
EXAMINATION: XR chest 1V 9/12/2023 10:22 AM PDT 
  
HISTORY: alcohol withdrawal 
  
TECHNIQUE: Single frontal view of the chest acquired. 
  
COMPARISONS: None. 
  
FINDINGS: 
Lines/tubes/other: None. 
Heart and mediastinum: The heart and the mediastinum are within normal limits  
for technique. 
Bones: No acute osseous abnormality. 
  
Lungs: The lungs are clear. There is no evidence of pneumonia or pulmonary  
edema. 
Pleura: There is no significant pleural effusion or pneumothorax. 
  
Other: None. 
  
ORDER #: [id number] XR/XR chest 1V  
IMPRESSION:  
No acute cardiopulmonary abnormality.   
   
   
Electronically authenticated by: JD BACON   Date: 9/12/2023  13:23

## 2023-09-13 VITALS
DIASTOLIC BLOOD PRESSURE: 74 MMHG | SYSTOLIC BLOOD PRESSURE: 110 MMHG | OXYGEN SATURATION: 97 % | TEMPERATURE: 97.7 F | RESPIRATION RATE: 18 BRPM | HEART RATE: 42 BPM

## 2023-09-13 VITALS — HEART RATE: 93 BPM

## 2023-09-13 VITALS — OXYGEN SATURATION: 50 %

## 2023-09-13 VITALS — HEART RATE: 67 BPM

## 2023-09-13 VITALS — HEART RATE: 49 BPM

## 2023-09-13 VITALS
HEART RATE: 49 BPM | RESPIRATION RATE: 18 BRPM | TEMPERATURE: 98.2 F | DIASTOLIC BLOOD PRESSURE: 79 MMHG | OXYGEN SATURATION: 97 % | SYSTOLIC BLOOD PRESSURE: 119 MMHG

## 2023-09-13 VITALS — SYSTOLIC BLOOD PRESSURE: 113 MMHG | DIASTOLIC BLOOD PRESSURE: 79 MMHG

## 2023-09-13 VITALS — HEART RATE: 71 BPM | RESPIRATION RATE: 18 BRPM

## 2023-09-13 VITALS — HEART RATE: 48 BPM

## 2023-09-13 VITALS — HEART RATE: 62 BPM

## 2023-09-13 VITALS — OXYGEN SATURATION: 98 %

## 2023-09-13 VITALS — HEART RATE: 45 BPM

## 2023-09-13 VITALS
TEMPERATURE: 98.3 F | DIASTOLIC BLOOD PRESSURE: 72 MMHG | RESPIRATION RATE: 14 BRPM | HEART RATE: 55 BPM | OXYGEN SATURATION: 98 % | SYSTOLIC BLOOD PRESSURE: 108 MMHG

## 2023-09-13 VITALS — HEART RATE: 53 BPM

## 2023-09-13 VITALS — HEART RATE: 47 BPM

## 2023-09-13 VITALS — HEART RATE: 44 BPM

## 2023-09-13 VITALS — HEART RATE: 85 BPM

## 2023-09-13 VITALS — OXYGEN SATURATION: 97 %

## 2023-09-13 VITALS — OXYGEN SATURATION: 99 %

## 2023-09-13 LAB
ADD MANUAL DIFF: NO
ANION GAP: 10.4
BLOOD UREA NITROGEN: 7 MG/DL (ref 7–18)
CALCIUM: 8.1 MG/DL (ref 8.5–10.1)
CARBON DIOXIDE: 25.7 MMOL/L (ref 21–32)
CHLORIDE: 105 MMOL/L (ref 98–107)
CO2 BLD-SCNC: 25.7 MMOL/L (ref 21–32)
ESTIMATED GFR (AFRICAN AMERICA: >60 (ref 60–?)
ESTIMATED GFR (NON-AFRICAN AME: >60 (ref 60–?)
GLUCOSE BLD-MCNC: 98 MG/DL (ref 74–106)
HCT VFR BLD CALC: 44.8 % (ref 42–54)
HEMATOCRIT: 44.8 % (ref 42–54)
HEMOGLOBIN: 14.8 G/DL (ref 14–18)
IMMATURE GRANULOCYTES ABS AUTO: 0.03 10^3/UL (ref 0–0.03)
IMMATURE GRANULOCYTES PCT AUTO: 0.3 % (ref 0–0.5)
LYMPHOCYTES  ABSOLUTE AUTO: 1.5 10^3/UL (ref 1.2–3.8)
MCV RBC: 94.7 FL (ref 80–94)
MEAN CORPUSCULAR HEMOGLOBIN: 31.3 PG (ref 25.9–34)
MEAN CORPUSCULAR HGB CONC: 33 G/DL (ref 29.9–35.2)
MEAN CORPUSCULAR VOLUME: 94.7 FL (ref 80–94)
PLATELET # BLD: 226 10^3/UL (ref 150–450)
PLATELET COUNT: 226 10^3/UL (ref 150–450)
POTASSIUM SERPLBLD-SCNC: 4.1 MMOL/L (ref 3.5–5.1)
POTASSIUM: 4.1 MMOL/L (ref 3.5–5.1)
RED BLOOD COUNT: 4.73 10^6/UL (ref 4.7–6.1)
SODIUM BLD-SCNC: 137 MMOL/L (ref 136–145)
SODIUM: 137 MMOL/L (ref 136–145)
WBC # BLD: 9.1 10^3/UL (ref 4–11)
WHITE BLOOD COUNT: 9.1 10^3/UL (ref 4–11)

## 2023-09-13 RX ADMIN — CALCIUM CARBONATE (ANTACID) CHEW TAB 500 MG 500 MG: 500 CHEW TAB at 22:39

## 2023-09-13 RX ADMIN — HYOSCYAMINE SULFATE 0.12 MG: 0.12 TABLET SUBLINGUAL at 22:39

## 2023-09-13 RX ADMIN — CHLORDIAZEPOXIDE HYDROCHLORIDE 50 MG: 25 CAPSULE ORAL at 17:13

## 2023-09-13 RX ADMIN — CHLORDIAZEPOXIDE HYDROCHLORIDE 25 MG: 25 CAPSULE ORAL at 04:49

## 2023-09-13 RX ADMIN — HYOSCYAMINE SULFATE 0.12 MG: 0.12 TABLET SUBLINGUAL at 11:07

## 2023-09-13 RX ADMIN — CHLORDIAZEPOXIDE HYDROCHLORIDE 50 MG: 25 CAPSULE ORAL at 14:59

## 2023-09-13 RX ADMIN — THIAMINE HCL TAB 100 MG 100 MG: 100 TAB at 09:08

## 2023-09-13 RX ADMIN — ROPINIROLE HYDROCHLORIDE 0.25 MG: 0.25 TABLET, FILM COATED ORAL at 05:03

## 2023-09-13 RX ADMIN — CHLORDIAZEPOXIDE HYDROCHLORIDE 50 MG: 25 CAPSULE ORAL at 23:27

## 2023-09-13 RX ADMIN — ROPINIROLE HYDROCHLORIDE 0.25 MG: 0.25 TABLET, FILM COATED ORAL at 22:39

## 2023-09-13 RX ADMIN — Medication 1 MG: at 17:13

## 2023-09-13 RX ADMIN — CHLORDIAZEPOXIDE HYDROCHLORIDE 50 MG: 25 CAPSULE ORAL at 20:43

## 2023-09-13 RX ADMIN — MULTIVITAMIN TABLET 1 TAB: TABLET at 09:08

## 2023-09-13 RX ADMIN — HYOSCYAMINE SULFATE 0.12 MG: 0.12 TABLET SUBLINGUAL at 17:13

## 2023-09-13 RX ADMIN — HYOSCYAMINE SULFATE 0.12 MG: 0.12 TABLET SUBLINGUAL at 09:08

## 2023-09-13 RX ADMIN — CLONIDINE HYDROCHLORIDE 0.1 MG: 0.1 TABLET ORAL at 20:43

## 2023-09-13 RX ADMIN — CHLORDIAZEPOXIDE HYDROCHLORIDE 50 MG: 25 CAPSULE ORAL at 13:00

## 2023-09-13 RX ADMIN — CHLORDIAZEPOXIDE HYDROCHLORIDE 50 MG: 25 CAPSULE ORAL at 11:07

## 2023-09-13 RX ADMIN — CLONIDINE HYDROCHLORIDE 0.1 MG: 0.1 TABLET ORAL at 09:08

## 2023-09-13 RX ADMIN — ROPINIROLE HYDROCHLORIDE 0.25 MG: 0.25 TABLET, FILM COATED ORAL at 13:31

## 2023-09-13 NOTE — DIETREC
Recommend MVI w/minerals 1x daily to ensure adequate micronutrient intakes; may replace MVI w/folate and thiamine per physician discretion.

## 2023-09-13 NOTE — PM.PN
Progress Note: Subjective
Subjective
Interval history: 
Still feeling some agitation.  Required IV Valium.  Patient unable to be discharged to rehab

Exam
Constitutional
Vital Signs, click to edit/add: 
Last Vital Signs

Temp  98.3 F   09/13/23 04:35
Pulse  47 L  09/13/23 08:00
Resp  14   09/13/23 04:35
BP  108/72   09/13/23 04:35
Pulse Ox  99   09/13/23 04:56
O2 Del Method  Room Air  09/13/23 04:56


Documenting provider has reviewed patient's vital signs: yes
Common normals: apparent distress
Respiratory
Common normals: normal respiratory effort, no use of accessory muscles and clear to auscultation bilaterally
Cardio
Common normals: regular rate, regular rhythm and no murmurs
GI
Common normals: Normal to inspection, nondistended, normoactive bowel sounds present
Neuro
Common normals: oriented x3 (Agitated, clearly with some withdrawal symptoms)

Progress Note: Objective
Labs
Labs: 
Short CBC

  09/12/23 09/13/23 Range/Units
  12:46 04:53 
WBC  15.0 H  9.1  (4.0-11.0)  10^3/uL
Hgb  17.7  14.8  (14.0-18.0)  g/dL
Hct  51.4  44.8  (42.0-54.0)  %
Plt Count  284  226  (150-450)  10^3/uL

BMP

  09/12/23 09/13/23
  12:46 04:11
Sodium  139  137
Potassium  4.3  4.1
Chloride  99  105
Carbon Dioxide  20.9 L  25.7
BUN  6.0 L  7.0
Creatinine  0.90  0.83
Glucose  64 L  98
Calcium  9.3  8.1 L

Liver Function

  09/12/23 Range/Units
  12:46 
Total Bilirubin  0.6  (0.2-1.0)  mg/dL
Direct Bilirubin  0.2  (0.0-0.2)  mg/dL
AST  28  (15-37)  U/L
ALT  25  (16-63)  U/L
Alkaline Phosphatase  96  ()  U/L
Albumin  4.5  (3.4-5.0)  g/dL



Progress Note: A&P
Assessment and Plan
(1) Alcohol withdrawal delirium: 

Plan
Alcohol abuse abuse, now with delirium tremens with hallucinations,  -continue with current medications but adjust doses.  Reviewed phenobarbital protocol with rehabilitation facility will institute that today.



Dehydration secondary to alcohol abuse-continue with hydration as above, monitor labs



Leukocytosis-resolved


Hypocalcemia-supplement

?

## 2023-09-13 NOTE — P.PN_ITS
Progress Note: Subjective    
Subjective    
Interval history:     
Still feeling some agitation.  Required IV Valium.  Patient unable to be   
discharged to rehab    
    
Exam    
Constitutional    
Vital Signs, click to edit/add:     
                                Last Vital Signs    
    
    
    
Temp  98.3 F   09/13/23 04:35    
     
Pulse  47 L  09/13/23 08:00    
     
Resp  14   09/13/23 04:35    
     
BP  108/72   09/13/23 04:35    
     
Pulse Ox  99   09/13/23 04:56    
     
O2 Del Method  Room Air  09/13/23 04:56    
    
    
    
Documenting provider has reviewed patient's vital signs: yes    
Common normals: apparent distress    
Respiratory    
Common normals: normal respiratory effort, no use of accessory muscles and clear  
to auscultation bilaterally    
Cardio    
Common normals: regular rate, regular rhythm and no murmurs    
GI    
Common normals: Normal to inspection, nondistended, normoactive bowel sounds   
present    
Neuro    
Common normals: oriented x3 (Agitated, clearly with some withdrawal symptoms)    
    
Progress Note: Objective    
Labs    
Labs:     
                                    Short CBC    
    
    
    
  09/12/23 09/13/23 Range/Units    
    
  12:46 04:53     
     
WBC  15.0 H  9.1  (4.0-11.0)  10^3/uL    
     
Hgb  17.7  14.8  (14.0-18.0)  g/dL    
     
Hct  51.4  44.8  (42.0-54.0)  %    
     
Plt Count  284  226  (150-450)  10^3/uL    
    
    
                                       BMP    
    
    
    
  09/12/23 09/13/23    
    
  12:46 04:11    
     
Sodium  139  137    
     
Potassium  4.3  4.1    
     
Chloride  99  105    
     
Carbon Dioxide  20.9 L  25.7    
     
BUN  6.0 L  7.0    
     
Creatinine  0.90  0.83    
     
Glucose  64 L  98    
     
Calcium  9.3  8.1 L    
    
    
                                 Liver Function    
    
    
    
  09/12/23 Range/Units    
    
  12:46     
     
Total Bilirubin  0.6  (0.2-1.0)  mg/dL    
     
Direct Bilirubin  0.2  (0.0-0.2)  mg/dL    
     
AST  28  (15-37)  U/L    
     
ALT  25  (16-63)  U/L    
     
Alkaline Phosphatase  96  ()  U/L    
     
Albumin  4.5  (3.4-5.0)  g/dL    
    
    
    
    
Progress Note: A&P    
Assessment and Plan    
(1) Alcohol withdrawal delirium:     
    
Plan    
Alcohol abuse abuse, now with delirium tremens with hallucinations,  -continue   
with current medications but adjust doses.  Reviewed phenobarbital protocol with  
rehabilitation facility will institute that today.    
    
--------------------------------------------------------------------------------    
    
    
Dehydration secondary to alcohol abuse-continue with hydration as above, monitor  
labs    
    
--------------------------------------------------------------------------------    
    
    
Leukocytosis-resolved    
--------------------------------------------------------------------------------    
    
    
Hypocalcemia-supplement    
--------------------------------------------------------------------------------    
--------------------------------------------------------------------------------    
    
?

## 2023-09-13 NOTE — CM.NOTE
Talked with pt about discharge plan and going to [facility] at discharge.  Pt states   I don't want to go back to [facility], its not a good choice for me being this far away from home.   Pt states he did not realize the program was 30 days. Listened to 
pt's frustrations and pt also feeling like his tremors have increase and he is hallucinating.  Pt also verbalizes the dose of phenobarbital Dr. Christensen ordered is incorrect for withdraw.  Explained to pt I would reach out to [facility] for phenobarbital 
marcellus and then reach out to Dr. Christensen for change in dose.  Talked with Sedrick and Chrissie from [facility] and received marcellus of medication per [facility] protocol.  Reached out to Dr. Christensen, new orders received.  Went back in to discuss changes with pt, pt 
tearful but in agreement to speak with Sedrick from [facility] and continue this detox process.  Support provided for pt, pt thankful for the help.  Sedrick from [facility] did reach out to pt per telephone to help pt's anxiety of the unknown of treatment plan.

## 2023-09-13 NOTE — NUTR.NU
Dietary consult completed this morning. PO intakes have improved to 100%, and lab results show significant improvement. All nutritional labs drawn today are WNL except Ca++ is low. Recommend MVI w/minerals, 1 PO daily. Will continue to follow PRN.

## 2023-09-14 VITALS
HEART RATE: 40 BPM | TEMPERATURE: 97.5 F | RESPIRATION RATE: 16 BRPM | OXYGEN SATURATION: 94 % | SYSTOLIC BLOOD PRESSURE: 114 MMHG | DIASTOLIC BLOOD PRESSURE: 80 MMHG

## 2023-09-14 VITALS — OXYGEN SATURATION: 97 %

## 2023-09-14 VITALS — HEART RATE: 48 BPM

## 2023-09-14 VITALS — HEART RATE: 41 BPM

## 2023-09-14 VITALS — HEART RATE: 75 BPM

## 2023-09-14 VITALS — HEART RATE: 78 BPM

## 2023-09-14 VITALS — HEART RATE: 47 BPM

## 2023-09-14 LAB
ADD MANUAL DIFF: NO
ANION GAP: 12.2
BLOOD UREA NITROGEN: 6 MG/DL (ref 7–18)
CALCIUM: 8 MG/DL (ref 8.5–10.1)
CARBON DIOXIDE: 24.5 MMOL/L (ref 21–32)
CHLORIDE: 107 MMOL/L (ref 98–107)
CO2 BLD-SCNC: 24.5 MMOL/L (ref 21–32)
ESTIMATED GFR (AFRICAN AMERICA: >60 (ref 60–?)
ESTIMATED GFR (NON-AFRICAN AME: >60 (ref 60–?)
GLUCOSE BLD-MCNC: 183 MG/DL (ref 74–106)
HCT VFR BLD CALC: 40.2 % (ref 42–54)
HEMATOCRIT: 40.2 % (ref 42–54)
HEMOGLOBIN: 13.6 G/DL (ref 14–18)
IMMATURE GRANULOCYTES ABS AUTO: 0.02 10^3/UL (ref 0–0.03)
IMMATURE GRANULOCYTES PCT AUTO: 0.2 % (ref 0–0.5)
LYMPHOCYTES  ABSOLUTE AUTO: 1.9 10^3/UL (ref 1.2–3.8)
MCV RBC: 97.1 FL (ref 80–94)
MEAN CORPUSCULAR HEMOGLOBIN: 32.9 PG (ref 25.9–34)
MEAN CORPUSCULAR HGB CONC: 33.8 G/DL (ref 29.9–35.2)
MEAN CORPUSCULAR VOLUME: 97.1 FL (ref 80–94)
PLATELET # BLD: 188 10^3/UL (ref 150–450)
PLATELET COUNT: 188 10^3/UL (ref 150–450)
POTASSIUM SERPLBLD-SCNC: 3.7 MMOL/L (ref 3.5–5.1)
POTASSIUM: 3.7 MMOL/L (ref 3.5–5.1)
RED BLOOD COUNT: 4.14 10^6/UL (ref 4.7–6.1)
SODIUM BLD-SCNC: 140 MMOL/L (ref 136–145)
SODIUM: 140 MMOL/L (ref 136–145)
WBC # BLD: 10.7 10^3/UL (ref 4–11)
WHITE BLOOD COUNT: 10.7 10^3/UL (ref 4–11)

## 2023-09-14 RX ADMIN — ROPINIROLE HYDROCHLORIDE 0.25 MG: 0.25 TABLET, FILM COATED ORAL at 05:45

## 2023-09-14 RX ADMIN — HYOSCYAMINE SULFATE 0.12 MG: 0.12 TABLET SUBLINGUAL at 08:09

## 2023-09-14 RX ADMIN — CHLORDIAZEPOXIDE HYDROCHLORIDE 50 MG: 25 CAPSULE ORAL at 08:07

## 2023-09-14 RX ADMIN — CALCIUM CARBONATE (ANTACID) CHEW TAB 500 MG 500 MG: 500 CHEW TAB at 05:46

## 2023-09-14 RX ADMIN — CHLORDIAZEPOXIDE HYDROCHLORIDE 50 MG: 25 CAPSULE ORAL at 01:44

## 2023-09-14 RX ADMIN — THIAMINE HCL TAB 100 MG 100 MG: 100 TAB at 08:07

## 2023-09-14 RX ADMIN — CLONIDINE HYDROCHLORIDE 0.1 MG: 0.1 TABLET ORAL at 08:07

## 2023-09-14 RX ADMIN — MULTIVITAMIN TABLET 1 TAB: TABLET at 08:07

## 2023-09-14 RX ADMIN — CHLORDIAZEPOXIDE HYDROCHLORIDE 50 MG: 25 CAPSULE ORAL at 03:52

## 2023-09-14 RX ADMIN — CHLORDIAZEPOXIDE HYDROCHLORIDE 50 MG: 25 CAPSULE ORAL at 05:45

## 2023-09-14 NOTE — PM.DS1
DS: Providers
Provider
Date of admission: 
23 14:15

Primary care physician: 
Non-Staff Physician, MD

Consults: 


23
Consult to Dietitian Routine 
 Reason For Exam: screening
 Reason for consultation: weight loss
Consult to  Routine 
 Reason for consult:: Drug Abuse
                        Food/Nutrition




DS: Diagnosis
Discharge Diagnosis
(1) Alcohol withdrawal delirium: 

[place] [address]
Progress Note 
Signed
Patient: CLARIBEL SINGH


MR#: [id number]
: 1990


Acct:[id number]
Age/Sex: 32 / M


ADM Date: 23
Loc: [place]  216-1


Date of Service: 23Attending Dr: Min Christensen M.D.

cc: Min Christensen M.D.; Physician,Non-Staff M.D.~


Progress Note: Subjective
Subjective
Interval history: 
Still feeling some agitation.  Required IV Valium.  Patient unable to be discharged to rehab

Exam
Constitutional
Vital Signs, click to edit/add: 
Last Vital Signs
Temp  98.3 F   23 04:35
Pulse  47 L  23 08:00
Resp  14   23 04:35
BP  108/72   23 04:35
Pulse Ox  99   23 04:56
O2 Del Method  Room Air  23 04:56


Documenting provider has reviewed patient's vital signs: yes
Common normals: apparent distress
Respiratory
Common normals: normal respiratory effort, no use of accessory muscles and clear to auscultation bilaterally
Cardio
Common normals: regular rate, regular rhythm and no murmurs
GI
Common normals: Normal to inspection, nondistended, normoactive bowel sounds present
Neuro
Common normals: oriented x3 (Agitated, clearly with some withdrawal symptoms)

Progress Note: Objective
Labs
Labs: 
Short CBC
  23 Range/Units
  12:46 04:53 
WBC  15.0 H  9.1  (4.0-11.0)  10^3/uL
Hgb  17.7  14.8  (14.0-18.0)  g/dL
Hct  51.4  44.8  (42.0-54.0)  %
Plt Count  284  226  (150-450)  10^3/uL

BMP
  23
  12:46 04:11
Sodium  139  137
Potassium  4.3  4.1
Chloride  99  105
Carbon Dioxide  20.9 L  25.7
BUN  6.0 L  7.0
Creatinine  0.90  0.83
Glucose  64 L  98
Calcium  9.3  8.1 L

Liver Function
  23 Range/Units
  12:46 
Total Bilirubin  0.6  (0.2-1.0)  mg/dL
Direct Bilirubin  0.2  (0.0-0.2)  mg/dL
AST  28  (15-37)  U/L
ALT  25  (16-63)  U/L
Alkaline Phosphatase  96  ()  U/L
Albumin  4.5  (3.4-5.0)  g/dL



Progress Note: A&P
Assessment and Plan
(1) Alcohol withdrawal delirium: 

Plan
Alcohol abuse abuse, now with delirium tremens with hallucinations,  -continue with current medications but adjust doses.  Reviewed phenobarbital protocol with rehabilitation facility will institute that today.




Dehydration secondary to alcohol abuse-continue with hydration as above, monitor labs




Leukocytosis-resolved



Hypocalcemia-supplement



DS: Summary
Hospital Course
Hospital Course:
Patient was admitted with a significant elevated alcohol of over 100, started having withdrawals even at that level.  Patient was placed on the CIWA scale.  IV fluids for dehydration.  Patient started having some hallucinations the following day, 
medications were adjusted with the addition of phenobarbital.  Patient does appear to be much improved today.  No further hallucinations.  He has not required any IV Ativan.  Case will be discussed with the inpatient alcohol rehab center, if they 
are comfortable with him returning as is he can be discharged home to their today.  Medications see list.  Follow-up with his PCP after discharge from inpatient alcohol rehab
Time Spent with Patient
Time attestation: 
Total time spent providing and/or coordinating discharge services:


Exam
Constitutional
Vital Signs, click to edit/add: 
Last Vital Signs

Temp  97.5 F L  23 05:41
Pulse  75   23 08:04
Resp  16   23 05:41
BP  114/80   23 05:41
Pulse Ox  94 L  23 05:41
O2 Del Method  Room Air  23 05:41


Documenting provider has reviewed patient's vital signs: yes
Common normals: apparent distress
Respiratory
Common normals: normal respiratory effort, no use of accessory muscles and clear to auscultation bilaterally
Cardio
Common normals: regular rate, regular rhythm and no murmurs
GI
Common normals: Normal to inspection, nondistended, normoactive bowel sounds present
Neuro
Common normals: oriented x3 (Agitated, clearly with some withdrawal symptoms)

DS: Data
Data Completed and Pending
Labs on day of discharge: 
Labs from last 24 hours

  23
  04:30
WBC  10.7
RBC  4.14 L
Hgb  13.6 L
Hct  40.2 L
MCV  97.1 H
MCH  32.9
MCHC  33.8
RDW  14.3
Plt Count  188
MPV  13.4
Neut % (Auto)  72.8
Lymph % (Auto)  17.7 L
Mono % (Auto)  6.6
Eos % (Auto)  2.3
Baso % (Auto)  0.4
Neut # (Auto)  7.8 H
Lymph # (Auto)  1.9
Mono # (Auto)  0.7
Eos # (Auto)  0.3
Baso # (Auto)  0.0
Abs Immat Gran (auto)  0.02
Imm/Tot Granulo (auto)  0.2
Sodium  140
Potassium  3.7
Chloride  107
Carbon Dioxide  24.5
Anion Gap  12.2
BUN  6.0 L
Creatinine  0.76
Est GFR (African Amer)  >60
Est GFR (Non-Af Amer)  >60
BUN/Creatinine Ratio  7.9
Glucose  183 H
Calcium  8.0 L




Discharge Plan
Discharge
Disposition: Xfer Inpatient Rehab Fac

Condition: Fair

/ Instructions: Patient is returning to [facility] in [place]. 


Forms:  Portal Instructions

Follow Up Appointments: Follow up care per [facility] facility 


Discharge Date/Time: 23 10:13

## 2023-09-14 NOTE — P.DS_ITS
DS: Providers    
Provider    
Date of admission:     
23 14:15    
    
Primary care physician:     
Non-Staff Physician, MD    
    
Consults:     
                                            
    
23    
Consult to Dietitian Routine     
   Reason For Exam: screening    
   Reason for consultation: weight loss    
Consult to  Routine     
   Reason for consult:: Drug Abuse    
                          Food/Nutrition    
    
    
    
    
DS: Diagnosis    
Discharge Diagnosis    
(1) Alcohol withdrawal delirium:     
    
Plan    
                              [place] [address]    
                                 Progress Note     
                                     Signed    
Patient: CLARIBEL SINGH    
    
    
MR#: [id number]    
: 1990    
    
    
Acct:[id number]    
Age/Sex: 32 / M    
    
    
ADM Date: 23    
Loc: [place]  216-1    
    
    
Date of Service: 23Attending Dr: Min Christensen M.D.    
    
cc: Min Christensen M.D.; Physician,Non-Staff M.D.~    
    
    
Progress Note: Subjective    
Subjective    
Interval history:     
Still feeling some agitation.  Required IV Valium.  Patient unable to be   
discharged to rehab    
    
Exam    
Constitutional    
Vital Signs, click to edit/add:     
                                Last Vital Signs    
    
    
Temp  98.3 F   23 04:35    
     
Pulse  47 L  23 08:00    
     
Resp  14   23 04:35    
     
BP  108/72   23 04:35    
     
Pulse Ox  99   23 04:56    
     
O2 Del Method  Room Air  23 04:56    
    
    
    
Documenting provider has reviewed patient's vital signs: yes    
Common normals: apparent distress    
Respiratory    
Common normals: normal respiratory effort, no use of accessory muscles and clear  
to auscultation bilaterally    
Cardio    
Common normals: regular rate, regular rhythm and no murmurs    
GI    
Common normals: Normal to inspection, nondistended, normoactive bowel sounds   
present    
Neuro    
Common normals: oriented x3 (Agitated, clearly with some withdrawal symptoms)    
    
Progress Note: Objective    
Labs    
Labs:     
                                    Short CBC    
    
    
  23 Range/Units    
     
  12:46 04:53     
     
WBC  15.0 H  9.1  (4.0-11.0)  10^3/uL    
     
Hgb  17.7  14.8  (14.0-18.0)  g/dL    
     
Hct  51.4  44.8  (42.0-54.0)  %    
     
Plt Count  284  226  (150-450)  10^3/uL    
    
    
                                       BMP    
    
    
  23    
     
  12:46 04:11    
     
Sodium  139  137    
     
Potassium  4.3  4.1    
     
Chloride  99  105    
     
Carbon Dioxide  20.9 L  25.7    
     
BUN  6.0 L  7.0    
     
Creatinine  0.90  0.83    
     
Glucose  64 L  98    
     
Calcium  9.3  8.1 L    
    
    
                                 Liver Function    
    
    
  23 Range/Units    
     
  12:46     
     
Total Bilirubin  0.6  (0.2-1.0)  mg/dL    
     
Direct Bilirubin  0.2  (0.0-0.2)  mg/dL    
     
AST  28  (15-37)  U/L    
     
ALT  25  (16-63)  U/L    
     
Alkaline Phosphatase  96  ()  U/L    
     
Albumin  4.5  (3.4-5.0)  g/dL    
    
    
    
    
Progress Note: A&P    
Assessment and Plan    
(1) Alcohol withdrawal delirium:     
    
Plan    
Alcohol abuse abuse, now with delirium tremens with hallucinations,  -continue   
with current medications but adjust doses.  Reviewed phenobarbital protocol with  
rehabilitation facility will institute that today.    
    
    
--------------------------------------------------------------------------------    
    
    
Dehydration secondary to alcohol abuse-continue with hydration as above, monitor  
labs    
    
    
--------------------------------------------------------------------------------    
    
    
Leukocytosis-resolved    
    
--------------------------------------------------------------------------------    
    
    
Hypocalcemia-supplement    
    
    
    
DS: Summary    
Hospital Course    
Hospital Course:    
Patient was admitted with a significant elevated alcohol of over 100, started   
having withdrawals even at that level.  Patient was placed on the CIWA scale.    
IV fluids for dehydration.  Patient started having some hallucinations the   
following day, medications were adjusted with the addition of phenobarbital.    
Patient does appear to be much improved today.  No further hallucinations.  He   
has not required any IV Ativan.  Case will be discussed with the inpatient   
alcohol rehab center, if they are comfortable with him returning as is he can be  
discharged home to their today.  Medications see list.  Follow-up with his PCP   
after discharge from inpatient alcohol rehab    
Time Spent with Patient    
Time attestation:     
Total time spent providing and/or coordinating discharge services:    
    
    
Exam    
Constitutional    
Vital Signs, click to edit/add:     
                                Last Vital Signs    
    
    
    
Temp  97.5 F L  23 05:41    
     
Pulse  75   23 08:04    
     
Resp  16   23 05:41    
     
BP  114/80   23 05:41    
     
Pulse Ox  94 L  23 05:41    
     
O2 Del Method  Room Air  23 05:41    
    
    
    
Documenting provider has reviewed patient's vital signs: yes    
Common normals: apparent distress    
Respiratory    
Common normals: normal respiratory effort, no use of accessory muscles and clear  
to auscultation bilaterally    
Cardio    
Common normals: regular rate, regular rhythm and no murmurs    
GI    
Common normals: Normal to inspection, nondistended, normoactive bowel sounds   
present    
Neuro    
Common normals: oriented x3 (Agitated, clearly with some withdrawal symptoms)    
    
DS: Data    
Data Completed and Pending    
Labs on day of discharge:     
                             Labs from last 24 hours    
    
    
    
  23    
    
  04:30    
     
WBC  10.7    
     
RBC  4.14 L    
     
Hgb  13.6 L    
     
Hct  40.2 L    
     
MCV  97.1 H    
     
MCH  32.9    
     
MCHC  33.8    
     
RDW  14.3    
     
Plt Count  188    
     
MPV  13.4    
     
Neut % (Auto)  72.8    
     
Lymph % (Auto)  17.7 L    
     
Mono % (Auto)  6.6    
     
Eos % (Auto)  2.3    
     
Baso % (Auto)  0.4    
     
Neut # (Auto)  7.8 H    
     
Lymph # (Auto)  1.9    
     
Mono # (Auto)  0.7    
     
Eos # (Auto)  0.3    
     
Baso # (Auto)  0.0    
     
Abs Immat Gran (auto)  0.02    
     
Imm/Tot Granulo (auto)  0.2    
     
Sodium  140    
     
Potassium  3.7    
     
Chloride  107    
     
Carbon Dioxide  24.5    
     
Anion Gap  12.2    
     
BUN  6.0 L    
     
Creatinine  0.76    
     
Est GFR ( Amer)  >60    
     
Est GFR (Non-Af Amer)  >60    
     
BUN/Creatinine Ratio  7.9    
     
Glucose  183 H    
     
Calcium  8.0 L    
    
    
    
    
    
Discharge Plan    
Discharge    
Disposition: Xfer Inpatient Rehab Fac    
    
Condition: Fair    
    
/ Instructions: Patient is returning to [facility] in [place].     
    
    
Forms:  Portal Instructions    
    
Follow Up Appointments: Follow up care per [facility] facility     
    
    
Discharge Date/Time: 23 10:13

## 2023-09-14 NOTE — CM.NOTE
Rounds made with Dr. Christensen, discussed discharge back to [facility] today.  Pt in agreement and is feeling better this AM, not as shaky and decreased hallucinations.

## 2023-09-26 ENCOUNTER — APPOINTMENT (OUTPATIENT)
Dept: GENERAL RADIOLOGY | Age: 33
End: 2023-09-26
Payer: COMMERCIAL

## 2023-09-26 ENCOUNTER — HOSPITAL ENCOUNTER (INPATIENT)
Age: 33
LOS: 2 days | Discharge: LEFT BEFORE DISCHARGE | End: 2023-09-28
Attending: EMERGENCY MEDICINE | Admitting: INTERNAL MEDICINE
Payer: COMMERCIAL

## 2023-09-26 DIAGNOSIS — R79.89 ELEVATED LACTIC ACID LEVEL: ICD-10-CM

## 2023-09-26 DIAGNOSIS — D72.829 LEUKOCYTOSIS, UNSPECIFIED TYPE: ICD-10-CM

## 2023-09-26 DIAGNOSIS — F10.939 ALCOHOL WITHDRAWAL SYNDROME WITH COMPLICATION (HCC): Primary | ICD-10-CM

## 2023-09-26 PROBLEM — R68.89 WITHDRAWAL COMPLAINT: Status: ACTIVE | Noted: 2023-09-26

## 2023-09-26 LAB
ALBUMIN SERPL-MCNC: 5.5 G/DL (ref 3.5–5.2)
ALP SERPL-CCNC: 128 U/L (ref 40–129)
ALT SERPL-CCNC: 20 U/L (ref 0–40)
ANION GAP SERPL CALCULATED.3IONS-SCNC: 20 MMOL/L (ref 7–16)
AST SERPL-CCNC: 38 U/L (ref 0–39)
BASOPHILS # BLD: 0.08 K/UL (ref 0–0.2)
BASOPHILS NFR BLD: 0 % (ref 0–2)
BILIRUB SERPL-MCNC: 1.7 MG/DL (ref 0–1.2)
BUN SERPL-MCNC: 7 MG/DL (ref 6–20)
CALCIUM SERPL-MCNC: 10.3 MG/DL (ref 8.6–10.2)
CHLORIDE SERPL-SCNC: 91 MMOL/L (ref 98–107)
CO2 SERPL-SCNC: 25 MMOL/L (ref 22–29)
CREAT SERPL-MCNC: 0.7 MG/DL (ref 0.7–1.2)
EKG ATRIAL RATE: 90 BPM
EKG P AXIS: 61 DEGREES
EKG P-R INTERVAL: 124 MS
EKG Q-T INTERVAL: 362 MS
EKG QRS DURATION: 84 MS
EKG QTC CALCULATION (BAZETT): 442 MS
EKG R AXIS: 63 DEGREES
EKG T AXIS: 76 DEGREES
EKG VENTRICULAR RATE: 90 BPM
EOSINOPHIL # BLD: 0.07 K/UL (ref 0.05–0.5)
EOSINOPHILS RELATIVE PERCENT: 0 % (ref 0–6)
ERYTHROCYTE [DISTWIDTH] IN BLOOD BY AUTOMATED COUNT: 13.4 % (ref 11.5–15)
ETHANOLAMINE SERPL-MCNC: <10 MG/DL
GFR SERPL CREATININE-BSD FRML MDRD: >60 ML/MIN/1.73M2
GLUCOSE SERPL-MCNC: 80 MG/DL (ref 74–99)
HCT VFR BLD AUTO: 53.9 % (ref 37–54)
HGB BLD-MCNC: 19 G/DL (ref 12.5–16.5)
IMM GRANULOCYTES # BLD AUTO: 0.07 K/UL (ref 0–0.58)
IMM GRANULOCYTES NFR BLD: 0 % (ref 0–5)
LACTATE BLDV-SCNC: 3.2 MMOL/L (ref 0.5–2.2)
LIPASE SERPL-CCNC: 21 U/L (ref 13–60)
LYMPHOCYTES NFR BLD: 1.5 K/UL (ref 1.5–4)
LYMPHOCYTES RELATIVE PERCENT: 8 % (ref 20–42)
MCH RBC QN AUTO: 32 PG (ref 26–35)
MCHC RBC AUTO-ENTMCNC: 35.3 G/DL (ref 32–34.5)
MCV RBC AUTO: 90.9 FL (ref 80–99.9)
MONOCYTES NFR BLD: 0.88 K/UL (ref 0.1–0.95)
MONOCYTES NFR BLD: 5 % (ref 2–12)
NEUTROPHILS NFR BLD: 86 % (ref 43–80)
NEUTS SEG NFR BLD: 16.01 K/UL (ref 1.8–7.3)
PLATELET # BLD AUTO: 303 K/UL (ref 130–450)
PMV BLD AUTO: 11.6 FL (ref 7–12)
POTASSIUM SERPL-SCNC: 5.3 MMOL/L (ref 3.5–5)
PROT SERPL-MCNC: 8.2 G/DL (ref 6.4–8.3)
RBC # BLD AUTO: 5.93 M/UL (ref 3.8–5.8)
SODIUM SERPL-SCNC: 136 MMOL/L (ref 132–146)
WBC OTHER # BLD: 18.6 K/UL (ref 4.5–11.5)

## 2023-09-26 PROCEDURE — 1200000000 HC SEMI PRIVATE

## 2023-09-26 PROCEDURE — 93005 ELECTROCARDIOGRAM TRACING: CPT | Performed by: STUDENT IN AN ORGANIZED HEALTH CARE EDUCATION/TRAINING PROGRAM

## 2023-09-26 PROCEDURE — 99285 EMERGENCY DEPT VISIT HI MDM: CPT

## 2023-09-26 PROCEDURE — 96374 THER/PROPH/DIAG INJ IV PUSH: CPT

## 2023-09-26 PROCEDURE — 6370000000 HC RX 637 (ALT 250 FOR IP): Performed by: STUDENT IN AN ORGANIZED HEALTH CARE EDUCATION/TRAINING PROGRAM

## 2023-09-26 PROCEDURE — 2580000003 HC RX 258

## 2023-09-26 PROCEDURE — 83605 ASSAY OF LACTIC ACID: CPT

## 2023-09-26 PROCEDURE — 80053 COMPREHEN METABOLIC PANEL: CPT

## 2023-09-26 PROCEDURE — 83690 ASSAY OF LIPASE: CPT

## 2023-09-26 PROCEDURE — 96375 TX/PRO/DX INJ NEW DRUG ADDON: CPT

## 2023-09-26 PROCEDURE — 93010 ELECTROCARDIOGRAM REPORT: CPT | Performed by: INTERNAL MEDICINE

## 2023-09-26 PROCEDURE — G0480 DRUG TEST DEF 1-7 CLASSES: HCPCS

## 2023-09-26 PROCEDURE — 6360000002 HC RX W HCPCS: Performed by: STUDENT IN AN ORGANIZED HEALTH CARE EDUCATION/TRAINING PROGRAM

## 2023-09-26 PROCEDURE — 99222 1ST HOSP IP/OBS MODERATE 55: CPT | Performed by: INTERNAL MEDICINE

## 2023-09-26 PROCEDURE — 71045 X-RAY EXAM CHEST 1 VIEW: CPT

## 2023-09-26 PROCEDURE — 85025 COMPLETE CBC W/AUTO DIFF WBC: CPT

## 2023-09-26 RX ORDER — SODIUM CHLORIDE 0.9 % (FLUSH) 0.9 %
5-40 SYRINGE (ML) INJECTION EVERY 12 HOURS SCHEDULED
Status: DISCONTINUED | OUTPATIENT
Start: 2023-09-26 | End: 2023-09-28 | Stop reason: HOSPADM

## 2023-09-26 RX ORDER — LORAZEPAM 1 MG/1
3 TABLET ORAL
Status: DISCONTINUED | OUTPATIENT
Start: 2023-09-26 | End: 2023-09-27

## 2023-09-26 RX ORDER — THIAMINE HYDROCHLORIDE 100 MG/ML
100 INJECTION, SOLUTION INTRAMUSCULAR; INTRAVENOUS ONCE
Status: COMPLETED | OUTPATIENT
Start: 2023-09-26 | End: 2023-09-26

## 2023-09-26 RX ORDER — ACETAMINOPHEN 650 MG/1
650 SUPPOSITORY RECTAL EVERY 6 HOURS PRN
Status: DISCONTINUED | OUTPATIENT
Start: 2023-09-26 | End: 2023-09-28 | Stop reason: HOSPADM

## 2023-09-26 RX ORDER — LORAZEPAM 2 MG/ML
1 INJECTION INTRAMUSCULAR
Status: DISCONTINUED | OUTPATIENT
Start: 2023-09-26 | End: 2023-09-27

## 2023-09-26 RX ORDER — PHENOBARBITAL SODIUM 65 MG/ML
130 INJECTION INTRAMUSCULAR ONCE
Status: COMPLETED | OUTPATIENT
Start: 2023-09-26 | End: 2023-09-26

## 2023-09-26 RX ORDER — LORAZEPAM 2 MG/ML
4 INJECTION INTRAMUSCULAR
Status: DISCONTINUED | OUTPATIENT
Start: 2023-09-26 | End: 2023-09-27

## 2023-09-26 RX ORDER — FOLIC ACID 1 MG/1
1 TABLET ORAL ONCE
Status: COMPLETED | OUTPATIENT
Start: 2023-09-26 | End: 2023-09-26

## 2023-09-26 RX ORDER — ENOXAPARIN SODIUM 100 MG/ML
40 INJECTION SUBCUTANEOUS DAILY
Status: DISCONTINUED | OUTPATIENT
Start: 2023-09-27 | End: 2023-09-28 | Stop reason: HOSPADM

## 2023-09-26 RX ORDER — SODIUM CHLORIDE 9 MG/ML
INJECTION, SOLUTION INTRAVENOUS PRN
Status: DISCONTINUED | OUTPATIENT
Start: 2023-09-26 | End: 2023-09-26 | Stop reason: SDUPTHER

## 2023-09-26 RX ORDER — SODIUM CHLORIDE 0.9 % (FLUSH) 0.9 %
10 SYRINGE (ML) INJECTION PRN
Status: DISCONTINUED | OUTPATIENT
Start: 2023-09-26 | End: 2023-09-28 | Stop reason: HOSPADM

## 2023-09-26 RX ORDER — LORAZEPAM 1 MG/1
1 TABLET ORAL
Status: DISCONTINUED | OUTPATIENT
Start: 2023-09-26 | End: 2023-09-27

## 2023-09-26 RX ORDER — POLYETHYLENE GLYCOL 3350 17 G/17G
17 POWDER, FOR SOLUTION ORAL DAILY PRN
Status: DISCONTINUED | OUTPATIENT
Start: 2023-09-26 | End: 2023-09-28 | Stop reason: HOSPADM

## 2023-09-26 RX ORDER — LORAZEPAM 2 MG/ML
2 INJECTION INTRAMUSCULAR
Status: DISCONTINUED | OUTPATIENT
Start: 2023-09-26 | End: 2023-09-27

## 2023-09-26 RX ORDER — SODIUM CHLORIDE 9 MG/ML
INJECTION, SOLUTION INTRAVENOUS PRN
Status: DISCONTINUED | OUTPATIENT
Start: 2023-09-26 | End: 2023-09-28 | Stop reason: HOSPADM

## 2023-09-26 RX ORDER — SODIUM CHLORIDE 0.9 % (FLUSH) 0.9 %
5-40 SYRINGE (ML) INJECTION PRN
Status: DISCONTINUED | OUTPATIENT
Start: 2023-09-26 | End: 2023-09-28 | Stop reason: HOSPADM

## 2023-09-26 RX ORDER — LORAZEPAM 1 MG/1
2 TABLET ORAL
Status: DISCONTINUED | OUTPATIENT
Start: 2023-09-26 | End: 2023-09-27

## 2023-09-26 RX ORDER — ONDANSETRON 2 MG/ML
4 INJECTION INTRAMUSCULAR; INTRAVENOUS EVERY 6 HOURS PRN
Status: DISCONTINUED | OUTPATIENT
Start: 2023-09-26 | End: 2023-09-28 | Stop reason: HOSPADM

## 2023-09-26 RX ORDER — SODIUM CHLORIDE 0.9 % (FLUSH) 0.9 %
10 SYRINGE (ML) INJECTION EVERY 12 HOURS SCHEDULED
Status: DISCONTINUED | OUTPATIENT
Start: 2023-09-26 | End: 2023-09-28 | Stop reason: HOSPADM

## 2023-09-26 RX ORDER — PROMETHAZINE HYDROCHLORIDE 25 MG/1
12.5 TABLET ORAL EVERY 6 HOURS PRN
Status: DISCONTINUED | OUTPATIENT
Start: 2023-09-26 | End: 2023-09-28 | Stop reason: HOSPADM

## 2023-09-26 RX ORDER — ACETAMINOPHEN 325 MG/1
650 TABLET ORAL EVERY 6 HOURS PRN
Status: DISCONTINUED | OUTPATIENT
Start: 2023-09-26 | End: 2023-09-28 | Stop reason: HOSPADM

## 2023-09-26 RX ORDER — GAUZE BANDAGE 2" X 2"
100 BANDAGE TOPICAL DAILY
Status: DISCONTINUED | OUTPATIENT
Start: 2023-09-27 | End: 2023-09-28 | Stop reason: HOSPADM

## 2023-09-26 RX ORDER — LORAZEPAM 1 MG/1
4 TABLET ORAL
Status: DISCONTINUED | OUTPATIENT
Start: 2023-09-26 | End: 2023-09-27

## 2023-09-26 RX ORDER — LORAZEPAM 2 MG/ML
3 INJECTION INTRAMUSCULAR
Status: DISCONTINUED | OUTPATIENT
Start: 2023-09-26 | End: 2023-09-27

## 2023-09-26 RX ADMIN — PHENOBARBITAL SODIUM 130 MG: 65 INJECTION INTRAMUSCULAR; INTRAVENOUS at 13:47

## 2023-09-26 RX ADMIN — THIAMINE HYDROCHLORIDE 100 MG: 100 INJECTION, SOLUTION INTRAMUSCULAR; INTRAVENOUS at 13:00

## 2023-09-26 RX ADMIN — PHENOBARBITAL SODIUM 130 MG: 65 INJECTION INTRAMUSCULAR; INTRAVENOUS at 16:03

## 2023-09-26 RX ADMIN — FOLIC ACID 1 MG: 1 TABLET ORAL at 13:05

## 2023-09-26 RX ADMIN — Medication 10 ML: at 22:45

## 2023-09-26 ASSESSMENT — PAIN SCALES - GENERAL: PAINLEVEL_OUTOF10: 4

## 2023-09-26 NOTE — ED NOTES
Provider notified of [address] of [address].      Meagan Abarca LPN  02/97/04 7300

## 2023-09-26 NOTE — ED NOTES
Social service has not at this time been able to find placement for rehab. Will update  for possible plan.      Manas Hayden RN  09/26/23 5724

## 2023-09-26 NOTE — CARE COORDINATION
Met with Patient in ED and he is agreeable to going to inpatient detox if there is a place that will accept him. Spoke with Jordin at [facility] who states patient she called several facilities and [facility] does have male beds. SW spoke with Jess Rocha at [address] Jess Rocha requested information be faxed to him at [phone] have faxed information await return call with whether they will accept. Addendum:  Spoke with Jaci Gotti at [facility] who states after review of information, patient is on their \"Conditional Re admit\" list and they would consider accepting him back but only if he was seen by psychiatry for his mental illness and cleared first.  Spoke with patient, he states he does not follow with anyone in community for his mental illness and takes no medications regarding this that he knows of, he furthermore states he didn't think [facility] would take him back. He states he has history at [facility] and [facility] as well. SW called [facility] and was transferred to admissions voicemail. Left message. Addendum:  Call placed to [facility], they asked SW to fax referral to them but he is familiar to them from previous admits and they will have to look into if able to accept. Spoke with patient to see if he would be willing to consider an out of area rehab, as PRS had mentioned a place in [facility] that may take, Patient unclear if he would agree to this, states he doesn't know if he got his \"pheno\" and states well \" I want to go somewhere I need to get help but I have to be stable too\"    Addendum:  Spoke with Jordin at [facility], she states she also reached out to a few facilities who don't have male beds currently. Patient is very familiar to [facility] states they have worked with him multiple times in the past and if patient is medically stable to DC from ED they can follow up if a bed becomes available. Patient is very familiar with outpatient resources as well and was just at [facility] outpatient yesterday.   He

## 2023-09-26 NOTE — CARE COORDINATION
Peer Recovery Support Note    Name: Nabil Bhatt  Date: 9/26/2023    Chief Complaint   Patient presents with    Alcohol Problem     Sent from outpt rehab facility pt is withdrawing from etoh last alcohol yesterday facility thinks pt is in danger of going into dt's and wants hin evaluated       Peer Support met with patient. [] Support and education provided  [] Resources provided   [x] Treatment referral: [facility]  [x] Other:   [] Patient declined peer recovery services     Referred By: Criselda Jackson ()    Notes: Patient will do assessment with [facility] to see if he is accepted.      Signed: Zoie Bronson, 9/26/2023

## 2023-09-27 LAB
ANION GAP SERPL CALCULATED.3IONS-SCNC: 14 MMOL/L (ref 7–16)
BASOPHILS # BLD: 0.05 K/UL (ref 0–0.2)
BASOPHILS NFR BLD: 1 % (ref 0–2)
BUN SERPL-MCNC: 9 MG/DL (ref 6–20)
CALCIUM SERPL-MCNC: 9.5 MG/DL (ref 8.6–10.2)
CHLORIDE SERPL-SCNC: 95 MMOL/L (ref 98–107)
CO2 SERPL-SCNC: 26 MMOL/L (ref 22–29)
CREAT SERPL-MCNC: 0.8 MG/DL (ref 0.7–1.2)
EOSINOPHIL # BLD: 0.27 K/UL (ref 0.05–0.5)
EOSINOPHILS RELATIVE PERCENT: 3 % (ref 0–6)
ERYTHROCYTE [DISTWIDTH] IN BLOOD BY AUTOMATED COUNT: 13.8 % (ref 11.5–15)
GFR SERPL CREATININE-BSD FRML MDRD: >60 ML/MIN/1.73M2
GLUCOSE SERPL-MCNC: 81 MG/DL (ref 74–99)
HCT VFR BLD AUTO: 49.6 % (ref 37–54)
HGB BLD-MCNC: 17 G/DL (ref 12.5–16.5)
IMM GRANULOCYTES # BLD AUTO: <0.03 K/UL (ref 0–0.58)
IMM GRANULOCYTES NFR BLD: 0 % (ref 0–5)
LYMPHOCYTES NFR BLD: 1.77 K/UL (ref 1.5–4)
LYMPHOCYTES RELATIVE PERCENT: 18 % (ref 20–42)
MCH RBC QN AUTO: 31.7 PG (ref 26–35)
MCHC RBC AUTO-ENTMCNC: 34.3 G/DL (ref 32–34.5)
MCV RBC AUTO: 92.4 FL (ref 80–99.9)
MONOCYTES NFR BLD: 1 K/UL (ref 0.1–0.95)
MONOCYTES NFR BLD: 10 % (ref 2–12)
NEUTROPHILS NFR BLD: 69 % (ref 43–80)
NEUTS SEG NFR BLD: 6.96 K/UL (ref 1.8–7.3)
PLATELET # BLD AUTO: 240 K/UL (ref 130–450)
PMV BLD AUTO: 11.9 FL (ref 7–12)
POTASSIUM SERPL-SCNC: 4 MMOL/L (ref 3.5–5)
RBC # BLD AUTO: 5.37 M/UL (ref 3.8–5.8)
SODIUM SERPL-SCNC: 135 MMOL/L (ref 132–146)
WBC OTHER # BLD: 10.1 K/UL (ref 4.5–11.5)

## 2023-09-27 PROCEDURE — 6360000002 HC RX W HCPCS: Performed by: INTERNAL MEDICINE

## 2023-09-27 PROCEDURE — 6370000000 HC RX 637 (ALT 250 FOR IP): Performed by: INTERNAL MEDICINE

## 2023-09-27 PROCEDURE — 2580000003 HC RX 258: Performed by: INTERNAL MEDICINE

## 2023-09-27 PROCEDURE — 36415 COLL VENOUS BLD VENIPUNCTURE: CPT

## 2023-09-27 PROCEDURE — 80048 BASIC METABOLIC PNL TOTAL CA: CPT

## 2023-09-27 PROCEDURE — 1200000000 HC SEMI PRIVATE

## 2023-09-27 PROCEDURE — 85025 COMPLETE CBC W/AUTO DIFF WBC: CPT

## 2023-09-27 PROCEDURE — 6370000000 HC RX 637 (ALT 250 FOR IP)

## 2023-09-27 PROCEDURE — 99232 SBSQ HOSP IP/OBS MODERATE 35: CPT | Performed by: INTERNAL MEDICINE

## 2023-09-27 RX ORDER — NICOTINE 21 MG/24HR
1 PATCH, TRANSDERMAL 24 HOURS TRANSDERMAL DAILY
Status: DISCONTINUED | OUTPATIENT
Start: 2023-09-27 | End: 2023-09-28 | Stop reason: HOSPADM

## 2023-09-27 RX ORDER — PHENOBARBITAL 32.4 MG/1
16.2 TABLET ORAL EVERY 6 HOURS PRN
Status: DISCONTINUED | OUTPATIENT
Start: 2023-09-29 | End: 2023-09-28 | Stop reason: HOSPADM

## 2023-09-27 RX ORDER — PHENOBARBITAL 32.4 MG/1
32.4 TABLET ORAL EVERY 6 HOURS PRN
Status: DISCONTINUED | OUTPATIENT
Start: 2023-09-27 | End: 2023-09-28 | Stop reason: HOSPADM

## 2023-09-27 RX ORDER — MIDAZOLAM HYDROCHLORIDE 1 MG/ML
1 INJECTION INTRAMUSCULAR; INTRAVENOUS
Status: DISCONTINUED | OUTPATIENT
Start: 2023-09-27 | End: 2023-09-28 | Stop reason: HOSPADM

## 2023-09-27 RX ORDER — CHLORDIAZEPOXIDE HYDROCHLORIDE 5 MG/1
5 CAPSULE, GELATIN COATED ORAL EVERY 4 HOURS PRN
Status: DISCONTINUED | OUTPATIENT
Start: 2023-09-27 | End: 2023-09-27

## 2023-09-27 RX ORDER — KETOROLAC TROMETHAMINE 15 MG/ML
15 INJECTION, SOLUTION INTRAMUSCULAR; INTRAVENOUS ONCE
Status: COMPLETED | OUTPATIENT
Start: 2023-09-27 | End: 2023-09-27

## 2023-09-27 RX ORDER — PHENOBARBITAL 32.4 MG/1
32.4 TABLET ORAL 2 TIMES DAILY
Status: DISCONTINUED | OUTPATIENT
Start: 2023-09-28 | End: 2023-09-28 | Stop reason: HOSPADM

## 2023-09-27 RX ORDER — PHENOBARBITAL 32.4 MG/1
16.2 TABLET ORAL 2 TIMES DAILY
Status: DISCONTINUED | OUTPATIENT
Start: 2023-09-29 | End: 2023-09-28 | Stop reason: HOSPADM

## 2023-09-27 RX ORDER — PHENOBARBITAL 32.4 MG/1
32.4 TABLET ORAL 4 TIMES DAILY
Status: COMPLETED | OUTPATIENT
Start: 2023-09-27 | End: 2023-09-27

## 2023-09-27 RX ADMIN — MIDAZOLAM 1 MG: 1 INJECTION INTRAMUSCULAR; INTRAVENOUS at 17:15

## 2023-09-27 RX ADMIN — ONDANSETRON 4 MG: 2 INJECTION INTRAMUSCULAR; INTRAVENOUS at 08:23

## 2023-09-27 RX ADMIN — Medication 100 MG: at 08:23

## 2023-09-27 RX ADMIN — CHLORDIAZEPOXIDE HYDROCHLORIDE 5 MG: 5 CAPSULE ORAL at 08:26

## 2023-09-27 RX ADMIN — PHENOBARBITAL 32.4 MG: 32.4 TABLET ORAL at 13:38

## 2023-09-27 RX ADMIN — MIDAZOLAM 1 MG: 1 INJECTION INTRAMUSCULAR; INTRAVENOUS at 21:04

## 2023-09-27 RX ADMIN — KETOROLAC TROMETHAMINE 15 MG: 15 INJECTION, SOLUTION INTRAMUSCULAR; INTRAVENOUS at 05:08

## 2023-09-27 RX ADMIN — PHENOBARBITAL 32.4 MG: 32.4 TABLET ORAL at 17:15

## 2023-09-27 RX ADMIN — MIDAZOLAM 1 MG: 1 INJECTION INTRAMUSCULAR; INTRAVENOUS at 23:39

## 2023-09-27 RX ADMIN — SODIUM CHLORIDE, PRESERVATIVE FREE 10 ML: 5 INJECTION INTRAVENOUS at 21:05

## 2023-09-27 RX ADMIN — PHENOBARBITAL 32.4 MG: 32.4 TABLET ORAL at 21:04

## 2023-09-27 RX ADMIN — MIDAZOLAM 1 MG: 1 INJECTION INTRAMUSCULAR; INTRAVENOUS at 08:22

## 2023-09-27 RX ADMIN — MIDAZOLAM 1 MG: 1 INJECTION INTRAMUSCULAR; INTRAVENOUS at 02:07

## 2023-09-27 RX ADMIN — ONDANSETRON 4 MG: 2 INJECTION INTRAMUSCULAR; INTRAVENOUS at 17:15

## 2023-09-27 RX ADMIN — MIDAZOLAM 1 MG: 1 INJECTION INTRAMUSCULAR; INTRAVENOUS at 05:24

## 2023-09-27 RX ADMIN — MIDAZOLAM 1 MG: 1 INJECTION INTRAMUSCULAR; INTRAVENOUS at 11:39

## 2023-09-27 RX ADMIN — SODIUM CHLORIDE, PRESERVATIVE FREE 10 ML: 5 INJECTION INTRAVENOUS at 05:08

## 2023-09-27 RX ADMIN — ENOXAPARIN SODIUM 40 MG: 100 INJECTION SUBCUTANEOUS at 08:23

## 2023-09-27 RX ADMIN — PHENOBARBITAL 32.4 MG: 32.4 TABLET ORAL at 11:04

## 2023-09-27 RX ADMIN — SODIUM CHLORIDE, PRESERVATIVE FREE 10 ML: 5 INJECTION INTRAVENOUS at 08:24

## 2023-09-27 RX ADMIN — CHLORDIAZEPOXIDE HYDROCHLORIDE 5 MG: 5 CAPSULE ORAL at 00:37

## 2023-09-27 ASSESSMENT — PAIN - FUNCTIONAL ASSESSMENT: PAIN_FUNCTIONAL_ASSESSMENT: ACTIVITIES ARE NOT PREVENTED

## 2023-09-27 ASSESSMENT — PAIN DESCRIPTION - LOCATION: LOCATION: HAND

## 2023-09-27 ASSESSMENT — LIFESTYLE VARIABLES: HOW OFTEN DO YOU HAVE A DRINK CONTAINING ALCOHOL: 4 OR MORE TIMES A WEEK

## 2023-09-27 ASSESSMENT — PAIN DESCRIPTION - DESCRIPTORS: DESCRIPTORS: DISCOMFORT;THROBBING;TENDER

## 2023-09-27 ASSESSMENT — PAIN DESCRIPTION - ORIENTATION: ORIENTATION: RIGHT

## 2023-09-27 ASSESSMENT — PAIN SCALES - GENERAL: PAINLEVEL_OUTOF10: 8

## 2023-09-27 ASSESSMENT — PAIN DESCRIPTION - PAIN TYPE: TYPE: ACUTE PAIN

## 2023-09-27 NOTE — CARE COORDINATION
Peer Recovery Support Note    Name: Daniel Galo  Date: 9/27/2023    Chief Complaint   Patient presents with    Alcohol Problem     Sent from outpt rehab facility pt is withdrawing from etoh last alcohol yesterday facility thinks pt is in danger of going into dt's and wants hin evaluated       Peer Support met with patient. [] Support and education provided  [] Resources provided   [] Treatment referral:   [x] Other:   [] Patient declined peer recovery services     Referred By: Selvin Salmeron)    Notes: Patient was very emotional during our interaction. Lots of sadness to loss of many family members. Patient not agreeable to inpatient for detox due to bad experiences in the past. Support was given.      Signed: Meryle Slater, 9/27/2023

## 2023-09-27 NOTE — PROGRESS NOTES
4 Eyes Skin Assessment     NAME:  Leo Claire  YOB: 1990  MEDICAL RECORD NUMBER:  [id number]    The patient is being assessed for  Admission    I agree that at least one RN has performed a thorough Head to Toe Skin Assessment on the patient. ALL assessment sites listed below have been assessed. Areas assessed by both nurses:    Head, Face, Ears, Shoulders, Back, Chest, Arms, Elbows, Hands, Sacrum. Buttock, Coccyx, Ischium, Legs. Feet and Heels, and Under Medical Devices         Does the Patient have a Wound?  No noted wound(s)       Maximus Prevention initiated by RN: No  Wound Care Orders initiated by RN: No    Pressure Injury (Stage 3,4, Unstageable, DTI, NWPT, and Complex wounds) if present, place Wound referral order by RN under : No    New Ostomies, if present place, Ostomy referral order under : No     Nurse 1 eSignature: Electronically signed by Herminio Leon RN on 9/27/23 at 12:45 AM EDT    **SHARE this note so that the co-signing nurse can place an eSignature**    Nurse 2 eSignature: Electronically signed by Eliel Gary RN on 9/27/23 at 12:52 AM EDT

## 2023-09-27 NOTE — CARE COORDINATION
Ss note: 9/27/2023.1:27 PM Peer Recovery supporter Caitie met with pt today, pt not agreeable to inpt treatment. Plan is home with his brother, pt to follow up outpt with [facility].  PAT Duran

## 2023-09-27 NOTE — H&P
[facility]   [facility]YSICAL EX[facility]      AUTHOR: Anil Erwin MD PATIENT NAME: Whitney Bynum   PCP: On File Not (Inactive)  MRN: [id number], : 1990       CHIEF COMPLAINT / REASON FOR ADMISSION: Alcohol withdrawal  HPI:   This is a 29 y.o. male  has a past medical history of Anxiety, Arm pain, Asthma, Concussion with loss of consciousness, Convulsions ([address]), Depression, Flashing lights, Head injury, Headache, Leg pain, Numbness and tingling, PTSD (post-traumatic stress disorder), and Seizures ([address] presented with Alcohol withdrawal  for last few days prior to arrival to the hospital.  Last drink 2 days back. Now having anxiety, tremors, sweating, shakes. The patient was seen and examined at bedside, appears alert and awake with no acute distress and is able to answer simple  questions. On direct questioning, patient denied any  resting ongoing chest pain, resting SOB, hemoptysis, productive cough, fever, ongoing palpitation, active abdominal pain, hematemesis, rectal bleeding, brielle, hematuria, any other  and GI complaints, and any new focal neuro deficits. ROS:  Pertinent positives and negatives are noted in the HPI, all other systems are reviewed and negative    PMH:  Past Medical History:   Diagnosis Date    Anxiety     Arm pain     Asthma     Concussion with loss of consciousness     Convulsions ([address])     Depression     Flashing lights     Head injury     Headache     Leg pain     Numbness and tingling     arms and hands    PTSD (post-traumatic stress disorder)     Seizures ([address])        Surgical History:  Past Surgical History:   Procedure Laterality Date    COLONOSCOPY      ENDOSCOPY, COLON, DIAGNOSTIC      UPPER GASTROINTESTINAL ENDOSCOPY N/A 2021    EGD BIOPSY performed by Evaristo Jessica MD at [facility]       Medications Prior to Admission:    Prior to Admission medications    Medication Sig Start Date End Date Taking?  Authorizing Provider placed or performed during the hospital encounter of 09/26/23   CBC with Auto Differential   Result Value Ref Range    WBC 18.6 (H) 4.5 - 11.5 k/uL    RBC 5.93 (H) 3.80 - 5.80 m/uL    Hemoglobin 19.0 (H) 12.5 - 16.5 g/dL    Hematocrit 53.9 37.0 - 54.0 %    MCV 90.9 80.0 - 99.9 fL    MCH 32.0 26.0 - 35.0 pg    MCHC 35.3 (H) 32.0 - 34.5 g/dL    RDW 13.4 11.5 - 15.0 %    Platelets 590 645 - 268 k/uL    MPV 11.6 7.0 - 12.0 fL    Neutrophils % 86 (H) 43.0 - 80.0 %    Lymphocytes % 8 (L) 20.0 - 42.0 %    Monocytes % 5 2.0 - 12.0 %    Eosinophils % 0 0 - 6 %    Basophils % 0 0.0 - 2.0 %    Immature Granulocytes 0 0.0 - 5.0 %    Neutrophils Absolute 16.01 (H) 1.80 - 7.30 k/uL    Lymphocytes Absolute 1.50 1.50 - 4.00 k/uL    Monocytes Absolute 0.88 0.10 - 0.95 k/uL    Eosinophils Absolute 0.07 0.05 - 0.50 k/uL    Basophils Absolute 0.08 0.00 - 0.20 k/uL    Absolute Immature Granulocyte 0.07 0.00 - 0.58 k/uL   Lactic Acid   Result Value Ref Range    Lactic Acid 3.2 (H) 0.5 - 2.2 mmol/L   Comprehensive Metabolic Panel   Result Value Ref Range    Sodium 136 132 - 146 mmol/L    Potassium 5.3 (H) 3.5 - 5.0 mmol/L    Chloride 91 (L) 98 - 107 mmol/L    CO2 25 22 - 29 mmol/L    Anion Gap 20 (H) 7 - 16 mmol/L    Glucose 80 74 - 99 mg/dL    BUN 7 6 - 20 mg/dL    Creatinine 0.7 0.70 - 1.20 mg/dL    Est, Glom Filt Rate >60 >60 mL/min/1.73m2    Calcium 10.3 (H) 8.6 - 10.2 mg/dL    Total Protein 8.2 6.4 - 8.3 g/dL    Albumin 5.5 (H) 3.5 - 5.2 g/dL    Total Bilirubin 1.7 (H) 0.0 - 1.2 mg/dL    Alkaline Phosphatase 128 40 - 129 U/L    ALT 20 0 - 40 U/L    AST 38 0 - 39 U/L   Ethanol   Result Value Ref Range    Ethanol <10 <10 mg/dL   Lipase   Result Value Ref Range    Lipase 21 13 - 60 U/L   EKG 12 Lead   Result Value Ref Range    Ventricular Rate 90 BPM    Atrial Rate 90 BPM    P-R Interval 124 ms    QRS Duration 84 ms    Q-T Interval 362 ms    QTc Calculation (Bazett) 442 ms    P Axis 61 degrees    R Axis 63 degrees    T Axis 76 degrees

## 2023-09-27 NOTE — ED NOTES
paged with no answer. Voicemail left to return call.      Franko Leon RN  09/26/23 5935       Franko Leon RN  09/26/23 9991

## 2023-09-27 NOTE — PROGRESS NOTES
Comprehensive Nutrition Assessment    Type and Reason for Visit:  Initial, Positive Nutrition Screen (MST = 3)    Nutrition Recommendations/Plan:   Recommend initiation of standard ONS BID to optimize nutrient intake. Continue current diet      Malnutrition Assessment:  Malnutrition Status: Moderate malnutrition (09/27/23 1050)    Context:  Social/Environmental Circumstances     Findings of the 6 clinical characteristics of malnutrition:  Energy Intake:  50% or less estimated energy requirements for 1 month or longer (Pt notes decreased PO intake. No PO past few days PTA.)  Weight Loss:  Unable to assess (CATY d/t lack of measured weight hx to trend. 137-155# per EMR however stated/estimated weights.)     Body Fat Loss:  Mild body fat loss Orbital, Triceps, Buccal region   Muscle Mass Loss:  Mild muscle mass loss Temples (temporalis)  Fluid Accumulation:  Unable to assess     Strength:  Not Performed    Nutrition Assessment:    Pt admitted with alcohol withdrawal.   PMHx Anxiety, Depression, Seizures, ETOH abuse. Pt with suboptimal oral intake. Meets criteria for moderate malnutrition. Agreeable to ONS w/ meals, will add BID and monitor. Nutrition Related Findings:    A/O x 4; abd soft, flat, active BS; no edema. Wound Type: None       Current Nutrition Intake & Therapies:    Average Meal Intake: Unable to assess  Average Supplements Intake: Unable to assess  ADULT DIET; Regular    Anthropometric Measures:  Height: 5' 6\" (167.6 cm)  Ideal Body Weight (IBW): 142 lbs (65 kg)    Admission Body Weight: 140 lb (63.5 kg) (9/26 stated)  Current Body Weight: 131 lb 6.3 oz (59.6 kg) (9/27),   IBW. Weight Source: Bed Scale  Current BMI (kg/m2): 21.2  Usual Body Weight:  (CATY: lack of recent wt trend. Per EMR, weights x past 18 months estimated/stated.)     Weight Adjustment For: No Adjustment                 BMI Categories: Normal Weight (BMI 18.5-24. 9)    Estimated Daily Nutrient Needs:  Energy Requirements Based On: Kcal/kg  Weight Used for Energy Requirements: Current  Energy (kcal/day): 0678-9294 (28-30 kcal/kg)  Weight Used for Protein Requirements: Current  Protein (g/day): 70-85 (1.2-1.4 gm pro/kg CBW)  Method Used for Fluid Requirements: 1 ml/kcal  Fluid (ml/day): 4077-1371    Nutrition Diagnosis:   Moderate malnutrition, In context of social or environmental circumstances related to inadequate protein-energy intake (2/2 etoh abuse) as evidenced by poor intake prior to admission, Criteria as identified in malnutrition assessment    Nutrition Interventions:   Food and/or Nutrient Delivery: Continue Current Diet, Start Oral Nutrition Supplement (Standard ONS BID to optimize nutrient intake.)  Nutrition Education/Counseling: No recommendation at this time  Coordination of Nutrition Care: Continue to monitor while inpatient       Goals:     Goals: PO intake 75% or greater, by next RD assessment       Nutrition Monitoring and Evaluation:   Behavioral-Environmental Outcomes: None Identified  Food/Nutrient Intake Outcomes: Food and Nutrient Intake, Supplement Intake  Physical Signs/Symptoms Outcomes: Biochemical Data, GI Status, Nausea or Vomiting, Fluid Status or Edema, Nutrition Focused Physical Findings, Skin, Weight    Discharge Planning:     Too soon to determine     [employer] Jagjit, RD  Contact: [phone]

## 2023-09-27 NOTE — CARE COORDINATION
Ss note: 9/27/2023 8:56 AM Pt was seen in ED yesterday by  Angela Loving (see note.) Multiple referrals made for inpt alcohol treatment without success. Peer Recovery Supporter Caitie to meet with pt later this morning to determine if there is any inpt options, PRS is very familiar with this pt and have worked with pt multiple times in the past. Will await Peer Recovery to meet with pt, may not be able to secure inpt placement. Follow up visit to room to inform pt Caitie will meet with him today, pt relays he resides with his brother Ying Nicholson and can go to [facility] as outpt and is aware there may not be inpt options available due to past aggressive behavior.  PAT Strickland

## 2023-09-27 NOTE — ED PROVIDER NOTES
[address] ENCOUNTER        Pt Name: David Melara  MRN: [id number]  [address] 1990  Date of evaluation: 9/26/2023  Provider: Ina Lion DO  PCP: On File Not (Inactive)  Note Started: 8:02 PM EDT 9/26/23    CHIEF COMPLAINT       Chief Complaint   Patient presents with    Alcohol Problem     Sent from outpt rehab facility pt is withdrawing from etoh last alcohol yesterday facility thinks pt is in danger of going into dt's and wants hin evaluated       HISTORY OF PRESENT ILLNESS: 1 or more Elements   History From: Patient     Limitations to history : None    David Melara is a 29 y.o. male with past medical history of alcoholism, alcoholic fatty liver, anxiety disorder and DTs who presents to the emergency department due to concerns for alcohol withdrawal.  Patient states that he usually drinks heavily and stopped drinking last night. Patient states that he is wanting to quit. He is concerned that he is having withdrawal.  Patient states that he has been having vague hallucinations and has been having a lot of tremors. Patient usually drinks 8 to 10 cans of tall can beers a day. He states that he has had withdrawals in the past and has required admission before. Patient was hoping to quit but was concerned that he was going into DTs and wanted to be evaluated in the ED. Patient does endorse chills but no fevers. He is also slightly nauseous as well. No other symptoms otherwise including chest pain, shortness of breath, unilateral weakness, ataxia, aphasia, abdominal pain, flank pain, neck pain, back pain, urinary symptoms, constipation or diarrhea. On initial assessment, patient is well-appearing and in no acute distress. He does have a slight active tremor. Patient states that he has no history of hyponatremia from drinking that he knows of.     Nursing Notes were all reviewed and agreed with or any disposition    This patient has remained hemodynamically stable during their ED course.     --------------------------------- IMPRESSION AND DISPOSITION ---------------------------------    IMPRESSION  1. Alcohol withdrawal syndrome with complication (HCC)    2. Leukocytosis, unspecified type    3. Elevated lactic acid level        DISPOSITION  Disposition: Likely admission, patient signed out to oncoming team.  Please see separate note or ED course as available for any changes in clinical status, treatment plan or final disposition. NOTE: This report was transcribed using voice recognition software.  Every effort was made to ensure accuracy; however, inadvertent computerized transcription errors may be present          DO Daija Allen  09/26/23 2026

## 2023-09-28 VITALS
BODY MASS INDEX: 21.1 KG/M2 | TEMPERATURE: 97.9 F | RESPIRATION RATE: 16 BRPM | OXYGEN SATURATION: 97 % | SYSTOLIC BLOOD PRESSURE: 113 MMHG | WEIGHT: 131.3 LBS | DIASTOLIC BLOOD PRESSURE: 90 MMHG | HEART RATE: 72 BPM | HEIGHT: 66 IN

## 2023-09-28 PROCEDURE — 99239 HOSP IP/OBS DSCHRG MGMT >30: CPT | Performed by: INTERNAL MEDICINE

## 2023-09-28 PROCEDURE — 2580000003 HC RX 258

## 2023-09-28 PROCEDURE — 6370000000 HC RX 637 (ALT 250 FOR IP): Performed by: INTERNAL MEDICINE

## 2023-09-28 PROCEDURE — 6370000000 HC RX 637 (ALT 250 FOR IP)

## 2023-09-28 PROCEDURE — 6360000002 HC RX W HCPCS: Performed by: INTERNAL MEDICINE

## 2023-09-28 PROCEDURE — 2580000003 HC RX 258: Performed by: INTERNAL MEDICINE

## 2023-09-28 RX ADMIN — Medication 100 MG: at 07:55

## 2023-09-28 RX ADMIN — ACETAMINOPHEN 650 MG: 325 TABLET ORAL at 02:01

## 2023-09-28 RX ADMIN — Medication 10 ML: at 08:00

## 2023-09-28 RX ADMIN — MIDAZOLAM 1 MG: 1 INJECTION INTRAMUSCULAR; INTRAVENOUS at 02:02

## 2023-09-28 RX ADMIN — SODIUM CHLORIDE, PRESERVATIVE FREE 10 ML: 5 INJECTION INTRAVENOUS at 07:56

## 2023-09-28 RX ADMIN — ONDANSETRON 4 MG: 2 INJECTION INTRAMUSCULAR; INTRAVENOUS at 07:56

## 2023-09-28 RX ADMIN — PHENOBARBITAL 32.4 MG: 32.4 TABLET ORAL at 03:07

## 2023-09-28 RX ADMIN — MIDAZOLAM 1 MG: 1 INJECTION INTRAMUSCULAR; INTRAVENOUS at 09:06

## 2023-09-28 RX ADMIN — PHENOBARBITAL 32.4 MG: 32.4 TABLET ORAL at 07:55

## 2023-09-28 RX ADMIN — MIDAZOLAM 1 MG: 1 INJECTION INTRAMUSCULAR; INTRAVENOUS at 05:08

## 2023-09-28 ASSESSMENT — PAIN SCALES - GENERAL
PAINLEVEL_OUTOF10: 8
PAINLEVEL_OUTOF10: 6
PAINLEVEL_OUTOF10: 4

## 2023-09-28 ASSESSMENT — PAIN DESCRIPTION - LOCATION
LOCATION: HEAD
LOCATION: HEAD

## 2023-09-28 ASSESSMENT — PAIN DESCRIPTION - DESCRIPTORS
DESCRIPTORS: ACHING
DESCRIPTORS: ACHING;HEAVINESS

## 2023-09-28 NOTE — PROGRESS NOTES
The patient became very agitated, CIWA performed and appropriate medication given. Patient stated he was going to leave against medical advice. Dr. Santana Crowe notified , called to bedside to talk to patient. Patient still wanted to leave against medical advice. Form signed. RN advised patient to seek follow up medical care.

## 2023-09-28 NOTE — PLAN OF CARE
Problem: Pain  Goal: Verbalizes/displays adequate comfort level or baseline comfort level  9/28/2023 1013 by Tony Hare RN  Outcome: Progressing     Problem: Nutrition Deficit:  Goal: Optimize nutritional status  9/28/2023 1013 by Tony Hare RN  Outcome: Progressing

## 2023-09-28 NOTE — DISCHARGE SUMMARY
[facility] Physician Discharge Summary       No follow-up provider specified. Activity level: Slowly increase as tolerated    Diet: No diet orders on file    Labs: None are pending at the discharge    Condition at discharge: Stable    Dispo: Return to home setting ama    Patient ID:  Gissel Robledo  [id number]  29 y.o.  1990    Admit date: 9/26/2023    Discharge date and time:  9/28/2023  2:12 PM    Admission Diagnoses: Principal Problem:    Withdrawal complaint  Resolved Problems:    * No resolved hospital problems. *      Discharge Diagnoses: Principal Problem:    Withdrawal complaint  Resolved Problems:    * No resolved hospital problems. *      Consults:  IP CONSULT TO SOCIAL WORK    Procedures: None significant except if described in hospital course. Hospital Course: Patient presented in alcohol withdrawal wishing to get medications to help him with his withdrawal symptoms he had significant symptoms as evidenced by his CIWA score earlier today however I was called by the nurse because the patient was agitated about getting more phenobarb he was demanding certain medical regimen regimen I immediately came up to speak to the patient at that point he was getting dressed and had continued to threaten [place] since yesterday. He was unpleasant and was not able to listen to any of what I was telling him which includes the following: I empathized with his multiple attempts at going into rehab but more importantly he needs to go to daily 12-step meetings as I discussed with him yesterday. I was willing to adjust his phenobarb taper and even was talking to the pharmacist to ensure that the proper order was placed in the correct manner when he actually left AMA.   Before he left he had explained to the nursing staff that he either is not willing or not allowed to go back to [facility]    Discharge Exam:  Vitals:    09/28/23 7352 09/28/23 9696

## 2023-12-21 ENCOUNTER — HOSPITAL ENCOUNTER (INPATIENT)
Age: 33
LOS: 1 days | Discharge: LEFT BEFORE DISCHARGE | DRG: 282 | End: 2023-12-23
Attending: EMERGENCY MEDICINE | Admitting: FAMILY MEDICINE
Payer: COMMERCIAL

## 2023-12-21 ENCOUNTER — APPOINTMENT (OUTPATIENT)
Dept: CT IMAGING | Age: 33
DRG: 282 | End: 2023-12-21
Payer: COMMERCIAL

## 2023-12-21 ENCOUNTER — APPOINTMENT (OUTPATIENT)
Dept: GENERAL RADIOLOGY | Age: 33
DRG: 282 | End: 2023-12-21
Payer: COMMERCIAL

## 2023-12-21 DIAGNOSIS — E87.20 LACTIC ACID ACIDOSIS: ICD-10-CM

## 2023-12-21 DIAGNOSIS — R10.10 UPPER ABDOMINAL PAIN: ICD-10-CM

## 2023-12-21 DIAGNOSIS — R78.89 ELEVATED BETA-HYDROXYBUTYRATE: ICD-10-CM

## 2023-12-21 DIAGNOSIS — F10.10 ALCOHOL ABUSE: ICD-10-CM

## 2023-12-21 DIAGNOSIS — K85.20 ALCOHOL-INDUCED ACUTE PANCREATITIS, UNSPECIFIED COMPLICATION STATUS: Primary | ICD-10-CM

## 2023-12-21 DIAGNOSIS — F10.939 ALCOHOL WITHDRAWAL SYNDROME WITH COMPLICATION (HCC): ICD-10-CM

## 2023-12-21 DIAGNOSIS — E87.29 HIGH ANION GAP METABOLIC ACIDOSIS: ICD-10-CM

## 2023-12-21 DIAGNOSIS — R65.10 SIRS (SYSTEMIC INFLAMMATORY RESPONSE SYNDROME) (HCC): ICD-10-CM

## 2023-12-21 LAB
ALBUMIN SERPL-MCNC: 4.9 G/DL (ref 3.5–5.2)
ALP SERPL-CCNC: 159 U/L (ref 40–129)
ALT SERPL-CCNC: 47 U/L (ref 0–40)
AMPHET UR QL SCN: NEGATIVE
ANION GAP SERPL CALCULATED.3IONS-SCNC: 31 MMOL/L (ref 7–16)
AST SERPL-CCNC: 123 U/L (ref 0–39)
B-OH-BUTYR SERPL-MCNC: 4.29 MMOL/L (ref 0.02–0.27)
BARBITURATES UR QL SCN: NEGATIVE
BASOPHILS # BLD: 0.03 K/UL (ref 0–0.2)
BASOPHILS NFR BLD: 0 % (ref 0–2)
BENZODIAZ UR QL: POSITIVE
BILIRUB SERPL-MCNC: 0.8 MG/DL (ref 0–1.2)
BILIRUB UR QL STRIP: ABNORMAL
BNP SERPL-MCNC: <36 PG/ML (ref 0–450)
BUN SERPL-MCNC: 8 MG/DL (ref 6–20)
BUPRENORPHINE UR QL: NEGATIVE
CALCIUM SERPL-MCNC: 9.1 MG/DL (ref 8.6–10.2)
CANNABINOIDS UR QL SCN: POSITIVE
CHLORIDE SERPL-SCNC: 87 MMOL/L (ref 98–107)
CLARITY UR: CLEAR
CO2 SERPL-SCNC: 17 MMOL/L (ref 22–29)
COCAINE UR QL SCN: NEGATIVE
COLOR UR: YELLOW
CREAT SERPL-MCNC: 0.7 MG/DL (ref 0.7–1.2)
EOSINOPHIL # BLD: 0.03 K/UL (ref 0.05–0.5)
EOSINOPHILS RELATIVE PERCENT: 0 % (ref 0–6)
EPI CELLS #/AREA URNS HPF: NORMAL /HPF
ERYTHROCYTE [DISTWIDTH] IN BLOOD BY AUTOMATED COUNT: 14.7 % (ref 11.5–15)
FENTANYL UR QL: NEGATIVE
GFR SERPL CREATININE-BSD FRML MDRD: >60 ML/MIN/1.73M2
GLUCOSE SERPL-MCNC: 133 MG/DL (ref 74–99)
GLUCOSE UR STRIP-MCNC: NEGATIVE MG/DL
HCT VFR BLD AUTO: 47.5 % (ref 37–54)
HGB BLD-MCNC: 16.3 G/DL (ref 12.5–16.5)
HGB UR QL STRIP.AUTO: NEGATIVE
IMM GRANULOCYTES # BLD AUTO: <0.03 K/UL (ref 0–0.58)
IMM GRANULOCYTES NFR BLD: 0 % (ref 0–5)
INR PPP: 1
KETONES UR STRIP-MCNC: 40 MG/DL
LACTATE BLDV-SCNC: 3.2 MMOL/L (ref 0.5–1.9)
LACTATE BLDV-SCNC: 8.4 MMOL/L (ref 0.5–2.2)
LEUKOCYTE ESTERASE UR QL STRIP: NEGATIVE
LIPASE SERPL-CCNC: 2691 U/L (ref 13–60)
LYMPHOCYTES NFR BLD: 0.65 K/UL (ref 1.5–4)
LYMPHOCYTES RELATIVE PERCENT: 9 % (ref 20–42)
MAGNESIUM SERPL-MCNC: 1.8 MG/DL (ref 1.6–2.6)
MCH RBC QN AUTO: 32.5 PG (ref 26–35)
MCHC RBC AUTO-ENTMCNC: 34.3 G/DL (ref 32–34.5)
MCV RBC AUTO: 94.8 FL (ref 80–99.9)
METHADONE UR QL: NEGATIVE
MONOCYTES NFR BLD: 0.33 K/UL (ref 0.1–0.95)
MONOCYTES NFR BLD: 5 % (ref 2–12)
NEUTROPHILS NFR BLD: 86 % (ref 43–80)
NEUTS SEG NFR BLD: 6.28 K/UL (ref 1.8–7.3)
NITRITE UR QL STRIP: POSITIVE
OPIATES UR QL SCN: NEGATIVE
OXYCODONE UR QL SCN: NEGATIVE
PARTIAL THROMBOPLASTIN TIME: 26.2 SEC (ref 24.5–35.1)
PCP UR QL SCN: NEGATIVE
PH UR STRIP: 5.5 [PH] (ref 5–9)
PLATELET # BLD AUTO: 190 K/UL (ref 130–450)
PMV BLD AUTO: 11.5 FL (ref 7–12)
POTASSIUM SERPL-SCNC: 4.3 MMOL/L (ref 3.5–5)
PROT SERPL-MCNC: 7.9 G/DL (ref 6.4–8.3)
PROT UR STRIP-MCNC: ABNORMAL MG/DL
PROTHROMBIN TIME: 11.2 SEC (ref 9.3–12.4)
RBC # BLD AUTO: 5.01 M/UL (ref 3.8–5.8)
RBC #/AREA URNS HPF: NORMAL /HPF
SODIUM SERPL-SCNC: 135 MMOL/L (ref 132–146)
SP GR UR STRIP: >1.03 (ref 1–1.03)
TEST INFORMATION: ABNORMAL
TROPONIN I SERPL HS-MCNC: <6 NG/L (ref 0–11)
UROBILINOGEN UR STRIP-ACNC: 0.2 EU/DL (ref 0–1)
WBC #/AREA URNS HPF: NORMAL /HPF
WBC OTHER # BLD: 7.3 K/UL (ref 4.5–11.5)

## 2023-12-21 PROCEDURE — 96365 THER/PROPH/DIAG IV INF INIT: CPT

## 2023-12-21 PROCEDURE — 96376 TX/PRO/DX INJ SAME DRUG ADON: CPT

## 2023-12-21 PROCEDURE — 82010 KETONE BODYS QUAN: CPT

## 2023-12-21 PROCEDURE — 84484 ASSAY OF TROPONIN QUANT: CPT

## 2023-12-21 PROCEDURE — 6360000002 HC RX W HCPCS: Performed by: EMERGENCY MEDICINE

## 2023-12-21 PROCEDURE — 83605 ASSAY OF LACTIC ACID: CPT

## 2023-12-21 PROCEDURE — 74177 CT ABD & PELVIS W/CONTRAST: CPT

## 2023-12-21 PROCEDURE — 2580000003 HC RX 258: Performed by: EMERGENCY MEDICINE

## 2023-12-21 PROCEDURE — 80053 COMPREHEN METABOLIC PANEL: CPT

## 2023-12-21 PROCEDURE — 85730 THROMBOPLASTIN TIME PARTIAL: CPT

## 2023-12-21 PROCEDURE — 80307 DRUG TEST PRSMV CHEM ANLYZR: CPT

## 2023-12-21 PROCEDURE — 93005 ELECTROCARDIOGRAM TRACING: CPT | Performed by: EMERGENCY MEDICINE

## 2023-12-21 PROCEDURE — 83880 ASSAY OF NATRIURETIC PEPTIDE: CPT

## 2023-12-21 PROCEDURE — 96375 TX/PRO/DX INJ NEW DRUG ADDON: CPT

## 2023-12-21 PROCEDURE — 6360000002 HC RX W HCPCS

## 2023-12-21 PROCEDURE — 87040 BLOOD CULTURE FOR BACTERIA: CPT

## 2023-12-21 PROCEDURE — 96361 HYDRATE IV INFUSION ADD-ON: CPT

## 2023-12-21 PROCEDURE — G0480 DRUG TEST DEF 1-7 CLASSES: HCPCS

## 2023-12-21 PROCEDURE — 85610 PROTHROMBIN TIME: CPT

## 2023-12-21 PROCEDURE — 85025 COMPLETE CBC W/AUTO DIFF WBC: CPT

## 2023-12-21 PROCEDURE — 80143 DRUG ASSAY ACETAMINOPHEN: CPT

## 2023-12-21 PROCEDURE — 83690 ASSAY OF LIPASE: CPT

## 2023-12-21 PROCEDURE — 81001 URINALYSIS AUTO W/SCOPE: CPT

## 2023-12-21 PROCEDURE — 71045 X-RAY EXAM CHEST 1 VIEW: CPT

## 2023-12-21 PROCEDURE — 83735 ASSAY OF MAGNESIUM: CPT

## 2023-12-21 PROCEDURE — 96372 THER/PROPH/DIAG INJ SC/IM: CPT

## 2023-12-21 PROCEDURE — 80179 DRUG ASSAY SALICYLATE: CPT

## 2023-12-21 PROCEDURE — 2500000003 HC RX 250 WO HCPCS: Performed by: EMERGENCY MEDICINE

## 2023-12-21 PROCEDURE — 84145 PROCALCITONIN (PCT): CPT

## 2023-12-21 PROCEDURE — 83615 LACTATE (LD) (LDH) ENZYME: CPT

## 2023-12-21 PROCEDURE — 99285 EMERGENCY DEPT VISIT HI MDM: CPT

## 2023-12-21 PROCEDURE — 6360000004 HC RX CONTRAST MEDICATION: Performed by: RADIOLOGY

## 2023-12-21 RX ORDER — METRONIDAZOLE 500 MG/100ML
500 INJECTION, SOLUTION INTRAVENOUS ONCE
Status: COMPLETED | OUTPATIENT
Start: 2023-12-21 | End: 2023-12-21

## 2023-12-21 RX ORDER — THIAMINE HYDROCHLORIDE 100 MG/ML
100 INJECTION, SOLUTION INTRAMUSCULAR; INTRAVENOUS DAILY
Status: DISCONTINUED | OUTPATIENT
Start: 2023-12-21 | End: 2023-12-23 | Stop reason: HOSPADM

## 2023-12-21 RX ORDER — FENTANYL CITRATE 0.05 MG/ML
50 INJECTION, SOLUTION INTRAMUSCULAR; INTRAVENOUS EVERY 30 MIN PRN
Status: COMPLETED | OUTPATIENT
Start: 2023-12-21 | End: 2023-12-22

## 2023-12-21 RX ORDER — ONDANSETRON 2 MG/ML
4 INJECTION INTRAMUSCULAR; INTRAVENOUS ONCE
Status: COMPLETED | OUTPATIENT
Start: 2023-12-21 | End: 2023-12-21

## 2023-12-21 RX ORDER — DIAZEPAM 5 MG/ML
5 INJECTION, SOLUTION INTRAMUSCULAR; INTRAVENOUS ONCE
Status: COMPLETED | OUTPATIENT
Start: 2023-12-21 | End: 2023-12-21

## 2023-12-21 RX ORDER — 0.9 % SODIUM CHLORIDE 0.9 %
1000 INTRAVENOUS SOLUTION INTRAVENOUS ONCE
Status: COMPLETED | OUTPATIENT
Start: 2023-12-21 | End: 2023-12-21

## 2023-12-21 RX ORDER — PHENOBARBITAL SODIUM 65 MG/ML
130 INJECTION INTRAMUSCULAR ONCE
Status: COMPLETED | OUTPATIENT
Start: 2023-12-21 | End: 2023-12-21

## 2023-12-21 RX ORDER — FENTANYL CITRATE 0.05 MG/ML
INJECTION, SOLUTION INTRAMUSCULAR; INTRAVENOUS
Status: COMPLETED
Start: 2023-12-21 | End: 2023-12-21

## 2023-12-21 RX ADMIN — PHENOBARBITAL SODIUM 130 MG: 65 INJECTION INTRAMUSCULAR; INTRAVENOUS at 19:56

## 2023-12-21 RX ADMIN — CEFTRIAXONE SODIUM 2000 MG: 2 INJECTION, POWDER, FOR SOLUTION INTRAMUSCULAR; INTRAVENOUS at 22:32

## 2023-12-21 RX ADMIN — METRONIDAZOLE 500 MG: 500 INJECTION, SOLUTION INTRAVENOUS at 22:32

## 2023-12-21 RX ADMIN — SODIUM CHLORIDE 1000 ML: 9 INJECTION, SOLUTION INTRAVENOUS at 19:56

## 2023-12-21 RX ADMIN — SODIUM CHLORIDE 1000 ML: 9 INJECTION, SOLUTION INTRAVENOUS at 21:28

## 2023-12-21 RX ADMIN — ONDANSETRON 4 MG: 2 INJECTION INTRAMUSCULAR; INTRAVENOUS at 21:32

## 2023-12-21 RX ADMIN — THIAMINE HYDROCHLORIDE: 100 INJECTION, SOLUTION INTRAMUSCULAR; INTRAVENOUS at 19:59

## 2023-12-21 RX ADMIN — FENTANYL CITRATE 50 MCG: 0.05 INJECTION, SOLUTION INTRAMUSCULAR; INTRAVENOUS at 22:14

## 2023-12-21 RX ADMIN — FENTANYL CITRATE 50 MCG: 0.05 INJECTION, SOLUTION INTRAMUSCULAR; INTRAVENOUS at 21:29

## 2023-12-21 RX ADMIN — FENTANYL CITRATE 50 MCG: 0.05 INJECTION, SOLUTION INTRAMUSCULAR; INTRAVENOUS at 23:08

## 2023-12-21 RX ADMIN — THIAMINE HYDROCHLORIDE 100 MG: 100 INJECTION, SOLUTION INTRAMUSCULAR; INTRAVENOUS at 19:56

## 2023-12-21 RX ADMIN — IOPAMIDOL 70 ML: 755 INJECTION, SOLUTION INTRAVENOUS at 22:05

## 2023-12-21 RX ADMIN — DIAZEPAM 5 MG: 5 INJECTION INTRAMUSCULAR; INTRAVENOUS at 19:55

## 2023-12-21 NOTE — ED TRIAGE NOTES
Department of Emergency Medicine  FIRST PROVIDER TRIAGE NOTE             Independent MLP           12/21/23  6:46 PM EST    Date of Encounter: 12/21/23   MRN: [id number]      HPI: Nissa Santillan is a 34 y.o. male who presents to the ED for Abdominal Pain (C/o severe abdominal pain with n/v/d x2 days; c/o fever/chills, concern for alcohol withdrawal )   Has been drinking everclear for 3 weeks straight, last drink yesterday    ROS: Negative for rash or dizziness. PE: Gen Appearance/Constitutional: alert, mod distress d/t pain and vomiting  CV: regular rate  Pulm: CTA bilat  GI: tender to palpation     Initial Plan of Care: All treatment areas with department are currently occupied. Plan to order/Initiate the following while awaiting opening in ED: .   Initiate Treatment-Testing, Proceed toTreatment Area When Bed Available for ED Attending/MLP to Continue Care    Electronically signed by Lasalle Soulier, APRN - CNP   DD: 12/21/23

## 2023-12-22 ENCOUNTER — APPOINTMENT (OUTPATIENT)
Dept: MRI IMAGING | Age: 33
DRG: 282 | End: 2023-12-22
Payer: COMMERCIAL

## 2023-12-22 PROBLEM — K85.90 ACUTE PANCREATITIS WITHOUT INFECTION OR NECROSIS: Status: ACTIVE | Noted: 2023-12-22

## 2023-12-22 PROBLEM — F10.931 ALCOHOL WITHDRAWAL DELIRIUM (HCC): Status: RESOLVED | Noted: 2023-08-10 | Resolved: 2023-12-22

## 2023-12-22 LAB
APAP SERPL-MCNC: <5 UG/ML (ref 10–30)
CHOLEST SERPL-MCNC: 144 MG/DL
ETHANOLAMINE SERPL-MCNC: 260 MG/DL
FERRITIN SERPL-MCNC: 477 NG/ML
FOLATE SERPL-MCNC: 10.5 NG/ML (ref 4.8–24.2)
HDLC SERPL-MCNC: 91 MG/DL
IRON SATN MFR SERPL: ABNORMAL % (ref 20–55)
IRON SERPL-MCNC: 236 UG/DL (ref 59–158)
LACTATE BLDV-SCNC: 2.2 MMOL/L (ref 0.5–1.9)
LACTATE BLDV-SCNC: 2.5 MMOL/L (ref 0.5–1.9)
LDH SERPL-CCNC: 585 U/L (ref 135–225)
LDLC SERPL CALC-MCNC: 36 MG/DL
PROCALCITONIN SERPL-MCNC: 0.07 NG/ML (ref 0–0.08)
SALICYLATES SERPL-MCNC: <0.3 MG/DL (ref 0–30)
TIBC SERPL-MCNC: ABNORMAL UG/DL (ref 250–450)
TOXIC TRICYCLIC SC,BLOOD: NEGATIVE
TRIGL SERPL-MCNC: 85 MG/DL
VIT B12 SERPL-MCNC: 684 PG/ML (ref 211–946)
VLDLC SERPL CALC-MCNC: 17 MG/DL

## 2023-12-22 PROCEDURE — 2580000003 HC RX 258

## 2023-12-22 PROCEDURE — 6370000000 HC RX 637 (ALT 250 FOR IP): Performed by: FAMILY MEDICINE

## 2023-12-22 PROCEDURE — 2060000000 HC ICU INTERMEDIATE R&B

## 2023-12-22 PROCEDURE — 6360000004 HC RX CONTRAST MEDICATION: Performed by: RADIOLOGY

## 2023-12-22 PROCEDURE — 80061 LIPID PANEL: CPT

## 2023-12-22 PROCEDURE — 83550 IRON BINDING TEST: CPT

## 2023-12-22 PROCEDURE — 74183 MRI ABD W/O CNTR FLWD CNTR: CPT

## 2023-12-22 PROCEDURE — 83540 ASSAY OF IRON: CPT

## 2023-12-22 PROCEDURE — 82746 ASSAY OF FOLIC ACID SERUM: CPT

## 2023-12-22 PROCEDURE — 6360000002 HC RX W HCPCS: Performed by: FAMILY MEDICINE

## 2023-12-22 PROCEDURE — A9577 INJ MULTIHANCE: HCPCS | Performed by: RADIOLOGY

## 2023-12-22 PROCEDURE — 99223 1ST HOSP IP/OBS HIGH 75: CPT | Performed by: FAMILY MEDICINE

## 2023-12-22 PROCEDURE — 36415 COLL VENOUS BLD VENIPUNCTURE: CPT

## 2023-12-22 PROCEDURE — 82607 VITAMIN B-12: CPT

## 2023-12-22 PROCEDURE — 82728 ASSAY OF FERRITIN: CPT

## 2023-12-22 PROCEDURE — 2580000003 HC RX 258: Performed by: FAMILY MEDICINE

## 2023-12-22 PROCEDURE — 80074 ACUTE HEPATITIS PANEL: CPT

## 2023-12-22 PROCEDURE — 6360000002 HC RX W HCPCS: Performed by: EMERGENCY MEDICINE

## 2023-12-22 PROCEDURE — 83605 ASSAY OF LACTIC ACID: CPT

## 2023-12-22 RX ORDER — NICOTINE 21 MG/24HR
1 PATCH, TRANSDERMAL 24 HOURS TRANSDERMAL DAILY
Status: DISCONTINUED | OUTPATIENT
Start: 2023-12-22 | End: 2023-12-23 | Stop reason: HOSPADM

## 2023-12-22 RX ORDER — SODIUM CHLORIDE 9 MG/ML
INJECTION, SOLUTION INTRAVENOUS PRN
Status: DISCONTINUED | OUTPATIENT
Start: 2023-12-22 | End: 2023-12-23 | Stop reason: HOSPADM

## 2023-12-22 RX ORDER — SODIUM CHLORIDE, SODIUM LACTATE, POTASSIUM CHLORIDE, CALCIUM CHLORIDE 600; 310; 30; 20 MG/100ML; MG/100ML; MG/100ML; MG/100ML
INJECTION, SOLUTION INTRAVENOUS CONTINUOUS
Status: DISCONTINUED | OUTPATIENT
Start: 2023-12-22 | End: 2023-12-23 | Stop reason: HOSPADM

## 2023-12-22 RX ORDER — PHENOBARBITAL SODIUM 65 MG/ML
16.2 INJECTION INTRAMUSCULAR 2 TIMES DAILY
Status: DISCONTINUED | OUTPATIENT
Start: 2023-12-24 | End: 2023-12-23 | Stop reason: HOSPADM

## 2023-12-22 RX ORDER — PROCHLORPERAZINE EDISYLATE 5 MG/ML
10 INJECTION INTRAMUSCULAR; INTRAVENOUS EVERY 6 HOURS PRN
Status: DISCONTINUED | OUTPATIENT
Start: 2023-12-22 | End: 2023-12-23 | Stop reason: HOSPADM

## 2023-12-22 RX ORDER — SODIUM CHLORIDE, SODIUM LACTATE, POTASSIUM CHLORIDE, CALCIUM CHLORIDE 600; 310; 30; 20 MG/100ML; MG/100ML; MG/100ML; MG/100ML
INJECTION, SOLUTION INTRAVENOUS
Status: COMPLETED
Start: 2023-12-22 | End: 2023-12-22

## 2023-12-22 RX ORDER — ENOXAPARIN SODIUM 100 MG/ML
40 INJECTION SUBCUTANEOUS DAILY
Status: DISCONTINUED | OUTPATIENT
Start: 2023-12-22 | End: 2023-12-23 | Stop reason: HOSPADM

## 2023-12-22 RX ORDER — PHENOBARBITAL SODIUM 65 MG/ML
16.2 INJECTION INTRAMUSCULAR EVERY 6 HOURS PRN
Status: DISCONTINUED | OUTPATIENT
Start: 2023-12-24 | End: 2023-12-23 | Stop reason: HOSPADM

## 2023-12-22 RX ORDER — PHENOBARBITAL SODIUM 65 MG/ML
32.5 INJECTION INTRAMUSCULAR 2 TIMES DAILY
Status: DISCONTINUED | OUTPATIENT
Start: 2023-12-23 | End: 2023-12-23 | Stop reason: HOSPADM

## 2023-12-22 RX ORDER — SODIUM CHLORIDE 0.9 % (FLUSH) 0.9 %
5-40 SYRINGE (ML) INJECTION EVERY 12 HOURS SCHEDULED
Status: DISCONTINUED | OUTPATIENT
Start: 2023-12-22 | End: 2023-12-23 | Stop reason: HOSPADM

## 2023-12-22 RX ORDER — PHENOBARBITAL SODIUM 65 MG/ML
32.5 INJECTION INTRAMUSCULAR 4 TIMES DAILY
Status: COMPLETED | OUTPATIENT
Start: 2023-12-22 | End: 2023-12-22

## 2023-12-22 RX ORDER — SODIUM CHLORIDE 0.9 % (FLUSH) 0.9 %
5-40 SYRINGE (ML) INJECTION PRN
Status: DISCONTINUED | OUTPATIENT
Start: 2023-12-22 | End: 2023-12-23 | Stop reason: HOSPADM

## 2023-12-22 RX ORDER — SODIUM CHLORIDE, SODIUM LACTATE, POTASSIUM CHLORIDE, CALCIUM CHLORIDE 600; 310; 30; 20 MG/100ML; MG/100ML; MG/100ML; MG/100ML
INJECTION, SOLUTION INTRAVENOUS CONTINUOUS
Status: ACTIVE | OUTPATIENT
Start: 2023-12-22 | End: 2023-12-22

## 2023-12-22 RX ORDER — PHENOBARBITAL SODIUM 65 MG/ML
32.5 INJECTION INTRAMUSCULAR EVERY 6 HOURS PRN
Status: DISCONTINUED | OUTPATIENT
Start: 2023-12-22 | End: 2023-12-23 | Stop reason: HOSPADM

## 2023-12-22 RX ORDER — MORPHINE SULFATE 4 MG/ML
4 INJECTION, SOLUTION INTRAMUSCULAR; INTRAVENOUS
Status: DISCONTINUED | OUTPATIENT
Start: 2023-12-22 | End: 2023-12-23 | Stop reason: HOSPADM

## 2023-12-22 RX ORDER — MORPHINE SULFATE 2 MG/ML
2 INJECTION, SOLUTION INTRAMUSCULAR; INTRAVENOUS
Status: DISCONTINUED | OUTPATIENT
Start: 2023-12-22 | End: 2023-12-23 | Stop reason: HOSPADM

## 2023-12-22 RX ORDER — SODIUM CHLORIDE, SODIUM LACTATE, POTASSIUM CHLORIDE, AND CALCIUM CHLORIDE .6; .31; .03; .02 G/100ML; G/100ML; G/100ML; G/100ML
2000 INJECTION, SOLUTION INTRAVENOUS ONCE
Status: COMPLETED | OUTPATIENT
Start: 2023-12-22 | End: 2023-12-22

## 2023-12-22 RX ADMIN — PROCHLORPERAZINE EDISYLATE 10 MG: 5 INJECTION INTRAMUSCULAR; INTRAVENOUS at 01:06

## 2023-12-22 RX ADMIN — MORPHINE SULFATE 4 MG: 4 INJECTION, SOLUTION INTRAMUSCULAR; INTRAVENOUS at 03:04

## 2023-12-22 RX ADMIN — PROCHLORPERAZINE EDISYLATE 10 MG: 5 INJECTION INTRAMUSCULAR; INTRAVENOUS at 09:05

## 2023-12-22 RX ADMIN — SODIUM CHLORIDE, POTASSIUM CHLORIDE, SODIUM LACTATE AND CALCIUM CHLORIDE 2000 ML: 600; 310; 30; 20 INJECTION, SOLUTION INTRAVENOUS at 01:05

## 2023-12-22 RX ADMIN — SODIUM CHLORIDE, PRESERVATIVE FREE 10 ML: 5 INJECTION INTRAVENOUS at 20:27

## 2023-12-22 RX ADMIN — MORPHINE SULFATE 4 MG: 4 INJECTION, SOLUTION INTRAMUSCULAR; INTRAVENOUS at 01:06

## 2023-12-22 RX ADMIN — THIAMINE HYDROCHLORIDE 100 MG: 100 INJECTION, SOLUTION INTRAMUSCULAR; INTRAVENOUS at 08:16

## 2023-12-22 RX ADMIN — MORPHINE SULFATE 4 MG: 4 INJECTION, SOLUTION INTRAMUSCULAR; INTRAVENOUS at 18:38

## 2023-12-22 RX ADMIN — SODIUM CHLORIDE, SODIUM LACTATE, POTASSIUM CHLORIDE, AND CALCIUM CHLORIDE 2000 ML: .6; .31; .03; .02 INJECTION, SOLUTION INTRAVENOUS at 01:05

## 2023-12-22 RX ADMIN — GADOBENATE DIMEGLUMINE 10 ML: 529 INJECTION, SOLUTION INTRAVENOUS at 11:33

## 2023-12-22 RX ADMIN — PHENOBARBITAL SODIUM 32.5 MG: 65 INJECTION INTRAMUSCULAR; INTRAVENOUS at 08:17

## 2023-12-22 RX ADMIN — SODIUM CHLORIDE, POTASSIUM CHLORIDE, SODIUM LACTATE AND CALCIUM CHLORIDE: 600; 310; 30; 20 INJECTION, SOLUTION INTRAVENOUS at 03:17

## 2023-12-22 RX ADMIN — PHENOBARBITAL SODIUM 32.5 MG: 65 INJECTION INTRAMUSCULAR; INTRAVENOUS at 13:13

## 2023-12-22 RX ADMIN — MORPHINE SULFATE 4 MG: 4 INJECTION, SOLUTION INTRAMUSCULAR; INTRAVENOUS at 06:16

## 2023-12-22 RX ADMIN — SODIUM CHLORIDE, POTASSIUM CHLORIDE, SODIUM LACTATE AND CALCIUM CHLORIDE: 600; 310; 30; 20 INJECTION, SOLUTION INTRAVENOUS at 13:14

## 2023-12-22 RX ADMIN — MORPHINE SULFATE 4 MG: 4 INJECTION, SOLUTION INTRAMUSCULAR; INTRAVENOUS at 14:24

## 2023-12-22 RX ADMIN — SODIUM CHLORIDE, PRESERVATIVE FREE 10 ML: 5 INJECTION INTRAVENOUS at 08:18

## 2023-12-22 RX ADMIN — FENTANYL CITRATE 50 MCG: 0.05 INJECTION, SOLUTION INTRAMUSCULAR; INTRAVENOUS at 00:10

## 2023-12-22 RX ADMIN — PHENOBARBITAL SODIUM 32.5 MG: 65 INJECTION INTRAMUSCULAR; INTRAVENOUS at 20:23

## 2023-12-22 RX ADMIN — ENOXAPARIN SODIUM 40 MG: 100 INJECTION SUBCUTANEOUS at 08:18

## 2023-12-22 RX ADMIN — PROCHLORPERAZINE EDISYLATE 10 MG: 5 INJECTION INTRAMUSCULAR; INTRAVENOUS at 17:13

## 2023-12-22 RX ADMIN — PHENOBARBITAL SODIUM 32.5 MG: 65 INJECTION INTRAMUSCULAR; INTRAVENOUS at 17:13

## 2023-12-22 NOTE — PROGRESS NOTES
4 Eyes Skin Assessment     NAME:  Olinda Hale  YOB: 1990  MEDICAL RECORD NUMBER:  [id number]    The patient is being assessed for  Admission    I agree that at least one RN has performed a thorough Head to Toe Skin Assessment on the patient. ALL assessment sites listed below have been assessed. Areas assessed by both nurses:    Head, Face, Ears, Shoulders, Back, Chest, Arms, Elbows, Hands, Sacrum. Buttock, Coccyx, Ischium, and Legs. Feet and Heels        Does the Patient have a Wound? No noted wound(s)       Maximus Prevention initiated by RN: No4 Eyes Skin Assessment     NAME:  Ziggy Clarity OF BIRTH:  1990  MEDICAL RECORD NUMBER:  [id number]    The patient is being assessed for  Admission    I agree that at least one RN has performed a thorough Head to Toe Skin Assessment on the patient. ALL assessment sites listed below have been assessed. Areas assessed by both nurses:    Head, Face, Ears, Shoulders, Back, Chest, Arms, Elbows, Hands, Sacrum. Buttock, Coccyx, Ischium, Legs. Feet and Heels, and Under Medical Devices         Does the Patient have a Wound?  No noted wound(s)       Maximus Prevention initiated by RN: No  Wound Care Orders initiated by RN: No    Pressure Injury (Stage 3,4, Unstageable, DTI, NWPT, and Complex wounds) if present, place Wound referral order by RN under : No    New Ostomies, if present place, Ostomy referral order under : No     Nurse 1 eSignature: Electronically signed by Eusebio Gilman RN on 12/22/23 at 2:40 AM EST    **SHARE this note so that the co-signing nurse can place an eSignature**    Nurse 2 eSignature: Electronically signed by Jory Tolbert RN on 12/22/23 at 2:58 AM EST  Wound Care Orders initiated by RN:

## 2023-12-22 NOTE — PROGRESS NOTES
Pt seen and examined by night physician who admitted pt earlier today.     Chart reviewed and discussed with night physician    Labs ordered

## 2023-12-22 NOTE — CARE COORDINATION
Peer Recovery Support Note    Name: Luane Schirmer  Date: 12/22/2023    Chief Complaint   Patient presents with    Abdominal Pain     C/o severe abdominal pain with n/v/d x2 days; c/o fever/chills, concern for alcohol withdrawal, hx pancreatitis, hx alcohol withdrawal with seizures; states he feels like he might pass out        Peer Support met with patient. [] Support and education provided  [x] Resources provided   [] Treatment referral:   [x] Other:   [] Patient declined peer recovery services     Referred By: Stephanie Blue (SAM)    Notes: Met with patient who was not feeling well physically. Patient was not interested in and inpatient or outpatient treatment options at this time. Patient states that he needs time to think about it, and has to know what's going on medically before he makes any decisions. Patient did not seem very receptive to suggestions, however, Peer did leave resources with facilities and services listed, as well as Peer contact information. [facility] updated.     Signed: Kyra Ayala, 12/22/2023

## 2023-12-22 NOTE — CARE COORDINATION
Case Management Assessment  Initial Evaluation    Date/Time of Evaluation: 12/22/2023 10:49 AM  Assessment Completed by: PAT Azevedo    If patient is discharged prior to next notation, then this note serves as note for discharge by case management. Patient Name: Jamaal Mckeon                   YOB: 1990  Diagnosis: Alcohol abuse [F10.10]  Lactic acid acidosis [E87.20]  SIRS (systemic inflammatory response syndrome) (HCC) [R65.10]  Upper abdominal pain [R10.10]  High anion gap metabolic acidosis [S98.22]  Alcohol withdrawal syndrome with complication (HCC) [C73.924]  Elevated beta-hydroxybutyrate [R78.89]  Acute pancreatitis without infection or necrosis [K85.90]  Alcohol-induced acute pancreatitis, unspecified complication status [R12.31]                   Date / Time: 12/21/2023  6:48 PM    Patient Admission Status: Inpatient   Readmission Risk (Low < 19, Mod (19-27), High > 27): Readmission Risk Score: 14    Current PCP: Not, On File (Inactive)  PCP verified by CM? No    Chart Reviewed: Yes      History Provided by: Patient  Patient Orientation: Alert and Oriented, Person, Place, Situation, Self    Patient Cognition: Alert    Hospitalization in the last 30 days (Readmission):  No    If yes, Readmission Assessment in CM Navigator will be completed. Advance Directives:      Code Status: Full Code   Patient's Primary Decision Maker is: Legal Next of Kin      Discharge Planning:    Patient lives with: Alone Type of Home: House  Primary Care Giver: Self  Patient Support Systems include: Family Members   Current Financial resources: Medicaid  Current community resources: [facility]  Current services prior to admission: None            Current DME:              Type of Home Care services:  None    ADLS  Prior functional level: Independent in ADLs/IADLs  Current functional level:  Independent in ADLs/IADLs    PT AM-PAC:   /24  OT AM-PAC:   /24    Family can provide

## 2023-12-22 NOTE — H&P
[facility]   History and Physical      CHIEF COMPLAINT:  abdominal pain    History of Present Illness:  34 y.o. male with a history of ETOH, pancreatitis, seizures, asthma, depression, anxiety presents with 2 days of upper abdominal pain. Reports he drinks 750 mL of moonshine daily, last drink was . Has been drinking for 15 years, but since his mother  last year and his girlfriend split up with him 6 months ago he has been drinking more. States he has lost 40# in the past 6 months. His parents both  of alcohol related complications. Workup in ED significant for CO2 17, anion gap 31, lactic acid 8.4, magnesium 1.8, glucose 133, , alk phos 159, ALT 47, , lipase 2691, beta hydroxybutyrate 4.29, ethanol 260. UDS positive for cannabinoid and benzodiazepine. UA nitrite positive. CT abd/pelvis showed interstitial pancreatitis and cystitis changes. Given banana bag, Rocephin, valium, Flagyl, zofran, 2L bolus and phenobarb in ED. Informant(s) for H&P: patient, chart    REVIEW OF SYSTEMS:  no fevers, chills, cp, sob, n/v, ha, vision/hearing changes, wt changes, hot/cold flashes, other open skin lesions, diarrhea, constipation, dysuria/hematuria unless noted in HPI. Complete ROS performed with the patient and is otherwise negative.       PMH:  Past Medical History:   Diagnosis Date    Anxiety     Arm pain     Asthma     Concussion with loss of consciousness     Convulsions (HCC)     Depression     Flashing lights     Head injury     Headache     Leg pain     Numbness and tingling     arms and hands    PTSD (post-traumatic stress disorder)     Seizures ([address])        Surgical History:  Past Surgical History:   Procedure Laterality Date    COLONOSCOPY      ENDOSCOPY, COLON, DIAGNOSTIC      UPPER GASTROINTESTINAL ENDOSCOPY N/A 2021    EGD BIOPSY performed by Jamal Martinez MD at [facility]       Medications Prior to Admission:    Prior to

## 2023-12-22 NOTE — ED PROVIDER NOTES
[address]        Pt Name: Bob Perez  MRN: [id number]  [address] [facility] 1990  Date of evaluation: 12/21/2023  Provider: Mela Lea DO  PCP: Not, On File (Inactive)  Note Started: 7:10 PM EST 12/21/23    CHIEF COMPLAINT       Chief Complaint   Patient presents with    Abdominal Pain     C/o severe abdominal pain with n/v/d x2 days; c/o fever/chills, concern for alcohol withdrawal, hx pancreatitis, hx alcohol withdrawal with seizures; states he feels like he might pass out        HISTORY OF PRESENT ILLNESS: 1 or more Elements   History From: Patient, EMR    Limitations to history : Acuity of illness    Bob Perez is a 34 y.o. male who presents severe upper abdominal pain nausea vomiting. History of alcohol abuse, states he had been drinking at least a pint of Everclear daily last drink 2 to 3 days ago. He states after this he developed severe epigastric abdominal pain has not been able to drink alcohol. States innumerable episodes of vomiting, he is unsure but thinks he saw some specks of blood in it. Denies chest pain shortness of breath, states history of alcohol withdrawal seizures, on direct questioning specifically denies suicidal homicidal ideation    Nursing Notes were all reviewed and agreed with or any disagreements were addressed in the HPI. REVIEW OF EXTERNAL NOTE :       Internal medicine note from 9/20/2023, was admitted 9/26/2023, was admitted for alcohol withdrawal,  was consulted. He left AGAINST MEDICAL ADVICE. REVIEW OF SYSTEMS :           Positives and Pertinent negatives as per HPI.      SURGICAL HISTORY     Past Surgical History:   Procedure Laterality Date    COLONOSCOPY      ENDOSCOPY, COLON, DIAGNOSTIC      UPPER GASTROINTESTINAL ENDOSCOPY N/A 7/22/2021    EGD BIOPSY performed by Christelle Felix MD at [address] [place] [address]       Previous hospitalization. CONSULTS: (Who and What was discussed)     Spoke with Critical care, labs reviewed, withdrawal symptoms controlled agree with volume resuscitation but recommend [facility] at this time rather than ICU. They can be reconsulted for ICU consideration if patient deteriorates  Spoke with Dr. Abril Glover (Medicine). Discussed case. They will admit this patient. I am the Primary Clinician of Record. FINAL IMPRESSION      1. Alcohol-induced acute pancreatitis, unspecified complication status    2. Upper abdominal pain    3. Alcohol abuse    4. Alcohol withdrawal syndrome with complication ([address])    5. High anion gap metabolic acidosis    6. Lactic acid acidosis    7. Elevated beta-hydroxybutyrate    8.  SIRS (systemic inflammatory response syndrome) [facility])          DISPOSITION/PLAN     DISPOSITION Decision To Admit 12/21/2023 10:38:08 PM           (Please note that portions of this note were completed with a voice recognition program.  Efforts were made to edit the dictations but occasionally words are mis-transcribed.)    Estrella Mera DO (electronically signed)           Estrella Mera DO  12/22/23 0036

## 2023-12-22 NOTE — CONSULTS
worse and become chronic with terrible pain and malaborption of nutrients without good treatments to help. No smoking as well as this contributes to pancreatitis. Triglycerides 85. IVF and symptomatic treatment at this time. Our service will follow. Watch for withdraws and replace thiamine. See orders.     Debbie Merrill D.O.  12/22/23

## 2023-12-23 VITALS
DIASTOLIC BLOOD PRESSURE: 88 MMHG | RESPIRATION RATE: 20 BRPM | HEIGHT: 66 IN | TEMPERATURE: 98.6 F | HEART RATE: 74 BPM | OXYGEN SATURATION: 95 % | WEIGHT: 125 LBS | BODY MASS INDEX: 20.09 KG/M2 | SYSTOLIC BLOOD PRESSURE: 99 MMHG

## 2023-12-23 PROBLEM — E83.42 HYPOMAGNESEMIA: Status: ACTIVE | Noted: 2023-12-23

## 2023-12-23 PROBLEM — E86.0 DEHYDRATION: Status: ACTIVE | Noted: 2023-12-23

## 2023-12-23 PROBLEM — K85.20 ALCOHOL-INDUCED ACUTE PANCREATITIS: Status: ACTIVE | Noted: 2023-12-23

## 2023-12-23 PROBLEM — E87.6 HYPOKALEMIA: Status: ACTIVE | Noted: 2023-12-23

## 2023-12-23 LAB
ALBUMIN SERPL-MCNC: 3.5 G/DL (ref 3.5–5.2)
ALP SERPL-CCNC: 98 U/L (ref 40–129)
ALT SERPL-CCNC: 28 U/L (ref 0–40)
ANION GAP SERPL CALCULATED.3IONS-SCNC: 11 MMOL/L (ref 7–16)
AST SERPL-CCNC: 56 U/L (ref 0–39)
BASOPHILS # BLD: 0 K/UL (ref 0–0.2)
BASOPHILS NFR BLD: 0 % (ref 0–2)
BILIRUB SERPL-MCNC: 1.2 MG/DL (ref 0–1.2)
BUN SERPL-MCNC: <1 MG/DL (ref 6–20)
CALCIUM SERPL-MCNC: 8.1 MG/DL (ref 8.6–10.2)
CHLORIDE SERPL-SCNC: 91 MMOL/L (ref 98–107)
CO2 SERPL-SCNC: 34 MMOL/L (ref 22–29)
CREAT SERPL-MCNC: 0.4 MG/DL (ref 0.7–1.2)
EKG ATRIAL RATE: 97 BPM
EKG P AXIS: 65 DEGREES
EKG P-R INTERVAL: 130 MS
EKG Q-T INTERVAL: 392 MS
EKG QRS DURATION: 98 MS
EKG QTC CALCULATION (BAZETT): 497 MS
EKG R AXIS: 43 DEGREES
EKG T AXIS: 68 DEGREES
EKG VENTRICULAR RATE: 97 BPM
EOSINOPHIL # BLD: 0 K/UL (ref 0.05–0.5)
EOSINOPHILS RELATIVE PERCENT: 0 % (ref 0–6)
ERYTHROCYTE [DISTWIDTH] IN BLOOD BY AUTOMATED COUNT: 13.9 % (ref 11.5–15)
GFR SERPL CREATININE-BSD FRML MDRD: >60 ML/MIN/1.73M2
GLUCOSE SERPL-MCNC: 91 MG/DL (ref 74–99)
HCT VFR BLD AUTO: 37 % (ref 37–54)
HGB BLD-MCNC: 12.9 G/DL (ref 12.5–16.5)
LACTATE BLDV-SCNC: 0.8 MMOL/L (ref 0.5–2.2)
LIPASE SERPL-CCNC: 289 U/L (ref 13–60)
LYMPHOCYTES NFR BLD: 0.67 K/UL (ref 1.5–4)
LYMPHOCYTES RELATIVE PERCENT: 7 % (ref 20–42)
MAGNESIUM SERPL-MCNC: 1.2 MG/DL (ref 1.6–2.6)
MCH RBC QN AUTO: 32.5 PG (ref 26–35)
MCHC RBC AUTO-ENTMCNC: 34.9 G/DL (ref 32–34.5)
MCV RBC AUTO: 93.2 FL (ref 80–99.9)
MONOCYTES NFR BLD: 0.1 K/UL (ref 0.1–0.95)
MONOCYTES NFR BLD: 1 % (ref 2–12)
NEUTROPHILS NFR BLD: 92 % (ref 43–80)
NEUTS SEG NFR BLD: 8.83 K/UL (ref 1.8–7.3)
PHOSPHATE SERPL-MCNC: 1.5 MG/DL (ref 2.5–4.5)
PLATELET CONFIRMATION: NORMAL
PLATELET, FLUORESCENCE: 99 K/UL (ref 130–450)
PMV BLD AUTO: 11.7 FL (ref 7–12)
POTASSIUM SERPL-SCNC: 3.1 MMOL/L (ref 3.5–5)
PROT SERPL-MCNC: 5.5 G/DL (ref 6.4–8.3)
RBC # BLD AUTO: 3.97 M/UL (ref 3.8–5.8)
SODIUM SERPL-SCNC: 136 MMOL/L (ref 132–146)
TRIGL SERPL-MCNC: 60 MG/DL
WBC OTHER # BLD: 9.6 K/UL (ref 4.5–11.5)

## 2023-12-23 PROCEDURE — 83605 ASSAY OF LACTIC ACID: CPT

## 2023-12-23 PROCEDURE — 80053 COMPREHEN METABOLIC PANEL: CPT

## 2023-12-23 PROCEDURE — 6370000000 HC RX 637 (ALT 250 FOR IP): Performed by: FAMILY MEDICINE

## 2023-12-23 PROCEDURE — 6360000002 HC RX W HCPCS: Performed by: INTERNAL MEDICINE

## 2023-12-23 PROCEDURE — 2580000003 HC RX 258: Performed by: INTERNAL MEDICINE

## 2023-12-23 PROCEDURE — 84100 ASSAY OF PHOSPHORUS: CPT

## 2023-12-23 PROCEDURE — 99232 SBSQ HOSP IP/OBS MODERATE 35: CPT | Performed by: INTERNAL MEDICINE

## 2023-12-23 PROCEDURE — 6360000002 HC RX W HCPCS: Performed by: EMERGENCY MEDICINE

## 2023-12-23 PROCEDURE — 84478 ASSAY OF TRIGLYCERIDES: CPT

## 2023-12-23 PROCEDURE — 85025 COMPLETE CBC W/AUTO DIFF WBC: CPT

## 2023-12-23 PROCEDURE — 6360000002 HC RX W HCPCS: Performed by: FAMILY MEDICINE

## 2023-12-23 PROCEDURE — 36415 COLL VENOUS BLD VENIPUNCTURE: CPT

## 2023-12-23 PROCEDURE — 2500000003 HC RX 250 WO HCPCS: Performed by: INTERNAL MEDICINE

## 2023-12-23 PROCEDURE — 93010 ELECTROCARDIOGRAM REPORT: CPT | Performed by: INTERNAL MEDICINE

## 2023-12-23 PROCEDURE — 83735 ASSAY OF MAGNESIUM: CPT

## 2023-12-23 PROCEDURE — 83690 ASSAY OF LIPASE: CPT

## 2023-12-23 RX ORDER — MAGNESIUM SULFATE IN WATER 40 MG/ML
4000 INJECTION, SOLUTION INTRAVENOUS ONCE
Status: COMPLETED | OUTPATIENT
Start: 2023-12-23 | End: 2023-12-23

## 2023-12-23 RX ADMIN — PHENOBARBITAL SODIUM 32.5 MG: 65 INJECTION INTRAMUSCULAR; INTRAVENOUS at 05:59

## 2023-12-23 RX ADMIN — SODIUM PHOSPHATE, MONOBASIC, MONOHYDRATE AND SODIUM PHOSPHATE, DIBASIC, ANHYDROUS 15 MMOL: 142; 276 INJECTION, SOLUTION INTRAVENOUS at 12:05

## 2023-12-23 RX ADMIN — MAGNESIUM SULFATE HEPTAHYDRATE 4000 MG: 4 INJECTION, SOLUTION INTRAVENOUS at 09:19

## 2023-12-23 RX ADMIN — MORPHINE SULFATE 2 MG: 2 INJECTION, SOLUTION INTRAMUSCULAR; INTRAVENOUS at 12:12

## 2023-12-23 RX ADMIN — PHENOBARBITAL SODIUM 32.5 MG: 65 INJECTION INTRAMUSCULAR; INTRAVENOUS at 09:03

## 2023-12-23 RX ADMIN — PHENOBARBITAL SODIUM 32.5 MG: 65 INJECTION INTRAMUSCULAR; INTRAVENOUS at 15:10

## 2023-12-23 RX ADMIN — MORPHINE SULFATE 4 MG: 4 INJECTION, SOLUTION INTRAMUSCULAR; INTRAVENOUS at 02:16

## 2023-12-23 RX ADMIN — THIAMINE HYDROCHLORIDE 100 MG: 100 INJECTION, SOLUTION INTRAMUSCULAR; INTRAVENOUS at 09:04

## 2023-12-23 RX ADMIN — ENOXAPARIN SODIUM 40 MG: 100 INJECTION SUBCUTANEOUS at 09:03

## 2023-12-24 NOTE — DISCHARGE SUMMARY
[facility] EMERGENCY SERVICE Physician Discharge Summary       No follow-up provider specified. Activity level: pt left [facility]a    Diet: No diet orders on file    Dispo:pt left [facility]    Condition at discharge: guarded        Patient ID:  Devonte Roberts  [id number]  34 y.o.  1990    Admit date: 12/21/2023    Discharge date and time:  12/24/2023  7:16 AM    Admission Diagnoses: Principal Problem:    Acute pancreatitis without infection or necrosis  Active Problems:    Alcoholism ([address])    Major depressive disorder, severe ([address])    Anxiety disorder    Nonintractable epilepsy with complex partial seizures ([address])    Dehydration    Hypomagnesemia    Hypokalemia    Alcohol-induced acute pancreatitis  Resolved Problems:    * No resolved hospital problems. *      Discharge Diagnoses: Principal Problem:    Acute pancreatitis without infection or necrosis  Active Problems:    Alcoholism ([address])    Major depressive disorder, severe (HCC)    Anxiety disorder    Nonintractable epilepsy with complex partial seizures (HCC)    Dehydration    Hypomagnesemia    Hypokalemia    Alcohol-induced acute pancreatitis  Resolved Problems:    * No resolved hospital problems. *    SIRS  Pancreatitis, likely due to alcohol  Dehydration  Hypokalemia  Hypomagnesemia  Hypophosphatemia  Alcohol abuse with possible withdrawal        Consults:  IP CONSULT TO SOCIAL WORK  IP CONSULT TO GI  IP CONSULT TO SOCIAL WORK    Procedures: none    Hospital Course: Patient was admitted with Alcohol abuse [F10.10]  Lactic acid acidosis [E87.20]  SIRS (systemic inflammatory response syndrome) (HCC) [R65.10]  Upper abdominal pain [R10.10]  High anion gap metabolic acidosis [T79.56]  Alcohol withdrawal syndrome with complication (HCC) [G13.124]  Elevated beta-hydroxybutyrate [R78.89]  Acute pancreatitis without infection or necrosis [K85.90]  Alcohol-induced acute pancreatitis, unspecified complication status [K48.68]. Patient is a 34 y.o.

## 2023-12-26 LAB
HAV IGM SERPL QL IA: NONREACTIVE
HBV CORE IGM SERPL QL IA: NONREACTIVE
HBV SURFACE AG SERPL QL IA: NONREACTIVE
HCV AB SERPL QL IA: NONREACTIVE

## 2023-12-27 LAB
MICROORGANISM SPEC CULT: NORMAL
MICROORGANISM SPEC CULT: NORMAL
SERVICE CMNT-IMP: NORMAL
SERVICE CMNT-IMP: NORMAL
SPECIMEN DESCRIPTION: NORMAL
SPECIMEN DESCRIPTION: NORMAL

## 2023-12-29 ENCOUNTER — HOSPITAL ENCOUNTER (EMERGENCY)
Age: 33
Discharge: HOME | End: 2023-12-29
Attending: EMERGENCY MEDICINE
Payer: COMMERCIAL

## 2023-12-29 VITALS
SYSTOLIC BLOOD PRESSURE: 148 MMHG | TEMPERATURE: 98.1 F | HEART RATE: 70 BPM | DIASTOLIC BLOOD PRESSURE: 100 MMHG | OXYGEN SATURATION: 100 % | RESPIRATION RATE: 16 BRPM

## 2023-12-29 DIAGNOSIS — S70.11XA CONTUSION OF RIGHT THIGH, INITIAL ENCOUNTER: ICD-10-CM

## 2023-12-29 DIAGNOSIS — N48.9 PENIS DISORDER: Primary | ICD-10-CM

## 2023-12-29 PROCEDURE — 99282 EMERGENCY DEPT VISIT SF MDM: CPT

## 2024-01-22 PROBLEM — E86.0 DEHYDRATION: Status: RESOLVED | Noted: 2023-12-23 | Resolved: 2024-01-22

## 2024-03-24 ENCOUNTER — HOSPITAL ENCOUNTER (EMERGENCY)
Age: 34
Discharge: HOME | End: 2024-03-24
Attending: EMERGENCY MEDICINE
Payer: COMMERCIAL

## 2024-03-24 ENCOUNTER — HOSPITAL ENCOUNTER (INPATIENT)
Age: 34
LOS: 3 days | Discharge: HOME | End: 2024-03-27
Attending: STUDENT IN AN ORGANIZED HEALTH CARE EDUCATION/TRAINING PROGRAM | Admitting: INTERNAL MEDICINE
Payer: COMMERCIAL

## 2024-03-24 VITALS
RESPIRATION RATE: 16 BRPM | SYSTOLIC BLOOD PRESSURE: 120 MMHG | TEMPERATURE: 98.2 F | WEIGHT: 130 LBS | BODY MASS INDEX: 20.98 KG/M2 | DIASTOLIC BLOOD PRESSURE: 96 MMHG | HEART RATE: 86 BPM | OXYGEN SATURATION: 98 %

## 2024-03-24 DIAGNOSIS — F10.939 ALCOHOL WITHDRAWAL SYNDROME WITH COMPLICATION (HCC): Primary | ICD-10-CM

## 2024-03-24 DIAGNOSIS — F10.10 ALCOHOL ABUSE: Primary | ICD-10-CM

## 2024-03-24 PROBLEM — F10.930 ALCOHOL WITHDRAWAL SYNDROME, UNCOMPLICATED (HCC): Status: ACTIVE | Noted: 2024-03-24

## 2024-03-24 LAB
ALBUMIN SERPL-MCNC: 5.1 G/DL (ref 3.5–5.2)
ALP SERPL-CCNC: 101 U/L (ref 40–129)
ALT SERPL-CCNC: 27 U/L (ref 0–40)
AMMONIA PLAS-SCNC: 36 UMOL/L (ref 16–60)
AMPHET UR QL SCN: NEGATIVE
ANION GAP SERPL CALCULATED.3IONS-SCNC: 21 MMOL/L (ref 7–16)
APAP SERPL-MCNC: <5 UG/ML (ref 10–30)
AST SERPL-CCNC: 45 U/L (ref 0–39)
BACTERIA URNS QL MICRO: ABNORMAL
BARBITURATES UR QL SCN: POSITIVE
BASOPHILS # BLD: 0.08 K/UL (ref 0–0.2)
BASOPHILS NFR BLD: 1 % (ref 0–2)
BENZODIAZ UR QL: NEGATIVE
BILIRUB SERPL-MCNC: 1.9 MG/DL (ref 0–1.2)
BILIRUB UR QL STRIP: ABNORMAL
BUN SERPL-MCNC: 7 MG/DL (ref 6–20)
BUPRENORPHINE UR QL: NEGATIVE
CALCIUM SERPL-MCNC: 10 MG/DL (ref 8.6–10.2)
CANNABINOIDS UR QL SCN: POSITIVE
CHLORIDE SERPL-SCNC: 94 MMOL/L (ref 98–107)
CLARITY UR: CLEAR
CO2 SERPL-SCNC: 25 MMOL/L (ref 22–29)
COCAINE UR QL SCN: NEGATIVE
COLOR UR: YELLOW
CREAT SERPL-MCNC: 0.8 MG/DL (ref 0.7–1.2)
EOSINOPHIL # BLD: 0.38 K/UL (ref 0.05–0.5)
EOSINOPHILS RELATIVE PERCENT: 3 % (ref 0–6)
EPI CELLS #/AREA URNS HPF: ABNORMAL /HPF
ERYTHROCYTE [DISTWIDTH] IN BLOOD BY AUTOMATED COUNT: 13.2 % (ref 11.5–15)
ETHANOLAMINE SERPL-MCNC: <10 MG/DL
FENTANYL UR QL: NEGATIVE
GFR SERPL CREATININE-BSD FRML MDRD: >60 ML/MIN/1.73M2
GLUCOSE SERPL-MCNC: 74 MG/DL (ref 74–99)
GLUCOSE UR STRIP-MCNC: NEGATIVE MG/DL
HCT VFR BLD AUTO: 42.4 % (ref 37–54)
HGB BLD-MCNC: 15 G/DL (ref 12.5–16.5)
HGB UR QL STRIP.AUTO: NEGATIVE
IMM GRANULOCYTES # BLD AUTO: 0.06 K/UL (ref 0–0.58)
IMM GRANULOCYTES NFR BLD: 1 % (ref 0–5)
KETONES UR STRIP-MCNC: 15 MG/DL
LACTATE BLDV-SCNC: 2.3 MMOL/L (ref 0.5–2.2)
LEUKOCYTE ESTERASE UR QL STRIP: NEGATIVE
LIPASE SERPL-CCNC: 18 U/L (ref 13–60)
LYMPHOCYTES NFR BLD: 3.46 K/UL (ref 1.5–4)
LYMPHOCYTES RELATIVE PERCENT: 31 % (ref 20–42)
MAGNESIUM SERPL-MCNC: 1.5 MG/DL (ref 1.6–2.6)
MCH RBC QN AUTO: 32.5 PG (ref 26–35)
MCHC RBC AUTO-ENTMCNC: 35.4 G/DL (ref 32–34.5)
MCV RBC AUTO: 92 FL (ref 80–99.9)
METHADONE UR QL: NEGATIVE
MONOCYTES NFR BLD: 0.91 K/UL (ref 0.1–0.95)
MONOCYTES NFR BLD: 8 % (ref 2–12)
NEUTROPHILS NFR BLD: 56 % (ref 43–80)
NEUTS SEG NFR BLD: 6.26 K/UL (ref 1.8–7.3)
NITRITE UR QL STRIP: POSITIVE
OPIATES UR QL SCN: NEGATIVE
OXYCODONE UR QL SCN: NEGATIVE
PCP UR QL SCN: NEGATIVE
PH UR STRIP: 7 [PH] (ref 5–9)
PHOSPHATE SERPL-MCNC: 2.3 MG/DL (ref 2.5–4.5)
PLATELET # BLD AUTO: 227 K/UL (ref 130–450)
PMV BLD AUTO: 12.6 FL (ref 7–12)
POTASSIUM SERPL-SCNC: 3.5 MMOL/L (ref 3.5–5)
PROT SERPL-MCNC: 7.9 G/DL (ref 6.4–8.3)
PROT UR STRIP-MCNC: NEGATIVE MG/DL
RBC # BLD AUTO: 4.61 M/UL (ref 3.8–5.8)
RBC #/AREA URNS HPF: ABNORMAL /HPF
SALICYLATES SERPL-MCNC: <0.3 MG/DL (ref 0–30)
SODIUM SERPL-SCNC: 140 MMOL/L (ref 132–146)
SP GR UR STRIP: 1.01 (ref 1–1.03)
TEST INFORMATION: ABNORMAL
TOXIC TRICYCLIC SC,BLOOD: NEGATIVE
UROBILINOGEN UR STRIP-ACNC: 0.2 EU/DL (ref 0–1)
WBC #/AREA URNS HPF: ABNORMAL /HPF
WBC OTHER # BLD: 11.2 K/UL (ref 4.5–11.5)

## 2024-03-24 PROCEDURE — 83605 ASSAY OF LACTIC ACID: CPT

## 2024-03-24 PROCEDURE — 6370000000 HC RX 637 (ALT 250 FOR IP): Performed by: NURSE PRACTITIONER

## 2024-03-24 PROCEDURE — 85025 COMPLETE CBC W/AUTO DIFF WBC: CPT

## 2024-03-24 PROCEDURE — 83690 ASSAY OF LIPASE: CPT

## 2024-03-24 PROCEDURE — 80179 DRUG ASSAY SALICYLATE: CPT

## 2024-03-24 PROCEDURE — 96375 TX/PRO/DX INJ NEW DRUG ADDON: CPT

## 2024-03-24 PROCEDURE — 2060000000 HC ICU INTERMEDIATE R&B

## 2024-03-24 PROCEDURE — G0480 DRUG TEST DEF 1-7 CLASSES: HCPCS

## 2024-03-24 PROCEDURE — 99222 1ST HOSP IP/OBS MODERATE 55: CPT | Performed by: INTERNAL MEDICINE

## 2024-03-24 PROCEDURE — 6360000002 HC RX W HCPCS: Performed by: INTERNAL MEDICINE

## 2024-03-24 PROCEDURE — 2580000003 HC RX 258: Performed by: NURSE PRACTITIONER

## 2024-03-24 PROCEDURE — 99282 EMERGENCY DEPT VISIT SF MDM: CPT

## 2024-03-24 PROCEDURE — 6370000000 HC RX 637 (ALT 250 FOR IP): Performed by: STUDENT IN AN ORGANIZED HEALTH CARE EDUCATION/TRAINING PROGRAM

## 2024-03-24 PROCEDURE — 96374 THER/PROPH/DIAG INJ IV PUSH: CPT

## 2024-03-24 PROCEDURE — APPSS45 APP SPLIT SHARED TIME 31-45 MINUTES: Performed by: NURSE PRACTITIONER

## 2024-03-24 PROCEDURE — 2580000003 HC RX 258: Performed by: STUDENT IN AN ORGANIZED HEALTH CARE EDUCATION/TRAINING PROGRAM

## 2024-03-24 PROCEDURE — 99285 EMERGENCY DEPT VISIT HI MDM: CPT

## 2024-03-24 PROCEDURE — 84100 ASSAY OF PHOSPHORUS: CPT

## 2024-03-24 PROCEDURE — 83735 ASSAY OF MAGNESIUM: CPT

## 2024-03-24 PROCEDURE — 81001 URINALYSIS AUTO W/SCOPE: CPT

## 2024-03-24 PROCEDURE — 80053 COMPREHEN METABOLIC PANEL: CPT

## 2024-03-24 PROCEDURE — 80143 DRUG ASSAY ACETAMINOPHEN: CPT

## 2024-03-24 PROCEDURE — 96372 THER/PROPH/DIAG INJ SC/IM: CPT

## 2024-03-24 PROCEDURE — 82140 ASSAY OF AMMONIA: CPT

## 2024-03-24 PROCEDURE — 80307 DRUG TEST PRSMV CHEM ANLYZR: CPT

## 2024-03-24 PROCEDURE — 6360000002 HC RX W HCPCS: Performed by: STUDENT IN AN ORGANIZED HEALTH CARE EDUCATION/TRAINING PROGRAM

## 2024-03-24 RX ORDER — LORAZEPAM 2 MG/ML
2 INJECTION INTRAMUSCULAR
Status: DISCONTINUED | OUTPATIENT
Start: 2024-03-24 | End: 2024-03-24

## 2024-03-24 RX ORDER — LORAZEPAM 1 MG/1
3 TABLET ORAL
Status: DISCONTINUED | OUTPATIENT
Start: 2024-03-24 | End: 2024-03-24

## 2024-03-24 RX ORDER — SODIUM CHLORIDE 9 MG/ML
INJECTION, SOLUTION INTRAVENOUS CONTINUOUS
Status: DISCONTINUED | OUTPATIENT
Start: 2024-03-24 | End: 2024-03-27 | Stop reason: HOSPADM

## 2024-03-24 RX ORDER — SODIUM CHLORIDE 0.9 % (FLUSH) 0.9 %
5-40 SYRINGE (ML) INJECTION PRN
Status: DISCONTINUED | OUTPATIENT
Start: 2024-03-24 | End: 2024-03-27 | Stop reason: HOSPADM

## 2024-03-24 RX ORDER — THIAMINE HYDROCHLORIDE 100 MG/ML
100 INJECTION, SOLUTION INTRAMUSCULAR; INTRAVENOUS ONCE
Status: COMPLETED | OUTPATIENT
Start: 2024-03-24 | End: 2024-03-24

## 2024-03-24 RX ORDER — PHENOBARBITAL SODIUM 65 MG/ML
130 INJECTION, SOLUTION INTRAMUSCULAR; INTRAVENOUS ONCE
Status: COMPLETED | OUTPATIENT
Start: 2024-03-24 | End: 2024-03-24

## 2024-03-24 RX ORDER — GAUZE BANDAGE 2" X 2"
100 BANDAGE TOPICAL DAILY
Status: DISCONTINUED | OUTPATIENT
Start: 2024-03-24 | End: 2024-03-27 | Stop reason: HOSPADM

## 2024-03-24 RX ORDER — SODIUM CHLORIDE 9 MG/ML
INJECTION, SOLUTION INTRAVENOUS PRN
Status: DISCONTINUED | OUTPATIENT
Start: 2024-03-24 | End: 2024-03-27 | Stop reason: HOSPADM

## 2024-03-24 RX ORDER — MAGNESIUM SULFATE IN WATER 40 MG/ML
2000 INJECTION, SOLUTION INTRAVENOUS ONCE
Status: COMPLETED | OUTPATIENT
Start: 2024-03-24 | End: 2024-03-24

## 2024-03-24 RX ORDER — M-VIT,TX,IRON,MINS/CALC/FOLIC 27MG-0.4MG
1 TABLET ORAL DAILY
Status: DISCONTINUED | OUTPATIENT
Start: 2024-03-24 | End: 2024-03-27 | Stop reason: HOSPADM

## 2024-03-24 RX ORDER — MULTIVITAMIN WITH FOLIC ACID 400 MCG
1 TABLET ORAL DAILY
COMMUNITY

## 2024-03-24 RX ORDER — THIAMINE MONONITRATE (VIT B1) 100 MG
100 TABLET ORAL DAILY
COMMUNITY

## 2024-03-24 RX ORDER — SODIUM CHLORIDE 0.9 % (FLUSH) 0.9 %
5-40 SYRINGE (ML) INJECTION EVERY 12 HOURS SCHEDULED
Status: DISCONTINUED | OUTPATIENT
Start: 2024-03-24 | End: 2024-03-27 | Stop reason: HOSPADM

## 2024-03-24 RX ORDER — LORAZEPAM 2 MG/ML
4 INJECTION INTRAMUSCULAR
Status: DISCONTINUED | OUTPATIENT
Start: 2024-03-24 | End: 2024-03-24

## 2024-03-24 RX ORDER — 0.9 % SODIUM CHLORIDE 0.9 %
1000 INTRAVENOUS SOLUTION INTRAVENOUS ONCE
Status: COMPLETED | OUTPATIENT
Start: 2024-03-24 | End: 2024-03-24

## 2024-03-24 RX ORDER — LORAZEPAM 1 MG/1
4 TABLET ORAL
Status: DISCONTINUED | OUTPATIENT
Start: 2024-03-24 | End: 2024-03-24

## 2024-03-24 RX ORDER — LORAZEPAM 1 MG/1
1 TABLET ORAL
Status: DISCONTINUED | OUTPATIENT
Start: 2024-03-24 | End: 2024-03-24

## 2024-03-24 RX ORDER — FOLIC ACID 1 MG/1
1 TABLET ORAL DAILY
Status: DISCONTINUED | OUTPATIENT
Start: 2024-03-24 | End: 2024-03-27 | Stop reason: HOSPADM

## 2024-03-24 RX ORDER — ONDANSETRON 2 MG/ML
4 INJECTION INTRAMUSCULAR; INTRAVENOUS ONCE
Status: COMPLETED | OUTPATIENT
Start: 2024-03-24 | End: 2024-03-24

## 2024-03-24 RX ORDER — DIAZEPAM 5 MG/1
5 TABLET ORAL
Status: DISCONTINUED | OUTPATIENT
Start: 2024-03-24 | End: 2024-03-27 | Stop reason: HOSPADM

## 2024-03-24 RX ORDER — PHENOBARBITAL SODIUM 65 MG/ML
65 INJECTION, SOLUTION INTRAMUSCULAR; INTRAVENOUS EVERY 6 HOURS PRN
Status: DISPENSED | OUTPATIENT
Start: 2024-03-24 | End: 2024-03-25

## 2024-03-24 RX ORDER — LORAZEPAM 2 MG/ML
3 INJECTION INTRAMUSCULAR
Status: DISCONTINUED | OUTPATIENT
Start: 2024-03-24 | End: 2024-03-24

## 2024-03-24 RX ORDER — LORAZEPAM 2 MG/ML
1 INJECTION INTRAMUSCULAR
Status: DISCONTINUED | OUTPATIENT
Start: 2024-03-24 | End: 2024-03-24

## 2024-03-24 RX ORDER — PHENOBARBITAL SODIUM 65 MG/ML
16.2 INJECTION, SOLUTION INTRAMUSCULAR; INTRAVENOUS EVERY 6 HOURS PRN
Status: DISCONTINUED | OUTPATIENT
Start: 2024-03-27 | End: 2024-03-27 | Stop reason: HOSPADM

## 2024-03-24 RX ORDER — PHENOBARBITAL SODIUM 65 MG/ML
32.5 INJECTION, SOLUTION INTRAMUSCULAR; INTRAVENOUS 4 TIMES DAILY
Status: COMPLETED | OUTPATIENT
Start: 2024-03-25 | End: 2024-03-26

## 2024-03-24 RX ORDER — GABAPENTIN 300 MG/1
300 CAPSULE ORAL 2 TIMES DAILY
COMMUNITY

## 2024-03-24 RX ORDER — PHENOBARBITAL SODIUM 65 MG/ML
32.5 INJECTION, SOLUTION INTRAMUSCULAR; INTRAVENOUS EVERY 6 HOURS PRN
Status: DISCONTINUED | OUTPATIENT
Start: 2024-03-25 | End: 2024-03-27 | Stop reason: HOSPADM

## 2024-03-24 RX ORDER — PHENOBARBITAL SODIUM 65 MG/ML
16.2 INJECTION, SOLUTION INTRAMUSCULAR; INTRAVENOUS 2 TIMES DAILY
Status: DISCONTINUED | OUTPATIENT
Start: 2024-03-27 | End: 2024-03-27 | Stop reason: HOSPADM

## 2024-03-24 RX ORDER — FOLIC ACID 1 MG/1
1 TABLET ORAL ONCE
Status: COMPLETED | OUTPATIENT
Start: 2024-03-24 | End: 2024-03-24

## 2024-03-24 RX ORDER — PHENOBARBITAL SODIUM 65 MG/ML
32.5 INJECTION, SOLUTION INTRAMUSCULAR; INTRAVENOUS 2 TIMES DAILY
Status: DISCONTINUED | OUTPATIENT
Start: 2024-03-26 | End: 2024-03-27 | Stop reason: HOSPADM

## 2024-03-24 RX ORDER — LORAZEPAM 1 MG/1
2 TABLET ORAL
Status: DISCONTINUED | OUTPATIENT
Start: 2024-03-24 | End: 2024-03-24

## 2024-03-24 RX ORDER — PHENOBARBITAL SODIUM 65 MG/ML
65 INJECTION, SOLUTION INTRAMUSCULAR; INTRAVENOUS 4 TIMES DAILY
Status: DISPENSED | OUTPATIENT
Start: 2024-03-24 | End: 2024-03-25

## 2024-03-24 RX ORDER — GABAPENTIN 300 MG/1
300 CAPSULE ORAL 3 TIMES DAILY
Status: DISCONTINUED | OUTPATIENT
Start: 2024-03-24 | End: 2024-03-25

## 2024-03-24 RX ADMIN — Medication 1 TABLET: at 18:30

## 2024-03-24 RX ADMIN — PHENOBARBITAL SODIUM 130 MG: 65 INJECTION INTRAMUSCULAR; INTRAVENOUS at 14:30

## 2024-03-24 RX ADMIN — SODIUM CHLORIDE: 900 INJECTION, SOLUTION INTRAVENOUS at 18:43

## 2024-03-24 RX ADMIN — GABAPENTIN 300 MG: 300 CAPSULE ORAL at 19:50

## 2024-03-24 RX ADMIN — PHENOBARBITAL SODIUM 65 MG: 65 INJECTION INTRAMUSCULAR; INTRAVENOUS at 21:49

## 2024-03-24 RX ADMIN — DIAZEPAM 5 MG: 5 TABLET ORAL at 14:36

## 2024-03-24 RX ADMIN — DIAZEPAM 5 MG: 5 TABLET ORAL at 19:50

## 2024-03-24 RX ADMIN — FOLIC ACID 1 MG: 1 TABLET ORAL at 14:30

## 2024-03-24 RX ADMIN — SODIUM CHLORIDE 1000 ML: 9 INJECTION, SOLUTION INTRAVENOUS at 14:29

## 2024-03-24 RX ADMIN — MAGNESIUM SULFATE HEPTAHYDRATE 2000 MG: 40 INJECTION, SOLUTION INTRAVENOUS at 19:45

## 2024-03-24 RX ADMIN — THIAMINE HYDROCHLORIDE 100 MG: 100 INJECTION, SOLUTION INTRAMUSCULAR; INTRAVENOUS at 14:29

## 2024-03-24 RX ADMIN — ONDANSETRON 4 MG: 2 INJECTION INTRAMUSCULAR; INTRAVENOUS at 14:29

## 2024-03-24 ASSESSMENT — PAIN - FUNCTIONAL ASSESSMENT: PAIN_FUNCTIONAL_ASSESSMENT: 0-10

## 2024-03-24 ASSESSMENT — LIFESTYLE VARIABLES
HOW OFTEN DO YOU HAVE A DRINK CONTAINING ALCOHOL: 4 OR MORE TIMES A WEEK
HOW MANY STANDARD DRINKS CONTAINING ALCOHOL DO YOU HAVE ON A TYPICAL DAY: 10 OR MORE

## 2024-03-24 ASSESSMENT — PAIN SCALES - GENERAL
PAINLEVEL_OUTOF10: 6
PAINLEVEL_OUTOF10: 5

## 2024-03-24 ASSESSMENT — PAIN DESCRIPTION - LOCATION: LOCATION: ABDOMEN

## 2024-03-24 ASSESSMENT — PAIN DESCRIPTION - ORIENTATION: ORIENTATION: RIGHT;UPPER

## 2024-03-24 ASSESSMENT — PAIN DESCRIPTION - PAIN TYPE: TYPE: ACUTE PAIN

## 2024-03-24 NOTE — ED PROVIDER NOTES
HPI:  3/24/24,   Time: 1:21 PM EDT         Mark Davila is a 33 y.o. male presenting to the ED for chief complaint: Patient presents to our facility basically requesting help for his alcohol abuse.  Patient dropped off at our facility by his brother who then left.  Patient recently admitted for pancreatitis.  Patient states last alcoholic drink was approximately 12 midnight (13 hours ago).   Patient had been at an outpatient facility for detox, patient ran away to go to the emergency room so he came to our facility.  He states he is concerned that he may have a seizure from DTs.    Patient states his ex girlfriend called an ambulance 2 days ago when he lost consciousness, but apparently he refused to go by EMS.      ROS:   Pertinent positives and negatives are stated within HPI, all other systems reviewed and are negative.  --------------------------------------------- PAST HISTORY ---------------------------------------------  Past Medical History:  has a past medical history of Anxiety, Arm pain, Asthma, Concussion with loss of consciousness, Convulsions (HCC), Depression, Flashing lights, Head injury, Headache, Leg pain, Numbness and tingling, PTSD (post-traumatic stress disorder), and Seizures (HCC).    Past Surgical History:  has a past surgical history that includes Colonoscopy; Endoscopy, colon, diagnostic; and Upper gastrointestinal endoscopy (N/A, 7/22/2021).    Social History:  reports that he has been smoking cigarettes. He has a 18.0 pack-year smoking history. He has never used smokeless tobacco. He reports that he does not currently use alcohol after a past usage of about 44.0 standard drinks of alcohol per week. He reports current drug use. Frequency: 1.00 time per week. Drug: Marijuana (Weed).    Family History: family history includes Alcohol Abuse in his father and mother; Cancer in his father; Cirrhosis in his father; Mental Illness in his brother, mother, and sister; Substance Abuse in his

## 2024-03-24 NOTE — ED NOTES
Pt in lobby waiting for bother to pick him up and pt can select hospital of choice for further evaluation/care/treatment.

## 2024-03-24 NOTE — ED PROVIDER NOTES
this visit were within normal range or not returned as of this dictation.    RADIOLOGY:   Non-plain film images such as CT, Ultrasound and MRI are read by the radiologist. Plain radiographic images are visualized and preliminarily interpreted by the ED Provider with the below findings:    Interpretation per the Radiologist below, if available at the time of this note:    No orders to display     No results found.    No results found.    PROCEDURES   Unless otherwise noted below, none     CRITICAL CARE TIME (.cct)   Per attending attestation    EMERGENCY DEPARTMENT COURSE    Vitals:    Vitals:    03/24/24 1359 03/24/24 1504 03/24/24 1514 03/24/24 1515   BP:  (!) 149/95 (!) 155/96    Pulse:  59 56    Resp: 24 20 16    Temp:       SpO2:  95% 96% 96%       Patient was given the following medications:  Medications   sodium chloride 0.9 % bolus 1,000 mL (1,000 mLs IntraVENous New Bag 3/24/24 1429)   sodium chloride flush 0.9 % injection 5-40 mL (has no administration in time range)   sodium chloride flush 0.9 % injection 5-40 mL (has no administration in time range)   0.9 % sodium chloride infusion (has no administration in time range)   diazePAM (VALIUM) tablet 5 mg (5 mg Oral Given 3/24/24 1436)   ondansetron (ZOFRAN) injection 4 mg (4 mg IntraVENous Given 3/24/24 1429)   PHENobarbital (LUMINAL) injection 130 mg (130 mg IntraMUSCular Given 3/24/24 1430)   thiamine (B-1) injection 100 mg (100 mg IntraVENous Given 3/24/24 1429)   folic acid (FOLVITE) tablet 1 mg (1 mg Oral Given 3/24/24 1430)         Is this patient to be included in the SEP-1 core measure due to severe sepsis or septic shock? No Exclusion criteria - the patient is NOT to be included for SEP-1 Core Measure due to: Infection is not suspected        Medical Decision Making/Differential Diagnosis:    CC/HPI Summary, Social Determinants of health, Records Reviewed, DDx, testing done/not done, ED Course, Reassessment, disposition considerations/shared decision

## 2024-03-24 NOTE — H&P
[facility] Hospitalist Group   History and Physical      CHIEF COMPLAINT:  Alcohol Withdrawal    History of Present Illness:  33 y.o. male with a history of Depression/Anxiety, PTSD, Seizures presents with Alcohol Withdrawal. Pt states he typically drinks alcohol daily States he has been drinking one bottle of Everclear 750ml for at least 2 weeks. States When he was out of alcohol he did ingest Purell States he has history of alcohol withdrawal. Mom recently  and was an alcoholic, siblings  from Fentanyl overdose. Does admit to hallucinations when eyes are closed. Notes tremors/shakiness over the last couple of hours and feels he is in alcohol withdrawal.States this morning developed projectile vomiting about 6-7 times. States he is not able to return to [facility] and [facility]. Pt sister [place] [address] for further rehabilitation. Denies fevers, chills, Diarrhea, CP, SOB. Pt received Folic Acid 1mg, [id number] IV, Penobarbital 130mg, Thiamine 100mg IV, and 1L NSS bolus in ED course. Decision to admit pt for further evaluation and treatment.     23-23 Presented to [facility] ED with complaints of abdominal pain. Reports ongoing alcohol abuse for 15 years. Workup in ED significant for CO2 17, anion gap 31, lactic acid 8.4, magnesium 1.8, glucose 133, , alk phos 159, ALT 47, , lipase 2691, beta hydroxybutyrate 4.29, ethanol 260.  UDS positive for cannabinoid and benzodiazepine. UA+ Received Ceftriaxone, valium, flagyl. Zofran, 2L NSS bolus and Phenobarb in ED. GI consulted. Received iv fluids, phenobarb and electrolyte replacement.Pt signed out AGAINST MEDICAL ADVISE     23-23 Presented with Withdrawal to [facility] ED.Placed on  CIWA and phenobarb. IVF. Pt became agitated. Pt Left AGAINST MEDICAL ADVISE.    8/10- Alcohol withdrawal presentation to [facility]. Cl 93, CO2 31,

## 2024-03-25 ENCOUNTER — APPOINTMENT (OUTPATIENT)
Dept: CT IMAGING | Age: 34
End: 2024-03-25
Payer: COMMERCIAL

## 2024-03-25 LAB
ALBUMIN SERPL-MCNC: 4.2 G/DL (ref 3.5–5.2)
ALP SERPL-CCNC: 89 U/L (ref 40–129)
ALT SERPL-CCNC: 20 U/L (ref 0–40)
ANION GAP SERPL CALCULATED.3IONS-SCNC: 11 MMOL/L (ref 7–16)
AST SERPL-CCNC: 33 U/L (ref 0–39)
BASOPHILS # BLD: 0.05 K/UL (ref 0–0.2)
BASOPHILS NFR BLD: 1 % (ref 0–2)
BILIRUB SERPL-MCNC: 2 MG/DL (ref 0–1.2)
BUN SERPL-MCNC: 7 MG/DL (ref 6–20)
CALCIUM SERPL-MCNC: 8.7 MG/DL (ref 8.6–10.2)
CHLORIDE SERPL-SCNC: 101 MMOL/L (ref 98–107)
CO2 SERPL-SCNC: 25 MMOL/L (ref 22–29)
CREAT SERPL-MCNC: 0.7 MG/DL (ref 0.7–1.2)
EOSINOPHIL # BLD: 0.62 K/UL (ref 0.05–0.5)
EOSINOPHILS RELATIVE PERCENT: 7 % (ref 0–6)
ERYTHROCYTE [DISTWIDTH] IN BLOOD BY AUTOMATED COUNT: 13.1 % (ref 11.5–15)
GFR SERPL CREATININE-BSD FRML MDRD: >60 ML/MIN/1.73M2
GLUCOSE SERPL-MCNC: 115 MG/DL (ref 74–99)
HCT VFR BLD AUTO: 37.9 % (ref 37–54)
HGB BLD-MCNC: 13.1 G/DL (ref 12.5–16.5)
IMM GRANULOCYTES # BLD AUTO: <0.03 K/UL (ref 0–0.58)
IMM GRANULOCYTES NFR BLD: 0 % (ref 0–5)
LACTATE BLDV-SCNC: 0.8 MMOL/L (ref 0.5–2.2)
LYMPHOCYTES NFR BLD: 2.21 K/UL (ref 1.5–4)
LYMPHOCYTES RELATIVE PERCENT: 26 % (ref 20–42)
MAGNESIUM SERPL-MCNC: 2.1 MG/DL (ref 1.6–2.6)
MCH RBC QN AUTO: 32.2 PG (ref 26–35)
MCHC RBC AUTO-ENTMCNC: 34.6 G/DL (ref 32–34.5)
MCV RBC AUTO: 93.1 FL (ref 80–99.9)
MONOCYTES NFR BLD: 0.68 K/UL (ref 0.1–0.95)
MONOCYTES NFR BLD: 8 % (ref 2–12)
NEUTROPHILS NFR BLD: 59 % (ref 43–80)
NEUTS SEG NFR BLD: 5.1 K/UL (ref 1.8–7.3)
PHOSPHATE SERPL-MCNC: 2.8 MG/DL (ref 2.5–4.5)
PLATELET # BLD AUTO: 171 K/UL (ref 130–450)
PMV BLD AUTO: 12.6 FL (ref 7–12)
POTASSIUM SERPL-SCNC: 3.5 MMOL/L (ref 3.5–5)
PROT SERPL-MCNC: 6.4 G/DL (ref 6.4–8.3)
RBC # BLD AUTO: 4.07 M/UL (ref 3.8–5.8)
SODIUM SERPL-SCNC: 137 MMOL/L (ref 132–146)
WBC OTHER # BLD: 8.7 K/UL (ref 4.5–11.5)

## 2024-03-25 PROCEDURE — 6370000000 HC RX 637 (ALT 250 FOR IP): Performed by: STUDENT IN AN ORGANIZED HEALTH CARE EDUCATION/TRAINING PROGRAM

## 2024-03-25 PROCEDURE — 2580000003 HC RX 258: Performed by: INTERNAL MEDICINE

## 2024-03-25 PROCEDURE — 99232 SBSQ HOSP IP/OBS MODERATE 35: CPT | Performed by: INTERNAL MEDICINE

## 2024-03-25 PROCEDURE — 70450 CT HEAD/BRAIN W/O DYE: CPT

## 2024-03-25 PROCEDURE — 6370000000 HC RX 637 (ALT 250 FOR IP): Performed by: INTERNAL MEDICINE

## 2024-03-25 PROCEDURE — 6370000000 HC RX 637 (ALT 250 FOR IP): Performed by: NURSE PRACTITIONER

## 2024-03-25 PROCEDURE — 80053 COMPREHEN METABOLIC PANEL: CPT

## 2024-03-25 PROCEDURE — APPSS30 APP SPLIT SHARED TIME 16-30 MINUTES: Performed by: NURSE PRACTITIONER

## 2024-03-25 PROCEDURE — 6360000002 HC RX W HCPCS: Performed by: INTERNAL MEDICINE

## 2024-03-25 PROCEDURE — 83735 ASSAY OF MAGNESIUM: CPT

## 2024-03-25 PROCEDURE — 6370000000 HC RX 637 (ALT 250 FOR IP)

## 2024-03-25 PROCEDURE — 85025 COMPLETE CBC W/AUTO DIFF WBC: CPT

## 2024-03-25 PROCEDURE — 2580000003 HC RX 258: Performed by: NURSE PRACTITIONER

## 2024-03-25 PROCEDURE — 84100 ASSAY OF PHOSPHORUS: CPT

## 2024-03-25 PROCEDURE — 2580000003 HC RX 258: Performed by: STUDENT IN AN ORGANIZED HEALTH CARE EDUCATION/TRAINING PROGRAM

## 2024-03-25 PROCEDURE — 2060000000 HC ICU INTERMEDIATE R&B

## 2024-03-25 PROCEDURE — 83605 ASSAY OF LACTIC ACID: CPT

## 2024-03-25 RX ORDER — NICOTINE 21 MG/24HR
1 PATCH, TRANSDERMAL 24 HOURS TRANSDERMAL DAILY
Status: DISCONTINUED | OUTPATIENT
Start: 2024-03-25 | End: 2024-03-25

## 2024-03-25 RX ORDER — ACETAMINOPHEN 325 MG/1
650 TABLET ORAL ONCE
Status: COMPLETED | OUTPATIENT
Start: 2024-03-25 | End: 2024-03-25

## 2024-03-25 RX ORDER — NICOTINE 21 MG/24HR
PATCH, TRANSDERMAL 24 HOURS TRANSDERMAL
Status: COMPLETED
Start: 2024-03-25 | End: 2024-03-26

## 2024-03-25 RX ORDER — PANTOPRAZOLE SODIUM 40 MG/1
40 TABLET, DELAYED RELEASE ORAL
Status: DISCONTINUED | OUTPATIENT
Start: 2024-03-25 | End: 2024-03-27 | Stop reason: HOSPADM

## 2024-03-25 RX ORDER — ACETAMINOPHEN 325 MG/1
650 TABLET ORAL EVERY 4 HOURS PRN
Status: DISCONTINUED | OUTPATIENT
Start: 2024-03-25 | End: 2024-03-27 | Stop reason: HOSPADM

## 2024-03-25 RX ORDER — ENOXAPARIN SODIUM 100 MG/ML
40 INJECTION SUBCUTANEOUS DAILY
Status: DISCONTINUED | OUTPATIENT
Start: 2024-03-25 | End: 2024-03-27 | Stop reason: HOSPADM

## 2024-03-25 RX ORDER — LOPERAMIDE HYDROCHLORIDE 2 MG/1
2 CAPSULE ORAL 3 TIMES DAILY PRN
Status: DISCONTINUED | OUTPATIENT
Start: 2024-03-25 | End: 2024-03-27 | Stop reason: HOSPADM

## 2024-03-25 RX ORDER — GABAPENTIN 300 MG/1
300 CAPSULE ORAL 2 TIMES DAILY
Status: DISCONTINUED | OUTPATIENT
Start: 2024-03-25 | End: 2024-03-27 | Stop reason: HOSPADM

## 2024-03-25 RX ORDER — NICOTINE 21 MG/24HR
1 PATCH, TRANSDERMAL 24 HOURS TRANSDERMAL DAILY
Status: DISCONTINUED | OUTPATIENT
Start: 2024-03-25 | End: 2024-03-27 | Stop reason: HOSPADM

## 2024-03-25 RX ADMIN — GABAPENTIN 300 MG: 300 CAPSULE ORAL at 21:11

## 2024-03-25 RX ADMIN — GABAPENTIN 300 MG: 300 CAPSULE ORAL at 08:25

## 2024-03-25 RX ADMIN — SODIUM CHLORIDE, PRESERVATIVE FREE 10 ML: 5 INJECTION INTRAVENOUS at 09:00

## 2024-03-25 RX ADMIN — ENOXAPARIN SODIUM 40 MG: 100 INJECTION SUBCUTANEOUS at 12:17

## 2024-03-25 RX ADMIN — PHENOBARBITAL SODIUM 65 MG: 65 INJECTION INTRAMUSCULAR; INTRAVENOUS at 03:49

## 2024-03-25 RX ADMIN — DIAZEPAM 5 MG: 5 TABLET ORAL at 16:16

## 2024-03-25 RX ADMIN — ACETAMINOPHEN 650 MG: 325 TABLET ORAL at 16:16

## 2024-03-25 RX ADMIN — DIAZEPAM 5 MG: 5 TABLET ORAL at 12:17

## 2024-03-25 RX ADMIN — SODIUM CHLORIDE, PRESERVATIVE FREE 10 ML: 5 INJECTION INTRAVENOUS at 21:13

## 2024-03-25 RX ADMIN — POTASSIUM & SODIUM PHOSPHATES POWDER PACK 280-160-250 MG 250 MG: 280-160-250 PACK at 18:23

## 2024-03-25 RX ADMIN — LOPERAMIDE HYDROCHLORIDE 2 MG: 2 CAPSULE ORAL at 18:22

## 2024-03-25 RX ADMIN — POTASSIUM & SODIUM PHOSPHATES POWDER PACK 280-160-250 MG 250 MG: 280-160-250 PACK at 08:25

## 2024-03-25 RX ADMIN — SODIUM CHLORIDE: 900 INJECTION, SOLUTION INTRAVENOUS at 12:19

## 2024-03-25 RX ADMIN — SODIUM CHLORIDE: 900 INJECTION, SOLUTION INTRAVENOUS at 04:03

## 2024-03-25 RX ADMIN — PANTOPRAZOLE SODIUM 40 MG: 40 TABLET, DELAYED RELEASE ORAL at 18:22

## 2024-03-25 RX ADMIN — FOLIC ACID 1 MG: 1 TABLET ORAL at 08:25

## 2024-03-25 RX ADMIN — DIAZEPAM 5 MG: 5 TABLET ORAL at 00:14

## 2024-03-25 RX ADMIN — Medication 100 MG: at 08:25

## 2024-03-25 RX ADMIN — SODIUM CHLORIDE, PRESERVATIVE FREE 10 ML: 5 INJECTION INTRAVENOUS at 12:19

## 2024-03-25 RX ADMIN — PHENOBARBITAL SODIUM 65 MG: 65 INJECTION INTRAMUSCULAR; INTRAVENOUS at 16:16

## 2024-03-25 RX ADMIN — PHENOBARBITAL SODIUM 65 MG: 65 INJECTION INTRAMUSCULAR; INTRAVENOUS at 08:30

## 2024-03-25 RX ADMIN — ACETAMINOPHEN 650 MG: 325 TABLET ORAL at 18:22

## 2024-03-25 RX ADMIN — PHENOBARBITAL SODIUM 65 MG: 65 INJECTION INTRAMUSCULAR; INTRAVENOUS at 12:18

## 2024-03-25 RX ADMIN — ACETAMINOPHEN 650 MG: 325 TABLET ORAL at 12:17

## 2024-03-25 RX ADMIN — Medication 1 TABLET: at 08:25

## 2024-03-25 RX ADMIN — PHENOBARBITAL SODIUM 32.5 MG: 65 INJECTION INTRAMUSCULAR; INTRAVENOUS at 21:11

## 2024-03-25 ASSESSMENT — PAIN DESCRIPTION - ORIENTATION: ORIENTATION: LOWER

## 2024-03-25 ASSESSMENT — PAIN - FUNCTIONAL ASSESSMENT
PAIN_FUNCTIONAL_ASSESSMENT: ACTIVITIES ARE NOT PREVENTED
PAIN_FUNCTIONAL_ASSESSMENT: ACTIVITIES ARE NOT PREVENTED

## 2024-03-25 ASSESSMENT — PAIN DESCRIPTION - LOCATION
LOCATION: ABDOMEN
LOCATION: ABDOMEN
LOCATION: HEAD

## 2024-03-25 ASSESSMENT — PAIN SCALES - GENERAL
PAINLEVEL_OUTOF10: 5
PAINLEVEL_OUTOF10: 6
PAINLEVEL_OUTOF10: 5

## 2024-03-25 ASSESSMENT — PAIN DESCRIPTION - PAIN TYPE
TYPE: ACUTE PAIN

## 2024-03-25 ASSESSMENT — PAIN DESCRIPTION - DESCRIPTORS
DESCRIPTORS: CRAMPING
DESCRIPTORS: CRAMPING
DESCRIPTORS: ACHING;OTHER (COMMENT)

## 2024-03-25 ASSESSMENT — PAIN DESCRIPTION - FREQUENCY
FREQUENCY: INTERMITTENT
FREQUENCY: INTERMITTENT

## 2024-03-25 NOTE — ED NOTES
Remains on telemetry monitor and continuous pulse ox at this time. Rhythm strip printed and placed in patient folder.

## 2024-03-25 NOTE — CARE COORDINATION
SOCIAL WORK / DISCHARGE PLANNING:  Sw attempted to meet with pt to complete assessment. Pt sound asleep, did not arouse. Sw consult noted that pt's sister resides in [facility] and wants pt to go to rehab there. Sw is unsure whether that is able to be managed with [employer] medicaid as well as transport issues. Sw reviewed medical record and it appears pt has been involved in numerous local rehab/detox units. He has spoken to [facility] multiple times. Sw did make referral to [facility]  for assistance with possible locating a rehab facility that can serve pt.                 Electronically signed by PAT Resendiz on 3/25/2024 at 2:12 PM

## 2024-03-26 PROCEDURE — 6360000002 HC RX W HCPCS: Performed by: INTERNAL MEDICINE

## 2024-03-26 PROCEDURE — 2580000003 HC RX 258: Performed by: INTERNAL MEDICINE

## 2024-03-26 PROCEDURE — 6370000000 HC RX 637 (ALT 250 FOR IP): Performed by: NURSE PRACTITIONER

## 2024-03-26 PROCEDURE — 99232 SBSQ HOSP IP/OBS MODERATE 35: CPT | Performed by: INTERNAL MEDICINE

## 2024-03-26 PROCEDURE — 2580000003 HC RX 258: Performed by: NURSE PRACTITIONER

## 2024-03-26 PROCEDURE — 2060000000 HC ICU INTERMEDIATE R&B

## 2024-03-26 PROCEDURE — 6370000000 HC RX 637 (ALT 250 FOR IP): Performed by: STUDENT IN AN ORGANIZED HEALTH CARE EDUCATION/TRAINING PROGRAM

## 2024-03-26 PROCEDURE — 6370000000 HC RX 637 (ALT 250 FOR IP): Performed by: INTERNAL MEDICINE

## 2024-03-26 PROCEDURE — APPSS30 APP SPLIT SHARED TIME 16-30 MINUTES: Performed by: NURSE PRACTITIONER

## 2024-03-26 RX ADMIN — PHENOBARBITAL SODIUM 32.5 MG: 65 INJECTION INTRAMUSCULAR; INTRAVENOUS at 13:39

## 2024-03-26 RX ADMIN — PHENOBARBITAL SODIUM 32.5 MG: 65 INJECTION INTRAMUSCULAR; INTRAVENOUS at 11:16

## 2024-03-26 RX ADMIN — PHENOBARBITAL SODIUM 32.5 MG: 65 INJECTION INTRAMUSCULAR; INTRAVENOUS at 08:36

## 2024-03-26 RX ADMIN — SODIUM CHLORIDE, PRESERVATIVE FREE 10 ML: 5 INJECTION INTRAVENOUS at 20:30

## 2024-03-26 RX ADMIN — PHENOBARBITAL SODIUM 32.5 MG: 65 INJECTION INTRAMUSCULAR; INTRAVENOUS at 20:31

## 2024-03-26 RX ADMIN — DIAZEPAM 5 MG: 5 TABLET ORAL at 09:21

## 2024-03-26 RX ADMIN — GABAPENTIN 300 MG: 300 CAPSULE ORAL at 20:30

## 2024-03-26 RX ADMIN — DIAZEPAM 5 MG: 5 TABLET ORAL at 01:26

## 2024-03-26 RX ADMIN — PHENOBARBITAL SODIUM 32.5 MG: 65 INJECTION INTRAMUSCULAR; INTRAVENOUS at 17:45

## 2024-03-26 RX ADMIN — ENOXAPARIN SODIUM 40 MG: 100 INJECTION SUBCUTANEOUS at 08:37

## 2024-03-26 RX ADMIN — Medication 100 MG: at 08:37

## 2024-03-26 RX ADMIN — PANTOPRAZOLE SODIUM 40 MG: 40 TABLET, DELAYED RELEASE ORAL at 17:45

## 2024-03-26 RX ADMIN — PHENOBARBITAL SODIUM 32.5 MG: 65 INJECTION INTRAMUSCULAR; INTRAVENOUS at 23:47

## 2024-03-26 RX ADMIN — GABAPENTIN 300 MG: 300 CAPSULE ORAL at 08:37

## 2024-03-26 RX ADMIN — SODIUM CHLORIDE: 900 INJECTION, SOLUTION INTRAVENOUS at 08:32

## 2024-03-26 RX ADMIN — FOLIC ACID 1 MG: 1 TABLET ORAL at 08:37

## 2024-03-26 RX ADMIN — Medication 1 TABLET: at 13:38

## 2024-03-26 RX ADMIN — PANTOPRAZOLE SODIUM 40 MG: 40 TABLET, DELAYED RELEASE ORAL at 06:25

## 2024-03-26 ASSESSMENT — PAIN SCALES - GENERAL: PAINLEVEL_OUTOF10: 0

## 2024-03-26 NOTE — CARE COORDINATION
Case Management Assessment  Initial Evaluation    Date/Time of Evaluation: 3/26/2024 1:52 PM  Assessment Completed by: PAT Resendiz    If patient is discharged prior to next notation, then this note serves as note for discharge by case management.    Patient Name: Mark Davila                   YOB: 1990  Diagnosis: Alcohol withdrawal syndrome, uncomplicated (HCC) [F10.930]  Alcohol withdrawal syndrome with complication (HCC) [F10.939]                   Date / Time: 3/24/2024  2:00 PM    Patient Admission Status: Inpatient   Readmission Risk (Low < 19, Mod (19-27), High > 27): Readmission Risk Score: 11.8    Current PCP: Not, On File (Inactive)  PCP verified by CM? No (pt could not recall name of PCP he states is with [facility])    Chart Reviewed: Yes      History Provided by: Patient  Patient Orientation: Alert and Oriented, Person, Place, Situation    Patient Cognition: Alert    Hospitalization in the last 30 days (Readmission):  No    If yes, Readmission Assessment in CM Navigator will be completed.    Advance Directives:      Code Status: Full Code   Patient's Primary Decision Maker is: Legal Next of Kin      Discharge Planning:    Patient lives with: Family Members Type of Home: House  Primary Care Giver: Self  Patient Support Systems include: Family Members   Current Financial resources:    Current community resources:    Current services prior to admission: None            Current DME:              Type of Home Care services:  None    ADLS  Prior functional level: Independent in ADLs/IADLs  Current functional level: Independent in ADLs/IADLs    PT AM-PAC:   /24  OT AM-PAC:   /24    Family can provide assistance at DC: No  Would you like Case Management to discuss the discharge plan with any other family members/significant others, and if so, who? No  Plans to Return to Present Housing: Yes  Other Identified Issues/Barriers to RETURNING to current housing: pt wanting ETOH rehab

## 2024-03-26 NOTE — CARE COORDINATION
[facility] Support Note    Name: Mark Davila  Date: 3/26/2024    Chief Complaint   Patient presents with    Alcohol Problem     Stopped drinking last night, drinks a bottle of alcohol (vodka) per day       Peer Support met with patient.  [x] Support and education provided  [] Resources provided   [x] Treatment referral: [facility]  [] Other:   [] Patient declined peer recovery services     Referred By: Sarah Beth([facility])    Notes:   Patient was very agitated and talking non stop about how he is not a good candidate for inpatient. He also stated that he has a bed at [facility] upon discharge. Kimmie from [facility] was contacted [phone] and she stated that all of his medical records will be reviewed before he can be accepted. Mela was notified of this arrangement.     Peer is familiar with this patient.     Signed: Rose Mary Muller, 3/26/2024

## 2024-03-27 VITALS
BODY MASS INDEX: 21.19 KG/M2 | TEMPERATURE: 98.9 F | RESPIRATION RATE: 16 BRPM | HEIGHT: 67 IN | DIASTOLIC BLOOD PRESSURE: 113 MMHG | OXYGEN SATURATION: 100 % | WEIGHT: 135 LBS | SYSTOLIC BLOOD PRESSURE: 144 MMHG | HEART RATE: 61 BPM

## 2024-03-27 PROCEDURE — 6370000000 HC RX 637 (ALT 250 FOR IP): Performed by: INTERNAL MEDICINE

## 2024-03-27 PROCEDURE — 6370000000 HC RX 637 (ALT 250 FOR IP): Performed by: STUDENT IN AN ORGANIZED HEALTH CARE EDUCATION/TRAINING PROGRAM

## 2024-03-27 PROCEDURE — 99238 HOSP IP/OBS DSCHRG MGMT 30/<: CPT | Performed by: INTERNAL MEDICINE

## 2024-03-27 PROCEDURE — 2580000003 HC RX 258: Performed by: INTERNAL MEDICINE

## 2024-03-27 PROCEDURE — 6370000000 HC RX 637 (ALT 250 FOR IP): Performed by: NURSE PRACTITIONER

## 2024-03-27 RX ORDER — TRAZODONE HYDROCHLORIDE 50 MG/1
50 TABLET ORAL ONCE
Status: DISCONTINUED | OUTPATIENT
Start: 2024-03-27 | End: 2024-03-27

## 2024-03-27 RX ORDER — DIPHENHYDRAMINE HCL 25 MG
50 TABLET ORAL NIGHTLY PRN
Status: DISCONTINUED | OUTPATIENT
Start: 2024-03-27 | End: 2024-03-27 | Stop reason: HOSPADM

## 2024-03-27 RX ADMIN — DIPHENHYDRAMINE HYDROCHLORIDE 50 MG: 25 TABLET ORAL at 02:08

## 2024-03-27 RX ADMIN — DIAZEPAM 5 MG: 5 TABLET ORAL at 01:20

## 2024-03-27 RX ADMIN — SODIUM CHLORIDE: 900 INJECTION, SOLUTION INTRAVENOUS at 01:11

## 2024-03-27 RX ADMIN — PANTOPRAZOLE SODIUM 40 MG: 40 TABLET, DELAYED RELEASE ORAL at 08:42

## 2024-03-27 RX ADMIN — GABAPENTIN 300 MG: 300 CAPSULE ORAL at 08:42

## 2024-03-27 NOTE — PROGRESS NOTES
[facility] Hospitalist Progress Note    Admitting Date and Time: 3/24/2024  2:00 PM  Admit Dx: Alcohol withdrawal syndrome, uncomplicated (HCC) [F10.930]  Alcohol withdrawal syndrome with complication (HCC) [F10.939]    Hospital Day 1    Subjective:    3/25: Pt awake, A&O, lying in bed in no apparent distress.  Patient endorses tremors,.,  A&O visual hallucinations.  Reports drinking 1 bottle of high proof alcohol (Everclear) for the past 2 weeks.  Further reports that he will occasionally drink hand  if alcoholic beverages 'aren't cutting it'.  States he has gone through withdrawal & rehab several times in the past; states his first rehab since 18 years old. States he decided to stop drinking recently because he woke up with a laceration to the left side of his head, though he does not remember the cause of this injury he reports that his brother heard him fall.  States he received a call from [facility] earlier today, they said they would take him back once out of withdrawals. Denies CP, SOB.  States his appetite is good.  No needs at this time.    PMH:  has a past medical history of Anxiety, Arm pain, Asthma, Concussion with loss of consciousness, Convulsions (HCC), Depression, Flashing lights, Head injury, Headache, Leg pain, Numbness and tingling, PTSD (post-traumatic stress disorder), and Seizures ([facility]).    REVIEW OF SYSTEMS:  Constitutional: Denies fever, weight loss, night sweats, and fatigue  Skin: Denies abnormal pigmentation, dark lesions, and rashes   HEENT: Denies hearing loss, tinnitus, ear drainage, epistaxis, sore throat, and hoarseness.  Cardiovascular: Denies palpitations, chest pain, and chest pressure.  Respiratory: Denies cough, dyspnea at rest, hemoptysis, apnea, and choking.  Gastrointestinal: Denies nausea, vomiting, poor appetite, heartburn or reflux  Genitourinary: Denies dysuria, frequency, urgency or hematuria  Musculoskeletal: Denies myalgias, muscle 
.4 Eyes Skin Assessment     NAME:  Mark Davila  YOB: 1990  MEDICAL RECORD NUMBER:  [id number]    The patient is being assessed for  Admission    I agree that at least one RN has performed a thorough Head to Toe Skin Assessment on the patient. ALL assessment sites listed below have been assessed.      Areas assessed by both nurses:    Head, Face, Ears, Shoulders, Back, Chest, Arms, Elbows, Hands, Sacrum. Buttock, Coccyx, Ischium, Legs. Feet and Heels, and Under Medical Devices         Does the Patient have a Wound? No noted wound(s)       Maximus Prevention initiated by RN: No  Wound Care Orders initiated by RN: No    Pressure Injury (Stage 3,4, Unstageable, DTI, NWPT, and Complex wounds) if present, place Wound referral order by RN under : No    New Ostomies, if present place, Ostomy referral order under : No     Nurse 1 eSignature: Electronically signed by Clemencia Grayson RN on 3/25/24 at 4:55 PM EDT    **SHARE this note so that the co-signing nurse can place an eSignature**    Nurse 2 eSignature: Electronically signed by Lulu Stroud RN on 3/25/24 at 5:06 PM EDT   
Comprehensive Nutrition Assessment    Type and Reason for Visit:  Initial, Positive Nutrition Screen (MST 2)    Nutrition Recommendations/Plan:   Continue Current Diet  Begin ONS (High calorie/high protein) TID  Begin ONS (frozen) BID     Malnutrition Assessment:  Malnutrition Status:  Moderate malnutrition (03/26/24 1238)    Context:  Social/Environmental Circumstances (ETOH abuse)     Findings of the 6 clinical characteristics of malnutrition:  Energy Intake:  50% or less estimated energy requirements for 1 month or longer (Pt notes decreased PO intake. No PO past few days PTA.)  Weight Loss:  No significant weight loss     Body Fat Loss:  Mild body fat loss Orbital, Triceps, Buccal region   Muscle Mass Loss:  Mild muscle mass loss Temples (temporalis), Clavicles (pectoralis & deltoids), Calf (gastrocnemius)  Fluid Accumulation:  No significant fluid accumulation     Strength:  Not Performed    Nutrition Assessment:    Pt admitted for alcohol withdrawl.  PMHx: Anxiety, Depresssion, Seizures ETOH abuse, PTSD, Concussion, Pancreatitis. Pt with suboptimal oral intake. Pt states he has nausea and cant eat big meals. Meets criteria for moderate malnutrition. Agreeable to ONS will begin Ensure TID, magic cup BIDwill add BID and monitor.    Nutrition Related Findings:    A/O x4, I/O -2230 somce admit, e'lytes wnl, abd wdl, bowel sounds active x4 Wound Type: None       Current Nutrition Intake & Therapies:    Average Meal Intake: 51-75%  Average Supplements Intake: None Ordered  ADULT DIET; Regular  ADULT ORAL NUTRITION SUPPLEMENT; Breakfast, Lunch, Dinner; Standard High Calorie/High Protein Oral Supplement  ADULT ORAL NUTRITION SUPPLEMENT; Lunch, Dinner; Frozen Oral Supplement    Anthropometric Measures:  Height: 170.2 cm (5' 7.01\")  Ideal Body Weight (IBW): 148 lbs (67 kg)    Admission Body Weight: 61.2 kg (134 lb 14.7 oz)  Current Body Weight: 61.2 kg (134 lb 14.7 oz), 91.2 % IBW. Weight Source: Bed Scale 
Patient does not want any information given to girlfriend Antonella Nicolle.   
Patient is yelling at this nurse, and stating that he \"does not want to go to rehab or to got to AA he just wants to go home and go to sleep\". This nurse and Dr Feliz went to see patient together and patient continues to state that he wants to go home to sleep and wants his gabapentin. Refusing to take other medications except for protonix at this time. Currently VSS despite patient stating that he is \"having withdrawals\". No noted s/s of withdrawal noted. Patient states \"just get me a ride out of here\". Dr Feliz to discharge patient this am after patient agreeing that this is what he wants.   Will continue with discharge per protocol, and  notified to get patient a ride home  
Plan:    Alcohol withdrawal syndrome  -reports last ETOH was late night of 3/23  -CIWA protocol   -continue phenobarbital taper  -continue phenobarbital & diazepam prn  -SW following for discharge planning  -seizure precautions  -fall precautions  -folic acid & thiamine daily  -most recent CIWA-Ar Total: (!) 9    Fall, prior to arrival  Abrasion left scalp  -CT head negative for acute abnormality    Tobacco use disorder  -Advised patient to quit and offered support.  Recommended nicotine replacement with patches, reviewed use and dosing.  -nicotine patch    Abrasion left scalp  -pt reported injury occurred while intoxicated, does not recall details of the event  -CT head negative for acute abnormalities     Numbness & tingling of upper extremities  -continue home gabapentin 300mg BID  -dose & frequency verified per dispense report      Code Status: Full Code  DVT prophylaxis: Lovenox 40mg daily    Disposition: From home. Continue to monitor response to above treatments but anticipate will need 2-3 days for medical optimization to ensure safe discharge.  SW following for discharge planning & possible rehab.     30 minutes or more spent reviewing patient chart, assessing patient, discussing plan of care with patient and family, discussing plan of care with collaborating physician, and documentation.       NOTE: This report was transcribed using voice recognition software. Every effort was made to ensure accuracy; however, inadvertent computerized transcription errors may be present.  Electronically signed by LI Toth NP on 3/26/2024 at 11:40 AM

## 2024-03-27 NOTE — PLAN OF CARE
Problem: Discharge Planning  Goal: Discharge to home or other facility with appropriate resources  3/26/2024 2200 by Ian Mayorga RN  Outcome: Progressing  3/26/2024 1031 by Oliva Crabtree RN  Outcome: Progressing     Problem: Pain  Goal: Verbalizes/displays adequate comfort level or baseline comfort level  3/26/2024 2200 by Ian Mayorga RN  Outcome: Progressing  3/26/2024 1031 by Oliva Crabtree RN  Outcome: Progressing     Problem: Safety - Adult  Goal: Free from fall injury  3/26/2024 2200 by Ian Mayorga RN  Outcome: Progressing  3/26/2024 1031 by Oliva Crabtree RN  Outcome: Progressing     Problem: Nutrition Deficit:  Goal: Optimize nutritional status  3/26/2024 2200 by Ian Mayorga RN  Outcome: Progressing  3/26/2024 1239 by Tomasa Anna, DEBORAH, LD  Outcome: Progressing  Flowsheets (Taken 3/26/2024 1239)  Nutrient intake appropriate for improving, restoring, or maintaining nutritional needs:   Assess nutritional status and recommend course of action   Recommend appropriate diets, oral nutritional supplements, and vitamin/mineral supplements   Monitor oral intake, labs, and treatment plans

## 2024-03-27 NOTE — CARE COORDINATION
SOCIAL WORK / DISCHARGE PLANNING:  Pt is discharging, has declined assistance to [place] or any other [facility] treatment facility. He plans to return home with brother as previous. He is angry, yelling that no one will give him any meds. Pt has been ordered home going meds. He needs transport arranged. [facility] was called, dispatching an Uber and they will text pt 15 min prior to arrival. Sw confirmed pts cell phone number and charged. RN assisting pt to get a new shirt as his clothes are soiled. Pt continued to swear and yell.             Electronically signed by PAT Resendiz on 3/27/2024 at 10:18 AM

## 2024-03-27 NOTE — DISCHARGE SUMMARY
Discharge Summary    Date: 3/27/2024  Patient Name: Mark Davila    YOB: 1990     Age: 33 y.o.    Admit Date: 3/24/2024  Discharge Date: 3/27/2024  Discharge Condition: Stable    Admission Diagnosis  Alcohol withdrawal syndrome, uncomplicated (HCC) [F10.930];Alcohol withdrawal syndrome with complication (HCC) [F10.939]      Discharge Diagnosis  Principal Problem:    Alcohol withdrawal syndrome, uncomplicated (HCC)  Active Problems:    Alcohol withdrawal syndrome with complication (HCC)  Resolved Problems:    * No resolved hospital problems. *      Hospital Stay  Narrative of Hospital Course:  33-year-old male patient is angry.  He states that he is in alcohol withdrawal for 4 days.  He has normal vital signs.  He is not confabulating.  I told him he is not in physiologic alcohol withdrawal.  He insists he just wants to take his gabapentin and go sleep.  I offered him options for treatment which she refused.  He has been offered opportunities for alcohol rehab.  He said he is banned from 7 already.  He said that he does not need these because he does not believe in the Gnosticist aspect of alcohol addiction and the need for God to help.  We mutually agreed that he could be discharged today.    On 3/25/2024 he told a representative of the hospital that, he did not want any information released to his girlfriend Shauna Valverde. I was notified by the floor his fiance called requesting I talk with her. I have requested that the floor notify Risk Management.    Consultants:  IP CONSULT TO SOCIAL WORK  IP CONSULT TO SOCIAL WORK  IP CONSULT TO SOCIAL WORK    Surgeries/procedures Performed:      Treatments:    IV Hydration        Discharge Plan/Disposition:  Home    Hospital/Incidental Findings Requiring Follow Up:    Patient Instructions:    Diet: Regular Diet    Activity:Activity as Tolerated  For number of days (if applicable):      Other Instructions:    Provider Follow-Up:   No follow-ups on file.

## 2024-05-17 ENCOUNTER — HOSPITAL ENCOUNTER (INPATIENT)
Age: 34
LOS: 1 days | Discharge: NURSING, REHAB OR LONG-TERM CARE | End: 2024-05-20
Attending: STUDENT IN AN ORGANIZED HEALTH CARE EDUCATION/TRAINING PROGRAM | Admitting: INTERNAL MEDICINE
Payer: COMMERCIAL

## 2024-05-17 DIAGNOSIS — F10.932 ALCOHOL WITHDRAWAL SYNDROME WITH PERCEPTUAL DISTURBANCE (HCC): Primary | ICD-10-CM

## 2024-05-17 LAB
ALBUMIN SERPL-MCNC: 4.8 G/DL (ref 3.5–5.2)
ALP SERPL-CCNC: 82 U/L (ref 40–129)
ALT SERPL-CCNC: 29 U/L (ref 0–40)
ANION GAP SERPL CALCULATED.3IONS-SCNC: 16 MMOL/L (ref 7–16)
AST SERPL-CCNC: 30 U/L (ref 0–39)
BASOPHILS # BLD: 0.08 K/UL (ref 0–0.2)
BASOPHILS NFR BLD: 1 % (ref 0–2)
BILIRUB SERPL-MCNC: 1.3 MG/DL (ref 0–1.2)
BUN SERPL-MCNC: 6 MG/DL (ref 6–20)
CALCIUM SERPL-MCNC: 9.8 MG/DL (ref 8.6–10.2)
CHLORIDE SERPL-SCNC: 94 MMOL/L (ref 98–107)
CO2 SERPL-SCNC: 30 MMOL/L (ref 22–29)
CREAT SERPL-MCNC: 0.8 MG/DL (ref 0.7–1.2)
EOSINOPHIL # BLD: 0.12 K/UL (ref 0.05–0.5)
EOSINOPHILS RELATIVE PERCENT: 1 % (ref 0–6)
ERYTHROCYTE [DISTWIDTH] IN BLOOD BY AUTOMATED COUNT: 11.7 % (ref 11.5–15)
ETHANOLAMINE SERPL-MCNC: 20 MG/DL (ref 0–0.08)
GFR, ESTIMATED: >90 ML/MIN/1.73M2
GLUCOSE SERPL-MCNC: 110 MG/DL (ref 74–99)
HCT VFR BLD AUTO: 46.9 % (ref 37–54)
HGB BLD-MCNC: 17.1 G/DL (ref 12.5–16.5)
IMM GRANULOCYTES # BLD AUTO: 0.03 K/UL (ref 0–0.58)
IMM GRANULOCYTES NFR BLD: 0 % (ref 0–5)
LIPASE SERPL-CCNC: 12 U/L (ref 13–60)
LYMPHOCYTES NFR BLD: 2.55 K/UL (ref 1.5–4)
LYMPHOCYTES RELATIVE PERCENT: 31 % (ref 20–42)
MAGNESIUM SERPL-MCNC: 1.7 MG/DL (ref 1.6–2.6)
MCH RBC QN AUTO: 33.4 PG (ref 26–35)
MCHC RBC AUTO-ENTMCNC: 36.5 G/DL (ref 32–34.5)
MCV RBC AUTO: 91.6 FL (ref 80–99.9)
MONOCYTES NFR BLD: 0.87 K/UL (ref 0.1–0.95)
MONOCYTES NFR BLD: 10 % (ref 2–12)
NEUTROPHILS NFR BLD: 56 % (ref 43–80)
NEUTS SEG NFR BLD: 4.72 K/UL (ref 1.8–7.3)
PHOSPHATE SERPL-MCNC: 4.1 MG/DL (ref 2.5–4.5)
PLATELET # BLD AUTO: 272 K/UL (ref 130–450)
PMV BLD AUTO: 12.4 FL (ref 7–12)
POTASSIUM SERPL-SCNC: 4.3 MMOL/L (ref 3.5–5)
PROT SERPL-MCNC: 7.8 G/DL (ref 6.4–8.3)
RBC # BLD AUTO: 5.12 M/UL (ref 3.8–5.8)
SODIUM SERPL-SCNC: 140 MMOL/L (ref 132–146)
WBC OTHER # BLD: 8.4 K/UL (ref 4.5–11.5)

## 2024-05-17 PROCEDURE — APPSS45 APP SPLIT SHARED TIME 31-45 MINUTES: Performed by: NURSE PRACTITIONER

## 2024-05-17 PROCEDURE — 6360000002 HC RX W HCPCS: Performed by: NURSE PRACTITIONER

## 2024-05-17 PROCEDURE — 83690 ASSAY OF LIPASE: CPT

## 2024-05-17 PROCEDURE — 83735 ASSAY OF MAGNESIUM: CPT

## 2024-05-17 PROCEDURE — 36415 COLL VENOUS BLD VENIPUNCTURE: CPT

## 2024-05-17 PROCEDURE — 6370000000 HC RX 637 (ALT 250 FOR IP)

## 2024-05-17 PROCEDURE — 6370000000 HC RX 637 (ALT 250 FOR IP): Performed by: NURSE PRACTITIONER

## 2024-05-17 PROCEDURE — 84100 ASSAY OF PHOSPHORUS: CPT

## 2024-05-17 PROCEDURE — 96372 THER/PROPH/DIAG INJ SC/IM: CPT

## 2024-05-17 PROCEDURE — 85025 COMPLETE CBC W/AUTO DIFF WBC: CPT

## 2024-05-17 PROCEDURE — 96375 TX/PRO/DX INJ NEW DRUG ADDON: CPT

## 2024-05-17 PROCEDURE — 80053 COMPREHEN METABOLIC PANEL: CPT

## 2024-05-17 PROCEDURE — G0378 HOSPITAL OBSERVATION PER HR: HCPCS

## 2024-05-17 PROCEDURE — 99285 EMERGENCY DEPT VISIT HI MDM: CPT

## 2024-05-17 PROCEDURE — 99222 1ST HOSP IP/OBS MODERATE 55: CPT | Performed by: INTERNAL MEDICINE

## 2024-05-17 PROCEDURE — 2580000003 HC RX 258: Performed by: NURSE PRACTITIONER

## 2024-05-17 PROCEDURE — G0480 DRUG TEST DEF 1-7 CLASSES: HCPCS

## 2024-05-17 PROCEDURE — 6370000000 HC RX 637 (ALT 250 FOR IP): Performed by: INTERNAL MEDICINE

## 2024-05-17 PROCEDURE — 2580000003 HC RX 258

## 2024-05-17 PROCEDURE — 6360000002 HC RX W HCPCS

## 2024-05-17 PROCEDURE — 96374 THER/PROPH/DIAG INJ IV PUSH: CPT

## 2024-05-17 RX ORDER — ONDANSETRON 2 MG/ML
4 INJECTION INTRAMUSCULAR; INTRAVENOUS ONCE
Status: COMPLETED | OUTPATIENT
Start: 2024-05-17 | End: 2024-05-17

## 2024-05-17 RX ORDER — POTASSIUM CHLORIDE 20 MEQ/1
40 TABLET, EXTENDED RELEASE ORAL PRN
Status: DISCONTINUED | OUTPATIENT
Start: 2024-05-17 | End: 2024-05-20 | Stop reason: HOSPADM

## 2024-05-17 RX ORDER — PHENOBARBITAL 32.4 MG/1
64.8 TABLET ORAL EVERY 6 HOURS PRN
Status: DISPENSED | OUTPATIENT
Start: 2024-05-17 | End: 2024-05-18

## 2024-05-17 RX ORDER — SODIUM CHLORIDE 9 MG/ML
INJECTION, SOLUTION INTRAVENOUS PRN
Status: DISCONTINUED | OUTPATIENT
Start: 2024-05-17 | End: 2024-05-20 | Stop reason: HOSPADM

## 2024-05-17 RX ORDER — THIAMINE HYDROCHLORIDE 100 MG/ML
100 INJECTION, SOLUTION INTRAMUSCULAR; INTRAVENOUS ONCE
Status: COMPLETED | OUTPATIENT
Start: 2024-05-17 | End: 2024-05-17

## 2024-05-17 RX ORDER — SODIUM CHLORIDE 0.9 % (FLUSH) 0.9 %
5-40 SYRINGE (ML) INJECTION EVERY 12 HOURS SCHEDULED
Status: DISCONTINUED | OUTPATIENT
Start: 2024-05-17 | End: 2024-05-20 | Stop reason: HOSPADM

## 2024-05-17 RX ORDER — PHENOBARBITAL 32.4 MG/1
32.4 TABLET ORAL EVERY 6 HOURS PRN
Status: DISPENSED | OUTPATIENT
Start: 2024-05-18 | End: 2024-05-20

## 2024-05-17 RX ORDER — M-VIT,TX,IRON,MINS/CALC/FOLIC 27MG-0.4MG
1 TABLET ORAL DAILY
Status: DISCONTINUED | OUTPATIENT
Start: 2024-05-17 | End: 2024-05-20 | Stop reason: HOSPADM

## 2024-05-17 RX ORDER — ENOXAPARIN SODIUM 100 MG/ML
40 INJECTION SUBCUTANEOUS DAILY
Status: DISCONTINUED | OUTPATIENT
Start: 2024-05-17 | End: 2024-05-20 | Stop reason: HOSPADM

## 2024-05-17 RX ORDER — ONDANSETRON 4 MG/1
4 TABLET, ORALLY DISINTEGRATING ORAL EVERY 8 HOURS PRN
Status: DISCONTINUED | OUTPATIENT
Start: 2024-05-17 | End: 2024-05-20 | Stop reason: HOSPADM

## 2024-05-17 RX ORDER — SODIUM CHLORIDE, SODIUM LACTATE, POTASSIUM CHLORIDE, AND CALCIUM CHLORIDE .6; .31; .03; .02 G/100ML; G/100ML; G/100ML; G/100ML
1000 INJECTION, SOLUTION INTRAVENOUS ONCE
Status: COMPLETED | OUTPATIENT
Start: 2024-05-17 | End: 2024-05-17

## 2024-05-17 RX ORDER — SODIUM CHLORIDE 0.9 % (FLUSH) 0.9 %
5-40 SYRINGE (ML) INJECTION PRN
Status: DISCONTINUED | OUTPATIENT
Start: 2024-05-17 | End: 2024-05-20 | Stop reason: HOSPADM

## 2024-05-17 RX ORDER — BISACODYL 5 MG/1
5 TABLET, DELAYED RELEASE ORAL DAILY PRN
Status: DISCONTINUED | OUTPATIENT
Start: 2024-05-17 | End: 2024-05-20 | Stop reason: HOSPADM

## 2024-05-17 RX ORDER — ACETAMINOPHEN 325 MG/1
650 TABLET ORAL EVERY 6 HOURS PRN
Status: DISCONTINUED | OUTPATIENT
Start: 2024-05-17 | End: 2024-05-20 | Stop reason: HOSPADM

## 2024-05-17 RX ORDER — PHENOBARBITAL 32.4 MG/1
32.4 TABLET ORAL 2 TIMES DAILY
Status: DISCONTINUED | OUTPATIENT
Start: 2024-05-19 | End: 2024-05-18

## 2024-05-17 RX ORDER — MULTIVITAMIN WITH FOLIC ACID 400 MCG
1 TABLET ORAL DAILY
Status: DISCONTINUED | OUTPATIENT
Start: 2024-05-17 | End: 2024-05-17 | Stop reason: CLARIF

## 2024-05-17 RX ORDER — GAUZE BANDAGE 2" X 2"
100 BANDAGE TOPICAL DAILY
Status: DISCONTINUED | OUTPATIENT
Start: 2024-05-17 | End: 2024-05-20 | Stop reason: HOSPADM

## 2024-05-17 RX ORDER — PHENOBARBITAL 32.4 MG/1
64.8 TABLET ORAL 4 TIMES DAILY
Status: COMPLETED | OUTPATIENT
Start: 2024-05-17 | End: 2024-05-18

## 2024-05-17 RX ORDER — GABAPENTIN 300 MG/1
300 CAPSULE ORAL 2 TIMES DAILY
Status: DISCONTINUED | OUTPATIENT
Start: 2024-05-17 | End: 2024-05-20 | Stop reason: HOSPADM

## 2024-05-17 RX ORDER — NICOTINE 21 MG/24HR
1 PATCH, TRANSDERMAL 24 HOURS TRANSDERMAL DAILY
Status: DISCONTINUED | OUTPATIENT
Start: 2024-05-17 | End: 2024-05-20 | Stop reason: HOSPADM

## 2024-05-17 RX ORDER — MAGNESIUM SULFATE IN WATER 40 MG/ML
2000 INJECTION, SOLUTION INTRAVENOUS PRN
Status: DISCONTINUED | OUTPATIENT
Start: 2024-05-17 | End: 2024-05-20 | Stop reason: HOSPADM

## 2024-05-17 RX ORDER — PHENOBARBITAL 32.4 MG/1
32.4 TABLET ORAL 4 TIMES DAILY
Status: DISCONTINUED | OUTPATIENT
Start: 2024-05-18 | End: 2024-05-18

## 2024-05-17 RX ORDER — POTASSIUM CHLORIDE 7.45 MG/ML
10 INJECTION INTRAVENOUS PRN
Status: DISCONTINUED | OUTPATIENT
Start: 2024-05-17 | End: 2024-05-20 | Stop reason: HOSPADM

## 2024-05-17 RX ORDER — PHENOBARBITAL 16.2 MG/1
16.2 TABLET ORAL EVERY 6 HOURS PRN
Status: DISCONTINUED | OUTPATIENT
Start: 2024-05-20 | End: 2024-05-20 | Stop reason: HOSPADM

## 2024-05-17 RX ORDER — PHENOBARBITAL 16.2 MG/1
16.2 TABLET ORAL 2 TIMES DAILY
Status: DISCONTINUED | OUTPATIENT
Start: 2024-05-20 | End: 2024-05-18

## 2024-05-17 RX ORDER — MIDAZOLAM HYDROCHLORIDE 1 MG/ML
2 INJECTION INTRAMUSCULAR; INTRAVENOUS ONCE
Status: COMPLETED | OUTPATIENT
Start: 2024-05-17 | End: 2024-05-17

## 2024-05-17 RX ORDER — ACETAMINOPHEN 650 MG/1
650 SUPPOSITORY RECTAL EVERY 6 HOURS PRN
Status: DISCONTINUED | OUTPATIENT
Start: 2024-05-17 | End: 2024-05-20 | Stop reason: HOSPADM

## 2024-05-17 RX ORDER — ONDANSETRON 2 MG/ML
4 INJECTION INTRAMUSCULAR; INTRAVENOUS EVERY 6 HOURS PRN
Status: DISCONTINUED | OUTPATIENT
Start: 2024-05-17 | End: 2024-05-20 | Stop reason: HOSPADM

## 2024-05-17 RX ADMIN — ENOXAPARIN SODIUM 40 MG: 100 INJECTION SUBCUTANEOUS at 18:07

## 2024-05-17 RX ADMIN — THIAMINE HYDROCHLORIDE 100 MG: 100 INJECTION, SOLUTION INTRAMUSCULAR; INTRAVENOUS at 12:05

## 2024-05-17 RX ADMIN — GABAPENTIN 300 MG: 300 CAPSULE ORAL at 20:56

## 2024-05-17 RX ADMIN — PHENOBARBITAL 64.8 MG: 32.4 TABLET ORAL at 18:08

## 2024-05-17 RX ADMIN — PHENOBARBITAL 64.8 MG: 32.4 TABLET ORAL at 15:09

## 2024-05-17 RX ADMIN — SODIUM CHLORIDE, PRESERVATIVE FREE 10 ML: 5 INJECTION INTRAVENOUS at 20:57

## 2024-05-17 RX ADMIN — PHENOBARBITAL 64.8 MG: 32.4 TABLET ORAL at 20:56

## 2024-05-17 RX ADMIN — Medication 1 TABLET: at 18:08

## 2024-05-17 RX ADMIN — Medication 100 MG: at 18:08

## 2024-05-17 RX ADMIN — ONDANSETRON 4 MG: 4 TABLET, ORALLY DISINTEGRATING ORAL at 22:59

## 2024-05-17 RX ADMIN — ONDANSETRON 4 MG: 2 INJECTION INTRAMUSCULAR; INTRAVENOUS at 12:05

## 2024-05-17 RX ADMIN — MIDAZOLAM 2 MG: 1 INJECTION INTRAMUSCULAR; INTRAVENOUS at 12:47

## 2024-05-17 RX ADMIN — SODIUM CHLORIDE, POTASSIUM CHLORIDE, SODIUM LACTATE AND CALCIUM CHLORIDE 1000 ML: 600; 310; 30; 20 INJECTION, SOLUTION INTRAVENOUS at 12:50

## 2024-05-17 RX ADMIN — PHENOBARBITAL 64.8 MG: 32.4 TABLET ORAL at 12:51

## 2024-05-17 ASSESSMENT — PAIN SCALES - GENERAL
PAINLEVEL_OUTOF10: 0
PAINLEVEL_OUTOF10: 4
PAINLEVEL_OUTOF10: 5

## 2024-05-17 ASSESSMENT — PAIN - FUNCTIONAL ASSESSMENT: PAIN_FUNCTIONAL_ASSESSMENT: 0-10

## 2024-05-17 NOTE — H&P
[facility] Hospitalist Group   History and Physical      CHIEF COMPLAINT:  emesis/Alcohol Intoxication    History of Present Illness:  33 y.o. male with a history of Anxiey, Depression, Asthma, Depression, PTSD, Seizures presents with alcohol withdrawal over the last 4 days. Pt states he has been drinking Everclear for about 2 weeks,last drink noted 5/16 2100. He has had multiple opportunities to received alcohol rehabilitation, however did not complete. States he has been in [facility] had some issues and has been asked not to return. The same with [facility]. States he had a break up with his girlfriend of 10 years. Started drinking and has not stopped for 2 or so weeks. States he feels the \"psychedelics\"  Should be legalized. States he has been seen [facility]. States they sent him home. States he does not want to see anyone else, because they will just place him on SSRIs which do not work.   He is receiving Gabapentin for off label use of his anxiety. Pt denies fevers, chills, N/V/D, CP, SOB. States he feels like he can eat and is requesting food. Pt received 1L LR, Phenobarbital 64.8mg, Thiamine 100mg IN ED course. Decision to admit pt for further evaluation and treatment.     Informant(s) for H&P:Pt and EMR     REVIEW OF SYSTEMS:  Admits to Alcohol consumption, emesis. Denies fevers, chills, cp, sob,  ha, vision/hearing changes, wt changes, hot/cold flashes, other open skin lesions, diarrhea, constipation, dysuria/hematuria unless noted in HPI. Complete ROS performed with the patient and is otherwise negative.      PMH:  Past Medical History:   Diagnosis Date    Anxiety     Arm pain     Asthma     Concussion with loss of consciousness     Convulsions (HCC)     Depression     Flashing lights     Head injury     Headache     Leg pain     Numbness and tingling     arms and hands    PTSD (post-traumatic stress disorder)     Seizures (HCC)        Surgical History:  Past  Attending Attestation (For Attendings USE Only)...

## 2024-05-17 NOTE — CARE COORDINATION
Peer Recovery Support Note       Name:Cesar Bobo  Date:05/17/2024        Chief Complaint   Patient presents with    Emesis    Alcohol Intoxication     Stated that he has been drinking ever clear for about 2 weeks.  Stated that he has done this before last drink of alcohol was 2100 last night.  Last time emesis was last hour           Peer Support met with patient.  [x] Support and education provided  [] Resources provided   [x] Treatment referral: [facility]  [] Other:   [] Patient declined peer recovery services      Referred By: Maura (NP)     Notes:  Was asked to see patient and help possibly coordinate care, so I spoke with the patient and the patient said that he would just like to go to detox. So I did contact Tejal ([facility] facility), was able to place the phone on speaker so Maura and Tejal could communicate. At that time Tejal requested MR be faxed.    I reached out to Tejal to see where things stood and she said that the patient will be kept through the night and is set for admission at [facility] tomorrow 05/18/2024 at 10am.    Electronically signed by Cesar Bobo on 5/17/2024 at 3:43 PM

## 2024-05-17 NOTE — ED NOTES
Department of Emergency Medicine  FIRST PROVIDER TRIAGE NOTE             Independent MLP           5/17/24  10:13 AM EDT    Date of Encounter: 5/17/24   MRN: [id number]      HPI: Mark Davila is a 33 y.o. male who presents to the ED for No chief complaint on file.       drinking alcohol last 2 weeks now having burning pain in abdomen with vomiting  when he has stopped drinking alcohol in the past he has had seizures     ROS: Negative for fever or rash.    PE: Gen Appearance/Constitutional: alert  CV: regular rate  Pulm: CTA bilat     Initial Plan of Care: All treatment areas with department are currently occupied. Plan to order/Initiate the following while awaiting opening in ED: labs and imaging studies.  Initiate Treatment-Testing, Proceed toTreatment Area When Bed Available for ED Attending/MLP to Continue Care    Electronically signed by BETO Millard   DD: 5/17/24

## 2024-05-17 NOTE — CARE COORDINATION
Case Management Assessment  Initial Evaluation    Date/Time of Evaluation: 5/17/2024 4:27 PM  Assessment Completed by: PAT Mayers    If patient is discharged prior to next notation, then this note serves as note for discharge by case management.    Patient Name: Mark Davila                   YOB: 1990  Diagnosis: Alcohol withdrawal syndrome, uncomplicated (HCC) [F10.930]                   Date / Time: 5/17/2024 11:36 AM    Patient Admission Status: Observation   Readmission Risk (Low < 19, Mod (19-27), High > 27): Readmission Risk Score: 11.5    Current PCP: Not, On File (Inactive)  PCP verified by CM? No    Chart Reviewed: Yes      History Provided by: Patient, Medical Record  Patient Orientation: Alert and Oriented, Person, Place, Situation, Self    Patient Cognition: Alert    Hospitalization in the last 30 days (Readmission):  No    If yes, Readmission Assessment in CM Navigator will be completed.    Advance Directives:      Code Status: Prior   Patient's Primary Decision Maker is: Patient Declined (Legal Next of Kin Remains as Decision Maker)      Discharge Planning:    Patient lives with:   Type of Home:    Primary Care Giver: Self  Patient Support Systems include: Family Members   Current Financial resources:    Current community resources:    Current services prior to admission:              Current DME:              Type of Home Care services:       ADLS  Prior functional level: Independent in ADLs/IADLs  Current functional level: Independent in ADLs/IADLs    PT AM-PAC:   /24  OT AM-PAC:   /24    Family can provide assistance at DC: No  Would you like Case Management to discuss the discharge plan with any other family members/significant others, and if so, who? No  Plans to Return to Present Housing: Yes  Other Identified Issues/Barriers to RETURNING to current housing: none  Potential Assistance needed at discharge:              Potential DME:    Patient expects to

## 2024-05-17 NOTE — ED NOTES
Pt placed on cardiac monitor with cont pulse ox, bed low/locked with side rails up and call light within reach, seizure pads in place, lights dimmed,
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
## 2024-05-18 PROBLEM — F41.9 ANXIETY AND DEPRESSION: Status: ACTIVE | Noted: 2024-05-18

## 2024-05-18 PROBLEM — F12.90 MARIJUANA USE: Status: ACTIVE | Noted: 2024-05-18

## 2024-05-18 PROBLEM — F10.932 ALCOHOL WITHDRAWAL SYNDROME WITH PERCEPTUAL DISTURBANCE (HCC): Status: ACTIVE | Noted: 2024-05-18

## 2024-05-18 PROBLEM — F32.A ANXIETY AND DEPRESSION: Status: ACTIVE | Noted: 2024-05-18

## 2024-05-18 LAB
ANION GAP SERPL CALCULATED.3IONS-SCNC: 14 MMOL/L (ref 7–16)
BASOPHILS # BLD: 0 K/UL (ref 0–0.2)
BASOPHILS NFR BLD: 0 % (ref 0–2)
BUN SERPL-MCNC: 8 MG/DL (ref 6–20)
CALCIUM SERPL-MCNC: 9.6 MG/DL (ref 8.6–10.2)
CHLORIDE SERPL-SCNC: 96 MMOL/L (ref 98–107)
CO2 SERPL-SCNC: 27 MMOL/L (ref 22–29)
CREAT SERPL-MCNC: 0.7 MG/DL (ref 0.7–1.2)
EOSINOPHIL # BLD: 0.35 K/UL (ref 0.05–0.5)
EOSINOPHILS RELATIVE PERCENT: 4 % (ref 0–6)
ERYTHROCYTE [DISTWIDTH] IN BLOOD BY AUTOMATED COUNT: 11.8 % (ref 11.5–15)
GFR, ESTIMATED: >90 ML/MIN/1.73M2
GLUCOSE SERPL-MCNC: 100 MG/DL (ref 74–99)
HCT VFR BLD AUTO: 47.6 % (ref 37–54)
HGB BLD-MCNC: 16.7 G/DL (ref 12.5–16.5)
LYMPHOCYTES NFR BLD: 2.48 K/UL (ref 1.5–4)
LYMPHOCYTES RELATIVE PERCENT: 24 % (ref 20–42)
MCH RBC QN AUTO: 33.1 PG (ref 26–35)
MCHC RBC AUTO-ENTMCNC: 35.1 G/DL (ref 32–34.5)
MCV RBC AUTO: 94.3 FL (ref 80–99.9)
MONOCYTES NFR BLD: 0.44 K/UL (ref 0.1–0.95)
MONOCYTES NFR BLD: 4 % (ref 2–12)
NEUTROPHILS NFR BLD: 68 % (ref 43–80)
NEUTS SEG NFR BLD: 6.92 K/UL (ref 1.8–7.3)
PLATELET CONFIRMATION: NORMAL
PLATELET, FLUORESCENCE: 160 K/UL (ref 130–450)
PMV BLD AUTO: 13.9 FL (ref 7–12)
POTASSIUM SERPL-SCNC: 4.2 MMOL/L (ref 3.5–5)
RBC # BLD AUTO: 5.05 M/UL (ref 3.8–5.8)
SODIUM SERPL-SCNC: 137 MMOL/L (ref 132–146)
WBC OTHER # BLD: 10.2 K/UL (ref 4.5–11.5)

## 2024-05-18 PROCEDURE — 96375 TX/PRO/DX INJ NEW DRUG ADDON: CPT

## 2024-05-18 PROCEDURE — 99232 SBSQ HOSP IP/OBS MODERATE 35: CPT | Performed by: INTERNAL MEDICINE

## 2024-05-18 PROCEDURE — 2580000003 HC RX 258: Performed by: NURSE PRACTITIONER

## 2024-05-18 PROCEDURE — 96376 TX/PRO/DX INJ SAME DRUG ADON: CPT

## 2024-05-18 PROCEDURE — 6360000002 HC RX W HCPCS: Performed by: NURSE PRACTITIONER

## 2024-05-18 PROCEDURE — 6370000000 HC RX 637 (ALT 250 FOR IP): Performed by: NURSE PRACTITIONER

## 2024-05-18 PROCEDURE — G0378 HOSPITAL OBSERVATION PER HR: HCPCS

## 2024-05-18 PROCEDURE — 36415 COLL VENOUS BLD VENIPUNCTURE: CPT

## 2024-05-18 PROCEDURE — 85025 COMPLETE CBC W/AUTO DIFF WBC: CPT

## 2024-05-18 PROCEDURE — 80048 BASIC METABOLIC PNL TOTAL CA: CPT

## 2024-05-18 PROCEDURE — 6370000000 HC RX 637 (ALT 250 FOR IP): Performed by: INTERNAL MEDICINE

## 2024-05-18 PROCEDURE — APPSS30 APP SPLIT SHARED TIME 16-30 MINUTES: Performed by: NURSE PRACTITIONER

## 2024-05-18 PROCEDURE — 96366 THER/PROPH/DIAG IV INF ADDON: CPT

## 2024-05-18 PROCEDURE — 96372 THER/PROPH/DIAG INJ SC/IM: CPT

## 2024-05-18 PROCEDURE — 6360000002 HC RX W HCPCS: Performed by: INTERNAL MEDICINE

## 2024-05-18 PROCEDURE — 96365 THER/PROPH/DIAG IV INF INIT: CPT

## 2024-05-18 RX ORDER — PHENOBARBITAL SODIUM 65 MG/ML
16.2 INJECTION, SOLUTION INTRAMUSCULAR; INTRAVENOUS 2 TIMES DAILY
Status: DISCONTINUED | OUTPATIENT
Start: 2024-05-21 | End: 2024-05-20

## 2024-05-18 RX ORDER — PHENOBARBITAL SODIUM 65 MG/ML
32.5 INJECTION, SOLUTION INTRAMUSCULAR; INTRAVENOUS 2 TIMES DAILY
Status: DISCONTINUED | OUTPATIENT
Start: 2024-05-20 | End: 2024-05-20

## 2024-05-18 RX ORDER — PHENOBARBITAL SODIUM 65 MG/ML
32.5 INJECTION, SOLUTION INTRAMUSCULAR; INTRAVENOUS 4 TIMES DAILY
Status: COMPLETED | OUTPATIENT
Start: 2024-05-19 | End: 2024-05-20

## 2024-05-18 RX ORDER — CHLORDIAZEPOXIDE HYDROCHLORIDE 25 MG/1
25 CAPSULE, GELATIN COATED ORAL EVERY 6 HOURS PRN
Status: DISCONTINUED | OUTPATIENT
Start: 2024-05-18 | End: 2024-05-20 | Stop reason: HOSPADM

## 2024-05-18 RX ORDER — MAGNESIUM SULFATE IN WATER 40 MG/ML
2000 INJECTION, SOLUTION INTRAVENOUS ONCE
Status: COMPLETED | OUTPATIENT
Start: 2024-05-18 | End: 2024-05-18

## 2024-05-18 RX ORDER — PHENOBARBITAL SODIUM 65 MG/ML
65 INJECTION, SOLUTION INTRAMUSCULAR; INTRAVENOUS 4 TIMES DAILY
Status: COMPLETED | OUTPATIENT
Start: 2024-05-18 | End: 2024-05-19

## 2024-05-18 RX ADMIN — PHENOBARBITAL 32.4 MG: 32.4 TABLET ORAL at 12:53

## 2024-05-18 RX ADMIN — Medication 100 MG: at 08:58

## 2024-05-18 RX ADMIN — PHENOBARBITAL 64.8 MG: 32.4 TABLET ORAL at 00:05

## 2024-05-18 RX ADMIN — PHENOBARBITAL 64.8 MG: 32.4 TABLET ORAL at 05:47

## 2024-05-18 RX ADMIN — GABAPENTIN 300 MG: 300 CAPSULE ORAL at 08:58

## 2024-05-18 RX ADMIN — PHENOBARBITAL SODIUM 65 MG: 65 INJECTION, SOLUTION INTRAMUSCULAR; INTRAVENOUS at 17:06

## 2024-05-18 RX ADMIN — Medication 1 TABLET: at 09:00

## 2024-05-18 RX ADMIN — MAGNESIUM SULFATE HEPTAHYDRATE 2000 MG: 40 INJECTION, SOLUTION INTRAVENOUS at 17:06

## 2024-05-18 RX ADMIN — SODIUM CHLORIDE, PRESERVATIVE FREE 10 ML: 5 INJECTION INTRAVENOUS at 09:00

## 2024-05-18 RX ADMIN — SODIUM CHLORIDE, PRESERVATIVE FREE 10 ML: 5 INJECTION INTRAVENOUS at 22:02

## 2024-05-18 RX ADMIN — PHENOBARBITAL 32.4 MG: 32.4 TABLET ORAL at 15:32

## 2024-05-18 RX ADMIN — PHENOBARBITAL SODIUM 65 MG: 65 INJECTION, SOLUTION INTRAMUSCULAR; INTRAVENOUS at 22:02

## 2024-05-18 RX ADMIN — ACETAMINOPHEN 650 MG: 325 TABLET ORAL at 12:52

## 2024-05-18 RX ADMIN — ENOXAPARIN SODIUM 40 MG: 100 INJECTION SUBCUTANEOUS at 08:57

## 2024-05-18 RX ADMIN — PHENOBARBITAL 64.8 MG: 32.4 TABLET ORAL at 09:00

## 2024-05-18 RX ADMIN — GABAPENTIN 300 MG: 300 CAPSULE ORAL at 22:02

## 2024-05-18 ASSESSMENT — PAIN DESCRIPTION - DESCRIPTORS: DESCRIPTORS: ACHING

## 2024-05-18 ASSESSMENT — PAIN SCALES - GENERAL
PAINLEVEL_OUTOF10: 5
PAINLEVEL_OUTOF10: 0
PAINLEVEL_OUTOF10: 0

## 2024-05-18 ASSESSMENT — PAIN DESCRIPTION - LOCATION: LOCATION: HEAD

## 2024-05-18 NOTE — PLAN OF CARE
Problem: Discharge Planning  Goal: Discharge to home or other facility with appropriate resources  5/18/2024 0317 by Ian Mayorga RN  Outcome: Progressing     Problem: Pain  Goal: Verbalizes/displays adequate comfort level or baseline comfort level  5/18/2024 0317 by Ian Mayorga RN  Outcome: Progressing     Problem: Safety - Adult  Goal: Free from fall injury  5/18/2024 0317 by Ian Mayorga RN  Outcome: Progressing     Problem: ABCDS Injury Assessment  Goal: Absence of physical injury  5/18/2024 0317 by Ian Mayorga RN  Outcome: Progressing     Problem: Risk for Elopement  Goal: Patient will not exit the unit/facility without proper excort  Outcome: Progressing

## 2024-05-19 LAB
ALBUMIN SERPL-MCNC: 4 G/DL (ref 3.5–5.2)
ALP SERPL-CCNC: 87 U/L (ref 40–129)
ALT SERPL-CCNC: 26 U/L (ref 0–40)
ANION GAP SERPL CALCULATED.3IONS-SCNC: 17 MMOL/L (ref 7–16)
AST SERPL-CCNC: 38 U/L (ref 0–39)
BASOPHILS # BLD: 0.06 K/UL (ref 0–0.2)
BASOPHILS NFR BLD: 1 % (ref 0–2)
BILIRUB SERPL-MCNC: 0.7 MG/DL (ref 0–1.2)
BUN SERPL-MCNC: 9 MG/DL (ref 6–20)
CALCIUM SERPL-MCNC: 9.2 MG/DL (ref 8.6–10.2)
CHLORIDE SERPL-SCNC: 101 MMOL/L (ref 98–107)
CO2 SERPL-SCNC: 19 MMOL/L (ref 22–29)
CREAT SERPL-MCNC: 0.8 MG/DL (ref 0.7–1.2)
EOSINOPHIL # BLD: 0.17 K/UL (ref 0.05–0.5)
EOSINOPHILS RELATIVE PERCENT: 2 % (ref 0–6)
ERYTHROCYTE [DISTWIDTH] IN BLOOD BY AUTOMATED COUNT: 11.9 % (ref 11.5–15)
GFR, ESTIMATED: >90 ML/MIN/1.73M2
GLUCOSE SERPL-MCNC: 124 MG/DL (ref 74–99)
HCT VFR BLD AUTO: 41.3 % (ref 37–54)
HGB BLD-MCNC: 14.7 G/DL (ref 12.5–16.5)
IMM GRANULOCYTES # BLD AUTO: 0.06 K/UL (ref 0–0.58)
IMM GRANULOCYTES NFR BLD: 1 % (ref 0–5)
LYMPHOCYTES NFR BLD: 1.77 K/UL (ref 1.5–4)
LYMPHOCYTES RELATIVE PERCENT: 22 % (ref 20–42)
MAGNESIUM SERPL-MCNC: 2.7 MG/DL (ref 1.6–2.6)
MCH RBC QN AUTO: 32.6 PG (ref 26–35)
MCHC RBC AUTO-ENTMCNC: 35.6 G/DL (ref 32–34.5)
MCV RBC AUTO: 91.6 FL (ref 80–99.9)
MONOCYTES NFR BLD: 0.53 K/UL (ref 0.1–0.95)
MONOCYTES NFR BLD: 7 % (ref 2–12)
NEUTROPHILS NFR BLD: 68 % (ref 43–80)
NEUTS SEG NFR BLD: 5.39 K/UL (ref 1.8–7.3)
PLATELET # BLD AUTO: 73 K/UL (ref 130–450)
PLATELET CONFIRMATION: NORMAL
PMV BLD AUTO: 12.8 FL (ref 7–12)
POTASSIUM SERPL-SCNC: 4.8 MMOL/L (ref 3.5–5)
PROT SERPL-MCNC: 7.3 G/DL (ref 6.4–8.3)
RBC # BLD AUTO: 4.51 M/UL (ref 3.8–5.8)
SODIUM SERPL-SCNC: 137 MMOL/L (ref 132–146)
WBC OTHER # BLD: 8 K/UL (ref 4.5–11.5)

## 2024-05-19 PROCEDURE — 96376 TX/PRO/DX INJ SAME DRUG ADON: CPT

## 2024-05-19 PROCEDURE — G0378 HOSPITAL OBSERVATION PER HR: HCPCS

## 2024-05-19 PROCEDURE — 36415 COLL VENOUS BLD VENIPUNCTURE: CPT

## 2024-05-19 PROCEDURE — 83735 ASSAY OF MAGNESIUM: CPT

## 2024-05-19 PROCEDURE — 6370000000 HC RX 637 (ALT 250 FOR IP): Performed by: NURSE PRACTITIONER

## 2024-05-19 PROCEDURE — 6360000002 HC RX W HCPCS: Performed by: INTERNAL MEDICINE

## 2024-05-19 PROCEDURE — 99232 SBSQ HOSP IP/OBS MODERATE 35: CPT | Performed by: INTERNAL MEDICINE

## 2024-05-19 PROCEDURE — 96372 THER/PROPH/DIAG INJ SC/IM: CPT

## 2024-05-19 PROCEDURE — 6360000002 HC RX W HCPCS: Performed by: NURSE PRACTITIONER

## 2024-05-19 PROCEDURE — 80053 COMPREHEN METABOLIC PANEL: CPT

## 2024-05-19 PROCEDURE — 85025 COMPLETE CBC W/AUTO DIFF WBC: CPT

## 2024-05-19 PROCEDURE — 6370000000 HC RX 637 (ALT 250 FOR IP): Performed by: INTERNAL MEDICINE

## 2024-05-19 PROCEDURE — 2580000003 HC RX 258: Performed by: NURSE PRACTITIONER

## 2024-05-19 PROCEDURE — APPSS30 APP SPLIT SHARED TIME 16-30 MINUTES: Performed by: NURSE PRACTITIONER

## 2024-05-19 RX ADMIN — Medication 1 TABLET: at 08:09

## 2024-05-19 RX ADMIN — PHENOBARBITAL SODIUM 32.5 MG: 65 INJECTION, SOLUTION INTRAMUSCULAR; INTRAVENOUS at 21:08

## 2024-05-19 RX ADMIN — PHENOBARBITAL SODIUM 65 MG: 65 INJECTION, SOLUTION INTRAMUSCULAR; INTRAVENOUS at 08:10

## 2024-05-19 RX ADMIN — CHLORDIAZEPOXIDE HYDROCHLORIDE 25 MG: 25 CAPSULE ORAL at 16:03

## 2024-05-19 RX ADMIN — Medication 100 MG: at 08:09

## 2024-05-19 RX ADMIN — Medication 10 ML: at 08:11

## 2024-05-19 RX ADMIN — ENOXAPARIN SODIUM 40 MG: 100 INJECTION SUBCUTANEOUS at 08:09

## 2024-05-19 RX ADMIN — GABAPENTIN 300 MG: 300 CAPSULE ORAL at 08:09

## 2024-05-19 RX ADMIN — GABAPENTIN 300 MG: 300 CAPSULE ORAL at 21:08

## 2024-05-19 RX ADMIN — PHENOBARBITAL SODIUM 32.5 MG: 65 INJECTION, SOLUTION INTRAMUSCULAR; INTRAVENOUS at 17:43

## 2024-05-19 RX ADMIN — ACETAMINOPHEN 650 MG: 325 TABLET ORAL at 08:09

## 2024-05-19 RX ADMIN — SODIUM CHLORIDE, PRESERVATIVE FREE 10 ML: 5 INJECTION INTRAVENOUS at 21:08

## 2024-05-19 RX ADMIN — CHLORDIAZEPOXIDE HYDROCHLORIDE 25 MG: 25 CAPSULE ORAL at 08:10

## 2024-05-19 RX ADMIN — PHENOBARBITAL SODIUM 65 MG: 65 INJECTION, SOLUTION INTRAMUSCULAR; INTRAVENOUS at 13:02

## 2024-05-19 ASSESSMENT — PAIN DESCRIPTION - DESCRIPTORS: DESCRIPTORS: ACHING

## 2024-05-19 ASSESSMENT — PAIN SCALES - GENERAL
PAINLEVEL_OUTOF10: 0
PAINLEVEL_OUTOF10: 0
PAINLEVEL_OUTOF10: 6

## 2024-05-19 ASSESSMENT — PAIN DESCRIPTION - LOCATION: LOCATION: HEAD

## 2024-05-19 NOTE — PLAN OF CARE
Problem: Discharge Planning  Goal: Discharge to home or other facility with appropriate resources  Outcome: Progressing     Problem: Pain  Goal: Verbalizes/displays adequate comfort level or baseline comfort level  Outcome: Progressing     Problem: Safety - Adult  Goal: Free from fall injury  Outcome: Progressing     Problem: ABCDS Injury Assessment  Goal: Absence of physical injury  Outcome: Progressing     Problem: Risk for Elopement  Goal: Patient will not exit the unit/facility without proper excort  Outcome: Progressing     Problem: Nutrition Deficit:  Goal: Optimize nutritional status  5/19/2024 0159 by Bridget Espinal RN  Outcome: Progressing  5/18/2024 1209 by Tomasa Anna RD, LD  Outcome: Progressing  Flowsheets (Taken 5/18/2024 1209)  Nutrient intake appropriate for improving, restoring, or maintaining nutritional needs:   Assess nutritional status and recommend course of action   Monitor oral intake, labs, and treatment plans   Recommend appropriate diets, oral nutritional supplements, and vitamin/mineral supplements

## 2024-05-19 NOTE — PROGRESS NOTES
[facility] Hospitalist   Progress Note    Admitting Date and Time: 5/17/2024 11:36 AM  Admit Dx: Alcohol withdrawal syndrome, uncomplicated (HCC) [F10.930]  Alcohol withdrawal syndrome with perceptual disturbance (HCC) [F10.932]    Subjective:    Pt feels slightly better this morning. Pt states he has been able to hold down food this morning. So far has not vomited. Expressed concerns regarding placement to rehabilitation facility. States he does not want to go so far away from home. Explained to pt he has been excluded for consideration on many local rehabs. Will have Social work look into closer facilities, however this may be his only option. Continues on Phenobarbital Taper.     Per RN: Pt stated he was having hallucinations when he closes his eyes.     ROS: denies fever, chills, cp, sob, n/v, HA unless stated above.     sodium chloride flush  5-40 mL IntraVENous 2 times per day    PHENobarbital  64.8 mg Oral 4x daily    Followed by    PHENobarbital  32.4 mg Oral 4x daily    Followed by    [START ON 5/19/2024] PHENobarbital  32.4 mg Oral BID    Followed by    [START ON 5/20/2024] PHENobarbital  16.2 mg Oral BID    gabapentin  300 mg Oral BID    vitamin B-1  100 mg Oral Daily    sodium chloride flush  5-40 mL IntraVENous 2 times per day    enoxaparin  40 mg SubCUTAneous Daily    nicotine  1 patch TransDERmal Daily    therapeutic multivitamin-minerals  1 tablet Oral Daily     sodium chloride flush, 5-40 mL, PRN  sodium chloride, , PRN  PHENobarbital, 64.8 mg, Q6H PRN   Followed by  PHENobarbital, 32.4 mg, Q6H PRN   Followed by  [START ON 5/20/2024] PHENobarbital, 16.2 mg, Q6H PRN  sodium chloride flush, 5-40 mL, PRN  sodium chloride, , PRN  potassium chloride, 40 mEq, PRN   Or  potassium alternative oral replacement, 40 mEq, PRN   Or  potassium chloride, 10 mEq, PRN  magnesium sulfate, 2,000 mg, PRN  ondansetron, 4 mg, Q8H PRN   Or  ondansetron, 4 mg, Q6H PRN  acetaminophen, 650 mg, Q6H PRN   
4 Eyes Skin Assessment     NAME:  Mark Davila  YOB: 1990  MEDICAL RECORD NUMBER:  [id number]    The patient is being assessed for  Admission    I agree that at least one RN has performed a thorough Head to Toe Skin Assessment on the patient. ALL assessment sites listed below have been assessed.      Areas assessed by both nurses:    Head, Face, Ears, Shoulders, Back, Chest, Arms, Elbows, Hands, Sacrum. Buttock, Coccyx, Ischium, and Legs. Feet and Heels        Does the Patient have a Wound? No noted wound(s)       Maximus Prevention initiated by RN: No  Wound Care Orders initiated by RN: No    Pressure Injury (Stage 3,4, Unstageable, DTI, NWPT, and Complex wounds) if present, place Wound referral order by RN under : No    New Ostomies, if present place, Ostomy referral order under : No     Nurse 1 eSignature: Electronically signed by Mark Servin RN on 5/17/24 at 5:40 PM EDT    **SHARE this note so that the co-signing nurse can place an eSignature**    Nurse 2 eSignature: Electronically signed by Tracy Grimes RN on 5/17/24 at 5:41 PM EDT   
Comprehensive Nutrition Assessment    Type and Reason for Visit:  Initial, Positive Nutrition Screen (MST 2)    Nutrition Recommendations/Plan:   Continue Current Diet  Continue ONS (high calorie/high protein) TID  Begin Magic Cup BID     Malnutrition Assessment:  Malnutrition Status:  At risk for malnutrition (Comment) (d/t chronic ETOH abuse) (05/18/24 1205)    Context:  Social/Environmental Circumstances     Findings of the 6 clinical characteristics of malnutrition:  Energy Intake:  Mild decrease in energy intake (Comment) (Poor intake x2 weeks d/t excessive ETOH intake)  Weight Loss:  No significant weight loss     Body Fat Loss:  Mild body fat loss Orbital, Triceps   Muscle Mass Loss:  Mild muscle mass loss Temples (temporalis), Clavicles (pectoralis & deltoids)  Fluid Accumulation:  No significant fluid accumulation     Strength:  Not Performed    Nutrition Assessment:    Pt admitted for alcohol withdrawl. PMHx: Anxiety, Depresssion, Seizures ETOH abuse, PTSD, Concussion, Pancreatitis. Pt with suboptimal oral intake. Pt states he has nausea and cant eat big meals. Meets criteria for moderate malnutrition. Agreeable to ONS will begin Ensure TID, magic cup BID, continue inpatient monitoring, f/up per policy.    Nutrition Related Findings:    A/O x4, I/O not recorded, e'lytes wnl, bilirubin 1.3, Lipase 12 Wound Type: None       Current Nutrition Intake & Therapies:    Average Meal Intake: 26-50%  Average Supplements Intake: 1-25%  ADULT DIET; Regular  ADULT ORAL NUTRITION SUPPLEMENT; Breakfast, Lunch, Dinner; Standard High Calorie/High Protein Oral Supplement    Anthropometric Measures:  Height: 167.6 cm (5' 5.98\")  Ideal Body Weight (IBW): 142 lbs (65 kg)    Admission Body Weight: 63.5 kg (139 lb 15.9 oz)  Current Body Weight: 63.5 kg (139 lb 15.9 oz), 98.6 % IBW. Weight Source: Not Specified (05/17/24)  Current BMI (kg/m2): 22.6  Usual Body Weight: 59.6 kg (131 lb 6.3 oz) (09/27/23)  % Weight Change 
Per Dr Lala's invitation and with permission from the patients' family in 631, patient went to room 631 to visit with the family of that patient.  The family is very caring and empathetic to what Mark is dealing with and were begging him to change his ways so that he did not end up like their son.  Mark apologized for not knowing what to say, but apologized to them for what they were going through.  When I spoke with Mark after taking him back to his room, he appeared to be doing some self-reflecting.  He admitted that he has pushed away people that cared for him because of his alcoholism.  He feels pretty helpless because he has no real support system since he has lost all of his family except one brother to drugs and alcohol.  He also shared how stressful it is to live with his brother and it often the reason that he turns to alcohol.  Pt was grateful for the opportunity to meet the other patient's family.  
mg, Q6H PRN  sodium chloride flush, 5-40 mL, PRN  sodium chloride, , PRN  potassium chloride, 40 mEq, PRN   Or  potassium alternative oral replacement, 40 mEq, PRN   Or  potassium chloride, 10 mEq, PRN  magnesium sulfate, 2,000 mg, PRN  ondansetron, 4 mg, Q8H PRN   Or  ondansetron, 4 mg, Q6H PRN  acetaminophen, 650 mg, Q6H PRN   Or  acetaminophen, 650 mg, Q6H PRN  bisacodyl, 5 mg, Daily PRN         Objective:    /79   Pulse 67   Temp 98.2 °F (36.8 °C) (Oral)   Resp 18   Ht 1.676 m (5' 5.98\")   Wt 63.5 kg (139 lb 14.4 oz)   SpO2 98%   BMI 22.59 kg/m²   General Appearance: alert and oriented to person, place and time and in no acute distress  Skin: warm and dry  Head: normocephalic and atraumatic  Eyes: pupils equal, round, and reactive to light, extraocular eye movements intact, conjunctivae normal  Neck: neck supple and non tender without mass   Pulmonary/Chest:Diminished to auscultation bilaterally- no wheezes, rales or rhonchi, normal air movement, no respiratory distress  Cardiovascular: normal rate, normal S1 and S2 and no RGM  Abdomen: soft, non-tender, non-distended, normal bowel sounds  Extremities: no cyanosis, no clubbing and no edema  Neurologic: no cranial nerve deficit and speech normal      Recent Labs     05/17/24 1153 05/18/24  0610 05/19/24  0947    137 137   K 4.3 4.2 4.8   CL 94* 96* 101   CO2 30* 27 19*   BUN 6 8 9   CREATININE 0.8 0.7 0.8   GLUCOSE 110* 100* 124*   CALCIUM 9.8 9.6 9.2         Recent Labs     05/17/24  1153 05/19/24  0947   ALKPHOS 82 87   BILITOT 1.3* 0.7   AST 30 38   ALT 29 26         Recent Labs     05/17/24 1153 05/18/24  0610 05/19/24  0947   WBC 8.4 10.2 8.0   RBC 5.12 5.05 4.51   HGB 17.1* 16.7* 14.7   HCT 46.9 47.6 41.3   MCV 91.6 94.3 91.6   MCH 33.4 33.1 32.6   MCHC 36.5* 35.1* 35.6*   RDW 11.7 11.8 11.9     --  73*   MPV 12.4* 13.9* 12.8*              Radiology:   No orders to display       Assessment:  Principal Problem:    Alcohol

## 2024-05-20 VITALS
HEART RATE: 66 BPM | OXYGEN SATURATION: 100 % | TEMPERATURE: 98.4 F | DIASTOLIC BLOOD PRESSURE: 82 MMHG | HEIGHT: 66 IN | RESPIRATION RATE: 18 BRPM | BODY MASS INDEX: 22.48 KG/M2 | SYSTOLIC BLOOD PRESSURE: 120 MMHG | WEIGHT: 139.9 LBS

## 2024-05-20 PROBLEM — F10.930 ALCOHOL WITHDRAWAL SYNDROME WITHOUT COMPLICATION (HCC): Status: ACTIVE | Noted: 2024-05-20

## 2024-05-20 PROCEDURE — 6360000002 HC RX W HCPCS: Performed by: NURSE PRACTITIONER

## 2024-05-20 PROCEDURE — 6370000000 HC RX 637 (ALT 250 FOR IP): Performed by: INTERNAL MEDICINE

## 2024-05-20 PROCEDURE — 6360000002 HC RX W HCPCS: Performed by: INTERNAL MEDICINE

## 2024-05-20 PROCEDURE — 96372 THER/PROPH/DIAG INJ SC/IM: CPT

## 2024-05-20 PROCEDURE — 96376 TX/PRO/DX INJ SAME DRUG ADON: CPT

## 2024-05-20 PROCEDURE — G0378 HOSPITAL OBSERVATION PER HR: HCPCS

## 2024-05-20 PROCEDURE — 2060000000 HC ICU INTERMEDIATE R&B

## 2024-05-20 PROCEDURE — 6370000000 HC RX 637 (ALT 250 FOR IP): Performed by: NURSE PRACTITIONER

## 2024-05-20 PROCEDURE — 2580000003 HC RX 258: Performed by: NURSE PRACTITIONER

## 2024-05-20 PROCEDURE — 99239 HOSP IP/OBS DSCHRG MGMT >30: CPT | Performed by: INTERNAL MEDICINE

## 2024-05-20 RX ORDER — PHENOBARBITAL 32.4 MG/1
32.4 TABLET ORAL 2 TIMES DAILY
Status: DISCONTINUED | OUTPATIENT
Start: 2024-05-20 | End: 2024-05-20 | Stop reason: HOSPADM

## 2024-05-20 RX ORDER — PHENOBARBITAL 16.2 MG/1
16.2 TABLET ORAL 2 TIMES DAILY
Status: DISCONTINUED | OUTPATIENT
Start: 2024-05-21 | End: 2024-05-20 | Stop reason: HOSPADM

## 2024-05-20 RX ADMIN — PHENOBARBITAL SODIUM 32.5 MG: 65 INJECTION, SOLUTION INTRAMUSCULAR; INTRAVENOUS at 08:10

## 2024-05-20 RX ADMIN — Medication 1 TABLET: at 08:10

## 2024-05-20 RX ADMIN — ENOXAPARIN SODIUM 40 MG: 100 INJECTION SUBCUTANEOUS at 08:10

## 2024-05-20 RX ADMIN — PHENOBARBITAL 32.4 MG: 32.4 TABLET ORAL at 14:37

## 2024-05-20 RX ADMIN — SODIUM CHLORIDE, PRESERVATIVE FREE 10 ML: 5 INJECTION INTRAVENOUS at 08:11

## 2024-05-20 RX ADMIN — Medication 100 MG: at 08:10

## 2024-05-20 RX ADMIN — Medication 10 ML: at 08:11

## 2024-05-20 RX ADMIN — GABAPENTIN 300 MG: 300 CAPSULE ORAL at 08:10

## 2024-05-20 RX ADMIN — PHENOBARBITAL SODIUM 32.5 MG: 65 INJECTION, SOLUTION INTRAMUSCULAR; INTRAVENOUS at 12:20

## 2024-05-20 ASSESSMENT — PAIN SCALES - GENERAL
PAINLEVEL_OUTOF10: 0

## 2024-05-20 NOTE — CARE COORDINATION
5/20/2024 1000 CM met with pt at the bedside and discussed pt discharging from the hospital to [facility].  Per mohit rep at [facility] they can take him there.   Pt states that he needs to go home and get his belongings and get a hair cut then he will call intake and get transportation to [facility]. [facility] intake phone number was provided to pt and placed on pt's d/c paperwork.  is [facility].  Pt's brother will provide transportation home.  CM will follow. Electronically signed by Francisca Jenkins RN on 5/20/2024 at 10:11 AM

## 2024-05-20 NOTE — DISCHARGE SUMMARY
[facility] Hospitalist       Hospitalist Physician Discharge Summary       [facility]  [address]  [address]  [place], [place]  [phone]  Call  Call [phone] intake line and ask for Tejal.      Activity level: As tolerated    Diet: ADULT DIET; Regular  ADULT ORAL NUTRITION SUPPLEMENT; Breakfast, Lunch, Dinner; Standard High Calorie/High Protein Oral Supplement  ADULT ORAL NUTRITION SUPPLEMENT; Lunch, Dinner; Frozen Oral Supplement    Labs:None    Condition at discharge: stable/good    Dispo:Home with plan for rehab at [facility]     Patient ID:  Mark Davila  [id number]  33 y.o.  1990    Admit date: 5/17/2024    Discharge date and time:  5/20/2024  12:52 PM    Admission Diagnoses: Principal Problem:    Alcohol withdrawal syndrome, uncomplicated (HCC)  Active Problems:    Anxiety and depression    Alcohol withdrawal syndrome with perceptual disturbance (HCC)    Marijuana use    Alcohol withdrawal syndrome without complication (HCC)  Resolved Problems:    * No resolved hospital problems. *      Discharge Diagnoses: Principal Problem:    Alcohol withdrawal syndrome, uncomplicated (HCC)  Active Problems:    Anxiety and depression    Alcohol withdrawal syndrome with perceptual disturbance (HCC)    Marijuana use    Alcohol withdrawal syndrome without complication (HCC)  Resolved Problems:    * No resolved hospital problems. *      Consults:  IP CONSULT TO SOCIAL WORK    Procedures: None    Hospital Course: This is a 33-year-old male with history significant for severe alcohol use disorder with many past relapses was admitted to the hospital for alcohol withdrawal syndrome.  Patient was treated with IV phenobarbital taper, symptoms well-controlled.  Social work consulted for discharge planning and arranging rehab.  A facility that would be willing to accept him was contacted, and agreed that patient could be transferred directly there when medically ready for

## 2024-06-23 ENCOUNTER — HOSPITAL ENCOUNTER (INPATIENT)
Age: 34
LOS: 2 days | Discharge: HOME | End: 2024-06-25
Attending: EMERGENCY MEDICINE | Admitting: INTERNAL MEDICINE
Payer: COMMERCIAL

## 2024-06-23 DIAGNOSIS — F10.939 ALCOHOL WITHDRAWAL SYNDROME WITH COMPLICATION (HCC): Primary | ICD-10-CM

## 2024-06-23 LAB
ALBUMIN SERPL-MCNC: 5.3 G/DL (ref 3.5–5.2)
ALP SERPL-CCNC: 118 U/L (ref 40–129)
ALT SERPL-CCNC: 28 U/L (ref 0–40)
AMMONIA PLAS-SCNC: 32 UMOL/L (ref 16–60)
ANION GAP SERPL CALCULATED.3IONS-SCNC: 27 MMOL/L (ref 7–16)
APAP SERPL-MCNC: <5 UG/ML (ref 10–30)
AST SERPL-CCNC: 35 U/L (ref 0–39)
BASOPHILS # BLD: 0.08 K/UL (ref 0–0.2)
BASOPHILS NFR BLD: 1 % (ref 0–2)
BILIRUB SERPL-MCNC: 0.9 MG/DL (ref 0–1.2)
BUN SERPL-MCNC: 16 MG/DL (ref 6–20)
CALCIUM SERPL-MCNC: 10.4 MG/DL (ref 8.6–10.2)
CHLORIDE SERPL-SCNC: 87 MMOL/L (ref 98–107)
CO2 SERPL-SCNC: 25 MMOL/L (ref 22–29)
CREAT SERPL-MCNC: 0.8 MG/DL (ref 0.7–1.2)
EKG ATRIAL RATE: 95 BPM
EKG P AXIS: 61 DEGREES
EKG P-R INTERVAL: 118 MS
EKG Q-T INTERVAL: 368 MS
EKG QRS DURATION: 84 MS
EKG QTC CALCULATION (BAZETT): 462 MS
EKG R AXIS: 61 DEGREES
EKG T AXIS: 69 DEGREES
EKG VENTRICULAR RATE: 95 BPM
EOSINOPHIL # BLD: 0.12 K/UL (ref 0.05–0.5)
EOSINOPHILS RELATIVE PERCENT: 1 % (ref 0–6)
ERYTHROCYTE [DISTWIDTH] IN BLOOD BY AUTOMATED COUNT: 11.9 % (ref 11.5–15)
ETHANOLAMINE SERPL-MCNC: 33 MG/DL (ref 0–0.08)
GFR, ESTIMATED: >90 ML/MIN/1.73M2
GLUCOSE SERPL-MCNC: 137 MG/DL (ref 74–99)
HCT VFR BLD AUTO: 46.5 % (ref 37–54)
HGB BLD-MCNC: 17.2 G/DL (ref 12.5–16.5)
IMM GRANULOCYTES # BLD AUTO: 0.03 K/UL (ref 0–0.58)
IMM GRANULOCYTES NFR BLD: 0 % (ref 0–5)
LIPASE SERPL-CCNC: 23 U/L (ref 13–60)
LYMPHOCYTES NFR BLD: 2.46 K/UL (ref 1.5–4)
LYMPHOCYTES RELATIVE PERCENT: 25 % (ref 20–42)
MCH RBC QN AUTO: 33 PG (ref 26–35)
MCHC RBC AUTO-ENTMCNC: 37 G/DL (ref 32–34.5)
MCV RBC AUTO: 89.1 FL (ref 80–99.9)
MONOCYTES NFR BLD: 0.88 K/UL (ref 0.1–0.95)
MONOCYTES NFR BLD: 9 % (ref 2–12)
NEUTROPHILS NFR BLD: 64 % (ref 43–80)
NEUTS SEG NFR BLD: 6.32 K/UL (ref 1.8–7.3)
PLATELET # BLD AUTO: 338 K/UL (ref 130–450)
PMV BLD AUTO: 12.4 FL (ref 7–12)
POTASSIUM SERPL-SCNC: 3.6 MMOL/L (ref 3.5–5)
PROT SERPL-MCNC: 8.7 G/DL (ref 6.4–8.3)
RBC # BLD AUTO: 5.22 M/UL (ref 3.8–5.8)
SALICYLATES SERPL-MCNC: <0.3 MG/DL (ref 0–30)
SODIUM SERPL-SCNC: 139 MMOL/L (ref 132–146)
TOXIC TRICYCLIC SC,BLOOD: NEGATIVE
WBC OTHER # BLD: 9.9 K/UL (ref 4.5–11.5)

## 2024-06-23 PROCEDURE — 80307 DRUG TEST PRSMV CHEM ANLYZR: CPT

## 2024-06-23 PROCEDURE — 80179 DRUG ASSAY SALICYLATE: CPT

## 2024-06-23 PROCEDURE — 99223 1ST HOSP IP/OBS HIGH 75: CPT | Performed by: INTERNAL MEDICINE

## 2024-06-23 PROCEDURE — 83690 ASSAY OF LIPASE: CPT

## 2024-06-23 PROCEDURE — 2580000003 HC RX 258: Performed by: EMERGENCY MEDICINE

## 2024-06-23 PROCEDURE — 99285 EMERGENCY DEPT VISIT HI MDM: CPT

## 2024-06-23 PROCEDURE — 93010 ELECTROCARDIOGRAM REPORT: CPT | Performed by: INTERNAL MEDICINE

## 2024-06-23 PROCEDURE — 2060000000 HC ICU INTERMEDIATE R&B

## 2024-06-23 PROCEDURE — G0480 DRUG TEST DEF 1-7 CLASSES: HCPCS

## 2024-06-23 PROCEDURE — 6360000002 HC RX W HCPCS: Performed by: FAMILY MEDICINE

## 2024-06-23 PROCEDURE — 6360000002 HC RX W HCPCS: Performed by: INTERNAL MEDICINE

## 2024-06-23 PROCEDURE — 80053 COMPREHEN METABOLIC PANEL: CPT

## 2024-06-23 PROCEDURE — 96374 THER/PROPH/DIAG INJ IV PUSH: CPT

## 2024-06-23 PROCEDURE — 6370000000 HC RX 637 (ALT 250 FOR IP): Performed by: EMERGENCY MEDICINE

## 2024-06-23 PROCEDURE — 80143 DRUG ASSAY ACETAMINOPHEN: CPT

## 2024-06-23 PROCEDURE — 82140 ASSAY OF AMMONIA: CPT

## 2024-06-23 PROCEDURE — 6360000002 HC RX W HCPCS: Performed by: EMERGENCY MEDICINE

## 2024-06-23 PROCEDURE — 6370000000 HC RX 637 (ALT 250 FOR IP): Performed by: INTERNAL MEDICINE

## 2024-06-23 PROCEDURE — 96372 THER/PROPH/DIAG INJ SC/IM: CPT

## 2024-06-23 PROCEDURE — 85025 COMPLETE CBC W/AUTO DIFF WBC: CPT

## 2024-06-23 PROCEDURE — 93005 ELECTROCARDIOGRAM TRACING: CPT | Performed by: EMERGENCY MEDICINE

## 2024-06-23 RX ORDER — M-VIT,TX,IRON,MINS/CALC/FOLIC 27MG-0.4MG
1 TABLET ORAL DAILY
Status: DISCONTINUED | OUTPATIENT
Start: 2024-06-23 | End: 2024-06-25 | Stop reason: HOSPADM

## 2024-06-23 RX ORDER — MAGNESIUM SULFATE IN WATER 40 MG/ML
2000 INJECTION, SOLUTION INTRAVENOUS PRN
Status: DISCONTINUED | OUTPATIENT
Start: 2024-06-23 | End: 2024-06-25 | Stop reason: HOSPADM

## 2024-06-23 RX ORDER — FOLIC ACID 1 MG/1
1 TABLET ORAL DAILY
Status: DISCONTINUED | OUTPATIENT
Start: 2024-06-23 | End: 2024-06-25 | Stop reason: HOSPADM

## 2024-06-23 RX ORDER — PHENOBARBITAL SODIUM 65 MG/ML
32.5 INJECTION, SOLUTION INTRAMUSCULAR; INTRAVENOUS EVERY 6 HOURS PRN
Status: DISCONTINUED | OUTPATIENT
Start: 2024-06-24 | End: 2024-06-23

## 2024-06-23 RX ORDER — PHENOBARBITAL SODIUM 65 MG/ML
130 INJECTION, SOLUTION INTRAMUSCULAR; INTRAVENOUS ONCE
Status: COMPLETED | OUTPATIENT
Start: 2024-06-23 | End: 2024-06-23

## 2024-06-23 RX ORDER — PHENOBARBITAL 32.4 MG/1
32.4 TABLET ORAL 4 TIMES DAILY
Status: COMPLETED | OUTPATIENT
Start: 2024-06-23 | End: 2024-06-24

## 2024-06-23 RX ORDER — PHENOBARBITAL SODIUM 65 MG/ML
16.2 INJECTION, SOLUTION INTRAMUSCULAR; INTRAVENOUS EVERY 12 HOURS
Status: DISCONTINUED | OUTPATIENT
Start: 2024-06-26 | End: 2024-06-23

## 2024-06-23 RX ORDER — PHENOBARBITAL SODIUM 65 MG/ML
32.5 INJECTION, SOLUTION INTRAMUSCULAR; INTRAVENOUS EVERY 6 HOURS
Status: DISCONTINUED | OUTPATIENT
Start: 2024-06-24 | End: 2024-06-23

## 2024-06-23 RX ORDER — POTASSIUM CHLORIDE 7.45 MG/ML
10 INJECTION INTRAVENOUS PRN
Status: DISCONTINUED | OUTPATIENT
Start: 2024-06-23 | End: 2024-06-25 | Stop reason: HOSPADM

## 2024-06-23 RX ORDER — ONDANSETRON 4 MG/1
4 TABLET, ORALLY DISINTEGRATING ORAL EVERY 8 HOURS PRN
Status: DISCONTINUED | OUTPATIENT
Start: 2024-06-23 | End: 2024-06-25 | Stop reason: HOSPADM

## 2024-06-23 RX ORDER — PHENOBARBITAL 32.4 MG/1
32.4 TABLET ORAL EVERY 6 HOURS PRN
Status: DISCONTINUED | OUTPATIENT
Start: 2024-06-23 | End: 2024-06-25 | Stop reason: HOSPADM

## 2024-06-23 RX ORDER — BISACODYL 5 MG/1
5 TABLET, DELAYED RELEASE ORAL DAILY PRN
Status: DISCONTINUED | OUTPATIENT
Start: 2024-06-23 | End: 2024-06-25 | Stop reason: HOSPADM

## 2024-06-23 RX ORDER — ONDANSETRON 2 MG/ML
4 INJECTION INTRAMUSCULAR; INTRAVENOUS ONCE
Status: COMPLETED | OUTPATIENT
Start: 2024-06-23 | End: 2024-06-23

## 2024-06-23 RX ORDER — PROCHLORPERAZINE EDISYLATE 5 MG/ML
10 INJECTION INTRAMUSCULAR; INTRAVENOUS EVERY 6 HOURS PRN
Status: DISCONTINUED | OUTPATIENT
Start: 2024-06-23 | End: 2024-06-25 | Stop reason: HOSPADM

## 2024-06-23 RX ORDER — ENOXAPARIN SODIUM 100 MG/ML
40 INJECTION SUBCUTANEOUS DAILY
Status: DISCONTINUED | OUTPATIENT
Start: 2024-06-23 | End: 2024-06-25 | Stop reason: HOSPADM

## 2024-06-23 RX ORDER — ONDANSETRON 2 MG/ML
4 INJECTION INTRAMUSCULAR; INTRAVENOUS EVERY 6 HOURS PRN
Status: DISCONTINUED | OUTPATIENT
Start: 2024-06-23 | End: 2024-06-25 | Stop reason: HOSPADM

## 2024-06-23 RX ORDER — PHENOBARBITAL 16.2 MG/1
16.2 TABLET ORAL 2 TIMES DAILY
Status: DISCONTINUED | OUTPATIENT
Start: 2024-06-25 | End: 2024-06-25 | Stop reason: HOSPADM

## 2024-06-23 RX ORDER — POTASSIUM CHLORIDE 20 MEQ/1
40 TABLET, EXTENDED RELEASE ORAL PRN
Status: DISCONTINUED | OUTPATIENT
Start: 2024-06-23 | End: 2024-06-25 | Stop reason: HOSPADM

## 2024-06-23 RX ORDER — PHENOBARBITAL SODIUM 65 MG/ML
16.2 INJECTION, SOLUTION INTRAMUSCULAR; INTRAVENOUS EVERY 6 HOURS PRN
Status: DISCONTINUED | OUTPATIENT
Start: 2024-06-26 | End: 2024-06-23

## 2024-06-23 RX ORDER — ACETAMINOPHEN 650 MG/1
650 SUPPOSITORY RECTAL EVERY 6 HOURS PRN
Status: DISCONTINUED | OUTPATIENT
Start: 2024-06-23 | End: 2024-06-25 | Stop reason: HOSPADM

## 2024-06-23 RX ORDER — 0.9 % SODIUM CHLORIDE 0.9 %
1000 INTRAVENOUS SOLUTION INTRAVENOUS ONCE
Status: COMPLETED | OUTPATIENT
Start: 2024-06-23 | End: 2024-06-23

## 2024-06-23 RX ORDER — PHENOBARBITAL 16.2 MG/1
16.2 TABLET ORAL EVERY 6 HOURS PRN
Status: DISCONTINUED | OUTPATIENT
Start: 2024-06-25 | End: 2024-06-25 | Stop reason: HOSPADM

## 2024-06-23 RX ORDER — PHENOBARBITAL SODIUM 65 MG/ML
65 INJECTION, SOLUTION INTRAMUSCULAR; INTRAVENOUS EVERY 6 HOURS PRN
Status: DISCONTINUED | OUTPATIENT
Start: 2024-06-23 | End: 2024-06-23

## 2024-06-23 RX ORDER — GAUZE BANDAGE 2" X 2"
100 BANDAGE TOPICAL DAILY
Status: DISCONTINUED | OUTPATIENT
Start: 2024-06-23 | End: 2024-06-25 | Stop reason: HOSPADM

## 2024-06-23 RX ORDER — PHENOBARBITAL SODIUM 65 MG/ML
65 INJECTION, SOLUTION INTRAMUSCULAR; INTRAVENOUS EVERY 6 HOURS
Status: DISCONTINUED | OUTPATIENT
Start: 2024-06-23 | End: 2024-06-23

## 2024-06-23 RX ORDER — PHENOBARBITAL SODIUM 65 MG/ML
32.5 INJECTION, SOLUTION INTRAMUSCULAR; INTRAVENOUS EVERY 12 HOURS
Status: DISCONTINUED | OUTPATIENT
Start: 2024-06-25 | End: 2024-06-23

## 2024-06-23 RX ORDER — GABAPENTIN 300 MG/1
300 CAPSULE ORAL 3 TIMES DAILY
COMMUNITY

## 2024-06-23 RX ORDER — THIAMINE HYDROCHLORIDE 100 MG/ML
100 INJECTION, SOLUTION INTRAMUSCULAR; INTRAVENOUS ONCE
Status: COMPLETED | OUTPATIENT
Start: 2024-06-23 | End: 2024-06-23

## 2024-06-23 RX ORDER — PHENOBARBITAL 32.4 MG/1
32.4 TABLET ORAL 2 TIMES DAILY
Status: COMPLETED | OUTPATIENT
Start: 2024-06-24 | End: 2024-06-25

## 2024-06-23 RX ORDER — ACETAMINOPHEN 325 MG/1
650 TABLET ORAL EVERY 6 HOURS PRN
Status: DISCONTINUED | OUTPATIENT
Start: 2024-06-23 | End: 2024-06-25 | Stop reason: HOSPADM

## 2024-06-23 RX ADMIN — Medication 100 MG: at 17:41

## 2024-06-23 RX ADMIN — PROCHLORPERAZINE EDISYLATE 10 MG: 5 INJECTION INTRAMUSCULAR; INTRAVENOUS at 23:05

## 2024-06-23 RX ADMIN — FOLIC ACID 1 MG: 1 TABLET ORAL at 14:33

## 2024-06-23 RX ADMIN — PHENOBARBITAL SODIUM 130 MG: 65 INJECTION, SOLUTION INTRAMUSCULAR; INTRAVENOUS at 15:09

## 2024-06-23 RX ADMIN — SODIUM CHLORIDE 1000 ML: 9 INJECTION, SOLUTION INTRAVENOUS at 12:03

## 2024-06-23 RX ADMIN — PHENOBARBITAL 32.4 MG: 32.4 TABLET ORAL at 17:41

## 2024-06-23 RX ADMIN — THIAMINE HYDROCHLORIDE 100 MG: 100 INJECTION, SOLUTION INTRAMUSCULAR; INTRAVENOUS at 14:33

## 2024-06-23 RX ADMIN — PHENOBARBITAL SODIUM 130 MG: 65 INJECTION, SOLUTION INTRAMUSCULAR; INTRAVENOUS at 12:18

## 2024-06-23 RX ADMIN — Medication 1 TABLET: at 17:41

## 2024-06-23 RX ADMIN — ONDANSETRON 4 MG: 2 INJECTION INTRAMUSCULAR; INTRAVENOUS at 12:02

## 2024-06-23 RX ADMIN — PHENOBARBITAL 32.4 MG: 32.4 TABLET ORAL at 19:50

## 2024-06-23 RX ADMIN — ONDANSETRON 4 MG: 4 TABLET, ORALLY DISINTEGRATING ORAL at 19:50

## 2024-06-23 RX ADMIN — ENOXAPARIN SODIUM 40 MG: 100 INJECTION SUBCUTANEOUS at 17:40

## 2024-06-23 RX ADMIN — PHENOBARBITAL 32.4 MG: 32.4 TABLET ORAL at 21:59

## 2024-06-23 ASSESSMENT — PAIN DESCRIPTION - DESCRIPTORS
DESCRIPTORS: CRAMPING
DESCRIPTORS: CRAMPING

## 2024-06-23 ASSESSMENT — LIFESTYLE VARIABLES: HOW OFTEN DO YOU HAVE A DRINK CONTAINING ALCOHOL: 4 OR MORE TIMES A WEEK

## 2024-06-23 ASSESSMENT — PAIN DESCRIPTION - LOCATION
LOCATION: ABDOMEN
LOCATION: HEAD;ABDOMEN

## 2024-06-23 ASSESSMENT — PAIN SCALES - GENERAL
PAINLEVEL_OUTOF10: 6
PAINLEVEL_OUTOF10: 6

## 2024-06-23 ASSESSMENT — PAIN DESCRIPTION - ORIENTATION: ORIENTATION: MID

## 2024-06-23 ASSESSMENT — PAIN - FUNCTIONAL ASSESSMENT: PAIN_FUNCTIONAL_ASSESSMENT: ACTIVITIES ARE NOT PREVENTED

## 2024-06-23 NOTE — H&P
Allergies:    Hydrocodone, Ativan [lorazepam], Invega sustenna [paliperidone palmitate er], Paliperidone, Pcn [penicillins], and Fluticasone    Social History:    reports that he has been smoking cigarettes. He has a 18.0 pack-year smoking history. He has never used smokeless tobacco. He reports that he does not currently use alcohol after a past usage of about 44.0 standard drinks of alcohol per week. He reports current drug use. Frequency: 1.00 time per week. Drug: Marijuana (Weed).      Family History:   family history includes Alcohol Abuse in his father and mother; Cancer in his father; Cirrhosis in his father; Mental Illness in his brother, mother, and sister; Substance Abuse in his father, maternal aunt, maternal uncle, mother, paternal aunt, paternal uncle, and sister.     PHYSICAL EXAM:  Vitals:  /84   Pulse 79   Temp 98.3 °F (36.8 °C)   Resp 24   Wt 63.5 kg (140 lb)   SpO2 96%   BMI 22.61 kg/m²     General Appearance: alert and oriented to person, place and time and in no acute distress  Skin: warm and dry  Head: normocephalic and atraumatic  Eyes: pupils equal, round, and reactive to light, extraocular eye movements intact, conjunctivae normal  Neck: neck supple and non tender without mass   Pulmonary/Chest: clear to auscultation bilaterally- no wheezes, rales or rhonchi, normal air movement, no respiratory distress  Cardiovascular: normal rate, normal S1 and S2 and no carotid bruits  Abdomen: soft, non-tender, non-distended, normal bowel sounds, no masses or organomegaly  Extremities: no cyanosis, no clubbing and no edema  Neurologic: no cranial nerve deficit and speech normal    LABS:  Recent Labs     06/23/24  1130      K 3.6   CL 87*   CO2 25   BUN 16   CREATININE 0.8   GLUCOSE 137*   CALCIUM 10.4*       Recent Labs     06/23/24  1130   WBC 9.9   RBC 5.22   HGB 17.2*   HCT 46.5   MCV 89.1   MCH 33.0   MCHC 37.0*   RDW 11.9      MPV 12.4*       No results for input(s):

## 2024-06-23 NOTE — PLAN OF CARE
Problem: Discharge Planning  Goal: Discharge to home or other facility with appropriate resources  Outcome: Progressing  Flowsheets (Taken 6/23/2024 1716)  Discharge to home or other facility with appropriate resources: Identify barriers to discharge with patient and caregiver     Problem: Risk for Elopement  Goal: Patient will not exit the unit/facility without proper excort  Outcome: Progressing  Flowsheets  Taken 6/23/2024 1814  Nursing Interventions for Elopement Risk:   Assist with personal care needs such as toileting, eating, dressing, as needed to reduce the risk of wandering   Collaborate with treatment team for nicotine replacement  Taken 6/23/2024 1715  Nursing Interventions for Elopement Risk:   Assist with personal care needs such as toileting, eating, dressing, as needed to reduce the risk of wandering   Collaborate with treatment team for nicotine replacement     Problem: Pain  Goal: Verbalizes/displays adequate comfort level or baseline comfort level  Outcome: Progressing     Problem: Safety - Adult  Goal: Free from fall injury  Outcome: Progressing

## 2024-06-23 NOTE — ED NOTES
Department of Emergency Medicine  FIRST PROVIDER TRIAGE NOTE             Independent MLP           6/23/24  11:11 AM EDT    Date of Encounter: 6/23/24   MRN: [id number]      HPI: Mark Davila is a 33 y.o. male who presents to the ED for No chief complaint on file.   etoh withdrawal last drink 2100 yesterday. Having hallucinatrions     ROS: Negative for fever or headache.    PE: Gen Appearance/Constitutional: alert  CV: tachycardia  Pulm: CTA bilat     Initial Plan of Care: All treatment areas with department are currently occupied. Plan to order/Initiate the following while awaiting opening in ED: labs.  Initiate Treatment-Testing, Proceed toTreatment Area When Bed Available for ED Attending/MLP to Continue Care    Electronically signed by BETO Millard   DD: 6/23/24

## 2024-06-23 NOTE — ED PROVIDER NOTES
SpO2:    96%   Weight:           ED Course as of 06/23/24 1342   Sun Jun 23, 2024   1240 Spoke with Dr. Snider he will admit the patient [MT]      ED Course User Index  [MT] Glenys La DO        Critical care:  Critical Care: Please note that the withdrawal or failure to initiate urgent interventions for this patient would likely result in a life threatening deterioration or permanent disability.      Accordingly this patient received 32 minutes of critical care time, excluding separately billable procedures.    I am the Primary Clinician of Record.    FINAL IMPRESSION      1. Alcohol withdrawal syndrome with complication (HCC)          DISPOSITION/PLAN      Admitted 06/23/2024 12:39:16 PM    Patient's disposition: Admit to [facility]  Patient's condition is stable.           (Please note that portions of this note were completed with a voice recognition program.  Efforts were made to edit the dictations but occasionally words are mis-transcribed.)    Glenys La DO (electronically signed)       Glenys La DO  06/23/24 134

## 2024-06-24 LAB
AMPHET UR QL SCN: NEGATIVE
BARBITURATES UR QL SCN: POSITIVE
BASOPHILS # BLD: 0.07 K/UL (ref 0–0.2)
BASOPHILS NFR BLD: 1 % (ref 0–2)
BENZODIAZ UR QL: NEGATIVE
BUPRENORPHINE UR QL: NEGATIVE
CANNABINOIDS UR QL SCN: POSITIVE
COCAINE UR QL SCN: NEGATIVE
EOSINOPHIL # BLD: 0.46 K/UL (ref 0.05–0.5)
EOSINOPHILS RELATIVE PERCENT: 5 % (ref 0–6)
ERYTHROCYTE [DISTWIDTH] IN BLOOD BY AUTOMATED COUNT: 11.8 % (ref 11.5–15)
FENTANYL UR QL: NEGATIVE
HCT VFR BLD AUTO: 41.5 % (ref 37–54)
HGB BLD-MCNC: 14.6 G/DL (ref 12.5–16.5)
IMM GRANULOCYTES # BLD AUTO: <0.03 K/UL (ref 0–0.58)
IMM GRANULOCYTES NFR BLD: 0 % (ref 0–5)
LYMPHOCYTES NFR BLD: 3.25 K/UL (ref 1.5–4)
LYMPHOCYTES RELATIVE PERCENT: 33 % (ref 20–42)
MCH RBC QN AUTO: 32.8 PG (ref 26–35)
MCHC RBC AUTO-ENTMCNC: 35.2 G/DL (ref 32–34.5)
MCV RBC AUTO: 93.3 FL (ref 80–99.9)
METHADONE UR QL: NEGATIVE
MONOCYTES NFR BLD: 0.8 K/UL (ref 0.1–0.95)
MONOCYTES NFR BLD: 8 % (ref 2–12)
NEUTROPHILS NFR BLD: 53 % (ref 43–80)
NEUTS SEG NFR BLD: 5.2 K/UL (ref 1.8–7.3)
OPIATES UR QL SCN: NEGATIVE
OXYCODONE UR QL SCN: NEGATIVE
PCP UR QL SCN: NEGATIVE
PLATELET, FLUORESCENCE: 240 K/UL (ref 130–450)
PMV BLD AUTO: 13.3 FL (ref 7–12)
RBC # BLD AUTO: 4.45 M/UL (ref 3.8–5.8)
TEST INFORMATION: ABNORMAL
WBC OTHER # BLD: 9.8 K/UL (ref 4.5–11.5)

## 2024-06-24 PROCEDURE — 6360000002 HC RX W HCPCS: Performed by: FAMILY MEDICINE

## 2024-06-24 PROCEDURE — 6370000000 HC RX 637 (ALT 250 FOR IP): Performed by: INTERNAL MEDICINE

## 2024-06-24 PROCEDURE — 99232 SBSQ HOSP IP/OBS MODERATE 35: CPT | Performed by: INTERNAL MEDICINE

## 2024-06-24 PROCEDURE — 6370000000 HC RX 637 (ALT 250 FOR IP): Performed by: EMERGENCY MEDICINE

## 2024-06-24 PROCEDURE — 80307 DRUG TEST PRSMV CHEM ANLYZR: CPT

## 2024-06-24 PROCEDURE — 2060000000 HC ICU INTERMEDIATE R&B

## 2024-06-24 PROCEDURE — 6360000002 HC RX W HCPCS: Performed by: INTERNAL MEDICINE

## 2024-06-24 PROCEDURE — 85025 COMPLETE CBC W/AUTO DIFF WBC: CPT

## 2024-06-24 PROCEDURE — 36415 COLL VENOUS BLD VENIPUNCTURE: CPT

## 2024-06-24 RX ORDER — NICOTINE 21 MG/24HR
1 PATCH, TRANSDERMAL 24 HOURS TRANSDERMAL DAILY
Status: DISCONTINUED | OUTPATIENT
Start: 2024-06-24 | End: 2024-06-25 | Stop reason: HOSPADM

## 2024-06-24 RX ADMIN — Medication 1 TABLET: at 11:05

## 2024-06-24 RX ADMIN — ONDANSETRON 4 MG: 4 TABLET, ORALLY DISINTEGRATING ORAL at 04:00

## 2024-06-24 RX ADMIN — ENOXAPARIN SODIUM 40 MG: 100 INJECTION SUBCUTANEOUS at 21:23

## 2024-06-24 RX ADMIN — Medication 100 MG: at 11:11

## 2024-06-24 RX ADMIN — PHENOBARBITAL 32.4 MG: 32.4 TABLET ORAL at 15:20

## 2024-06-24 RX ADMIN — PHENOBARBITAL 32.4 MG: 32.4 TABLET ORAL at 04:00

## 2024-06-24 RX ADMIN — PROCHLORPERAZINE EDISYLATE 10 MG: 5 INJECTION INTRAMUSCULAR; INTRAVENOUS at 06:58

## 2024-06-24 RX ADMIN — PHENOBARBITAL 32.4 MG: 32.4 TABLET ORAL at 21:23

## 2024-06-24 RX ADMIN — PHENOBARBITAL 32.4 MG: 32.4 TABLET ORAL at 13:57

## 2024-06-24 RX ADMIN — ONDANSETRON 4 MG: 4 TABLET, ORALLY DISINTEGRATING ORAL at 15:21

## 2024-06-24 RX ADMIN — ACETAMINOPHEN 650 MG: 325 TABLET ORAL at 04:00

## 2024-06-24 RX ADMIN — PHENOBARBITAL 32.4 MG: 32.4 TABLET ORAL at 11:16

## 2024-06-24 RX ADMIN — FOLIC ACID 1 MG: 1 TABLET ORAL at 11:05

## 2024-06-24 ASSESSMENT — PAIN DESCRIPTION - DESCRIPTORS: DESCRIPTORS: ACHING

## 2024-06-24 ASSESSMENT — ENCOUNTER SYMPTOMS
DIARRHEA: 0
ABDOMINAL PAIN: 0
NAUSEA: 0
FACIAL SWELLING: 0
ABDOMINAL DISTENTION: 0
WHEEZING: 0
CHEST TIGHTNESS: 0
SHORTNESS OF BREATH: 0
RHINORRHEA: 0
COUGH: 0
SINUS PAIN: 0
CONSTIPATION: 0
BLOOD IN STOOL: 0

## 2024-06-24 ASSESSMENT — PAIN SCALES - GENERAL: PAINLEVEL_OUTOF10: 6

## 2024-06-24 ASSESSMENT — PAIN DESCRIPTION - LOCATION: LOCATION: HEAD

## 2024-06-24 NOTE — PLAN OF CARE
Problem: Discharge Planning  Goal: Discharge to home or other facility with appropriate resources  6/24/2024 0100 by Shyann Skelton RN  Outcome: Progressing  6/23/2024 2127 by Shyann Skelton RN  Outcome: Progressing  Flowsheets (Taken 6/23/2024 1900)  Discharge to home or other facility with appropriate resources:   Arrange for needed discharge resources and transportation as appropriate   Identify barriers to discharge with patient and caregiver  6/23/2024 1833 by Meche Trivedi RN  Outcome: Progressing  Flowsheets (Taken 6/23/2024 1716)  Discharge to home or other facility with appropriate resources: Identify barriers to discharge with patient and caregiver     Problem: Risk for Elopement  Goal: Patient will not exit the unit/facility without proper excort  6/24/2024 0100 by Shyann Skelton RN  Outcome: Progressing  Flowsheets (Taken 6/23/2024 1900)  Nursing Interventions for Elopement Risk:   Assist with personal care needs such as toileting, eating, dressing, as needed to reduce the risk of wandering   Collaborate with treatment team for nicotine replacement  6/23/2024 1833 by Meche Trivedi, RN  Outcome: Progressing  Flowsheets  Taken 6/23/2024 1814  Nursing Interventions for Elopement Risk:   Assist with personal care needs such as toileting, eating, dressing, as needed to reduce the risk of wandering   Collaborate with treatment team for nicotine replacement  Taken 6/23/2024 1715  Nursing Interventions for Elopement Risk:   Assist with personal care needs such as toileting, eating, dressing, as needed to reduce the risk of wandering   Collaborate with treatment team for nicotine replacement     Problem: Pain  Goal: Verbalizes/displays adequate comfort level or baseline comfort level  6/24/2024 0100 by Shyann Skelton RN  Outcome: Progressing  6/23/2024 1833 by Meche Trivedi RN  Outcome: Progressing     Problem: Safety - Adult  Goal: Free from fall injury  6/24/2024 0100 by

## 2024-06-25 VITALS
OXYGEN SATURATION: 98 % | RESPIRATION RATE: 19 BRPM | HEART RATE: 59 BPM | SYSTOLIC BLOOD PRESSURE: 117 MMHG | WEIGHT: 140 LBS | BODY MASS INDEX: 22.5 KG/M2 | DIASTOLIC BLOOD PRESSURE: 91 MMHG | TEMPERATURE: 98.6 F | HEIGHT: 66 IN

## 2024-06-25 LAB
ALBUMIN SERPL-MCNC: 4.4 G/DL (ref 3.5–5.2)
ALP SERPL-CCNC: 110 U/L (ref 40–129)
ALT SERPL-CCNC: 36 U/L (ref 0–40)
ANION GAP SERPL CALCULATED.3IONS-SCNC: 14 MMOL/L (ref 7–16)
AST SERPL-CCNC: 46 U/L (ref 0–39)
BASOPHILS # BLD: 0.08 K/UL (ref 0–0.2)
BASOPHILS NFR BLD: 1 % (ref 0–2)
BILIRUB SERPL-MCNC: 0.9 MG/DL (ref 0–1.2)
BUN SERPL-MCNC: 7 MG/DL (ref 6–20)
CALCIUM SERPL-MCNC: 9.4 MG/DL (ref 8.6–10.2)
CHLORIDE SERPL-SCNC: 96 MMOL/L (ref 98–107)
CO2 SERPL-SCNC: 28 MMOL/L (ref 22–29)
CREAT SERPL-MCNC: 0.7 MG/DL (ref 0.7–1.2)
EOSINOPHIL # BLD: 0.38 K/UL (ref 0.05–0.5)
EOSINOPHILS RELATIVE PERCENT: 5 % (ref 0–6)
ERYTHROCYTE [DISTWIDTH] IN BLOOD BY AUTOMATED COUNT: 11.9 % (ref 11.5–15)
GFR, ESTIMATED: >90 ML/MIN/1.73M2
GLUCOSE SERPL-MCNC: 92 MG/DL (ref 74–99)
HCT VFR BLD AUTO: 43.7 % (ref 37–54)
HGB BLD-MCNC: 15 G/DL (ref 12.5–16.5)
IMM GRANULOCYTES # BLD AUTO: 0.03 K/UL (ref 0–0.58)
IMM GRANULOCYTES NFR BLD: 0 % (ref 0–5)
LYMPHOCYTES NFR BLD: 2.41 K/UL (ref 1.5–4)
LYMPHOCYTES RELATIVE PERCENT: 30 % (ref 20–42)
MCH RBC QN AUTO: 31.6 PG (ref 26–35)
MCHC RBC AUTO-ENTMCNC: 34.3 G/DL (ref 32–34.5)
MCV RBC AUTO: 92 FL (ref 80–99.9)
MONOCYTES NFR BLD: 0.73 K/UL (ref 0.1–0.95)
MONOCYTES NFR BLD: 9 % (ref 2–12)
NEUTROPHILS NFR BLD: 55 % (ref 43–80)
NEUTS SEG NFR BLD: 4.5 K/UL (ref 1.8–7.3)
PLATELET, FLUORESCENCE: 234 K/UL (ref 130–450)
PMV BLD AUTO: 13.8 FL (ref 7–12)
POTASSIUM SERPL-SCNC: 3.5 MMOL/L (ref 3.5–5)
PROT SERPL-MCNC: 7.2 G/DL (ref 6.4–8.3)
RBC # BLD AUTO: 4.75 M/UL (ref 3.8–5.8)
SODIUM SERPL-SCNC: 138 MMOL/L (ref 132–146)
WBC OTHER # BLD: 8.1 K/UL (ref 4.5–11.5)

## 2024-06-25 PROCEDURE — 80053 COMPREHEN METABOLIC PANEL: CPT

## 2024-06-25 PROCEDURE — 6370000000 HC RX 637 (ALT 250 FOR IP): Performed by: INTERNAL MEDICINE

## 2024-06-25 PROCEDURE — 36415 COLL VENOUS BLD VENIPUNCTURE: CPT

## 2024-06-25 PROCEDURE — 6370000000 HC RX 637 (ALT 250 FOR IP): Performed by: EMERGENCY MEDICINE

## 2024-06-25 PROCEDURE — 85025 COMPLETE CBC W/AUTO DIFF WBC: CPT

## 2024-06-25 RX ORDER — M-VIT,TX,IRON,MINS/CALC/FOLIC 27MG-0.4MG
1 TABLET ORAL DAILY
Qty: 30 TABLET | Refills: 1 | Status: SHIPPED | OUTPATIENT
Start: 2024-06-26

## 2024-06-25 RX ORDER — NICOTINE 21 MG/24HR
1 PATCH, TRANSDERMAL 24 HOURS TRANSDERMAL DAILY
Qty: 30 PATCH | Refills: 0 | Status: SHIPPED | OUTPATIENT
Start: 2024-06-26

## 2024-06-25 RX ORDER — FOLIC ACID 1 MG/1
1 TABLET ORAL DAILY
Qty: 30 TABLET | Refills: 1 | Status: SHIPPED | OUTPATIENT
Start: 2024-06-26

## 2024-06-25 RX ORDER — THIAMINE MONONITRATE (VIT B1) 100 MG
100 TABLET ORAL DAILY
Qty: 30 TABLET | Refills: 1 | Status: SHIPPED | OUTPATIENT
Start: 2024-06-26

## 2024-06-25 RX ADMIN — Medication 100 MG: at 10:23

## 2024-06-25 RX ADMIN — ONDANSETRON 4 MG: 4 TABLET, ORALLY DISINTEGRATING ORAL at 02:26

## 2024-06-25 RX ADMIN — Medication 1 TABLET: at 10:23

## 2024-06-25 RX ADMIN — PHENOBARBITAL 32.4 MG: 32.4 TABLET ORAL at 10:23

## 2024-06-25 RX ADMIN — FOLIC ACID 1 MG: 1 TABLET ORAL at 10:23

## 2024-06-25 ASSESSMENT — PAIN SCALES - GENERAL: PAINLEVEL_OUTOF10: 2

## 2024-06-25 ASSESSMENT — PAIN DESCRIPTION - LOCATION: LOCATION: HEAD

## 2024-06-25 ASSESSMENT — PAIN - FUNCTIONAL ASSESSMENT: PAIN_FUNCTIONAL_ASSESSMENT: ACTIVITIES ARE NOT PREVENTED

## 2024-06-25 ASSESSMENT — PAIN DESCRIPTION - ORIENTATION: ORIENTATION: MID

## 2024-06-25 ASSESSMENT — PAIN DESCRIPTION - DESCRIPTORS: DESCRIPTORS: ACHING

## 2024-06-25 NOTE — PROGRESS NOTES
4 Eyes Skin Assessment     NAME:  Mark Davila  YOB: 1990  MEDICAL RECORD NUMBER:  [id number]    The patient is being assessed for  Admission    I agree that at least one RN has performed a thorough Head to Toe Skin Assessment on the patient. ALL assessment sites listed below have been assessed.      Areas assessed by both nurses:    Head, Face, Ears, Shoulders, Back, Chest, Arms, Elbows, Hands, Sacrum. Buttock, Coccyx, Ischium, Legs. Feet and Heels, and Under Medical Devices         Does the Patient have a Wound? No noted wound(s)       Maximus Prevention initiated by RN: No  Wound Care Orders initiated by RN: No    Pressure Injury (Stage 3,4, Unstageable, DTI, NWPT, and Complex wounds) if present, place Wound referral order by RN under : No    New Ostomies, if present place, Ostomy referral order under : No     Nurse 1 eSignature: Electronically signed by Meche Trivedi RN on 6/23/24 at 6:04 PM EDT    **SHARE this note so that the co-signing nurse can place an eSignature**    Nurse 2 eSignature: Electronically signed by Milena Sanford RN on 6/23/24 at 6:06 PM EDT    
CLINICAL PHARMACY NOTE: MEDS TO BEDS    Total # of Prescriptions Filled: 4   The following medications were delivered to the patient:  Nicotine 21 mg/24 hr. Patch  Thera tabs m   Folic acid 1 mg  B1 100 mg    Additional Documentation:    
Patient CIWA 25 at 1950, administered scheduled Phenobarbitol 32.4mg. At 2122 patient's CIWA 31 with increased pain and anxiety rated 11 per patient on pain scale 1-10, and increased nausea. Vital signs stable. Physician notified, see new orders for nausea.   
  Vital signs: (most recent): Blood pressure 98/71, pulse 66, temperature 97.6 °F (36.4 °C), temperature source Infrared, resp. rate 16, height 1.676 m (5' 6\"), weight 63.5 kg (140 lb), SpO2 98 %.  Vital signs are normal.  No fever.    Output: Producing urine.    HEENT: Normal HEENT exam.    Lungs:  Normal effort and normal respiratory rate.  Breath sounds clear to auscultation.    Heart: Normal rate.  Regular rhythm.  S1 normal.    Abdomen: Abdomen is soft.  Bowel sounds are normal.   There is no abdominal tenderness.     Extremities: There is no dependent edema.    Pulses: Distal pulses are intact.    Neurological: Patient is oriented to person, place and time.    Pupils:  Pupils are equal, round, and reactive to light.      Labs/Imaging/Diagnostics    Labs:  CBC:  Recent Labs     06/23/24  1130 06/24/24  0441   WBC 9.9 9.8   RBC 5.22 4.45   HGB 17.2* 14.6   HCT 46.5 41.5   MCV 89.1 93.3   RDW 11.9 11.8     --      CHEMISTRIES:  Recent Labs     06/23/24  1130      K 3.6   CL 87*   CO2 25   BUN 16   CREATININE 0.8   GLUCOSE 137*     PT/INR:No results for input(s): \"PROTIME\", \"INR\" in the last 72 hours.  APTT:No results for input(s): \"APTT\" in the last 72 hours.  LIVER PROFILE:  Recent Labs     06/23/24  1130   AST 35   ALT 28   BILITOT 0.9   ALKPHOS 118       Imaging Last 24 Hours:  No results found.  Assessment//Plan           Hospital Problems             Last Modified POA    * (Principal) Alcohol withdrawal syndrome without complication (HCC) 6/23/2024 Yes    Tobacco abuse 6/24/2024 Yes    Marijuana use 6/24/2024 Yes     Assessment:    Condition: In stable condition.   (Alcohol withdrawal he is phenobarb taper, patient is doing well he still has some mild withdrawal symptoms but better.    Tobacco abuse he smokes 2 packs a day.  Today he wants a nicotine patch I ordered it for him.    Marijuana use his urine drug screen showed positive for marijuana    Full code  Lovenox for DVT prophylaxis  ).

## 2024-06-25 NOTE — PLAN OF CARE
Problem: Discharge Planning  Goal: Discharge to home or other facility with appropriate resources  Outcome: Progressing     Problem: Risk for Elopement  Goal: Patient will not exit the unit/facility without proper excort  Outcome: Progressing  Flowsheets (Taken 6/25/2024 0014)  Nursing Interventions for Elopement Risk:   Assist with personal care needs such as toileting, eating, dressing, as needed to reduce the risk of wandering   Collaborate with treatment team for nicotine replacement     Problem: Pain  Goal: Verbalizes/displays adequate comfort level or baseline comfort level  Outcome: Progressing     Problem: Safety - Adult  Goal: Free from fall injury  Outcome: Progressing  Flowsheets (Taken 6/25/2024 0019)  Free From Fall Injury: Instruct family/caregiver on patient safety

## 2024-06-25 NOTE — CARE COORDINATION
Peer Recovery Support Note       Name:  Mark Davila   Date:    6/25/2024        Chief Complaint   Patient presents with    Alcohol Problem     Last drink 2100 last night, typically drinks a bottle of vodka daily, vomiting all day, hallucinating, hands going numb, abd pain, diarrhea,            Peer Support met with patient.  [x] Support and education provided  [] Resources provided   [] Treatment referral:   [x] Other:   [] Patient declined peer recovery services      Referred By: Sarah Beth ([facility])     Notes:  Peer met with patient to discuss services upon discharge. Patient was very adamant that he did not want to engage with inpatient treatment. Patient stated he has several appointments scheduled with [facility] and he does not want to miss or reschedule the appointments. Peer shared lived experience. Patient stated that he want to focus on the science of his alcoholism. Patient is eager to be discharged.

## 2024-06-25 NOTE — CARE COORDINATION
Case Management Assessment  Initial Evaluation    Date/Time of Evaluation: 6/25/2024 1:38 PM  Assessment Completed by: PAT Resendiz    If patient is discharged prior to next notation, then this note serves as note for discharge by case management.    Patient Name: Mark Davila                   YOB: 1990  Diagnosis: Alcohol withdrawal syndrome with complication (HCC) [F10.939]  Alcohol withdrawal syndrome without complication (HCC) [F10.930]                   Date / Time: 6/23/2024 11:18 AM    Patient Admission Status: Inpatient   Readmission Risk (Low < 19, Mod (19-27), High > 27): Readmission Risk Score: 15.4    Current PCP: Not, On File (Inactive)  PCP verified by CM? No    Chart Reviewed: Yes      History Provided by: Patient  Patient Orientation: Alert and Oriented, Person, Place, Situation    Patient Cognition: Alert    Hospitalization in the last 30 days (Readmission):  No    If yes, Readmission Assessment in CM Navigator will be completed.    Advance Directives:      Code Status: Full Code   Patient's Primary Decision Maker is: Legal Next of Kin      Discharge Planning:    Patient lives with: Family Members Type of Home: House  Primary Care Giver: Self  Patient Support Systems include: Family Members   Current Financial resources:    Current community resources:    Current services prior to admission: None            Current DME:              Type of Home Care services:  None    ADLS  Prior functional level: Independent in ADLs/IADLs  Current functional level: Independent in ADLs/IADLs    PT AM-PAC:   /24  OT AM-PAC:   /24    Family can provide assistance at DC: No  Would you like Case Management to discuss the discharge plan with any other family members/significant others, and if so, who? No  Plans to Return to Present Housing: Yes  Other Identified Issues/Barriers to RETURNING to current housing: pt self limits as well as has had been to numerous ETOH units   Potential

## 2024-06-25 NOTE — DISCHARGE SUMMARY
[facility] Hospitalist       Hospitalist Physician Discharge Summary       Not, On File            Activity level: Walks normal    Diet: No diet orders on file      Condition at discharge: Stable    Dispo: Home with outpatient alcohol rehab follow-up      Patient ID:  Mark Davila  [id number]  33 y.o.  1990    Admit date: 6/23/2024    Discharge date and time:  6/25/2024  7:30 PM    Admission Diagnoses: Principal Problem:    Alcohol withdrawal syndrome without complication (HCC)  Active Problems:    Tobacco abuse    Marijuana use  Resolved Problems:    * No resolved hospital problems. *      Discharge Diagnoses: Principal Problem:    Alcohol withdrawal syndrome without complication (HCC)  Active Problems:    Tobacco abuse    Marijuana use  Resolved Problems:    * No resolved hospital problems. *      Consults:  IP CONSULT TO SOCIAL WORK    Procedures: None    Hospital Course: This is a 33 years old white gentleman significant past medical history anxiety, mood disorder, alcohol abuse and PTSD and seizures who presented to the emergency room because he was in alcohol withdrawal he drinks the night prior to admission and then he came on the day of the admission because he was vomiting and hallucinating and shaky  He has been going to [facility] for outpatient rehab he stated that he can stay sober for a month and then he relapses.  Patient was admitted was placed on CIWA protocol and phenobarbital.  Was given IV fluids.  He is feeling much better.  I discussed with him about going to inpatient rehab but he was not motivated to do that he was said he will follow-up with [facility] again.  Patient was discharged in stable condition  Discharge Exam:  Vitals:    06/25/24 0021 06/25/24 0101 06/25/24 0425 06/25/24 0704   BP: 123/87 123/87 118/82 (!) 117/91   Pulse: 71 71 70 59   Resp: 16  18 19   Temp: 98.8 °F (37.1 °C)  98.7 °F (37.1 °C) 98.6 °F (37 °C)   TempSrc: Oral  Oral Oral   SpO2: 99%  98%

## 2024-08-20 ENCOUNTER — APPOINTMENT (OUTPATIENT)
Dept: CT IMAGING | Age: 34
End: 2024-08-20
Payer: COMMERCIAL

## 2024-08-20 ENCOUNTER — HOSPITAL ENCOUNTER (INPATIENT)
Age: 34
LOS: 2 days | Discharge: OTHER INSTITUTION | End: 2024-08-22
Attending: STUDENT IN AN ORGANIZED HEALTH CARE EDUCATION/TRAINING PROGRAM | Admitting: FAMILY MEDICINE
Payer: COMMERCIAL

## 2024-08-20 ENCOUNTER — APPOINTMENT (OUTPATIENT)
Dept: GENERAL RADIOLOGY | Age: 34
End: 2024-08-20
Payer: COMMERCIAL

## 2024-08-20 DIAGNOSIS — F10.930 ALCOHOL WITHDRAWAL SYNDROME WITHOUT COMPLICATION (HCC): Primary | ICD-10-CM

## 2024-08-20 LAB
ALBUMIN SERPL-MCNC: 4.9 G/DL (ref 3.5–5.2)
ALP SERPL-CCNC: 107 U/L (ref 40–129)
ALT SERPL-CCNC: 23 U/L (ref 0–40)
AMPHET UR QL SCN: NEGATIVE
ANION GAP SERPL CALCULATED.3IONS-SCNC: 25 MMOL/L (ref 7–16)
AST SERPL-CCNC: 39 U/L (ref 0–39)
BARBITURATES UR QL SCN: POSITIVE
BASOPHILS # BLD: 0.05 K/UL (ref 0–0.2)
BASOPHILS NFR BLD: 1 % (ref 0–2)
BENZODIAZ UR QL: NEGATIVE
BILIRUB SERPL-MCNC: 0.5 MG/DL (ref 0–1.2)
BUN SERPL-MCNC: 13 MG/DL (ref 6–20)
BUPRENORPHINE UR QL: NEGATIVE
CALCIUM SERPL-MCNC: 9.3 MG/DL (ref 8.6–10.2)
CANNABINOIDS UR QL SCN: POSITIVE
CHLORIDE SERPL-SCNC: 91 MMOL/L (ref 98–107)
CK SERPL-CCNC: 68 U/L (ref 20–200)
CO2 SERPL-SCNC: 16 MMOL/L (ref 22–29)
COCAINE UR QL SCN: NEGATIVE
CREAT SERPL-MCNC: 0.7 MG/DL (ref 0.7–1.2)
EOSINOPHIL # BLD: 0.01 K/UL (ref 0.05–0.5)
EOSINOPHILS RELATIVE PERCENT: 0 % (ref 0–6)
ERYTHROCYTE [DISTWIDTH] IN BLOOD BY AUTOMATED COUNT: 12.4 % (ref 11.5–15)
FENTANYL UR QL: NEGATIVE
GFR, ESTIMATED: >90 ML/MIN/1.73M2
GLUCOSE SERPL-MCNC: 68 MG/DL (ref 74–99)
HCT VFR BLD AUTO: 47.7 % (ref 37–54)
HGB BLD-MCNC: 16.5 G/DL (ref 12.5–16.5)
IMM GRANULOCYTES # BLD AUTO: <0.03 K/UL (ref 0–0.58)
IMM GRANULOCYTES NFR BLD: 0 % (ref 0–5)
LYMPHOCYTES NFR BLD: 2 K/UL (ref 1.5–4)
LYMPHOCYTES RELATIVE PERCENT: 25 % (ref 20–42)
MCH RBC QN AUTO: 32.9 PG (ref 26–35)
MCHC RBC AUTO-ENTMCNC: 34.6 G/DL (ref 32–34.5)
MCV RBC AUTO: 95.2 FL (ref 80–99.9)
METHADONE UR QL: NEGATIVE
MONOCYTES NFR BLD: 0.22 K/UL (ref 0.1–0.95)
MONOCYTES NFR BLD: 3 % (ref 2–12)
NEUTROPHILS NFR BLD: 71 % (ref 43–80)
NEUTS SEG NFR BLD: 5.61 K/UL (ref 1.8–7.3)
OPIATES UR QL SCN: NEGATIVE
OXYCODONE UR QL SCN: NEGATIVE
PCP UR QL SCN: NEGATIVE
PLATELET, FLUORESCENCE: 224 K/UL (ref 130–450)
PMV BLD AUTO: 13 FL (ref 7–12)
POTASSIUM SERPL-SCNC: 4.7 MMOL/L (ref 3.5–5)
PROT SERPL-MCNC: 8.4 G/DL (ref 6.4–8.3)
RBC # BLD AUTO: 5.01 M/UL (ref 3.8–5.8)
SODIUM SERPL-SCNC: 132 MMOL/L (ref 132–146)
TEST INFORMATION: ABNORMAL
TROPONIN I SERPL HS-MCNC: <6 NG/L (ref 0–11)
WBC OTHER # BLD: 7.9 K/UL (ref 4.5–11.5)

## 2024-08-20 PROCEDURE — 80179 DRUG ASSAY SALICYLATE: CPT

## 2024-08-20 PROCEDURE — 82550 ASSAY OF CK (CPK): CPT

## 2024-08-20 PROCEDURE — 84484 ASSAY OF TROPONIN QUANT: CPT

## 2024-08-20 PROCEDURE — 93005 ELECTROCARDIOGRAM TRACING: CPT | Performed by: STUDENT IN AN ORGANIZED HEALTH CARE EDUCATION/TRAINING PROGRAM

## 2024-08-20 PROCEDURE — 99222 1ST HOSP IP/OBS MODERATE 55: CPT | Performed by: FAMILY MEDICINE

## 2024-08-20 PROCEDURE — 71045 X-RAY EXAM CHEST 1 VIEW: CPT

## 2024-08-20 PROCEDURE — 99285 EMERGENCY DEPT VISIT HI MDM: CPT

## 2024-08-20 PROCEDURE — G0480 DRUG TEST DEF 1-7 CLASSES: HCPCS

## 2024-08-20 PROCEDURE — 72125 CT NECK SPINE W/O DYE: CPT

## 2024-08-20 PROCEDURE — 96375 TX/PRO/DX INJ NEW DRUG ADDON: CPT

## 2024-08-20 PROCEDURE — 96365 THER/PROPH/DIAG IV INF INIT: CPT

## 2024-08-20 PROCEDURE — 6370000000 HC RX 637 (ALT 250 FOR IP)

## 2024-08-20 PROCEDURE — 2580000003 HC RX 258: Performed by: STUDENT IN AN ORGANIZED HEALTH CARE EDUCATION/TRAINING PROGRAM

## 2024-08-20 PROCEDURE — 80307 DRUG TEST PRSMV CHEM ANLYZR: CPT

## 2024-08-20 PROCEDURE — 6360000002 HC RX W HCPCS: Performed by: STUDENT IN AN ORGANIZED HEALTH CARE EDUCATION/TRAINING PROGRAM

## 2024-08-20 PROCEDURE — 2060000000 HC ICU INTERMEDIATE R&B

## 2024-08-20 PROCEDURE — 85025 COMPLETE CBC W/AUTO DIFF WBC: CPT

## 2024-08-20 PROCEDURE — 2500000003 HC RX 250 WO HCPCS: Performed by: STUDENT IN AN ORGANIZED HEALTH CARE EDUCATION/TRAINING PROGRAM

## 2024-08-20 PROCEDURE — 80143 DRUG ASSAY ACETAMINOPHEN: CPT

## 2024-08-20 PROCEDURE — 80053 COMPREHEN METABOLIC PANEL: CPT

## 2024-08-20 PROCEDURE — 70450 CT HEAD/BRAIN W/O DYE: CPT

## 2024-08-20 PROCEDURE — 81001 URINALYSIS AUTO W/SCOPE: CPT

## 2024-08-20 RX ORDER — SODIUM CHLORIDE 9 MG/ML
INJECTION, SOLUTION INTRAVENOUS PRN
Status: DISCONTINUED | OUTPATIENT
Start: 2024-08-20 | End: 2024-08-22 | Stop reason: HOSPADM

## 2024-08-20 RX ORDER — BUPROPION HYDROCHLORIDE 75 MG/1
75 TABLET ORAL 2 TIMES DAILY
COMMUNITY

## 2024-08-20 RX ORDER — PHENOBARBITAL 32.4 MG/1
32.4 TABLET ORAL 4 TIMES DAILY
Status: DISCONTINUED | OUTPATIENT
Start: 2024-08-20 | End: 2024-08-20

## 2024-08-20 RX ORDER — PHENOBARBITAL 32.4 MG/1
16.2 TABLET ORAL EVERY 6 HOURS PRN
Status: DISCONTINUED | OUTPATIENT
Start: 2024-08-23 | End: 2024-08-22 | Stop reason: HOSPADM

## 2024-08-20 RX ORDER — ACETAMINOPHEN 650 MG/1
650 SUPPOSITORY RECTAL EVERY 6 HOURS PRN
Status: DISCONTINUED | OUTPATIENT
Start: 2024-08-20 | End: 2024-08-22 | Stop reason: HOSPADM

## 2024-08-20 RX ORDER — FOLIC ACID 1 MG/1
1 TABLET ORAL DAILY
Status: DISCONTINUED | OUTPATIENT
Start: 2024-08-21 | End: 2024-08-22 | Stop reason: HOSPADM

## 2024-08-20 RX ORDER — ENOXAPARIN SODIUM 100 MG/ML
40 INJECTION SUBCUTANEOUS DAILY
Status: DISCONTINUED | OUTPATIENT
Start: 2024-08-21 | End: 2024-08-22 | Stop reason: HOSPADM

## 2024-08-20 RX ORDER — FOLIC ACID 5 MG/ML
1 INJECTION, SOLUTION INTRAMUSCULAR; INTRAVENOUS; SUBCUTANEOUS ONCE
Status: DISCONTINUED | OUTPATIENT
Start: 2024-08-20 | End: 2024-08-20 | Stop reason: CLARIF

## 2024-08-20 RX ORDER — 0.9 % SODIUM CHLORIDE 0.9 %
1000 INTRAVENOUS SOLUTION INTRAVENOUS ONCE
Status: COMPLETED | OUTPATIENT
Start: 2024-08-20 | End: 2024-08-20

## 2024-08-20 RX ORDER — ONDANSETRON 4 MG/1
TABLET, ORALLY DISINTEGRATING ORAL
Status: COMPLETED
Start: 2024-08-20 | End: 2024-08-20

## 2024-08-20 RX ORDER — PHENOBARBITAL 32.4 MG/1
32.4 TABLET ORAL EVERY 6 HOURS PRN
Status: DISCONTINUED | OUTPATIENT
Start: 2024-08-21 | End: 2024-08-22 | Stop reason: HOSPADM

## 2024-08-20 RX ORDER — 0.9 % SODIUM CHLORIDE 0.9 %
1000 INTRAVENOUS SOLUTION INTRAVENOUS ONCE
Status: DISCONTINUED | OUTPATIENT
Start: 2024-08-20 | End: 2024-08-22 | Stop reason: HOSPADM

## 2024-08-20 RX ORDER — M-VIT,TX,IRON,MINS/CALC/FOLIC 27MG-0.4MG
1 TABLET ORAL DAILY
Status: DISCONTINUED | OUTPATIENT
Start: 2024-08-21 | End: 2024-08-22 | Stop reason: HOSPADM

## 2024-08-20 RX ORDER — GAUZE BANDAGE 2" X 2"
100 BANDAGE TOPICAL DAILY
Status: DISCONTINUED | OUTPATIENT
Start: 2024-08-21 | End: 2024-08-22 | Stop reason: HOSPADM

## 2024-08-20 RX ORDER — PHENOBARBITAL 32.4 MG/1
32.4 TABLET ORAL 2 TIMES DAILY
Status: DISCONTINUED | OUTPATIENT
Start: 2024-08-21 | End: 2024-08-20

## 2024-08-20 RX ORDER — PHENOBARBITAL 32.4 MG/1
16.2 TABLET ORAL 2 TIMES DAILY
Status: DISCONTINUED | OUTPATIENT
Start: 2024-08-22 | End: 2024-08-20

## 2024-08-20 RX ORDER — ONDANSETRON 2 MG/ML
4 INJECTION INTRAMUSCULAR; INTRAVENOUS EVERY 6 HOURS PRN
Status: DISCONTINUED | OUTPATIENT
Start: 2024-08-20 | End: 2024-08-20 | Stop reason: SDUPTHER

## 2024-08-20 RX ORDER — SODIUM CHLORIDE 0.9 % (FLUSH) 0.9 %
5-40 SYRINGE (ML) INJECTION PRN
Status: DISCONTINUED | OUTPATIENT
Start: 2024-08-20 | End: 2024-08-22 | Stop reason: HOSPADM

## 2024-08-20 RX ORDER — ONDANSETRON 4 MG/1
4 TABLET, ORALLY DISINTEGRATING ORAL ONCE
Status: COMPLETED | OUTPATIENT
Start: 2024-08-20 | End: 2024-08-20

## 2024-08-20 RX ORDER — PHENOBARBITAL 32.4 MG/1
32.4 TABLET ORAL 2 TIMES DAILY
Status: DISCONTINUED | OUTPATIENT
Start: 2024-08-23 | End: 2024-08-22 | Stop reason: HOSPADM

## 2024-08-20 RX ORDER — PHENOBARBITAL 32.4 MG/1
32.4 TABLET ORAL 4 TIMES DAILY
Status: DISCONTINUED | OUTPATIENT
Start: 2024-08-22 | End: 2024-08-22 | Stop reason: HOSPADM

## 2024-08-20 RX ORDER — PHENOBARBITAL 32.4 MG/1
64.8 TABLET ORAL 4 TIMES DAILY
Status: COMPLETED | OUTPATIENT
Start: 2024-08-21 | End: 2024-08-21

## 2024-08-20 RX ORDER — PHENOBARBITAL SODIUM 65 MG/ML
130 INJECTION, SOLUTION INTRAMUSCULAR; INTRAVENOUS ONCE
Status: COMPLETED | OUTPATIENT
Start: 2024-08-20 | End: 2024-08-21

## 2024-08-20 RX ORDER — PHENOBARBITAL 32.4 MG/1
64.8 TABLET ORAL EVERY 6 HOURS PRN
Status: DISPENSED | OUTPATIENT
Start: 2024-08-20 | End: 2024-08-21

## 2024-08-20 RX ORDER — SODIUM CHLORIDE 0.9 % (FLUSH) 0.9 %
5-40 SYRINGE (ML) INJECTION EVERY 12 HOURS SCHEDULED
Status: DISCONTINUED | OUTPATIENT
Start: 2024-08-20 | End: 2024-08-22 | Stop reason: HOSPADM

## 2024-08-20 RX ORDER — DEXTROSE MONOHYDRATE 25 G/50ML
25 INJECTION, SOLUTION INTRAVENOUS ONCE
Status: COMPLETED | OUTPATIENT
Start: 2024-08-20 | End: 2024-08-20

## 2024-08-20 RX ORDER — ONDANSETRON 2 MG/ML
4 INJECTION INTRAMUSCULAR; INTRAVENOUS EVERY 6 HOURS PRN
Status: DISCONTINUED | OUTPATIENT
Start: 2024-08-20 | End: 2024-08-22 | Stop reason: HOSPADM

## 2024-08-20 RX ORDER — DEXTROSE MONOHYDRATE AND SODIUM CHLORIDE 5; .45 G/100ML; G/100ML
INJECTION, SOLUTION INTRAVENOUS CONTINUOUS
Status: DISCONTINUED | OUTPATIENT
Start: 2024-08-20 | End: 2024-08-21

## 2024-08-20 RX ORDER — ONDANSETRON 4 MG/1
4 TABLET, ORALLY DISINTEGRATING ORAL EVERY 8 HOURS PRN
Status: DISCONTINUED | OUTPATIENT
Start: 2024-08-20 | End: 2024-08-22 | Stop reason: HOSPADM

## 2024-08-20 RX ORDER — PHENOBARBITAL 32.4 MG/1
32.4 TABLET ORAL EVERY 6 HOURS PRN
Status: DISCONTINUED | OUTPATIENT
Start: 2024-08-20 | End: 2024-08-20

## 2024-08-20 RX ORDER — PHENOBARBITAL 32.4 MG/1
16.2 TABLET ORAL 2 TIMES DAILY
Status: DISCONTINUED | OUTPATIENT
Start: 2024-08-24 | End: 2024-08-22 | Stop reason: HOSPADM

## 2024-08-20 RX ORDER — ACETAMINOPHEN 325 MG/1
650 TABLET ORAL EVERY 6 HOURS PRN
Status: DISCONTINUED | OUTPATIENT
Start: 2024-08-20 | End: 2024-08-22 | Stop reason: HOSPADM

## 2024-08-20 RX ORDER — THIAMINE HYDROCHLORIDE 100 MG/ML
100 INJECTION, SOLUTION INTRAMUSCULAR; INTRAVENOUS ONCE
Status: COMPLETED | OUTPATIENT
Start: 2024-08-20 | End: 2024-08-20

## 2024-08-20 RX ORDER — PHENOBARBITAL 32.4 MG/1
16.2 TABLET ORAL EVERY 6 HOURS PRN
Status: DISCONTINUED | OUTPATIENT
Start: 2024-08-22 | End: 2024-08-20

## 2024-08-20 RX ADMIN — DEXTROSE MONOHYDRATE 25 G: 25 INJECTION, SOLUTION INTRAVENOUS at 22:46

## 2024-08-20 RX ADMIN — ONDANSETRON 4 MG: 4 TABLET, ORALLY DISINTEGRATING ORAL at 22:46

## 2024-08-20 RX ADMIN — Medication 10 ML: at 21:22

## 2024-08-20 RX ADMIN — FOLIC ACID 1 MG: 5 INJECTION, SOLUTION INTRAMUSCULAR; INTRAVENOUS; SUBCUTANEOUS at 21:48

## 2024-08-20 RX ADMIN — THIAMINE HYDROCHLORIDE 100 MG: 100 INJECTION, SOLUTION INTRAMUSCULAR; INTRAVENOUS at 21:46

## 2024-08-20 RX ADMIN — SODIUM CHLORIDE 1000 ML: 9 INJECTION, SOLUTION INTRAVENOUS at 21:44

## 2024-08-20 RX ADMIN — ONDANSETRON 4 MG: 2 INJECTION INTRAMUSCULAR; INTRAVENOUS at 21:45

## 2024-08-20 ASSESSMENT — PAIN - FUNCTIONAL ASSESSMENT: PAIN_FUNCTIONAL_ASSESSMENT: NONE - DENIES PAIN

## 2024-08-21 PROBLEM — F10.930 ALCOHOL WITHDRAWAL SYNDROME, UNCOMPLICATED (HCC): Status: RESOLVED | Noted: 2024-03-24 | Resolved: 2024-08-21

## 2024-08-21 PROBLEM — K85.90 ACUTE PANCREATITIS WITHOUT INFECTION OR NECROSIS: Status: RESOLVED | Noted: 2023-12-22 | Resolved: 2024-08-21

## 2024-08-21 PROBLEM — R68.89 WITHDRAWAL COMPLAINT: Status: RESOLVED | Noted: 2023-09-26 | Resolved: 2024-08-21

## 2024-08-21 PROBLEM — F41.9 ANXIETY DISORDER: Status: RESOLVED | Noted: 2021-09-09 | Resolved: 2024-08-21

## 2024-08-21 PROBLEM — K85.20 ALCOHOL-INDUCED ACUTE PANCREATITIS: Status: RESOLVED | Noted: 2023-12-23 | Resolved: 2024-08-21

## 2024-08-21 LAB
ANION GAP SERPL CALCULATED.3IONS-SCNC: 16 MMOL/L (ref 7–16)
BASOPHILS # BLD: 0.06 K/UL (ref 0–0.2)
BASOPHILS NFR BLD: 1 % (ref 0–2)
BILIRUB UR QL STRIP: NEGATIVE
BUN SERPL-MCNC: 9 MG/DL (ref 6–20)
CALCIUM SERPL-MCNC: 8.4 MG/DL (ref 8.6–10.2)
CHLORIDE SERPL-SCNC: 97 MMOL/L (ref 98–107)
CLARITY UR: CLEAR
CO2 SERPL-SCNC: 21 MMOL/L (ref 22–29)
COLOR UR: YELLOW
CREAT SERPL-MCNC: 0.6 MG/DL (ref 0.7–1.2)
EKG ATRIAL RATE: 74 BPM
EKG P AXIS: 56 DEGREES
EKG P-R INTERVAL: 134 MS
EKG Q-T INTERVAL: 410 MS
EKG QRS DURATION: 88 MS
EKG QTC CALCULATION (BAZETT): 455 MS
EKG R AXIS: 45 DEGREES
EKG T AXIS: 64 DEGREES
EKG VENTRICULAR RATE: 74 BPM
EOSINOPHIL # BLD: 0.17 K/UL (ref 0.05–0.5)
EOSINOPHILS RELATIVE PERCENT: 2 % (ref 0–6)
ERYTHROCYTE [DISTWIDTH] IN BLOOD BY AUTOMATED COUNT: 12.5 % (ref 11.5–15)
GFR, ESTIMATED: >90 ML/MIN/1.73M2
GLUCOSE SERPL-MCNC: 157 MG/DL (ref 74–99)
GLUCOSE UR STRIP-MCNC: NEGATIVE MG/DL
HCT VFR BLD AUTO: 38.8 % (ref 37–54)
HGB BLD-MCNC: 13.8 G/DL (ref 12.5–16.5)
HGB UR QL STRIP.AUTO: NEGATIVE
IMM GRANULOCYTES # BLD AUTO: <0.03 K/UL (ref 0–0.58)
IMM GRANULOCYTES NFR BLD: 0 % (ref 0–5)
KETONES UR STRIP-MCNC: 40 MG/DL
LEUKOCYTE ESTERASE UR QL STRIP: NEGATIVE
LYMPHOCYTES NFR BLD: 2.17 K/UL (ref 1.5–4)
LYMPHOCYTES RELATIVE PERCENT: 27 % (ref 20–42)
MAGNESIUM SERPL-MCNC: 2.2 MG/DL (ref 1.6–2.6)
MCH RBC QN AUTO: 32.9 PG (ref 26–35)
MCHC RBC AUTO-ENTMCNC: 35.6 G/DL (ref 32–34.5)
MCV RBC AUTO: 92.4 FL (ref 80–99.9)
MONOCYTES NFR BLD: 0.8 K/UL (ref 0.1–0.95)
MONOCYTES NFR BLD: 10 % (ref 2–12)
NEUTROPHILS NFR BLD: 60 % (ref 43–80)
NEUTS SEG NFR BLD: 4.84 K/UL (ref 1.8–7.3)
NITRITE UR QL STRIP: NEGATIVE
PH UR STRIP: 6 [PH] (ref 5–9)
PHOSPHATE SERPL-MCNC: 1.7 MG/DL (ref 2.5–4.5)
PLATELET # BLD AUTO: 261 K/UL (ref 130–450)
PMV BLD AUTO: 12.8 FL (ref 7–12)
POTASSIUM SERPL-SCNC: 3.7 MMOL/L (ref 3.5–5)
PROT UR STRIP-MCNC: NEGATIVE MG/DL
RBC # BLD AUTO: 4.2 M/UL (ref 3.8–5.8)
RBC #/AREA URNS HPF: ABNORMAL /HPF
SODIUM SERPL-SCNC: 134 MMOL/L (ref 132–146)
SP GR UR STRIP: 1.01 (ref 1–1.03)
UROBILINOGEN UR STRIP-ACNC: 0.2 EU/DL (ref 0–1)
WBC #/AREA URNS HPF: ABNORMAL /HPF
WBC OTHER # BLD: 8.1 K/UL (ref 4.5–11.5)

## 2024-08-21 PROCEDURE — 85025 COMPLETE CBC W/AUTO DIFF WBC: CPT

## 2024-08-21 PROCEDURE — 2060000000 HC ICU INTERMEDIATE R&B

## 2024-08-21 PROCEDURE — 84100 ASSAY OF PHOSPHORUS: CPT

## 2024-08-21 PROCEDURE — 2500000003 HC RX 250 WO HCPCS: Performed by: INTERNAL MEDICINE

## 2024-08-21 PROCEDURE — 36415 COLL VENOUS BLD VENIPUNCTURE: CPT

## 2024-08-21 PROCEDURE — 6360000002 HC RX W HCPCS: Performed by: FAMILY MEDICINE

## 2024-08-21 PROCEDURE — 99232 SBSQ HOSP IP/OBS MODERATE 35: CPT | Performed by: INTERNAL MEDICINE

## 2024-08-21 PROCEDURE — 2580000003 HC RX 258: Performed by: FAMILY MEDICINE

## 2024-08-21 PROCEDURE — 2580000003 HC RX 258: Performed by: STUDENT IN AN ORGANIZED HEALTH CARE EDUCATION/TRAINING PROGRAM

## 2024-08-21 PROCEDURE — 93010 ELECTROCARDIOGRAM REPORT: CPT | Performed by: INTERNAL MEDICINE

## 2024-08-21 PROCEDURE — 80048 BASIC METABOLIC PNL TOTAL CA: CPT

## 2024-08-21 PROCEDURE — 83735 ASSAY OF MAGNESIUM: CPT

## 2024-08-21 PROCEDURE — 2580000003 HC RX 258: Performed by: INTERNAL MEDICINE

## 2024-08-21 PROCEDURE — 6370000000 HC RX 637 (ALT 250 FOR IP): Performed by: FAMILY MEDICINE

## 2024-08-21 RX ORDER — BUPROPION HYDROCHLORIDE 75 MG/1
75 TABLET ORAL 2 TIMES DAILY
Status: DISCONTINUED | OUTPATIENT
Start: 2024-08-21 | End: 2024-08-22 | Stop reason: HOSPADM

## 2024-08-21 RX ADMIN — BUPROPION HYDROCHLORIDE 75 MG: 75 TABLET, FILM COATED ORAL at 03:17

## 2024-08-21 RX ADMIN — PHENOBARBITAL SODIUM 130 MG: 65 INJECTION, SOLUTION INTRAMUSCULAR; INTRAVENOUS at 00:03

## 2024-08-21 RX ADMIN — ENOXAPARIN SODIUM 40 MG: 100 INJECTION SUBCUTANEOUS at 08:22

## 2024-08-21 RX ADMIN — ACETAMINOPHEN 650 MG: 325 TABLET ORAL at 05:29

## 2024-08-21 RX ADMIN — PHENOBARBITAL 64.8 MG: 32.4 TABLET ORAL at 17:03

## 2024-08-21 RX ADMIN — Medication 10 ML: at 21:07

## 2024-08-21 RX ADMIN — SODIUM PHOSPHATE, MONOBASIC, MONOHYDRATE AND SODIUM PHOSPHATE, DIBASIC, ANHYDROUS 30 MMOL: 142; 276 INJECTION, SOLUTION INTRAVENOUS at 11:47

## 2024-08-21 RX ADMIN — PHENOBARBITAL 64.8 MG: 32.4 TABLET ORAL at 13:06

## 2024-08-21 RX ADMIN — Medication 100 MG: at 08:20

## 2024-08-21 RX ADMIN — Medication 1 TABLET: at 08:20

## 2024-08-21 RX ADMIN — PHENOBARBITAL 64.8 MG: 32.4 TABLET ORAL at 06:14

## 2024-08-21 RX ADMIN — DEXTROSE AND SODIUM CHLORIDE: 5; 450 INJECTION, SOLUTION INTRAVENOUS at 01:34

## 2024-08-21 RX ADMIN — BUPROPION HYDROCHLORIDE 75 MG: 75 TABLET, FILM COATED ORAL at 20:55

## 2024-08-21 RX ADMIN — Medication 10 ML: at 08:22

## 2024-08-21 RX ADMIN — SODIUM CHLORIDE, PRESERVATIVE FREE 10 ML: 5 INJECTION INTRAVENOUS at 08:22

## 2024-08-21 RX ADMIN — ONDANSETRON 4 MG: 2 INJECTION INTRAMUSCULAR; INTRAVENOUS at 05:29

## 2024-08-21 RX ADMIN — FOLIC ACID 1 MG: 1 TABLET ORAL at 08:20

## 2024-08-21 RX ADMIN — ACETAMINOPHEN 650 MG: 325 TABLET ORAL at 21:06

## 2024-08-21 RX ADMIN — BUPROPION HYDROCHLORIDE 75 MG: 75 TABLET, FILM COATED ORAL at 08:20

## 2024-08-21 RX ADMIN — PHENOBARBITAL 64.8 MG: 32.4 TABLET ORAL at 20:55

## 2024-08-21 RX ADMIN — SODIUM CHLORIDE, PRESERVATIVE FREE 10 ML: 5 INJECTION INTRAVENOUS at 21:08

## 2024-08-21 RX ADMIN — PHENOBARBITAL 64.8 MG: 32.4 TABLET ORAL at 09:09

## 2024-08-21 ASSESSMENT — PAIN DESCRIPTION - DESCRIPTORS: DESCRIPTORS: ACHING

## 2024-08-21 ASSESSMENT — LIFESTYLE VARIABLES
HOW MANY STANDARD DRINKS CONTAINING ALCOHOL DO YOU HAVE ON A TYPICAL DAY: 7 TO 9
HOW OFTEN DO YOU HAVE A DRINK CONTAINING ALCOHOL: 4 OR MORE TIMES A WEEK

## 2024-08-21 ASSESSMENT — PAIN SCALES - GENERAL
PAINLEVEL_OUTOF10: 4
PAINLEVEL_OUTOF10: 4
PAINLEVEL_OUTOF10: 3
PAINLEVEL_OUTOF10: 3

## 2024-08-21 ASSESSMENT — PAIN DESCRIPTION - LOCATION: LOCATION: HEAD

## 2024-08-21 NOTE — H&P
[facility] Hospitalist Group   History and Physical      CHIEF COMPLAINT:  alcohol withdrawal    History of Present Illness:  33 y.o. male with a history of ETOH, withdrawal seizure, PTSD presents with alcohol withdrawal.  Reports he drinks a bottle of vodka daily and last drink was 9am this morning.  Reports feeling nauseated, shaky, vision changes (tunnel vision).  [facility] but [facility] can't get him in until . Has a brother who  from fentanyl overdose, and mother  from alcohol withdrawal seizures.  Patient planned to go to rehab after last admission but ended up relapsing.  Workup in ED significant for CO2 16, glucose 68, ethanol 266, UDS barbiturate, cannabinoid positive.  UA ketone positive.  CT head/c-spine no acute finding.  Given zofran, D50, 2L bolus in ED.    Informant(s) for H&P: patient, chart    REVIEW OF SYSTEMS:  no fevers, chills, cp, sob, ha, wt changes, hot/cold flashes, other open skin lesions, diarrhea, constipation, dysuria/hematuria unless noted in HPI. Complete ROS performed with the patient and is otherwise negative.      PMH:  Past Medical History:   Diagnosis Date    Anxiety     Arm pain     Asthma     Concussion with loss of consciousness     Convulsions (HCC)     Depression     Flashing lights     Head injury     Headache     Leg pain     Numbness and tingling     arms and hands    PTSD (post-traumatic stress disorder)     Seizures (HCC)        Surgical History:  Past Surgical History:   Procedure Laterality Date    COLONOSCOPY      ENDOSCOPY, COLON, DIAGNOSTIC      UPPER GASTROINTESTINAL ENDOSCOPY N/A 2021    EGD BIOPSY performed by Kit Carter MD at [facility] ENDOSCOPY       Medications Prior to Admission:    Prior to Admission medications    Medication Sig Start Date End Date Taking? Authorizing Provider   buPROPion (WELLBUTRIN) 75 MG tablet Take 1 tablet by mouth 2 times daily   Yes Provider, MD Rajani       Allergies:     Hydrocodone, Ativan [lorazepam], Invega sustenna [paliperidone palmitate er], Paliperidone, Pcn [penicillins], and Fluticasone    Social History:    reports that he has been smoking cigarettes. He has a 18 pack-year smoking history. He has never used smokeless tobacco. He reports that he does not currently use alcohol. He reports current drug use. Frequency: 1.00 time per week. Drug: Marijuana (Weed).      Family History:   family history includes Alcohol Abuse in his father and mother; Cancer in his father; Cirrhosis in his father; Mental Illness in his brother, mother, and sister; Substance Abuse in his father, maternal aunt, maternal uncle, mother, paternal aunt, paternal uncle, and sister.     PHYSICAL EXAM:  Vitals:  /80   Pulse 84   Temp 98 °F (36.7 °C) (Oral)   Resp 18   SpO2 100%     Constitutional:  anxious, awake, alert  Eyes: no scleral icterus, normal lids, no discharge  ENMT:  Normocephalic, atraumatic, mucosa moist, EOMI  Neck:  trachea midline, no JVD  Lungs:  CTA bilaterally, no audible rhonchi or wheezes noted, respirations unlabored, no retractions  Heart::  RRR, tachycardic, no murmur, rub, or gallop noted during exam  Abd:  Soft, non tender, non distended, bowel sounds present  :  deferred  MSK: sarcopenia absent  Ext:  Moving all extremities, no edema, pulses present  Skin:  Warm and dry, no rashes on visible skin  Psych: anxious affect  Neuro:  PERRL, Alert, grossly nonfocal; following commands    LABS:  Recent Labs     08/20/24 2054      K 4.7   CL 91*   CO2 16*   BUN 13   CREATININE 0.7   GLUCOSE 68*   CALCIUM 9.3       Recent Labs     08/20/24 2054   WBC 7.9   RBC 5.01   HGB 16.5   HCT 47.7   MCV 95.2   MCH 32.9   MCHC 34.6*   RDW 12.4   MPV 13.0*       No results for input(s): \"POCGLU\" in the last 72 hours.      Radiology: XR CHEST PORTABLE    Result Date: 8/20/2024  EXAM: XR Chest, 1 View EXAM DATE/TIME: 8/20/2024 9:27 pm CLINICAL HISTORY: ORDERING SYSTEM PROVIDED

## 2024-08-21 NOTE — PROGRESS NOTES
[facility] Hospitalist   Progress Note    Admitting Date and Time: 8/20/2024  8:27 PM  Admit Dx: Alcohol withdrawal syndrome without complication (HCC) [F10.930]    Subjective:    Pt asking for something more for his nerves. This phenobarbital is not enough.    Per RN: appears calm when observed from afar but once get to his bedside always seems to be more symptomatic.       buPROPion  75 mg Oral BID    sodium phosphate IVPB (PERIPHERAL line)  30 mmol IntraVENous Once    sodium chloride flush  5-40 mL IntraVENous 2 times per day    sodium chloride  1,000 mL IntraVENous Once    sodium chloride flush  5-40 mL IntraVENous 2 times per day    thiamine  100 mg Oral Daily    enoxaparin  40 mg SubCUTAneous Daily    multivitamin  1 tablet Oral Daily    folic acid  1 mg Oral Daily    PHENobarbital  64.8 mg Oral 4x daily    Followed by    [START ON 8/22/2024] PHENobarbital  32.4 mg Oral 4x daily    Followed by    [START ON 8/23/2024] PHENobarbital  32.4 mg Oral BID    Followed by    [START ON 8/24/2024] PHENobarbital  16.2 mg Oral BID     sodium chloride flush, 5-40 mL, PRN  sodium chloride, , PRN  sodium chloride flush, 5-40 mL, PRN  sodium chloride, , PRN  ondansetron, 4 mg, Q8H PRN   Or  ondansetron, 4 mg, Q6H PRN  acetaminophen, 650 mg, Q6H PRN   Or  acetaminophen, 650 mg, Q6H PRN  PHENobarbital, 64.8 mg, Q6H PRN   Followed by  PHENobarbital, 32.4 mg, Q6H PRN   Followed by  [START ON 8/23/2024] PHENobarbital, 16.2 mg, Q6H PRN         Objective:    /66   Pulse 88   Temp 98.2 °F (36.8 °C) (Oral)   Resp 18   Wt 63.5 kg (140 lb)   SpO2 98%   BMI 22.60 kg/m²   CIWA Assessment  BP: 110/66  Pulse: 88  Nausea and Vomiting: no nausea and no vomiting  Tactile Disturbances: very mild itching, pins and needles, burning or numbness  Tremor: 2  Auditory Disturbances: very mild harshness or ability to frighten  Paroxysmal Sweats: no sweat visible  Visual Disturbances: very mild sensitivity  Anxiety: mildly

## 2024-08-21 NOTE — PLAN OF CARE
Problem: Discharge Planning  Goal: Discharge to home or other facility with appropriate resources  Outcome: Progressing  Flowsheets (Taken 8/21/2024 0130)  Discharge to home or other facility with appropriate resources: Identify barriers to discharge with patient and caregiver     Problem: Risk for Elopement  Goal: Patient will not exit the unit/facility without proper excort  Outcome: Progressing  Flowsheets  Taken 8/21/2024 0150  Nursing Interventions for Elopement Risk:   Collaborate with family members/caregivers to mitigate the elopement risk   Collaborate with treatment team for drug withdrawal symptoms treatment   Make sure patient has all necessary personal care items   Reduce environmental triggers  Taken 8/21/2024 0130  Nursing Interventions for Elopement Risk: Collaborate with treatment team for drug withdrawal symptoms treatment

## 2024-08-21 NOTE — PROGRESS NOTES
4 Eyes Skin Assessment     NAME:  Mark Davila  YOB: 1990  MEDICAL RECORD NUMBER:  [id number]    The patient is being assessed for  Admission    I agree that at least one RN has performed a thorough Head to Toe Skin Assessment on the patient. ALL assessment sites listed below have been assessed.      Areas assessed by both nurses:    Head, Face, Ears, Shoulders, Back, Chest, Arms, Elbows, Hands, Sacrum. Buttock, Coccyx, Ischium, Legs. Feet and Heels, and Under Medical Devices         Does the Patient have a Wound? No noted wound(s)       Maximus Prevention initiated by RN: No  Wound Care Orders initiated by RN: No    Pressure Injury (Stage 3,4, Unstageable, DTI, NWPT, and Complex wounds) if present, place Wound referral order by RN under : No    New Ostomies, if present place, Ostomy referral order under : No     Nurse 1 eSignature: Electronically signed by Nora Webb RN on 8/21/24 at 4:37 AM EDT    **SHARE this note so that the co-signing nurse can place an eSignature**    Nurse 2 eSignature: Electronically signed by Ramona Rodriguez RN on 8/21/24 at 4:38 AM EDT

## 2024-08-21 NOTE — CARE COORDINATION
Case Management Assessment  Initial Evaluation    Date/Time of Evaluation: 8/21/2024 2:32 PM  Assessment Completed by: Francisca Coates RN    If patient is discharged prior to next notation, then this note serves as note for discharge by case management.    Patient Name: Mark Davila                   YOB: 1990  Diagnosis: Alcohol withdrawal syndrome without complication (HCC) [F10.930]                   Date / Time: 8/20/2024  8:27 PM    Patient Admission Status: Inpatient   Readmission Risk (Low < 19, Mod (19-27), High > 27): Readmission Risk Score: 15.6    Current PCP: Not, On File (Inactive)  PCP verified by CM? Yes (pt states he's went to [place] residency program in the past, but will likely est w/ new PCP going forward. Provided pt with [facility] PCP list and contact information for Jorge Weinstein)    Chart Reviewed: Yes      History Provided by: Patient  Patient Orientation: Alert and Oriented    Patient Cognition: Alert    Hospitalization in the last 30 days (Readmission):  No    If yes, Readmission Assessment in CM Navigator will be completed.    Advance Directives:      Code Status: Full Code   Patient's Primary Decision Maker is: Legal Next of Kin      Discharge Planning:    Patient lives with: Alone Type of Home: House  Primary Care Giver: Self  Patient Support Systems include: Family Members   Current Financial resources:    Current community resources:    Current services prior to admission: None            Current DME:              Type of Home Care services:  None    ADLS  Prior functional level: Independent in ADLs/IADLs  Current functional level: Independent in ADLs/IADLs      Family can provide assistance at DC: Other (comment) (unclear at this time)  Would you like Case Management to discuss the discharge plan with any other family members/significant others, and if so, who? No  Plans to Return to Present Housing: Other (see comment) (Pt [facility] appt [facility] at 9:00 am  leave ASAP in am. CM will follow. Electronically signed by Francisca Coates RN on 8/21/2024 at 3:08 PM        Francisca Coates RN  Case Management Department

## 2024-08-21 NOTE — PLAN OF CARE
Problem: Discharge Planning  Goal: Discharge to home or other facility with appropriate resources  8/21/2024 1631 by Bryson Costello, RN  Outcome: Progressing  Flowsheets (Taken 8/21/2024 0826)  Discharge to home or other facility with appropriate resources: Identify barriers to discharge with patient and caregiver  8/21/2024 0435 by Nora Webb RN  Outcome: Progressing  Flowsheets (Taken 8/21/2024 0130)  Discharge to home or other facility with appropriate resources: Identify barriers to discharge with patient and caregiver     Problem: Risk for Elopement  Goal: Patient will not exit the unit/facility without proper excort  8/21/2024 1631 by Bryson Costello RN  Outcome: Progressing  Flowsheets (Taken 8/21/2024 0826)  Nursing Interventions for Elopement Risk:   Collaborate with family members/caregivers to mitigate the elopement risk   Collaborate with treatment team for drug withdrawal symptoms treatment   Make sure patient has all necessary personal care items   Reduce environmental triggers  8/21/2024 0435 by Nora Webb RN  Outcome: Progressing  Flowsheets  Taken 8/21/2024 0150  Nursing Interventions for Elopement Risk:   Collaborate with family members/caregivers to mitigate the elopement risk   Collaborate with treatment team for drug withdrawal symptoms treatment   Make sure patient has all necessary personal care items   Reduce environmental triggers  Taken 8/21/2024 0130  Nursing Interventions for Elopement Risk: Collaborate with treatment team for drug withdrawal symptoms treatment     Problem: Safety - Adult  Goal: Free from fall injury  Outcome: Progressing  Flowsheets (Taken 8/21/2024 0829)  Free From Fall Injury: Instruct family/caregiver on patient safety

## 2024-08-21 NOTE — ED PROVIDER NOTES
(36.7 °C)    TempSrc: Oral    SpO2: 100%        Patient was given the following medications:  Medications   sodium chloride 0.9 % bolus 1,000 mL (1,000 mLs IntraVENous New Bag 8/20/24 2144)   sodium chloride flush 0.9 % injection 5-40 mL (10 mLs IntraVENous Given 8/20/24 2122)   sodium chloride flush 0.9 % injection 5-40 mL (has no administration in time range)   0.9 % sodium chloride infusion (has no administration in time range)   PHENobarbital tablet 32.4 mg (has no administration in time range)     Followed by   PHENobarbital tablet 16.2 mg (has no administration in time range)   ondansetron (ZOFRAN) injection 4 mg (4 mg IntraVENous Given 8/20/24 2145)   sodium chloride 0.9 % bolus 1,000 mL (has no administration in time range)   sodium chloride flush 0.9 % injection 5-40 mL (has no administration in time range)   sodium chloride flush 0.9 % injection 5-40 mL (has no administration in time range)   0.9 % sodium chloride infusion (has no administration in time range)   thiamine mononitrate tablet 100 mg (has no administration in time range)   enoxaparin (LOVENOX) injection 40 mg (has no administration in time range)   ondansetron (ZOFRAN-ODT) disintegrating tablet 4 mg (has no administration in time range)     Or   ondansetron (ZOFRAN) injection 4 mg (has no administration in time range)   acetaminophen (TYLENOL) tablet 650 mg (has no administration in time range)     Or   acetaminophen (TYLENOL) suppository 650 mg (has no administration in time range)   dextrose 5 % and 0.45 % sodium chloride infusion (has no administration in time range)   therapeutic multivitamin-minerals 1 tablet (has no administration in time range)   folic acid (FOLVITE) tablet 1 mg (has no administration in time range)   PHENobarbital tablet 64.8 mg (has no administration in time range)     Followed by   PHENobarbital tablet 32.4 mg (has no administration in time range)     Followed by   PHENobarbital tablet 16.2 mg (has no administration  in time range)   PHENobarbital tablet 64.8 mg (has no administration in time range)     Followed by   PHENobarbital tablet 32.4 mg (has no administration in time range)     Followed by   PHENobarbital tablet 32.4 mg (has no administration in time range)     Followed by   PHENobarbital tablet 16.2 mg (has no administration in time range)   thiamine (B-1) injection 100 mg (100 mg IntraVENous Given 8/20/24 2146)   folic acid 1 mg in sodium chloride 0.9 % 50 mL IVPB (1 mg IntraVENous New Bag 8/20/24 2148)   dextrose 50 % IV solution (25 g IntraVENous Given 8/20/24 2246)   ondansetron (ZOFRAN-ODT) disintegrating tablet 4 mg (4 mg Oral Given 8/20/24 2246)       ED Course as of 08/20/24 2319   Tue Aug 20, 2024   2105 EKG:  This EKG is signed and interpreted by me.    Rate: 74  Rhythm: Sinus  Interpretation: Normal sinus rate with sinus arrhythmia, QTc is 455  Comparison: no previous EKG available    [KG]   2310 Spoke with Dr. Maya, hospitalist, who accepted for admission.  [KG]      ED Course User Index  [KG] Chastity Graff,             Medical Decision Making/Differential Diagnosis:    CC/HPI Summary, Social Determinants of health, Records Reviewed, DDx, testing done/not done, ED Course, Reassessment, disposition considerations/shared decision making with patient, consults, disposition:        The patient is a 33-year-old male presenting for alcohol withdrawal.  Patient seemingly stable, nontoxic, in no acute distress.    Patient is a daily smoker and also uses marijuana which are known social determinants to his health.  He also drinks a bottle of vodka daily.  Prior records reviewed patient was brought close drowsy/23-24.  States after discharge he did relapse.  He is looking for help today.    Differential diagnosis includes but not limited to alcohol withdrawal versus alcohol tox patient versus drug overdose versus electrolyte abnormality versus concussion versus intracranial hemorrhage versus skull  MULTIPLE VITAMINS-MINERALS (THERAPEUTIC MULTIVITAMIN-MINERALS) TABLET    Take 1 tablet by mouth daily    NICOTINE (NICODERM CQ) 21 MG/24HR    Place 1 patch onto the skin daily    THIAMINE MONONITRATE (THIAMINE) 100 MG TABLET    Take 1 tablet by mouth daily              (Please note that portions of this note were completed with a voice recognition program.  Efforts were made to edit the dictations but occasionally words are mis-transcribed.)    Chastity Graff DO (electronically signed)           Chastity Graff DO  08/20/24 3589

## 2024-08-21 NOTE — PROGRESS NOTES
Patient stated \"I'm having visual and auditory disturbances.\" When asked what he is seeing and/or hearing, pt states \"I just can't explain it.\"   Pt also states,\"Is there anyway you can call the doctor and ask for valium.\" This RN informed patient, Dr is unable to prescribe valium. Patient then states \"can I just get more phenobarbital?\"   Pt is not actively sweating, pt is not stating he has a headache, there are no complaints of nausea or vomiting and there is no active tremors. Will continue plan of care.

## 2024-08-22 VITALS
DIASTOLIC BLOOD PRESSURE: 80 MMHG | OXYGEN SATURATION: 100 % | HEART RATE: 67 BPM | SYSTOLIC BLOOD PRESSURE: 116 MMHG | RESPIRATION RATE: 22 BRPM | WEIGHT: 140 LBS | BODY MASS INDEX: 22.6 KG/M2 | TEMPERATURE: 99 F

## 2024-08-22 LAB
APAP SERPL-MCNC: <5 UG/ML (ref 10–30)
ETHANOLAMINE SERPL-MCNC: 266 MG/DL (ref 0–0.08)
SALICYLATES SERPL-MCNC: <0.3 MG/DL (ref 0–30)
TOXIC TRICYCLIC SC,BLOOD: NEGATIVE

## 2024-08-22 PROCEDURE — 2580000003 HC RX 258: Performed by: FAMILY MEDICINE

## 2024-08-22 PROCEDURE — 6360000002 HC RX W HCPCS: Performed by: FAMILY MEDICINE

## 2024-08-22 PROCEDURE — 6370000000 HC RX 637 (ALT 250 FOR IP): Performed by: FAMILY MEDICINE

## 2024-08-22 PROCEDURE — 99238 HOSP IP/OBS DSCHRG MGMT 30/<: CPT | Performed by: INTERNAL MEDICINE

## 2024-08-22 PROCEDURE — 2580000003 HC RX 258: Performed by: STUDENT IN AN ORGANIZED HEALTH CARE EDUCATION/TRAINING PROGRAM

## 2024-08-22 PROCEDURE — 6360000002 HC RX W HCPCS: Performed by: INTERNAL MEDICINE

## 2024-08-22 RX ORDER — DIPHENHYDRAMINE HYDROCHLORIDE 50 MG/ML
50 INJECTION INTRAMUSCULAR; INTRAVENOUS EVERY 6 HOURS PRN
Status: DISCONTINUED | OUTPATIENT
Start: 2024-08-22 | End: 2024-08-22 | Stop reason: HOSPADM

## 2024-08-22 RX ORDER — PHENOBARBITAL 16.2 MG/1
TABLET ORAL
Status: SHIPPED | DISCHARGE
Start: 2024-08-22 | End: 2024-08-25

## 2024-08-22 RX ORDER — THIAMINE MONONITRATE (VIT B1) 100 MG
100 TABLET ORAL DAILY
DISCHARGE
Start: 2024-08-22

## 2024-08-22 RX ORDER — FOLIC ACID 1 MG/1
1 TABLET ORAL DAILY
Qty: 30 TABLET | Refills: 1 | Status: SHIPPED | OUTPATIENT
Start: 2024-08-22

## 2024-08-22 RX ORDER — M-VIT,TX,IRON,MINS/CALC/FOLIC 27MG-0.4MG
1 TABLET ORAL DAILY
DISCHARGE
Start: 2024-08-22

## 2024-08-22 RX ORDER — PHENOBARBITAL 16.2 MG/1
TABLET ORAL
Status: SHIPPED | DISCHARGE
Start: 2024-08-22 | End: 2024-08-24

## 2024-08-22 RX ADMIN — PHENOBARBITAL 32.4 MG: 32.4 TABLET ORAL at 08:16

## 2024-08-22 RX ADMIN — Medication 10 ML: at 08:16

## 2024-08-22 RX ADMIN — ENOXAPARIN SODIUM 40 MG: 100 INJECTION SUBCUTANEOUS at 08:15

## 2024-08-22 RX ADMIN — BUPROPION HYDROCHLORIDE 75 MG: 75 TABLET, FILM COATED ORAL at 08:19

## 2024-08-22 RX ADMIN — DIPHENHYDRAMINE HYDROCHLORIDE 50 MG: 50 INJECTION INTRAMUSCULAR; INTRAVENOUS at 02:58

## 2024-08-22 RX ADMIN — Medication 1 TABLET: at 08:16

## 2024-08-22 RX ADMIN — PHENOBARBITAL 32.4 MG: 32.4 TABLET ORAL at 13:19

## 2024-08-22 RX ADMIN — PHENOBARBITAL 32.4 MG: 32.4 TABLET ORAL at 00:41

## 2024-08-22 RX ADMIN — Medication 100 MG: at 08:16

## 2024-08-22 RX ADMIN — SODIUM CHLORIDE, PRESERVATIVE FREE 10 ML: 5 INJECTION INTRAVENOUS at 08:16

## 2024-08-22 RX ADMIN — FOLIC ACID 1 MG: 1 TABLET ORAL at 08:16

## 2024-08-22 RX ADMIN — DIPHENHYDRAMINE HYDROCHLORIDE 50 MG: 50 INJECTION INTRAMUSCULAR; INTRAVENOUS at 13:22

## 2024-08-22 NOTE — DISCHARGE SUMMARY
[facility] Hospitalist       Hospitalist Physician Discharge Summary         Activity level: As tolerated     Diet: ADULT DIET; Regular    Labs:None     Condition at discharge: Stable     Dispo: [facility]      Patient ID:  Mark Davila  [id number]  33 y.o.  1990    Admit date: 2024    Discharge date and time:  2024  1:59 PM    Admission Diagnoses: Principal Problem:    Alcohol withdrawal syndrome without complication (HCC)  Active Problems:    Alcoholism (HCC)    Anxiety and depression    Alcohol withdrawal syndrome with perceptual disturbance (HCC)    Marijuana use  Resolved Problems:    * No resolved hospital problems. *      Discharge Diagnoses: Principal Problem:    Alcohol withdrawal syndrome without complication (HCC)  Active Problems:    Alcoholism (HCC)    Anxiety and depression    Alcohol withdrawal syndrome with perceptual disturbance (HCC)    Marijuana use  Resolved Problems:    * No resolved hospital problems. *      Consults:  IP CONSULT TO SOCIAL WORK  IP CONSULT TO SOCIAL WORK    Procedures: None    History of Present Illness:  33 y.o. male with a history of ETOH, withdrawal seizure, PTSD presents with alcohol withdrawal.  Reports he drinks a bottle of vodka daily and last drink was 9am this morning.  Reports feeling nauseated, shaky, vision changes (tunnel vision).  [facility] but they can't get him in until . Has a brother who  from fentanyl overdose, and mother  from alcohol withdrawal seizures.  Patient planned to go to rehab after last admission but ended up relapsing.  Workup in ED significant for CO2 16, glucose 68, ethanol 266, UDS barbiturate, cannabinoid positive.  UA ketone positive.  CT head/c-spine no acute finding.  Given zofran, D50, 2L bolus in ED.     Hospital Course: 34 yo male admitted as per above details. See outline below for additional details and issues addressed this admission:     # Alcohol withdrawal  SHIFT CHANGE NOTE FOR [facility]    Bedside and Verbal shift change report given to MAURICIO ARAMBULA (oncoming nurse) by Lakesha Williamson RN (offgoing nurse). Report included the following information SBAR, Kardex, MAR and Recent Results. Situation:   Code Status: DNR   Hospital Day: 10   Problem List:   Hospital Problems  Date Reviewed: 5/31/2022            Codes Class Noted POA    Hip fracture [facility]) ICD-10-CM: T14.264J  ICD-9-CM: 820.8  9/18/2022 Unknown         Background:   Past Medical History:   Past Medical History:   Diagnosis Date    CAD (coronary artery disease) 2008    Fibromyalgia     Heart failure ([facility] [address]     Pt. states sge gas 5 heart blocks.     Hypercholesterolemia     Hypertension     Menopause     Thyroid disease         Assessment:  Changes in Assessment throughout shift: No change to previous assessment    Patient has a central line: no Reasons if yes: na  Insertion date:na Last dressing date:na  Patient has Elliott Cath: no Reasons if yes: na   Insertion date:na  Shift elliott care completed: NO    Last Vitals:     Vitals:    09/30/22 2044 10/01/22 0736 10/01/22 1615 10/01/22 2029   BP: (!) 150/60 (!) 168/66 (!) 130/59 (!) 147/53   Pulse: 65 65 70 65   Resp: 16 18 18 18   Temp: 98.2 °F (36.8 °C) 98.4 °F (36.9 °C) 98.5 °F (36.9 °C) 98.2 °F (36.8 °C)   SpO2: 99% 97% 97% 97%   Weight:       Height:           PAIN    Pain Assessment    Pain Intensity 1: 0 (10/02/22 0404) Pain Intensity 1: 2 (12/29/14 1105)    Pain Location 1: Hip Pain Location 1: Abdomen    Pain Intervention(s) 1: Repositioned Pain Intervention(s) 1: Medication (see MAR)  Patient Stated Pain Goal: 0 Patient Stated Pain Goal: 0  Intervention effective: yes  Other actions taken for pain:      Skin Assessment  Skin color Skin Color: Appropriate for ethnicity  Condition/Temperature    Integrity Skin Integrity: Incision (comment)  Turgor    Weekly Pressure Ulcer Documentation  Pressure  Injury Documentation: No Pressure Injury Noted-Pressure Ulcer Prevention Initiated  Wound Prevention & Protection Methods  Orientation of wound Orientation of Wound Prevention: Posterior  Location of Prevention Location of Wound Prevention: Sacrum/Coccyx  Dressing Present Dressing Present : Yes  Dressing Status Dressing Status: Intact  Wound Offloading Wound Offloading (Prevention Methods): Bed, pressure reduction mattress, Elevate heels, Pillows, Walker, Wheelchair    INTAKE/OUPUT  Date 10/01/22 0700 - 10/02/22 0659 10/02/22 0700 - 10/03/22 0659   Shift 0700-1859 1900-0659 24 Hour Total 0700-1859 1900-0659 24 Hour Total   INTAKE   P.O. 1200  1200        P. O. 1200  1200      Shift Total(mL/kg) 1200(19.2)  1200(19.2)      OUTPUT   Urine(mL/kg/hr) 400(0.5) 300(0.4) 700(0.5)        Urine Voided 400 300 700        Urine Occurrence(s) 4 x 4 x 8 x      Emesis/NG output           Emesis Occurrence(s)  0 x 0 x      Stool           Stool Occurrence(s) 0 x 0 x 0 x      Shift Total(mL/kg) 400(6.4) 300(4.8) 700(11.2)       -300 500      Weight (kg) 62.4 62.4 62.4 62.4 62.4 62.4         Recommendations:  Patient needs and requests: na    Pending tests/procedures: na     Functional Level/Equipment: Partial (one person) / Bed (comment); Elverna Corti; Wheelchair    Fall Precautions:   Fall risk precautions were reinforced with the patient; she was instructed to call for help prior to getting up. The following fall risk precautions were continued: bed/ chair alarms, door signage, yellow bracelet and socks as well as update of the [place] tool in the patient's room. Alma Rosa Score: 5    HEALS Safety Check    A safety check occurred in the patient's room between off going nurse and oncoming nurse listed above.     The safety check included the below items  Area Items   H  High Alert Medications Verify all high alert medication drips (heparin, PCA, etc.)   E  Equipment Suction is set up for ALL patients (with yanker)  Red plugs utilized for all equipment (IV pumps, etc.)  WOWs wiped down at end of shift.  Room stocked with oxygen, suction, and other unit-specific supplies   A  Alarms Bed alarm is set for fall risk patients  Ensure chair alarm is in place and activated if patient is up in a chair   L  Lines Check IV for any infiltration  Talley bag is empty if patient has a Talley   Tubing and IV bags are labeled   S  Safety   Room is clean, patient is clean, and equipment is clean. Hallways are clear from equipment besides carts. Fall bracelet on for fall risk patients  Ensure room is clear and free of clutter  Suction is set up for ALL patients (with [place]eva)  Hallways are clear from equipment besides carts.    Isolation precautions followed, supplies available outside room, sign posted     Tyrone St RN Potential duplicate medications found. Please discuss with provider.        CONTINUE these medications which have CHANGED    Details   folic acid (FOLVITE) 1 MG tablet Take 1 tablet by mouth daily  Qty: 30 tablet, Refills: 1      Multiple Vitamins-Minerals (THERAPEUTIC MULTIVITAMIN-MINERALS) tablet Take 1 tablet by mouth daily      vitamin B-1 (THIAMINE) 100 MG tablet Take 1 tablet by mouth daily           CONTINUE these medications which have NOT CHANGED    Details   buPROPion (WELLBUTRIN) 75 MG tablet Take 1 tablet by mouth 2 times daily           STOP taking these medications       nicotine (NICODERM CQ) 21 MG/24HR Comments:   Reason for Stopping:         gabapentin (NEURONTIN) 300 MG capsule Comments:   Reason for Stopping:                 Note  that  29  minutes were spent in preparing discharge papers, discussing discharge with patient, medication review, etc.    NOTE: This report was transcribed using voice recognition software. Every effort was made to ensure accuracy; however, inadvertent computerized transcription errors may be present.     Signed:  Electronically signed by Kobe Lala MD on 8/22/2024 at 1:59 PM

## 2024-08-22 NOTE — PROGRESS NOTES
CLINICAL PHARMACY NOTE: MEDS TO BEDS    Total # of Prescriptions Filled: 1   The following medications were delivered to the patient:  Folic acid 1 mg    Additional Documentation:

## 2024-08-22 NOTE — CARE COORDINATION
8/22/24 1400 CM note: no covid testing. Room air. Hx ETOH abuse w/ withdrawal seizures, pt on phenobarbital taper. Discharge order noted. [facility]. Per [facility],  they are sending someone to pick pt up now and are 10 minutes away. Notified pt, pts RN Bryson, and charge nurse Dipika of above. Electronically signed by Francisca Coates RN on 8/22/2024 at 2:09 PM

## 2025-04-05 ENCOUNTER — HOSPITAL ENCOUNTER (INPATIENT)
Age: 35
LOS: 2 days | Discharge: LEFT BEFORE DISCHARGE | DRG: 770 | End: 2025-04-07
Attending: STUDENT IN AN ORGANIZED HEALTH CARE EDUCATION/TRAINING PROGRAM | Admitting: INTERNAL MEDICINE
Payer: COMMERCIAL

## 2025-04-05 ENCOUNTER — APPOINTMENT (OUTPATIENT)
Dept: GENERAL RADIOLOGY | Age: 35
DRG: 770 | End: 2025-04-05
Payer: COMMERCIAL

## 2025-04-05 DIAGNOSIS — F10.932 ALCOHOL WITHDRAWAL HALLUCINOSIS (HCC): ICD-10-CM

## 2025-04-05 DIAGNOSIS — F10.939 ALCOHOL WITHDRAWAL SYNDROME WITH COMPLICATION (HCC): Primary | ICD-10-CM

## 2025-04-05 LAB
ALBUMIN SERPL-MCNC: 4.7 G/DL (ref 3.5–5.2)
ALP SERPL-CCNC: 175 U/L (ref 40–129)
ALT SERPL-CCNC: 8 U/L (ref 0–40)
AMPHET UR QL SCN: NEGATIVE
ANION GAP SERPL CALCULATED.3IONS-SCNC: 17 MMOL/L (ref 7–16)
AST SERPL-CCNC: 16 U/L (ref 0–39)
BARBITURATES UR QL SCN: NEGATIVE
BASOPHILS # BLD: 0.08 K/UL (ref 0–0.2)
BASOPHILS NFR BLD: 1 % (ref 0–2)
BENZODIAZ UR QL: NEGATIVE
BILIRUB SERPL-MCNC: 0.6 MG/DL (ref 0–1.2)
BUN SERPL-MCNC: 5 MG/DL (ref 6–20)
BUPRENORPHINE UR QL: NEGATIVE
CALCIUM SERPL-MCNC: 9.7 MG/DL (ref 8.6–10.2)
CANNABINOIDS UR QL SCN: POSITIVE
CHLORIDE SERPL-SCNC: 97 MMOL/L (ref 98–107)
CO2 SERPL-SCNC: 24 MMOL/L (ref 22–29)
COCAINE UR QL SCN: NEGATIVE
CREAT SERPL-MCNC: 0.8 MG/DL (ref 0.7–1.2)
EOSINOPHIL # BLD: 0.18 K/UL (ref 0.05–0.5)
EOSINOPHILS RELATIVE PERCENT: 1 % (ref 0–6)
ERYTHROCYTE [DISTWIDTH] IN BLOOD BY AUTOMATED COUNT: 14.6 % (ref 11.5–15)
FENTANYL UR QL: NEGATIVE
GFR, ESTIMATED: >90 ML/MIN/1.73M2
GLUCOSE SERPL-MCNC: 126 MG/DL (ref 74–99)
HCT VFR BLD AUTO: 46.7 % (ref 37–54)
HGB BLD-MCNC: 16.4 G/DL (ref 12.5–16.5)
IMM GRANULOCYTES # BLD AUTO: 0.06 K/UL (ref 0–0.58)
IMM GRANULOCYTES NFR BLD: 0 % (ref 0–5)
LYMPHOCYTES NFR BLD: 3.23 K/UL (ref 1.5–4)
LYMPHOCYTES RELATIVE PERCENT: 23 % (ref 20–42)
MCH RBC QN AUTO: 31.3 PG (ref 26–35)
MCHC RBC AUTO-ENTMCNC: 35.1 G/DL (ref 32–34.5)
MCV RBC AUTO: 89.1 FL (ref 80–99.9)
METHADONE UR QL: NEGATIVE
MONOCYTES NFR BLD: 0.92 K/UL (ref 0.1–0.95)
MONOCYTES NFR BLD: 7 % (ref 2–12)
NEUTROPHILS NFR BLD: 68 % (ref 43–80)
NEUTS SEG NFR BLD: 9.66 K/UL (ref 1.8–7.3)
OPIATES UR QL SCN: NEGATIVE
OXYCODONE UR QL SCN: NEGATIVE
PCP UR QL SCN: NEGATIVE
PLATELET # BLD AUTO: 273 K/UL (ref 130–450)
PMV BLD AUTO: 12.3 FL (ref 7–12)
POTASSIUM SERPL-SCNC: 3.8 MMOL/L (ref 3.5–5)
PROT SERPL-MCNC: 7.7 G/DL (ref 6.4–8.3)
RBC # BLD AUTO: 5.24 M/UL (ref 3.8–5.8)
SODIUM SERPL-SCNC: 138 MMOL/L (ref 132–146)
TEST INFORMATION: ABNORMAL
WBC OTHER # BLD: 14.1 K/UL (ref 4.5–11.5)

## 2025-04-05 PROCEDURE — 2060000000 HC ICU INTERMEDIATE R&B

## 2025-04-05 PROCEDURE — G0480 DRUG TEST DEF 1-7 CLASSES: HCPCS

## 2025-04-05 PROCEDURE — 6360000002 HC RX W HCPCS

## 2025-04-05 PROCEDURE — 80053 COMPREHEN METABOLIC PANEL: CPT

## 2025-04-05 PROCEDURE — 96374 THER/PROPH/DIAG INJ IV PUSH: CPT

## 2025-04-05 PROCEDURE — 85025 COMPLETE CBC W/AUTO DIFF WBC: CPT

## 2025-04-05 PROCEDURE — 80307 DRUG TEST PRSMV CHEM ANLYZR: CPT

## 2025-04-05 PROCEDURE — 71045 X-RAY EXAM CHEST 1 VIEW: CPT

## 2025-04-05 PROCEDURE — 99285 EMERGENCY DEPT VISIT HI MDM: CPT

## 2025-04-05 PROCEDURE — 99223 1ST HOSP IP/OBS HIGH 75: CPT | Performed by: INTERNAL MEDICINE

## 2025-04-05 PROCEDURE — 80143 DRUG ASSAY ACETAMINOPHEN: CPT

## 2025-04-05 PROCEDURE — 80179 DRUG ASSAY SALICYLATE: CPT

## 2025-04-05 RX ORDER — PHENOBARBITAL SODIUM 65 MG/ML
65 INJECTION, SOLUTION INTRAMUSCULAR; INTRAVENOUS EVERY 6 HOURS
Status: COMPLETED | OUTPATIENT
Start: 2025-04-05 | End: 2025-04-06

## 2025-04-05 RX ORDER — POTASSIUM CHLORIDE 1500 MG/1
40 TABLET, EXTENDED RELEASE ORAL PRN
Status: DISCONTINUED | OUTPATIENT
Start: 2025-04-05 | End: 2025-04-07 | Stop reason: HOSPADM

## 2025-04-05 RX ORDER — ACETAMINOPHEN 325 MG/1
650 TABLET ORAL EVERY 6 HOURS PRN
Status: DISCONTINUED | OUTPATIENT
Start: 2025-04-05 | End: 2025-04-07 | Stop reason: HOSPADM

## 2025-04-05 RX ORDER — M-VIT,TX,IRON,MINS/CALC/FOLIC 27MG-0.4MG
1 TABLET ORAL DAILY
Status: DISCONTINUED | OUTPATIENT
Start: 2025-04-06 | End: 2025-04-07 | Stop reason: HOSPADM

## 2025-04-05 RX ORDER — BUPROPION HYDROCHLORIDE 150 MG/1
300 TABLET ORAL DAILY
Status: DISCONTINUED | OUTPATIENT
Start: 2025-04-06 | End: 2025-04-07 | Stop reason: HOSPADM

## 2025-04-05 RX ORDER — SENNOSIDES 8.8 MG/5ML
5 LIQUID ORAL 2 TIMES DAILY PRN
Status: DISCONTINUED | OUTPATIENT
Start: 2025-04-05 | End: 2025-04-07 | Stop reason: HOSPADM

## 2025-04-05 RX ORDER — PHENOBARBITAL SODIUM 65 MG/ML
32.5 INJECTION, SOLUTION INTRAMUSCULAR; INTRAVENOUS EVERY 6 HOURS PRN
Status: DISCONTINUED | OUTPATIENT
Start: 2025-04-06 | End: 2025-04-07 | Stop reason: HOSPADM

## 2025-04-05 RX ORDER — SODIUM CHLORIDE 9 MG/ML
INJECTION, SOLUTION INTRAVENOUS PRN
Status: DISCONTINUED | OUTPATIENT
Start: 2025-04-05 | End: 2025-04-07 | Stop reason: HOSPADM

## 2025-04-05 RX ORDER — PHENOBARBITAL SODIUM 65 MG/ML
16.2 INJECTION, SOLUTION INTRAMUSCULAR; INTRAVENOUS EVERY 6 HOURS PRN
Status: DISCONTINUED | OUTPATIENT
Start: 2025-04-08 | End: 2025-04-07 | Stop reason: HOSPADM

## 2025-04-05 RX ORDER — SODIUM CHLORIDE 0.9 % (FLUSH) 0.9 %
5-40 SYRINGE (ML) INJECTION EVERY 12 HOURS SCHEDULED
Status: DISCONTINUED | OUTPATIENT
Start: 2025-04-05 | End: 2025-04-07 | Stop reason: HOSPADM

## 2025-04-05 RX ORDER — PHENOBARBITAL SODIUM 65 MG/ML
65 INJECTION, SOLUTION INTRAMUSCULAR; INTRAVENOUS EVERY 6 HOURS PRN
Status: DISPENSED | OUTPATIENT
Start: 2025-04-05 | End: 2025-04-06

## 2025-04-05 RX ORDER — SODIUM CHLORIDE 0.9 % (FLUSH) 0.9 %
5-40 SYRINGE (ML) INJECTION PRN
Status: DISCONTINUED | OUTPATIENT
Start: 2025-04-05 | End: 2025-04-07 | Stop reason: HOSPADM

## 2025-04-05 RX ORDER — PHENOBARBITAL SODIUM 65 MG/ML
32.5 INJECTION, SOLUTION INTRAMUSCULAR; INTRAVENOUS EVERY 12 HOURS
Status: DISCONTINUED | OUTPATIENT
Start: 2025-04-08 | End: 2025-04-07

## 2025-04-05 RX ORDER — ENOXAPARIN SODIUM 100 MG/ML
30 INJECTION SUBCUTANEOUS DAILY
Status: DISCONTINUED | OUTPATIENT
Start: 2025-04-06 | End: 2025-04-07

## 2025-04-05 RX ORDER — FOLIC ACID 1 MG/1
1 TABLET ORAL DAILY
Status: DISCONTINUED | OUTPATIENT
Start: 2025-04-06 | End: 2025-04-07 | Stop reason: HOSPADM

## 2025-04-05 RX ORDER — SODIUM CHLORIDE 9 MG/ML
INJECTION, SOLUTION INTRAVENOUS PRN
Status: DISCONTINUED | OUTPATIENT
Start: 2025-04-05 | End: 2025-04-05 | Stop reason: SDUPTHER

## 2025-04-05 RX ORDER — THIAMINE HYDROCHLORIDE 100 MG/ML
100 INJECTION, SOLUTION INTRAMUSCULAR; INTRAVENOUS DAILY
Status: DISCONTINUED | OUTPATIENT
Start: 2025-04-06 | End: 2025-04-07 | Stop reason: HOSPADM

## 2025-04-05 RX ORDER — ONDANSETRON 4 MG/1
4 TABLET, ORALLY DISINTEGRATING ORAL EVERY 8 HOURS PRN
Status: DISCONTINUED | OUTPATIENT
Start: 2025-04-05 | End: 2025-04-07 | Stop reason: HOSPADM

## 2025-04-05 RX ORDER — PHENOBARBITAL SODIUM 65 MG/ML
130 INJECTION, SOLUTION INTRAMUSCULAR; INTRAVENOUS ONCE
Status: COMPLETED | OUTPATIENT
Start: 2025-04-05 | End: 2025-04-05

## 2025-04-05 RX ORDER — POTASSIUM CHLORIDE 7.45 MG/ML
10 INJECTION INTRAVENOUS PRN
Status: DISCONTINUED | OUTPATIENT
Start: 2025-04-05 | End: 2025-04-07 | Stop reason: HOSPADM

## 2025-04-05 RX ORDER — PHENOBARBITAL SODIUM 65 MG/ML
16.2 INJECTION, SOLUTION INTRAMUSCULAR; INTRAVENOUS EVERY 12 HOURS
Status: DISCONTINUED | OUTPATIENT
Start: 2025-04-08 | End: 2025-04-07

## 2025-04-05 RX ORDER — BUPROPION HYDROCHLORIDE 75 MG/1
75 TABLET ORAL 2 TIMES DAILY
Status: DISCONTINUED | OUTPATIENT
Start: 2025-04-05 | End: 2025-04-05 | Stop reason: SDUPTHER

## 2025-04-05 RX ORDER — PHENOBARBITAL SODIUM 65 MG/ML
32.5 INJECTION, SOLUTION INTRAMUSCULAR; INTRAVENOUS EVERY 6 HOURS
Status: DISCONTINUED | OUTPATIENT
Start: 2025-04-07 | End: 2025-04-07

## 2025-04-05 RX ORDER — MAGNESIUM SULFATE IN WATER 40 MG/ML
2000 INJECTION, SOLUTION INTRAVENOUS PRN
Status: DISCONTINUED | OUTPATIENT
Start: 2025-04-05 | End: 2025-04-07 | Stop reason: HOSPADM

## 2025-04-05 RX ORDER — ACETAMINOPHEN 650 MG/1
650 SUPPOSITORY RECTAL EVERY 6 HOURS PRN
Status: DISCONTINUED | OUTPATIENT
Start: 2025-04-05 | End: 2025-04-07 | Stop reason: HOSPADM

## 2025-04-05 RX ORDER — SODIUM CHLORIDE 9 MG/ML
INJECTION, SOLUTION INTRAVENOUS CONTINUOUS
Status: ACTIVE | OUTPATIENT
Start: 2025-04-05 | End: 2025-04-06

## 2025-04-05 RX ORDER — ONDANSETRON 2 MG/ML
4 INJECTION INTRAMUSCULAR; INTRAVENOUS EVERY 6 HOURS PRN
Status: DISCONTINUED | OUTPATIENT
Start: 2025-04-05 | End: 2025-04-07 | Stop reason: HOSPADM

## 2025-04-05 RX ORDER — GABAPENTIN 600 MG/1
600 TABLET ORAL 3 TIMES DAILY
Status: ON HOLD | COMMUNITY

## 2025-04-05 RX ADMIN — PHENOBARBITAL SODIUM 130 MG: 65 INJECTION INTRAMUSCULAR; INTRAVENOUS at 20:13

## 2025-04-05 ASSESSMENT — PAIN SCALES - GENERAL: PAINLEVEL_OUTOF10: 5

## 2025-04-06 VITALS
SYSTOLIC BLOOD PRESSURE: 104 MMHG | OXYGEN SATURATION: 95 % | HEART RATE: 79 BPM | RESPIRATION RATE: 20 BRPM | BODY MASS INDEX: 22.5 KG/M2 | WEIGHT: 140 LBS | DIASTOLIC BLOOD PRESSURE: 94 MMHG | HEIGHT: 66 IN | TEMPERATURE: 97.7 F

## 2025-04-06 LAB
ALBUMIN SERPL-MCNC: 4 G/DL (ref 3.5–5.2)
ALP SERPL-CCNC: 151 U/L (ref 40–129)
ALT SERPL-CCNC: 7 U/L (ref 0–40)
ANION GAP SERPL CALCULATED.3IONS-SCNC: 11 MMOL/L (ref 7–16)
APAP SERPL-MCNC: <5 UG/ML (ref 10–30)
AST SERPL-CCNC: 13 U/L (ref 0–39)
BASOPHILS # BLD: 0.07 K/UL (ref 0–0.2)
BASOPHILS NFR BLD: 1 % (ref 0–2)
BILIRUB DIRECT SERPL-MCNC: <0.2 MG/DL (ref 0–0.3)
BILIRUB INDIRECT SERPL-MCNC: ABNORMAL MG/DL (ref 0–1)
BILIRUB SERPL-MCNC: 0.9 MG/DL (ref 0–1.2)
BUN SERPL-MCNC: 5 MG/DL (ref 6–20)
CALCIUM SERPL-MCNC: 9.1 MG/DL (ref 8.6–10.2)
CHLORIDE SERPL-SCNC: 99 MMOL/L (ref 98–107)
CO2 SERPL-SCNC: 26 MMOL/L (ref 22–29)
CREAT SERPL-MCNC: 0.7 MG/DL (ref 0.7–1.2)
EOSINOPHIL # BLD: 0.2 K/UL (ref 0.05–0.5)
EOSINOPHILS RELATIVE PERCENT: 2 % (ref 0–6)
ERYTHROCYTE [DISTWIDTH] IN BLOOD BY AUTOMATED COUNT: 14.6 % (ref 11.5–15)
ETHANOLAMINE SERPL-MCNC: 125 MG/DL (ref 0–0.08)
ETHANOLAMINE SERPL-MCNC: <10 MG/DL (ref 0–0.08)
GFR, ESTIMATED: >90 ML/MIN/1.73M2
GLUCOSE SERPL-MCNC: 80 MG/DL (ref 74–99)
HCT VFR BLD AUTO: 44.1 % (ref 37–54)
HGB BLD-MCNC: 15.1 G/DL (ref 12.5–16.5)
IMM GRANULOCYTES # BLD AUTO: 0.03 K/UL (ref 0–0.58)
IMM GRANULOCYTES NFR BLD: 0 % (ref 0–5)
INR PPP: 1.1
LYMPHOCYTES NFR BLD: 3.89 K/UL (ref 1.5–4)
LYMPHOCYTES RELATIVE PERCENT: 42 % (ref 20–42)
MAGNESIUM SERPL-MCNC: 2.2 MG/DL (ref 1.6–2.6)
MCH RBC QN AUTO: 31.3 PG (ref 26–35)
MCHC RBC AUTO-ENTMCNC: 34.2 G/DL (ref 32–34.5)
MCV RBC AUTO: 91.5 FL (ref 80–99.9)
MONOCYTES NFR BLD: 0.73 K/UL (ref 0.1–0.95)
MONOCYTES NFR BLD: 8 % (ref 2–12)
NEUTROPHILS NFR BLD: 47 % (ref 43–80)
NEUTS SEG NFR BLD: 4.39 K/UL (ref 1.8–7.3)
PHOSPHATE SERPL-MCNC: 3.2 MG/DL (ref 2.5–4.5)
PLATELET # BLD AUTO: 226 K/UL (ref 130–450)
PMV BLD AUTO: 12.5 FL (ref 7–12)
POTASSIUM SERPL-SCNC: 4 MMOL/L (ref 3.5–5)
PROT SERPL-MCNC: 6.6 G/DL (ref 6.4–8.3)
PROTHROMBIN TIME: 11.8 SEC (ref 9.3–12.4)
RBC # BLD AUTO: 4.82 M/UL (ref 3.8–5.8)
SALICYLATES SERPL-MCNC: <0.3 MG/DL (ref 0–30)
SODIUM SERPL-SCNC: 136 MMOL/L (ref 132–146)
TOXIC TRICYCLIC SC,BLOOD: NEGATIVE
WBC OTHER # BLD: 9.3 K/UL (ref 4.5–11.5)

## 2025-04-06 PROCEDURE — 6370000000 HC RX 637 (ALT 250 FOR IP): Performed by: INTERNAL MEDICINE

## 2025-04-06 PROCEDURE — 2500000003 HC RX 250 WO HCPCS: Performed by: INTERNAL MEDICINE

## 2025-04-06 PROCEDURE — 83735 ASSAY OF MAGNESIUM: CPT

## 2025-04-06 PROCEDURE — 80053 COMPREHEN METABOLIC PANEL: CPT

## 2025-04-06 PROCEDURE — 85025 COMPLETE CBC W/AUTO DIFF WBC: CPT

## 2025-04-06 PROCEDURE — 82248 BILIRUBIN DIRECT: CPT

## 2025-04-06 PROCEDURE — 36415 COLL VENOUS BLD VENIPUNCTURE: CPT

## 2025-04-06 PROCEDURE — 99233 SBSQ HOSP IP/OBS HIGH 50: CPT | Performed by: INTERNAL MEDICINE

## 2025-04-06 PROCEDURE — 2580000003 HC RX 258: Performed by: INTERNAL MEDICINE

## 2025-04-06 PROCEDURE — 85610 PROTHROMBIN TIME: CPT

## 2025-04-06 PROCEDURE — 84100 ASSAY OF PHOSPHORUS: CPT

## 2025-04-06 PROCEDURE — 2060000000 HC ICU INTERMEDIATE R&B

## 2025-04-06 PROCEDURE — G0480 DRUG TEST DEF 1-7 CLASSES: HCPCS

## 2025-04-06 PROCEDURE — 6360000002 HC RX W HCPCS: Performed by: INTERNAL MEDICINE

## 2025-04-06 RX ORDER — CHLORDIAZEPOXIDE HYDROCHLORIDE 5 MG/1
10 CAPSULE, GELATIN COATED ORAL EVERY 6 HOURS PRN
Status: DISCONTINUED | OUTPATIENT
Start: 2025-04-06 | End: 2025-04-07 | Stop reason: HOSPADM

## 2025-04-06 RX ADMIN — ENOXAPARIN SODIUM 30 MG: 100 INJECTION SUBCUTANEOUS at 08:36

## 2025-04-06 RX ADMIN — PHENOBARBITAL SODIUM 65 MG: 65 INJECTION INTRAMUSCULAR; INTRAVENOUS at 18:16

## 2025-04-06 RX ADMIN — SODIUM CHLORIDE: 0.9 INJECTION, SOLUTION INTRAVENOUS at 13:53

## 2025-04-06 RX ADMIN — SODIUM CHLORIDE, PRESERVATIVE FREE 10 ML: 5 INJECTION INTRAVENOUS at 00:15

## 2025-04-06 RX ADMIN — PHENOBARBITAL SODIUM 65 MG: 65 INJECTION INTRAMUSCULAR; INTRAVENOUS at 00:30

## 2025-04-06 RX ADMIN — PHENOBARBITAL SODIUM 65 MG: 65 INJECTION INTRAMUSCULAR; INTRAVENOUS at 15:01

## 2025-04-06 RX ADMIN — SODIUM CHLORIDE: 0.9 INJECTION, SOLUTION INTRAVENOUS at 00:17

## 2025-04-06 RX ADMIN — Medication 1 TABLET: at 08:36

## 2025-04-06 RX ADMIN — BUPROPION HYDROCHLORIDE 300 MG: 150 TABLET, EXTENDED RELEASE ORAL at 08:36

## 2025-04-06 RX ADMIN — FOLIC ACID 1 MG: 1 TABLET ORAL at 08:36

## 2025-04-06 RX ADMIN — CHLORDIAZEPOXIDE HYDROCHLORIDE 10 MG: 5 CAPSULE ORAL at 20:20

## 2025-04-06 RX ADMIN — THIAMINE HYDROCHLORIDE 100 MG: 100 INJECTION, SOLUTION INTRAMUSCULAR; INTRAVENOUS at 08:36

## 2025-04-06 RX ADMIN — PHENOBARBITAL SODIUM 65 MG: 65 INJECTION INTRAMUSCULAR; INTRAVENOUS at 06:40

## 2025-04-06 RX ADMIN — PHENOBARBITAL SODIUM 65 MG: 65 INJECTION INTRAMUSCULAR; INTRAVENOUS at 12:42

## 2025-04-06 ASSESSMENT — PAIN SCALES - GENERAL: PAINLEVEL_OUTOF10: 0

## 2025-04-06 NOTE — H&P
[facility] Hospitalist Group History and Physical          PCP: Not, On File (Inactive)    Date of Admission: 4/5/2025    Date of Service: Pt seen/examined on 4/5/2025 and is admitted to Inpatient with expected LOS greater than two midnights due to medical therapy. Placed in inpatient    Chief Complaint:  had concerns including Alcohol Problem (Patient was sober for 2 years, 2 weeks ago started drinking 1 bottle of whisky per day. States he feels shaky. ).    History Of Present Illness:    Mr. Mark Davila, a 34 y.o. year old male  who  has a past medical history of Anxiety, Arm pain, Asthma, Concussion with loss of consciousness, Convulsions (HCC), Depression, Flashing lights, Head injury, Headache, Leg pain, Numbness and tingling, PTSD (post-traumatic stress disorder), and Seizures (HCC).  Who presents emergency department with alcohol withdrawal.  Patient stated he has relapsed 2 weeks ago after being sober for 2 years.  He has been drinking daily 1 bottle of whiskey per day as well as multiple bottles of hard liquor, generalized shakiness, dry heaving, abdominal discomfort.  Has visual hallucinations, he attempted to slow down on his own yesterday by drinking 3D mat dog but has been feeling jittery.  Denies any chest pain, shortness of breath, fever, diarrhea, melena, hemoptysis, hematemesis.  Previous admissions required phenobarbital taper.  His alcohol level of 125, WBC 14, chest x-ray unremarkable.  CODE STATUS full code      Past Medical History:        Diagnosis Date    Anxiety     Arm pain     Asthma     Concussion with loss of consciousness     Convulsions (HCC)     Depression     Flashing lights     Head injury     Headache     Leg pain     Numbness and tingling     arms and hands    PTSD (post-traumatic stress disorder)     Seizures (HCC)        Past Surgical History:        Procedure Laterality Date    COLONOSCOPY      ENDOSCOPY, COLON, DIAGNOSTIC      UPPER GASTROINTESTINAL ENDOSCOPY N/A

## 2025-04-06 NOTE — PLAN OF CARE
Problem: Safety - Adult  Goal: Free from fall injury  Outcome: Progressing     Problem: Discharge Planning  Goal: Discharge to home or other facility with appropriate resources  Outcome: Progressing  Flowsheets (Taken 4/6/2025 0800)  Discharge to home or other facility with appropriate resources: Identify barriers to discharge with patient and caregiver     Problem: Pain  Goal: Verbalizes/displays adequate comfort level or baseline comfort level  Outcome: Progressing

## 2025-04-06 NOTE — ED PROVIDER NOTES
WORK        FINAL IMPRESSION      1. Alcohol withdrawal syndrome with complication (HCC)          DISPOSITION/PLAN     DISPOSITION      DISPOSITION  Disposition: Admit to telemetry  Patient condition is stable    4/5/25, 9:43 PM EDT.    Manny La DO  Emergency Medicine    PATIENT REFERRED TO:  No follow-up provider specified.    DISCHARGE MEDICATIONS:  New Prescriptions    No medications on file       DISCONTINUED MEDICATIONS:  Discontinued Medications    No medications on file              (Please note that portions of this note were completed with a voice recognition program.  Efforts were made to edit the dictations but occasionally words are mis-transcribed.)    Manny La DO (electronically signed)

## 2025-04-07 LAB
ALBUMIN SERPL-MCNC: 3.9 G/DL (ref 3.5–5.2)
ALP SERPL-CCNC: 134 U/L (ref 40–129)
ALT SERPL-CCNC: <5 U/L (ref 0–40)
ANION GAP SERPL CALCULATED.3IONS-SCNC: 11 MMOL/L (ref 7–16)
AST SERPL-CCNC: 13 U/L (ref 0–39)
BASOPHILS # BLD: 0.06 K/UL (ref 0–0.2)
BASOPHILS NFR BLD: 1 % (ref 0–2)
BILIRUB SERPL-MCNC: 0.9 MG/DL (ref 0–1.2)
BUN SERPL-MCNC: 5 MG/DL (ref 6–20)
CALCIUM SERPL-MCNC: 9.2 MG/DL (ref 8.6–10.2)
CHLORIDE SERPL-SCNC: 105 MMOL/L (ref 98–107)
CO2 SERPL-SCNC: 23 MMOL/L (ref 22–29)
CREAT SERPL-MCNC: 0.8 MG/DL (ref 0.7–1.2)
EOSINOPHIL # BLD: 0.22 K/UL (ref 0.05–0.5)
EOSINOPHILS RELATIVE PERCENT: 3 % (ref 0–6)
ERYTHROCYTE [DISTWIDTH] IN BLOOD BY AUTOMATED COUNT: 14.8 % (ref 11.5–15)
GFR, ESTIMATED: >90 ML/MIN/1.73M2
GLUCOSE SERPL-MCNC: 85 MG/DL (ref 74–99)
HCT VFR BLD AUTO: 41.7 % (ref 37–54)
HGB BLD-MCNC: 14 G/DL (ref 12.5–16.5)
IMM GRANULOCYTES # BLD AUTO: <0.03 K/UL (ref 0–0.58)
IMM GRANULOCYTES NFR BLD: 0 % (ref 0–5)
LYMPHOCYTES NFR BLD: 2.68 K/UL (ref 1.5–4)
LYMPHOCYTES RELATIVE PERCENT: 32 % (ref 20–42)
MCH RBC QN AUTO: 31.2 PG (ref 26–35)
MCHC RBC AUTO-ENTMCNC: 33.6 G/DL (ref 32–34.5)
MCV RBC AUTO: 92.9 FL (ref 80–99.9)
MONOCYTES NFR BLD: 0.65 K/UL (ref 0.1–0.95)
MONOCYTES NFR BLD: 8 % (ref 2–12)
NEUTROPHILS NFR BLD: 56 % (ref 43–80)
NEUTS SEG NFR BLD: 4.66 K/UL (ref 1.8–7.3)
PLATELET # BLD AUTO: 199 K/UL (ref 130–450)
PMV BLD AUTO: 13 FL (ref 7–12)
POTASSIUM SERPL-SCNC: 4 MMOL/L (ref 3.5–5)
PROT SERPL-MCNC: 6.3 G/DL (ref 6.4–8.3)
RBC # BLD AUTO: 4.49 M/UL (ref 3.8–5.8)
SODIUM SERPL-SCNC: 139 MMOL/L (ref 132–146)
WBC OTHER # BLD: 8.3 K/UL (ref 4.5–11.5)

## 2025-04-07 PROCEDURE — 36415 COLL VENOUS BLD VENIPUNCTURE: CPT

## 2025-04-07 PROCEDURE — 99239 HOSP IP/OBS DSCHRG MGMT >30: CPT | Performed by: FAMILY MEDICINE

## 2025-04-07 PROCEDURE — 6360000002 HC RX W HCPCS: Performed by: INTERNAL MEDICINE

## 2025-04-07 PROCEDURE — 6370000000 HC RX 637 (ALT 250 FOR IP): Performed by: INTERNAL MEDICINE

## 2025-04-07 PROCEDURE — 85025 COMPLETE CBC W/AUTO DIFF WBC: CPT

## 2025-04-07 PROCEDURE — 80053 COMPREHEN METABOLIC PANEL: CPT

## 2025-04-07 PROCEDURE — 6370000000 HC RX 637 (ALT 250 FOR IP): Performed by: FAMILY MEDICINE

## 2025-04-07 RX ORDER — PHENOBARBITAL 32.4 MG/1
16.2 TABLET ORAL 2 TIMES DAILY
Status: DISCONTINUED | OUTPATIENT
Start: 2025-04-10 | End: 2025-04-07 | Stop reason: HOSPADM

## 2025-04-07 RX ORDER — PHENOBARBITAL 32.4 MG/1
32.4 TABLET ORAL 4 TIMES DAILY
Status: DISCONTINUED | OUTPATIENT
Start: 2025-04-08 | End: 2025-04-07 | Stop reason: HOSPADM

## 2025-04-07 RX ORDER — PHENOBARBITAL 32.4 MG/1
64.8 TABLET ORAL 4 TIMES DAILY
Status: DISCONTINUED | OUTPATIENT
Start: 2025-04-07 | End: 2025-04-07 | Stop reason: HOSPADM

## 2025-04-07 RX ORDER — PHENOBARBITAL 32.4 MG/1
32.4 TABLET ORAL 2 TIMES DAILY
Status: DISCONTINUED | OUTPATIENT
Start: 2025-04-09 | End: 2025-04-07 | Stop reason: HOSPADM

## 2025-04-07 RX ORDER — ENOXAPARIN SODIUM 100 MG/ML
40 INJECTION SUBCUTANEOUS DAILY
Status: DISCONTINUED | OUTPATIENT
Start: 2025-04-07 | End: 2025-04-07 | Stop reason: HOSPADM

## 2025-04-07 RX ORDER — PHENOBARBITAL 16.2 MG/1
TABLET ORAL
Qty: 30 TABLET | Refills: 0 | Status: SHIPPED | OUTPATIENT
Start: 2025-04-07 | End: 2025-04-07

## 2025-04-07 RX ORDER — PHENOBARBITAL 16.2 MG/1
TABLET ORAL
Qty: 30 TABLET | Refills: 0 | Status: SHIPPED | OUTPATIENT
Start: 2025-04-07 | End: 2025-04-11

## 2025-04-07 RX ADMIN — FOLIC ACID 1 MG: 1 TABLET ORAL at 08:45

## 2025-04-07 RX ADMIN — CHLORDIAZEPOXIDE HYDROCHLORIDE 10 MG: 5 CAPSULE ORAL at 02:46

## 2025-04-07 RX ADMIN — PHENOBARBITAL SODIUM 32.5 MG: 65 INJECTION INTRAMUSCULAR; INTRAVENOUS at 00:28

## 2025-04-07 RX ADMIN — Medication 1 TABLET: at 08:45

## 2025-04-07 RX ADMIN — PHENOBARBITAL SODIUM 32.5 MG: 65 INJECTION INTRAMUSCULAR; INTRAVENOUS at 06:37

## 2025-04-07 RX ADMIN — PHENOBARBITAL 64.8 MG: 32.4 TABLET ORAL at 08:47

## 2025-04-07 NOTE — CARE COORDINATION
4/7/25 Pt has SW consult order for \"consideration of rehab.\" Pt discharged prior to SW assessment completed.  Electronically signed by PAT Skaggs on 4/7/2025 at 1:29 PM

## 2025-04-07 NOTE — PLAN OF CARE
Problem: Safety - Adult  Goal: Free from fall injury  4/6/2025 2317 by Helen Staley, RN  Outcome: Progressing  4/6/2025 1609 by Nora Webb RN  Outcome: Progressing     Problem: Discharge Planning  Goal: Discharge to home or other facility with appropriate resources  4/6/2025 2317 by Helen Staley, RN  Outcome: Progressing  4/6/2025 1609 by Nora Webb RN  Outcome: Progressing     Problem: Pain  Goal: Verbalizes/displays adequate comfort level or baseline comfort level  4/6/2025 2317 by Helen Staley RN  Outcome: Progressing  4/6/2025 1609 by Nora Webb RN  Outcome: Progressing

## 2025-04-07 NOTE — DISCHARGE SUMMARY
taking on: April 7, 2025            CONTINUE taking these medications      buPROPion 75 MG tablet  Commonly known as: WELLBUTRIN     gabapentin 600 MG tablet  Commonly known as: NEURONTIN            STOP taking these medications      therapeutic multivitamin-minerals tablet            ASK your doctor about these medications      folic acid 1 MG tablet  Commonly known as: FOLVITE  Take 1 tablet by mouth daily     vitamin B-1 100 MG tablet  Commonly known as: THIAMINE  Take 1 tablet by mouth daily               Where to Get Your Medications        These medications were sent to [facility]/pharmacy #[id number] - [place], [place] - [address] - P [phone] - F [phone]  [address] [place] [id number]      Phone: [phone]   PHENobarbital 16.2 MG tablet       Note that greater than 30 minutes was spent in preparing discharge papers, discussing discharge with patient, medication review, etc.      Signed:  Electronically signed by Joelle Leblanc DO on 4/7/2025 at 9:35 AM

## 2025-04-07 NOTE — PROGRESS NOTES
This patient is on DVT Prophylaxis medication that requires a dose adjustment      Date Body Weight IBW  Adjusted BW SCr  CrCl Dialysis status   4/7/2025 63.5 kg (140 lb) Ideal body weight: 63.8 kg (140 lb 10.5 oz) Serum creatinine: 0.7 mg/dL 04/06/25 0700  Estimated creatinine clearance: 136 mL/min N/a       Pharmacy has dose-adjusted the DVT Prophylaxis regimen to match   the recommendations from the following table        Ordered Medication:Lovenox 30mg daily    Order Changed/converted to: Lovenox 40mg daily    These changes were made per protocol according to the [facility] Pharmacist   Review for Appropriate Use and Automatic Dose Adjustments of   Subcutaneous Anticoagulants Policy     *Please note this dose may need readjusted if patient's condition changes.    Please contact pharmacy with any questions regarding these changes.    Thank you,       Daniella Raines RPH  4/7/2025   7:41 AM    LETY: [phone]    
Pt verbally aggressive with visitor & staff. Pt moved to another room. Pt threatening to leave AMA. Pt A&Ox4. Dr. Leblanc notified, told RN pt is already discharged. Pt very labile, tearful and aggressive. RN removed pts IV and given discharge paperwork.  
and georgina       Radiology:   XR CHEST PORTABLE   Final Result   No acute process.             Assessment:  Principal Problem:    Alcohol withdrawal hallucinosis (HCC)  Resolved Problems:    * No resolved hospital problems. *      Plan:    Alcohol withdrawal hallucinosis/alcohol use disorder  -currently, receiving high dose IV Phenobarb taper as well as phenobarb prn. Will add Librium 10 mg oral q6 prn anxiety/withdrawal symptoms  -IV thiamine daily    Anxiety/depression with PTSD  -continue buproprion. He was ordered 75 mg bid as reported but is on extended release version 300 mg daily and that was corrected by pharmacy.    Peripheral neuropathy  -he is supposed to be on gabapentin 600 mg tid  and will resume that in morning.    Disposition  - Continue to monitor response to the above treatments but anticipate will require least 24 to 48 hours for medical optimization to ensure safe discharge.    Case discussed with significant other at bedside.    NOTE: This report was transcribed using voice recognition software. Every effort was made to ensure accuracy; however, inadvertent computerized transcription errors may be present.     Electronically signed by Kobe Lala MD on 4/6/2025 at 3:10 PM

## 2025-04-08 ENCOUNTER — HOSPITAL ENCOUNTER (EMERGENCY)
Age: 35
Discharge: HOME | End: 2025-04-08
Attending: STUDENT IN AN ORGANIZED HEALTH CARE EDUCATION/TRAINING PROGRAM
Payer: COMMERCIAL

## 2025-04-08 VITALS
SYSTOLIC BLOOD PRESSURE: 116 MMHG | OXYGEN SATURATION: 99 % | DIASTOLIC BLOOD PRESSURE: 93 MMHG | TEMPERATURE: 97.3 F | HEART RATE: 83 BPM | RESPIRATION RATE: 16 BRPM

## 2025-04-08 DIAGNOSIS — F10.90 PROBLEMATIC CONSUMPTION OF ALCOHOL: Primary | ICD-10-CM

## 2025-04-08 LAB
ALBUMIN SERPL-MCNC: 4.5 G/DL (ref 3.5–5.2)
ALP SERPL-CCNC: 126 U/L (ref 40–129)
ALT SERPL-CCNC: 9 U/L (ref 0–40)
AMPHET UR QL SCN: NEGATIVE
ANION GAP SERPL CALCULATED.3IONS-SCNC: 14 MMOL/L (ref 7–16)
APAP SERPL-MCNC: <5 UG/ML (ref 10–30)
AST SERPL-CCNC: 15 U/L (ref 0–39)
BARBITURATES UR QL SCN: POSITIVE
BASOPHILS # BLD: 0.06 K/UL (ref 0–0.2)
BASOPHILS NFR BLD: 0 % (ref 0–2)
BENZODIAZ UR QL: POSITIVE
BILIRUB SERPL-MCNC: 0.7 MG/DL (ref 0–1.2)
BUN SERPL-MCNC: 5 MG/DL (ref 6–20)
BUPRENORPHINE UR QL: NEGATIVE
CALCIUM SERPL-MCNC: 8.9 MG/DL (ref 8.6–10.2)
CANNABINOIDS UR QL SCN: POSITIVE
CHLORIDE SERPL-SCNC: 103 MMOL/L (ref 98–107)
CO2 SERPL-SCNC: 20 MMOL/L (ref 22–29)
COCAINE UR QL SCN: NEGATIVE
CREAT SERPL-MCNC: 0.7 MG/DL (ref 0.7–1.2)
EOSINOPHIL # BLD: 0.15 K/UL (ref 0.05–0.5)
EOSINOPHILS RELATIVE PERCENT: 1 % (ref 0–6)
ERYTHROCYTE [DISTWIDTH] IN BLOOD BY AUTOMATED COUNT: 14.7 % (ref 11.5–15)
ETHANOLAMINE SERPL-MCNC: <10 MG/DL (ref 0–0.08)
FENTANYL UR QL: NEGATIVE
GFR, ESTIMATED: >90 ML/MIN/1.73M2
GLUCOSE SERPL-MCNC: 80 MG/DL (ref 74–99)
HCT VFR BLD AUTO: 41.4 % (ref 37–54)
HGB BLD-MCNC: 14 G/DL (ref 12.5–16.5)
IMM GRANULOCYTES # BLD AUTO: 0.05 K/UL (ref 0–0.58)
IMM GRANULOCYTES NFR BLD: 0 % (ref 0–5)
LYMPHOCYTES NFR BLD: 2.21 K/UL (ref 1.5–4)
LYMPHOCYTES RELATIVE PERCENT: 17 % (ref 20–42)
MCH RBC QN AUTO: 31.5 PG (ref 26–35)
MCHC RBC AUTO-ENTMCNC: 33.8 G/DL (ref 32–34.5)
MCV RBC AUTO: 93.2 FL (ref 80–99.9)
METHADONE UR QL: NEGATIVE
MONOCYTES NFR BLD: 0.62 K/UL (ref 0.1–0.95)
MONOCYTES NFR BLD: 5 % (ref 2–12)
NEUTROPHILS NFR BLD: 77 % (ref 43–80)
NEUTS SEG NFR BLD: 10.34 K/UL (ref 1.8–7.3)
OPIATES UR QL SCN: NEGATIVE
OXYCODONE UR QL SCN: NEGATIVE
PCP UR QL SCN: NEGATIVE
PLATELET # BLD AUTO: 199 K/UL (ref 130–450)
PMV BLD AUTO: 12.7 FL (ref 7–12)
POTASSIUM SERPL-SCNC: 3.6 MMOL/L (ref 3.5–5)
PROT SERPL-MCNC: 6.8 G/DL (ref 6.4–8.3)
RBC # BLD AUTO: 4.44 M/UL (ref 3.8–5.8)
SALICYLATES SERPL-MCNC: <0.3 MG/DL (ref 0–30)
SODIUM SERPL-SCNC: 137 MMOL/L (ref 132–146)
TEST INFORMATION: ABNORMAL
TOXIC TRICYCLIC SC,BLOOD: NEGATIVE
WBC OTHER # BLD: 13.4 K/UL (ref 4.5–11.5)

## 2025-04-08 PROCEDURE — 96372 THER/PROPH/DIAG INJ SC/IM: CPT

## 2025-04-08 PROCEDURE — 93005 ELECTROCARDIOGRAM TRACING: CPT | Performed by: STUDENT IN AN ORGANIZED HEALTH CARE EDUCATION/TRAINING PROGRAM

## 2025-04-08 PROCEDURE — 80179 DRUG ASSAY SALICYLATE: CPT

## 2025-04-08 PROCEDURE — G0480 DRUG TEST DEF 1-7 CLASSES: HCPCS

## 2025-04-08 PROCEDURE — 80307 DRUG TEST PRSMV CHEM ANLYZR: CPT

## 2025-04-08 PROCEDURE — 99284 EMERGENCY DEPT VISIT MOD MDM: CPT

## 2025-04-08 PROCEDURE — 85025 COMPLETE CBC W/AUTO DIFF WBC: CPT

## 2025-04-08 PROCEDURE — 80143 DRUG ASSAY ACETAMINOPHEN: CPT

## 2025-04-08 PROCEDURE — 80053 COMPREHEN METABOLIC PANEL: CPT

## 2025-04-08 PROCEDURE — 6360000002 HC RX W HCPCS: Performed by: STUDENT IN AN ORGANIZED HEALTH CARE EDUCATION/TRAINING PROGRAM

## 2025-04-08 RX ORDER — DROPERIDOL 2.5 MG/ML
5 INJECTION, SOLUTION INTRAMUSCULAR; INTRAVENOUS ONCE
Status: COMPLETED | OUTPATIENT
Start: 2025-04-08 | End: 2025-04-08

## 2025-04-08 RX ORDER — MIDAZOLAM HYDROCHLORIDE 5 MG/ML
5 INJECTION, SOLUTION INTRAMUSCULAR; INTRAVENOUS ONCE
Status: COMPLETED | OUTPATIENT
Start: 2025-04-08 | End: 2025-04-08

## 2025-04-08 RX ORDER — CHLORDIAZEPOXIDE HYDROCHLORIDE 25 MG/1
CAPSULE, GELATIN COATED ORAL
Qty: 20 CAPSULE | Refills: 0 | Status: ON HOLD | OUTPATIENT
Start: 2025-04-08 | End: 2025-04-12

## 2025-04-08 RX ADMIN — MIDAZOLAM 5 MG: 5 INJECTION INTRAMUSCULAR; INTRAVENOUS at 17:49

## 2025-04-08 RX ADMIN — DROPERIDOL 5 MG: 2.5 INJECTION, SOLUTION INTRAMUSCULAR; INTRAVENOUS at 17:49

## 2025-04-08 NOTE — ED NOTES
Patient refusing to change out of clothes, girlfriend at bed side stating \"he doesn't have to this isn't a psychiatric problem\"  
Urine specimen cup provided, pt unable to urinate at this time  
0.18

## 2025-04-09 LAB
EKG ATRIAL RATE: 83 BPM
EKG P AXIS: 51 DEGREES
EKG P-R INTERVAL: 148 MS
EKG Q-T INTERVAL: 380 MS
EKG QRS DURATION: 94 MS
EKG QTC CALCULATION (BAZETT): 446 MS
EKG R AXIS: 35 DEGREES
EKG T AXIS: 45 DEGREES
EKG VENTRICULAR RATE: 83 BPM

## 2025-04-09 PROCEDURE — 93010 ELECTROCARDIOGRAM REPORT: CPT | Performed by: INTERNAL MEDICINE

## 2025-04-09 NOTE — VIRTUAL HEALTH
mmol/L    Anion Gap 14 7 - 16 mmol/L    Glucose 80 74 - 99 mg/dL    BUN 5 (L) 6 - 20 mg/dL    Creatinine 0.7 0.70 - 1.20 mg/dL    Est, Glom Filt Rate >90 >60 mL/min/1.73m2    Calcium 8.9 8.6 - 10.2 mg/dL    Total Protein 6.8 6.4 - 8.3 g/dL    Albumin 4.5 3.5 - 5.2 g/dL    Total Bilirubin 0.7 0.0 - 1.2 mg/dL    Alkaline Phosphatase 126 40 - 129 U/L    ALT 9 0 - 40 U/L    AST 15 0 - 39 U/L   SERUM DRUG SCREEN    Collection Time: 04/08/25  6:26 PM   Result Value Ref Range    Acetaminophen Level <5 (L) 10.0 - 30.0 ug/mL    Ethanol Lvl <10 <10 mg/dL    Salicylate Lvl <0.3 0.0 - 30.0 mg/dL    Toxic Tricyclic Sc,Blood NEGATIVE NEGATIVE   URINE DRUG SCREEN    Collection Time: 04/08/25  7:41 PM   Result Value Ref Range    Amphetamine Screen, Ur NEGATIVE NEGATIVE    Barbiturate Screen, Ur POSITIVE (A) NEGATIVE    Benzodiazepine Screen, Urine POSITIVE (A) NEGATIVE    Cocaine Metabolite, Urine NEGATIVE NEGATIVE    Methadone Screen, Urine NEGATIVE NEGATIVE    Opiates, Urine NEGATIVE NEGATIVE    Phencyclidine, Urine NEGATIVE NEGATIVE    Cannabinoid Scrn, Ur POSITIVE (A) NEGATIVE    Oxycodone Screen, Ur NEGATIVE NEGATIVE    Fentanyl, Ur NEGATIVE NEGATIVE    Buprenorphine Urine NEGATIVE NEGATIVE    Test Information       These drug screen results are for medical purposes only and should not be considered definitive or confirmed.       Imaging:   No orders to display     Radiology:  XR CHEST PORTABLE  Result Date: 4/5/2025  EXAMINATION: ONE XRAY VIEW OF THE CHEST 4/5/2025 8:16 pm COMPARISON: None. HISTORY: ORDERING SYSTEM PROVIDED HISTORY: EtOH withdrawl TECHNOLOGIST PROVIDED HISTORY: Reason for exam:->EtOH withdrawl FINDINGS: The lungs are without acute focal process.  There is no effusion or pneumothorax. The cardiomediastinal silhouette is without acute process. The osseous structures are without acute process.     No acute process.       Review of Systems:  Reports no current cardiovascular, respiratory, gastrointestinal,

## 2025-04-09 NOTE — ED PROVIDER NOTES
note that portions of this note were completed with a voice recognition program.  Efforts were made to edit the dictations but occasionally words are mis-transcribed.)    Matty Bowers DO (electronically signed)       Matty Bowers,   04/10/25 0737

## 2025-04-11 ENCOUNTER — HOSPITAL ENCOUNTER (EMERGENCY)
Age: 35
Discharge: ANOTHER HOSPITAL | End: 2025-04-12
Attending: STUDENT IN AN ORGANIZED HEALTH CARE EDUCATION/TRAINING PROGRAM
Payer: COMMERCIAL

## 2025-04-11 VITALS
WEIGHT: 156 LBS | RESPIRATION RATE: 16 BRPM | DIASTOLIC BLOOD PRESSURE: 67 MMHG | TEMPERATURE: 97.7 F | HEART RATE: 91 BPM | OXYGEN SATURATION: 96 % | BODY MASS INDEX: 24.48 KG/M2 | SYSTOLIC BLOOD PRESSURE: 109 MMHG | HEIGHT: 67 IN

## 2025-04-11 DIAGNOSIS — Z76.89 ENCOUNTER FOR PSYCHIATRIC ASSESSMENT: Primary | ICD-10-CM

## 2025-04-11 DIAGNOSIS — F30.9 MANIA: ICD-10-CM

## 2025-04-11 LAB
ALBUMIN SERPL-MCNC: 4.3 G/DL (ref 3.5–5.2)
ALP SERPL-CCNC: 119 U/L (ref 40–129)
ALT SERPL-CCNC: 7 U/L (ref 0–40)
AMPHET UR QL SCN: NEGATIVE
ANION GAP SERPL CALCULATED.3IONS-SCNC: 9 MMOL/L (ref 7–16)
APAP SERPL-MCNC: <5 UG/ML (ref 10–30)
AST SERPL-CCNC: 13 U/L (ref 0–39)
BARBITURATES UR QL SCN: POSITIVE
BASOPHILS # BLD: 0.04 K/UL (ref 0–0.2)
BASOPHILS NFR BLD: 0 % (ref 0–2)
BENZODIAZ UR QL: POSITIVE
BILIRUB SERPL-MCNC: 0.2 MG/DL (ref 0–1.2)
BUN SERPL-MCNC: 8 MG/DL (ref 6–20)
BUPRENORPHINE UR QL: NEGATIVE
CALCIUM SERPL-MCNC: 9.2 MG/DL (ref 8.6–10.2)
CANNABINOIDS UR QL SCN: POSITIVE
CHLORIDE SERPL-SCNC: 108 MMOL/L (ref 98–107)
CO2 SERPL-SCNC: 24 MMOL/L (ref 22–29)
COCAINE UR QL SCN: NEGATIVE
CREAT SERPL-MCNC: 0.7 MG/DL (ref 0.7–1.2)
EKG ATRIAL RATE: 51 BPM
EKG P AXIS: 41 DEGREES
EKG P-R INTERVAL: 138 MS
EKG Q-T INTERVAL: 404 MS
EKG QRS DURATION: 86 MS
EKG QTC CALCULATION (BAZETT): 372 MS
EKG R AXIS: 34 DEGREES
EKG T AXIS: 54 DEGREES
EKG VENTRICULAR RATE: 51 BPM
EOSINOPHIL # BLD: 0.14 K/UL (ref 0.05–0.5)
EOSINOPHILS RELATIVE PERCENT: 1 % (ref 0–6)
ERYTHROCYTE [DISTWIDTH] IN BLOOD BY AUTOMATED COUNT: 15.2 % (ref 11.5–15)
ETHANOLAMINE SERPL-MCNC: <10 MG/DL (ref 0–0.08)
FENTANYL UR QL: NEGATIVE
GFR, ESTIMATED: >90 ML/MIN/1.73M2
GLUCOSE SERPL-MCNC: 104 MG/DL (ref 74–99)
HCT VFR BLD AUTO: 46 % (ref 37–54)
HGB BLD-MCNC: 15.4 G/DL (ref 12.5–16.5)
IMM GRANULOCYTES # BLD AUTO: 0.06 K/UL (ref 0–0.58)
IMM GRANULOCYTES NFR BLD: 1 % (ref 0–5)
LYMPHOCYTES NFR BLD: 1.98 K/UL (ref 1.5–4)
LYMPHOCYTES RELATIVE PERCENT: 16 % (ref 20–42)
MCH RBC QN AUTO: 31.8 PG (ref 26–35)
MCHC RBC AUTO-ENTMCNC: 33.5 G/DL (ref 32–34.5)
MCV RBC AUTO: 94.8 FL (ref 80–99.9)
METHADONE UR QL: NEGATIVE
MONOCYTES NFR BLD: 0.62 K/UL (ref 0.1–0.95)
MONOCYTES NFR BLD: 5 % (ref 2–12)
NEUTROPHILS NFR BLD: 77 % (ref 43–80)
NEUTS SEG NFR BLD: 9.41 K/UL (ref 1.8–7.3)
OPIATES UR QL SCN: NEGATIVE
OXYCODONE UR QL SCN: NEGATIVE
PCP UR QL SCN: NEGATIVE
PLATELET, FLUORESCENCE: 182 K/UL (ref 130–450)
PMV BLD AUTO: 12.9 FL (ref 7–12)
POTASSIUM SERPL-SCNC: 4.7 MMOL/L (ref 3.5–5)
PROT SERPL-MCNC: 6.9 G/DL (ref 6.4–8.3)
RBC # BLD AUTO: 4.85 M/UL (ref 3.8–5.8)
SALICYLATES SERPL-MCNC: <0.3 MG/DL (ref 0–30)
SODIUM SERPL-SCNC: 141 MMOL/L (ref 132–146)
TEST INFORMATION: ABNORMAL
TOXIC TRICYCLIC SC,BLOOD: NEGATIVE
WBC OTHER # BLD: 12.3 K/UL (ref 4.5–11.5)

## 2025-04-11 PROCEDURE — 6360000002 HC RX W HCPCS

## 2025-04-11 PROCEDURE — 99284 EMERGENCY DEPT VISIT MOD MDM: CPT

## 2025-04-11 PROCEDURE — 85025 COMPLETE CBC W/AUTO DIFF WBC: CPT

## 2025-04-11 PROCEDURE — 80307 DRUG TEST PRSMV CHEM ANLYZR: CPT

## 2025-04-11 PROCEDURE — 96372 THER/PROPH/DIAG INJ SC/IM: CPT

## 2025-04-11 PROCEDURE — 80053 COMPREHEN METABOLIC PANEL: CPT

## 2025-04-11 PROCEDURE — 93005 ELECTROCARDIOGRAM TRACING: CPT

## 2025-04-11 PROCEDURE — 80143 DRUG ASSAY ACETAMINOPHEN: CPT

## 2025-04-11 PROCEDURE — 80179 DRUG ASSAY SALICYLATE: CPT

## 2025-04-11 PROCEDURE — 6370000000 HC RX 637 (ALT 250 FOR IP)

## 2025-04-11 PROCEDURE — 93010 ELECTROCARDIOGRAM REPORT: CPT | Performed by: INTERNAL MEDICINE

## 2025-04-11 PROCEDURE — G0480 DRUG TEST DEF 1-7 CLASSES: HCPCS

## 2025-04-11 RX ORDER — GABAPENTIN 300 MG/1
600 CAPSULE ORAL 3 TIMES DAILY
Status: DISCONTINUED | OUTPATIENT
Start: 2025-04-11 | End: 2025-04-11

## 2025-04-11 RX ORDER — HALOPERIDOL 5 MG/ML
5 INJECTION INTRAMUSCULAR ONCE
Status: COMPLETED | OUTPATIENT
Start: 2025-04-11 | End: 2025-04-11

## 2025-04-11 RX ORDER — HALOPERIDOL 5 MG/ML
5 INJECTION INTRAMUSCULAR ONCE
Status: DISCONTINUED | OUTPATIENT
Start: 2025-04-11 | End: 2025-04-11

## 2025-04-11 RX ORDER — CHLORDIAZEPOXIDE HYDROCHLORIDE 25 MG/1
50 CAPSULE, GELATIN COATED ORAL ONCE
Status: COMPLETED | OUTPATIENT
Start: 2025-04-11 | End: 2025-04-11

## 2025-04-11 RX ORDER — GABAPENTIN 300 MG/1
600 CAPSULE ORAL ONCE
Status: COMPLETED | OUTPATIENT
Start: 2025-04-11 | End: 2025-04-11

## 2025-04-11 RX ORDER — CHLORDIAZEPOXIDE HYDROCHLORIDE 5 MG/1
5 CAPSULE, GELATIN COATED ORAL ONCE
Status: DISCONTINUED | OUTPATIENT
Start: 2025-04-11 | End: 2025-04-11

## 2025-04-11 RX ORDER — HALOPERIDOL 5 MG/ML
2 INJECTION INTRAMUSCULAR ONCE
Status: COMPLETED | OUTPATIENT
Start: 2025-04-11 | End: 2025-04-11

## 2025-04-11 RX ADMIN — GABAPENTIN 600 MG: 300 CAPSULE ORAL at 17:20

## 2025-04-11 RX ADMIN — HALOPERIDOL LACTATE 2 MG: 5 INJECTION, SOLUTION INTRAMUSCULAR at 20:57

## 2025-04-11 RX ADMIN — CHLORDIAZEPOXIDE HYDROCHLORIDE 50 MG: 25 CAPSULE ORAL at 17:19

## 2025-04-11 RX ADMIN — HALOPERIDOL LACTATE 5 MG: 5 INJECTION, SOLUTION INTRAMUSCULAR at 18:00

## 2025-04-11 ASSESSMENT — PAIN DESCRIPTION - LOCATION: LOCATION: NECK

## 2025-04-11 ASSESSMENT — PAIN - FUNCTIONAL ASSESSMENT: PAIN_FUNCTIONAL_ASSESSMENT: 0-10

## 2025-04-11 ASSESSMENT — PAIN SCALES - GENERAL: PAINLEVEL_OUTOF10: 2

## 2025-04-11 NOTE — ED PROVIDER NOTES
[facility] EMERGENCY DEPARTMENT  EMERGENCY DEPARTMENT ENCOUNTER        Pt Name: Mark Davila  MRN: [id number]  Birthdate 1990  Date of evaluation: 4/11/2025  Provider: Carolynn Da Silva DO  PCP: Not, On File (Inactive)  Note Started: 2:17 PM EDT 4/11/25    CHIEF COMPLAINT       Chief Complaint   Patient presents with    Psychiatric Evaluation     Per EMS, pt went to [facility] , there was confusion about his appointment and he couldn't get in. Pt is homeless and was hoping to get housing, now her can't so he began to yell.        HISTORY OF PRESENT ILLNESS: 1 or more Elements   Mark Davila is a 34 y.o. male with a past medical history of major depressive disorder and alcohol abuse with previous withdrawal who presents to the emergency department with chief complaint of agitation and virginie.  Patient presents with a pink slip completed by [facility] as he was at their facility today and was very agitated, nonsensical, rambling, and aggressive with staff there.  He states that over the past several months he has been homeless and today's situation was, as due to a misunderstanding regarding a shelter for a homeless situation.  In the ER, patient is calm but easily agitated.  His speech is tangential and is difficult to keep him on track and he needs to be redirected several times throughout the conversation.  He is not currently having any complaints at this time.  Denies chest pain, fevers, chills, nausea, vomiting, chest pain, or shortness of breath.  Patient adamantly denies any illicit substance use or alcohol intake.    Nursing Notes were all reviewed and agreed with or any disagreements were addressed in the HPI.    REVIEW OF SYSTEMS :    Positives and Pertinent negatives as per HPI.    PAST MEDICAL HISTORY/Chronic Conditions Affecting Care    has a past medical history of Anxiety, Arm pain, Asthma, Concussion with loss of consciousness, Convulsions (HCC), Depression, Flashing

## 2025-04-11 NOTE — ED NOTES
Assumed care of patient, patient asleep in bed, easily aroused. No concerns or complaints at this time. Sitter at bedside.

## 2025-04-11 NOTE — PROGRESS NOTES
04/11/25  Mark Davila  [id number]  1990    Social Work Behavioral Health Crisis Assessment    Chief Complaint:     Mental Status Exam:  Level of consciousness:  within normal limits   Appearance:  hospital attire.  Does appear stated age. No acute distress.  Behavior/Motor:  agitated  Attitude toward examiner:  argumentative  SI/HI:Denies SI/HI  However, according to p/s, repeatedly stated, \"I'm done.\"''  Speech:  loud , Tone: loud  Mood: angry  Affect: angry  Thought Processes:  rapid.   Thought Content: Cl denies SI; However, according to p/s, repeatedly stated, \"I'm done.\"  Hallucinations:  Hallucinations: Denies AVOT-H  Cognition:  oriented to person, place, and time   Concentration: poor  Memory: intact, though not formally tested.  Insight: poor   Judgement: poor   Fund of Knowledge: marginal    Legal Status:  [] Voluntary:  [x] Involuntary, Issued by:  [] Probate    Brief Clinical Summary: Patient is a 34 y.o. White (non-) male who presents for manic behavior. Patient presented to the ED via EMS on 04/11/25 from [facility].    Collateral Information:    Risk Factors: Poor Sleep    Protective Factors: No access to firearms    Legal Issues:  []  Yes (Specify)  []  No    Access to Weapons:  []  Yes (Specify)  [x]  No    Violence Risk Screening:        Have you ever thought about hurting someone?   [x]  No  []  Yes (Ask the questions listed below)   When?    Did you follow through with the thoughts?      [] No     [] Yes- When and what happened?  2.  Have you ever threatened anyone?  [x]  No  []  Yes (Ask the questions listed below)   When and what happened?    Have you ever threatened someone with a gun, knife or other weapon?   []  No  []  Yes - When and what happened?  2. Have you ever had an order of protection taken out against you? []  Yes [x]  No  3. Have you ever been arrested due to violence? []  Yes [x]  No  4. Have you ever been cruel to animals? []  Yes [x]  No    C-SSRS Screening

## 2025-04-12 ENCOUNTER — HOSPITAL ENCOUNTER (INPATIENT)
Age: 35
LOS: 4 days | Discharge: HOME | DRG: 750 | End: 2025-04-16
Attending: PSYCHIATRY & NEUROLOGY | Admitting: PSYCHIATRY & NEUROLOGY
Payer: COMMERCIAL

## 2025-04-12 PROBLEM — F39 MOOD DISORDER: Status: ACTIVE | Noted: 2025-04-12

## 2025-04-12 PROCEDURE — 90792 PSYCH DIAG EVAL W/MED SRVCS: CPT | Performed by: NURSE PRACTITIONER

## 2025-04-12 PROCEDURE — 1240000000 HC EMOTIONAL WELLNESS R&B

## 2025-04-12 PROCEDURE — 6360000002 HC RX W HCPCS: Performed by: NURSE PRACTITIONER

## 2025-04-12 PROCEDURE — 6370000000 HC RX 637 (ALT 250 FOR IP): Performed by: NURSE PRACTITIONER

## 2025-04-12 PROCEDURE — 6370000000 HC RX 637 (ALT 250 FOR IP)

## 2025-04-12 PROCEDURE — 6370000000 HC RX 637 (ALT 250 FOR IP): Performed by: PSYCHIATRY & NEUROLOGY

## 2025-04-12 RX ORDER — OLANZAPINE 5 MG/1
5 TABLET ORAL 2 TIMES DAILY
Status: DISCONTINUED | OUTPATIENT
Start: 2025-04-12 | End: 2025-04-13

## 2025-04-12 RX ORDER — HALOPERIDOL 5 MG/1
5 TABLET ORAL EVERY 6 HOURS PRN
Status: DISCONTINUED | OUTPATIENT
Start: 2025-04-12 | End: 2025-04-16 | Stop reason: HOSPADM

## 2025-04-12 RX ORDER — FOLIC ACID 1 MG/1
1 TABLET ORAL DAILY
Status: DISCONTINUED | OUTPATIENT
Start: 2025-04-12 | End: 2025-04-12

## 2025-04-12 RX ORDER — DIVALPROEX SODIUM 500 MG/1
500 TABLET, DELAYED RELEASE ORAL 2 TIMES DAILY
Status: DISCONTINUED | OUTPATIENT
Start: 2025-04-12 | End: 2025-04-12

## 2025-04-12 RX ORDER — NICOTINE 21 MG/24HR
1 PATCH, TRANSDERMAL 24 HOURS TRANSDERMAL DAILY
Status: DISCONTINUED | OUTPATIENT
Start: 2025-04-12 | End: 2025-04-13

## 2025-04-12 RX ORDER — HYDROXYZINE HYDROCHLORIDE 50 MG/1
50 TABLET, FILM COATED ORAL 3 TIMES DAILY PRN
Status: DISCONTINUED | OUTPATIENT
Start: 2025-04-12 | End: 2025-04-16 | Stop reason: HOSPADM

## 2025-04-12 RX ORDER — LANOLIN ALCOHOL/MO/W.PET/CERES
100 CREAM (GRAM) TOPICAL DAILY
Status: DISCONTINUED | OUTPATIENT
Start: 2025-04-13 | End: 2025-04-12

## 2025-04-12 RX ORDER — WATER 10 ML/10ML
INJECTION INTRAMUSCULAR; INTRAVENOUS; SUBCUTANEOUS
Status: DISCONTINUED
Start: 2025-04-12 | End: 2025-04-12

## 2025-04-12 RX ORDER — DIVALPROEX SODIUM 250 MG/1
250 TABLET, DELAYED RELEASE ORAL 2 TIMES DAILY
Status: DISCONTINUED | OUTPATIENT
Start: 2025-04-12 | End: 2025-04-12

## 2025-04-12 RX ORDER — CHLORDIAZEPOXIDE HYDROCHLORIDE 25 MG/1
CAPSULE, GELATIN COATED ORAL
Status: COMPLETED
Start: 2025-04-12 | End: 2025-04-12

## 2025-04-12 RX ORDER — CHLORDIAZEPOXIDE HYDROCHLORIDE 25 MG/1
25 CAPSULE, GELATIN COATED ORAL
Status: DISCONTINUED | OUTPATIENT
Start: 2025-04-12 | End: 2025-04-12

## 2025-04-12 RX ORDER — CHLORDIAZEPOXIDE HYDROCHLORIDE 25 MG/1
25 CAPSULE, GELATIN COATED ORAL EVERY 4 HOURS
Status: DISCONTINUED | OUTPATIENT
Start: 2025-04-12 | End: 2025-04-13

## 2025-04-12 RX ORDER — OLANZAPINE 5 MG/1
15 TABLET, ORALLY DISINTEGRATING ORAL
Status: COMPLETED | OUTPATIENT
Start: 2025-04-12 | End: 2025-04-12

## 2025-04-12 RX ORDER — DIPHENHYDRAMINE HYDROCHLORIDE 50 MG/ML
INJECTION, SOLUTION INTRAMUSCULAR; INTRAVENOUS
Status: DISCONTINUED
Start: 2025-04-12 | End: 2025-04-12

## 2025-04-12 RX ORDER — OLANZAPINE 10 MG/2ML
10 INJECTION, POWDER, FOR SOLUTION INTRAMUSCULAR ONCE
Status: COMPLETED | OUTPATIENT
Start: 2025-04-12 | End: 2025-04-12

## 2025-04-12 RX ORDER — OLANZAPINE 5 MG/1
TABLET, ORALLY DISINTEGRATING ORAL
Status: COMPLETED
Start: 2025-04-12 | End: 2025-04-12

## 2025-04-12 RX ORDER — FOLIC ACID 1 MG/1
1 TABLET ORAL DAILY
Status: DISCONTINUED | OUTPATIENT
Start: 2025-04-13 | End: 2025-04-16 | Stop reason: HOSPADM

## 2025-04-12 RX ORDER — LANOLIN ALCOHOL/MO/W.PET/CERES
100 CREAM (GRAM) TOPICAL DAILY
Status: DISCONTINUED | OUTPATIENT
Start: 2025-04-13 | End: 2025-04-16 | Stop reason: HOSPADM

## 2025-04-12 RX ORDER — MULTIVITAMIN WITH IRON
1 TABLET ORAL DAILY
Status: DISCONTINUED | OUTPATIENT
Start: 2025-04-13 | End: 2025-04-16 | Stop reason: HOSPADM

## 2025-04-12 RX ORDER — ROPINIROLE 1 MG/1
1 TABLET, FILM COATED ORAL 2 TIMES DAILY
Status: ON HOLD | COMMUNITY
End: 2025-04-16 | Stop reason: HOSPADM

## 2025-04-12 RX ORDER — DIVALPROEX SODIUM 500 MG/1
TABLET, DELAYED RELEASE ORAL
Status: COMPLETED
Start: 2025-04-12 | End: 2025-04-12

## 2025-04-12 RX ORDER — LANOLIN ALCOHOL/MO/W.PET/CERES
100 CREAM (GRAM) TOPICAL DAILY
Status: DISCONTINUED | OUTPATIENT
Start: 2025-04-12 | End: 2025-04-12

## 2025-04-12 RX ORDER — HALOPERIDOL 5 MG/ML
5 INJECTION INTRAMUSCULAR EVERY 6 HOURS PRN
Status: DISCONTINUED | OUTPATIENT
Start: 2025-04-12 | End: 2025-04-16 | Stop reason: HOSPADM

## 2025-04-12 RX ORDER — DIPHENHYDRAMINE HYDROCHLORIDE 50 MG/ML
50 INJECTION, SOLUTION INTRAMUSCULAR; INTRAVENOUS ONCE
Status: COMPLETED | OUTPATIENT
Start: 2025-04-12 | End: 2025-04-12

## 2025-04-12 RX ORDER — QUETIAPINE FUMARATE 100 MG/1
100 TABLET, FILM COATED ORAL DAILY
Status: ON HOLD | COMMUNITY
End: 2025-04-16 | Stop reason: HOSPADM

## 2025-04-12 RX ORDER — ZIPRASIDONE MESYLATE 20 MG/ML
INJECTION, POWDER, LYOPHILIZED, FOR SOLUTION INTRAMUSCULAR
Status: DISCONTINUED
Start: 2025-04-12 | End: 2025-04-12

## 2025-04-12 RX ORDER — FOLIC ACID 1 MG/1
1 TABLET ORAL DAILY
Status: DISCONTINUED | OUTPATIENT
Start: 2025-04-13 | End: 2025-04-12

## 2025-04-12 RX ORDER — ACETAMINOPHEN 325 MG/1
650 TABLET ORAL EVERY 6 HOURS PRN
Status: DISCONTINUED | OUTPATIENT
Start: 2025-04-12 | End: 2025-04-16 | Stop reason: HOSPADM

## 2025-04-12 RX ORDER — GABAPENTIN 300 MG/1
600 CAPSULE ORAL 3 TIMES DAILY
Status: DISCONTINUED | OUTPATIENT
Start: 2025-04-12 | End: 2025-04-16 | Stop reason: HOSPADM

## 2025-04-12 RX ORDER — BUSPIRONE HYDROCHLORIDE 10 MG/1
10 TABLET ORAL 3 TIMES DAILY
Status: ON HOLD | COMMUNITY
End: 2025-04-16 | Stop reason: HOSPADM

## 2025-04-12 RX ORDER — MAGNESIUM HYDROXIDE/ALUMINUM HYDROXICE/SIMETHICONE 120; 1200; 1200 MG/30ML; MG/30ML; MG/30ML
30 SUSPENSION ORAL PRN
Status: DISCONTINUED | OUTPATIENT
Start: 2025-04-12 | End: 2025-04-16 | Stop reason: HOSPADM

## 2025-04-12 RX ORDER — CHLORDIAZEPOXIDE HYDROCHLORIDE 25 MG/1
25 CAPSULE, GELATIN COATED ORAL
Status: COMPLETED | OUTPATIENT
Start: 2025-04-12 | End: 2025-04-12

## 2025-04-12 RX ORDER — DIVALPROEX SODIUM 500 MG/1
500 TABLET, DELAYED RELEASE ORAL EVERY 12 HOURS SCHEDULED
Status: DISCONTINUED | OUTPATIENT
Start: 2025-04-12 | End: 2025-04-16 | Stop reason: HOSPADM

## 2025-04-12 RX ORDER — QUETIAPINE FUMARATE 200 MG/1
200 TABLET, FILM COATED ORAL DAILY
Status: ON HOLD | COMMUNITY
End: 2025-04-16 | Stop reason: HOSPADM

## 2025-04-12 RX ORDER — DIVALPROEX SODIUM 500 MG/1
500 TABLET, FILM COATED, EXTENDED RELEASE ORAL
Status: DISCONTINUED | OUTPATIENT
Start: 2025-04-12 | End: 2025-04-12

## 2025-04-12 RX ADMIN — HYDROXYZINE HYDROCHLORIDE 50 MG: 50 TABLET, FILM COATED ORAL at 20:41

## 2025-04-12 RX ADMIN — FOLIC ACID 1 MG: 1 TABLET ORAL at 08:25

## 2025-04-12 RX ADMIN — DIVALPROEX SODIUM 500 MG: 500 TABLET, DELAYED RELEASE ORAL at 01:15

## 2025-04-12 RX ADMIN — GABAPENTIN 600 MG: 300 CAPSULE ORAL at 20:41

## 2025-04-12 RX ADMIN — DIPHENHYDRAMINE HYDROCHLORIDE 50 MG: 50 INJECTION INTRAMUSCULAR; INTRAVENOUS at 18:34

## 2025-04-12 RX ADMIN — OLANZAPINE 5 MG: 5 TABLET, FILM COATED ORAL at 21:03

## 2025-04-12 RX ADMIN — Medication 100 MG: at 08:26

## 2025-04-12 RX ADMIN — OLANZAPINE 10 MG: 10 INJECTION, POWDER, FOR SOLUTION INTRAMUSCULAR at 18:34

## 2025-04-12 RX ADMIN — GABAPENTIN 600 MG: 300 CAPSULE ORAL at 14:10

## 2025-04-12 RX ADMIN — OLANZAPINE 15 MG: 5 TABLET, ORALLY DISINTEGRATING ORAL at 01:15

## 2025-04-12 RX ADMIN — Medication 3 MG: at 20:41

## 2025-04-12 RX ADMIN — DIVALPROEX SODIUM 500 MG: 500 TABLET, DELAYED RELEASE ORAL at 21:03

## 2025-04-12 RX ADMIN — CHLORDIAZEPOXIDE HYDROCHLORIDE 25 MG: 25 CAPSULE ORAL at 14:04

## 2025-04-12 RX ADMIN — CHLORDIAZEPOXIDE HYDROCHLORIDE 25 MG: 25 CAPSULE ORAL at 23:57

## 2025-04-12 RX ADMIN — CHLORDIAZEPOXIDE HYDROCHLORIDE 25 MG: 25 CAPSULE, GELATIN COATED ORAL at 01:15

## 2025-04-12 RX ADMIN — CHLORDIAZEPOXIDE HYDROCHLORIDE 25 MG: 25 CAPSULE ORAL at 07:11

## 2025-04-12 RX ADMIN — CHLORDIAZEPOXIDE HYDROCHLORIDE 25 MG: 25 CAPSULE ORAL at 01:15

## 2025-04-12 RX ADMIN — DIVALPROEX SODIUM 250 MG: 250 TABLET, DELAYED RELEASE ORAL at 08:27

## 2025-04-12 ASSESSMENT — SLEEP AND FATIGUE QUESTIONNAIRES
SLEEP PATTERN: RESTLESSNESS
AVERAGE NUMBER OF SLEEP HOURS: 0
DO YOU HAVE DIFFICULTY SLEEPING: YES
DO YOU USE A SLEEP AID: NO

## 2025-04-12 ASSESSMENT — PAIN SCALES - GENERAL: PAINLEVEL_OUTOF10: 0

## 2025-04-12 NOTE — BH NOTE
Patient came on the unit demanding that we get him his girlfriends number however had an outburst about not being able to make phone calls on his phone. Patient's girlfriend came to ED to come to visit the patient at 0215 am. Her name is Marcela Taylor and visitation policy was explained visitation hours are from 12-2 pm and 5-6 pm Patient girlfriends phone number is [phone]. Will pass on to patient when he wakes up.

## 2025-04-12 NOTE — PROGRESS NOTES
Pt admitted during computer downtime.  Pt was at [facility] and  arrived  to 7S Ext via cart accompanied by EMS personnel to Adult Behavioral health for involuntary pink slip admission to [id number].  Pt arrived yelling, cursing and screaming. Pt made threats to this writer to throw chairs over into NS and threatened to punch writer when writer approached pt.  Pt refused redirection.  Police phoned to standby.  Pt refused all of admission process.

## 2025-04-12 NOTE — BH NOTE
Patient woke up on his own at 1810 came to nurses station and asked why wasn't he awakened  for dinner. He was instructed that due to his statement of not sleeping for 2 days, we left him asleep. Patient became irate and hitting doors, Police called and NP Liliya Ding called. Orders given to medicate patient with Zyprexa 10 mg IM and Benadryl 50 mg IM  once.

## 2025-04-12 NOTE — PROGRESS NOTES
[facility] and [facility] complied with medication.  Pt resting in bed angry, demanding, argumentative and complaining about what medications he was ordered.  Pt remained in bed.  Desires to sleep. Remains on close observation staggered q 15 min checks, seizure precautions, assault precautions.

## 2025-04-12 NOTE — PROGRESS NOTES
Patient given Librium 25 mg, Depakote 500 mg and Zyprexa 15 mg for withdrawal and agitation shortly after patient arrived on unit. Medication administered by Crystal TELLES.

## 2025-04-12 NOTE — PROGRESS NOTES
Dr. Eubanks was phoned, pt agitation and ETOH withdrawal history reported to Dr and orders obtained.  Pt continues to scream, yell and threaten. Police here.  Pt encouraged to go to his room and eventually complied.

## 2025-04-12 NOTE — PROGRESS NOTES
Pt awake in am, agitated yelling and not appropriate for assessment. Pt sleeping and this staff will wait until pt is appropriate to complete assessment.

## 2025-04-12 NOTE — H&P
could reasonably be expected to improve this patient's condition,        [ ] (2) Or for diagnostic study;      AND      [x](2) The inpatient psychiatric services are provided while the individual is under the care of a physician and are included in the individualized plan of care.     Estimated length of stay/service 3 to 7 days based on stability     Plan for post-hospital care outpatient psychiatric and counseling services    NOTE: This report was transcribed using voice recognition software. Every effort was made to ensure accuracy; however, inadvertent computerized transcription errors may be present.       Electronically signed by LI Lind CNP on 4/12/2025 at 10:54 AM

## 2025-04-13 PROBLEM — F60.2 ANTISOCIAL PERSONALITY DISORDER (HCC): Status: ACTIVE | Noted: 2025-04-13

## 2025-04-13 LAB
CHOLEST SERPL-MCNC: 173 MG/DL
HBA1C MFR BLD: 5.3 % (ref 4–5.6)
HDLC SERPL-MCNC: 41 MG/DL
LDLC SERPL CALC-MCNC: 105 MG/DL
TRIGL SERPL-MCNC: 134 MG/DL
VLDLC SERPL CALC-MCNC: 27 MG/DL

## 2025-04-13 PROCEDURE — 99232 SBSQ HOSP IP/OBS MODERATE 35: CPT | Performed by: NURSE PRACTITIONER

## 2025-04-13 PROCEDURE — 1240000000 HC EMOTIONAL WELLNESS R&B

## 2025-04-13 PROCEDURE — 6370000000 HC RX 637 (ALT 250 FOR IP): Performed by: PSYCHIATRY & NEUROLOGY

## 2025-04-13 PROCEDURE — 83036 HEMOGLOBIN GLYCOSYLATED A1C: CPT

## 2025-04-13 PROCEDURE — 6370000000 HC RX 637 (ALT 250 FOR IP): Performed by: NURSE PRACTITIONER

## 2025-04-13 PROCEDURE — 36415 COLL VENOUS BLD VENIPUNCTURE: CPT

## 2025-04-13 PROCEDURE — 80061 LIPID PANEL: CPT

## 2025-04-13 RX ORDER — POLYETHYLENE GLYCOL 3350 17 G
2 POWDER IN PACKET (EA) ORAL
Status: DISCONTINUED | OUTPATIENT
Start: 2025-04-13 | End: 2025-04-16 | Stop reason: HOSPADM

## 2025-04-13 RX ORDER — OLANZAPINE 10 MG/1
10 TABLET ORAL 2 TIMES DAILY
Status: DISCONTINUED | OUTPATIENT
Start: 2025-04-13 | End: 2025-04-14

## 2025-04-13 RX ORDER — CHLORDIAZEPOXIDE HYDROCHLORIDE 25 MG/1
25 CAPSULE, GELATIN COATED ORAL EVERY 6 HOURS PRN
Status: DISCONTINUED | OUTPATIENT
Start: 2025-04-13 | End: 2025-04-15

## 2025-04-13 RX ADMIN — NICOTINE POLACRILEX 2 MG: 2 LOZENGE ORAL at 16:38

## 2025-04-13 RX ADMIN — OLANZAPINE 5 MG: 5 TABLET, FILM COATED ORAL at 08:43

## 2025-04-13 RX ADMIN — CHLORDIAZEPOXIDE HYDROCHLORIDE 25 MG: 25 CAPSULE ORAL at 04:07

## 2025-04-13 RX ADMIN — CHLORDIAZEPOXIDE HYDROCHLORIDE 25 MG: 25 CAPSULE ORAL at 08:43

## 2025-04-13 RX ADMIN — CHLORDIAZEPOXIDE HYDROCHLORIDE 25 MG: 25 CAPSULE ORAL at 15:10

## 2025-04-13 RX ADMIN — CHLORDIAZEPOXIDE HYDROCHLORIDE 25 MG: 25 CAPSULE ORAL at 21:40

## 2025-04-13 RX ADMIN — HYDROXYZINE HYDROCHLORIDE 50 MG: 50 TABLET, FILM COATED ORAL at 17:51

## 2025-04-13 RX ADMIN — NICOTINE POLACRILEX 2 MG: 2 LOZENGE ORAL at 20:00

## 2025-04-13 RX ADMIN — OLANZAPINE 10 MG: 10 TABLET, FILM COATED ORAL at 21:39

## 2025-04-13 RX ADMIN — NICOTINE POLACRILEX 2 MG: 2 LOZENGE ORAL at 13:19

## 2025-04-13 RX ADMIN — MULTIVITAMIN TABLET 1 TABLET: TABLET at 08:43

## 2025-04-13 RX ADMIN — DIVALPROEX SODIUM 500 MG: 500 TABLET, DELAYED RELEASE ORAL at 21:39

## 2025-04-13 RX ADMIN — GABAPENTIN 600 MG: 300 CAPSULE ORAL at 08:43

## 2025-04-13 RX ADMIN — Medication 100 MG: at 08:43

## 2025-04-13 RX ADMIN — DIVALPROEX SODIUM 500 MG: 500 TABLET, DELAYED RELEASE ORAL at 08:43

## 2025-04-13 RX ADMIN — GABAPENTIN 600 MG: 300 CAPSULE ORAL at 13:57

## 2025-04-13 RX ADMIN — HYDROXYZINE HYDROCHLORIDE 50 MG: 50 TABLET, FILM COATED ORAL at 04:07

## 2025-04-13 RX ADMIN — FOLIC ACID 1 MG: 1 TABLET ORAL at 08:43

## 2025-04-13 RX ADMIN — GABAPENTIN 600 MG: 300 CAPSULE ORAL at 21:39

## 2025-04-13 RX ADMIN — NICOTINE POLACRILEX 2 MG: 2 LOZENGE ORAL at 09:52

## 2025-04-13 ASSESSMENT — PATIENT HEALTH QUESTIONNAIRE - PHQ9
SUM OF ALL RESPONSES TO PHQ QUESTIONS 1-9: 0
1. LITTLE INTEREST OR PLEASURE IN DOING THINGS: NOT AT ALL
SUM OF ALL RESPONSES TO PHQ QUESTIONS 1-9: 0
SUM OF ALL RESPONSES TO PHQ QUESTIONS 1-9: 0
2. FEELING DOWN, DEPRESSED OR HOPELESS: NOT AT ALL
SUM OF ALL RESPONSES TO PHQ QUESTIONS 1-9: 0

## 2025-04-13 ASSESSMENT — SLEEP AND FATIGUE QUESTIONNAIRES
DO YOU USE A SLEEP AID: YES
SLEEP PATTERN: DIFFICULTY FALLING ASLEEP;DISTURBED/INTERRUPTED SLEEP
AVERAGE NUMBER OF SLEEP HOURS: 5
DO YOU HAVE DIFFICULTY SLEEPING: YES

## 2025-04-13 ASSESSMENT — LIFESTYLE VARIABLES: HOW OFTEN DO YOU HAVE A DRINK CONTAINING ALCOHOL: 4 OR MORE TIMES A WEEK

## 2025-04-13 NOTE — BH NOTE
Patient awake in day room upon approach.  Patient has poor eye contact.    Patient presents anxious, labile, irritable, and suspicious. Patient yelling at staff about medications, easily agitated, talking/yelling over staff when his questions are being answered.  Appears well groomed  Patient denies suicidal/homicidal ideations and hallucinations.     Patient rates anxiety 10/10 and depression 5/10   Patient denies pain  Patient is taking ordered medications without issue. Patient is not attending groups per note review.  Purposeful Q15min rounding continues.

## 2025-04-13 NOTE — BH NOTE
[facility]  Initial Interdisciplinary Treatment Plan NOTE    Review Date & Time: 4/13/2025 0900    Patient was not in treatment team    Admission Type: Involuntary       Reason for admission:Mood Disorder         Estimated Length of Stay Update:  3-5 days  Estimated Discharge Date Update: 4/16/2025    EDUCATION:   Learner Progress Toward Treatment Goals: Reviewed results and recommendations of this team, Reviewed group plan and strategies, Reviewed signs, symptoms and risk of self harm and violent behavior, and Reviewed goals and plan of care    Method: Small group    Outcome: Verbalized understanding    PATIENT GOALS: To be discharged    PLAN/TREATMENT RECOMMENDATIONS UPDATE:attend groups and be medication compliant    GOALS UPDATE:   Time frame for Short-Term Goals: 1 day    Kathi Rockwell RN

## 2025-04-13 NOTE — BH NOTE
Patient was able to talk with me at nurse station today without being agitated. Patient is grandiose and believes that he is smarter than everyone here. Patient stated that he is so smart that he already figured out two ways to escape the unit. Patient continue to go on and state that he is able to make his own guns. Will notify patient nurse. Will continue to observe patient.

## 2025-04-13 NOTE — PROGRESS NOTES
BEHAVIORAL HEALTH FOLLOW-UP NOTE     4/13/2025     Patient was seen and examined in person, Chart reviewed   Patient's case discussed with staff/team    Chief Complaint: Agitation and aggression      Interim History:   I saw patient up on the unit myself and with the physician.  He tells us that this all happened because he got upset and called someone and name at [facility].  He states that he was told to show up there to apply for housing between a certain time when he showed up they told him no they could not help him because they only take 4 applications a day.  He states that him and his girlfriend are now going to be homeless and he became upset and agitated this.  He states he does not need to be here he wants to get out and work on getting a place for him and his girlfriend.  He denies suicidal homicidal ideations intent or plan denies auditory or visual hallucinations.  He has continued to have periods of agitation and impulsivity on the unit.  Patient states that that is just because he gets upset because he has been feel that he needs to be here and that nobody is telling him anything.  He also states that he is upset because he recently relapsed after having significant sober time from alcohol.  He states he works as a  at the [employer] in [place].  He is calm and cooperative during my assessment.  He does demonstrate significant cluster B personality traits.  He states that he is hoping to be able to be discharged tomorrow and that he will maintain control of his behaviors today.  He denies any side effects medications.  He denies auditory or visual hallucinations does not appear to be internally stimulated internally preoccupied.  His thought process is linear and goal directed.  His insight and judgment is fair as impulse control is adequate at this time and he is alert on time place and person    Appetite: [x] Normal/Unchanged  [] Increased  [] Decreased      Sleep:       [x] Normal/Unchanged  []

## 2025-04-14 PROCEDURE — 6370000000 HC RX 637 (ALT 250 FOR IP): Performed by: NURSE PRACTITIONER

## 2025-04-14 PROCEDURE — 99232 SBSQ HOSP IP/OBS MODERATE 35: CPT | Performed by: NURSE PRACTITIONER

## 2025-04-14 PROCEDURE — 6370000000 HC RX 637 (ALT 250 FOR IP): Performed by: PSYCHIATRY & NEUROLOGY

## 2025-04-14 PROCEDURE — 1240000000 HC EMOTIONAL WELLNESS R&B

## 2025-04-14 RX ORDER — QUETIAPINE FUMARATE 200 MG/1
200 TABLET, FILM COATED ORAL DAILY
Status: DISCONTINUED | OUTPATIENT
Start: 2025-04-14 | End: 2025-04-15

## 2025-04-14 RX ORDER — QUETIAPINE FUMARATE 100 MG/1
100 TABLET, FILM COATED ORAL DAILY
Status: DISCONTINUED | OUTPATIENT
Start: 2025-04-15 | End: 2025-04-15

## 2025-04-14 RX ADMIN — NICOTINE POLACRILEX 2 MG: 2 LOZENGE ORAL at 20:45

## 2025-04-14 RX ADMIN — FOLIC ACID 1 MG: 1 TABLET ORAL at 09:49

## 2025-04-14 RX ADMIN — HALOPERIDOL 5 MG: 5 TABLET ORAL at 16:50

## 2025-04-14 RX ADMIN — QUETIAPINE FUMARATE 200 MG: 200 TABLET ORAL at 20:58

## 2025-04-14 RX ADMIN — HYDROXYZINE HYDROCHLORIDE 50 MG: 50 TABLET, FILM COATED ORAL at 07:43

## 2025-04-14 RX ADMIN — HYDROXYZINE HYDROCHLORIDE 50 MG: 50 TABLET, FILM COATED ORAL at 16:43

## 2025-04-14 RX ADMIN — Medication 3 MG: at 01:05

## 2025-04-14 RX ADMIN — Medication 100 MG: at 09:50

## 2025-04-14 RX ADMIN — NICOTINE POLACRILEX 2 MG: 2 LOZENGE ORAL at 12:15

## 2025-04-14 RX ADMIN — DIVALPROEX SODIUM 500 MG: 500 TABLET, DELAYED RELEASE ORAL at 09:49

## 2025-04-14 RX ADMIN — NICOTINE POLACRILEX 2 MG: 2 LOZENGE ORAL at 16:43

## 2025-04-14 RX ADMIN — HYDROXYZINE HYDROCHLORIDE 50 MG: 50 TABLET, FILM COATED ORAL at 00:56

## 2025-04-14 RX ADMIN — CHLORDIAZEPOXIDE HYDROCHLORIDE 25 MG: 25 CAPSULE ORAL at 04:47

## 2025-04-14 RX ADMIN — NICOTINE POLACRILEX 2 MG: 2 LOZENGE ORAL at 07:43

## 2025-04-14 RX ADMIN — GABAPENTIN 600 MG: 300 CAPSULE ORAL at 09:50

## 2025-04-14 RX ADMIN — OLANZAPINE 10 MG: 10 TABLET, FILM COATED ORAL at 09:49

## 2025-04-14 RX ADMIN — GABAPENTIN 600 MG: 300 CAPSULE ORAL at 14:29

## 2025-04-14 RX ADMIN — DIVALPROEX SODIUM 500 MG: 500 TABLET, DELAYED RELEASE ORAL at 20:58

## 2025-04-14 RX ADMIN — HALOPERIDOL 5 MG: 5 TABLET ORAL at 07:43

## 2025-04-14 RX ADMIN — CHLORDIAZEPOXIDE HYDROCHLORIDE 25 MG: 25 CAPSULE ORAL at 12:31

## 2025-04-14 RX ADMIN — MULTIVITAMIN TABLET 1 TABLET: TABLET at 09:49

## 2025-04-14 RX ADMIN — NICOTINE POLACRILEX 2 MG: 2 LOZENGE ORAL at 05:48

## 2025-04-14 RX ADMIN — Medication 3 MG: at 20:57

## 2025-04-14 RX ADMIN — GABAPENTIN 600 MG: 300 CAPSULE ORAL at 20:58

## 2025-04-14 RX ADMIN — CHLORDIAZEPOXIDE HYDROCHLORIDE 25 MG: 25 CAPSULE ORAL at 21:11

## 2025-04-14 ASSESSMENT — PAIN SCALES - GENERAL: PAINLEVEL_OUTOF10: 0

## 2025-04-14 NOTE — BH NOTE
Demanding discharge, at the top of his lungs screaming at staff, swearing and using abrasive language, no regard for the well being of others on the unit with his abusive, boisterous language. Given Haldol 5 mg and Atarax 50 mg PRN for agitation, irritability, anger, and boisterous behavior.

## 2025-04-14 NOTE — PROGRESS NOTES
Pt up in milieu and remains irritable, argumentative, histrionic and ruminates over his displeasure with his admission here, the medication that he is on and the unit guidelines. Pt speaks loudly and often with a whine in his voice.   Pt focused on and is fretful and worrisome about getting discharged in the am.  Pt said he will lose a good job with good pay if he doesn't get to work.  Pt also said his girlfriend \"will die without me.\"  Pt rated his anxiety 10/10 because of his girlfriend is at her brother's house living in the shed.  Pt said he had a \"weird day', but refused to elaborate on it.  Pt said he played chess, interacted with peers and walked today.  Pt plans to go to a homeless shelter after discharge and return to his  sponsor and his [facility] counselor.  Pt did have some loud outbursts of cursing , but maintained control over his behavior.  Support offered.  Remains on close observation staggered q 15 min checks.

## 2025-04-14 NOTE — DISCHARGE INSTRUCTIONS
Patient was offered a referral to substance abuse treatment, patient declined referral at this time.      Follow up for Tobacco Cessation at:    [facility]                              Date:  Tuesday 4/22 at 3pm  [address] , [address]   [place], [place] [address]   Phone: [phone]   Fax: [phone]     RESOURCES FOR HOMELESS:   [facility]- [address] Males [phone] Females [phone]   [facility]- before 4  [facility] [place] [place] [address] [phone]     after 4 PM  [address] [place] [address]   [facility]- [address] [place] [address] [phone]   [facility]- [address] [place], [place] [id number] [phone]   [facility]- [address] [place] [address] [phone]   [facility]- [address] [id number] [phone]   [facility]- [address] [place], [place] [id number] [phone]   [facility]- [address] [place], [place] [address] [phone]   [facility] [phone]   [facility] shelter- Phone: [phone]   [facility] [phone]   [facility] [phone] extension [address] [place] [address] Homeless Shelter- [phone]   [facility] homeless outreach program- [phone]   [facility] [phone]   [facility] [phone]   [facility]- [phone]   [facility] [facility] [phone]   [facility] [address] [address] Phone: [phone]   [facility] Phone: [phone] [address], [place] [address]    [facility] Phone: [phone]   Support , Tianna Andre Phone: [phone]    [facility]

## 2025-04-14 NOTE — BH NOTE
Patient observed sitting in group. Given his scheduled morning medications, see eMar. Patient soft spoken with this nurse, and became tearful, saying \"can you please transfer me I don't feel safe here.\" Gave patient re-assurance that I was here to help, that we are all here to help him. Patient is feeling frustrated because \"I have stuff to do outside\" and \"I just want a cigarette.\" Remained soft spoken the entire conversation, intermittently tearful.

## 2025-04-14 NOTE — GROUP NOTE
Group Therapy Note    Date: 4/14/2025    Group Start Time: 0950  Group End Time: 1020  Group Topic: Psychoeducation    [facility] ACUTE BH 1    Zeinab Ruiz CTRS    Group Therapy Note    Attendees: 17    Date: 4/14/2025  Start Time: 0950  End Time:  1020  Number of Participants: 17    Type of Group: Psychoeducation    Name:  Mindfulness    Patient's Goal:  Identified what is mindfulness, how mindfulness can positively impact mental health and ways to incorporate mindfulness into every day activities.    Notes:  CTRS led educational group on mindfulness. Encouraged patients to share their experiences. Patient was actively engaged in group discussion and made positive responses.    Status After Intervention:  Improved    Participation Level: Active Listener and Interactive    Participation Quality: Appropriate, Attentive, and Sharing      Speech:  normal      Thought Process/Content: Logical  Linear      Affective Functioning: Congruent      Mood:  Appropriate      Level of consciousness:  Alert and Attentive      Response to Learning: Able to verbalize current knowledge/experience, Able to verbalize/acknowledge new learning, Able to retain information, Capable of insight, Able to change behavior, and Progressing to goal      Endings: None Reported    Modes of Intervention: Education, Support, Socialization, Exploration, Clarifying, and Problem-solving      Discipline Responsible: Psychoeducational Specialist      Signature:  ADDY Singh

## 2025-04-14 NOTE — PROGRESS NOTES
Pt's girlfriend calling the unit every few minutes to get her bf d/c to a rehab. Pt asked \"Can I get discharged to a rehab?\" Informed the pt that it is done often. Pt stated \"[facility]. I can leave when I get there.\" Pt currently screaming at the top of his lungs. \"I don't want to be here. I want to go smoke a cigarette. I don't care if you give me haldol. I don't want it. I'm agitated. Do you hear me?\" Pt encouraged to rest. Pt screamed \"I don't want to because you are telling me to go to my room. Do you hear me?\" PO PRNs given. Encouraged pt to rest. Pt tearful and sad stating \"Will I ever get out of here?\" Emotional support given. Will continue to monitor.     Pt denies SI, HI and AVH.

## 2025-04-14 NOTE — PROGRESS NOTES
Pt crying on the unit upset he lost his gf's number. Pt accusing all peers of stealing it. Pt acting childlike.

## 2025-04-14 NOTE — PROGRESS NOTES
PT. REQUIRED PRN HALDOL AND VISTARIL EARLY SHIFT FOR AGITATION, DEMANDING DISCHARGE AND ACCUSING ANOTHER PT. OF STEALING HIS CLOTHES. PT. REPEATEDLY ASKS THE SAME QUESTIONS TO DIFFERENT STAFF, REQUIRING FREQUENT REDIRECTION AND LIMIT SETTING.   PT. CHALLENGES SAME AND EXPLANATION AND ANSWERS TO REPEATED QUESTIONS AND REQUESTS. MOOD IS LABILE. PT. DENIED NEED FOR FURTHER ADMISSION. PT. DENIED THOUGHTS OF HARM TO SELF OR OTHERS. PT. DENIED HALLUCINATIONS. PT. REPORTS LIVES IN A SHED BEHIND BROTHER'S HOME AFTER BEING EVICTED OUT OF BROTHER'S HOME. PT. REPORTS IS LIVING IN THE SHED WITH HIS GIRLFRIEND AND HAS MONEY FOR A HOTEL AND HAS A JOB TO REPORT TO IN 2 DAYS.

## 2025-04-14 NOTE — GROUP NOTE
Group Therapy Note    Date: 4/14/2025    Group Start Time: 0930  Group End Time: 0950  Group Topic: Community Meeting    [facility] AC[facility] 1    Zeinab Ruiz CTRS    Group Therapy Note    Attendees: 17    Date: 4/14/2025  Start Time: 0930  End Time:  0950  Number of Participants: 17    Type of Group: Community Meeting    Patient's Goal:  Increased awareness of expectations of the milieu, daily staffing and programming. Identified goal for the day.    Notes:  Patient was an active listener in group. Patient shared goal for the day as, \"Stay sober.\"    Status After Intervention:  Improved    Participation Level: Active Listener and Interactive    Participation Quality: Appropriate, Attentive, and Sharing      Speech:  normal      Thought Process/Content: Logical  Linear      Affective Functioning: Congruent      Mood:  Appropriate      Level of consciousness:  Alert and Attentive      Response to Learning: Able to verbalize current knowledge/experience, Able to verbalize/acknowledge new learning, Able to retain information, Capable of insight, Able to change behavior, and Progressing to goal      Endings: None Reported    Modes of Intervention: Education, Support, Socialization, Exploration, Clarifying, and Problem-solving      Discipline Responsible: Psychoeducational Specialist      Signature:  ADDY Singh

## 2025-04-14 NOTE — BH NOTE
Patient approaching nurses station multiple times yelling/demanding to be discharged. Patient was told multiple times by staff there is no discharge order in for him. Patient walks away and returns within 5 minutes demanding again to be discharged.

## 2025-04-15 LAB
DATE LAST DOSE: NORMAL
TME LAST DOSE: NORMAL H
VALPROATE SERPL-MCNC: 85 UG/ML (ref 50–100)
VANCOMYCIN DOSE: NORMAL MG

## 2025-04-15 PROCEDURE — 6370000000 HC RX 637 (ALT 250 FOR IP): Performed by: NURSE PRACTITIONER

## 2025-04-15 PROCEDURE — 80164 ASSAY DIPROPYLACETIC ACD TOT: CPT

## 2025-04-15 PROCEDURE — 1240000000 HC EMOTIONAL WELLNESS R&B

## 2025-04-15 PROCEDURE — 36415 COLL VENOUS BLD VENIPUNCTURE: CPT

## 2025-04-15 PROCEDURE — 6370000000 HC RX 637 (ALT 250 FOR IP): Performed by: PSYCHIATRY & NEUROLOGY

## 2025-04-15 PROCEDURE — 6360000002 HC RX W HCPCS: Performed by: PSYCHIATRY & NEUROLOGY

## 2025-04-15 PROCEDURE — 99232 SBSQ HOSP IP/OBS MODERATE 35: CPT | Performed by: NURSE PRACTITIONER

## 2025-04-15 RX ORDER — QUETIAPINE FUMARATE 200 MG/1
200 TABLET, FILM COATED ORAL DAILY
Status: DISCONTINUED | OUTPATIENT
Start: 2025-04-15 | End: 2025-04-16 | Stop reason: HOSPADM

## 2025-04-15 RX ORDER — DIPHENHYDRAMINE HYDROCHLORIDE 50 MG/ML
50 INJECTION, SOLUTION INTRAMUSCULAR; INTRAVENOUS EVERY 6 HOURS PRN
Status: DISCONTINUED | OUTPATIENT
Start: 2025-04-15 | End: 2025-04-16 | Stop reason: HOSPADM

## 2025-04-15 RX ORDER — CHLORDIAZEPOXIDE HYDROCHLORIDE 25 MG/1
25 CAPSULE, GELATIN COATED ORAL EVERY 12 HOURS PRN
Status: DISCONTINUED | OUTPATIENT
Start: 2025-04-15 | End: 2025-04-16 | Stop reason: HOSPADM

## 2025-04-15 RX ORDER — BENZTROPINE MESYLATE 1 MG/1
1 TABLET ORAL 2 TIMES DAILY
Status: DISCONTINUED | OUTPATIENT
Start: 2025-04-15 | End: 2025-04-16 | Stop reason: HOSPADM

## 2025-04-15 RX ORDER — QUETIAPINE FUMARATE 200 MG/1
400 TABLET, FILM COATED ORAL DAILY
Status: DISCONTINUED | OUTPATIENT
Start: 2025-04-15 | End: 2025-04-16 | Stop reason: HOSPADM

## 2025-04-15 RX ADMIN — HALOPERIDOL LACTATE 5 MG: 5 INJECTION, SOLUTION INTRAMUSCULAR at 00:17

## 2025-04-15 RX ADMIN — GABAPENTIN 600 MG: 300 CAPSULE ORAL at 20:43

## 2025-04-15 RX ADMIN — HYDROXYZINE HYDROCHLORIDE 50 MG: 50 TABLET, FILM COATED ORAL at 20:43

## 2025-04-15 RX ADMIN — DIVALPROEX SODIUM 500 MG: 500 TABLET, DELAYED RELEASE ORAL at 09:33

## 2025-04-15 RX ADMIN — QUETIAPINE FUMARATE 200 MG: 200 TABLET ORAL at 09:32

## 2025-04-15 RX ADMIN — Medication 6 MG: at 20:43

## 2025-04-15 RX ADMIN — DIVALPROEX SODIUM 500 MG: 500 TABLET, DELAYED RELEASE ORAL at 20:43

## 2025-04-15 RX ADMIN — NICOTINE POLACRILEX 2 MG: 2 LOZENGE ORAL at 20:55

## 2025-04-15 RX ADMIN — QUETIAPINE FUMARATE 400 MG: 200 TABLET ORAL at 20:42

## 2025-04-15 RX ADMIN — NICOTINE POLACRILEX 2 MG: 2 LOZENGE ORAL at 22:59

## 2025-04-15 RX ADMIN — DIPHENHYDRAMINE HYDROCHLORIDE 50 MG: 50 INJECTION INTRAMUSCULAR; INTRAVENOUS at 00:17

## 2025-04-15 RX ADMIN — GABAPENTIN 600 MG: 300 CAPSULE ORAL at 14:01

## 2025-04-15 RX ADMIN — Medication 100 MG: at 09:33

## 2025-04-15 RX ADMIN — NICOTINE POLACRILEX 2 MG: 2 LOZENGE ORAL at 07:13

## 2025-04-15 RX ADMIN — CHLORDIAZEPOXIDE HYDROCHLORIDE 25 MG: 25 CAPSULE ORAL at 15:15

## 2025-04-15 RX ADMIN — FOLIC ACID 1 MG: 1 TABLET ORAL at 09:33

## 2025-04-15 RX ADMIN — GABAPENTIN 600 MG: 300 CAPSULE ORAL at 09:33

## 2025-04-15 RX ADMIN — MULTIVITAMIN TABLET 1 TABLET: TABLET at 09:33

## 2025-04-15 RX ADMIN — NICOTINE POLACRILEX 2 MG: 2 LOZENGE ORAL at 09:36

## 2025-04-15 RX ADMIN — BENZTROPINE MESYLATE 1 MG: 1 TABLET ORAL at 14:01

## 2025-04-15 RX ADMIN — NICOTINE POLACRILEX 2 MG: 2 LOZENGE ORAL at 14:01

## 2025-04-15 RX ADMIN — BENZTROPINE MESYLATE 1 MG: 1 TABLET ORAL at 20:43

## 2025-04-15 ASSESSMENT — PAIN SCALES - GENERAL
PAINLEVEL_OUTOF10: 0

## 2025-04-15 NOTE — GROUP NOTE
Group Therapy Note    Date: 4/15/2025    Group Start Time: 0950  Group End Time: 1020  Group Topic: Psychoeducation    [facility] 1    Zeinab Ruiz CTRS    Group Therapy Note    Attendees: 14    Date: 4/15/2025  Start Time: 0950  End Time:  1020  Number of Participants: 14    Type of Group: Psychoeducation    Name:  Positive Thinking    Patient's Goal:  Identified what it means to be a positive thinker, influences of negative thinking and ways to change thinking habits.    Notes:  CTRS led educational group discussion on positive thinking. Encouraged patients to share their experiences. Patient was actively engaged in group discussion.    Status After Intervention:  Improved    Participation Level: Active Listener and Interactive    Participation Quality: Appropriate, Attentive, and Sharing      Speech:  normal      Thought Process/Content: Logical  Linear      Affective Functioning: Congruent      Mood:  Appropriate      Level of consciousness:  Alert and Attentive      Response to Learning: Able to verbalize current knowledge/experience, Able to verbalize/acknowledge new learning, Able to retain information, Capable of insight, Able to change behavior, and Progressing to goal      Endings: None Reported    Modes of Intervention: Education, Support, Socialization, Exploration, Clarifying, and Problem-solving      Discipline Responsible: Psychoeducational Specialist      Signature:  ADDY Singh

## 2025-04-15 NOTE — BH NOTE
Pt observed attempting to use the phone. Pt educated of the amenities that are not available at this hour. Pt became upset, stating that he needs to call someone to \"make sure about my laptop.\" This nurse educated patient to call in the AM when the phones are available. Pt yelling out stating, \"No one here is helping me! I'm a slave, I'm trapped.\" Pt stating that he \"wants shots.\" Pt declined po medications stating that \"they don't work.\" Provider notified and ordered Benadryl 50mg IM to be given with Haldol 5mg IM for agitation.

## 2025-04-15 NOTE — PROGRESS NOTES
BEHAVIORAL HEALTH FOLLOW-UP NOTE     4/14/2025     Patient was seen and examined in person, Chart reviewed   Patient's case discussed with staff/team    Chief Complaint: Agitation and aggression      Interim History:   Patient seen this morning with the treatment team.  He is irritable complaining about his brother not allowing him to live in the house having to live in a shed and states he plans to get a hotel for him and his girlfriend.  Staff indicates the patient did not sleep last night t.  He states that he needs to be discharge because people keep coming in his room and stealing all his belongings..  He later was asking staff about going to rehab.  He began threatening with staff in the afternoon demanding discharge.  Patient became increasingly  Patient reported to staff that he would only be out of sleep if he has his Seroquel ordered which was communicated to me.  Patient is reportedly screaming on the unit tearful labile patient continues to multiple doses of as needed Librium        Appetite: [x] Normal/Unchanged  [] Increased  [] Decreased      Sleep:       [x] Normal/Unchanged  [] Fair       [] Poor              Energy:    [x] Normal/Unchanged  [] Increased  [] Decreased        SI [] Present  [x] Absent    HI  []Present  [x] Absent     Aggression:  [] yes  [x] no    Patient is [x] able  [] unable to CONTRACT FOR SAFETY     PAST MEDICAL/PSYCHIATRIC HISTORY:   Past Medical History:   Diagnosis Date    Anxiety     Arm pain     Asthma     Concussion with loss of consciousness     Convulsions (HCC)     Depression     Flashing lights     Head injury     Headache     Leg pain     Numbness and tingling     arms and hands    PTSD (post-traumatic stress disorder)     Seizures (HCC)        FAMILY/SOCIAL HISTORY:  Family History   Problem Relation Age of Onset    Mental Illness Mother     Substance Abuse Mother     Alcohol Abuse Mother     Cancer Father         lung     Substance Abuse Father     Cirrhosis Father

## 2025-04-15 NOTE — PROGRESS NOTES
BEHAVIORAL HEALTH FOLLOW-UP NOTE     4/15/2025     Patient was seen and examined in person, Chart reviewed   Patient's case discussed with staff/team    Chief Complaint: Agitation and aggression      Interim History:   Patient seen this morning with his nurse.  Patient states that Seroquel does work well for him but it causes restless legs he is agreeable to start Cogentin to help with the restless legs.  He is calmer less agitated on assessment.  Denies suicidal homicidal ideations intent or plan denies auditory or visual hallucinations he is eating well sleeping well and no neurovegetative signs of depression he did have some outburst yesterday on the unit yesterday states he was just upset because of his situation.  He states he plans to attend groups today stating control of his emotions and continues to work towards discharge.       Appetite: [x] Normal/Unchanged  [] Increased  [] Decreased      Sleep:       [x] Normal/Unchanged  [] Fair       [] Poor              Energy:    [x] Normal/Unchanged  [] Increased  [] Decreased        SI [] Present  [x] Absent    HI  []Present  [x] Absent     Aggression:  [] yes  [x] no    Patient is [x] able  [] unable to CONTRACT FOR SAFETY     PAST MEDICAL/PSYCHIATRIC HISTORY:   Past Medical History:   Diagnosis Date    Anxiety     Arm pain     Asthma     Concussion with loss of consciousness     Convulsions (HCC)     Depression     Flashing lights     Head injury     Headache     Leg pain     Numbness and tingling     arms and hands    PTSD (post-traumatic stress disorder)     Seizures (HCC)        FAMILY/SOCIAL HISTORY:  Family History   Problem Relation Age of Onset    Mental Illness Mother     Substance Abuse Mother     Alcohol Abuse Mother     Cancer Father         lung     Substance Abuse Father     Cirrhosis Father     Alcohol Abuse Father     Mental Illness Sister     Substance Abuse Sister     Mental Illness Brother     Substance Abuse Maternal Aunt     Substance Abuse

## 2025-04-15 NOTE — PROGRESS NOTES
Pt. has been in control with no unit problems and is accepting to support. Pt. Has been medication and group compliant, social with select peers and cooperative to unit routine.    Pt. Denied suicidal ideations, homicidal ideations and hallucinations. No overt delusions or paranoid thoughts processes.     Pt. Is aware of continued visitor restriction and potential plan for discharge tomorrow.

## 2025-04-15 NOTE — GROUP NOTE
Group Therapy Note    Date: 4/15/2025    Group Start Time: 1700  Group End Time: 1730  Group Topic: Guest Group    [facility]    Zeinab Ruiz CTRS    Group Therapy Note    Attendees: 9    Date: 4/15/2025  Start Time: 1700  End Time:  1730  Number of Participants: 9    Type of Group: Healthy Living/Wellness    Name:  Coping Skills Bingo    Patient's Goal:  Identified coping skills through [employer] game.    Notes:  [facility] nursing students facilitated group with ADDY observation. Patient was actively engaged in group activity.    Status After Intervention:  Improved    Participation Level: Active Listener and Interactive    Participation Quality: Appropriate, Attentive, and Sharing      Speech:  normal      Thought Process/Content: Logical  Linear      Affective Functioning: Congruent      Mood:  Appropriate      Level of consciousness:  Alert and Attentive      Response to Learning: Able to verbalize current knowledge/experience, Able to verbalize/acknowledge new learning, Able to retain information, Capable of insight, Able to change behavior, and Progressing to goal      Endings: None Reported    Modes of Intervention: Education, Support, Socialization, Exploration, Clarifying, Problem-solving, and Activity      Discipline Responsible: Psychoeducational Specialist      Signature:  ADDY Singh

## 2025-04-15 NOTE — GROUP NOTE
Group Therapy Note    Date: 4/15/2025    Group Start Time: 1050  Group End Time: 1140  Group Topic: Psychotherapy    [facility]    Elodia Stokes MSW, LSW        Group Therapy Note    Attendees: 7         Patient's Goal:  To increase social interaction and improve relationships with others.      Notes:  Pt was attentive in group and was able to identify an agenda. They were also able to verbalize relating to others within the group.      Status After Intervention:  Improved    Participation Level: Active Listener and Interactive    Participation Quality: Appropriate, Attentive, Sharing, and Supportive      Speech:  normal      Thought Process/Content: Logical  Linear      Affective Functioning: Congruent      Mood: anxious      Level of consciousness:  Alert, Oriented x4, and Attentive      Response to Learning: Able to verbalize current knowledge/experience, Able to verbalize/acknowledge new learning, Able to retain information, and Capable of insight      Endings: None Reported    Modes of Intervention: Support, Socialization, and Exploration      Discipline Responsible: /Counselor      Signature:  MELISSA Al LSW

## 2025-04-15 NOTE — GROUP NOTE
Group Therapy Note    Date: 4/15/2025    Group Start Time: 1430  Group End Time: 1510  Group Topic: Recreational    [facility]    Zeinab Ruiz CTRS    Group Therapy Note    Attendees: 13    Date: 4/15/2025  Start Time: 1430  End Time:  1510  Number of Participants: 13    Type of Group: Recreational    Name:  Mental Health Fact or Fiction    Patient's Goal:  Increased awareness of mental health topics through fact or fiction game.    Notes:  Patient was actively engaged in activity and made appropriate responses.    Status After Intervention:  Improved    Participation Level: Active Listener and Interactive    Participation Quality: Appropriate, Attentive, and Sharing      Speech:  normal      Thought Process/Content: Logical  Linear      Affective Functioning: Congruent      Mood:  Appropriate      Level of consciousness:  Alert and Attentive      Response to Learning: Able to verbalize current knowledge/experience, Able to verbalize/acknowledge new learning, Able to retain information, Capable of insight, Able to change behavior, and Progressing to goal      Endings: None Reported    Modes of Intervention: Education, Support, Socialization, Exploration, Clarifying, and Problem-solving      Discipline Responsible: Psychoeducational Specialist      Signature:  ADDY Singh

## 2025-04-16 VITALS
HEIGHT: 67 IN | TEMPERATURE: 97.8 F | WEIGHT: 150 LBS | BODY MASS INDEX: 23.54 KG/M2 | DIASTOLIC BLOOD PRESSURE: 85 MMHG | SYSTOLIC BLOOD PRESSURE: 131 MMHG | HEART RATE: 104 BPM | RESPIRATION RATE: 14 BRPM | OXYGEN SATURATION: 96 %

## 2025-04-16 PROBLEM — F33.3 MAJOR DEPRESSIVE DISORDER, RECURRENT, SEVERE WITH PSYCHOTIC FEATURES (HCC): Status: ACTIVE | Noted: 2025-04-16

## 2025-04-16 PROCEDURE — 6370000000 HC RX 637 (ALT 250 FOR IP): Performed by: PSYCHIATRY & NEUROLOGY

## 2025-04-16 PROCEDURE — 99239 HOSP IP/OBS DSCHRG MGMT >30: CPT | Performed by: NURSE PRACTITIONER

## 2025-04-16 PROCEDURE — 6370000000 HC RX 637 (ALT 250 FOR IP): Performed by: NURSE PRACTITIONER

## 2025-04-16 RX ORDER — MULTIVITAMIN WITH IRON
1 TABLET ORAL DAILY
Qty: 30 TABLET | Refills: 0 | Status: SHIPPED | OUTPATIENT
Start: 2025-04-17 | End: 2025-05-17

## 2025-04-16 RX ORDER — QUETIAPINE FUMARATE 200 MG/1
200 TABLET, FILM COATED ORAL DAILY
Qty: 30 TABLET | Refills: 0 | Status: SHIPPED | OUTPATIENT
Start: 2025-04-17 | End: 2025-05-17

## 2025-04-16 RX ORDER — LANOLIN ALCOHOL/MO/W.PET/CERES
100 CREAM (GRAM) TOPICAL DAILY
Qty: 30 TABLET | Refills: 0 | Status: SHIPPED | OUTPATIENT
Start: 2025-04-17 | End: 2025-05-17

## 2025-04-16 RX ORDER — BENZTROPINE MESYLATE 1 MG/1
1 TABLET ORAL 2 TIMES DAILY
Qty: 60 TABLET | Refills: 0 | Status: SHIPPED | OUTPATIENT
Start: 2025-04-16 | End: 2025-05-16

## 2025-04-16 RX ORDER — DIVALPROEX SODIUM 500 MG/1
500 TABLET, DELAYED RELEASE ORAL EVERY 12 HOURS SCHEDULED
Qty: 60 TABLET | Refills: 0 | Status: SHIPPED | OUTPATIENT
Start: 2025-04-16 | End: 2025-05-16

## 2025-04-16 RX ORDER — QUETIAPINE FUMARATE 400 MG/1
400 TABLET, FILM COATED ORAL DAILY
Qty: 30 TABLET | Refills: 0 | Status: SHIPPED | OUTPATIENT
Start: 2025-04-16 | End: 2025-05-16

## 2025-04-16 RX ADMIN — NICOTINE POLACRILEX 2 MG: 2 LOZENGE ORAL at 06:50

## 2025-04-16 RX ADMIN — QUETIAPINE FUMARATE 200 MG: 200 TABLET ORAL at 08:31

## 2025-04-16 RX ADMIN — Medication 100 MG: at 08:31

## 2025-04-16 RX ADMIN — FOLIC ACID 1 MG: 1 TABLET ORAL at 08:31

## 2025-04-16 RX ADMIN — GABAPENTIN 600 MG: 300 CAPSULE ORAL at 08:30

## 2025-04-16 RX ADMIN — BENZTROPINE MESYLATE 1 MG: 1 TABLET ORAL at 08:31

## 2025-04-16 RX ADMIN — MULTIVITAMIN TABLET 1 TABLET: TABLET at 08:31

## 2025-04-16 RX ADMIN — NICOTINE POLACRILEX 2 MG: 2 LOZENGE ORAL at 09:17

## 2025-04-16 RX ADMIN — DIVALPROEX SODIUM 500 MG: 500 TABLET, DELAYED RELEASE ORAL at 08:31

## 2025-04-16 ASSESSMENT — PAIN SCALES - GENERAL: PAINLEVEL_OUTOF10: 0

## 2025-04-16 NOTE — DISCHARGE SUMMARY
DISCHARGE SUMMARY      Patient ID:  Mark Davila  [id number]  34 y.o.  1990    Admit date: 4/12/2025    Discharge date and time: 4/16/2025    Admitting Physician: Valentin Eubanks MD     Discharge Physician: Dr Cha JAUREGUI    Discharge Diagnoses:   Patient Active Problem List   Diagnosis    Alcoholism (HCC)    Major depressive disorder, severe (HCC)    Alcoholic fatty liver    Tobacco abuse    Alcohol dependence    Nonintractable epilepsy with complex partial seizures (HCC)    Hypomagnesemia    Hypokalemia    Anxiety and depression    Alcohol withdrawal syndrome with perceptual disturbance (HCC)    Marijuana use    Alcohol withdrawal syndrome without complication (HCC)    Alcohol withdrawal hallucinosis (HCC)    Mood disorder    Antisocial personality disorder (HCC)    Major depressive disorder, recurrent, severe with psychotic features (HCC)       Admission Condition: poor    Discharged Condition: stable    Admission Circumstance:   Patient name: Mark Davila  Patient's past mental health and addiction history: Mood disorder including depression and anxiety alcohol dependence  Patient's presentation to the ED and why the patient needs admission: presented to the ED at [facility] agitated placed on a pink slip while a [place] as patient is found to be agitated nonsensical aggressive with staff indicating has been homeless he is found to be tangential difficult to redirect  Legal status:  []  Voluntary  [x]  Involuntary  []  Probate  Triggering/precipitating events: Unclear  Duration of triggering/precipitating events: Just prior to arrival to the ED      PAST MEDICAL/PSYCHIATRIC HISTORY:   Past Medical History:   Diagnosis Date    Anxiety     Arm pain     Asthma     Concussion with loss of consciousness     Convulsions (HCC)     Depression     Flashing lights     Head injury     Headache     Leg pain     Numbness and tingling     arms and hands    PTSD (post-traumatic stress disorder)     Seizures

## 2025-04-16 NOTE — PROGRESS NOTES
CLINICAL PHARMACY NOTE: MEDS TO BEDS    Total # of Prescriptions Filled: 6   The following medications were delivered to the patient:  Benztropine 1mg  Melatonin 3mg  Therems tabs  Quetiapine fum 400mg  Thiamine 100mg  Depakote dr 500mg    Additional Documentation:  Delivered to Aubrie RN 4-16-25

## 2025-04-16 NOTE — PROGRESS NOTES
[facility]  Discharge Note    Pt discharged with followings belongings:   Dental Appliances: None  Vision - Corrective Lenses: None  Hearing Aid: None  Jewelry: None  Body Piercings Removed: N/A  Clothing: Sweater, Footwear, Pants, Shirt  Other Valuables: Other (Comment) (sunglasses)   Valuables sent home with patient, along with filled prescriptions and copy of AVS. All personal items were returned to patient. Patient educated on aftercare instructions: completed, reviewed and copy given to pt..  Information faxed to [facility] by . Patient verbalize understanding of AVS: yes with stated potential to comply to same.    Status EXAM upon discharge:  Mental Status and Behavioral Exam    Level of Assistance: Independent/Self  Facial Expression: Avoids Gaze  Affect: Blunted  Level of Consciousness: Alert  Frequency of Checks: 4 times per hour, close  Mood: Anxious  Motor Activity: Decreased  Eye Contact: Fair  Observed Behavior: Cooperative, Friendly  Sexual Misconduct History: Current - no  Preception: Theresa to person, Theresa to time, Theresa to place, Theresa to situation  Attention:Normal: Yes  Attention: Others (comment) (IMPROVED)  Thought Processes: Unremarkable  Thought Content: Preoccupations  Depression Symptoms: Impaired concentration  Anxiety Symptoms: Generalized, minimal  Karrie Symptoms: Less need to sleep  Hallucinations: None  Delusions: None voiced  Memory: improved  Insight and Judgment:  improved    Tobacco Screening:  Practical Counseling, on admission, sonal X, if applicable and completed (first 3 are required if patient doesn't refuse):            ( ) Recognizing danger situations (included triggers and roadblocks)                    ( ) Coping skills (new ways to manage stress,relaxation techniques, changing routine, distraction)                                                           ( ) Basic information about quitting (benefits of quitting, techniques in how to quit,

## 2025-04-16 NOTE — PLAN OF CARE
Problem: Anxiety  Goal: Will report anxiety at manageable levels  Description: INTERVENTIONS:1. Administer medication as ordered2. Teach and rehearse alternative coping skills3. Provide emotional support with 1:1 interaction with staff  4/14/2025 2202 by Prakash Sevilla RN  Outcome: Progressing     Problem: Coping  Goal: Pt/Family able to verbalize concerns and demonstrate effective coping strategies  Description: INTERVENTIONS:1. Assist patient/family to identify coping skills, available support systems and cultural and spiritual values2. Provide emotional support, including active listening and acknowledgement of concerns of patient and caregivers3. Reduce environmental stimuli, as able4. Instruct patient/family in relaxation techniques, as appropriate5. Assess for spiritual pain/suffering and initiate Spiritual Care, Psychosocial Clinical Specialist consults as needed  4/14/2025 2202 by Prakash Sevilla RN  Outcome: Progressing     Problem: Change in Body Image  Goal: Pt/Family communicate acceptance of loss or change in body image and feel psychological comfort and peace  Description: INTERVENTIONS:1. Assess patient/family anxiety and grief process related to change in body image, loss of functional status, loss of sense of self, and forgiveness2. Provide emotional and spiritual support3. Provide information about the patient's health status with consideration of family and cultural values4. Communicate willingness to discuss loss and facilitate grief process with patient/family as appropriate5. Emphasize sustaining relationships within family system and community, or melissa/spiritual traditions6. Initiate Spiritual Care, Psychosocial Clinical Specialist consult as needed  4/14/2025 2202 by Prakash Sevilla RN  Outcome: Progressing     Problem: Death & Dying  Goal: Pt/Family communicate acceptance of impending death and feel psychological comfort and peace  Description: INTERVENTIONS:1. Assess patient/family anxiety 
  Problem: Anxiety  Goal: Will report anxiety at manageable levels  Description: INTERVENTIONS:1. Administer medication as ordered2. Teach and rehearse alternative coping skills3. Provide emotional support with 1:1 interaction with staff  Outcome: Progressing     Problem: Coping  Goal: Pt/Family able to verbalize concerns and demonstrate effective coping strategies  Description: INTERVENTIONS:1. Assist patient/family to identify coping skills, available support systems and cultural and spiritual values2. Provide emotional support, including active listening and acknowledgement of concerns of patient and caregivers3. Reduce environmental stimuli, as able4. Instruct patient/family in relaxation techniques, as appropriate5. Assess for spiritual pain/suffering and initiate Spiritual Care, Psychosocial Clinical Specialist consults as needed  Outcome: Progressing     
  Problem: Anxiety  Goal: Will report anxiety at manageable levels  Description: INTERVENTIONS:1. Administer medication as ordered2. Teach and rehearse alternative coping skills3. Provide emotional support with 1:1 interaction with staff  Outcome: Progressing     Problem: Coping  Goal: Pt/Family able to verbalize concerns and demonstrate effective coping strategies  Description: INTERVENTIONS:1. Assist patient/family to identify coping skills, available support systems and cultural and spiritual values2. Provide emotional support, including active listening and acknowledgement of concerns of patient and caregivers3. Reduce environmental stimuli, as able4. Instruct patient/family in relaxation techniques, as appropriate5. Assess for spiritual pain/suffering and initiate Spiritual Care, Psychosocial Clinical Specialist consults as needed  Outcome: Progressing     Problem: Decision Making  Goal: Pt/Family able to effectively weigh alternatives and participate in decision making related to treatment and care  Description: INTERVENTIONS:1. Determine when there are differences between patient's view, family's view, and healthcare provider's view of condition2. Facilitate patient and family articulation of goals for care3. Help patient and family identify pros/cons of alternative solutions4. Provide information as requested by patient/family5. Respect patient/family right to receive or not to receive information6. Serve as a liaison between patient and family and health care team7. Initiate Consults from Ethics, Palliative Care or initiate Family Care Conference as is appropriate  Outcome: Progressing     
  Problem: Coping  Goal: Pt/Family able to verbalize concerns and demonstrate effective coping strategies  Description: INTERVENTIONS:1. Assist patient/family to identify coping skills, available support systems and cultural and spiritual values2. Provide emotional support, including active listening and acknowledgement of concerns of patient and caregivers3. Reduce environmental stimuli, as able4. Instruct patient/family in relaxation techniques, as appropriate5. Assess for spiritual pain/suffering and initiate Spiritual Care, Psychosocial Clinical Specialist consults as needed  Outcome: Not Progressing     Problem: Decision Making  Goal: Pt/Family able to effectively weigh alternatives and participate in decision making related to treatment and care  Description: INTERVENTIONS:1. Determine when there are differences between patient's view, family's view, and healthcare provider's view of condition2. Facilitate patient and family articulation of goals for care3. Help patient and family identify pros/cons of alternative solutions4. Provide information as requested by patient/family5. Respect patient/family right to receive or not to receive information6. Serve as a liaison between patient and family and health care team7. Initiate Consults from Ethics, Palliative Care or initiate Family Care Conference as is appropriate  Outcome: Not Progressing     Problem: Behavior  Goal: Pt/Family maintain appropriate behavior and adhere to behavioral management agreement, if implemented  Description: INTERVENTIONS:1. Assess patient/family's coping skills and  non-compliant behavior (including use of illegal substances)2. Notify security of behavior or suspected illegal substances which indicate the need for search of the family and/or belongings3. Encourage verbalization of thoughts and concerns in a socially appropriate manner4. Utilize positive, consistent limit setting strategies supporting safety of patient, staff and 
[facility]  Day 3 Interdisciplinary Treatment Plan NOTE    Review Date & Time:  4/15/25 0900    Patient was in treatment team    Estimated Length of Stay Update:   3 DAYS  Estimated Discharge Date Update:  POTENTIAL TOMORROW    EDUCATION:   Learner Progress Toward Treatment Goals: Reviewed results and recommendations of this team    Method: Small group    Outcome: No evidence of Learning    PATIENT GOALS: STAY SOBER\"    PLAN/TREATMENT RECOMMENDATIONS UPDATE:VISITOR RESTRICTION, SUPPORTIVE CARE, ASSESS FOR PARANOIA, ENCOURAGE COMPLIANCE TO MEDICATIONS AND GROUPS, CIWA SCALE ASSESSMENTS    GOALS UPDATE:   Time frame for Short-Term Goals:  3 DAYS      Aubrie Layton RN  Problem: Anxiety  Goal: Will report anxiety at manageable levels  Description: INTERVENTIONS:1. Administer medication as ordered2. Teach and rehearse alternative coping skills3. Provide emotional support with 1:1 interaction with staff  4/14/2025 1025 by Aubrie Layton RN  Outcome: Not Progressing  4/14/2025 0532 by Genie Bonilla RN  Outcome: Not Progressing     Problem: Coping  Goal: Pt/Family able to verbalize concerns and demonstrate effective coping strategies  Description: INTERVENTIONS:1. Assist patient/family to identify coping skills, available support systems and cultural and spiritual values2. Provide emotional support, including active listening and acknowledgement of concerns of patient and caregivers3. Reduce environmental stimuli, as able4. Instruct patient/family in relaxation techniques, as appropriate5. Assess for spiritual pain/suffering and initiate Spiritual Care, Psychosocial Clinical Specialist consults as needed  4/14/2025 1025 by Aubrie Layton RN  Outcome: Not Progressing  4/14/2025 0532 by Genie Bonilla RN  Outcome: Not Progressing     Problem: Decision Making  Goal: Pt/Family able to effectively weigh alternatives and participate in decision making related to treatment and care  Description: INTERVENTIONS:1. 
[facility]  Day 3 Interdisciplinary Treatment Plan NOTE    Review Date & Time:  4/26/25 0900    Patient was in treatment team    Estimated Length of Stay Update:   3 DAYS  Estimated Discharge Date Update:  TODAY    EDUCATION:   Learner Progress Toward Treatment Goals: Reviewed results and recommendations of this team    Method: Small group    Outcome: Verbalized understanding    PATIENT GOALS: \"FIND HOUSING WITH GIRLFRIEND\"    PLAN/TREATMENT RECOMMENDATIONS UPDATE: PLAN FOR DISCHARGE TODAY TO HOME WITH MEDICATIONS AND FOLLOW UP    GOALS UPDATE:   Time frame for Short-Term Goals:  3 DAYS      Aubrie Layton RN  Problem: Anxiety  Goal: Will report anxiety at manageable levels  Description: INTERVENTIONS:1. Administer medication as ordered2. Teach and rehearse alternative coping skills3. Provide emotional support with 1:1 interaction with staff  4/16/2025 0905 by Aubrie Layton RN  Outcome: Progressing  4/15/2025 2246 by Eugenio Coronado RN  Outcome: Progressing     Problem: Coping  Goal: Pt/Family able to verbalize concerns and demonstrate effective coping strategies  Description: INTERVENTIONS:1. Assist patient/family to identify coping skills, available support systems and cultural and spiritual values2. Provide emotional support, including active listening and acknowledgement of concerns of patient and caregivers3. Reduce environmental stimuli, as able4. Instruct patient/family in relaxation techniques, as appropriate5. Assess for spiritual pain/suffering and initiate Spiritual Care, Psychosocial Clinical Specialist consults as needed  4/16/2025 0905 by Aubrie Layton RN  Outcome: Progressing  4/15/2025 2246 by Eugenio Coronado RN  Outcome: Progressing     Problem: Death & Dying  Goal: Pt/Family communicate acceptance of impending death and feel psychological comfort and peace  Description: INTERVENTIONS:1. Assess patient/family anxiety and grief process related to end of life issues2. Provide emotional 
Patient is medication compliant. Patient is attending group activities. Patient is eating meals. Patient denies any suicidal ideations/homicidal ideations/audio or visual hallucinations. Patient behavior has been under control throughout the duration of the shift. Patient is cooperative. Patient has been making positive future focused statements and patient has been appropriate.     Problem: Anxiety  Goal: Will report anxiety at manageable levels  Description: INTERVENTIONS:1. Administer medication as ordered2. Teach and rehearse alternative coping skills3. Provide emotional support with 1:1 interaction with staff  4/15/2025 2246 by Eugenio Coronado RN  Outcome: Progressing     Problem: Coping  Goal: Pt/Family able to verbaliz  Problem: Decision Making  Goal: Pt/Family able to effectively weigh alternatives and participate in decision making related to treatment and care  Description: INTERVENTIONS:1. Determine when there are differences between patient's view, family's view, and healthcare provider's view of condition2. Facilitate patient and family articulation of goals for care3. Help patient and family identify pros/cons of alternative solutions4. Provide information as requested by patient/family5. Respect patient/family right to receive or not to receive information6. Serve as a liaison between patient and family and health care team7. Initiate Consults from Ethics, Palliative Care or initiate Family Care Conference as is appropriate  4/15/2025 2246 by Eugenio Coronado RN  Outcome: Progressing     Problem: Behavior  Goal: Pt/Family maintain appropriate behavior and adhere to behavioral management agreement, if implemented  Description: INTERVENTIONS:1. Assess patient/family's coping skills and  non-compliant behavior (including use of illegal substances)2. Notify security of behavior or suspected illegal substances which indicate the need for search of the family and/or belongings3. Encourage verbalization of 
Pt did not sleep during HS shift.  Problem: Anxiety  Goal: Will report anxiety at manageable levels  Description: INTERVENTIONS:1. Administer medication as ordered2. Teach and rehearse alternative coping skills3. Provide emotional support with 1:1 interaction with staff  4/14/2025 0532 by Genie Bonilla RN  Outcome: Not Progressing  4/13/2025 1807 by Kathi Rockwell RN  Outcome: Progressing     Problem: Coping  Goal: Pt/Family able to verbalize concerns and demonstrate effective coping strategies  Description: INTERVENTIONS:1. Assist patient/family to identify coping skills, available support systems and cultural and spiritual values2. Provide emotional support, including active listening and acknowledgement of concerns of patient and caregivers3. Reduce environmental stimuli, as able4. Instruct patient/family in relaxation techniques, as appropriate5. Assess for spiritual pain/suffering and initiate Spiritual Care, Psychosocial Clinical Specialist consults as needed  4/14/2025 0532 by Genie Bonilla RN  Outcome: Not Progressing  4/13/2025 1807 by Kathi Rockwell RN  Outcome: Progressing     Problem: Behavior  Goal: Pt/Family maintain appropriate behavior and adhere to behavioral management agreement, if implemented  Description: INTERVENTIONS:1. Assess patient/family's coping skills and  non-compliant behavior (including use of illegal substances)2. Notify security of behavior or suspected illegal substances which indicate the need for search of the family and/or belongings3. Encourage verbalization of thoughts and concerns in a socially appropriate manner4. Utilize positive, consistent limit setting strategies supporting safety of patient, staff and others5. Encourage participation in the decision making process about the behavioral management agreement6. If a visitor's behavior poses a threat to safety call refer to organization policy.7. Initiate consult with , Psychosocial CNS, Spiritual Care as 
RN  Outcome: Progressing  4/14/2025 2202 by Prakash Sevilla, RN  Outcome: Progressing

## 2025-04-16 NOTE — TRANSITION OF CARE
[facility] Transition Record    Patient Name: Mark Davila  YOB: 1990   Medical Record Number: [id number]  Date of Admission: 4/12/2025 12:42 AM   Date of Discharge: 4/16/25    Attending Provider: Valentin Culver MD   Discharging Provider:  LAURA CULVER MD  To contact this individual call  [phone] and ask the  to page.  If unavailable, ask to be transferred to Behavioral Health Provider on call.  A Behavioral Health Provider will be available on call 24/7 and during holidays.    Primary Care Provider: Not, On File (Inactive)    Allergies   Allergen Reactions    Hydrocodone Hives     States not true per an allergy test he had     Ativan [Lorazepam] Hallucinations     Does opposite of what is supposed to do    Invega Sustenna [Paliperidone Palmitate Er] Other (See Comments)     \"Unable to Urinate\"    Paliperidone Other (See Comments)     \"Unable to urinate\"     Pcn [Penicillins] Nausea Only    Fluticasone Rash       Reason for Admission:   Patient name: Mark Davila  Patient's past mental health and addiction history: Mood disorder including depression and anxiety alcohol dependence  Patient's presentation to the ED and why the patient needs admission: presented to the ED at [facility] agitated placed on a pink slip while a [place] as patient is found to be agitated nonsensical aggressive with staff indicating has been homeless he is found to be tangential difficult to redirect  Legal status:  []  Voluntary  [x]  Involuntary  []  Probate  Triggering/precipitating events: ETOH RELAPSE, PENDING HOMELESSNESS  Duration of triggering/precipitating events: 2 WEEKS PRIOR TO ADMIT    Admission Diagnosis: Major depressive disorder, recurrent episode, severe (HCC) [F33.2]  Mood disorder [F39]  Major depressive disorder, recurrent, severe with psychotic features (HCC) [F33.3]    * No surgery found *    Results for orders placed or performed during the hospital encounter of 04/12/25

## 2025-04-16 NOTE — GROUP NOTE
Group Therapy Note    Date: 4/16/2025    Group Start Time: 0815  Group End Time: 0830  Group Topic: Community Meeting    [facility]    Citlali Moore LISW        Group Therapy Note    Attendees: 11       Patient's Goal:  Pt attended community support meeting where we discussed unit rules and expectations.    Notes:  Pt was an active participant in group and identified their daily goal as, \"find housing with girlfriend.\"    Status After Intervention:  Unchanged    Participation Level: Active Listener and Interactive    Participation Quality: Appropriate, Attentive, and Sharing      Speech:  normal      Thought Process/Content: Logical      Affective Functioning: Flat      Mood: depressed      Level of consciousness:  Attentive and Drowsy      Response to Learning: Able to verbalize current knowledge/experience and Able to retain information      Endings: None Reported    Modes of Intervention: Education, Support, Socialization, Exploration, Clarifying, and Problem-solving      Discipline Responsible: /Counselor      Signature:  LAITH Perez

## 2025-04-16 NOTE — CARE COORDINATION
SW received voicemail from [facility]  stating that pt was calling them inquiring about bed availability and that SW will need to contact them if pt would like admission. Per chart, \"Pt stated \"[facility] when I get [facility].\" \".     SW will discuss this option with pt, though per chart, it appears he is discharge focused and does not have intent to stay at this facility.   
Leisure assessment completed. Pt was calm through most of assessment. After answering last question, pt began to work himself up reporting that he has been trying to talk to counselors but no one cares what he has to say. Pt began yelling loudly, stating \"Now I'm mad again and it's your fault.\" Pt demonstrates lack of insight.     04/13/25 1030   Activities of Daily Living   Patient Requires assistance with daily self-care activities? No   Leisure Activity 1   3 Favorite Leisure Activities \"Computers.\"   Frequency > 2 hours/day   Last time this week   Barriers to participating  financial/money  (\"I'm homeless.\")   Leisure Activity 2   Favorite Leisure Activities  same as above   Leisure Activity 3   Favorite Leisure Activities  same as above   Social   Patient reports spending the majority of their free time with one other person   Patient verbalizes a preference for spending free time   (\"It depends.\")   Patient’s perception of support system healthy/strong   Patient’s perception of barriers to socializing with others include(s) lack of comfort;lack of skills   Social Details Pt identified his brother, girlfriend as being supportive. Pt reported he is homeless then later stated he does have a place to stay upon discharge. Pt is unemployed, has no children.   Beliefs & Coping   Has difficulty dealing with feelings   No   Internalizes feelings/Keeps feelings in Yes   Externalizes feelings through aggressiveness or poor temper control  No   Feels uncomfortable around others  Yes   Has difficulty talking to others  Yes   Depends on others for direction or decisions Yes   Difficulty dealing with anger of others  No   Difficulty dealing with own anger  No   Difficulty managing stress Yes   Frequently has difficulty with relationships  Yes   which,who,where in family;with friends/peers   Has recently perceived/experienced loss, disappointment, humiliation or failure  NO   General perception about self likes self 
Pt requested to speak with SW after group today. Pt stated that he is upset because he had his GF come for visitation yesterday and when she got here she was told that she is on a restriction. Pt stated that he was informed that he was the one that that put his GF on the restriction and he informed RN to take him off. Pt was upset and SW provided emotional support. SW informed assigned RN.   
Pt was encouraged to attend group and community meeting but declined.  
Pt was encouraged to go to group, but declined.  
Pt was seen during treatment team. Pt is calm and cooperative with logical/linear thoughts. He states that he is feeling better and denied SI/HI/hallucinations. He states that his medications are working well and he feels ready for discharge. He states that he plans to go home and stay with his girlfriend. He states that he spoke with his brother and is still able to stay in the camper outside of brother's house. He and his girlfriend plan to save up money for their own place to stay. He states that girlfriend was able to get a refund on their [employer]BN[employer] so now they have an extra $1500. Pt declined substance abuse rehab referral. He plans to return home with girlfriend and support system will transport. Pt denied SI/HI/hallucinations and denied further questions or concerns for treatment team at this time.     SAM called pt's girlfriend Marcela [phone] (DOLORES signed) to discuss pt discharge for today. She states that she spoke with pt and is aware of his discharge for today. She denied any concerns for pt discharge, thinks he is doing better, and she is able to transport pt home whenever he is ready for discharge today. She is aware that pt will call her when ready. She denied further questions or concerns for SW at this time.     In order to ensure appropriate transition and discharge planning is in place, the following documents have been transmitted to [facility], as the new outpatient provider:    The d/c diagnosis was transmitted to the next care provider  The reason for hospitalization was transmitted to the next care provider  The d/c medications (dosage and indication) were transmitted to the next care provider   The continuing care plan was transmitted to the next care provider      
Pt was seen during treatment team. Pt is discharge focused. He reports that he has had a lot of stressors in his life recently, such as homelessness and conflict with his brother. He states that he wants to stay in a hotel with his girlfriend, does not want to go to a shelter at discharge as he knows some of the people there and they use substances he does not want to be around. Pt reports having strong support from his girlfriend and is looking forward to seeing her. He reports that he got a few hours of sleep last night, that he was nervous about whether or not he would be discharging today, so he had some trouble with his sleep. He adamantly denied SI/HI/hallucinations, states that he is hopeful to discharge and feels safe and stable for discharge if this is what provider decides. He states that he plans to follow up with [facility]. He denied questions or concerns for treatment team at this time. Pt thoughts are linear, future focused and goal oriented.      SAM called [facility] [phone] to schedule appointment for pt. Pt has appointment 4/22 at 4pm in [place] office for medication management.     SAM did include list of homeless and financial resources in pt discharge summary. He plans to work on obtaining housing.   
RN provided SW with signed DOLORES for pt girlfriend Marcela Taylor. Per RN, pt discharge plan is to return to the shed he lives in with Marcela, obtain their belongings, and stay at a motel. Per RN, pt reported having a monthly income that he is able to use towards the hotel. Per RN, pt girlfriend would like to confirm this plan with SW.     SW called pt's girlfriend Marcela [phone] (DOLORES signed). She is calling a  to have pt discharged. She states that she has a motel for them to stay in, but does not want to rent this until pt is discharged. She states that pt is typically very calm, that he is only behaving this way because he is hospitalized and does not want to be here. She states that she has \"zero concerns\" for pt mental health. She denied pt access to guns/weapons. She states that pt treats with [facility] and is in the process of switching to [facility]. She states that after 2 months of homelessness and struggles, this is what caused him to break down, which she feels is a normal response. She states that pt is only angry because he is here, that this is not who he is. She states that pt plan is to return to the shed to obtain their belongings, then go to a motel which they have money to pay for. She states that they both have job opportunities lined up as well. She states that she has also talked to people from [facility] who are able to have them stay with them. She has to work at 3pm today and is hopeful that if he is able to discharge today, it will be before this time. She states that he will be \"completely normal\" once he finds out he is able to discharge. SW provided empathy and support to her, advised her that SW is going to speak with treatment team and that pt will keep her updated on if he is able to discharge.      
SAM received a call from pt's girlfriend Marcela [phone] (DOLORES NOT SIGNED) asking for information on the pt. SAM informed Marcela without a release of information SW cannot disclose any information on the pt. Marcela stated the pt told staff last night he wanted to sign the DOLORES for her to receive all information so this should've been done already. SAM confirmed there is no signed DOLORES on file at this time. Marcela plans to call the pt at this time to tell him to sign the DOLORES.   
Sw attempted to complete biopsychosocial assessment with pt. Pt was asleep and did not wake up to verbal cues. Per chart, pt was medicated last evening.   
Sw attempted to conduct Biopsychosocial assessment with pt. Pt was asleep and did not wake up to verbal cues.   
anxiety in his family.     Pt plans to return home to brother.       Risk Factors:   Previous  admission  Mental Health Diagnosis  Family Mental Health history  Impulsive behaviors   Substance Use   Multiple stressors   Poor sleep     Protective Factors:   Outpatient provider   Safe and stable housing   Access to essential/ basic needs   No history of suicide attempts   No history of self-harm      Gender  [x] Male [] Female [] Transgender  [] Other    Sexual Orientation    [x] Heterosexual [] Homosexual [] Bisexual [] Other    Suicidal Ideation  [] Past [] Present [x] Denies     C-SSRS Screening Completed: Current Suicide Risk:  [x] No Risk  [] Low [] Moderate [] High    Homicidal Ideation  [] Past [] Present [x] Denies     Hallucinations/Delusions (Specify type)  [] Reports [x] Denies     Current or Past Mental Health Treatment:  [x] Yes, When and Where: unknown  [] No    Substance Use/Alcohol Use/Addiction  [x] Reports [] Denies     Tobacco Use (within the last 6 months)  [x] Reports [] Denies     Trauma History  [x] Reports [] Denies     Self Injurious/Self Mutilation Behaviors:   [] Reports:    [] Past [] Present   [x] Denies    Legal History:  []  Yes (Specify)    [x] No    Collateral Contact (DOLORES signed)- Declined  Name:   Relationship:  Number:     Collateral Information:      Access to Weapons per Collateral Contact: [] Reports [] Denies     After consideration of C-SSRS screening results, C-SSRS assessments, and this professional's assessment the patient's overall suicide risk assessed to be:  [x] None   [] Low   [] Moderate   [] High     [x] Discussed current suicide risk, protective and risk factors with RN and NP/Psychiatrist.    Discharge Plan:  [x] Home: with brother  [] Shelter:  [] Crisis Unit:  [] Substance Abuse Rehab:  [] Nursing Facility:  [] Other (Specify):    Follow up Provider: ISIDRO

## 2025-04-20 ENCOUNTER — HOSPITAL ENCOUNTER (EMERGENCY)
Age: 35
Discharge: HOME | End: 2025-04-20
Payer: COMMERCIAL

## 2025-04-20 VITALS
TEMPERATURE: 97.6 F | HEART RATE: 88 BPM | SYSTOLIC BLOOD PRESSURE: 119 MMHG | OXYGEN SATURATION: 98 % | DIASTOLIC BLOOD PRESSURE: 94 MMHG | RESPIRATION RATE: 16 BRPM

## 2025-04-20 DIAGNOSIS — L72.3 SEBACEOUS CYST: Primary | ICD-10-CM

## 2025-04-20 PROCEDURE — 99282 EMERGENCY DEPT VISIT SF MDM: CPT

## 2025-04-20 ASSESSMENT — LIFESTYLE VARIABLES
HOW OFTEN DO YOU HAVE A DRINK CONTAINING ALCOHOL: NEVER
HOW MANY STANDARD DRINKS CONTAINING ALCOHOL DO YOU HAVE ON A TYPICAL DAY: PATIENT DOES NOT DRINK

## 2025-04-20 NOTE — ED PROVIDER NOTES
Allergies: Hydrocodone, Ativan [lorazepam], Invega sustenna [paliperidone palmitate er], Paliperidone, Pcn [penicillins], and Fluticasone    Physical Exam   Oxygen Saturation Interpretation: Normal.        ED Triage Vitals   BP Systolic BP Percentile Diastolic BP Percentile Temp Temp Source Pulse Respirations SpO2   04/20/25 1253 -- -- 04/20/25 1250 04/20/25 1250 04/20/25 1250 04/20/25 1250 04/20/25 1250   (!) 119/94   97.6 °F (36.4 °C) Temporal 96 16 97 %      Height Weight         -- --                       Physical Exam  Musculoskeletal:        Back:        Constitutional:  Alert, development consistent with age.  HEENT:  NC/NT.  Airway patent  Neck:  Normal ROM.  Supple.  Respiratory:  Clear to auscultation and breath sounds equal.  CV:  Regular rate and rhythm, normal heart sounds, without pathological murmurs, ectopy, gallops, or rubs.  GI:  Abdomen Soft, nontender, good bowel sounds.  No firm or pulsatile mass.  Back:  No costovertebral tenderness.  Extremities: No tenderness or edema noted.  Integument:  Normal turgor.  Warm, dry.  There is a palpable sebaceous cyst noted to the right shoulder blade.  There is no overlying skin change, no sign of infection.    Lymphatics: No lymphangitis or adenopathy noted.  Neurological:  Oriented.  Motor functions intact.     Lab / Imaging Results   (All laboratory and radiology results have been personally reviewed by myself)  Labs:  No results found for this visit on 04/20/25.  Imaging:  All Radiology results interpreted by Radiologist unless otherwise noted.  No orders to display       ED Course / Medical Decision Making   Medications - No data to display       Consult(s):   None    Procedure(s):  None    MDM:    Briefly is a 34-year-old male patient is presenting with a 2-year history of a cyst to his right upper back just medial to his shoulder belly.  Area is not painful, he has not had any fevers, no swelling.  On exam he does have a palpable sebaceous cyst

## 2025-04-20 NOTE — DISCHARGE INSTRUCTIONS
This is a sebaceous cyst, it will need to be removed by dermatology.  Please call and schedule an appointment.  There is no urgent need to open this up.  Please do not try to remove it yourself.

## 2025-05-06 ENCOUNTER — HOSPITAL ENCOUNTER (INPATIENT)
Age: 35
LOS: 3 days | Discharge: LEFT BEFORE DISCHARGE | DRG: 770 | End: 2025-05-09
Attending: EMERGENCY MEDICINE | Admitting: INTERNAL MEDICINE
Payer: COMMERCIAL

## 2025-05-06 DIAGNOSIS — R11.14 BILIOUS VOMITING WITH NAUSEA: ICD-10-CM

## 2025-05-06 DIAGNOSIS — F10.930 ALCOHOL WITHDRAWAL SYNDROME WITHOUT COMPLICATION (HCC): Primary | ICD-10-CM

## 2025-05-06 PROBLEM — F10.931 ALCOHOL WITHDRAWAL DELIRIUM, ACUTE, HYPERACTIVE (HCC): Status: ACTIVE | Noted: 2025-05-06

## 2025-05-06 LAB
ALBUMIN SERPL-MCNC: 5 G/DL (ref 3.5–5.2)
ALP SERPL-CCNC: 153 U/L (ref 40–129)
ALT SERPL-CCNC: 20 U/L (ref 0–40)
ANION GAP SERPL CALCULATED.3IONS-SCNC: 22 MMOL/L (ref 7–16)
APAP SERPL-MCNC: <5 UG/ML (ref 10–30)
AST SERPL-CCNC: 36 U/L (ref 0–39)
BASOPHILS # BLD: 0.04 K/UL (ref 0–0.2)
BASOPHILS NFR BLD: 1 % (ref 0–2)
BILIRUB DIRECT SERPL-MCNC: <0.2 MG/DL (ref 0–0.3)
BILIRUB INDIRECT SERPL-MCNC: ABNORMAL MG/DL (ref 0–1)
BILIRUB SERPL-MCNC: 0.5 MG/DL (ref 0–1.2)
BUN SERPL-MCNC: 4 MG/DL (ref 6–20)
CALCIUM SERPL-MCNC: 9.5 MG/DL (ref 8.6–10.2)
CHLORIDE SERPL-SCNC: 93 MMOL/L (ref 98–107)
CO2 SERPL-SCNC: 23 MMOL/L (ref 22–29)
CREAT SERPL-MCNC: 0.6 MG/DL (ref 0.7–1.2)
EOSINOPHIL # BLD: 0.03 K/UL (ref 0.05–0.5)
EOSINOPHILS RELATIVE PERCENT: 0 % (ref 0–6)
ERYTHROCYTE [DISTWIDTH] IN BLOOD BY AUTOMATED COUNT: 14.6 % (ref 11.5–15)
ETHANOLAMINE SERPL-MCNC: 74 MG/DL (ref 0–0.08)
GFR, ESTIMATED: >90 ML/MIN/1.73M2
GLUCOSE SERPL-MCNC: 118 MG/DL (ref 74–99)
HCT VFR BLD AUTO: 47 % (ref 37–54)
HGB BLD-MCNC: 16.8 G/DL (ref 12.5–16.5)
IMM GRANULOCYTES # BLD AUTO: <0.03 K/UL (ref 0–0.58)
IMM GRANULOCYTES NFR BLD: 0 % (ref 0–5)
LIPASE SERPL-CCNC: 18 U/L (ref 13–60)
LYMPHOCYTES NFR BLD: 2.13 K/UL (ref 1.5–4)
LYMPHOCYTES RELATIVE PERCENT: 27 % (ref 20–42)
MCH RBC QN AUTO: 31.9 PG (ref 26–35)
MCHC RBC AUTO-ENTMCNC: 35.7 G/DL (ref 32–34.5)
MCV RBC AUTO: 89.4 FL (ref 80–99.9)
MONOCYTES NFR BLD: 0.5 K/UL (ref 0.1–0.95)
MONOCYTES NFR BLD: 6 % (ref 2–12)
NEUTROPHILS NFR BLD: 65 % (ref 43–80)
NEUTS SEG NFR BLD: 5.04 K/UL (ref 1.8–7.3)
PLATELET # BLD AUTO: 277 K/UL (ref 130–450)
PMV BLD AUTO: 11.3 FL (ref 7–12)
POTASSIUM SERPL-SCNC: 4.4 MMOL/L (ref 3.5–5)
PROT SERPL-MCNC: 8.2 G/DL (ref 6.4–8.3)
RBC # BLD AUTO: 5.26 M/UL (ref 3.8–5.8)
SALICYLATES SERPL-MCNC: <0.3 MG/DL (ref 0–30)
SODIUM SERPL-SCNC: 138 MMOL/L (ref 132–146)
TOXIC TRICYCLIC SC,BLOOD: NEGATIVE
WBC OTHER # BLD: 7.8 K/UL (ref 4.5–11.5)

## 2025-05-06 PROCEDURE — 80179 DRUG ASSAY SALICYLATE: CPT

## 2025-05-06 PROCEDURE — 6370000000 HC RX 637 (ALT 250 FOR IP): Performed by: EMERGENCY MEDICINE

## 2025-05-06 PROCEDURE — 82248 BILIRUBIN DIRECT: CPT

## 2025-05-06 PROCEDURE — 96375 TX/PRO/DX INJ NEW DRUG ADDON: CPT

## 2025-05-06 PROCEDURE — 96374 THER/PROPH/DIAG INJ IV PUSH: CPT

## 2025-05-06 PROCEDURE — 96376 TX/PRO/DX INJ SAME DRUG ADON: CPT

## 2025-05-06 PROCEDURE — G0480 DRUG TEST DEF 1-7 CLASSES: HCPCS

## 2025-05-06 PROCEDURE — 99223 1ST HOSP IP/OBS HIGH 75: CPT | Performed by: INTERNAL MEDICINE

## 2025-05-06 PROCEDURE — 99285 EMERGENCY DEPT VISIT HI MDM: CPT

## 2025-05-06 PROCEDURE — 6360000002 HC RX W HCPCS: Performed by: INTERNAL MEDICINE

## 2025-05-06 PROCEDURE — 80143 DRUG ASSAY ACETAMINOPHEN: CPT

## 2025-05-06 PROCEDURE — 80053 COMPREHEN METABOLIC PANEL: CPT

## 2025-05-06 PROCEDURE — 6360000002 HC RX W HCPCS: Performed by: EMERGENCY MEDICINE

## 2025-05-06 PROCEDURE — 80307 DRUG TEST PRSMV CHEM ANLYZR: CPT

## 2025-05-06 PROCEDURE — 6360000002 HC RX W HCPCS

## 2025-05-06 PROCEDURE — 2060000000 HC ICU INTERMEDIATE R&B

## 2025-05-06 PROCEDURE — 6370000000 HC RX 637 (ALT 250 FOR IP): Performed by: INTERNAL MEDICINE

## 2025-05-06 PROCEDURE — 83690 ASSAY OF LIPASE: CPT

## 2025-05-06 PROCEDURE — 85025 COMPLETE CBC W/AUTO DIFF WBC: CPT

## 2025-05-06 RX ORDER — PHENOBARBITAL SODIUM 65 MG/ML
130 INJECTION, SOLUTION INTRAMUSCULAR; INTRAVENOUS ONCE
Status: COMPLETED | OUTPATIENT
Start: 2025-05-06 | End: 2025-05-06

## 2025-05-06 RX ORDER — POLYETHYLENE GLYCOL 3350 17 G/17G
17 POWDER, FOR SOLUTION ORAL DAILY PRN
Status: DISCONTINUED | OUTPATIENT
Start: 2025-05-06 | End: 2025-05-10 | Stop reason: HOSPADM

## 2025-05-06 RX ORDER — PHENOBARBITAL 32.4 MG/1
64.8 TABLET ORAL 4 TIMES DAILY
Status: DISPENSED | OUTPATIENT
Start: 2025-05-06 | End: 2025-05-07

## 2025-05-06 RX ORDER — M-VIT,TX,IRON,MINS/CALC/FOLIC 27MG-0.4MG
1 TABLET ORAL DAILY
Status: DISCONTINUED | OUTPATIENT
Start: 2025-05-06 | End: 2025-05-10 | Stop reason: HOSPADM

## 2025-05-06 RX ORDER — DROPERIDOL 2.5 MG/ML
2.5 INJECTION, SOLUTION INTRAMUSCULAR; INTRAVENOUS EVERY 6 HOURS PRN
Status: DISCONTINUED | OUTPATIENT
Start: 2025-05-06 | End: 2025-05-10 | Stop reason: HOSPADM

## 2025-05-06 RX ORDER — THIAMINE HYDROCHLORIDE 100 MG/ML
100 INJECTION, SOLUTION INTRAMUSCULAR; INTRAVENOUS ONCE
Status: COMPLETED | OUTPATIENT
Start: 2025-05-06 | End: 2025-05-06

## 2025-05-06 RX ORDER — ACETAMINOPHEN 325 MG/1
650 TABLET ORAL EVERY 6 HOURS PRN
Status: DISCONTINUED | OUTPATIENT
Start: 2025-05-06 | End: 2025-05-10 | Stop reason: HOSPADM

## 2025-05-06 RX ORDER — ENOXAPARIN SODIUM 100 MG/ML
40 INJECTION SUBCUTANEOUS DAILY
Status: DISCONTINUED | OUTPATIENT
Start: 2025-05-06 | End: 2025-05-10 | Stop reason: HOSPADM

## 2025-05-06 RX ORDER — PHENOBARBITAL 32.4 MG/1
32.4 TABLET ORAL 2 TIMES DAILY
Status: COMPLETED | OUTPATIENT
Start: 2025-05-08 | End: 2025-05-09

## 2025-05-06 RX ORDER — FOLIC ACID 1 MG/1
1 TABLET ORAL ONCE
Status: DISCONTINUED | OUTPATIENT
Start: 2025-05-06 | End: 2025-05-10 | Stop reason: HOSPADM

## 2025-05-06 RX ORDER — PHENOBARBITAL 32.4 MG/1
32.4 TABLET ORAL 4 TIMES DAILY
Status: CANCELLED | OUTPATIENT
Start: 2025-05-06 | End: 2025-05-07

## 2025-05-06 RX ORDER — GAUZE BANDAGE 2" X 2"
100 BANDAGE TOPICAL DAILY
Status: DISCONTINUED | OUTPATIENT
Start: 2025-05-06 | End: 2025-05-10 | Stop reason: HOSPADM

## 2025-05-06 RX ORDER — DIPHENHYDRAMINE HYDROCHLORIDE 50 MG/ML
25 INJECTION, SOLUTION INTRAMUSCULAR; INTRAVENOUS ONCE
Status: COMPLETED | OUTPATIENT
Start: 2025-05-06 | End: 2025-05-06

## 2025-05-06 RX ORDER — GAUZE BANDAGE 2" X 2"
100 BANDAGE TOPICAL DAILY
Status: CANCELLED | OUTPATIENT
Start: 2025-05-06

## 2025-05-06 RX ORDER — PHENOBARBITAL 32.4 MG/1
16.2 TABLET ORAL EVERY 6 HOURS PRN
Status: DISCONTINUED | OUTPATIENT
Start: 2025-05-09 | End: 2025-05-10 | Stop reason: HOSPADM

## 2025-05-06 RX ORDER — PROMETHAZINE HYDROCHLORIDE 25 MG/ML
25 INJECTION, SOLUTION INTRAMUSCULAR; INTRAVENOUS ONCE
Status: DISCONTINUED | OUTPATIENT
Start: 2025-05-06 | End: 2025-05-10 | Stop reason: HOSPADM

## 2025-05-06 RX ORDER — PHENOBARBITAL 32.4 MG/1
32.4 TABLET ORAL EVERY 6 HOURS PRN
Status: DISPENSED | OUTPATIENT
Start: 2025-05-07 | End: 2025-05-09

## 2025-05-06 RX ORDER — PHENOBARBITAL 32.4 MG/1
64.8 TABLET ORAL EVERY 6 HOURS PRN
Status: DISPENSED | OUTPATIENT
Start: 2025-05-06 | End: 2025-05-07

## 2025-05-06 RX ORDER — ACETAMINOPHEN 650 MG/1
650 SUPPOSITORY RECTAL EVERY 6 HOURS PRN
Status: DISCONTINUED | OUTPATIENT
Start: 2025-05-06 | End: 2025-05-10 | Stop reason: HOSPADM

## 2025-05-06 RX ORDER — PHENOBARBITAL 32.4 MG/1
32.4 TABLET ORAL EVERY 6 HOURS PRN
Status: CANCELLED | OUTPATIENT
Start: 2025-05-06 | End: 2025-05-08

## 2025-05-06 RX ORDER — PHENOBARBITAL 32.4 MG/1
32.4 TABLET ORAL 2 TIMES DAILY
Status: CANCELLED | OUTPATIENT
Start: 2025-05-07 | End: 2025-05-08

## 2025-05-06 RX ORDER — PHENOBARBITAL 32.4 MG/1
16.2 TABLET ORAL 2 TIMES DAILY
Status: DISCONTINUED | OUTPATIENT
Start: 2025-05-09 | End: 2025-05-10 | Stop reason: HOSPADM

## 2025-05-06 RX ORDER — CHLORDIAZEPOXIDE HYDROCHLORIDE 25 MG/1
50 CAPSULE, GELATIN COATED ORAL ONCE
Status: COMPLETED | OUTPATIENT
Start: 2025-05-06 | End: 2025-05-06

## 2025-05-06 RX ORDER — DROPERIDOL 2.5 MG/ML
2.5 INJECTION, SOLUTION INTRAMUSCULAR; INTRAVENOUS ONCE
Status: COMPLETED | OUTPATIENT
Start: 2025-05-06 | End: 2025-05-06

## 2025-05-06 RX ORDER — SODIUM CHLORIDE 9 MG/ML
INJECTION, SOLUTION INTRAVENOUS CONTINUOUS
Status: CANCELLED | OUTPATIENT
Start: 2025-05-06

## 2025-05-06 RX ORDER — PROMETHAZINE HYDROCHLORIDE 25 MG/1
12.5 TABLET ORAL EVERY 6 HOURS PRN
Status: DISCONTINUED | OUTPATIENT
Start: 2025-05-06 | End: 2025-05-10 | Stop reason: HOSPADM

## 2025-05-06 RX ORDER — M-VIT,TX,IRON,MINS/CALC/FOLIC 27MG-0.4MG
1 TABLET ORAL DAILY
Status: CANCELLED | OUTPATIENT
Start: 2025-05-06

## 2025-05-06 RX ORDER — DIPHENHYDRAMINE HYDROCHLORIDE 50 MG/ML
50 INJECTION, SOLUTION INTRAMUSCULAR; INTRAVENOUS EVERY 6 HOURS PRN
Status: DISCONTINUED | OUTPATIENT
Start: 2025-05-06 | End: 2025-05-10 | Stop reason: HOSPADM

## 2025-05-06 RX ORDER — ONDANSETRON 2 MG/ML
4 INJECTION INTRAMUSCULAR; INTRAVENOUS EVERY 6 HOURS PRN
Status: DISCONTINUED | OUTPATIENT
Start: 2025-05-06 | End: 2025-05-06 | Stop reason: SDUPTHER

## 2025-05-06 RX ORDER — ONDANSETRON 2 MG/ML
4 INJECTION INTRAMUSCULAR; INTRAVENOUS EVERY 6 HOURS PRN
Status: DISCONTINUED | OUTPATIENT
Start: 2025-05-06 | End: 2025-05-10 | Stop reason: HOSPADM

## 2025-05-06 RX ORDER — PHENOBARBITAL 32.4 MG/1
16.2 TABLET ORAL EVERY 6 HOURS PRN
Status: CANCELLED | OUTPATIENT
Start: 2025-05-08 | End: 2025-05-09

## 2025-05-06 RX ORDER — PHENOBARBITAL 32.4 MG/1
32.4 TABLET ORAL 4 TIMES DAILY
Status: COMPLETED | OUTPATIENT
Start: 2025-05-07 | End: 2025-05-08

## 2025-05-06 RX ORDER — PHENOBARBITAL 32.4 MG/1
16.2 TABLET ORAL 2 TIMES DAILY
Status: CANCELLED | OUTPATIENT
Start: 2025-05-08 | End: 2025-05-09

## 2025-05-06 RX ADMIN — PHENOBARBITAL SODIUM 130 MG: 65 INJECTION INTRAMUSCULAR; INTRAVENOUS at 17:32

## 2025-05-06 RX ADMIN — ENOXAPARIN SODIUM 40 MG: 100 INJECTION SUBCUTANEOUS at 19:56

## 2025-05-06 RX ADMIN — CHLORDIAZEPOXIDE HYDROCHLORIDE 50 MG: 25 CAPSULE ORAL at 18:51

## 2025-05-06 RX ADMIN — PHENOBARBITAL 64.8 MG: 32.4 TABLET ORAL at 19:56

## 2025-05-06 RX ADMIN — Medication 1 TABLET: at 19:56

## 2025-05-06 RX ADMIN — ONDANSETRON 4 MG: 2 INJECTION, SOLUTION INTRAMUSCULAR; INTRAVENOUS at 20:05

## 2025-05-06 RX ADMIN — THIAMINE HYDROCHLORIDE 100 MG: 100 INJECTION, SOLUTION INTRAMUSCULAR; INTRAVENOUS at 15:10

## 2025-05-06 RX ADMIN — Medication 100 MG: at 19:56

## 2025-05-06 RX ADMIN — PHENOBARBITAL SODIUM 130 MG: 65 INJECTION INTRAMUSCULAR; INTRAVENOUS at 15:10

## 2025-05-06 RX ADMIN — ONDANSETRON 4 MG: 2 INJECTION, SOLUTION INTRAMUSCULAR; INTRAVENOUS at 15:09

## 2025-05-06 RX ADMIN — DROPERIDOL 2.5 MG: 2.5 INJECTION, SOLUTION INTRAMUSCULAR; INTRAVENOUS at 18:05

## 2025-05-06 RX ADMIN — DIPHENHYDRAMINE HYDROCHLORIDE 25 MG: 50 INJECTION INTRAMUSCULAR; INTRAVENOUS at 18:05

## 2025-05-06 ASSESSMENT — PAIN - FUNCTIONAL ASSESSMENT: PAIN_FUNCTIONAL_ASSESSMENT: 0-10

## 2025-05-06 ASSESSMENT — PAIN SCALES - GENERAL: PAINLEVEL_OUTOF10: 5

## 2025-05-06 ASSESSMENT — PAIN DESCRIPTION - LOCATION: LOCATION: ABDOMEN

## 2025-05-06 NOTE — ED PROVIDER NOTES
[facility]  EMERGENCY DEPARTMENT ENCOUNTER        Pt Name: Mark Davila  MRN: [id number]  Birthdate 1990  Date of evaluation: 5/6/2025  Provider: Fazal Corbin MD  PCP: Not, On File (Inactive)  Note Started: 2:03 PM EDT 5/6/25    CHIEF COMPLAINT & HISTORY     Chief Complaint   Patient presents with    Alcohol Intoxication     Per EMS, pt from home, c/o alcohol and benzo withdrawal. Pt having tremors. Last drink at 3am. States was drinking 3 pints a day.          History From: pt  Mark Davila is a 34 y.o. male w/pmhx of alcoholism, anxiety/depression, alcohol withdraw with complications presents with concern for alcohol withdrawal.  His last drink was approximately 3 AM.  He was previously drinking 3 pints of liquor a day per EMS. He says that he was admitted a month ago for alcohol withdrawal.  Prior to this he had been sober for an entire year.  He says that he has a paradoxical reaction to Ativan where it causes him to have convulsions and in the past he had been given phenobarbital stead.  A month ago he was also started on gabapentin and attempt open taper off alcohol according to him and he has been trying to taper down his alcohol aggressively over the last several days with no success.  He says he has pain all over his body that feels like pins-and-needles burning everywhere.      Unless otherwise noted in HPI above, patient denies fever, chills, headache, shortness of breath, chest pain, abdominal pain, nausea, vomiting, diarrhea, lightheadedness, dysuria, hematuria, hematochezia, and melena.    Medical Decision Making/Differential Diagnosis/ED Course:    CC/HPI Summary, Social Determinants of health, Records Reviewed, DDx, testing done/not done, ED Course, Reassessment, disposition considerations/shared decision making with patient, consults, disposition:        Relevant PE:  Trembling, not in respiratory distress, normotensive. He is vomiting in ED bed. No acute pain focus.

## 2025-05-06 NOTE — ED NOTES
Patient c/o burning around insertion site of IV. Red rash noted around IV site and dressing. Dr. Brooks and Pharmacist notified.

## 2025-05-06 NOTE — DISCHARGE INSTR - COC
Continuity of Care Form    Patient Name: Mark Davila   :  1990  MRN:  [id number]    Admit date:  2025  Discharge date:  ***    Code Status Order: Prior   Advance Directives:     Admitting Physician:  No admitting provider for patient encounter.  PCP: Not, On File (Inactive)    Discharging Nurse: ***  Discharging Hospital Unit/Room#: HALL04/H4  Discharging Unit Phone Number: ***    Emergency Contact:   Extended Emergency Contact Information  Primary Emergency Contact: Delfin Davila  Home Phone: [phone]  Mobile Phone: [phone]  Relation: Brother/Sister   needed? No  Secondary Emergency Contact: mikayla aguayo  Mobile Phone: [phone]  Relation: Girlfriend    Past Surgical History:  Past Surgical History:   Procedure Laterality Date    COLONOSCOPY      ENDOSCOPY, COLON, DIAGNOSTIC      UPPER GASTROINTESTINAL ENDOSCOPY N/A 2021    EGD BIOPSY performed by Kit Carter MD at [facility] ENDOSCOPY       Immunization History:   Immunization History   Administered Date(s) Administered    DTP 1993, 1993, 1994    DTaP vaccine 1995, 1998    Hep B, ENGERIX-B, RECOMBIVAX-HB, (age Birth - 19y), IM, 0.5mL 1992, 1993, 1994    Hib vaccine 1993, 1993, 1994    MMR, PRIORIX, M-M-R II, (age 12m+), SC, 0.5mL 1995, 11/10/2004    Polio OPV 1993, 1993, 1994, 1998    TD 2LF, TDVAX, (age 7y+), IM, 0.5mL 2022    TDaP, ADACEL (age 10y-64y), BOOSTRIX (age 10y+), IM, 0.5mL 2008, 2021       Active Problems:  Patient Active Problem List   Diagnosis Code    Alcoholism (HCC) F10.20    Major depressive disorder, severe (HCC) F32.2    Alcoholic fatty liver K70.0    Tobacco abuse Z72.0    Alcohol dependence (HCC) F10.20    Nonintractable epilepsy with complex partial seizures (HCC) G40.209    Hypomagnesemia E83.42    Hypokalemia E87.6    Anxiety and depression F41.9, F32.A    Alcohol withdrawal syndrome

## 2025-05-07 LAB
AMMONIA PLAS-SCNC: 39 UMOL/L (ref 16–60)
AMPHET UR QL SCN: NEGATIVE
ANION GAP SERPL CALCULATED.3IONS-SCNC: 20 MMOL/L (ref 7–16)
BARBITURATES UR QL SCN: POSITIVE
BASOPHILS # BLD: 0.05 K/UL (ref 0–0.2)
BASOPHILS NFR BLD: 1 % (ref 0–2)
BENZODIAZ UR QL: POSITIVE
BUN SERPL-MCNC: 9 MG/DL (ref 6–20)
BUPRENORPHINE UR QL: NEGATIVE
CALCIUM SERPL-MCNC: 9.7 MG/DL (ref 8.6–10.2)
CANNABINOIDS UR QL SCN: POSITIVE
CHLORIDE SERPL-SCNC: 94 MMOL/L (ref 98–107)
CO2 SERPL-SCNC: 24 MMOL/L (ref 22–29)
COCAINE UR QL SCN: NEGATIVE
CREAT SERPL-MCNC: 0.8 MG/DL (ref 0.7–1.2)
EOSINOPHIL # BLD: 0.06 K/UL (ref 0.05–0.5)
EOSINOPHILS RELATIVE PERCENT: 1 % (ref 0–6)
ERYTHROCYTE [DISTWIDTH] IN BLOOD BY AUTOMATED COUNT: 14.5 % (ref 11.5–15)
FENTANYL UR QL: NEGATIVE
GFR, ESTIMATED: >90 ML/MIN/1.73M2
GLUCOSE SERPL-MCNC: 110 MG/DL (ref 74–99)
HCT VFR BLD AUTO: 46.3 % (ref 37–54)
HGB BLD-MCNC: 16.4 G/DL (ref 12.5–16.5)
IMM GRANULOCYTES # BLD AUTO: 0.03 K/UL (ref 0–0.58)
IMM GRANULOCYTES NFR BLD: 0 % (ref 0–5)
LYMPHOCYTES NFR BLD: 2.46 K/UL (ref 1.5–4)
LYMPHOCYTES RELATIVE PERCENT: 24 % (ref 20–42)
MAGNESIUM SERPL-MCNC: 2.4 MG/DL (ref 1.6–2.6)
MCH RBC QN AUTO: 31.8 PG (ref 26–35)
MCHC RBC AUTO-ENTMCNC: 35.4 G/DL (ref 32–34.5)
MCV RBC AUTO: 89.7 FL (ref 80–99.9)
METHADONE UR QL: NEGATIVE
MONOCYTES NFR BLD: 0.96 K/UL (ref 0.1–0.95)
MONOCYTES NFR BLD: 9 % (ref 2–12)
NEUTROPHILS NFR BLD: 66 % (ref 43–80)
NEUTS SEG NFR BLD: 6.85 K/UL (ref 1.8–7.3)
OPIATES UR QL SCN: NEGATIVE
OXYCODONE UR QL SCN: NEGATIVE
PCP UR QL SCN: NEGATIVE
PHOSPHATE SERPL-MCNC: 3 MG/DL (ref 2.5–4.5)
PLATELET # BLD AUTO: 258 K/UL (ref 130–450)
PMV BLD AUTO: 11.8 FL (ref 7–12)
POTASSIUM SERPL-SCNC: 3.9 MMOL/L (ref 3.5–5)
RBC # BLD AUTO: 5.16 M/UL (ref 3.8–5.8)
SODIUM SERPL-SCNC: 138 MMOL/L (ref 132–146)
TEST INFORMATION: ABNORMAL
WBC OTHER # BLD: 10.4 K/UL (ref 4.5–11.5)

## 2025-05-07 PROCEDURE — 6370000000 HC RX 637 (ALT 250 FOR IP): Performed by: INTERNAL MEDICINE

## 2025-05-07 PROCEDURE — 85025 COMPLETE CBC W/AUTO DIFF WBC: CPT

## 2025-05-07 PROCEDURE — 80048 BASIC METABOLIC PNL TOTAL CA: CPT

## 2025-05-07 PROCEDURE — 84100 ASSAY OF PHOSPHORUS: CPT

## 2025-05-07 PROCEDURE — 83735 ASSAY OF MAGNESIUM: CPT

## 2025-05-07 PROCEDURE — 6360000002 HC RX W HCPCS: Performed by: INTERNAL MEDICINE

## 2025-05-07 PROCEDURE — 2060000000 HC ICU INTERMEDIATE R&B

## 2025-05-07 PROCEDURE — 82140 ASSAY OF AMMONIA: CPT

## 2025-05-07 PROCEDURE — 80307 DRUG TEST PRSMV CHEM ANLYZR: CPT

## 2025-05-07 PROCEDURE — 99232 SBSQ HOSP IP/OBS MODERATE 35: CPT | Performed by: INTERNAL MEDICINE

## 2025-05-07 RX ORDER — NICOTINE 21 MG/24HR
1 PATCH, TRANSDERMAL 24 HOURS TRANSDERMAL DAILY
Status: DISCONTINUED | OUTPATIENT
Start: 2025-05-07 | End: 2025-05-10 | Stop reason: HOSPADM

## 2025-05-07 RX ORDER — PHENOBARBITAL SODIUM 65 MG/ML
64.8 INJECTION, SOLUTION INTRAMUSCULAR; INTRAVENOUS ONCE
Status: COMPLETED | OUTPATIENT
Start: 2025-05-07 | End: 2025-05-07

## 2025-05-07 RX ADMIN — ONDANSETRON 4 MG: 2 INJECTION, SOLUTION INTRAMUSCULAR; INTRAVENOUS at 09:54

## 2025-05-07 RX ADMIN — Medication 1 TABLET: at 07:45

## 2025-05-07 RX ADMIN — ACETAMINOPHEN 650 MG: 325 TABLET ORAL at 10:07

## 2025-05-07 RX ADMIN — ONDANSETRON 4 MG: 2 INJECTION, SOLUTION INTRAMUSCULAR; INTRAVENOUS at 16:26

## 2025-05-07 RX ADMIN — ENOXAPARIN SODIUM 40 MG: 100 INJECTION SUBCUTANEOUS at 21:16

## 2025-05-07 RX ADMIN — PHENOBARBITAL 64.8 MG: 32.4 TABLET ORAL at 02:09

## 2025-05-07 RX ADMIN — PHENOBARBITAL 64.8 MG: 32.4 TABLET ORAL at 19:40

## 2025-05-07 RX ADMIN — PHENOBARBITAL 64.8 MG: 32.4 TABLET ORAL at 09:55

## 2025-05-07 RX ADMIN — ONDANSETRON 4 MG: 2 INJECTION, SOLUTION INTRAMUSCULAR; INTRAVENOUS at 02:09

## 2025-05-07 RX ADMIN — Medication 100 MG: at 07:45

## 2025-05-07 RX ADMIN — PHENOBARBITAL 64.8 MG: 32.4 TABLET ORAL at 17:22

## 2025-05-07 RX ADMIN — PHENOBARBITAL SODIUM 64.8 MG: 65 INJECTION INTRAMUSCULAR; INTRAVENOUS at 07:45

## 2025-05-07 RX ADMIN — DIPHENHYDRAMINE HYDROCHLORIDE 50 MG: 50 INJECTION INTRAMUSCULAR; INTRAVENOUS at 03:20

## 2025-05-07 RX ADMIN — DROPERIDOL 2.5 MG: 2.5 INJECTION, SOLUTION INTRAMUSCULAR; INTRAVENOUS at 21:17

## 2025-05-07 RX ADMIN — PHENOBARBITAL 64.8 MG: 32.4 TABLET ORAL at 13:48

## 2025-05-07 RX ADMIN — DIPHENHYDRAMINE HYDROCHLORIDE 50 MG: 50 INJECTION INTRAMUSCULAR; INTRAVENOUS at 21:17

## 2025-05-07 RX ADMIN — PHENOBARBITAL 32.4 MG: 32.4 TABLET ORAL at 21:18

## 2025-05-07 ASSESSMENT — PAIN SCALES - GENERAL
PAINLEVEL_OUTOF10: 0
PAINLEVEL_OUTOF10: 3
PAINLEVEL_OUTOF10: 2
PAINLEVEL_OUTOF10: 0
PAINLEVEL_OUTOF10: 0

## 2025-05-07 ASSESSMENT — PAIN DESCRIPTION - DESCRIPTORS
DESCRIPTORS: ACHING

## 2025-05-07 ASSESSMENT — PAIN - FUNCTIONAL ASSESSMENT
PAIN_FUNCTIONAL_ASSESSMENT: PREVENTS OR INTERFERES SOME ACTIVE ACTIVITIES AND ADLS
PAIN_FUNCTIONAL_ASSESSMENT: ACTIVITIES ARE NOT PREVENTED

## 2025-05-07 ASSESSMENT — PAIN DESCRIPTION - ORIENTATION
ORIENTATION: MID

## 2025-05-07 ASSESSMENT — PAIN DESCRIPTION - LOCATION
LOCATION: HEAD

## 2025-05-07 ASSESSMENT — PAIN DESCRIPTION - PAIN TYPE: TYPE: ACUTE PAIN

## 2025-05-07 NOTE — H&P
[facility]   HISTORY AND [facility]YSICAL EXAM      AUTHOR: Ahsan Zhang MD PATIENT NAME: Mark Davila   PCP: Not, On File (Inactive)  MRN: [id number], : 1990       CHIEF COMPLAINT / REASON FOR ADMISSION: Abdominal pain, alcohol withdrawal  HPI:   This is a 34 y.o. male  has a past medical history of Anxiety, Arm pain, Asthma, Concussion with loss of consciousness, Convulsions (HCC), Depression, Flashing lights, Head injury, Headache, Leg pain, Numbness and tingling, PTSD (post-traumatic stress disorder), and Seizures (HCC). presented with Abdominal pain, alcohol withdrawal  for last few days prior to arrival to the hospital.  His last drink was at 3 AM, and he previously consumed 3 pints of liquor daily. He was admitted a month ago for withdrawal after being sober for a year. Has generalized pain and widespread pain that feels like pins and needles and has tremor and anxiety.  The patient was seen and examined at bedside, appears alert and awake with no acute distress and is able to answer simple  questions. On direct questioning, patient denied any  resting ongoing chest pain, resting SOB, hemoptysis, productive cough, fever, ongoing palpitation, active abdominal pain, hematemesis, rectal bleeding, brielle, hematuria, any other  and GI complaints, and any new focal neuro deficits.    ROS:  Pertinent positives and negatives are noted in the HPI, all other systems are reviewed and negative    PMH:  Past Medical History:   Diagnosis Date    Anxiety     Arm pain     Asthma     Concussion with loss of consciousness     Convulsions (HCC)     Depression     Flashing lights     Head injury     Headache     Leg pain     Numbness and tingling     arms and hands    PTSD (post-traumatic stress disorder)     Seizures (HCC)        Surgical History:  Past Surgical History:   Procedure Laterality Date    COLONOSCOPY      ENDOSCOPY, COLON, DIAGNOSTIC      UPPER GASTROINTESTINAL ENDOSCOPY

## 2025-05-07 NOTE — PROGRESS NOTES
Spiritual Health History and Assessment/Progress Note  [facility]    Initial Encounter,  ,  ,      Name: Mark Davila MRN: [id number]    Age: 34 y.o.     Sex: male   Language: English   Judaism: None   Alcohol withdrawal delirium, acute, hyperactive (HCC)     Date: 5/7/2025                           Spiritual Assessment began in [facility]ICU        Referral/Consult From: Rounding   Encounter Overview/Reason: Initial Encounter  Service Provided For: Patient, Significant other    Grace, Belief, Meaning:   Patient Other: No grace belief  Family/Friends Other: No grace belief      Importance and Influence:  Patient has no beliefs influential to healthcare decision-making identified during this visit  Family/Friends have no beliefs influential to healthcare decision-making identified during this visit    Community:  Patient feels well-supported. Support system includes: Spouse/Partner and Extended family  Family/Friends feel well-supported. Support system includes: Spouse/Partner    Assessment and Plan of Care:     Patient Interventions include: Facilitated expression of thoughts and feelings  Family/Friends Interventions include: Facilitated expression of thoughts and feelings    Patient Plan of Care: No spiritual needs identified for follow-up  Family/Friends Plan of Care: No spiritual needs identified for follow-up    Electronically signed by MALENA Gimenez on 5/7/2025 at 9:43 AM

## 2025-05-07 NOTE — PROGRESS NOTES
Admitting Date and Time: 5/6/2025  1:51 PM  Admit Dx: Alcohol withdrawal delirium, acute, hyperactive (HCC) [F10.931]    Subjective:    Pt feels like his body is on fire  When I came into the room patient is rolled up into a ball with his friend Tevin striking his hair.  Per RN: Scoring high on CIWA still this morning.  ROS: denies fever, chills, cp, sob, n/v, HA unless stated above.     PHENobarbital  64.8 mg IntraVENous Once    folic acid  1 mg Oral Once    promethazine  25 mg IntraMUSCular Once    enoxaparin  40 mg SubCUTAneous Daily    thiamine  100 mg Oral Daily    multivitamin  1 tablet Oral Daily    PHENobarbital  64.8 mg Oral 4x daily    Followed by    PHENobarbital  32.4 mg Oral 4x daily    Followed by    [START ON 5/8/2025] PHENobarbital  32.4 mg Oral BID    Followed by    [START ON 5/9/2025] PHENobarbital  16.2 mg Oral BID     diphenhydrAMINE, 50 mg, Q6H PRN  droPERidol, 2.5 mg, Q6H PRN  promethazine, 12.5 mg, Q6H PRN   Or  ondansetron, 4 mg, Q6H PRN  polyethylene glycol, 17 g, Daily PRN  acetaminophen, 650 mg, Q6H PRN   Or  acetaminophen, 650 mg, Q6H PRN  PHENobarbital, 64.8 mg, Q6H PRN   Followed by  PHENobarbital, 32.4 mg, Q6H PRN   Followed by  [START ON 5/9/2025] PHENobarbital, 16.2 mg, Q6H PRN         Objective:    /76   Pulse 67   Temp 97.9 °F (36.6 °C)   Resp 20   Ht 1.702 m (5' 7\")   Wt 68 kg (150 lb)   SpO2 97%   BMI 23.49 kg/m²   General Appearance: alert and oriented to person, place and time and in no acute distress  Skin: warm and dry  Head: normocephalic and atraumatic  Eyes: pupils equal, round, and reactive to light, extraocular eye movements intact, conjunctivae normal  Neck: neck supple and non tender without mass   Pulmonary/Chest: clear to auscultation bilaterally- no wheezes, rales or rhonchi, normal air movement, no respiratory distress  Cardiovascular: normal rate, normal S1 and S2 and no carotid bruits  Abdomen: soft, non-tender, non-distended, normal bowel sounds, no

## 2025-05-07 NOTE — PROGRESS NOTES
4 Eyes Skin Assessment     NAME:  Mark Davila  YOB: 1990  MEDICAL RECORD NUMBER:  [id number]    The patient is being assessed for  Admission    I agree that at least one RN has performed a thorough Head to Toe Skin Assessment on the patient. ALL assessment sites listed below have been assessed.      Areas assessed by both nurses:    Head, Face, Ears, Shoulders, Back, Chest, Arms, Elbows, Hands, Sacrum. Buttock, Coccyx, Ischium, Legs. Feet and Heels, and Under Medical Devices         Does the Patient have a Wound? No noted wound(s)       Maximus Prevention initiated by RN: Yes  Wound Care Orders initiated by RN: No    Pressure Injury (Stage 3,4, Unstageable, DTI, NWPT, and Complex wounds) if present, place Wound referral order by RN under : No    New Ostomies, if present place, Ostomy referral order under : No     Nurse 1 eSignature: Electronically signed by Westley Peace RN on 5/7/25 at 6:49 AM EDT    **SHARE this note so that the co-signing nurse can place an eSignature**    Nurse 2 eSignature: {Esignature:[id number]}

## 2025-05-07 NOTE — PLAN OF CARE
Problem: Discharge Planning  Goal: Discharge to home or other facility with appropriate resources  Outcome: Progressing  Flowsheets  Taken 5/6/2025 2135  Discharge to home or other facility with appropriate resources:   Identify barriers to discharge with patient and caregiver   Arrange for needed discharge resources and transportation as appropriate   Identify discharge learning needs (meds, wound care, etc)   Refer to discharge planning if patient needs post-hospital services based on physician order or complex needs related to functional status, cognitive ability or social support system  Taken 5/6/2025 2125  Discharge to home or other facility with appropriate resources:   Identify barriers to discharge with patient and caregiver   Arrange for needed discharge resources and transportation as appropriate   Identify discharge learning needs (meds, wound care, etc)   Refer to discharge planning if patient needs post-hospital services based on physician order or complex needs related to functional status, cognitive ability or social support system     Problem: Pain  Goal: Verbalizes/displays adequate comfort level or baseline comfort level  Outcome: Progressing

## 2025-05-08 PROCEDURE — 6360000002 HC RX W HCPCS: Performed by: INTERNAL MEDICINE

## 2025-05-08 PROCEDURE — 99232 SBSQ HOSP IP/OBS MODERATE 35: CPT | Performed by: INTERNAL MEDICINE

## 2025-05-08 PROCEDURE — 2060000000 HC ICU INTERMEDIATE R&B

## 2025-05-08 PROCEDURE — 6370000000 HC RX 637 (ALT 250 FOR IP): Performed by: INTERNAL MEDICINE

## 2025-05-08 RX ADMIN — ACETAMINOPHEN 650 MG: 325 TABLET ORAL at 19:59

## 2025-05-08 RX ADMIN — DIPHENHYDRAMINE HYDROCHLORIDE 50 MG: 50 INJECTION INTRAMUSCULAR; INTRAVENOUS at 03:53

## 2025-05-08 RX ADMIN — ENOXAPARIN SODIUM 40 MG: 100 INJECTION SUBCUTANEOUS at 20:00

## 2025-05-08 RX ADMIN — PHENOBARBITAL 32.4 MG: 32.4 TABLET ORAL at 14:26

## 2025-05-08 RX ADMIN — Medication 1 TABLET: at 08:05

## 2025-05-08 RX ADMIN — PROMETHAZINE HYDROCHLORIDE 12.5 MG: 25 TABLET ORAL at 09:57

## 2025-05-08 RX ADMIN — ONDANSETRON 4 MG: 2 INJECTION, SOLUTION INTRAMUSCULAR; INTRAVENOUS at 01:39

## 2025-05-08 RX ADMIN — PHENOBARBITAL 32.4 MG: 32.4 TABLET ORAL at 20:00

## 2025-05-08 RX ADMIN — PHENOBARBITAL 32.4 MG: 32.4 TABLET ORAL at 18:13

## 2025-05-08 RX ADMIN — PHENOBARBITAL 32.4 MG: 32.4 TABLET ORAL at 11:44

## 2025-05-08 RX ADMIN — PROMETHAZINE HYDROCHLORIDE 12.5 MG: 25 TABLET ORAL at 19:59

## 2025-05-08 RX ADMIN — PHENOBARBITAL 32.4 MG: 32.4 TABLET ORAL at 08:05

## 2025-05-08 RX ADMIN — PHENOBARBITAL 32.4 MG: 32.4 TABLET ORAL at 01:39

## 2025-05-08 RX ADMIN — PHENOBARBITAL 32.4 MG: 32.4 TABLET ORAL at 22:17

## 2025-05-08 RX ADMIN — Medication 100 MG: at 08:05

## 2025-05-08 RX ADMIN — DIPHENHYDRAMINE HYDROCHLORIDE 50 MG: 50 INJECTION INTRAMUSCULAR; INTRAVENOUS at 20:02

## 2025-05-08 ASSESSMENT — PAIN SCALES - GENERAL
PAINLEVEL_OUTOF10: 4
PAINLEVEL_OUTOF10: 3

## 2025-05-08 NOTE — PROGRESS NOTES
Admitting Date and Time: 5/6/2025  1:51 PM  Admit Dx: Alcohol withdrawal delirium, acute, hyperactive (HCC) [F10.931]    Subjective:    Pt feels like his body is on fire  When I came into the room patient is rolled up into a ball with his friend Tevin striking his hair.  Per RN: Scoring high on CIWA still this morning.  ROS: denies fever, chills, cp, sob, n/v, HA unless stated above.     nicotine  1 patch TransDERmal Daily    folic acid  1 mg Oral Once    promethazine  25 mg IntraMUSCular Once    enoxaparin  40 mg SubCUTAneous Daily    thiamine  100 mg Oral Daily    multivitamin  1 tablet Oral Daily    PHENobarbital  32.4 mg Oral 4x daily    Followed by    PHENobarbital  32.4 mg Oral BID    Followed by    [START ON 5/9/2025] PHENobarbital  16.2 mg Oral BID     diphenhydrAMINE, 50 mg, Q6H PRN  droPERidol, 2.5 mg, Q6H PRN  promethazine, 12.5 mg, Q6H PRN   Or  ondansetron, 4 mg, Q6H PRN  polyethylene glycol, 17 g, Daily PRN  acetaminophen, 650 mg, Q6H PRN   Or  acetaminophen, 650 mg, Q6H PRN  PHENobarbital, 32.4 mg, Q6H PRN   Followed by  [START ON 5/9/2025] PHENobarbital, 16.2 mg, Q6H PRN         Objective:    /77   Pulse 71   Temp 98.1 °F (36.7 °C) (Oral)   Resp 18   Ht 1.702 m (5' 7\")   Wt 68 kg (150 lb)   SpO2 98%   BMI 23.49 kg/m²   General Appearance: alert and oriented to person, place and time and in no acute distress  Skin: warm and dry  Head: normocephalic and atraumatic  Eyes: pupils equal, round, and reactive to light, extraocular eye movements intact, conjunctivae normal  Neck: neck supple and non tender without mass   Pulmonary/Chest: clear to auscultation bilaterally- no wheezes, rales or rhonchi, normal air movement, no respiratory distress  Cardiovascular: normal rate, normal S1 and S2 and no carotid bruits  Abdomen: soft, non-tender, non-distended, normal bowel sounds, no masses or organomegaly  Extremities: no cyanosis, no clubbing and no  edema  Neurologic: no cranial nerve deficit and

## 2025-05-08 NOTE — CARE COORDINATION
Case Management Assessment  Initial Evaluation    Date/Time of Evaluation: 5/8/2025 6:29 PM  Assessment Completed by: Francisca Coates RN    If patient is discharged prior to next notation, then this note serves as note for discharge by case management.    Patient Name: Mark Davila                   YOB: 1990  Diagnosis: Alcohol withdrawal delirium, acute, hyperactive (HCC) [F10.931]                   Date / Time: 5/6/2025  1:51 PM    Patient Admission Status: Inpatient   Readmission Risk (Low < 19, Mod (19-27), High > 27): Readmission Risk Score: 21.8    Current PCP: Not, On File (Inactive)  PCP verified by CM? No (Pt does not have a PCP. Provided [facility] PCP list)    Chart Reviewed: Yes      History Provided by: Patient, Significant Other  Patient Orientation: Alert and Oriented    Patient Cognition: Alert    Hospitalization in the last 30 days (Readmission):  No    If yes, Readmission Assessment in CM Navigator will be completed.    Advance Directives:      Code Status: Full Code   Patient's Primary Decision Maker is: Legal Next of Kin    Primary Decision Maker: Delfin Davila - Brother/Sister - [phone]    Discharge Planning:    Patient lives with: Spouse/Significant Other Type of Home: Apartment  Primary Care Giver: Self  Patient Support Systems include: Spouse/Significant Other, Family Members   Current Financial resources:    Current community resources:    Current services prior to admission: None            Current DME:              Type of Home Care services:  None    ADLS  Prior functional level: Independent in ADLs/IADLs  Current functional level: Independent in ADLs/IADLs      Family can provide assistance at DC: Yes  Would you like Case Management to discuss the discharge plan with any other family members/significant others, and if so, who? No  Plans to Return to Present Housing: Yes  Other Identified Issues/Barriers to RETURNING to current housing: none  Potential

## 2025-05-08 NOTE — PROGRESS NOTES
Spiritual Health History and Assessment/Progress Note  [facility]    (P) Follow-up,  ,  ,      Name: Mark Davila MRN: [id number]    Age: 34 y.o.     Sex: male   Language: English   Islam: None   Alcohol withdrawal delirium, acute, hyperactive (HCC)     Date: 5/8/2025                           Spiritual Assessment continued in [facility] PIC/ICU        Referral/Consult From: (P) Rounding   Encounter Overview/Reason: (P) Follow-up  Service Provided For: (P) Patient    Grace, Belief, Meaning:   Patient Other: Wants to start attending a Islam. Patient feels like he needs help from a higher power.    Family/Friends No family/friends present      Importance and Influence:  Patient has no beliefs influential to healthcare decision-making identified during this visit  Family/Friends No family/friends present    Community:  Patient Other: Wants to start attending a Islam. Patient feels like he needs help from a higher power.  Family/Friends No family/friends present    Assessment and Plan of Care:     Patient Interventions include: Facilitated expression of thoughts and feelings  Family/Friends Interventions include: No family/friends present    Patient Plan of Care: Spiritual Care available upon further referral  Family/Friends Plan of Care: No family/friends present    Electronically signed by Chaplain Kimberly on 5/8/2025 at 3:13 PM

## 2025-05-08 NOTE — PLAN OF CARE
Problem: Discharge Planning  Goal: Discharge to home or other facility with appropriate resources  Outcome: Progressing  Flowsheets (Taken 5/7/2025 2000)  Discharge to home or other facility with appropriate resources:   Identify barriers to discharge with patient and caregiver   Arrange for needed discharge resources and transportation as appropriate   Identify discharge learning needs (meds, wound care, etc)   Refer to discharge planning if patient needs post-hospital services based on physician order or complex needs related to functional status, cognitive ability or social support system     Problem: Pain  Goal: Verbalizes/displays adequate comfort level or baseline comfort level  Outcome: Progressing     Problem: Safety - Adult  Goal: Free from fall injury  Outcome: Progressing  Flowsheets (Taken 5/7/2025 2000)  Free From Fall Injury:   Instruct family/caregiver on patient safety   Based on caregiver fall risk screen, instruct family/caregiver to ask for assistance with transferring infant if caregiver noted to have fall risk factors     Problem: ABCDS Injury Assessment  Goal: Absence of physical injury  Outcome: Progressing

## 2025-05-09 VITALS
TEMPERATURE: 98.6 F | SYSTOLIC BLOOD PRESSURE: 105 MMHG | WEIGHT: 150 LBS | RESPIRATION RATE: 20 BRPM | DIASTOLIC BLOOD PRESSURE: 77 MMHG | HEIGHT: 67 IN | BODY MASS INDEX: 23.54 KG/M2 | OXYGEN SATURATION: 96 % | HEART RATE: 87 BPM

## 2025-05-09 PROCEDURE — 6370000000 HC RX 637 (ALT 250 FOR IP): Performed by: INTERNAL MEDICINE

## 2025-05-09 PROCEDURE — 6360000002 HC RX W HCPCS: Performed by: INTERNAL MEDICINE

## 2025-05-09 PROCEDURE — 99232 SBSQ HOSP IP/OBS MODERATE 35: CPT | Performed by: INTERNAL MEDICINE

## 2025-05-09 RX ADMIN — PHENOBARBITAL 32.4 MG: 32.4 TABLET ORAL at 02:37

## 2025-05-09 RX ADMIN — PHENOBARBITAL 32.4 MG: 32.4 TABLET ORAL at 19:33

## 2025-05-09 RX ADMIN — PHENOBARBITAL 32.4 MG: 32.4 TABLET ORAL at 09:01

## 2025-05-09 RX ADMIN — Medication 1 TABLET: at 09:01

## 2025-05-09 RX ADMIN — DROPERIDOL 2.5 MG: 2.5 INJECTION, SOLUTION INTRAMUSCULAR; INTRAVENOUS at 05:01

## 2025-05-09 RX ADMIN — Medication 100 MG: at 09:01

## 2025-05-09 RX ADMIN — DIPHENHYDRAMINE HYDROCHLORIDE 50 MG: 50 INJECTION INTRAMUSCULAR; INTRAVENOUS at 02:23

## 2025-05-09 RX ADMIN — DIPHENHYDRAMINE HYDROCHLORIDE 50 MG: 50 INJECTION INTRAMUSCULAR; INTRAVENOUS at 19:33

## 2025-05-09 ASSESSMENT — PAIN SCALES - GENERAL: PAINLEVEL_OUTOF10: 0

## 2025-05-09 NOTE — PROGRESS NOTES
Admitting Date and Time: 5/6/2025  1:51 PM  Admit Dx: Alcohol withdrawal delirium, acute, hyperactive (HCC) [F10.931]    Subjective:  malaise  Per RN: Scoring lower on CIWA    ROS: denies fever, chills, cp, sob, n/v, HA unless stated above.     nicotine  1 patch TransDERmal Daily    folic acid  1 mg Oral Once    promethazine  25 mg IntraMUSCular Once    enoxaparin  40 mg SubCUTAneous Daily    thiamine  100 mg Oral Daily    multivitamin  1 tablet Oral Daily    PHENobarbital  32.4 mg Oral BID    Followed by    PHENobarbital  16.2 mg Oral BID     diphenhydrAMINE, 50 mg, Q6H PRN  droPERidol, 2.5 mg, Q6H PRN  promethazine, 12.5 mg, Q6H PRN   Or  ondansetron, 4 mg, Q6H PRN  polyethylene glycol, 17 g, Daily PRN  acetaminophen, 650 mg, Q6H PRN   Or  acetaminophen, 650 mg, Q6H PRN  PHENobarbital, 32.4 mg, Q6H PRN   Followed by  PHENobarbital, 16.2 mg, Q6H PRN         Objective:    BP 98/75   Pulse 69   Temp 98.1 °F (36.7 °C) (Oral)   Resp 18   Ht 1.702 m (5' 7\")   Wt 68 kg (150 lb)   SpO2 98%   BMI 23.49 kg/m²   General Appearance: alert and oriented to person, place and time and in no acute distress  Skin: warm and dry  Head: normocephalic and atraumatic  Eyes: pupils equal, round, and reactive to light, extraocular eye movements intact, conjunctivae normal  Neck: neck supple and non tender without mass   Pulmonary/Chest: clear to auscultation bilaterally- no wheezes, rales or rhonchi, normal air movement, no respiratory distress  Cardiovascular: normal rate, normal S1 and S2 and no carotid bruits  Abdomen: soft, non-tender, non-distended, normal bowel sounds, no masses or organomegaly  Extremities: no cyanosis, no clubbing and no  edema  Neurologic: no cranial nerve deficit and speech normal      Recent Labs     05/06/25  1456 05/07/25  0815    138   K 4.4 3.9   CL 93* 94*   CO2 23 24   BUN 4* 9   CREATININE 0.6* 0.8   GLUCOSE 118* 110*   CALCIUM 9.5 9.7       Recent Labs     05/06/25  1456   ALKPHOS 153*

## 2025-05-09 NOTE — CARE COORDINATION
5-9-Cm note: Pt resting, states he is feeling better today but is concerned about going thru withdrawals from ETOH, pt's girlfriend at the bedside, pt plans on outpt ETOH rehab, denies speaking to [facility]. Electronically signed by Mela Clayton RN on 5/9/2025 at 1:12 PM

## 2025-05-10 NOTE — CARE COORDINATION
Called by RN for patient agitation, demanding medications on top of phenobarb and stating he will leave AMA.  Went to see patient, he was aggressive verbally, swearing frequently, security called for staff safety.  Patient then calmed down, he stated there was no reason to stay at the hospital because he is not interested in rehab after d/c, he just wants more medications now.  He verbalizes understanding of risk of injury up to and including death if he leaves AMA and he states he still wants to leave AMA.  RN to print out AMA papers.

## 2025-05-10 NOTE — PROGRESS NOTES
Dr Maya called and notified that patient is becoming agitated and upset with the treatment he is receiving for alcohol withdrawal, stating he wants to leave AMA. Dr Maya arrived and attempted to speak to the patient but he became belligerent, cursing and not letting her talk. Security called and standing outside of room. Dr Maya then explained to the patient the risks of leaving AMA. Patient signed AMA papers. IV and tele monitor removed. Patient has now left the unit.

## 2025-08-22 ENCOUNTER — HOSPITAL ENCOUNTER (EMERGENCY)
Age: 35
Discharge: HOME | End: 2025-08-22
Payer: COMMERCIAL

## 2025-08-22 VITALS
SYSTOLIC BLOOD PRESSURE: 113 MMHG | OXYGEN SATURATION: 100 % | TEMPERATURE: 97.1 F | HEART RATE: 98 BPM | DIASTOLIC BLOOD PRESSURE: 65 MMHG | RESPIRATION RATE: 18 BRPM

## 2025-08-22 DIAGNOSIS — L73.9 ACUTE FOLLICULITIS: ICD-10-CM

## 2025-08-22 DIAGNOSIS — Z11.3 SCREEN FOR STD (SEXUALLY TRANSMITTED DISEASE): Primary | ICD-10-CM

## 2025-08-22 DIAGNOSIS — L98.9 SKIN LESIONS: ICD-10-CM

## 2025-08-22 DIAGNOSIS — N50.9 SCROTAL SKIN LESION: ICD-10-CM

## 2025-08-22 LAB
BILIRUB UR QL STRIP: NEGATIVE
CASTS #/AREA URNS LPF: ABNORMAL /LPF
CLARITY UR: CLEAR
COLOR UR: YELLOW
EPI CELLS #/AREA URNS HPF: ABNORMAL /HPF
GLUCOSE UR STRIP-MCNC: NEGATIVE MG/DL
HGB UR QL STRIP.AUTO: NEGATIVE
KETONES UR STRIP-MCNC: NEGATIVE MG/DL
LEUKOCYTE ESTERASE UR QL STRIP: NEGATIVE
MUCOUS THREADS URNS QL MICRO: PRESENT
NITRITE UR QL STRIP: NEGATIVE
PH UR STRIP: 6 [PH] (ref 5–8)
PROT UR STRIP-MCNC: NEGATIVE MG/DL
RBC #/AREA URNS HPF: ABNORMAL /HPF
SP GR UR STRIP: 1.02 (ref 1–1.03)
UROBILINOGEN UR STRIP-ACNC: 0.2 EU/DL (ref 0–1)
WBC #/AREA URNS HPF: ABNORMAL /HPF

## 2025-08-22 PROCEDURE — 87086 URINE CULTURE/COLONY COUNT: CPT

## 2025-08-22 PROCEDURE — 87591 N.GONORRHOEAE DNA AMP PROB: CPT

## 2025-08-22 PROCEDURE — 87491 CHLMYD TRACH DNA AMP PROBE: CPT

## 2025-08-22 PROCEDURE — 99284 EMERGENCY DEPT VISIT MOD MDM: CPT

## 2025-08-22 PROCEDURE — 81001 URINALYSIS AUTO W/SCOPE: CPT

## 2025-08-22 PROCEDURE — 96372 THER/PROPH/DIAG INJ SC/IM: CPT

## 2025-08-22 PROCEDURE — 6360000002 HC RX W HCPCS

## 2025-08-22 PROCEDURE — 87529 HSV DNA AMP PROBE: CPT

## 2025-08-22 PROCEDURE — 6370000000 HC RX 637 (ALT 250 FOR IP)

## 2025-08-22 RX ORDER — ONDANSETRON 4 MG/1
4 TABLET, ORALLY DISINTEGRATING ORAL ONCE
Status: COMPLETED | OUTPATIENT
Start: 2025-08-22 | End: 2025-08-22

## 2025-08-22 RX ORDER — METRONIDAZOLE 500 MG/1
2000 TABLET ORAL ONCE
Status: COMPLETED | OUTPATIENT
Start: 2025-08-22 | End: 2025-08-22

## 2025-08-22 RX ORDER — AZITHROMYCIN 250 MG/1
1000 TABLET, FILM COATED ORAL ONCE
Status: COMPLETED | OUTPATIENT
Start: 2025-08-22 | End: 2025-08-22

## 2025-08-22 RX ORDER — DOXYCYCLINE HYCLATE 100 MG
100 TABLET ORAL 2 TIMES DAILY
Qty: 28 TABLET | Refills: 0 | Status: SHIPPED | OUTPATIENT
Start: 2025-08-22 | End: 2025-09-05

## 2025-08-22 RX ADMIN — AZITHROMYCIN DIHYDRATE 1000 MG: 250 TABLET ORAL at 19:35

## 2025-08-22 RX ADMIN — METRONIDAZOLE 2000 MG: 500 TABLET ORAL at 19:34

## 2025-08-22 RX ADMIN — LIDOCAINE HYDROCHLORIDE 500 MG: 10 INJECTION, SOLUTION EPIDURAL; INFILTRATION; INTRACAUDAL; PERINEURAL at 19:32

## 2025-08-22 RX ADMIN — ONDANSETRON 4 MG: 4 TABLET, ORALLY DISINTEGRATING ORAL at 19:34

## 2025-08-23 LAB
MICROORGANISM SPEC CULT: ABNORMAL
SERVICE CMNT-IMP: ABNORMAL
SPECIMEN DESCRIPTION: ABNORMAL

## 2025-08-24 ENCOUNTER — HOSPITAL ENCOUNTER (EMERGENCY)
Age: 35
Discharge: LEFT BEFORE DISCHARGE | End: 2025-08-24
Payer: COMMERCIAL

## 2025-08-24 VITALS
TEMPERATURE: 98 F | OXYGEN SATURATION: 97 % | SYSTOLIC BLOOD PRESSURE: 104 MMHG | HEART RATE: 80 BPM | RESPIRATION RATE: 18 BRPM | DIASTOLIC BLOOD PRESSURE: 76 MMHG

## 2025-08-24 DIAGNOSIS — Z53.21 PATIENT LEFT WITHOUT BEING SEEN: Primary | ICD-10-CM

## 2025-08-24 LAB
HSV1 DNA SPEC QL NAA+PROBE: NOT DETECTED
HSV2 DNA SPEC QL NAA+PROBE: NOT DETECTED
SPECIMEN DESCRIPTION: NORMAL

## 2025-08-24 PROCEDURE — 99281 EMR DPT VST MAYX REQ PHY/QHP: CPT

## 2025-08-26 LAB
CHLAMYDIA DNA UR QL NAA+PROBE: NEGATIVE
N GONORRHOEA DNA UR QL NAA+PROBE: NEGATIVE
SPECIMEN DESCRIPTION: NORMAL

## 2025-09-06 ENCOUNTER — HOSPITAL ENCOUNTER (EMERGENCY)
Age: 35
Discharge: HOME | End: 2025-09-06
Attending: EMERGENCY MEDICINE
Payer: COMMERCIAL

## 2025-09-06 VITALS
SYSTOLIC BLOOD PRESSURE: 113 MMHG | HEART RATE: 124 BPM | DIASTOLIC BLOOD PRESSURE: 83 MMHG | RESPIRATION RATE: 20 BRPM | TEMPERATURE: 98.2 F | OXYGEN SATURATION: 96 %

## 2025-09-06 DIAGNOSIS — R21 RASH AND OTHER NONSPECIFIC SKIN ERUPTION: Primary | ICD-10-CM

## 2025-09-06 LAB
ALBUMIN SERPL-MCNC: 4.4 G/DL (ref 3.5–5.2)
ALP SERPL-CCNC: 91 U/L (ref 40–129)
ALT SERPL-CCNC: 17 U/L (ref 0–50)
ANION GAP SERPL CALCULATED.3IONS-SCNC: 12 MMOL/L (ref 7–16)
AST SERPL-CCNC: 15 U/L (ref 0–50)
BASOPHILS # BLD: 0.09 K/UL (ref 0–0.2)
BASOPHILS NFR BLD: 1 % (ref 0–2)
BILIRUB SERPL-MCNC: 0.5 MG/DL (ref 0–1.2)
BUN SERPL-MCNC: 16 MG/DL (ref 6–20)
CALCIUM SERPL-MCNC: 9.9 MG/DL (ref 8.6–10)
CHLORIDE SERPL-SCNC: 103 MMOL/L (ref 98–107)
CO2 SERPL-SCNC: 22 MMOL/L (ref 22–29)
CREAT SERPL-MCNC: 0.7 MG/DL (ref 0.7–1.2)
EOSINOPHIL # BLD: 0.64 K/UL (ref 0.05–0.5)
EOSINOPHILS RELATIVE PERCENT: 4 % (ref 0–6)
ERYTHROCYTE [DISTWIDTH] IN BLOOD BY AUTOMATED COUNT: 13.1 % (ref 11.5–15)
GFR, ESTIMATED: >90 ML/MIN/1.73M2
GLUCOSE SERPL-MCNC: 94 MG/DL (ref 74–99)
HCT VFR BLD AUTO: 44.3 % (ref 37–54)
HGB BLD-MCNC: 15 G/DL (ref 12.5–16.5)
HIV 1+2 AB+HIV1 P24 AG SERPL QL IA: NONREACTIVE
IMM GRANULOCYTES # BLD AUTO: 0.09 K/UL (ref 0–0.58)
IMM GRANULOCYTES NFR BLD: 1 % (ref 0–5)
LYMPHOCYTES NFR BLD: 3.09 K/UL (ref 1.5–4)
LYMPHOCYTES RELATIVE PERCENT: 17 % (ref 20–42)
MCH RBC QN AUTO: 30.6 PG (ref 26–35)
MCHC RBC AUTO-ENTMCNC: 33.9 G/DL (ref 32–34.5)
MCV RBC AUTO: 90.4 FL (ref 80–99.9)
MONOCYTES NFR BLD: 1.32 K/UL (ref 0.1–0.95)
MONOCYTES NFR BLD: 7 % (ref 2–12)
NEUTROPHILS NFR BLD: 71 % (ref 43–80)
NEUTS SEG NFR BLD: 12.51 K/UL (ref 1.8–7.3)
PLATELET, FLUORESCENCE: 223 K/UL (ref 130–450)
PMV BLD AUTO: 13.2 FL (ref 7–12)
POTASSIUM SERPL-SCNC: 4.5 MMOL/L (ref 3.5–5.1)
PROT SERPL-MCNC: 7.5 G/DL (ref 6.4–8.3)
RBC # BLD AUTO: 4.9 M/UL (ref 3.8–5.8)
SODIUM SERPL-SCNC: 138 MMOL/L (ref 136–145)
T PALLIDUM AB SER QL IA: NONREACTIVE
WBC OTHER # BLD: 17.7 K/UL (ref 4.5–11.5)

## 2025-09-06 PROCEDURE — 87389 HIV-1 AG W/HIV-1&-2 AB AG IA: CPT

## 2025-09-06 PROCEDURE — 86780 TREPONEMA PALLIDUM: CPT

## 2025-09-06 PROCEDURE — 80053 COMPREHEN METABOLIC PANEL: CPT

## 2025-09-06 PROCEDURE — 85025 COMPLETE CBC W/AUTO DIFF WBC: CPT

## 2025-09-06 RX ORDER — PREDNISONE 10 MG/1
TABLET ORAL
Qty: 20 TABLET | Refills: 0 | Status: SHIPPED | OUTPATIENT
Start: 2025-09-06 | End: 2025-09-16

## (undated) DEVICE — BLOCK BITE 60FR CAREGUARD

## (undated) DEVICE — SPONGE GZ 4IN 4IN 4 PLY N WVN AVANT

## (undated) DEVICE — CONTAINER SPEC COLL 960ML POLYPR TRIANG GRAD INTAKE/OUTPUT

## (undated) DEVICE — MASK,FACE,MAXFLUIDPROTECT,SHIELD/ERLPS: Brand: MEDLINE

## (undated) DEVICE — KENDALL 450 SERIES MONITORING FOAM ELECTRODE - RECTANGULAR SHAPE ( 3/PK): Brand: KENDALL

## (undated) DEVICE — COVER,LIGHT HANDLE,FLX,1/PK: Brand: MEDLINE INDUSTRIES, INC.

## (undated) DEVICE — FORCEPS BX L240CM JAW DIA2.8MM L CAP W/ NDL MIC MESH TOOTH

## (undated) DEVICE — LUBRICANT SURG JELLY ST BACTER TUBE 4.25OZ

## (undated) DEVICE — GOWN ISOLATN REG YEL M WT MULTIPLY SIDETIE LEV 2

## (undated) DEVICE — YANKAUER,BULB TIP,W/O VENT,RIGID,STERILE: Brand: MEDLINE

## (undated) DEVICE — KIT BEDSIDE REVITAL OX 500ML

## (undated) DEVICE — Device: Brand: DEFENDO VALVE AND CONNECTOR KIT

## (undated) DEVICE — TUBING, SUCTION, 1/4" X 10', STRAIGHT: Brand: MEDLINE

## (undated) DEVICE — 6 X 9  1.75MIL 4-WALL LABGUARD: Brand: MINIGRIP COMMERCIAL LLC

## (undated) DEVICE — TOWEL,OR,DSP,ST,BLUE,STD,6/PK,12PK/CS: Brand: MEDLINE